# Patient Record
Sex: FEMALE | Race: WHITE | Employment: OTHER | URBAN - METROPOLITAN AREA
[De-identification: names, ages, dates, MRNs, and addresses within clinical notes are randomized per-mention and may not be internally consistent; named-entity substitution may affect disease eponyms.]

---

## 2017-08-08 ENCOUNTER — HOSPITAL ENCOUNTER (OUTPATIENT)
Dept: LAB | Age: 82
Discharge: HOME | End: 2017-08-08
Attending: INTERNAL MEDICINE
Payer: MEDICARE

## 2017-08-08 DIAGNOSIS — M81.8 OTHER OSTEOPOROSIS: ICD-10-CM

## 2017-08-08 DIAGNOSIS — E83.52 HYPERCALCEMIA: ICD-10-CM

## 2017-08-08 LAB
ALBUMIN SERPL BCP-MCNC: 4.1 G/DL (ref 3.2–4.6)
ALBUMIN/GLOB SERPL: 1.1 {RATIO}
ALP SERPL-CCNC: 77 U/L (ref 50–136)
ALT SERPL-CCNC: 21 U/L (ref 12–65)
ANION GAP BLD CALC-SCNC: 8 MMOL/L
AST SERPL W P-5'-P-CCNC: 17 U/L (ref 15–37)
BILIRUB SERPL-MCNC: 0.7 MG/DL (ref 0.2–1.1)
BUN SERPL-MCNC: 25 MG/DL (ref 8–23)
CA-I BLD-MCNC: 5.44 MG/DL (ref 4–5.2)
CALCIUM SERPL-MCNC: 10 MG/DL (ref 8.3–10.4)
CHLORIDE SERPL-SCNC: 107 MMOL/L (ref 98–107)
CO2 SERPL-SCNC: 25 MMOL/L (ref 21–32)
CREAT SERPL-MCNC: 1 MG/DL (ref 0.6–1)
CREAT UR-MCNC: 112 MG/DL
GLOBULIN SER CALC-MCNC: 3.6 G/DL
GLUCOSE SERPL-MCNC: 114 MG/DL (ref 65–100)
POTASSIUM SERPL-SCNC: 4.3 MMOL/L (ref 3.5–5.1)
PROT SERPL-MCNC: 7.7 G/DL (ref 6.3–8.2)
SODIUM SERPL-SCNC: 140 MMOL/L (ref 136–145)

## 2017-08-08 PROCEDURE — 82306 VITAMIN D 25 HYDROXY: CPT | Performed by: INTERNAL MEDICINE

## 2017-08-08 PROCEDURE — 82340 ASSAY OF CALCIUM IN URINE: CPT

## 2017-08-08 PROCEDURE — 36415 COLL VENOUS BLD VENIPUNCTURE: CPT | Performed by: INTERNAL MEDICINE

## 2017-08-08 PROCEDURE — 82330 ASSAY OF CALCIUM: CPT | Performed by: INTERNAL MEDICINE

## 2017-08-08 PROCEDURE — 80053 COMPREHEN METABOLIC PANEL: CPT | Performed by: INTERNAL MEDICINE

## 2017-08-08 PROCEDURE — 83970 ASSAY OF PARATHORMONE: CPT | Performed by: INTERNAL MEDICINE

## 2017-08-08 PROCEDURE — 82164 ANGIOTENSIN I ENZYME TEST: CPT | Performed by: INTERNAL MEDICINE

## 2017-08-08 PROCEDURE — 82570 ASSAY OF URINE CREATININE: CPT | Performed by: INTERNAL MEDICINE

## 2017-08-09 LAB
25(OH)D3+25(OH)D2 SERPL-MCNC: 48.8 NG/ML (ref 30–100)
ACE SERPL-CCNC: < 15 U/L (ref 14–82)

## 2017-08-11 LAB
CALCIUM SERPL-MCNC: 10 MG/DL (ref 8.3–10.4)
Lab: NORMAL
REFERENCE LAB,REFLB: NORMAL
TEST DESCRIPTION:,ATST: NORMAL

## 2017-08-17 ENCOUNTER — HOSPITAL ENCOUNTER (OUTPATIENT)
Dept: LAB | Age: 82
Discharge: HOME | End: 2017-08-17
Attending: INTERNAL MEDICINE
Payer: MEDICARE

## 2017-08-17 DIAGNOSIS — E83.52 HYPERCALCEMIA: ICD-10-CM

## 2017-08-17 LAB
ANION GAP SERPL CALC-SCNC: 13 MMOL/L
BUN SERPL-MCNC: 28 MG/DL (ref 8–23)
CALCIUM SERPL-MCNC: 9.7 MG/DL (ref 8.3–10.4)
CALCIUM SERPL-MCNC: 9.7 MG/DL (ref 8.3–10.4)
CHLORIDE SERPL-SCNC: 104 MMOL/L (ref 98–107)
CO2 SERPL-SCNC: 24 MMOL/L (ref 21–32)
CREAT SERPL-MCNC: 1.2 MG/DL (ref 0.6–1)
GLUCOSE SERPL-MCNC: 161 MG/DL (ref 65–100)
POTASSIUM SERPL-SCNC: 4.2 MMOL/L (ref 3.5–5.1)
PTH-INTACT SERPL-MCNC: 24.3 PG/ML (ref 14–72)
SODIUM SERPL-SCNC: 141 MMOL/L (ref 136–145)

## 2017-08-17 PROCEDURE — 36415 COLL VENOUS BLD VENIPUNCTURE: CPT | Performed by: INTERNAL MEDICINE

## 2017-08-17 PROCEDURE — 83970 ASSAY OF PARATHORMONE: CPT | Performed by: INTERNAL MEDICINE

## 2017-08-17 PROCEDURE — 80048 BASIC METABOLIC PNL TOTAL CA: CPT | Performed by: INTERNAL MEDICINE

## 2017-10-06 ENCOUNTER — ANESTHESIA (OUTPATIENT)
Dept: SURGERY | Age: 82
End: 2017-10-06
Payer: MEDICARE

## 2017-10-06 ENCOUNTER — ANESTHESIA EVENT (OUTPATIENT)
Dept: SURGERY | Age: 82
End: 2017-10-06
Payer: MEDICARE

## 2017-10-06 ENCOUNTER — HOSPITAL ENCOUNTER (OUTPATIENT)
Age: 82
Setting detail: OBSERVATION
Discharge: HOME | End: 2017-10-08
Attending: STUDENT IN AN ORGANIZED HEALTH CARE EDUCATION/TRAINING PROGRAM | Admitting: SURGERY
Payer: MEDICARE

## 2017-10-06 DIAGNOSIS — D72.829 LEUKOCYTOSIS, UNSPECIFIED TYPE: ICD-10-CM

## 2017-10-06 DIAGNOSIS — K81.0 ACUTE CHOLECYSTITIS: Primary | ICD-10-CM

## 2017-10-06 DIAGNOSIS — N17.9 ACUTE KIDNEY INJURY (HCC): ICD-10-CM

## 2017-10-06 PROBLEM — K80.10 CHOLECYSTITIS WITH CHOLELITHIASIS: Status: ACTIVE | Noted: 2017-10-06

## 2017-10-06 LAB
ALBUMIN SERPL-MCNC: 3.7 G/DL (ref 3.2–4.6)
ALBUMIN/GLOB SERPL: 0.9 {RATIO} (ref 1.2–3.5)
ALP SERPL-CCNC: 89 U/L (ref 50–136)
ALT SERPL-CCNC: 14 U/L (ref 12–65)
ANION GAP SERPL CALC-SCNC: 12 MMOL/L (ref 7–16)
AST SERPL-CCNC: 17 U/L (ref 15–37)
BACTERIA URNS QL MICRO: ABNORMAL /HPF
BASOPHILS # BLD: 0 K/UL (ref 0–0.2)
BASOPHILS NFR BLD: 0 % (ref 0–2)
BILIRUB SERPL-MCNC: 2.8 MG/DL (ref 0.2–1.1)
BUN SERPL-MCNC: 40 MG/DL (ref 8–23)
CALCIUM SERPL-MCNC: 9.6 MG/DL (ref 8.3–10.4)
CASTS URNS QL MICRO: ABNORMAL /LPF
CHLORIDE SERPL-SCNC: 97 MMOL/L (ref 98–107)
CO2 SERPL-SCNC: 24 MMOL/L (ref 21–32)
CREAT SERPL-MCNC: 2.28 MG/DL (ref 0.6–1)
CRYSTALS URNS QL MICRO: 0 /LPF
DIFFERENTIAL METHOD BLD: ABNORMAL
EOSINOPHIL # BLD: 0 K/UL (ref 0–0.8)
EOSINOPHIL NFR BLD: 0 % (ref 0.5–7.8)
EPI CELLS #/AREA URNS HPF: ABNORMAL /HPF
ERYTHROCYTE [DISTWIDTH] IN BLOOD BY AUTOMATED COUNT: 13.4 % (ref 11.9–14.6)
GLOBULIN SER CALC-MCNC: 4 G/DL (ref 2.3–3.5)
GLUCOSE BLD STRIP.AUTO-MCNC: 220 MG/DL (ref 65–100)
GLUCOSE BLD STRIP.AUTO-MCNC: 265 MG/DL (ref 65–100)
GLUCOSE SERPL-MCNC: 266 MG/DL (ref 65–100)
HCT VFR BLD AUTO: 36.5 % (ref 35.8–46.3)
HGB BLD-MCNC: 12.4 G/DL (ref 11.7–15.4)
IMM GRANULOCYTES # BLD: 0.3 K/UL (ref 0–0.5)
IMM GRANULOCYTES NFR BLD: 1.1 % (ref 0–5)
LIPASE SERPL-CCNC: 72 U/L (ref 73–393)
LYMPHOCYTES # BLD: 0.7 K/UL (ref 0.5–4.6)
LYMPHOCYTES NFR BLD: 3 % (ref 13–44)
MCH RBC QN AUTO: 29.8 PG (ref 26.1–32.9)
MCHC RBC AUTO-ENTMCNC: 34 G/DL (ref 31.4–35)
MCV RBC AUTO: 87.7 FL (ref 79.6–97.8)
MONOCYTES # BLD: 1 K/UL (ref 0.1–1.3)
MONOCYTES NFR BLD: 4 % (ref 4–12)
MUCOUS THREADS URNS QL MICRO: 0 /LPF
NEUTS SEG # BLD: 22.9 K/UL (ref 1.7–8.2)
NEUTS SEG NFR BLD: 92 % (ref 43–78)
PLATELET # BLD AUTO: 202 K/UL (ref 150–450)
PMV BLD AUTO: 11 FL (ref 10.8–14.1)
POTASSIUM SERPL-SCNC: 4.1 MMOL/L (ref 3.5–5.1)
PROT SERPL-MCNC: 7.7 G/DL (ref 6.3–8.2)
RBC # BLD AUTO: 4.16 M/UL (ref 4.05–5.25)
RBC #/AREA URNS HPF: ABNORMAL /HPF
SODIUM SERPL-SCNC: 133 MMOL/L (ref 136–145)
WBC # BLD AUTO: 24.9 K/UL (ref 4.3–11.1)
WBC URNS QL MICRO: ABNORMAL /HPF

## 2017-10-06 PROCEDURE — 77030000038 HC TIP SCIS LAPSCP SURI -B: Performed by: SURGERY

## 2017-10-06 PROCEDURE — 99218 HC RM OBSERVATION: CPT

## 2017-10-06 PROCEDURE — 77030008477 HC STYL SATN SLP COVD -A: Performed by: ANESTHESIOLOGY

## 2017-10-06 PROCEDURE — 76210000006 HC OR PH I REC 0.5 TO 1 HR: Performed by: SURGERY

## 2017-10-06 PROCEDURE — 74011000250 HC RX REV CODE- 250

## 2017-10-06 PROCEDURE — 76010000161 HC OR TIME 1 TO 1.5 HR INTENSV-TIER 1: Performed by: SURGERY

## 2017-10-06 PROCEDURE — 77030019908 HC STETH ESOPH SIMS -A: Performed by: ANESTHESIOLOGY

## 2017-10-06 PROCEDURE — 77030018836 HC SOL IRR NACL ICUM -A: Performed by: SURGERY

## 2017-10-06 PROCEDURE — 77030031139 HC SUT VCRL2 J&J -A: Performed by: SURGERY

## 2017-10-06 PROCEDURE — 77030011640 HC PAD GRND REM COVD -A: Performed by: SURGERY

## 2017-10-06 PROCEDURE — 74011250636 HC RX REV CODE- 250/636: Performed by: SURGERY

## 2017-10-06 PROCEDURE — 77030012022 HC APPL CLP ENDOSC COVD -C: Performed by: SURGERY

## 2017-10-06 PROCEDURE — 80053 COMPREHEN METABOLIC PANEL: CPT | Performed by: EMERGENCY MEDICINE

## 2017-10-06 PROCEDURE — 77030035048 HC TRCR ENDOSC OPTCL COVD -B: Performed by: SURGERY

## 2017-10-06 PROCEDURE — 77030008703 HC TU ET UNCUF COVD -A: Performed by: ANESTHESIOLOGY

## 2017-10-06 PROCEDURE — 81015 MICROSCOPIC EXAM OF URINE: CPT | Performed by: STUDENT IN AN ORGANIZED HEALTH CARE EDUCATION/TRAINING PROGRAM

## 2017-10-06 PROCEDURE — 88304 TISSUE EXAM BY PATHOLOGIST: CPT | Performed by: SURGERY

## 2017-10-06 PROCEDURE — 74011250636 HC RX REV CODE- 250/636: Performed by: STUDENT IN AN ORGANIZED HEALTH CARE EDUCATION/TRAINING PROGRAM

## 2017-10-06 PROCEDURE — 74011000258 HC RX REV CODE- 258: Performed by: STUDENT IN AN ORGANIZED HEALTH CARE EDUCATION/TRAINING PROGRAM

## 2017-10-06 PROCEDURE — 76060000033 HC ANESTHESIA 1 TO 1.5 HR: Performed by: SURGERY

## 2017-10-06 PROCEDURE — 85025 COMPLETE CBC W/AUTO DIFF WBC: CPT | Performed by: EMERGENCY MEDICINE

## 2017-10-06 PROCEDURE — 77030008467 HC STPLR SKN COVD -B: Performed by: SURGERY

## 2017-10-06 PROCEDURE — 96361 HYDRATE IV INFUSION ADD-ON: CPT | Performed by: STUDENT IN AN ORGANIZED HEALTH CARE EDUCATION/TRAINING PROGRAM

## 2017-10-06 PROCEDURE — 82962 GLUCOSE BLOOD TEST: CPT

## 2017-10-06 PROCEDURE — 77030020782 HC GWN BAIR PAWS FLX 3M -B: Performed by: ANESTHESIOLOGY

## 2017-10-06 PROCEDURE — 77030035051: Performed by: SURGERY

## 2017-10-06 PROCEDURE — 74011000250 HC RX REV CODE- 250: Performed by: SURGERY

## 2017-10-06 PROCEDURE — 77030008756 HC TU IRR SUC STRY -B: Performed by: SURGERY

## 2017-10-06 PROCEDURE — 77030036733 HC ENDOLP LIG VCRL SUT J&J -C: Performed by: SURGERY

## 2017-10-06 PROCEDURE — 96365 THER/PROPH/DIAG IV INF INIT: CPT | Performed by: STUDENT IN AN ORGANIZED HEALTH CARE EDUCATION/TRAINING PROGRAM

## 2017-10-06 PROCEDURE — 74011250637 HC RX REV CODE- 250/637: Performed by: ANESTHESIOLOGY

## 2017-10-06 PROCEDURE — 74011250636 HC RX REV CODE- 250/636: Performed by: ANESTHESIOLOGY

## 2017-10-06 PROCEDURE — 77030037892: Performed by: SURGERY

## 2017-10-06 PROCEDURE — 74011000258 HC RX REV CODE- 258: Performed by: SURGERY

## 2017-10-06 PROCEDURE — 77030032490 HC SLV COMPR SCD KNE COVD -B: Performed by: SURGERY

## 2017-10-06 PROCEDURE — 74011250636 HC RX REV CODE- 250/636

## 2017-10-06 PROCEDURE — 77030035220 HC TRCR ENDOSC BLNTPRT ANCHR COVD -B: Performed by: SURGERY

## 2017-10-06 PROCEDURE — 96375 TX/PRO/DX INJ NEW DRUG ADDON: CPT | Performed by: STUDENT IN AN ORGANIZED HEALTH CARE EDUCATION/TRAINING PROGRAM

## 2017-10-06 PROCEDURE — 77030008518 HC TBNG INSUF ENDO STRY -B: Performed by: SURGERY

## 2017-10-06 PROCEDURE — 83690 ASSAY OF LIPASE: CPT | Performed by: EMERGENCY MEDICINE

## 2017-10-06 PROCEDURE — 99284 EMERGENCY DEPT VISIT MOD MDM: CPT | Performed by: STUDENT IN AN ORGANIZED HEALTH CARE EDUCATION/TRAINING PROGRAM

## 2017-10-06 PROCEDURE — 81003 URINALYSIS AUTO W/O SCOPE: CPT | Performed by: STUDENT IN AN ORGANIZED HEALTH CARE EDUCATION/TRAINING PROGRAM

## 2017-10-06 RX ORDER — SODIUM CHLORIDE, SODIUM LACTATE, POTASSIUM CHLORIDE, CALCIUM CHLORIDE 600; 310; 30; 20 MG/100ML; MG/100ML; MG/100ML; MG/100ML
100 INJECTION, SOLUTION INTRAVENOUS CONTINUOUS
Status: DISCONTINUED | OUTPATIENT
Start: 2017-10-06 | End: 2017-10-06 | Stop reason: HOSPADM

## 2017-10-06 RX ORDER — ONDANSETRON 2 MG/ML
4 INJECTION INTRAMUSCULAR; INTRAVENOUS
Status: DISCONTINUED | OUTPATIENT
Start: 2017-10-06 | End: 2017-10-08 | Stop reason: HOSPADM

## 2017-10-06 RX ORDER — SODIUM CHLORIDE 0.9 % (FLUSH) 0.9 %
5-10 SYRINGE (ML) INJECTION AS NEEDED
Status: DISCONTINUED | OUTPATIENT
Start: 2017-10-06 | End: 2017-10-08 | Stop reason: HOSPADM

## 2017-10-06 RX ORDER — SODIUM CHLORIDE 0.9 % (FLUSH) 0.9 %
5-10 SYRINGE (ML) INJECTION EVERY 8 HOURS
Status: DISCONTINUED | OUTPATIENT
Start: 2017-10-06 | End: 2017-10-08 | Stop reason: HOSPADM

## 2017-10-06 RX ORDER — OXYCODONE AND ACETAMINOPHEN 7.5; 325 MG/1; MG/1
1 TABLET ORAL
Status: DISCONTINUED | OUTPATIENT
Start: 2017-10-06 | End: 2017-10-08 | Stop reason: HOSPADM

## 2017-10-06 RX ORDER — DEXTROSE MONOHYDRATE AND SODIUM CHLORIDE 5; .45 G/100ML; G/100ML
100 INJECTION, SOLUTION INTRAVENOUS CONTINUOUS
Status: DISCONTINUED | OUTPATIENT
Start: 2017-10-06 | End: 2017-10-07 | Stop reason: SDUPTHER

## 2017-10-06 RX ORDER — OXYCODONE HYDROCHLORIDE 5 MG/1
5 TABLET ORAL
Status: DISCONTINUED | OUTPATIENT
Start: 2017-10-06 | End: 2017-10-06 | Stop reason: HOSPADM

## 2017-10-06 RX ORDER — ONDANSETRON 2 MG/ML
4 INJECTION INTRAMUSCULAR; INTRAVENOUS
Status: COMPLETED | OUTPATIENT
Start: 2017-10-06 | End: 2017-10-06

## 2017-10-06 RX ORDER — SPIRONOLACTONE 25 MG/1
25 TABLET ORAL DAILY
Status: DISCONTINUED | OUTPATIENT
Start: 2017-10-07 | End: 2017-10-08 | Stop reason: HOSPADM

## 2017-10-06 RX ORDER — PROPOFOL 10 MG/ML
INJECTION, EMULSION INTRAVENOUS AS NEEDED
Status: DISCONTINUED | OUTPATIENT
Start: 2017-10-06 | End: 2017-10-06 | Stop reason: HOSPADM

## 2017-10-06 RX ORDER — PANTOPRAZOLE SODIUM 40 MG/1
40 TABLET, DELAYED RELEASE ORAL
Status: DISCONTINUED | OUTPATIENT
Start: 2017-10-07 | End: 2017-10-08 | Stop reason: HOSPADM

## 2017-10-06 RX ORDER — LIDOCAINE HYDROCHLORIDE 20 MG/ML
INJECTION, SOLUTION EPIDURAL; INFILTRATION; INTRACAUDAL; PERINEURAL AS NEEDED
Status: DISCONTINUED | OUTPATIENT
Start: 2017-10-06 | End: 2017-10-06 | Stop reason: HOSPADM

## 2017-10-06 RX ORDER — MORPHINE SULFATE 2 MG/ML
4 INJECTION, SOLUTION INTRAMUSCULAR; INTRAVENOUS
Status: COMPLETED | OUTPATIENT
Start: 2017-10-06 | End: 2017-10-06

## 2017-10-06 RX ORDER — GUAIFENESIN 100 MG/5ML
81 LIQUID (ML) ORAL DAILY
Status: DISCONTINUED | OUTPATIENT
Start: 2017-10-07 | End: 2017-10-06 | Stop reason: SDUPTHER

## 2017-10-06 RX ORDER — ONDANSETRON 2 MG/ML
INJECTION INTRAMUSCULAR; INTRAVENOUS AS NEEDED
Status: DISCONTINUED | OUTPATIENT
Start: 2017-10-06 | End: 2017-10-06 | Stop reason: HOSPADM

## 2017-10-06 RX ORDER — ROCURONIUM BROMIDE 10 MG/ML
INJECTION, SOLUTION INTRAVENOUS AS NEEDED
Status: DISCONTINUED | OUTPATIENT
Start: 2017-10-06 | End: 2017-10-06 | Stop reason: HOSPADM

## 2017-10-06 RX ORDER — BUPIVACAINE HYDROCHLORIDE 5 MG/ML
INJECTION, SOLUTION EPIDURAL; INTRACAUDAL AS NEEDED
Status: DISCONTINUED | OUTPATIENT
Start: 2017-10-06 | End: 2017-10-06 | Stop reason: HOSPADM

## 2017-10-06 RX ORDER — HYDROMORPHONE HYDROCHLORIDE 1 MG/ML
0.5 INJECTION, SOLUTION INTRAMUSCULAR; INTRAVENOUS; SUBCUTANEOUS
Status: DISCONTINUED | OUTPATIENT
Start: 2017-10-06 | End: 2017-10-08 | Stop reason: HOSPADM

## 2017-10-06 RX ORDER — HYDROMORPHONE HYDROCHLORIDE 1 MG/ML
0.5 INJECTION, SOLUTION INTRAMUSCULAR; INTRAVENOUS; SUBCUTANEOUS
Status: DISCONTINUED | OUTPATIENT
Start: 2017-10-06 | End: 2017-10-07 | Stop reason: SDUPTHER

## 2017-10-06 RX ORDER — FENTANYL CITRATE 50 UG/ML
100 INJECTION, SOLUTION INTRAMUSCULAR; INTRAVENOUS ONCE
Status: DISCONTINUED | OUTPATIENT
Start: 2017-10-06 | End: 2017-10-06 | Stop reason: HOSPADM

## 2017-10-06 RX ORDER — LISINOPRIL 20 MG/1
40 TABLET ORAL DAILY
Status: DISCONTINUED | OUTPATIENT
Start: 2017-10-07 | End: 2017-10-08 | Stop reason: HOSPADM

## 2017-10-06 RX ORDER — CARVEDILOL 12.5 MG/1
12.5 TABLET ORAL 2 TIMES DAILY WITH MEALS
Status: DISCONTINUED | OUTPATIENT
Start: 2017-10-07 | End: 2017-10-08 | Stop reason: HOSPADM

## 2017-10-06 RX ORDER — MIDAZOLAM HYDROCHLORIDE 1 MG/ML
2 INJECTION, SOLUTION INTRAMUSCULAR; INTRAVENOUS
Status: DISCONTINUED | OUTPATIENT
Start: 2017-10-06 | End: 2017-10-06 | Stop reason: HOSPADM

## 2017-10-06 RX ORDER — SUCCINYLCHOLINE CHLORIDE 20 MG/ML
INJECTION INTRAMUSCULAR; INTRAVENOUS AS NEEDED
Status: DISCONTINUED | OUTPATIENT
Start: 2017-10-06 | End: 2017-10-06 | Stop reason: HOSPADM

## 2017-10-06 RX ORDER — SODIUM CHLORIDE 9 MG/ML
75 INJECTION, SOLUTION INTRAVENOUS CONTINUOUS
Status: DISCONTINUED | OUTPATIENT
Start: 2017-10-06 | End: 2017-10-08 | Stop reason: HOSPADM

## 2017-10-06 RX ORDER — ZOLPIDEM TARTRATE 5 MG/1
5 TABLET ORAL
Status: DISCONTINUED | OUTPATIENT
Start: 2017-10-06 | End: 2017-10-08 | Stop reason: HOSPADM

## 2017-10-06 RX ORDER — ONDANSETRON 4 MG/1
4 TABLET, ORALLY DISINTEGRATING ORAL
Status: DISCONTINUED | OUTPATIENT
Start: 2017-10-06 | End: 2017-10-08 | Stop reason: HOSPADM

## 2017-10-06 RX ORDER — HYDROMORPHONE HYDROCHLORIDE 2 MG/ML
0.5 INJECTION, SOLUTION INTRAMUSCULAR; INTRAVENOUS; SUBCUTANEOUS
Status: DISCONTINUED | OUTPATIENT
Start: 2017-10-06 | End: 2017-10-06 | Stop reason: HOSPADM

## 2017-10-06 RX ORDER — GUAIFENESIN 100 MG/5ML
81 LIQUID (ML) ORAL
Status: COMPLETED | OUTPATIENT
Start: 2017-10-06 | End: 2017-10-06

## 2017-10-06 RX ORDER — MIDAZOLAM HYDROCHLORIDE 1 MG/ML
2 INJECTION, SOLUTION INTRAMUSCULAR; INTRAVENOUS ONCE
Status: DISCONTINUED | OUTPATIENT
Start: 2017-10-06 | End: 2017-10-06 | Stop reason: HOSPADM

## 2017-10-06 RX ORDER — LIDOCAINE HYDROCHLORIDE 10 MG/ML
0.1 INJECTION INFILTRATION; PERINEURAL AS NEEDED
Status: DISCONTINUED | OUTPATIENT
Start: 2017-10-06 | End: 2017-10-06 | Stop reason: HOSPADM

## 2017-10-06 RX ADMIN — SUCCINYLCHOLINE CHLORIDE 140 MG: 20 INJECTION INTRAMUSCULAR; INTRAVENOUS at 16:36

## 2017-10-06 RX ADMIN — LIDOCAINE HYDROCHLORIDE 60 MG: 20 INJECTION, SOLUTION EPIDURAL; INFILTRATION; INTRACAUDAL; PERINEURAL at 16:36

## 2017-10-06 RX ADMIN — SODIUM CHLORIDE 75 ML/HR: 900 INJECTION, SOLUTION INTRAVENOUS at 19:43

## 2017-10-06 RX ADMIN — SODIUM CHLORIDE, SODIUM LACTATE, POTASSIUM CHLORIDE, AND CALCIUM CHLORIDE: 600; 310; 30; 20 INJECTION, SOLUTION INTRAVENOUS at 16:29

## 2017-10-06 RX ADMIN — PIPERACILLIN SODIUM,TAZOBACTAM SODIUM 4.5 G: 4; .5 INJECTION, POWDER, FOR SOLUTION INTRAVENOUS at 14:55

## 2017-10-06 RX ADMIN — ROCURONIUM BROMIDE 10 MG: 10 INJECTION, SOLUTION INTRAVENOUS at 16:36

## 2017-10-06 RX ADMIN — PROPOFOL 150 MG: 10 INJECTION, EMULSION INTRAVENOUS at 16:36

## 2017-10-06 RX ADMIN — MORPHINE SULFATE 4 MG: 2 INJECTION, SOLUTION INTRAMUSCULAR; INTRAVENOUS at 13:44

## 2017-10-06 RX ADMIN — ROCURONIUM BROMIDE 15 MG: 10 INJECTION, SOLUTION INTRAVENOUS at 16:47

## 2017-10-06 RX ADMIN — ONDANSETRON 4 MG: 2 INJECTION INTRAMUSCULAR; INTRAVENOUS at 17:43

## 2017-10-06 RX ADMIN — ONDANSETRON 4 MG: 2 INJECTION INTRAMUSCULAR; INTRAVENOUS at 13:43

## 2017-10-06 RX ADMIN — SODIUM CHLORIDE 500 ML: 9 INJECTION, SOLUTION INTRAVENOUS at 13:43

## 2017-10-06 RX ADMIN — ASPIRIN 81 MG 81 MG: 81 TABLET ORAL at 19:49

## 2017-10-06 RX ADMIN — OXYCODONE HYDROCHLORIDE 5 MG: 5 TABLET ORAL at 18:05

## 2017-10-06 RX ADMIN — SODIUM CHLORIDE 3 G: 900 INJECTION, SOLUTION INTRAVENOUS at 19:44

## 2017-10-06 NOTE — ANESTHESIA POSTPROCEDURE EVALUATION
Post-Anesthesia Evaluation and Assessment    Patient: Irina Morel MRN: [id number]  SSN: [id number]    YOB: 1934  Age: 80 y.o. Sex: female       Cardiovascular Function/Vital Signs  Visit Vitals    BP 97/51    Pulse 80    Temp 36.7 °C (98 °F)    Resp 14    Ht 4' 9.5\" (1.461 m)    Wt 57.2 kg (126 lb)    SpO2 93%    BMI 26.79 kg/m2     Bedside sat 95%. Patient is status post general anesthesia for Procedure(s):  CHOLECYSTECTOMY LAPAROSCOPIC. Nausea/Vomiting: None    Postoperative hydration reviewed and adequate. Pain:  Pain Scale 1: Numeric (0 - 10) (10/06/17 1822)  Pain Intensity 1: 3 (10/06/17 1822)   Managed    Neurological Status:   Neuro (WDL): Within Defined Limits (10/06/17 1822)  Neuro  Neurologic State: Appropriate for age (10/06/17 4089)  Orientation Level: Oriented X4 (10/06/17 1822)  Cognition: Appropriate decision making; Appropriate safety awareness; Follows commands (10/06/17 1320)  LUE Motor Response: Purposeful (10/06/17 1822)  LLE Motor Response: Purposeful (10/06/17 1822)  RUE Motor Response: Purposeful (10/06/17 1822)  RLE Motor Response: Purposeful (10/06/17 1822)   At baseline    Mental Status and Level of Consciousness: Awake. Pulmonary Status:   O2 Device: Nasal cannula (10/06/17 1822)   Adequate oxygenation and airway patent    Complications related to anesthesia: None    Post-anesthesia assessment completed.  No concerns    Signed By: Duke Marinelli MD     October 6, 2017

## 2017-10-06 NOTE — ED PROVIDER NOTES
HPI Comments: 80-year-old female patient presents with reports of worsening abdominal pain ×3 days. Pain is localized over the right upper quadrant, nonradiating and worsening since onset. Patient was seen by her primary care provider ordered an ultrasound as outpatient. This test was performed yesterday and displayed findings concerning for acute cholecystitis. Patient reports 3 episodes of nonbilious, nonbloody vomiting, subjective chills with her symptoms. She denies any significant increase in pain with oral intake. Patient reports a history of a hysterectomy in the past but denies any other abdominal surgeries. Patient is a 80 y.o. female presenting with abdominal pain. The history is provided by the patient. No  was used. Abdominal Pain    This is a new problem. The current episode started more than 2 days ago. The problem occurs constantly. The problem has been gradually worsening. The pain is associated with vomiting and eating. The pain is located in the RUQ. The quality of the pain is aching, dull and sharp. The pain is at a severity of 7/10. The pain is moderate. Associated symptoms include diarrhea, nausea and vomiting. Pertinent negatives include no anorexia, no fever, no belching, no flatus, no hematochezia, no melena, no constipation, no dysuria, no frequency, no hematuria, no headaches, no arthralgias, no myalgias, no trauma, no chest pain, no testicular pain and no back pain. The pain is relieved by nothing. Past workup includes ultrasound. The patient's surgical history includes hysterectomy.        Past Medical History:   Diagnosis Date    Acute kidney failure 05/2016    Acute pericarditis 2015    Arthritis     CAP (community acquired pneumonia) 5/2016    Coronary artery disease     Crohn's disease     Hypercalcemia     Hypercholesterolemia     Hypertension     Lumbar compression fracture     Osteoporosis     Type 2 diabetes mellitus        Past Surgical History:   Procedure Laterality Date    HX ARTHROPLASTY  1/5/2016    left elbow    HX HEART CATHETERIZATION  5/12/2015    no intervention    HX HYSTERECTOMY  1978    HX KYPHOPLASTY  ~2002    lumbar         Family History:   Problem Relation Age of Onset    Alcohol abuse Neg Hx     Arthritis-rheumatoid Neg Hx     Asthma Neg Hx     Bleeding Prob Neg Hx     Elevated Lipids Neg Hx     Headache Neg Hx     Hypertension Neg Hx     Lung Disease Neg Hx     Migraines Neg Hx     Psychiatric Disorder Neg Hx     Stroke Neg Hx     Mental Retardation Neg Hx     Heart Failure Mother     Diabetes Mother     Cancer Father      throat    Thyroid Disease Neg Hx        Social History     Social History    Marital status:      Spouse name: N/A    Number of children: N/A    Years of education: N/A     Occupational History    Not on file. Social History Main Topics    Smoking status: Former Smoker     Types: Cigarettes     Start date: 6/14/1961     Quit date: 6/14/1964    Smokeless tobacco: Never Used      Comment: 2 packs a wk    Alcohol use 0.0 oz/week     0 Standard drinks or equivalent per week      Comment: rarely    Drug use: No    Sexual activity: No     Other Topics Concern    Not on file     Social History Narrative    Pt , lives with . Retired. She worked as an . She has 1 dog and 1 cat at home. ALLERGIES: Sulfa (sulfonamide antibiotics); Other medication; and Tape [adhesive]    Review of Systems   Constitutional: Negative for chills, diaphoresis and fever. HENT: Negative for congestion, sneezing and sore throat. Eyes: Negative for visual disturbance. Respiratory: Negative for cough, chest tightness, shortness of breath and wheezing. Cardiovascular: Negative for chest pain and leg swelling. Gastrointestinal: Positive for abdominal pain, diarrhea, nausea and vomiting.  Negative for anorexia, blood in stool, constipation, flatus, hematochezia and melena. Endocrine: Negative for polyuria. Genitourinary: Negative for difficulty urinating, dysuria, flank pain, frequency, hematuria, testicular pain and urgency. Musculoskeletal: Negative for arthralgias, back pain, myalgias, neck pain and neck stiffness. Skin: Negative for color change and rash. Neurological: Negative for dizziness, syncope, speech difficulty, weakness, light-headedness, numbness and headaches. Psychiatric/Behavioral: Negative for behavioral problems. All other systems reviewed and are negative. Vitals:    10/06/17 1208   BP: 115/66   Pulse: 72   Resp: 26   Temp: 98.1 °F (36.7 °C)   SpO2: 93%   Weight: 57.2 kg (126 lb)   Height: 4' 9.5\" (1.461 m)            Physical Exam   Constitutional: She is oriented to person, place, and time. She appears well-developed and well-nourished. No distress. Alert and oriented to person, place and time. No acute distress. Speaks in clear, fluent sentences. HENT:   Head: Normocephalic and atraumatic. Nose: Nose normal.   Eyes: Conjunctivae and EOM are normal. Pupils are equal, round, and reactive to light. Neck: Normal range of motion. Neck supple. No JVD present. No tracheal deviation present. Cardiovascular: Normal rate, regular rhythm, S1 normal, S2 normal, normal heart sounds and intact distal pulses. Exam reveals no gallop, no distant heart sounds and no friction rub. No murmur heard. Pulmonary/Chest: Effort normal and breath sounds normal. No accessory muscle usage or stridor. No tachypnea and no bradypnea. No respiratory distress. She has no decreased breath sounds. She has no wheezes. She has no rhonchi. She has no rales. She exhibits no tenderness. Abdominal: Soft. Normal appearance. She exhibits no distension and no mass. There is no hepatosplenomegaly, splenomegaly or hepatomegaly. There is tenderness in the right upper quadrant. There is positive Wolf's sign.  There is no rigidity, no rebound, no guarding, no CVA tenderness and no tenderness at McBurney's point. Right upper quadrant abdominal pain with positive Wolf sign on exam.  Also pain in the right mid to lower quadrant as well. Significant distention on exam.  There is no reproducible CVA tenderness. Musculoskeletal: Normal range of motion. She exhibits no edema, tenderness or deformity. Neurological: She is alert and oriented to person, place, and time. No cranial nerve deficit. Skin: Skin is warm and dry. No rash noted. She is not diaphoretic. Psychiatric: She has a normal mood and affect. Her behavior is normal.   Nursing note and vitals reviewed. MDM  Number of Diagnoses or Management Options  Acute cholecystitis: new and requires workup  Acute kidney injury [facility]): new and requires workup  Leukocytosis, unspecified type: new and requires workup  Diagnosis management comments: Patient underwent ultrasound imaging through the [facility] system yesterday. Findings concerning for acute cholecystitis secondary to gallbladder wall thickening and intraluminal stones present. Patient arrives today for surgical evaluation after the results were called to her primary care provider. She presents with a significant leukocytosis 24, mild elevation in her creatinine level as well. She reports decreased food and fluid intake but does have a history of kidney disease. DATE OF EXAM: 10/05/2017 16:30  REASON FOR EXAM: R10.11^Right upper quadrant pain^I10    ADMISSION DATE: 10/05/2017 17:06        EXAM:  Abdominal ultrasound    COMPARISON:  NONE    HISTORY: Right upper quadrant pain    TECHNIQUE:  Multi gray-scale and color Doppler images of the abdomen performed. FINDINGS:  Pancreas:  No focal mass. IVC:Unremarkable. Aorta:  Unremarkable. Liver:  Maximal craniocaudal dimension 18.1 cm.   No focal mass present. There is appropriate direction of flow in the portal vein.   Common bile duct:  Maximal diameter 6mm.    Gallbladder:  Wall thickening and heterogeneity of the echogenicity present.  Echogenic shadowing stones are noted.  The patient is diffusely tender in the right upper quadrant. Radha Hong kidney unremarkable.  Left kidney demonstrates a single cyst with thin septation    Impression:  1..  Wall thickening of the gallbladder with heterogeneity of the wall echogenicity with intraluminal stones present.  Findings suspicious for cholecystitis. Findings called to the referring clinician Dr. Flower Seo at 6:15 p.m. IV Zosyn initiated at this time. Spoke with on-call surgical service agrees to evaluate patient for admission. Nothing by mouth diet ordered as well.        Amount and/or Complexity of Data Reviewed  Clinical lab tests: ordered and reviewed  Tests in the radiology section of CPT®: ordered and reviewed  Tests in the medicine section of CPT®: ordered and reviewed  Review and summarize past medical records: yes  Discuss the patient with other providers: yes  Independent visualization of images, tracings, or specimens: yes    Risk of Complications, Morbidity, and/or Mortality  Presenting problems: moderate  Diagnostic procedures: low  Management options: moderate    Patient Progress  Patient progress: stable    ED Course       Procedures

## 2017-10-06 NOTE — PROGRESS NOTES
Appreciate Dr Graciela Cook input. Will proceed with cholecystectomy,procedure and all risks discussed. Pt agrees to proceed.

## 2017-10-06 NOTE — PERIOP NOTES
TRANSFER - OUT REPORT:    Verbal report given to Orville RN(name) on Ephriam Channel  being transferred to 220(unit) for routine post - op       Report consisted of patients Situation, Background, Assessment and   Recommendations(SBAR). Information from the following report(s) SBAR, OR Summary, Procedure Summary, Intake/Output and MAR was reviewed with the receiving nurse. Lines:   Peripheral IV 10/06/17 Right Arm (Active)   Site Assessment Clean, dry, & intact 10/6/2017  6:22 PM   Phlebitis Assessment 0 10/6/2017  6:22 PM   Infiltration Assessment 0 10/6/2017  6:22 PM   Dressing Status Clean, dry, & intact 10/6/2017  6:22 PM   Dressing Type Transparent 10/6/2017  6:22 PM   Hub Color/Line Status Pink 10/6/2017  6:22 PM   Alcohol Cap Used No 10/6/2017  6:22 PM        Opportunity for questions and clarification was provided. Patient transported with:   O2 @ 1.5 liters    VTE prophylaxis orders have been written for Ephriam Channel. Patient and family given floor number and nurses name. Family updated re: pt status after security code verified.

## 2017-10-06 NOTE — H&P
Gallbladder History and Physical    Subjective:     Patient is a 80 y.o.  female who presents with abdominal pain. Onset of symptoms was abrupt with unchanged course since that time. The pain is located in the RUQ without radiation. Patient describes the pain as pressure and sharp, continuous, and rated as severe. Additional biliary/liver symptoms include RUQ Pain. Pancreatic symptoms include none. Previous studies include ultrasound showing gallstones and a thick walled GB. This has been going on since Wednesday. WBC 24.9 and T.Bili is 2.8.     Patient Active Problem List    Diagnosis Date Noted    Osteoporosis 07/06/2016    History of implantation of joint prosthesis of elbow 05/18/2016    Electrolyte abnormality 05/11/2016    CAP (community acquired pneumonia) 05/09/2016    LIZ (acute kidney injury) ([address] 05/09/2016    Non-PTH-dependent hypercalcemia 05/09/2016    Coronary artery disease involving native coronary artery of native heart with angina pectoris ([address] 05/03/2016    Dyslipidemia 05/03/2016    History of artificial joint 02/03/2016    Supracondylar fracture of humerus 01/04/2016    Precordial pain 09/04/2015    Nonspecific abnormal electrocardiogram (ECG) (EKG) 09/04/2015    Pericarditis, acute 05/12/2015    DM type 2 (diabetes mellitus, type 2) ([address] 03/16/2013    Essential hypertension with goal blood pressure less than 140/90 03/16/2013    Crohn disease ([address] 03/16/2013    Other and unspecified hyperlipidemia 03/16/2013     Past Medical History:   Diagnosis Date    Acute kidney failure 05/2016    Acute pericarditis 2015    Arthritis     CAP (community acquired pneumonia) 5/2016    Coronary artery disease     Crohn's disease     Hypercalcemia     Hypercholesterolemia     Hypertension     Lumbar compression fracture     Osteoporosis     Type 2 diabetes mellitus       Past Surgical History:   Procedure Laterality Date    HX ARTHROPLASTY  1/5/2016    left elbow    HX HEART CATHETERIZATION  5/12/2015    no intervention    HX HYSTERECTOMY  1978    HX KYPHOPLASTY  ~2002    lumbar     Social History   Substance Use Topics    Smoking status: Former Smoker     Types: Cigarettes     Start date: 6/14/1961     Quit date: 6/14/1964    Smokeless tobacco: Never Used      Comment: 2 packs a wk    Alcohol use 0.0 oz/week     0 Standard drinks or equivalent per week      Comment: rarely      Family History   Problem Relation Age of Onset    Alcohol abuse Neg Hx     Arthritis-rheumatoid Neg Hx     Asthma Neg Hx     Bleeding Prob Neg Hx     Elevated Lipids Neg Hx     Headache Neg Hx     Hypertension Neg Hx     Lung Disease Neg Hx     Migraines Neg Hx     Psychiatric Disorder Neg Hx     Stroke Neg Hx     Mental Retardation Neg Hx     Heart Failure Mother     Diabetes Mother     Cancer Father      throat    Thyroid Disease Neg Hx        Prior to Admission medications    Medication Sig Start Date End Date Taking? Authorizing Provider   atorvastatin (LIPITOR) 40 mg tablet Take  by mouth daily. Historical Provider   ZINC PO Take  by mouth. Historical Provider   acetaminophen (TYLENOL EXTRA STRENGTH) 500 mg tablet Take  by mouth every six (6) hours as needed for Pain. Historical Provider   spironolactone (ALDACTONE) 25 mg tablet Take 1 Tab by mouth daily. 8/16/16   Armando Echavarria MD   cyanocobalamin 1,000 mcg tablet Take 2,500 mcg by mouth daily. Historical Provider   lisinopril (PRINIVIL, ZESTRIL) 40 mg tablet Take 40 mg by mouth daily. 9/30/15   Historical Provider   ranitidine (ZANTAC) 150 mg tablet Take 150 mg by mouth daily. 2/16/16   Historical Provider   cholecalciferol (VITAMIN D3) 1,000 unit cap Take  by mouth two (2) times a day. Historical Provider   omeprazole (PRILOSEC) 40 mg capsule Take 40 mg by mouth daily. 3/24/16   Historical Provider   B.infantis-B.ani-B.long-B.bifi (PROBIOTIC 4X) 10-15 mg Tab Take  by mouth daily.     Leon Barker MD carvedilol (COREG) 12.5 mg tablet Take 12.5 mg by mouth two (2) times daily (with meals). Historical Provider   METFORMIN 500 mg tablet Take 1,000 mg by mouth two (2) times daily (with meals). Leon Barker MD   aspirin 81 mg Tab take by mouth. Leon Barker MD     Allergies   Allergen Reactions    Sulfa (Sulfonamide Antibiotics) Rash    Other Medication Unknown (comments)     \"Dypentin\"    Tape [Adhesive] Other (comments)     Blisters and skin tears        Review of Systems   Gastrointestinal: Positive for abdominal pain, nausea and vomiting. All other systems reviewed and are negative. Objective:     Patient Vitals for the past 8 hrs:   BP Temp Pulse Resp SpO2 Height Weight   10/06/17 1427 107/68 - 70 - 92 % - -   10/06/17 1208 115/66 98.1 °F (36.7 °C) 72 26 93 % 4' 9.5\" (1.461 m) 126 lb (57.2 kg)       Physical Exam   Constitutional: She is oriented to person, place, and time. She appears well-developed and well-nourished. No distress. HENT:   Head: Normocephalic and atraumatic. Eyes: Conjunctivae and EOM are normal. Pupils are equal, round, and reactive to light. No scleral icterus. Neck: Normal range of motion. Neck supple. Cardiovascular: Normal rate, regular rhythm and normal heart sounds. Pulmonary/Chest: Effort normal and breath sounds normal. No respiratory distress. She has no wheezes. Abdominal: Soft. Bowel sounds are normal. She exhibits no distension and no mass. There is tenderness. There is guarding. There is no rebound. Musculoskeletal: Normal range of motion. Neurological: She is alert and oriented to person, place, and time. Skin: Skin is warm and dry. She is not diaphoretic. Psychiatric: She has a normal mood and affect.  Her behavior is normal. Judgment and thought content normal.       Imaging and Lab Review:     Recent Results (from the past 24 hour(s))   CBC WITH AUTOMATED DIFF    Collection Time: 10/06/17 12:13 PM   Result Value Ref Range    WBC 24.9 (H) 4.3 - 11.1 K/uL    RBC 4.16 4.05 - 5.25 M/uL    HGB 12.4 11.7 - 15.4 g/dL    HCT 36.5 35.8 - 46.3 %    MCV 87.7 79.6 - 97.8 FL    MCH 29.8 26.1 - 32.9 PG    MCHC 34.0 31.4 - 35.0 g/dL    RDW 13.4 11.9 - 14.6 %    PLATELET 920 331 - 981 K/uL    MPV 11.0 10.8 - 14.1 FL    DF AUTOMATED      NEUTROPHILS 92 (H) 43 - 78 %    LYMPHOCYTES 3 (L) 13 - 44 %    MONOCYTES 4 4.0 - 12.0 %    EOSINOPHILS 0 (L) 0.5 - 7.8 %    BASOPHILS 0 0.0 - 2.0 %    IMMATURE GRANULOCYTES 1.1 0.0 - 5.0 %    ABS. NEUTROPHILS 22.9 (H) 1.7 - 8.2 K/UL    ABS. LYMPHOCYTES 0.7 0.5 - 4.6 K/UL    ABS. MONOCYTES 1.0 0.1 - 1.3 K/UL    ABS. EOSINOPHILS 0.0 0.0 - 0.8 K/UL    ABS. BASOPHILS 0.0 0.0 - 0.2 K/UL    ABS. IMM. GRANS. 0.3 0.0 - 0.5 K/UL   METABOLIC PANEL, COMPREHENSIVE    Collection Time: 10/06/17 12:13 PM   Result Value Ref Range    Sodium 133 (L) 136 - 145 mmol/L    Potassium 4.1 3.5 - 5.1 mmol/L    Chloride 97 (L) 98 - 107 mmol/L    CO2 24 21 - 32 mmol/L    Anion gap 12 7 - 16 mmol/L    Glucose 266 (H) 65 - 100 mg/dL    BUN 40 (H) 8 - 23 MG/DL    Creatinine 2.28 (H) 0.6 - 1.0 MG/DL    GFR est AA 26 (L) >60 ml/min/1.73m2    GFR est non-AA 22 (L) >60 ml/min/1.73m2    Calcium 9.6 8.3 - 10.4 MG/DL    Bilirubin, total 2.8 (H) 0.2 - 1.1 MG/DL    ALT (SGPT) 14 12 - 65 U/L    AST (SGOT) 17 15 - 37 U/L    Alk. phosphatase 89 50 - 136 U/L    Protein, total 7.7 6.3 - 8.2 g/dL    Albumin 3.7 3.2 - 4.6 g/dL    Globulin 4.0 (H) 2.3 - 3.5 g/dL    A-G Ratio 0.9 (L) 1.2 - 3.5     LIPASE    Collection Time: 10/06/17 12:13 PM   Result Value Ref Range    Lipase 72 (L) 73 - 393 U/L   URINE MICROSCOPIC    Collection Time: 10/06/17  1:56 PM   Result Value Ref Range    WBC 20-50 0 /hpf    RBC 10-20 0 /hpf    Epithelial cells 10-20 0 /hpf    Bacteria 4+ (H) 0 /hpf    Casts HYALINE 0 /lpf    Crystals, urine 0 0 /LPF    Mucus 0 0 /lpf       images and reports reviewed    Assessment:     Acute cholecystitis. Choleliyhiasis.   Patient has failed conservative therapy and will need surgical intervention. Will ask GI to see  And determine whether they feel she should have an ERCP prior to cholecystectomy. Plan/Recommendations/Medical Decision Making:     Discussed the findings with her and a friend. Ask GI to see re:ERCP,then will proceed with cholecystectomy. Admit,hydrate,Abx!     Signed By: Azul Arreola MD     October 6, 2017

## 2017-10-06 NOTE — ED NOTES
TRANSFER - OUT REPORT:    Verbal report given to [facility], RN  on Francesca Omar  being transferred to Pre OP for routine progression of care  (surgical procedure)     Report consisted of patients Situation, Background, Assessment and   Recommendations(SBAR). Information from the following report(s) SBAR was reviewed with the receiving nurse. Lines:     20 gauge right [facility]   Opportunity for questions and clarification was provided.       Patient transported with:

## 2017-10-06 NOTE — BRIEF OP NOTE
BRIEF OPERATIVE NOTE    Date of Procedure: 10/6/2017   Preoperative Diagnosis: CHOLELITHIASIS  Postoperative Diagnosis: CHOLELITHIASIS    Procedure(s):  CHOLECYSTECTOMY LAPAROSCOPIC  Surgeon(s) and Role:      Maryellen Summers MD - Primary         Assistant Staff:       Surgical Staff:  Circ-1: Haylie Oliveros  Circ-Relief: Callum Marx RN  Scrub Tech-1: Cherise Farias  Scrub Tech-2: Myesha Esqueda  Event Time In   Incision Start 1649   Incision Close 1739     Anesthesia: General   Estimated Blood Loss: minimal  Specimens:   ID Type Source Tests Collected by Time Destination   1 : Gallbladder Preservative Gallbladder  Link Bailey MD 10/6/2017 1735 Pathology      Findings: see op note   Complications: none  Implants: * No implants in log *

## 2017-10-06 NOTE — ED TRIAGE NOTES
Pt states she is having abdominal pain related to her gallbladder since yesterday. Pt had an ultrasound yesterday at [facility] and was told to come to the er. Pt states nausea, vomiting and diarrhea.

## 2017-10-06 NOTE — CONSULTS
[facility] Consult Note    Monalisa Schaeefr,  1934       Primary GI Physician: Dr Kristina Matthew    Referring Physician:  [place] - Debra Laurent NP, Dr Katrin Martino    Consult Date:  10/6/2017    Admit Date:  10/6/2017    Chief Complaint:  Elevated bilirubin    Subjective:     History of Present Illness:  Patient is a 80 y.o. female seen in consultation at the request of Dr Katrin Martino for evaluation of elevated bilirubin. She presented to the ER for abdominal pain which began Wednesday. She reports RUQ pain with some heartburn and loose stools, reporting visit to her PCP, Dr Crystal Quintero, yesterday for evaluation. She was sent to the [facility] for ultrasound which revealed a CBD 6 mm with suspicion for cholecystitis. Labs in the ER today show WBC to be elevated at 24.7, and tbili 2.8, with remainder of liver panel normal.  Patient denies nausea, jaundice or icterus and has had no prior issues with her gallbladder. She does report hx of Crohn's disease for which she has followed with Dr Kristina Matthew. RUQ Ultrasound [facility] 10/5/17  Impression:  1..  Wall thickening of the gallbladder with heterogeneity of the wall echogenicity with intraluminal stones present.  Findings suspicious for cholecystitis. PMH:  Past Medical History:   Diagnosis Date    Acute kidney failure 2016    Acute pericarditis     Arthritis     CAP (community acquired pneumonia) 2016    Coronary artery disease     Crohn's disease     Hypercalcemia     Hypercholesterolemia     Hypertension     Lumbar compression fracture     Osteoporosis     Type 2 diabetes mellitus        PSH:  Past Surgical History:   Procedure Laterality Date    HX ARTHROPLASTY  2016    left elbow    HX HEART CATHETERIZATION  2015    no intervention    HX HYSTERECTOMY      HX KYPHOPLASTY  ~    lumbar       Allergies:   Allergies   Allergen Reactions    Sulfa (Sulfonamide Antibiotics) Rash    Other Medication Unknown (comments)     \"Dypentin\"  Tape [Adhesive] Other (comments)     Blisters and skin tears       Home Medications:  Prior to Admission medications    Medication Sig Start Date End Date Taking? Authorizing Provider   atorvastatin (LIPITOR) 40 mg tablet Take  by mouth daily. Historical Provider   ZINC PO Take  by mouth. Historical Provider   acetaminophen (TYLENOL EXTRA STRENGTH) 500 mg tablet Take  by mouth every six (6) hours as needed for Pain. Historical Provider   spironolactone (ALDACTONE) 25 mg tablet Take 1 Tab by mouth daily. 8/16/16   Jacqueline Kiran MD   cyanocobalamin 1,000 mcg tablet Take 2,500 mcg by mouth daily. Historical Provider   lisinopril (PRINIVIL, ZESTRIL) 40 mg tablet Take 40 mg by mouth daily. 9/30/15   Historical Provider   ranitidine (ZANTAC) 150 mg tablet Take 150 mg by mouth daily. 2/16/16   Historical Provider   cholecalciferol (VITAMIN D3) 1,000 unit cap Take  by mouth two (2) times a day. Historical Provider   omeprazole (PRILOSEC) 40 mg capsule Take 40 mg by mouth daily. 3/24/16   Historical Provider   B.infantis-B.ani-B.long-B.bifi (PROBIOTIC 4X) 10-15 mg Tab Take  by mouth daily. Leon Barker MD   carvedilol (COREG) 12.5 mg tablet Take 12.5 mg by mouth two (2) times daily (with meals). Historical Provider   METFORMIN 500 mg tablet Take 1,000 mg by mouth two (2) times daily (with meals). Leon Barker MD   aspirin 81 mg Tab take by mouth. Leon Barker MD       Hospital Medications:  Current Facility-Administered Medications   Medication Dose Route Frequency    sodium chloride 0.9 % bolus infusion 500 mL  500 mL IntraVENous ONCE    piperacillin-tazobactam (ZOSYN) 4.5 g in 0.9% sodium chloride (MBP/ADV) 100 mL  4.5 g IntraVENous NOW     Current Outpatient Prescriptions   Medication Sig    atorvastatin (LIPITOR) 40 mg tablet Take  by mouth daily.  ZINC PO Take  by mouth.  acetaminophen (TYLENOL EXTRA STRENGTH) 500 mg tablet Take  by mouth every six (6) hours as needed for Pain.  spironolactone (ALDACTONE) 25 mg tablet Take 1 Tab by mouth daily.  cyanocobalamin 1,000 mcg tablet Take 2,500 mcg by mouth daily.  lisinopril (PRINIVIL, ZESTRIL) 40 mg tablet Take 40 mg by mouth daily.  ranitidine (ZANTAC) 150 mg tablet Take 150 mg by mouth daily.  cholecalciferol (VITAMIN D3) 1,000 unit cap Take  by mouth two (2) times a day.  omeprazole (PRILOSEC) 40 mg capsule Take 40 mg by mouth daily.  B.infantis-B.ani-B.long-B.bifi (PROBIOTIC 4X) 10-15 mg Tab Take  by mouth daily.  carvedilol (COREG) 12.5 mg tablet Take 12.5 mg by mouth two (2) times daily (with meals).  METFORMIN 500 mg tablet Take 1,000 mg by mouth two (2) times daily (with meals).  aspirin 81 mg Tab take by mouth. Social History:  Social History   Substance Use Topics    Smoking status: Former Smoker     Types: Cigarettes     Start date: 6/14/1961     Quit date: 6/14/1964    Smokeless tobacco: Never Used      Comment: 2 packs a wk    Alcohol use 0.0 oz/week     0 Standard drinks or equivalent per week      Comment: rarely       Pt denies any history of drug use, blood transfusions, or tattoos. Family History:  Family History   Problem Relation Age of Onset    Alcohol abuse Neg Hx     Arthritis-rheumatoid Neg Hx     Asthma Neg Hx     Bleeding Prob Neg Hx     Elevated Lipids Neg Hx     Headache Neg Hx     Hypertension Neg Hx     Lung Disease Neg Hx     Migraines Neg Hx     Psychiatric Disorder Neg Hx     Stroke Neg Hx     Mental Retardation Neg Hx     Heart Failure Mother     Diabetes Mother     Cancer Father      throat    Thyroid Disease Neg Hx        Review of Systems:  A detailed 10 system ROS is obtained, with pertinent positives as listed above. All others are negative.     Diet:      Objective:     Physical Exam:  Vitals:  Visit Vitals    /68    Pulse 70    Temp 98.1 °F (36.7 °C)    Resp 26    Ht 4' 9.5\" (1.461 m)    Wt 57.2 kg (126 lb)    LMP 09/08/2008    SpO2 92%    BMI 26.79 kg/m2     Gen:  Pt is alert, cooperative, no acute distress  Skin:  Extremities and face reveal no rashes. HEENT: Sclerae anicteric. Extra-occular muscles are intact. No oral ulcers. No abnormal pigmentation of the lips. The neck is supple. Cardiovascular: Regular rate and rhythm. No murmurs, gallops, or rubs. Respiratory:  Comfortable breathing with no accessory muscle use. Clear breath sounds anteriorly with no wheezes, rales, or rhonchi. GI:  Abdomen nondistended, soft, with mild tenderness to RUQ but no rebound or guarding. Normal active bowel sounds. No enlargement of the liver or spleen. No masses palpable. Rectal:  Deferred  Musculoskeletal:  No pitting edema of the lower legs. Neurological:  Gross memory appears intact. Patient is alert and oriented. Psychiatric:  Mood appears appropriate with judgement intact. Lymphatic:  No cervical or supraclavicular adenopathy. Laboratory:    Recent Labs      10/06/17   1213   WBC  24.9*   HGB  12.4   HCT  36.5   PLT  202   MCV  87.7   NA  133*   K  4.1   CL  97*   CO2  24   BUN  40*   CREA  2.28*   CA  9.6   GLU  266*   AP  89   SGOT  17   ALT  14   TBILI  2.8*   ALB  3.7   TP  7.7   LPSE  72*          Assessment:     Active Problems:    *Elevated bilirubin *    Cholecystitis      Plan:     79 yo female is seen today in evaluation of elevated bilirubin, reporting RUQ abdominal pain which began Wednesday night. She was seen yesterday by primary care with ultrasound at [facility] which revealed a CBD 6 mm, with suspicion for cholecystitis. She presented to the ER today has been seen by surgery for evaluation for cholecystectomy. WBC is 24 with tbili 2.8; remainder of liver panel is normal.  Elevated bilirubin is likely secondary to acute cholecystitis or Gilbert's as opposed to biliary obstruction.   Recommend deferring of ERCP with consideration for intraoperative cholangiogram.     Patient is seen and examined in collaboration with  Don. Assessment and plan as per Dr. Andrew Coffey. Phil Meng NP    At this point don't suspect CBD stone or cholangitis  Discussed with Dr. Preethi Way and will be on standby pending surgical results and clinical course     [address]   Rachid Berumen MD

## 2017-10-06 NOTE — ED NOTES
Pt resting on stretcher with eyes closed. O2 monitoring in place, BP cycling. Pt in NAD at this time.

## 2017-10-06 NOTE — ANESTHESIA PREPROCEDURE EVALUATION
Anesthetic History   No history of anesthetic complications            Review of Systems / Medical History  Pertinent labs reviewed    Pulmonary  Within defined limits                 Neuro/Psych   Within defined limits           Cardiovascular    Hypertension          Past MI (2015) and CAD (mild nonobstructive dz on cath 2015)    Exercise tolerance: >4 METS: Exercises regularly     GI/Hepatic/Renal         Renal disease: ARF      Comments: Crohn's Endo/Other    Diabetes: type 2    Arthritis     Other Findings            Physical Exam    Airway  Mallampati: II  TM Distance: < 4 cm  Neck ROM: normal range of motion   Mouth opening: Diminished (comment)     Cardiovascular  Regular rate and rhythm,  S1 and S2 normal,  no murmur, click, rub, or gallop             Dental  No notable dental hx       Pulmonary  Breath sounds clear to auscultation               Abdominal  GI exam deferred       Other Findings            Anesthetic Plan    ASA: 3  Anesthesia type: general          Induction: Intravenous  Anesthetic plan and risks discussed with: Patient and Spouse

## 2017-10-06 NOTE — IP AVS SNAPSHOT
[facility] 
 
 
 [address] [facility] 
[phone] Patient: Cony Rouse MRN: [id number] :1934 You are allergic to the following Allergen Reactions Sulfa (Sulfonamide Antibiotics) Rash Other Medication Unknown (comments) \"Dypentin\" Tape (Adhesive) Other (comments) Blisters and skin tears Recent Documentation Height Weight BMI OB Status Smoking Status 1.461 m 57.2 kg 26.79 kg/m2 Postmenopausal Former Smoker Emergency Contacts Name Discharge Info Relation Home Work Mobile PikeJulieth DISCHARGE CAREGIVER [3] Daughter [21] [phone] Yan Maza DISCHARGE CAREGIVER [3] Spouse [3] [phone] About your hospitalization You were admitted on:  2017 You last received care in the:  [facility] 2 SURGICAL You were discharged on:  2017 Unit phone number:  [phone] Why you were hospitalized Your primary diagnosis was:  Cholecystitis With Cholelithiasis Providers Seen During Your Hospitalizations Provider Role Specialty Primary office phone Margareth Lawton DO Attending Provider Emergency Medicine [phone] Danni Monae MD Attending Provider General Surgery [phone] Your Primary Care Physician (PCP) Primary Care Physician Office Phone Office Fax Lucie Gallegoo [phone] Follow-up Information Follow up With Details Comments Contact Info D[facility] a [facility] follow up appointment [address] [facility] [address] Emanuel Shen [address] [facility] [address] [phone] Adilson Burkett MD   [address] [place] [address] [address] [place] [address] Andrey Curry [address] 
[phone] Current Discharge Medication List  
  
START taking these medications Dose & Instructions Dispensing Information Comments Morning Noon Evening Bedtime  
 oxyCODONE-acetaminophen 5-325 mg per tablet Commonly known as:  PERCOCET Your last dose was: Your next dose is:    
   
   
 1-2 tabs by mouth every four hours prn pain Quantity:  40 Tab Refills:  0 CONTINUE these medications which have NOT CHANGED Dose & Instructions Dispensing Information Comments Morning Noon Evening Bedtime  
 aspirin 81 mg tablet Your last dose was: Your next dose is:    
   
   
 take by mouth. Refills:  0  
     
   
   
   
  
 atorvastatin 40 mg tablet Commonly known as:  LIPITOR Your last dose was: Your next dose is: Take  by mouth daily. Refills:  0  
     
   
   
   
  
 carvedilol 12.5 mg tablet Commonly known as:  Media Glencoe Your last dose was: Your next dose is:    
   
   
 Dose:  12.5 mg Take 12.5 mg by mouth two (2) times daily (with meals). Refills:  0  
     
   
   
   
  
 cholecalciferol 1,000 unit Cap Commonly known as:  VITAMIN D3 Your last dose was: Your next dose is: Take  by mouth two (2) times a day. Refills:  0  
     
   
   
   
  
 cyanocobalamin 1,000 mcg tablet Your last dose was: Your next dose is:    
   
   
 Dose:  2500 mcg Take 2,500 mcg by mouth daily. Refills:  0  
     
   
   
   
  
 lisinopril 40 mg tablet Commonly known as:  Gualberto Bridges Your last dose was: Your next dose is:    
   
   
 Dose:  40 mg Take 40 mg by mouth daily. Refills:  0  
     
   
   
   
  
 metFORMIN 500 mg tablet Commonly known as:  GLUCOPHAGE Your last dose was: Your next dose is:    
   
   
 Dose:  1000 mg Take 1,000 mg by mouth two (2) times daily (with meals). Refills:  0  
     
   
   
   
  
 omeprazole 40 mg capsule Commonly known as:  PRILOSEC  
   
 Your last dose was: Your next dose is:    
   
   
 Dose:  40 mg Take 40 mg by mouth daily. Refills:  0 PROBIOTIC 4X 10-15 mg Tbec Generic drug:  B.infantis-B.ani-B.long-B.bifi Your last dose was: Your next dose is: Take  by mouth daily. Refills:  0  
     
   
   
   
  
 raNITIdine 150 mg tablet Commonly known as:  ZANTAC Your last dose was: Your next dose is:    
   
   
 Dose:  150 mg Take 150 mg by mouth daily. Refills:  0  
     
   
   
   
  
 spironolactone 25 mg tablet Commonly known as:  ALDACTONE Your last dose was: Your next dose is:    
   
   
 Dose:  25 mg Take 1 Tab by mouth daily. Quantity:  90 Tab Refills:  3  
     
   
   
   
  
 TYLENOL EXTRA STRENGTH 500 mg tablet Generic drug:  acetaminophen Your last dose was: Your next dose is: Take  by mouth every six (6) hours as needed for Pain. Refills:  0 ZINC PO Your last dose was: Your next dose is: Take  by mouth. Refills:  0 Where to Get Your Medications Information on where to get these meds will be given to you by the nurse or doctor. ! Ask your nurse or doctor about these medications  
  oxyCODONE-acetaminophen 5-325 mg per tablet Discharge Instructions DISCHARGE SUMMARY from Nurse The following personal items are in your possession at time of discharge: 
 
Dental Appliances: None Home Medications: None Jewelry: Ring Clothing: Footwear, Pants, Shirt, Undergarments Other Valuables: [address] [place] PATIENT INSTRUCTIONS: 
 
After general anesthesia or intravenous sedation, for 24 hours or while taking prescription Narcotics: · Limit your activities · Do not drive and operate hazardous machinery · Do not make important personal or business decisions · Do  not drink alcoholic beverages · If you have not urinated within 8 hours after discharge, please contact your surgeon on call. Report the following to your surgeon: 
· Excessive pain, swelling, redness or odor of or around the surgical area · Temperature over 100.5 · Nausea and vomiting lasting longer than 4 hours or if unable to take medications · Any signs of decreased circulation or nerve impairment to extremity: change in color, persistent  numbness, tingling, coldness or increase pain · Any questions What to do at Home: 
Recommended activity: activity as tolerated, no driving while on pain medicine and no heavy lifting (>10lbs) for 6 weeks, unless your doctor tells you otherwise. Wait until you have been completely off pain medication for 24 hours before you resume driving. You may shower, but no tub baths, swimming pools or hot tubs. If you experience any of the following symptoms pain that gets worse or is unrelieved by medication; temperature of 101 or higher; unusual smelly or large amounts of drainage from your incision; worsening redness at incision borders; unable to keep down food/liquds; any other questions or concerns, please follow up with Dr. Alexandrea Bolden [phone] *  Please give a list of your current medications to your Primary Care Provider. *  Please update this list whenever your medications are discontinued, doses are 
    changed, or new medications (including over-the-counter products) are added. *  Please carry medication information at all times in case of emergency situations. These are general instructions for a healthy lifestyle: No smoking/ No tobacco products/ Avoid exposure to second hand smoke Surgeon General's Warning:  Quitting smoking now greatly reduces serious risk to your health. Obesity, smoking, and sedentary lifestyle greatly increases your risk for illness A healthy diet, regular physical exercise & weight monitoring are important for maintaining a healthy lifestyle You may be retaining fluid if you have a history of heart failure or if you experience any of the following symptoms:  Weight gain of 3 pounds or more overnight or 5 pounds in a week, increased swelling in our hands or feet or shortness of breath while lying flat in bed. Please call your doctor as soon as you notice any of these symptoms; do not wait until your next office visit. Recognize signs and symptoms of STROKE: 
 
F-face looks uneven A-arms unable to move or move unevenly S-speech slurred or non-existent T-time-call 911 as soon as signs and symptoms begin-DO NOT go Back to bed or wait to see if you get better-TIME IS BRAIN. Warning Signs of HEART ATTACK Call 911 if you have these symptoms: 
? Chest discomfort. Most heart attacks involve discomfort in the center of the chest that lasts more than a few minutes, or that goes away and comes back. It can feel like uncomfortable pressure, squeezing, fullness, or pain. ? Discomfort in other areas of the upper body. Symptoms can include pain or discomfort in one or both arms, the back, neck, jaw, or stomach. ? Shortness of breath with or without chest discomfort. ? Other signs may include breaking out in a cold sweat, nausea, or lightheadedness. Don't wait more than five minutes to call [address]! Fast action can save your life. Calling 911 is almost always the fastest way to get lifesaving treatment. Emergency Medical Services staff can begin treatment when they arrive  up to an hour sooner than if someone gets to the hospital by car. The discharge information has been reviewed with the patient and spouse. The patient and spouse verbalized understanding. Discharge medications reviewed with the patient and spouse and appropriate educational materials and side effects teaching were provided. Cholecystectomy: What to Expect at [facility] Your Recovery After your surgery, it is normal to feel weak and tired for several days after you return home. Your belly may be swollen. Since you had laparoscopic surgery, you may also have pain in your shoulder for about 24 hours. You may have gas or need to burp a lot at first, and a few people get diarrhea. The diarrhea usually goes away in 2 to 4 weeks, but it may last longer. For a laparoscopic surgery, most people can go back to work or their normal routine in 1 to 2 weeks, but it may take longer, depending on the type of work you do. This care sheet gives you a general idea about how long it will take for you to recover. However, each person recovers at a different pace. Follow the steps below to get better as quickly as possible. How can you care for yourself at home? Activity · Rest when you feel tired. Getting enough sleep will help you recover. · Try to walk each day. Start out by walking a little more than you did the day before. Gradually increase the amount you walk. Walking boosts blood flow and helps prevent pneumonia and constipation. · For about 2 to 4 weeks, avoid lifting anything that would make you strain or anything over 10 pounds. · Avoid strenuous activities, such as biking, jogging, weightlifting, and aerobic exercise, until your doctor says it is okay. · You may shower 24 hours after surgery, if your doctor okays it. Pat the cut (incision) dry. Do not take a bath until your doctor tells you it is okay. · You may drive when you are no longer taking pain medicine and can quickly move your foot from the gas pedal to the brake. You must also be able to sit comfortably for a long period of time, even if you do not plan to go far. You might get caught in traffic. · Your doctor will tell you when you can have sex again. Diet · Eat smaller meals more often instead of fewer larger meals. You can eat a normal diet, but avoid eating fatty foods for about 1 month.  Fatty foods include hamburger, whole milk, cheese, and many snack foods. If your stomach is upset, try bland, low-fat foods like plain rice, broiled chicken, toast, and yogurt. · Drink plenty of fluids (unless your doctor tells you not to). · If you have diarrhea, try avoiding spicy foods, dairy products, fatty foods, and alcohol. You can also watch to see if specific foods cause it, and stop eating them. If the diarrhea continues for more than 2 weeks, talk to your doctor. · You may notice that your bowel movements are not regular right after your surgery. This is common. Try to avoid constipation and straining with bowel movements. You may want to take a fiber supplement every day. If you have not had a bowel movement after a couple of days, ask your doctor about taking a mild laxative. Medicines · Take pain medicines exactly as directed. ¨ If the doctor gave you a prescription medicine for pain, take it as prescribed. ¨ If you are not taking a prescription pain medicine, take an over-the-counter medicine such as acetaminophen (Tylenol), ibuprofen (Advil, Motrin), or naproxen (Aleve). Read and follow all instructions on the label. ¨ Do not take two or more pain medicines at the same time unless the doctor told you to. Many pain medicines contain acetaminophen, which is Tylenol. Too much Tylenol can be harmful. · If you think your pain medicine is making you sick to your stomach: 
¨ Take your medicine after meals (unless your doctor tells you not to). ¨ Ask your doctor for a different pain medicine. · If your doctor prescribed antibiotics, take them as directed. Do not stop taking them just because you feel better. You need to take the full course of antibiotics. Incision care · If you have strips of tape or glue on the incision, or cut, leave the tape/glue on for a week or until it falls off. · After 24 hours wash the area daily with warm, soapy water, and pat it dry. · You may have staples to hold the cut together. Keep them dry until your doctor takes them out. This is usually in 7 to 10 days. · Keep the area clean and dry. You may cover it with a gauze bandage if it weeps or rubs against clothing. Change the bandage every day. Ice · To reduce swelling and pain, put ice or a cold pack on your belly for 10 to 20 minutes at a time. Do this every 1 to 2 hours. Put a thin cloth between the ice and your skin. Follow-up care is a key part of your treatment and safety. Be sure to make and go to all appointments, and call your doctor if you are having problems. Its also a good idea to know your test results and keep a list of the medicines you take. When should you call for help? Call 911 anytime you think you may need emergency care. For example, call if: 
· You passed out (lost consciousness). · You have severe trouble breathing. · You have sudden chest pain and shortness of breath, or you cough up blood. Call your doctor now or seek immediate medical care if: 
· You are sick to your stomach and cannot drink fluids. · You have pain that does not get better when you take your pain medicine. · You have signs of infection, such as: 
¨ Increased pain, swelling, warmth, or redness. ¨ Red streaks leading from the incision. ¨ Pus draining from the incision. ¨ Swollen lymph nodes in your neck, armpits, or groin. ¨ A fever. · Your urine turns dark brown or your stool is light-colored or andrea-colored. · Your skin or the whites of your eyes turn yellow. · Bright red blood has soaked through a large bandage over your incision. · You have signs of a blood clot, such as: 
¨ Pain in your calf, back of knee, thigh, or groin. ¨ Redness and swelling in your leg or groin. · You have trouble passing urine or stool, especially if you have mild pain or swelling in your lower belly.  
· You had a laparoscopic surgery and your shoulder pain lasts more than 24 hours or if you do not have a bowel movement after taking a laxative. After general anesthesia or intravenous sedation, for 24 hours or while taking prescription Narcotics: · Limit your activities · Do not drive and operate hazardous machinery · Do not make important personal or business decisions · Do  not drink alcoholic beverages · If you have not urinated within 8 hours after discharge, please contact your surgeon on call. *  Please give a list of your current medications to your Primary Care Provider. *  Please update this list whenever your medications are discontinued, doses are 
    changed, or new medications (including over-the-counter products) are added. *  Please carry medication information at all times in case of emergency situations. These are general instructions for a healthy lifestyle: No smoking/ No tobacco products/ Avoid exposure to second hand smoke Surgeon General's Warning:  Quitting smoking now greatly reduces serious risk to your health. Obesity, smoking, and sedentary lifestyle greatly increases your risk for illness A healthy diet, regular physical exercise & weight monitoring are important for maintaining a healthy lifestyle You may be retaining fluid if you have a history of heart failure or if you experience any of the following symptoms:  Weight gain of 3 pounds or more overnight or 5 pounds in a week, increased swelling in our hands or feet or shortness of breath while lying flat in bed. Please call your doctor as soon as you notice any of these symptoms; do not wait until your next office visit. Recognize signs and symptoms of STROKE: 
F-face looks uneven A-arms unable to move or move unevenly S-speech slurred or non-existent T-time-call 911 as soon as signs and symptoms begin-DO NOT go Back to bed or wait to see if you get better-TIME IS BRAIN. Discharge Orders None [facility] Announcement We are excited to announce that we are making your provider's discharge notes available to you in Player X. You will see these notes when they are completed and signed by the physician that discharged you from your recent hospital stay. If you have any questions or concerns about any information you see in Player X, please call the Health Information Department where you were seen or reach out to your Primary Care Provider for more information about your plan of care. Introducing [facility]! Dear Corwin Marx: Thank you for requesting a Player X account. Our records indicate that you already have an active Player X account. You can access your account anytime at [email or web address] Did you know that you can access your hospital and ER discharge instructions at any time in Player X? You can also review all of your test results from your hospital stay or ER visit. Additional Information If you have questions, please visit the Frequently Asked Questions section of the Player X website at [email or web address] Remember, Player X is NOT to be used for urgent needs. For medical emergencies, dial 911. Now available from your iPhone and Android! General Information Please provide this summary of care documentation to your next provider. Patient Signature:  ____________________________________________________________ Date:  ____________________________________________________________  
  
[facility] Provider Signature:  ____________________________________________________________ Date:  ____________________________________________________________

## 2017-10-07 LAB
GLUCOSE BLD STRIP.AUTO-MCNC: 174 MG/DL (ref 65–100)
GLUCOSE BLD STRIP.AUTO-MCNC: 180 MG/DL (ref 65–100)
GLUCOSE BLD STRIP.AUTO-MCNC: 231 MG/DL (ref 65–100)
GLUCOSE BLD STRIP.AUTO-MCNC: 298 MG/DL (ref 65–100)

## 2017-10-07 PROCEDURE — 82962 GLUCOSE BLOOD TEST: CPT

## 2017-10-07 PROCEDURE — 99218 HC RM OBSERVATION: CPT

## 2017-10-07 PROCEDURE — 74011250637 HC RX REV CODE- 250/637: Performed by: SURGERY

## 2017-10-07 PROCEDURE — 74011250636 HC RX REV CODE- 250/636: Performed by: SURGERY

## 2017-10-07 PROCEDURE — 74011636637 HC RX REV CODE- 636/637: Performed by: SURGERY

## 2017-10-07 PROCEDURE — 74011000258 HC RX REV CODE- 258: Performed by: SURGERY

## 2017-10-07 RX ADMIN — LISINOPRIL 40 MG: 20 TABLET ORAL at 12:36

## 2017-10-07 RX ADMIN — INSULIN HUMAN 9 UNITS: 100 INJECTION, SOLUTION PARENTERAL at 21:15

## 2017-10-07 RX ADMIN — INSULIN HUMAN 9 UNITS: 100 INJECTION, SOLUTION PARENTERAL at 00:36

## 2017-10-07 RX ADMIN — OXYCODONE HYDROCHLORIDE AND ACETAMINOPHEN 1 TABLET: 7.5; 325 TABLET ORAL at 22:52

## 2017-10-07 RX ADMIN — Medication 10 ML: at 00:37

## 2017-10-07 RX ADMIN — INSULIN HUMAN 6 UNITS: 100 INJECTION, SOLUTION PARENTERAL at 08:02

## 2017-10-07 RX ADMIN — CARVEDILOL 12.5 MG: 12.5 TABLET, FILM COATED ORAL at 07:57

## 2017-10-07 RX ADMIN — Medication 10 ML: at 06:05

## 2017-10-07 RX ADMIN — SODIUM CHLORIDE 3 G: 900 INJECTION, SOLUTION INTRAVENOUS at 07:56

## 2017-10-07 RX ADMIN — INSULIN HUMAN 3 UNITS: 100 INJECTION, SOLUTION PARENTERAL at 12:31

## 2017-10-07 RX ADMIN — SPIRONOLACTONE 25 MG: 25 TABLET, FILM COATED ORAL at 12:35

## 2017-10-07 RX ADMIN — SODIUM CHLORIDE 3 G: 900 INJECTION, SOLUTION INTRAVENOUS at 21:10

## 2017-10-07 RX ADMIN — INSULIN HUMAN 3 UNITS: 100 INJECTION, SOLUTION PARENTERAL at 17:10

## 2017-10-07 RX ADMIN — CARVEDILOL 12.5 MG: 12.5 TABLET, FILM COATED ORAL at 17:13

## 2017-10-07 RX ADMIN — PANTOPRAZOLE SODIUM 40 MG: 40 TABLET, DELAYED RELEASE ORAL at 07:40

## 2017-10-07 NOTE — PROGRESS NOTES
END OF SHIFT NOTE:    INTAKE/OUTPUT  10/06 0701 - 10/07 0700  In: 843.9 [P.O.:120; I.V.:723.9]  Out: 535 [Urine:530]  Voiding: YES  Catheter: NO  Drain:              Flatus: Patient does not have flatus present. Stool:  0 occurrences. Characteristics:       Emesis: 0 occurrences. Characteristics:        VITAL SIGNS  Patient Vitals for the past 12 hrs:   Temp Pulse Resp BP SpO2   10/07/17 0300 99.1 °F (37.3 °C) 85 15 104/48 90 %   10/06/17 2315 98 °F (36.7 °C) 84 15 (!) 94/38 94 %   10/06/17 1943 97.8 °F (36.6 °C) 83 15 105/46 96 %       Pain Assessment  Pain Intensity 1: 0 (10/07/17 0000)  Pain Location 1: Abdomen  Pain Intervention(s) 1: Emotional support, Family Support  Patient Stated Pain Goal: 0    Ambulating  Yes    Shift report given to oncoming nurse at the bedside.     Ellis Marquez RN

## 2017-10-07 NOTE — PROGRESS NOTES
TRANSFER - IN REPORT:    Verbal report received from Herbert Muñoz RN(name) on Annelise Krueger  being received from PACU(unit) for routine progression of care      Report consisted of patients Situation, Background, Assessment and   Recommendations(SBAR). Information from the following report(s) Kardex was reviewed with the receiving nurse. Opportunity for questions and clarification was provided. Assessment completed upon patients arrival to unit and care assumed. Received to room and care assumed. Plan of care explained to patient and , both verbalized understanding. Instructed to call with needs.

## 2017-10-07 NOTE — OP NOTES
[facility]   OPERATIVE REPORT       Name:  Elliot Matos   MR#:  [id number]   :  1934   Account #:  [de-identified]   Date of Adm:  10/06/2017       DATE OF PROCEDURE: 10/06/2017. PREOPERATIVE DIAGNOSIS: Acute cholecystitis with cholelithiasis. PREOPERATIVE DIAGNOSIS: Acute cholecystitis with cholelithiasis. NAME OF PROCEDURE: Laparoscopic cholecystectomy. SURGEON: Keagan Rain. Juju Douglas MD.    ANESTHESIA: General.    COMPLICATIONS: None. COUNTS: Correct. ESTIMATED BLOOD LOSS: 20 mL. SPECIMEN: Gallbladder. DESCRIPTION OF PROCEDURE: After the patient was asleep, the   abdomen was prepped and draped in sterile fashion. Incision made   in the umbilicus, fascia opened and controlled with 0 Vicryls. Sulema trocar, followed by pneumoperitoneum. The scope was   introduced. Two right subcostal 5 mm trocars were placed under   direct visualization after infiltration of skin, subcutaneous   tissue and peritoneum with 0.5% Marcaine with epinephrine. A   superior 5 mm epigastric trocar was also placed. The gallbladder   was mottled, very thick-walled, and appeared to be gangrenous. This required decompressing with a needle. I was then able to   grasp it, but the gallbladder wall ripped and tore, but I was   able to elevate it. Extensive dissection was required to free   this up. Basically, I began freeing this up but could not get down   to the cystic duct. I, therefore, began a dome-down approach and   took the gallbladder down completely. This then entered into   some very fibrous tissue and firm tissue. I was afraid to   dissect this down any further as I might risk injury to the   common bile duct. I then divided the gallbladder at just above   where I felt the cystic duct was. Extensive irrigation was then   done. All fluid suctioned free. This base of the gallbladder   right at the cystic duct was then tied with an Endo tie of 0   Vicryl. This was then cut and removed. The gallbladder placed in   an EndoCatch bag and removed. I then reexamined the area. Extensive   irrigation was done. All fluid suctioned free until it was   clear. There was no oozing. Endo tie was then placed without   evidence of bile leakage. At this time, trocars removed   and pneumoperitoneum released. Vicryl 0 was used to close the   fascia at the umbilical port. Staples were used to close the skin. The patient tolerated the procedure well. MD NBA Baird / Kibmerly Ken   D:  10/06/2017   18:00   T:  10/06/2017   22:29   Job #:  [id number]

## 2017-10-07 NOTE — PROGRESS NOTES
PLAN:  Follow labs  I&O  IV - .9% Sodium Chloride @ 75cc/hr  IV Abx - Unasyn, Zosyn  Pain/ nausea control  Advance to Clear Liquid Diet  Possibly Home in am    ASSESSMENT:  Admit Date: 10/6/2017   1 Day Post-Op  Procedure(s):  CHOLECYSTECTOMY LAPAROSCOPIC    Active Problems:    Cholecystitis with cholelithiasis (10/6/2017)      SUBJECTIVE:  Pt awake in bed. Reports pain as controlled. Denies nausea or vomiting. Has been up to [facility]. Voiding without difficulty.  at bedside. AF, VSS. OBJECTIVE:  Constitutional: Alert, cooperative, no acute distress; appears stated age   Visit Vitals    /50    Pulse 78    Temp 97.8 °F (36.6 °C)    Resp 18    Ht 4' 9.5\" (1.461 m)    Wt 126 lb (57.2 kg)    LMP 09/08/2008    SpO2 95%    BMI 26.79 kg/m2     Eyes:Sclera are clear. ENMT: no external lesions gross hearing normal; no obvious neck masses, no ear or lip lesions  CV: RRR. Normal perfusion  Resp: No JVD. Breathing is  non-labored; no audible wheezing. GI: soft, appropriately tender, non-distended, BS active, dressing c/d/i     Musculoskeletal: unremarkable with normal function. No embolic signs or cyanosis.    Neuro:  Oriented; moves all 4; no focal deficits  Psychiatric: normal affect and mood, no memory impairment      Patient Vitals for the past 24 hrs:   BP Temp Pulse Resp SpO2   10/07/17 1512 125/50 97.8 °F (36.6 °C) 78 18 95 %   10/07/17 1206 111/51 99.6 °F (37.6 °C) 78 18 99 %   10/07/17 0747 118/56 98.1 °F (36.7 °C) 85 18 95 %   10/07/17 0300 104/48 99.1 °F (37.3 °C) 85 15 90 %   10/06/17 2315 (!) 94/38 98 °F (36.7 °C) 84 15 94 %   10/06/17 1943 105/46 97.8 °F (36.6 °C) 83 15 96 %   10/06/17 1832 97/51 - 80 14 93 %   10/06/17 1827 97/53 - 80 14 93 %   10/06/17 1822 99/52 98 °F (36.7 °C) 80 14 95 %   10/06/17 1817 99/51 - 80 14 91 %   10/06/17 1812 102/53 - 80 14 94 %   10/06/17 1807 95/49 - 80 14 94 %   10/06/17 1802 103/53 - 83 14 95 %   10/06/17 1758 103/52 - 82 14 95 %   10/06/17 1753 110/54 - 79 14 93 %   10/06/17 1752 110/54 97.5 °F (36.4 °C) 80 16 93 %     Labs:  Recent Labs      10/06/17   1213   WBC  24.9*   HGB  12.4   PLT  202   NA  133*   K  4.1   CL  97*   CO2  24   BUN  40*   CREA  2.28*   GLU  266*   TBILI  2.8*   SGOT  17   ALT  14   AP  89   LPSE  72*       Ba Flores, FNP-BC    The patient was seen in conjunction with Dr. Aj Samayoa who independently evaluated the patient, reviewed the chart and agreed with the assessment and plan.

## 2017-10-07 NOTE — PROGRESS NOTES
END OF SHIFT NOTE:    INTAKE/OUTPUT  10/06 0701 - 10/07 0700  In: 843.9 [P.O.:120; I.V.:723.9]  Out: 535 [Urine:530]  Voiding: YES  Catheter: NO  Drain:              Flatus: Patient does have flatus present. Stool:  0 occurrences. Characteristics:       Emesis: 0 occurrences. Characteristics:        VITAL SIGNS  Patient Vitals for the past 12 hrs:   Temp Pulse Resp BP SpO2   10/07/17 1512 97.8 °F (36.6 °C) 78 18 125/50 95 %   10/07/17 1206 99.6 °F (37.6 °C) 78 18 111/51 99 %   10/07/17 0747 98.1 °F (36.7 °C) 85 18 118/56 95 %       Pain Assessment  Pain Intensity 1: 0 (10/07/17 0747)  Pain Location 1: Abdomen  Pain Intervention(s) 1: Emotional support, Family Support  Patient Stated Pain Goal: 0    Ambulating  No    Shift report given to oncoming nurse at the bedside. Weaker this afternoon, no c/o pain.     Eduardo Mckeon RN

## 2017-10-08 VITALS
BODY MASS INDEX: 26.45 KG/M2 | DIASTOLIC BLOOD PRESSURE: 70 MMHG | WEIGHT: 126 LBS | HEIGHT: 58 IN | TEMPERATURE: 98.5 F | OXYGEN SATURATION: 95 % | SYSTOLIC BLOOD PRESSURE: 117 MMHG | HEART RATE: 68 BPM | RESPIRATION RATE: 17 BRPM

## 2017-10-08 PROBLEM — K80.10 CHOLECYSTITIS WITH CHOLELITHIASIS: Chronic | Status: ACTIVE | Noted: 2017-10-06

## 2017-10-08 LAB
BASOPHILS # BLD: 0 K/UL (ref 0–0.2)
BASOPHILS NFR BLD: 0 % (ref 0–2)
DIFFERENTIAL METHOD BLD: ABNORMAL
EOSINOPHIL # BLD: 0.2 K/UL (ref 0–0.8)
EOSINOPHIL NFR BLD: 1 % (ref 0.5–7.8)
ERYTHROCYTE [DISTWIDTH] IN BLOOD BY AUTOMATED COUNT: 13.5 % (ref 11.9–14.6)
GLUCOSE BLD STRIP.AUTO-MCNC: 139 MG/DL (ref 65–100)
GLUCOSE BLD STRIP.AUTO-MCNC: 223 MG/DL (ref 65–100)
HCT VFR BLD AUTO: 31.7 % (ref 35.8–46.3)
HGB BLD-MCNC: 10.7 G/DL (ref 11.7–15.4)
IMM GRANULOCYTES # BLD: 0.1 K/UL (ref 0–0.5)
IMM GRANULOCYTES NFR BLD: 0.9 % (ref 0–5)
LYMPHOCYTES # BLD: 0.5 K/UL (ref 0.5–4.6)
LYMPHOCYTES NFR BLD: 4 % (ref 13–44)
MCH RBC QN AUTO: 29.3 PG (ref 26.1–32.9)
MCHC RBC AUTO-ENTMCNC: 33.8 G/DL (ref 31.4–35)
MCV RBC AUTO: 86.8 FL (ref 79.6–97.8)
MONOCYTES # BLD: 0.7 K/UL (ref 0.1–1.3)
MONOCYTES NFR BLD: 5 % (ref 4–12)
NEUTS SEG # BLD: 12.3 K/UL (ref 1.7–8.2)
NEUTS SEG NFR BLD: 89 % (ref 43–78)
PLATELET # BLD AUTO: 146 K/UL (ref 150–450)
PMV BLD AUTO: 11.2 FL (ref 10.8–14.1)
RBC # BLD AUTO: 3.65 M/UL (ref 4.05–5.25)
WBC # BLD AUTO: 13.8 K/UL (ref 4.3–11.1)

## 2017-10-08 PROCEDURE — 82962 GLUCOSE BLOOD TEST: CPT

## 2017-10-08 PROCEDURE — 74011000258 HC RX REV CODE- 258: Performed by: SURGERY

## 2017-10-08 PROCEDURE — 36415 COLL VENOUS BLD VENIPUNCTURE: CPT | Performed by: NURSE PRACTITIONER

## 2017-10-08 PROCEDURE — 74011636637 HC RX REV CODE- 636/637: Performed by: SURGERY

## 2017-10-08 PROCEDURE — 85025 COMPLETE CBC W/AUTO DIFF WBC: CPT | Performed by: NURSE PRACTITIONER

## 2017-10-08 PROCEDURE — 74011250637 HC RX REV CODE- 250/637: Performed by: SURGERY

## 2017-10-08 PROCEDURE — 74011250636 HC RX REV CODE- 250/636: Performed by: SURGERY

## 2017-10-08 PROCEDURE — 99218 HC RM OBSERVATION: CPT

## 2017-10-08 RX ORDER — OXYCODONE AND ACETAMINOPHEN 5; 325 MG/1; MG/1
TABLET ORAL
Qty: 40 TAB | Refills: 0 | Status: SHIPPED
Start: 2017-10-08 | End: 2017-10-19

## 2017-10-08 RX ADMIN — PANTOPRAZOLE SODIUM 40 MG: 40 TABLET, DELAYED RELEASE ORAL at 08:03

## 2017-10-08 RX ADMIN — Medication 5 ML: at 11:15

## 2017-10-08 RX ADMIN — SODIUM CHLORIDE 3 G: 900 INJECTION, SOLUTION INTRAVENOUS at 08:03

## 2017-10-08 RX ADMIN — SODIUM CHLORIDE 75 ML/HR: 900 INJECTION, SOLUTION INTRAVENOUS at 08:05

## 2017-10-08 RX ADMIN — INSULIN HUMAN 6 UNITS: 100 INJECTION, SOLUTION PARENTERAL at 11:13

## 2017-10-08 RX ADMIN — CARVEDILOL 12.5 MG: 12.5 TABLET, FILM COATED ORAL at 08:03

## 2017-10-08 RX ADMIN — Medication 5 ML: at 11:14

## 2017-10-08 RX ADMIN — LISINOPRIL 40 MG: 20 TABLET ORAL at 08:03

## 2017-10-08 RX ADMIN — SPIRONOLACTONE 25 MG: 25 TABLET, FILM COATED ORAL at 08:03

## 2017-10-08 NOTE — DISCHARGE INSTRUCTIONS
DISCHARGE SUMMARY from Nurse    The following personal items are in your possession at time of discharge:    Dental Appliances: None        Home Medications: None  Jewelry: Ring  Clothing: Footwear, Pants, Shirt, Undergarments  Other Valuables: Cane             PATIENT INSTRUCTIONS:    After general anesthesia or intravenous sedation, for 24 hours or while taking prescription Narcotics:  · Limit your activities  · Do not drive and operate hazardous machinery  · Do not make important personal or business decisions  · Do  not drink alcoholic beverages  · If you have not urinated within 8 hours after discharge, please contact your surgeon on call. Report the following to your surgeon:  · Excessive pain, swelling, redness or odor of or around the surgical area  · Temperature over 100.5  · Nausea and vomiting lasting longer than 4 hours or if unable to take medications  · Any signs of decreased circulation or nerve impairment to extremity: change in color, persistent  numbness, tingling, coldness or increase pain  · Any questions      What to do at Home:  Recommended activity: activity as tolerated, no driving while on pain medicine and no heavy lifting (>10lbs) for 6 weeks, unless your doctor tells you otherwise. Wait until you have been completely off pain medication for 24 hours before you resume driving. You may shower, but no tub baths, swimming pools or hot tubs. If you experience any of the following symptoms pain that gets worse or is unrelieved by medication; temperature of 101 or higher; unusual smelly or large amounts of drainage from your incision; worsening redness at incision borders; unable to keep down food/liquds; any other questions or concerns, please follow up with Dr. Alba Boogie [phone]        *  Please give a list of your current medications to your Primary Care Provider.     *  Please update this list whenever your medications are discontinued, doses are      changed, or new medications (including over-the-counter products) are added. *  Please carry medication information at all times in case of emergency situations. These are general instructions for a healthy lifestyle:    No smoking/ No tobacco products/ Avoid exposure to second hand smoke    Surgeon General's Warning:  Quitting smoking now greatly reduces serious risk to your health. Obesity, smoking, and sedentary lifestyle greatly increases your risk for illness    A healthy diet, regular physical exercise & weight monitoring are important for maintaining a healthy lifestyle    You may be retaining fluid if you have a history of heart failure or if you experience any of the following symptoms:  Weight gain of 3 pounds or more overnight or 5 pounds in a week, increased swelling in our hands or feet or shortness of breath while lying flat in bed. Please call your doctor as soon as you notice any of these symptoms; do not wait until your next office visit. Recognize signs and symptoms of STROKE:    F-face looks uneven    A-arms unable to move or move unevenly    S-speech slurred or non-existent    T-time-call 911 as soon as signs and symptoms begin-DO NOT go       Back to bed or wait to see if you get better-TIME IS BRAIN. Warning Signs of HEART ATTACK     Call 911 if you have these symptoms:   Chest discomfort. Most heart attacks involve discomfort in the center of the chest that lasts more than a few minutes, or that goes away and comes back. It can feel like uncomfortable pressure, squeezing, fullness, or pain.  Discomfort in other areas of the upper body. Symptoms can include pain or discomfort in one or both arms, the back, neck, jaw, or stomach.  Shortness of breath with or without chest discomfort.  Other signs may include breaking out in a cold sweat, nausea, or lightheadedness. Don't wait more than five minutes to call 911 - MINUTES MATTER! Fast action can save your life.  Calling 911 is almost always the fastest way to get lifesaving treatment. Emergency Medical Services staff can begin treatment when they arrive -- up to an hour sooner than if someone gets to the hospital by car. The discharge information has been reviewed with the patient and spouse. The patient and spouse verbalized understanding. Discharge medications reviewed with the patient and spouse and appropriate educational materials and side effects teaching were provided. Cholecystectomy: What to Expect at [address] [place] [address]  After your surgery, it is normal to feel weak and tired for several days after you return home. Your belly may be swollen. Since you had laparoscopic surgery, you may also have pain in your shoulder for about 24 hours. You may have gas or need to burp a lot at first, and a few people get diarrhea. The diarrhea usually goes away in 2 to 4 weeks, but it may last longer. For a laparoscopic surgery, most people can go back to work or their normal routine in 1 to 2 weeks, but it may take longer, depending on the type of work you do. This care sheet gives you a general idea about how long it will take for you to recover. However, each person recovers at a different pace. Follow the steps below to get better as quickly as possible. How can you care for yourself at home? Activity  · Rest when you feel tired. Getting enough sleep will help you recover. · Try to walk each day. Start out by walking a little more than you did the day before. Gradually increase the amount you walk. Walking boosts blood flow and helps prevent pneumonia and constipation. · For about 2 to 4 weeks, avoid lifting anything that would make you strain or anything over 10 pounds. · Avoid strenuous activities, such as biking, jogging, weightlifting, and aerobic exercise, until your doctor says it is okay. · You may shower 24 hours after surgery, if your doctor okays it. Pat the cut (incision) dry.  Do not take a bath until your doctor tells you it is okay. · You may drive when you are no longer taking pain medicine and can quickly move your foot from the gas pedal to the brake. You must also be able to sit comfortably for a long period of time, even if you do not plan to go far. You might get caught in traffic. · Your doctor will tell you when you can have sex again. Diet  · Eat smaller meals more often instead of fewer larger meals. You can eat a normal diet, but avoid eating fatty foods for about 1 month. Fatty foods include hamburger, whole milk, cheese, and many snack foods. If your stomach is upset, try bland, low-fat foods like plain rice, broiled chicken, toast, and yogurt. · Drink plenty of fluids (unless your doctor tells you not to). · If you have diarrhea, try avoiding spicy foods, dairy products, fatty foods, and alcohol. You can also watch to see if specific foods cause it, and stop eating them. If the diarrhea continues for more than 2 weeks, talk to your doctor. · You may notice that your bowel movements are not regular right after your surgery. This is common. Try to avoid constipation and straining with bowel movements. You may want to take a fiber supplement every day. If you have not had a bowel movement after a couple of days, ask your doctor about taking a mild laxative. Medicines  · Take pain medicines exactly as directed. ¨ If the doctor gave you a prescription medicine for pain, take it as prescribed. ¨ If you are not taking a prescription pain medicine, take an over-the-counter medicine such as acetaminophen (Tylenol), ibuprofen (Advil, Motrin), or naproxen (Aleve). Read and follow all instructions on the label. ¨ Do not take two or more pain medicines at the same time unless the doctor told you to. Many pain medicines contain acetaminophen, which is Tylenol. Too much Tylenol can be harmful.   · If you think your pain medicine is making you sick to your stomach:  ¨ Take your medicine after meals (unless your doctor tells you not to). ¨ Ask your doctor for a different pain medicine. · If your doctor prescribed antibiotics, take them as directed. Do not stop taking them just because you feel better. You need to take the full course of antibiotics. Incision care  · If you have strips of tape or glue on the incision, or cut, leave the tape/glue on for a week or until it falls off. · After 24 hours wash the area daily with warm, soapy water, and pat it dry. · You may have staples to hold the cut together. Keep them dry until your doctor takes them out. This is usually in 7 to 10 days. · Keep the area clean and dry. You may cover it with a gauze bandage if it weeps or rubs against clothing. Change the bandage every day. Ice   · To reduce swelling and pain, put ice or a cold pack on your belly for 10 to 20 minutes at a time. Do this every 1 to 2 hours. Put a thin cloth between the ice and your skin. Follow-up care is a key part of your treatment and safety. Be sure to make and go to all appointments, and call your doctor if you are having problems. Its also a good idea to know your test results and keep a list of the medicines you take. When should you call for help? Call 911 anytime you think you may need emergency care. For example, call if:  · You passed out (lost consciousness). · You have severe trouble breathing. · You have sudden chest pain and shortness of breath, or you cough up blood. Call your doctor now or seek immediate medical care if:  · You are sick to your stomach and cannot drink fluids. · You have pain that does not get better when you take your pain medicine. · You have signs of infection, such as:  ¨ Increased pain, swelling, warmth, or redness. ¨ Red streaks leading from the incision. ¨ Pus draining from the incision. ¨ Swollen lymph nodes in your neck, armpits, or groin. ¨ A fever. · Your urine turns dark brown or your stool is light-colored or andrea-colored.   · Your skin or the whites of your eyes turn yellow. · Bright red blood has soaked through a large bandage over your incision. · You have signs of a blood clot, such as:  ¨ Pain in your calf, back of knee, thigh, or groin. ¨ Redness and swelling in your leg or groin. · You have trouble passing urine or stool, especially if you have mild pain or swelling in your lower belly. · You had a laparoscopic surgery and your shoulder pain lasts more than 24 hours or if you do not have a bowel movement after taking a laxative. After general anesthesia or intravenous sedation, for 24 hours or while taking prescription Narcotics:  · Limit your activities  · Do not drive and operate hazardous machinery  · Do not make important personal or business decisions  · Do  not drink alcoholic beverages  · If you have not urinated within 8 hours after discharge, please contact your surgeon on call. *  Please give a list of your current medications to your Primary Care Provider. *  Please update this list whenever your medications are discontinued, doses are      changed, or new medications (including over-the-counter products) are added. *  Please carry medication information at all times in case of emergency situations. These are general instructions for a healthy lifestyle:  No smoking/ No tobacco products/ Avoid exposure to second hand smoke  Surgeon General's Warning:  Quitting smoking now greatly reduces serious risk to your health. Obesity, smoking, and sedentary lifestyle greatly increases your risk for illness  A healthy diet, regular physical exercise & weight monitoring are important for maintaining a healthy lifestyle    You may be retaining fluid if you have a history of heart failure or if you experience any of the following symptoms:  Weight gain of 3 pounds or more overnight or 5 pounds in a week, increased swelling in our hands or feet or shortness of breath while lying flat in bed.   Please call your doctor as soon as you notice any of these symptoms; do not wait until your next office visit. Recognize signs and symptoms of STROKE:  F-face looks uneven  A-arms unable to move or move unevenly  S-speech slurred or non-existent  T-time-call 911 as soon as signs and symptoms begin-DO NOT go       Back to bed or wait to see if you get better-TIME IS BRAIN.

## 2017-10-08 NOTE — PROGRESS NOTES
PLAN:  Full Liquid Diet - Advance as tolerated  DC home after lunch  Call office on Monday to schedule an appointment with Dr. Christiano Roque in 7-10 days    ASSESSMENT:  Admit Date: 10/6/2017   2 Days Post-Op  Procedure(s):  CHOLECYSTECTOMY LAPAROSCOPIC    Principal Problem:    Cholecystitis with cholelithiasis (10/6/2017)       SUBJECTIVE:  Pt sitting up in chair. Pain controlled c/o generalized weakness. Tolerating Full Liquid Diet - no nausea or vomiting. Has been up walking in room. Voiding without difficulty.  at bedside. AF, VSS.        OBJECTIVE:  Constitutional: Alert, cooperative, no acute distress; appears stated age   Visit Vitals    /70    Pulse 68    Temp 98.5 °F (36.9 °C)    Resp 17    Ht 4' 9.5\" (1.461 m)    Wt 126 lb (57.2 kg)    LMP 09/08/2008    SpO2 95%    BMI 26.79 kg/m2     Eyes:Sclera are clear. ENMT: no external lesions gross hearing normal; no obvious neck masses, no ear or lip lesions  CV: RRR. Normal perfusion  Resp: No JVD. Breathing is  non-labored; no audible wheezing. GI: soft, appropriately tender, non-distended, BS active, dressings removed - staples c/d/i  Musculoskeletal: unremarkable with normal function. No embolic signs or cyanosis.    Neuro:  Oriented; moves all 4; no focal deficits  Psychiatric: normal affect and mood, no memory impairment    Patient Vitals for the past 24 hrs:   BP Temp Pulse Resp SpO2   10/08/17 1110 117/70 98.5 °F (36.9 °C) 68 17 95 %   10/08/17 0715 104/59 98.4 °F (36.9 °C) 72 17 94 %   10/08/17 0320 105/58 97.7 °F (36.5 °C) 71 18 94 %   10/07/17 2310 105/45 99.8 °F (37.7 °C) 82 18 90 %   10/07/17 1920 132/68 99 °F (37.2 °C) 90 18 91 %   10/07/17 1512 125/50 97.8 °F (36.6 °C) 78 18 95 %     Labs:  Recent Labs      10/08/17   0918  10/06/17   1213   WBC  13.8*  24.9*   HGB  10.7*  12.4   PLT  146*  202   NA   --   133*   K   --   4.1   CL   --   97*   CO2   --   24   BUN   --   40*   CREA   --   2.28*   GLU   --   266*   TBILI   --   2.8* SGOT   --   17   ALT   --   14   AP   --   89   LPSE   --   72*     Cindy Bob, [facility]-BC    The patient was seen in conjunction with Dr Krystle Naylor who independently evaluated the patient, reviewed the chart and agreed with the assessment and plan.

## 2017-10-08 NOTE — DISCHARGE SUMMARY
Physician Discharge Summary     Patient ID:  Sidney Gauthier  [id number]  4 y.o.  1934    Admit Date: 10/6/2017     HPI: Patient is a 80 y.o.  female who presents to the ED 10/6/17 with abdominal pain. Onset of symptoms was abrupt with unchanged course since that time. The pain is located in the RUQ without radiation. Patient describes the pain as pressure and sharp, continuous, and rated as severe. Additional biliary/liver symptoms include RUQ Pain. Pancreatic symptoms include none. Previous studies include ultrasound showing gallstones and a thick walled GB. This has been going on since Wednesday. WBC 24.9 and T.Bili is 2.8. Pt underwent Laparoscopic cholecystectomy 10/6/17 by Dr. Erasmo Mobley. Discharge Date: 10/8/2017    * Admission Diagnoses: CHOLELITHIASIS  Cholecystitis with cholelithiasis    * Discharge Diagnoses:    Hospital Problems as of 10/8/2017  Date Reviewed: 6/14/2016          Codes Class Noted - Resolved POA    * (Principal)Cholecystitis with cholelithiasis (Chronic) ICD-10-CM: K80.10  ICD-9-CM: 574.10  10/6/2017 - Present Unknown               Admission Condition: Fair    * Discharge Condition: good    * Procedures: Procedure(s):  [address] Course:   Normal hospital course for this procedure. Consults: GI    Significant Diagnostic Studies: labs and radiology    * Disposition: Home    Discharge Medications:   Current Discharge Medication List      START taking these medications    Details   oxyCODONE-acetaminophen (PERCOCET) 5-325 mg per tablet 1-2 tabs by mouth every four hours prn pain  Qty: 40 Tab, Refills: 0         CONTINUE these medications which have NOT CHANGED    Details   atorvastatin (LIPITOR) 40 mg tablet Take  by mouth daily. ZINC PO Take  by mouth. spironolactone (ALDACTONE) 25 mg tablet Take 1 Tab by mouth daily.   Qty: 90 Tab, Refills: 3    Associated Diagnoses: Essential hypertension with goal blood pressure less than 140/90 cyanocobalamin 1,000 mcg tablet Take 2,500 mcg by mouth daily. lisinopril (PRINIVIL, ZESTRIL) 40 mg tablet Take 40 mg by mouth daily. ranitidine (ZANTAC) 150 mg tablet Take 150 mg by mouth daily. cholecalciferol (VITAMIN D3) 1,000 unit cap Take  by mouth two (2) times a day. omeprazole (PRILOSEC) 40 mg capsule Take 40 mg by mouth daily. B.infantis-B.ani-B.long-B.bifi (PROBIOTIC 4X) 10-15 mg Tab Take  by mouth daily. carvedilol (COREG) 12.5 mg tablet Take 12.5 mg by mouth two (2) times daily (with meals). METFORMIN 500 mg tablet Take 1,000 mg by mouth two (2) times daily (with meals). aspirin 81 mg Tab take by mouth. acetaminophen (TYLENOL EXTRA STRENGTH) 500 mg tablet Take  by mouth every six (6) hours as needed for Pain. * Follow-up Care/Patient Instructions: Activity: Activity as tolerated  Diet: Full Liquid - advance as tolerated to soft diet over next several days  Wound Care: Keep wound clean and dry    Follow-up Information     Follow up With Details Comments Contact Info    Rolene Lundborg., MD Call call ofice on monday to make a post op follow up appointment [address] [place] [phone] [facility], [address] [place] [address]  [phone]          Discharge Instructions/Follow-up Plans: Follow-up - Call office on Monday to schedule a follow up appointment with Dr. Dali Cazares in 7-10 days. [phone]. Keep incisions clean and dry, may remain uncovered. Do not apply lotions, creams or ointments to incisions.     Diet - Full Liquid diet - advance as tolerated to Soft foods diet over next few days  Activity - ambulate - as tolerated   May shower - no tub baths or soaking/submerging.     No driving while taking narcotics. Do not drink alcohol while taking narcotics.   Resume other home medications.      If problems or questions arise, please call our office at [phone].     Greater than 30 minutes were spent discharging the patient     Signed:  JULIAN Reyes  10/8/2017  12:19 PM

## 2017-10-08 NOTE — PROGRESS NOTES
END OF SHIFT NOTE:    INTAKE/OUTPUT  10/07 0701 - 10/08 0700  In: 2020 [I.V.:2020]  Out: 300 [Urine:300]  Voiding: YES  Catheter: NO  Drain:              Flatus: Patient does have flatus present. Stool:  0 occurrences. Characteristics:       Emesis: 0 occurrences. Characteristics:        VITAL SIGNS  Patient Vitals for the past 12 hrs:   Temp Pulse Resp BP SpO2   10/08/17 0320 97.7 °F (36.5 °C) 71 18 105/58 94 %   10/07/17 2310 99.8 °F (37.7 °C) 82 18 105/45 90 %   10/07/17 1920 99 °F (37.2 °C) 90 18 132/68 91 %       Pain Assessment  Pain Intensity 1: 0 (10/08/17 0231)  Pain Location 1: Abdomen  Pain Intervention(s) 1: Emotional support, Family Support  Patient Stated Pain Goal: 0    Ambulating  Yes    Shift report given to oncoming nurse at the bedside.     Jennie Marcelino RN

## 2017-10-08 NOTE — PROGRESS NOTES
Discharge instructions reviewed with patient and spouse. Opportunity for questions allowed. Patient signed and given copies of discharge instructions. Pt verbalizes understanding of all instructions and follow-up appointment. Prescription given to patient. Patient discharged to home with belongings, discharge papers and prescription. Pt denies needs at this time. No distress or changes noted. Pt transported to pt dc area via wheelchair accompanied by hospital personel.

## 2017-10-23 ENCOUNTER — HOSPITAL ENCOUNTER (INPATIENT)
Age: 82
LOS: 4 days | Discharge: HOME HEALTH | DRG: 064 | End: 2017-10-27
Attending: EMERGENCY MEDICINE | Admitting: HOSPITALIST
Payer: MEDICARE

## 2017-10-23 ENCOUNTER — APPOINTMENT (OUTPATIENT)
Dept: CT IMAGING | Age: 82
DRG: 064 | End: 2017-10-23
Attending: EMERGENCY MEDICINE
Payer: MEDICARE

## 2017-10-23 DIAGNOSIS — G93.89 RIGHT PARIETAL LOBE MASS: Primary | ICD-10-CM

## 2017-10-23 PROBLEM — R41.0 CONFUSION: Status: ACTIVE | Noted: 2017-10-23

## 2017-10-23 PROBLEM — I63.9 CVA (CEREBRAL VASCULAR ACCIDENT) (HCC): Status: ACTIVE | Noted: 2017-10-23

## 2017-10-23 LAB
ALBUMIN SERPL-MCNC: 3.5 G/DL (ref 3.2–4.6)
ALBUMIN/GLOB SERPL: 0.7 {RATIO} (ref 1.2–3.5)
ALP SERPL-CCNC: 123 U/L (ref 50–136)
ALT SERPL-CCNC: 28 U/L (ref 12–65)
ANION GAP SERPL CALC-SCNC: 8 MMOL/L (ref 7–16)
AST SERPL-CCNC: 56 U/L (ref 15–37)
BACTERIA URNS QL MICRO: NORMAL /HPF
BASOPHILS # BLD: 0.1 K/UL (ref 0–0.2)
BASOPHILS NFR BLD: 1 % (ref 0–2)
BILIRUB SERPL-MCNC: 0.8 MG/DL (ref 0.2–1.1)
BUN SERPL-MCNC: 15 MG/DL (ref 8–23)
CALCIUM SERPL-MCNC: 10.2 MG/DL (ref 8.3–10.4)
CASTS URNS QL MICRO: 0 /LPF
CHLORIDE SERPL-SCNC: 99 MMOL/L (ref 98–107)
CO2 SERPL-SCNC: 33 MMOL/L (ref 21–32)
CREAT SERPL-MCNC: 0.9 MG/DL (ref 0.6–1)
CRYSTALS URNS QL MICRO: 0 /LPF
DIFFERENTIAL METHOD BLD: ABNORMAL
EOSINOPHIL # BLD: 0.3 K/UL (ref 0–0.8)
EOSINOPHIL NFR BLD: 3 % (ref 0.5–7.8)
EPI CELLS #/AREA URNS HPF: NORMAL /HPF
ERYTHROCYTE [DISTWIDTH] IN BLOOD BY AUTOMATED COUNT: 13.5 % (ref 11.9–14.6)
GLOBULIN SER CALC-MCNC: 5 G/DL (ref 2.3–3.5)
GLUCOSE SERPL-MCNC: 199 MG/DL (ref 65–100)
HCT VFR BLD AUTO: 39.7 % (ref 35.8–46.3)
HGB BLD-MCNC: 12.9 G/DL (ref 11.7–15.4)
IMM GRANULOCYTES # BLD: 0.1 K/UL (ref 0–0.5)
IMM GRANULOCYTES NFR BLD: 1 % (ref 0–5)
LACTATE BLD-SCNC: 1.8 MMOL/L (ref 0.5–1.9)
LIPASE SERPL-CCNC: 293 U/L (ref 73–393)
LYMPHOCYTES # BLD: 2.1 K/UL (ref 0.5–4.6)
LYMPHOCYTES NFR BLD: 20 % (ref 13–44)
MCH RBC QN AUTO: 28.9 PG (ref 26.1–32.9)
MCHC RBC AUTO-ENTMCNC: 32.5 G/DL (ref 31.4–35)
MCV RBC AUTO: 89 FL (ref 79.6–97.8)
MONOCYTES # BLD: 0.8 K/UL (ref 0.1–1.3)
MONOCYTES NFR BLD: 8 % (ref 4–12)
MUCOUS THREADS URNS QL MICRO: 0 /LPF
NEUTS SEG # BLD: 7 K/UL (ref 1.7–8.2)
NEUTS SEG NFR BLD: 67 % (ref 43–78)
OTHER OBSERVATIONS,UCOM: NORMAL
PLATELET # BLD AUTO: 457 K/UL (ref 150–450)
PMV BLD AUTO: 10.4 FL (ref 10.8–14.1)
POTASSIUM SERPL-SCNC: 4.9 MMOL/L (ref 3.5–5.1)
PROT SERPL-MCNC: 8.5 G/DL (ref 6.3–8.2)
RBC # BLD AUTO: 4.46 M/UL (ref 4.05–5.25)
RBC #/AREA URNS HPF: NORMAL /HPF
SODIUM SERPL-SCNC: 140 MMOL/L (ref 136–145)
WBC # BLD AUTO: 10.3 K/UL (ref 4.3–11.1)
WBC URNS QL MICRO: NORMAL /HPF

## 2017-10-23 PROCEDURE — 74011250636 HC RX REV CODE- 250/636: Performed by: HOSPITALIST

## 2017-10-23 PROCEDURE — 83036 HEMOGLOBIN GLYCOSYLATED A1C: CPT | Performed by: HOSPITALIST

## 2017-10-23 PROCEDURE — 83605 ASSAY OF LACTIC ACID: CPT

## 2017-10-23 PROCEDURE — 93005 ELECTROCARDIOGRAM TRACING: CPT | Performed by: EMERGENCY MEDICINE

## 2017-10-23 PROCEDURE — 96360 HYDRATION IV INFUSION INIT: CPT | Performed by: EMERGENCY MEDICINE

## 2017-10-23 PROCEDURE — 74011250636 HC RX REV CODE- 250/636: Performed by: EMERGENCY MEDICINE

## 2017-10-23 PROCEDURE — 85025 COMPLETE CBC W/AUTO DIFF WBC: CPT | Performed by: EMERGENCY MEDICINE

## 2017-10-23 PROCEDURE — 81015 MICROSCOPIC EXAM OF URINE: CPT | Performed by: EMERGENCY MEDICINE

## 2017-10-23 PROCEDURE — 99285 EMERGENCY DEPT VISIT HI MDM: CPT | Performed by: EMERGENCY MEDICINE

## 2017-10-23 PROCEDURE — 74011000258 HC RX REV CODE- 258: Performed by: HOSPITALIST

## 2017-10-23 PROCEDURE — 74011250637 HC RX REV CODE- 250/637: Performed by: HOSPITALIST

## 2017-10-23 PROCEDURE — 70450 CT HEAD/BRAIN W/O DYE: CPT

## 2017-10-23 PROCEDURE — 83690 ASSAY OF LIPASE: CPT | Performed by: EMERGENCY MEDICINE

## 2017-10-23 PROCEDURE — 80053 COMPREHEN METABOLIC PANEL: CPT | Performed by: EMERGENCY MEDICINE

## 2017-10-23 PROCEDURE — 65660000000 HC RM CCU STEPDOWN

## 2017-10-23 PROCEDURE — 81003 URINALYSIS AUTO W/O SCOPE: CPT | Performed by: EMERGENCY MEDICINE

## 2017-10-23 RX ORDER — ACETAMINOPHEN 325 MG/1
650 TABLET ORAL
Status: DISCONTINUED | OUTPATIENT
Start: 2017-10-23 | End: 2017-10-27 | Stop reason: HOSPADM

## 2017-10-23 RX ORDER — FAMOTIDINE 20 MG/1
20 TABLET, FILM COATED ORAL 2 TIMES DAILY
Status: DISCONTINUED | OUTPATIENT
Start: 2017-10-24 | End: 2017-10-24 | Stop reason: SDUPTHER

## 2017-10-23 RX ORDER — NITROFURANTOIN MACROCRYSTALS 50 MG/1
50 CAPSULE ORAL EVERY 6 HOURS
Status: DISCONTINUED | OUTPATIENT
Start: 2017-10-24 | End: 2017-10-24

## 2017-10-23 RX ORDER — INSULIN LISPRO 100 [IU]/ML
INJECTION, SOLUTION INTRAVENOUS; SUBCUTANEOUS
Status: DISCONTINUED | OUTPATIENT
Start: 2017-10-24 | End: 2017-10-25

## 2017-10-23 RX ORDER — PANTOPRAZOLE SODIUM 40 MG/1
40 TABLET, DELAYED RELEASE ORAL
Status: DISCONTINUED | OUTPATIENT
Start: 2017-10-24 | End: 2017-10-27 | Stop reason: HOSPADM

## 2017-10-23 RX ORDER — ACETAMINOPHEN 650 MG/1
650 SUPPOSITORY RECTAL
Status: DISCONTINUED | OUTPATIENT
Start: 2017-10-23 | End: 2017-10-27 | Stop reason: HOSPADM

## 2017-10-23 RX ORDER — BISACODYL 5 MG
5 TABLET, DELAYED RELEASE (ENTERIC COATED) ORAL DAILY PRN
Status: DISCONTINUED | OUTPATIENT
Start: 2017-10-23 | End: 2017-10-27 | Stop reason: HOSPADM

## 2017-10-23 RX ORDER — ONDANSETRON 2 MG/ML
4 INJECTION INTRAMUSCULAR; INTRAVENOUS
Status: DISCONTINUED | OUTPATIENT
Start: 2017-10-23 | End: 2017-10-27 | Stop reason: HOSPADM

## 2017-10-23 RX ORDER — DEXTROSE 50 % IN WATER (D50W) INTRAVENOUS SYRINGE
25-50 AS NEEDED
Status: DISCONTINUED | OUTPATIENT
Start: 2017-10-23 | End: 2017-10-27 | Stop reason: HOSPADM

## 2017-10-23 RX ORDER — CARVEDILOL 6.25 MG/1
12.5 TABLET ORAL 2 TIMES DAILY WITH MEALS
Status: DISCONTINUED | OUTPATIENT
Start: 2017-10-24 | End: 2017-10-27 | Stop reason: HOSPADM

## 2017-10-23 RX ORDER — LANOLIN ALCOHOL/MO/W.PET/CERES
2500 CREAM (GRAM) TOPICAL DAILY
Status: DISCONTINUED | OUTPATIENT
Start: 2017-10-24 | End: 2017-10-27 | Stop reason: HOSPADM

## 2017-10-23 RX ORDER — LABETALOL HYDROCHLORIDE 5 MG/ML
5 INJECTION, SOLUTION INTRAVENOUS
Status: DISCONTINUED | OUTPATIENT
Start: 2017-10-23 | End: 2017-10-25 | Stop reason: ALTCHOICE

## 2017-10-23 RX ORDER — DEXTROSE 40 %
15 GEL (GRAM) ORAL AS NEEDED
Status: DISCONTINUED | OUTPATIENT
Start: 2017-10-23 | End: 2017-10-27 | Stop reason: HOSPADM

## 2017-10-23 RX ORDER — ATORVASTATIN CALCIUM 40 MG/1
40 TABLET, FILM COATED ORAL
Status: DISCONTINUED | OUTPATIENT
Start: 2017-10-23 | End: 2017-10-27 | Stop reason: HOSPADM

## 2017-10-23 RX ORDER — SODIUM CHLORIDE 0.9 % (FLUSH) 0.9 %
5-10 SYRINGE (ML) INJECTION AS NEEDED
Status: DISCONTINUED | OUTPATIENT
Start: 2017-10-23 | End: 2017-10-27 | Stop reason: HOSPADM

## 2017-10-23 RX ORDER — SODIUM CHLORIDE 9 MG/ML
100 INJECTION, SOLUTION INTRAVENOUS CONTINUOUS
Status: DISPENSED | OUTPATIENT
Start: 2017-10-23 | End: 2017-10-24

## 2017-10-23 RX ORDER — LISINOPRIL 20 MG/1
40 TABLET ORAL DAILY
Status: DISCONTINUED | OUTPATIENT
Start: 2017-10-24 | End: 2017-10-27 | Stop reason: HOSPADM

## 2017-10-23 RX ORDER — ATORVASTATIN CALCIUM 10 MG/1
40 TABLET, FILM COATED ORAL DAILY
Status: DISCONTINUED | OUTPATIENT
Start: 2017-10-24 | End: 2017-10-24 | Stop reason: SDUPTHER

## 2017-10-23 RX ORDER — MELATONIN
1000 DAILY
Status: DISCONTINUED | OUTPATIENT
Start: 2017-10-24 | End: 2017-10-27 | Stop reason: HOSPADM

## 2017-10-23 RX ORDER — SODIUM CHLORIDE 0.9 % (FLUSH) 0.9 %
5-10 SYRINGE (ML) INJECTION EVERY 8 HOURS
Status: DISCONTINUED | OUTPATIENT
Start: 2017-10-23 | End: 2017-10-27 | Stop reason: HOSPADM

## 2017-10-23 RX ORDER — DEXAMETHASONE SODIUM PHOSPHATE 100 MG/10ML
6 INJECTION INTRAMUSCULAR; INTRAVENOUS EVERY 8 HOURS
Status: DISCONTINUED | OUTPATIENT
Start: 2017-10-23 | End: 2017-10-27 | Stop reason: HOSPADM

## 2017-10-23 RX ADMIN — ATORVASTATIN CALCIUM 40 MG: 40 TABLET, FILM COATED ORAL at 22:33

## 2017-10-23 RX ADMIN — DEXAMETHASONE SODIUM PHOSPHATE 6 MG: 10 INJECTION INTRAMUSCULAR; INTRAVENOUS at 20:46

## 2017-10-23 RX ADMIN — SODIUM CHLORIDE 1000 ML: 900 INJECTION, SOLUTION INTRAVENOUS at 18:00

## 2017-10-23 RX ADMIN — LEVETIRACETAM 500 MG: 100 INJECTION, SOLUTION INTRAVENOUS at 20:45

## 2017-10-23 NOTE — ED PROVIDER NOTES
HPI Comments: Pt presents to the ED for onset of confusion and fatigue. Pt states symptoms began last evening. Reports today she has \"not felt like herself\". Reports some generalized malaise and fatigue. State she was recently diagnosed with a UTI. Reports difficulty \"focusing here eyes\". Was seen by her PCP today and sent to the ED for further Work-up    Patient is a 80 y.o. female presenting with altered mental status. The history is provided by the patient. Altered mental status    This is a recurrent problem. The current episode started more than 2 days ago. Associated symptoms include confusion. Pertinent negatives include no somnolence, no seizures, no unresponsiveness, no weakness, no delusions, no self-injury and no tingling. Risk factors include a recent illness. Her past medical history does not include CVA or TIA.         Past Medical History:   Diagnosis Date    Acute kidney failure 05/2016    Acute pericarditis 2015    Arthritis     CAP (community acquired pneumonia) 5/2016    Coronary artery disease     Crohn's disease     Hypercalcemia     Hypercholesterolemia     Hypertension     Lumbar compression fracture     Osteoporosis     Type 2 diabetes mellitus        Past Surgical History:   Procedure Laterality Date    HX ARTHROPLASTY  1/5/2016    left elbow    HX HEART CATHETERIZATION  5/12/2015    no intervention    HX HYSTERECTOMY  1978    HX KYPHOPLASTY  ~2002    lumbar         Family History:   Problem Relation Age of Onset    Heart Failure Mother     Diabetes Mother     Cancer Father      throat    Alcohol abuse Neg Hx     Arthritis-rheumatoid Neg Hx     Asthma Neg Hx     Bleeding Prob Neg Hx     Elevated Lipids Neg Hx     Headache Neg Hx     Hypertension Neg Hx     Lung Disease Neg Hx     Migraines Neg Hx     Psychiatric Disorder Neg Hx     Stroke Neg Hx     Mental Retardation Neg Hx     Thyroid Disease Neg Hx        Social History     Social History    Marital status:      Spouse name: N/A    Number of children: N/A    Years of education: N/A     Occupational History    Not on file. Social History Main Topics    Smoking status: Former Smoker     Types: Cigarettes     Start date: 6/14/1961     Quit date: 6/14/1964    Smokeless tobacco: Never Used      Comment: 2 packs a wk    Alcohol use 0.0 oz/week     0 Standard drinks or equivalent per week      Comment: rarely    Drug use: No    Sexual activity: No     Other Topics Concern    Not on file     Social History Narrative    Pt , lives with . Retired. She worked as an . She has 1 dog and 1 cat at home. ALLERGIES: Sulfa (sulfonamide antibiotics); Other medication; and Tape [adhesive]    Review of Systems   Constitutional: Positive for fatigue. Negative for fever and unexpected weight change. HENT: Negative for congestion and dental problem. Eyes: Negative for photophobia, redness and visual disturbance. Respiratory: Negative for chest tightness and shortness of breath. Cardiovascular: Negative for palpitations and leg swelling. Gastrointestinal: Negative for abdominal pain, anal bleeding and vomiting. Endocrine: Negative for polydipsia and polyphagia. Genitourinary: Negative for flank pain, frequency and urgency. Musculoskeletal: Negative for back pain and gait problem. Skin: Negative for pallor and rash. Allergic/Immunologic: Negative for food allergies and immunocompromised state. Neurological: Positive for light-headedness. Negative for tingling, tremors, seizures, syncope and weakness. Psychiatric/Behavioral: Positive for confusion. Negative for self-injury. All other systems reviewed and are negative.       Vitals:    10/23/17 1640   BP: 182/85   Pulse: 74   Resp: 16   Temp: 98.5 °F (36.9 °C)   SpO2: 95%   Weight: 57.2 kg (126 lb)   Height: 4' 9\" (1.448 m)            Physical Exam   Constitutional: She is oriented to person, place, and time. She appears well-developed and well-nourished. HENT:   Head: Normocephalic and atraumatic. Mouth/Throat: Oropharynx is clear and moist.   Eyes: Conjunctivae and EOM are normal. Pupils are equal, round, and reactive to light. No scleral icterus. Neck: Normal range of motion. Neck supple. No tracheal deviation present. No thyromegaly present. Cardiovascular: Normal rate, regular rhythm and normal heart sounds. Exam reveals no friction rub. No murmur heard. Pulmonary/Chest: Effort normal and breath sounds normal. No respiratory distress. Abdominal: Soft. Bowel sounds are normal. She exhibits no distension. There is no tenderness. Musculoskeletal: Normal range of motion. She exhibits no edema, tenderness or deformity. Neurological: She is alert and oriented to person, place, and time. She has normal reflexes. No cranial nerve deficit. Coordination normal.   Nursing note and vitals reviewed. MDM  Number of Diagnoses or Management Options  Diagnosis management comments: Pt is AAOx3 here  No gross neurological deficits  Will check basic labs, urine as well as CT head    6:27 PM  CT head shows a right parietal lobe vasogenic edema concerning for mass versus infarct. Case d/w Hospitalist for admission       Amount and/or Complexity of Data Reviewed  Clinical lab tests: ordered and reviewed  Tests in the radiology section of CPT®: ordered and reviewed    Risk of Complications, Morbidity, and/or Mortality  Presenting problems: moderate  Diagnostic procedures: moderate  Management options: moderate    Patient Progress  Patient progress: stable    ED Course       Procedures             CT HEAD WO CONT (Final result) Result time: 10/23/17 18:09:15     Final result by Berenice Koroma MD (10/23/17 18:09:15)     Impression:     Impression: Vasogenic edema present within the right parietal lobe with mass  effect upon the right lateral ventricle as well as upon the sulci.  Cannot  exclude either infarction or intracranial neoplasm. Correlation with MRI with  and without contrast as well as diffusion-weighted imaging for further  evaluation.         Narrative:     History: altered mental status. Blurred vision, nausea, one day duration     Exam: CT head without contrast    Technique: Thin section axial CT images were obtained from the skullbase through  the vertex. Radiation dose reduction techniques were used for this study.  Our  CT scanners use one or all of the following: Automated exposure control,  adjustment of the mA and/or kV according to patient size, use of iterative  reconstruction. Findings: The ventricles are normal in size, shape, and position. There is a  focal area of low density seen within the subcortical white matter of the right  parietal lobe with some localized mass effect and obliteration of the sulci as  well as localized mass effect upon the atrium of the right lateral ventricle. There are also chronic appearing white matter changes noted in the corona  radiata and centrum semiovale. No extra-axial fluid collection is present. There  is no mass or mass-effect. The basilar cisterns are patent. The paranasal  sinuses and mastoid air cells are clear.

## 2017-10-23 NOTE — ED TRIAGE NOTES
PMD-Dr Kevin Anderson. Pt c/o confusion x 2-3 days. States that she started taking an antibiotics for a urinary tract infection and is using a cream for a yeast infection. Pt is alert and oriented to person, place, and time. She says that she is having trouble focusing her eyes. Was seen by both PMD and eye doctor today and they both suggested she come to the ED.

## 2017-10-23 NOTE — IP AVS SNAPSHOT
[place] 
 
 
 [address] 
[phone] Patient: Kwame Allen MRN: [id number] :1934 About your hospitalization You were admitted on:  2017 You last received care in the:  [facility] 3M You were discharged on:  2017 Why you were hospitalized Your primary diagnosis was:  Cva (Cerebral Vascular Accident) (Hcc) Your diagnoses also included:  Confusion, Coronary Artery Disease Involving Native Coronary Artery Of Native Heart With Angina Pectoris (Hcc), Dyslipidemia, Dysuria, Vaginal Candida Things You Need To Do (next 8 weeks) Follow up with Lewis Matthew MD in 1 week(s) APPT.  @ 11:00 Phone:  [phone] Where:  [facility] [address] [place] [address] Follow up with GIOVANNA HANKINS in 1 week(s) APPT.  @ 2:45 Phone:  [phone] Where:  [address] [address]  New Patient with Javi Garcia MD at  3:00 PM  
Where: [facility] ([facility]) Discharge Orders None A check ernesto indicates which time of day the medication should be taken. My Medications TAKE these medications as instructed Instructions Each Dose to Equal  
 Morning Noon Evening Bedtime  
 amLODIPine 5 mg tablet Commonly known as:  Cristobal Jameel Your last dose was: Your next dose is: Take 0.5 Tabs by mouth daily for 30 days. 2.5 mg  
    
   
   
   
  
 aspirin 81 mg tablet Your last dose was: Your next dose is:    
   
   
 take by mouth. atorvastatin 40 mg tablet Commonly known as:  LIPITOR Your last dose was: Your next dose is: Take  by mouth daily. carvedilol 12.5 mg tablet Commonly known as:  Marley Locket Your last dose was: Your next dose is: Take 12.5 mg by mouth two (2) times daily (with meals). 12.5 mg  
    
   
   
   
  
 cholecalciferol 1,000 unit Cap Commonly known as:  VITAMIN D3 Your last dose was: Your next dose is: Take  by mouth two (2) times a day. cyanocobalamin 1,000 mcg tablet Your last dose was: Your next dose is: Take 2,500 mcg by mouth daily. 2500 mcg  
    
   
   
   
  
 levETIRAcetam 500 mg tablet Commonly known as:  KEPPRA Your last dose was: Your next dose is: Take 1 Tab by mouth two (2) times a day for 30 days. 500 mg  
    
   
   
   
  
 lisinopril 40 mg tablet Commonly known as:  Hailey Manolo Your last dose was: Your next dose is: Take 40 mg by mouth daily. 40 mg  
    
   
   
   
  
 magnesium oxide 400 mg tablet Commonly known as:  MAG-OX Your last dose was: Your next dose is: Take 1 Tab by mouth two (2) times a day for 14 days. 400 mg  
    
   
   
   
  
 metFORMIN 500 mg tablet Commonly known as:  GLUCOPHAGE Your last dose was: Your next dose is: Take 1,000 mg by mouth two (2) times daily (with meals). 1000 mg  
    
   
   
   
  
 nitrofurantoin (macrocrystal-monohydrate) 100 mg capsule Commonly known as:  MACROBID Your last dose was: Your next dose is:    
   
   
      
   
   
   
  
 nystatin powder Commonly known as:  MYCOSTATIN Your last dose was: Your next dose is:    
   
   
 Apply 1 g to affected area two (2) times a day for 14 days. 1 g  
    
   
   
   
  
 omeprazole 40 mg capsule Commonly known as:  PRILOSEC Your last dose was: Your next dose is: Take 40 mg by mouth daily. 40 mg PROBIOTIC 4X 10-15 mg Tbec Generic drug:  B.infantis-B.ani-B.long-B.bifi Your last dose was: Your next dose is: Take  by mouth daily. raNITIdine 150 mg tablet Commonly known as:  ZANTAC Your last dose was: Your next dose is: Take 150 mg by mouth daily. 150 mg  
    
   
   
   
  
 spironolactone 25 mg tablet Commonly known as:  ALDACTONE Your last dose was: Your next dose is: Take 1 Tab by mouth daily. 25 mg  
    
   
   
   
  
 TYLENOL EXTRA STRENGTH 500 mg tablet Generic drug:  acetaminophen Your last dose was: Your next dose is: Take  by mouth every six (6) hours as needed for Pain. URO--10-40.8-36 mg Cap capsule Generic drug:  Mth-Me Blue-Sod Phos-PhSal-Hyo Your last dose was: Your next dose is: ZINC PO Your last dose was: Your next dose is: Take  by mouth. Where to Get Your Medications These medications were sent to [address] [place] [address], [address] [place] [address] Phone:  [phone]  
  amLODIPine 5 mg tablet  
 levETIRAcetam 500 mg tablet  
 magnesium oxide 400 mg tablet  
 nystatin powder Discharge Instructions DISCHARGE SUMMARY from Nurse PATIENT INSTRUCTIONS: 
 
After general anesthesia or intravenous sedation, for 24 hours or while taking prescription Narcotics: · Limit your activities · Do not drive and operate hazardous machinery · Do not make important personal or business decisions · Do  not drink alcoholic beverages · If you have not urinated within 8 hours after discharge, please contact your surgeon on call. Report the following to your surgeon: 
· Excessive pain, swelling, redness or odor of or around the surgical area · Temperature over 100.5 · Nausea and vomiting lasting longer than 4 hours or if unable to take medications · Any signs of decreased circulation or nerve impairment to extremity: change in color, persistent  numbness, tingling, coldness or increase pain · Any questions What to do at Home: 
Recommended activity: Activity as tolerated. If you experience any of the following symptoms nausea, vomiting, chest pain, shortness of breath;  please follow up with MD . 
 
*  Please give a list of your current medications to your Primary Care Provider. *  Please update this list whenever your medications are discontinued, doses are 
    changed, or new medications (including over-the-counter products) are added. *  Please carry medication information at all times in case of emergency situations. These are general instructions for a healthy lifestyle: No smoking/ No tobacco products/ Avoid exposure to second hand smoke Surgeon General's Warning:  Quitting smoking now greatly reduces serious risk to your health. Obesity, smoking, and sedentary lifestyle greatly increases your risk for illness A healthy diet, regular physical exercise & weight monitoring are important for maintaining a healthy lifestyle You may be retaining fluid if you have a history of heart failure or if you experience any of the following symptoms:  Weight gain of 3 pounds or more overnight or 5 pounds in a week, increased swelling in our hands or feet or shortness of breath while lying flat in bed. Please call your doctor as soon as you notice any of these symptoms; do not wait until your next office visit. Recognize signs and symptoms of STROKE: 
 
F-face looks uneven A-arms unable to move or move unevenly S-speech slurred or non-existent T-time-call 911 as soon as signs and symptoms begin-DO NOT go Back to bed or wait to see if you get better-TIME IS BRAIN.  
 
Warning Signs of HEART ATTACK  
 
 Call 911 if you have these symptoms: 
? Chest discomfort. Most heart attacks involve discomfort in the center of the chest that lasts more than a few minutes, or that goes away and comes back. It can feel like uncomfortable pressure, squeezing, fullness, or pain. ? Discomfort in other areas of the upper body. Symptoms can include pain or discomfort in one or both arms, the back, neck, jaw, or stomach. ? Shortness of breath with or without chest discomfort. ? Other signs may include breaking out in a cold sweat, nausea, or lightheadedness. Don't wait more than five minutes to call [address]! Fast action can save your life. Calling 911 is almost always the fastest way to get lifesaving treatment. Emergency Medical Services staff can begin treatment when they arrive  up to an hour sooner than if someone gets to the hospital by car. The discharge information has been reviewed with the patient. The patient verbalized understanding. Discharge medications reviewed with the patient and appropriate educational materials and side effects teaching were provided. ___________________________________________________________________________________________________________________________________ Stroke: After Your Visit Your Care Instructions You have had a stroke. Risk factors for stroke include being overweight, smoking, and sedentary lifestyle. This means that the blood flow to a part of your brain was blocked for some time, which damages the nerve cells in that part of the brain. The part of your body controlled by that part of your brain may not function properly now. The brain is an amazing organ that can heal itself to some degree. The stroke you had damaged part of your brain, but other parts of your brain may take over in some way for the damaged areas. You have already started this process. Going home may be hard for you and your family.  The more you can try to do for yourself, the better. Remember to take each day one at a time. Follow-up care is a key part of your treatment and safety. Be sure to make and go to all appointments, and call your doctor if you are having problems. Its also a good idea to know your test results and keep a list of the medicines you take. How can you care for yourself at home? Enter a stroke rehabilitation (rehab) program, if your doctor recommends it. Physical, speech, and occupational therapies can help you manage bathing, dressing, eating, and other basics of daily living. Eat a heart-healthy diet that is low in cholesterol, saturated fat, and salt. Eat lots of fresh fruits and vegetables and foods high in fiber. Increase your activities slowly. Take short rest breaks when you get tired. Gradually increase the amount you walk. Start out by walking a little more than you did the day before. Do not drive until your doctor says it is okay. It is normal to feel sad or depressed after a stroke. If the blues last, talk to your doctor. If you are having problems with urine leakage, go to the bathroom at regular times, including when you first wake up and at bedtime. Also, limit fluids after dinner. If you are constipated, drink plenty of fluids, enough so that your urine is light yellow or clear like water. If you have kidney, heart, or liver disease and have to limit fluids, talk with your doctor before you increase the amount of fluids you drink. Set up a regular time for using the toilet. If you continue to have constipation, your doctor may suggest using a bulking agent, such as Metamucil, or a stool softener, laxative, or enema. Medicines Take your medicines exactly as prescribed. Call your doctor if you think you are having a problem with your medicine. You may be taking several medicines.  ACE (angiotensin-converting enzyme) inhibitors, angiotensin II receptor blockers (ARBs), beta-blockers, diuretics (water pills), and calcium channel blockers control your blood pressure. Statins help lower cholesterol. Your doctor may also prescribe medicines for depression, pain, sleep problems, anxiety, or agitation. If your doctor has given you medicine that prevents blood clots, such as warfarin (Coumadin), aspirin combined with extended-release dipyridamole (Aggrenox), clopidogrel (Plavix), or aspirin to prevent another stroke, you should: 
Tell your dentist, pharmacist, and other health professionals that you take these medicines. Watch for unusual bruising or bleeding, such as blood in your urine, red or black stools, or bleeding from your nose or gums. Get regular blood tests to check your clotting time if you are taking Coumadin. Wear medical alert jewelry that says you take blood thinners. You can buy this at most drugsclypdes. Do not take any over-the-counter medicines or herbal products without talking to your doctor first. 
If you take birth control pills or hormone replacement therapy, talk to your doctor about whether they are right for you. For family members and caregivers Make the home safe. Set up a room so that your loved one does not have to climb stairs. Be sure the bathroom is on the same floor. Move throw rugs and furniture that could cause falls, and make sure that the lighting is good. Put grab bars and seats in tubs and showers. Find out what your loved one can do and what he or she needs help with. Try not to do things for your loved one that your loved one can do on his or her own. Help him or her learn and practice new skills. Visit and talk with your loved one often. Try doing activities together that you both enjoy, such as playing cards or board games. Keep in touch with your loved one's friends as much as you can, and encourage them to visit. Take care of yourself. Do not try to do everything yourself. Ask other family members to help.  Eat well, get enough rest, and take time to do things that you enjoy. Keep up with your own doctor visits, and make sure to take your medicines regularly. Get out of the house as much as you can. Join a local support group. Find out if you qualify for home health care visits to help with rehab or for adult day care. When should you call for help? Call 911 anytime you think you may need emergency care. For example, call if: 
You have signs of another stroke. These may include: 
Sudden numbness, paralysis, or weakness in your face, arm, or leg, especially on only one side of your body. New problems with walking or balance. Sudden vision changes. Drooling or slurred speech. New problems speaking or understanding simple statements, or you feel confused. A sudden, severe headache that is different from past headaches. Call 911 even if these symptoms go away in a few minutes. You cough up blood. You vomit blood or what looks like coffee grounds. You pass maroon or very bloody stools. Call your doctor now or seek immediate medical care if: 
You have new bruises or blood spots under your skin. You have a nosebleed. Your gums bleed when you brush your teeth. You have blood in your urine. Your stools are black and tarlike or have streaks of blood. You have vaginal bleeding when you are not having your period, or heavy period bleeding. You have new symptoms that may be related to your stroke, such as falls or trouble swallowing. Watch closely for changes in your health, and be sure to contact your doctor if you have any problems. Where can you learn more? Go to CopsForHire.be Enter D174  in the search box to learn more about \"Stroke: After Your Visit\". © 3339-9078 Healthwise, Incorporated. Care instructions adapted under license by [facility] (which disclaims liability or warranty for this information).  This care instruction is for use with your licensed healthcare professional. If you have questions about a medical condition or this instruction, always ask your healthcare professional. Demetrius Juniortammy any warranty or liability for your use of this information. Panelfly Announcement We are excited to announce that we are making your provider's discharge notes available to you in Panelfly. You will see these notes when they are completed and signed by the physician that discharged you from your recent hospital stay. If you have any questions or concerns about any information you see in Panelfly, please call the Health Information Department where you were seen or reach out to your Primary Care Provider for more information about your plan of care. Introducing [facility]! Dear Joaquina Gomez: Thank you for requesting a Panelfly account. Our records indicate that you already have an active Panelfly account. You can access your account anytime at [email or web address] Did you know that you can access your hospital and ER discharge instructions at any time in Panelfly? You can also review all of your test results from your hospital stay or ER visit. Additional Information If you have questions, please visit the Frequently Asked Questions section of the Panelfly website at [email or web address] Remember, Panelfly is NOT to be used for urgent needs. For medical emergencies, dial 911. Now available from your iPhone and Android! Providers Seen During Your Hospitalization Provider Specialty Primary office phone Saul Chavira MD Emergency Medicine [phone] Adriana Kim MD Internal Medicine [phone] Gauri Manzano DO Internal Medicine [phone] Your Primary Care Physician (PCP) Primary Care Physician Office Phone Office Fax Steven Smith [phone] You are allergic to the following Allergen Reactions Sulfa (Sulfonamide Antibiotics) Rash Other Medication Unknown (comments) \"Dypentin\" Tape (Adhesive) Other (comments) Blisters and skin tears Recent Documentation Height Weight BMI OB Status Smoking Status 1.448 m 55 kg 26.25 kg/m2 Postmenopausal Former Smoker Emergency Contacts Name Discharge Info Relation Home Work Mobile Julieth Pike DISCHARGE CAREGIVER [3] Daughter [21] [phone] Yan Maza DISCHARGE CAREGIVER [3] Spouse [3] [phone] Patient Belongings The following personal items are in your possession at time of discharge: 
  Dental Appliances: None  Visual Aid: Glasses      Home Medications: None      Clothing: None    Other Valuables: None Discharge Instructions Attachments/References TIA (TRANSIENT ISCHEMIC ATTACK) (ENGLISH) SECONDHAND SMOKE (ENGLISH) HAND-WASHING (ENGLISH) Patient Handouts Transient Ischemic Attack: Care Instructions Your Care Instructions A transient ischemic attack (TIA) is when blood flow to a part of your brain is blocked for a short time. A TIA is like a stroke but usually lasts only a few minutes. A TIA does not cause lasting brain damage. Any vision problems, slurred speech, or other symptoms usually go away in 10 to 20 minutes. But they may last for up to 24 hours. TIAs are often warning signs of a stroke. Some people who have a TIA may have a stroke in the future. A stroke can cause symptoms like those of a TIA. But a stroke causes lasting damage to your brain. You can take steps to help prevent a stroke. One thing you can do is get early treatment. If you have other new symptoms, or if your symptoms do not get better, go back to the emergency room or call your doctor right away. Getting treatment right away may prevent long-term brain damage caused by a stroke. The doctor has checked you carefully, but problems can develop later.  If you notice any problems or new symptoms, get medical treatment right away. Follow-up care is a key part of your treatment and safety. Be sure to make and go to all appointments, and call your doctor if you are having problems. It's also a good idea to know your test results and keep a list of the medicines you take. How can you care for yourself at home? Medicines ? · Be safe with medicines. Take your medicines exactly as prescribed. Call your doctor if you think you are having a problem with your medicine. ? · If you take a blood thinner, such as aspirin, be sure you get instructions about how to take your medicine safely. Blood thinners can cause serious bleeding problems. ? · Call your doctor if you are not able to take your medicines for any reason. ? · Do not take any over-the-counter medicines or herbal products without talking to your doctor first.  
? · If you take birth control pills or hormone therapy, talk to your doctor. Ask if these treatments are right for you. ? Lifestyle changes ? · Do not smoke. If you need help quitting, talk to your doctor about stop-smoking programs and medicines. ? · Be active. If your doctor recommends it, get more exercise. Walking is a good choice. Bit by bit, increase the amount you walk every day. Try for at least 30 minutes on most days of the week. You also may want to swim, bike, or do other activities. ? · Eat heart-healthy foods. These include fruits, vegetables, high-fiber foods, fish, and foods that are low in sodium, saturated fat, and trans fat. ? · Stay at a healthy weight. Lose weight if you need to.  
? · Limit alcohol to 2 drinks a day for men and 1 drink a day for women. ?Staying healthy ? · Manage other health problems such as diabetes, high blood pressure, and high cholesterol. ? · Get the flu vaccine every year. When should you call for help? Call 911 anytime you think you may need emergency care. For example, call if: ? · You have new or worse symptoms of a stroke. These may include: 
¨ Sudden numbness, tingling, weakness, or loss of movement in your face, arm, or leg, especially on only one side of your body. ¨ Sudden vision changes. ¨ Sudden trouble speaking. ¨ Sudden confusion or trouble understanding simple statements. ¨ Sudden problems with walking or balance. ¨ A sudden, severe headache that is different from past headaches. Call 911 even if these symptoms go away in a few minutes. ? · You feel like you are having another TIA. ? Watch closely for changes in your health, and be sure to contact your doctor if you have any problems. Where can you learn more? Go to http://faviolaKadoinkconi.info/. Enter ([phone] [id number] in the search box to learn more about \"Transient Ischemic Attack: Care Instructions. \" Current as of: March 20, 2017 Content Version: 11.4 © 5197-9437 [employer]. Care instructions adapted under license by [employer] (which disclaims liability or warranty for this information). If you have questions about a medical condition or this instruction, always ask your healthcare professional. [place] [address] any warranty or liability for your use of this information. Secondhand Smoke: Care Instructions Your Care Instructions Secondhand smoke comes from the burning end of a cigarette, cigar, or pipe and the smoke that a smoker exhales. The smoke contains nicotine and many other harmful chemicals. Breathing secondhand smoke can cause or worsen health problems including cancer, asthma, coronary artery disease, and respiratory infections. It can make your eyes and nose burn and cause a sore throat. Secondhand smoke is especially bad for babies and young children whose lungs are still developing.  Babies whose parents smoke are more likely to have ear infections, pneumonia, and bronchitis in the first few years of their lives. Secondhand smoke can make asthma symptoms worse in children. If you are pregnant, it is important that you not smoke and that you avoid secondhand smoke. You are more likely to give birth to a baby who weighs less than expected (low birth weight) if you smoke. And your baby may have a greater risk for sudden infant death syndrome (SIDS). Babies whose mothers are exposed to secondhand smoke during pregnancy have a higher risk for health problems. Follow-up care is a key part of your treatment and safety. Be sure to make and go to all appointments, and call your doctor if you are having problems. It's also a good idea to know your test results and keep a list of the medicines you take. How can you care for yourself at home? · Do not smoke or let anyone else smoke in your home. If people must smoke, ask them to go outside. · If people do smoke in your home, choose a room where you can open a window or use a fan to get the smoke outside. · Do not let anyone smoke in your car. If someone must smoke, pull over in a safe place and let him or her smoke away from the car. · Ask your employer to make sure that you have a smoke-free work area. · Make sure that your children are not exposed to secondhand smoke at day care, school, and after-school programs. · Try to choose nonsmoking bars, restaurants, and other public places when you go out. · Help your family and friends who smoke to quit by encouraging them to try. Tell them about treatment resources. Having support from others often helps. · If you smoke, quit. Quitting is hard, but there are ways to boost your chance of quitting tobacco for good. ¨ Use nicotine gum, patches, or lozenges. Call a quitline. Ask your doctor about stop-smoking programs and medicines. ¨ Keep trying. When should you call for help? Watch closely for changes in your health, and be sure to contact your doctor if you have any problems. Where can you learn more? Go to http://faviola-coni.info/. Enter L004 in the search box to learn more about \"Secondhand Smoke: Care Instructions. \" Current as of: March 20, 2017 Content Version: 11.4 © 9515-3763 [employer]. Care instructions adapted under license by [employer] (which disclaims liability or warranty for this information). If you have questions about a medical condition or this instruction, always ask your healthcare professional. [address] any warranty or liability for your use of this information. Hand-Washing: Care Instructions Your Care Instructions It is important for caregivers to wash their hands properly. This is the single best way to prevent the spread of infections. Hand-washing can help keep you from getting sick. It is easy, doesn't cost much, and it works. Make sure that you and your caregivers follow safe hand-washing routines. Caregivers may include health care workers or family members at home or in a care facility. You can talk to them about this information on hand-washing. Follow-up care is a key part of your treatment and safety. Be sure to make and go to all appointments, and call your doctor if you are having problems. It's also a good idea to know your test results and keep a list of the medicines you take. How can you care for yourself at home? · Caregivers should wash their hands with soap and water: ¨ When their hands are dirty, especially after being exposed to body fluids. This includes blood. ¨ When their hands may have been exposed to germs that could spread infection. ¨ After they touch broken skin, sores, or wound bandages. ¨ After they use the bathroom. · At other times, caregivers can use an alcohol-based gel  or soap and water to clean hands. This should be done: ¨ Before and after any contact with you. ¨ After they take off gloves. ¨ Before they handle a device that touches your body (even if gloves are used). ¨ After they touch any objects near you, such as medical equipment, lights, or doorknobs. ¨ Before they handle medicine or prepare food. Proper hand-washing for caregivers · When using an alcohol-based gel , fill your palm with the gel. Then spread it all over your hands. Rub your hands together until they are dry. · When washing hands with soap and water: ¨ Wet your hands with running water, and apply soap. ¨ Rub your hands together to make a lather. Scrub well for at least 20 seconds. ¨ Pay special attention to your wrists, the backs of your hands, between your fingers, and under your fingernails. ¨ Rinse your hands well under running water. ¨ Use a clean towel to dry your hands, or air-dry your hands. You may want to use a clean towel as a barrier between the faucet and your clean hands when you turn off the water. · If you use bar soap, use small bars. Set the soap on a rack that lets water drain. Where can you learn more? Go to http://faviola-coni.info/. Enter P903 in the search box to learn more about \"Hand-Washing: Care Instructions. \" Current as of: March 3, 2017 Content Version: 11.4 © 3897-1162 [employer]. Care instructions adapted under license by [employer] (which disclaims liability or warranty for this information). If you have questions about a medical condition or this instruction, always ask your healthcare professional. [place] [address] any warranty or liability for your use of this information. Please provide this summary of care documentation to your next provider. Signatures-by signing, you are acknowledging that this After Visit Summary has been reviewed with you and you have received a copy. Patient Signature:  ____________________________________________________________ Date:  ____________________________________________________________  
  
Isaiah Sport Provider Signature:  ____________________________________________________________ Date:  ____________________________________________________________

## 2017-10-24 ENCOUNTER — APPOINTMENT (OUTPATIENT)
Dept: CT IMAGING | Age: 82
DRG: 064 | End: 2017-10-24
Attending: HOSPITALIST
Payer: MEDICARE

## 2017-10-24 ENCOUNTER — APPOINTMENT (OUTPATIENT)
Dept: MRI IMAGING | Age: 82
DRG: 064 | End: 2017-10-24
Attending: HOSPITALIST
Payer: MEDICARE

## 2017-10-24 LAB
ATRIAL RATE: 65 BPM
CALCULATED P AXIS, ECG09: 114 DEGREES
CALCULATED R AXIS, ECG10: 7 DEGREES
CALCULATED T AXIS, ECG11: -53 DEGREES
CHOLEST SERPL-MCNC: 95 MG/DL
CK SERPL-CCNC: 25 U/L (ref 21–215)
DIAGNOSIS, 93000: NORMAL
ERYTHROCYTE [SEDIMENTATION RATE] IN BLOOD: 42 MM/HR (ref 0–30)
EST. AVERAGE GLUCOSE BLD GHB EST-MCNC: 163 MG/DL
EST. AVERAGE GLUCOSE BLD GHB EST-MCNC: 166 MG/DL
GLUCOSE BLD STRIP.AUTO-MCNC: 263 MG/DL (ref 65–100)
GLUCOSE BLD STRIP.AUTO-MCNC: 303 MG/DL (ref 65–100)
GLUCOSE BLD STRIP.AUTO-MCNC: 316 MG/DL (ref 65–100)
GLUCOSE BLD STRIP.AUTO-MCNC: 333 MG/DL (ref 65–100)
HBA1C MFR BLD: 7.3 % (ref 4.8–6)
HBA1C MFR BLD: 7.4 % (ref 4.8–6)
HDLC SERPL-MCNC: 32 MG/DL (ref 40–60)
HDLC SERPL: 3 {RATIO}
LDLC SERPL CALC-MCNC: 43.4 MG/DL
LIPID PROFILE,FLP: ABNORMAL
MAGNESIUM SERPL-MCNC: 1.3 MG/DL (ref 1.8–2.4)
MAGNESIUM SERPL-MCNC: 2.4 MG/DL (ref 1.8–2.4)
P-R INTERVAL, ECG05: 160 MS
PHOSPHATE SERPL-MCNC: 3 MG/DL (ref 2.3–3.7)
Q-T INTERVAL, ECG07: 386 MS
QRS DURATION, ECG06: 86 MS
QTC CALCULATION (BEZET), ECG08: 401 MS
TRIGL SERPL-MCNC: 98 MG/DL (ref 35–150)
TSH SERPL DL<=0.005 MIU/L-ACNC: 0.4 UIU/ML (ref 0.36–3.74)
VENTRICULAR RATE, ECG03: 65 BPM
VLDLC SERPL CALC-MCNC: 19.6 MG/DL (ref 6–23)

## 2017-10-24 PROCEDURE — 84443 ASSAY THYROID STIM HORMONE: CPT | Performed by: HOSPITALIST

## 2017-10-24 PROCEDURE — 65660000000 HC RM CCU STEPDOWN

## 2017-10-24 PROCEDURE — 83036 HEMOGLOBIN GLYCOSYLATED A1C: CPT | Performed by: HOSPITALIST

## 2017-10-24 PROCEDURE — 74011250637 HC RX REV CODE- 250/637: Performed by: HOSPITALIST

## 2017-10-24 PROCEDURE — 82550 ASSAY OF CK (CPK): CPT | Performed by: HOSPITALIST

## 2017-10-24 PROCEDURE — A9577 INJ MULTIHANCE: HCPCS | Performed by: INTERNAL MEDICINE

## 2017-10-24 PROCEDURE — 80061 LIPID PANEL: CPT | Performed by: HOSPITALIST

## 2017-10-24 PROCEDURE — 70553 MRI BRAIN STEM W/O & W/DYE: CPT

## 2017-10-24 PROCEDURE — 36415 COLL VENOUS BLD VENIPUNCTURE: CPT | Performed by: INTERNAL MEDICINE

## 2017-10-24 PROCEDURE — 74011250637 HC RX REV CODE- 250/637: Performed by: INTERNAL MEDICINE

## 2017-10-24 PROCEDURE — 74011250636 HC RX REV CODE- 250/636: Performed by: HOSPITALIST

## 2017-10-24 PROCEDURE — 83735 ASSAY OF MAGNESIUM: CPT | Performed by: HOSPITALIST

## 2017-10-24 PROCEDURE — 82962 GLUCOSE BLOOD TEST: CPT

## 2017-10-24 PROCEDURE — 74011000258 HC RX REV CODE- 258: Performed by: HOSPITALIST

## 2017-10-24 PROCEDURE — 74011250636 HC RX REV CODE- 250/636: Performed by: INTERNAL MEDICINE

## 2017-10-24 PROCEDURE — 74011636320 HC RX REV CODE- 636/320: Performed by: HOSPITALIST

## 2017-10-24 PROCEDURE — 84100 ASSAY OF PHOSPHORUS: CPT | Performed by: HOSPITALIST

## 2017-10-24 PROCEDURE — 93306 TTE W/DOPPLER COMPLETE: CPT

## 2017-10-24 PROCEDURE — 83735 ASSAY OF MAGNESIUM: CPT | Performed by: INTERNAL MEDICINE

## 2017-10-24 PROCEDURE — 70496 CT ANGIOGRAPHY HEAD: CPT

## 2017-10-24 PROCEDURE — 85652 RBC SED RATE AUTOMATED: CPT | Performed by: HOSPITALIST

## 2017-10-24 PROCEDURE — 74011000250 HC RX REV CODE- 250: Performed by: INTERNAL MEDICINE

## 2017-10-24 PROCEDURE — 74011636637 HC RX REV CODE- 636/637: Performed by: HOSPITALIST

## 2017-10-24 RX ORDER — MAGNESIUM SULFATE HEPTAHYDRATE 40 MG/ML
2 INJECTION, SOLUTION INTRAVENOUS ONCE
Status: COMPLETED | OUTPATIENT
Start: 2017-10-24 | End: 2017-10-24

## 2017-10-24 RX ORDER — SODIUM CHLORIDE 0.9 % (FLUSH) 0.9 %
10 SYRINGE (ML) INJECTION
Status: COMPLETED | OUTPATIENT
Start: 2017-10-24 | End: 2017-10-24

## 2017-10-24 RX ORDER — POLYVINYL ALCOHOL 14 MG/ML
1 SOLUTION/ DROPS OPHTHALMIC AS NEEDED
Status: DISCONTINUED | OUTPATIENT
Start: 2017-10-24 | End: 2017-10-27 | Stop reason: HOSPADM

## 2017-10-24 RX ORDER — MAGNESIUM SULFATE HEPTAHYDRATE 40 MG/ML
2 INJECTION, SOLUTION INTRAVENOUS
Status: COMPLETED | OUTPATIENT
Start: 2017-10-24 | End: 2017-10-24

## 2017-10-24 RX ORDER — GUAIFENESIN 100 MG/5ML
81 LIQUID (ML) ORAL DAILY
Status: DISCONTINUED | OUTPATIENT
Start: 2017-10-24 | End: 2017-10-27 | Stop reason: HOSPADM

## 2017-10-24 RX ORDER — FAMOTIDINE 20 MG/1
20 TABLET, FILM COATED ORAL DAILY
Status: DISCONTINUED | OUTPATIENT
Start: 2017-10-24 | End: 2017-10-27 | Stop reason: HOSPADM

## 2017-10-24 RX ADMIN — INSULIN LISPRO 6 UNITS: 100 INJECTION, SOLUTION INTRAVENOUS; SUBCUTANEOUS at 07:30

## 2017-10-24 RX ADMIN — PANTOPRAZOLE SODIUM 40 MG: 40 TABLET, DELAYED RELEASE ORAL at 07:30

## 2017-10-24 RX ADMIN — MAGNESIUM SULFATE IN WATER 2 G: 40 INJECTION, SOLUTION INTRAVENOUS at 17:05

## 2017-10-24 RX ADMIN — INSULIN LISPRO 8 UNITS: 100 INJECTION, SOLUTION INTRAVENOUS; SUBCUTANEOUS at 21:20

## 2017-10-24 RX ADMIN — NITROFURANTOIN MACROCRYSTALS 50 MG: 50 CAPSULE ORAL at 06:00

## 2017-10-24 RX ADMIN — Medication 10 ML: at 13:22

## 2017-10-24 RX ADMIN — Medication 10 ML: at 09:15

## 2017-10-24 RX ADMIN — INSULIN LISPRO 8 UNITS: 100 INJECTION, SOLUTION INTRAVENOUS; SUBCUTANEOUS at 11:30

## 2017-10-24 RX ADMIN — DEXAMETHASONE SODIUM PHOSPHATE 6 MG: 10 INJECTION INTRAMUSCULAR; INTRAVENOUS at 12:37

## 2017-10-24 RX ADMIN — FAMOTIDINE 20 MG: 20 TABLET ORAL at 09:00

## 2017-10-24 RX ADMIN — CYANOCOBALAMIN TAB 1000 MCG 2500 MCG: 1000 TAB at 09:00

## 2017-10-24 RX ADMIN — LISINOPRIL 40 MG: 20 TABLET ORAL at 09:00

## 2017-10-24 RX ADMIN — CARVEDILOL 12.5 MG: 6.25 TABLET, FILM COATED ORAL at 08:00

## 2017-10-24 RX ADMIN — IOPAMIDOL 80 ML: 755 INJECTION, SOLUTION INTRAVENOUS at 09:15

## 2017-10-24 RX ADMIN — GADOBENATE DIMEGLUMINE 10 ML: 529 INJECTION, SOLUTION INTRAVENOUS at 09:38

## 2017-10-24 RX ADMIN — POLYVINYL ALCOHOL 1 DROP: 14 SOLUTION/ DROPS OPHTHALMIC at 18:35

## 2017-10-24 RX ADMIN — ATORVASTATIN CALCIUM 40 MG: 40 TABLET, FILM COATED ORAL at 21:18

## 2017-10-24 RX ADMIN — Medication 10 ML: at 09:39

## 2017-10-24 RX ADMIN — NITROFURANTOIN MACROCRYSTALS 50 MG: 50 CAPSULE ORAL at 07:00

## 2017-10-24 RX ADMIN — DEXAMETHASONE SODIUM PHOSPHATE 6 MG: 10 INJECTION INTRAMUSCULAR; INTRAVENOUS at 19:26

## 2017-10-24 RX ADMIN — Medication 10 ML: at 21:18

## 2017-10-24 RX ADMIN — ASPIRIN 81 MG 81 MG: 81 TABLET ORAL at 09:20

## 2017-10-24 RX ADMIN — SODIUM CHLORIDE 100 ML/HR: 900 INJECTION, SOLUTION INTRAVENOUS at 12:45

## 2017-10-24 RX ADMIN — DEXAMETHASONE SODIUM PHOSPHATE 6 MG: 10 INJECTION INTRAMUSCULAR; INTRAVENOUS at 04:26

## 2017-10-24 RX ADMIN — SODIUM CHLORIDE 100 ML: 900 INJECTION, SOLUTION INTRAVENOUS at 09:15

## 2017-10-24 RX ADMIN — INSULIN LISPRO 8 UNITS: 100 INJECTION, SOLUTION INTRAVENOUS; SUBCUTANEOUS at 16:22

## 2017-10-24 RX ADMIN — ATORVASTATIN CALCIUM 40 MG: 10 TABLET, FILM COATED ORAL at 09:00

## 2017-10-24 RX ADMIN — CARVEDILOL 12.5 MG: 6.25 TABLET, FILM COATED ORAL at 16:16

## 2017-10-24 RX ADMIN — LEVETIRACETAM 500 MG: 100 INJECTION, SOLUTION INTRAVENOUS at 08:00

## 2017-10-24 RX ADMIN — MAGNESIUM SULFATE IN WATER 2 G: 40 INJECTION, SOLUTION INTRAVENOUS at 04:26

## 2017-10-24 RX ADMIN — LEVETIRACETAM 500 MG: 100 INJECTION, SOLUTION INTRAVENOUS at 19:26

## 2017-10-24 RX ADMIN — CHOLECALCIFEROL TAB 25 MCG (1000 UNIT) 1000 UNITS: 25 TAB at 12:37

## 2017-10-24 NOTE — PROGRESS NOTES
Speech Pathology:     Patient is asleep and RN stated that patient had been up all night. ST will re-assess in the am.       IESHA Dallas

## 2017-10-24 NOTE — ED NOTES
Patient c/o blurry vision. Patient states she went to her ophthalmologist today and they dilated her eyes. Patient states her eyes still feel dilated and fuzzy. Patient with some difficulty reading words on exam. Patient able to read some words but not the complete sentence. Patient does recognize and identify items. Patient normally wears glasses to read but she does not have them with her today.

## 2017-10-24 NOTE — ED NOTES
Pt on and off bedpan. Voided 200 mls of clear yellow urine. Readjusted in bed.    left to get breakfast.

## 2017-10-24 NOTE — PROGRESS NOTES
TRANSFER - IN REPORT:    Verbal report received from Wadie Severe, RN on [address]  being received from ED(unit) for routine progression of care      Report consisted of patients Situation, Background, Assessment and   Recommendations(SBAR). Information from the following report(s) SBAR, Kardex, ED Summary, MAR, Recent Results, Med Rec Status and Cardiac Rhythm SR was reviewed with the receiving nurse. Opportunity for questions and clarification was provided. Assessment completed upon patients arrival to unit and care assumed. Dual skin assessment completed with Shahnaz Sue RN findings were Skin color, texture, turgor normal. No rashes or lesions. Patient turns self and ambulatory, no Allevyn placed.      Stroke education binder given to patient

## 2017-10-24 NOTE — ED NOTES
BGL is 263. Pt to MRI and will be given insulin and breakfast once she returns. Pt assisted to restroom before MRI.

## 2017-10-24 NOTE — PROGRESS NOTES
Hospitalist Progress Note    10/24/2017  Admit Date: 10/23/2017  5:12 PM   NAME: Christian Curtis   :  1934   MRN:  [id number]   Attending: No att. providers found  PCP:  Sascha Lowery MD    SUBJECTIVE:   Patient presented with concerns for cva including confusion and va changes. cva workup pending and pt awaiting tele bed currently. Denies any pain, nausea, sob. Review of Systems negative with exception of pertinent positives noted above  PHYSICAL EXAM     Visit Vitals    /68    Pulse 61    Temp 98.5 °F (36.9 °C)    Resp 18    Ht 4' 9\" (1.448 m)    Wt 57.2 kg (126 lb)    LMP 2008    SpO2 92%    BMI 27.27 kg/m2      Temp (24hrs), Av.5 °F (36.9 °C), Min:98.5 °F (36.9 °C), Max:98.5 °F (36.9 °C)    Oxygen Therapy  O2 Sat (%): 92 % (10/24/17 0631)  Pulse via Oximetry: 61 beats per minute (10/24/17 0631)  O2 Device: Room air (10/23/17 1640)  No intake or output data in the 24 hours ending 10/24/17 08   General: No acute distress    Lungs:  CTA Bilaterally.    Heart:  Regular rate and rhythm,  No murmur, rub, or gallop  Abdomen: Soft, Non distended, Non tender, Positive bowel sounds  Extremities: No cyanosis, clubbing or edema  Neurologic:  No focal deficits, cn intact    ASSESSMENT      Active Hospital Problems    Diagnosis Date Noted    CVA [facility]cerebral vascular accident) ([facility] 10/23/2017    Confusion 10/23/2017    Coronary artery disease involving native coronary artery of native heart with angina pectoris ([facility] 2016    Dyslipidemia 2016    DM type 2 (diabetes mellitus, type 2) ([facility] [address] 2013    Crohn disease ([facility] [address] 2013    Essential hypertension with goal blood pressure less than 140/90 2013     Plan:  · Follow up mri brain, echo, carotid duplex  · Continue statin, asa   · Pt/ot/speech  · Further rcs pending workup    DVT Prophylaxis:     Signed By: Aster Stephenson MD     2017

## 2017-10-24 NOTE — PROGRESS NOTES
Physical Therapy note and Occupational therapy note: on first attempt pt was eating lunch. Came back a little later and pt had just gotten to sleep. She had been in the ER all night and most of the morning waiting on a telemetry bed. Talked with RN and RN and therapy agreed that we would let pt sleep. We will assess in the morning. Thanks.  Kelsy Carroll, PT

## 2017-10-24 NOTE — H&P
Hospitalist H&P/Consult Note     Admit Date:  10/23/2017  5:12 PM   Name:  Cony Rouse   Age:  80 y.o.  :  1934   MRN:  [id number]   PCP:  Adilson Burkett MD  Treatment Team: Primary Nurse: Felicia Vergara RN    HPI:   Fany 81 y/o female with hx CAD, HTN, DM presents with 2-3 days of confusion and difficulty focusing her eyes. Recently diagnosed with UTI and started on abx. She has not felt like her usual self. Denies any neck stiffness, fever or chills. Saw her PCP and eye Dr who told her to go to the ED for further evaluation. No significant neurologic deficits identified but she did have abnormal head CT:    Impression: Vasogenic edema present within the right parietal lobe with mass  effect upon the right lateral ventricle as well as upon the sulci. Cannot  exclude either infarction or intracranial neoplasm. Correlation with MRI with  and without contrast as well as diffusion-weighted imaging for further evaluation    Family reports she has actually had some difficulties for a week. No headaches. No seizures. Has no prior hx CVA. Will admit for further workup. 10 systems reviewed and negative except as noted in HPI. Past Medical History:   Diagnosis Date    Acute kidney failure 2016    Acute pericarditis     Arthritis     CAP (community acquired pneumonia) 2016    Coronary artery disease     Crohn's disease     Hypercalcemia     Hypercholesterolemia     Hypertension     Lumbar compression fracture     Osteoporosis     Type 2 diabetes mellitus       Past Surgical History:   Procedure Laterality Date    HX ARTHROPLASTY  2016    left elbow    HX HEART CATHETERIZATION  2015    no intervention    HX HYSTERECTOMY      HX KYPHOPLASTY  ~    lumbar      Prior to Admission Medications   Prescriptions Last Dose Informant Patient Reported? Taking? B.infantis-B.ani-B.long-B.bifi (PROBIOTIC 4X) 10-15 mg Tab   Yes No   Sig: Take  by mouth daily. METFORMIN 500 mg tablet   Yes No   Sig: Take 1,000 mg by mouth two (2) times daily (with meals). URO--10-40.8-36 mg cap capsule   Yes No   ZINC PO   Yes No   Sig: Take  by mouth. acetaminophen (TYLENOL EXTRA STRENGTH) 500 mg tablet   Yes No   Sig: Take  by mouth every six (6) hours as needed for Pain. aspirin 81 mg Tab   Yes No   Sig: take by mouth. atorvastatin (LIPITOR) 40 mg tablet   Yes No   Sig: Take  by mouth daily. carvedilol (COREG) 12.5 mg tablet   Yes No   Sig: Take 12.5 mg by mouth two (2) times daily (with meals). cholecalciferol (VITAMIN D3) 1,000 unit cap   Yes No   Sig: Take  by mouth two (2) times a day. cyanocobalamin 1,000 mcg tablet   Yes No   Sig: Take 2,500 mcg by mouth daily. lisinopril (PRINIVIL, ZESTRIL) 40 mg tablet   Yes No   Sig: Take 40 mg by mouth daily. nitrofurantoin, macrocrystal-monohydrate, (MACROBID) 100 mg capsule   Yes No   omeprazole (PRILOSEC) 40 mg capsule   Yes No   Sig: Take 40 mg by mouth daily. ranitidine (ZANTAC) 150 mg tablet   Yes No   Sig: Take 150 mg by mouth daily. spironolactone (ALDACTONE) 25 mg tablet   No No   Sig: Take 1 Tab by mouth daily.       Facility-Administered Medications: None     Allergies   Allergen Reactions    Sulfa (Sulfonamide Antibiotics) Rash    Other Medication Unknown (comments)     \"Dypentin\"    Tape [Adhesive] Other (comments)     Blisters and skin tears      Social History   Substance Use Topics    Smoking status: Former Smoker     Types: Cigarettes     Start date: 6/14/1961     Quit date: 6/14/1964    Smokeless tobacco: Never Used      Comment: 2 packs a wk    Alcohol use 0.0 oz/week     0 Standard drinks or equivalent per week      Comment: rarely      Family History   Problem Relation Age of Onset    Heart Failure Mother     Diabetes Mother     Cancer Father      throat    Alcohol abuse Neg Hx     Arthritis-rheumatoid Neg Hx     Asthma Neg Hx     Bleeding Prob Neg Hx     Elevated Lipids Neg Hx  Headache Neg Hx     Hypertension Neg Hx     Lung Disease Neg Hx     Migraines Neg Hx     Psychiatric Disorder Neg Hx     Stroke Neg Hx     Mental Retardation Neg Hx     Thyroid Disease Neg Hx       Immunization History   Administered Date(s) Administered    Influenza Vaccine 10/01/2015, 10/17/2016    TB Skin Test (PPD) Intradermal 03/17/2013       Objective:   Patient Vitals for the past 24 hrs:   Temp Pulse Resp BP SpO2   10/24/17 0030 - 67 25 176/74 -   10/24/17 0000 - 69 8 165/73 -   10/23/17 2330 - 69 13 178/76 (!) 82 %   10/23/17 2300 - 67 23 166/73 (!) 81 %   10/23/17 2230 - 60 26 155/80 95 %   10/23/17 2130 - 72 18 (!) 211/87 92 %   10/23/17 2100 - 76 13 (!) 211/87 92 %   10/23/17 2046 - 66 18 (!) 195/95 95 %   10/23/17 2006 - 70 21 186/82 96 %   10/23/17 1921 - 67 16 179/90 -   10/23/17 1640 98.5 °F (36.9 °C) 74 16 182/85 95 %     Oxygen Therapy  O2 Sat (%): (!) 82 % (10/23/17 2330)  Pulse via Oximetry: 62 beats per minute (10/23/17 2230)  O2 Device: Room air (10/23/17 1640)  No intake or output data in the 24 hours ending 10/24/17 0139    Physical Exam:  General:    Well nourished. Alert and oriented. Eyes:   Normal sclera. Extraocular movements intact. Right pupil larger than left. No dysarthria or aphasia. No nystagmus  ENT:  Normocephalic, atraumatic. Moist mucous membranes  CV:   Regular rate and rhythm. No murmur, rub, or gallop. Lungs:  Clear to auscultation bilaterally. No wheezing, rhonchi, or rales. Abdomen: Soft, nontender, nondistended. Bowel sounds normal.   Extremities: Warm and dry. No cyanosis or edema. Neurologic: CN II-XII intact. Sensation intact. Str 5/5 all extremities. Skin:     No rashes or jaundice. No wounds. Psych:  Normal mood and affect. I reviewed the labs, imaging, EKGs, telemetry, and other studies done this admission.   Data Review:   Recent Results (from the past 24 hour(s))   CBC WITH AUTOMATED DIFF    Collection Time: 10/23/17  4:52 PM Result Value Ref Range    WBC 10.3 4.3 - 11.1 K/uL    RBC 4.46 4.05 - 5.25 M/uL    HGB 12.9 11.7 - 15.4 g/dL    HCT 39.7 35.8 - 46.3 %    MCV 89.0 79.6 - 97.8 FL    MCH 28.9 26.1 - 32.9 PG    MCHC 32.5 31.4 - 35.0 g/dL    RDW 13.5 11.9 - 14.6 %    PLATELET 021 (H) 398 - 450 K/uL    MPV 10.4 (L) 10.8 - 14.1 FL    DF AUTOMATED      NEUTROPHILS 67 43 - 78 %    LYMPHOCYTES 20 13 - 44 %    MONOCYTES 8 4.0 - 12.0 %    EOSINOPHILS 3 0.5 - 7.8 %    BASOPHILS 1 0.0 - 2.0 %    IMMATURE GRANULOCYTES 1 0.0 - 5.0 %    ABS. NEUTROPHILS 7.0 1.7 - 8.2 K/UL    ABS. LYMPHOCYTES 2.1 0.5 - 4.6 K/UL    ABS. MONOCYTES 0.8 0.1 - 1.3 K/UL    ABS. EOSINOPHILS 0.3 0.0 - 0.8 K/UL    ABS. BASOPHILS 0.1 0.0 - 0.2 K/UL    ABS. IMM. GRANS. 0.1 0.0 - 0.5 K/UL   METABOLIC PANEL, COMPREHENSIVE    Collection Time: 10/23/17  4:52 PM   Result Value Ref Range    Sodium 140 136 - 145 mmol/L    Potassium 4.9 3.5 - 5.1 mmol/L    Chloride 99 98 - 107 mmol/L    CO2 33 (H) 21 - 32 mmol/L    Anion gap 8 7 - 16 mmol/L    Glucose 199 (H) 65 - 100 mg/dL    BUN 15 8 - 23 MG/DL    Creatinine 0.90 0.6 - 1.0 MG/DL    GFR est AA >60 >60 ml/min/1.73m2    GFR est non-AA >60 >60 ml/min/1.73m2    Calcium 10.2 8.3 - 10.4 MG/DL    Bilirubin, total 0.8 0.2 - 1.1 MG/DL    ALT (SGPT) 28 12 - 65 U/L    AST (SGOT) 56 (H) 15 - 37 U/L    Alk.  phosphatase 123 50 - 136 U/L    Protein, total 8.5 (H) 6.3 - 8.2 g/dL    Albumin 3.5 3.2 - 4.6 g/dL    Globulin 5.0 (H) 2.3 - 3.5 g/dL    A-G Ratio 0.7 (L) 1.2 - 3.5     LIPASE    Collection Time: 10/23/17  4:52 PM   Result Value Ref Range    Lipase 293 73 - 393 U/L   POC LACTIC ACID    Collection Time: 10/23/17  4:58 PM   Result Value Ref Range    Lactic Acid (POC) 1.8 0.5 - 1.9 mmol/L   EKG, 12 LEAD, INITIAL    Collection Time: 10/23/17  7:21 PM   Result Value Ref Range    Ventricular Rate 65 BPM    Atrial Rate 65 BPM    P-R Interval 160 ms    QRS Duration 86 ms    Q-T Interval 386 ms    QTC Calculation (Bezet) 401 ms    Calculated P Axis 114 degrees    Calculated R Axis 7 degrees    Calculated T Axis -53 degrees    Diagnosis       !! AGE AND GENDER SPECIFIC ECG ANALYSIS !! Normal sinus rhythm  ST & T wave abnormality, consider inferior ischemia  Abnormal ECG  When compared with ECG of 09-MAY-2016 19:42,  Non-specific change in ST segment in Lateral leads     URINE MICROSCOPIC    Collection Time: 10/23/17  7:30 PM   Result Value Ref Range    WBC 5-10 0 /hpf    RBC 0-3 0 /hpf    Epithelial cells 3-5 0 /hpf    Bacteria TRACE 0 /hpf    Casts 0 0 /lpf    Crystals, urine 0 0 /LPF    Mucus 0 0 /lpf    Other observations RESULTS VERIFIED MANUALLY     HEMOGLOBIN A1C WITH EAG    Collection Time: 10/23/17  8:17 PM   Result Value Ref Range    Hemoglobin A1c 7.4 (H) 4.8 - 6.0 %    Est. average glucose 166 mg/dL       Imaging Studies:  CXR Results  (Last 48 hours)    None        CT Results  (Last 48 hours)               10/23/17 1758  CT HEAD WO CONT Final result    Impression:  Impression: Vasogenic edema present within the right parietal lobe with mass   effect upon the right lateral ventricle as well as upon the sulci. Cannot   exclude either infarction or intracranial neoplasm. Correlation with MRI with   and without contrast as well as diffusion-weighted imaging for further   evaluation. Narrative:  History: altered mental status. Blurred vision, nausea, one day duration        Exam: CT head without contrast       Technique: Thin section axial CT images were obtained from the skullbase through   the vertex. Radiation dose reduction techniques were used for this study. Our   CT scanners use one or all of the following: Automated exposure control,   adjustment of the mA and/or kV according to patient size, use of iterative   reconstruction. Findings: The ventricles are normal in size, shape, and position.  There is a   focal area of low density seen within the subcortical white matter of the right   parietal lobe with some localized mass effect and obliteration of the sulci as   well as localized mass effect upon the atrium of the right lateral ventricle. There are also chronic appearing white matter changes noted in the corona   radiata and centrum semiovale. No extra-axial fluid collection is present. There   is no mass or mass-effect. The basilar cisterns are patent. The paranasal   sinuses and mastoid air cells are clear. Assessment and Plan:     Hospital Problems as of 10/24/2017  Date Reviewed: 10/19/2017          Codes Class Noted - Resolved POA    * (Principal)CVA (cerebral vascular accident) [facility]) ICD-10-CM: I63.9  ICD-9-CM: 434.91  10/23/2017 - Present Yes        Confusion ICD-10-CM: R41.0  ICD-9-CM: 298.9  10/23/2017 - Present Yes        Coronary artery disease involving native coronary artery of native heart with angina pectoris ([facility] [address] ICD-10-CM: I25.119  ICD-9-CM: 414.01, 413.9  5/3/2016 - Present Yes        Dyslipidemia ICD-10-CM: E78.5  ICD-9-CM: 272.4  5/3/2016 - Present Yes        DM type 2 (diabetes mellitus, type 2) (HCC) (Chronic) ICD-10-CM: E11.9  ICD-9-CM: 250.00  3/16/2013 - Present Yes        Essential hypertension with goal blood pressure less than 140/90 ICD-10-CM: I10  ICD-9-CM: 401.9  3/16/2013 - Present Yes        Cro[facility] (Chronic) ICD-10-CM: K50.90  ICD-9-CM: 555.9  3/16/2013 - Present Yes                PLAN  · Admit to inpatient on telemetry  · Check MRI brain, 2d echo, carotid US, lipids and a1c.  antithrombotic ordered. PT/OT/SLP evals. Permissive HTN for ~24 hours after symptom onset  · CT head shows vasogenic edema within the parietal lobe with mass effect upon the right lateral ventricle as well as upon the sulci and cannot exclude either infarction or neoplasm  · Will keep on bedrest. Start IV decadron and keppra. No asa or AC. Needs MRI with and without contrast to further delineate. If CVA needs further neurologic workup for stroke.  If neoplasm needs neurosurgical evaluation  · Recently started on abx for UTI.  Will repeat UA to see if cleared  · Place SCDs  · I discussed full plan with family at bedside      Estimated LOS:  2-3 days  Risk: high    Signed:  Arnie Alexandre MD

## 2017-10-24 NOTE — ED NOTES
TRANSFER - OUT REPORT:    Verbal report given to Pa Faulkner RN(name) on Lawanda Ríos  being transferred to [facility](unit) for routine progression of care       Report consisted of patients Situation, Background, Assessment and   Recommendations(SBAR). Information from the following report(s) SBAR, ED Summary, Procedure Summary and MAR was reviewed with the receiving nurse. Lines:   Peripheral IV 10/23/17 Right Antecubital (Active)   Site Assessment Clean, dry, & intact 10/23/2017  4:51 PM   Phlebitis Assessment 0 10/23/2017  4:51 PM   Infiltration Assessment 0 10/23/2017  4:51 PM        Opportunity for questions and clarification was provided.       Patient transported with:   Registered Nurse

## 2017-10-25 PROBLEM — R30.0 DYSURIA: Status: ACTIVE | Noted: 2017-10-25

## 2017-10-25 LAB
ANION GAP SERPL CALC-SCNC: 12 MMOL/L (ref 7–16)
APPEARANCE UR: CLEAR
BILIRUB UR QL: NEGATIVE
BUN SERPL-MCNC: 21 MG/DL (ref 8–23)
CALCIUM SERPL-MCNC: 9.5 MG/DL (ref 8.3–10.4)
CHLORIDE SERPL-SCNC: 102 MMOL/L (ref 98–107)
CO2 SERPL-SCNC: 23 MMOL/L (ref 21–32)
COLOR UR: YELLOW
CREAT SERPL-MCNC: 1.13 MG/DL (ref 0.6–1)
ERYTHROCYTE [DISTWIDTH] IN BLOOD BY AUTOMATED COUNT: 13.2 % (ref 11.9–14.6)
GLUCOSE BLD STRIP.AUTO-MCNC: 234 MG/DL (ref 65–100)
GLUCOSE BLD STRIP.AUTO-MCNC: 288 MG/DL (ref 65–100)
GLUCOSE BLD STRIP.AUTO-MCNC: 314 MG/DL (ref 65–100)
GLUCOSE BLD STRIP.AUTO-MCNC: 348 MG/DL (ref 65–100)
GLUCOSE SERPL-MCNC: 363 MG/DL (ref 65–100)
GLUCOSE UR STRIP.AUTO-MCNC: >1000 MG/DL
HCT VFR BLD AUTO: 37.1 % (ref 35.8–46.3)
HGB BLD-MCNC: 11.7 G/DL (ref 11.7–15.4)
HGB UR QL STRIP: NEGATIVE
KETONES UR QL STRIP.AUTO: NEGATIVE MG/DL
LEUKOCYTE ESTERASE UR QL STRIP.AUTO: NEGATIVE
MAGNESIUM SERPL-MCNC: 2 MG/DL (ref 1.8–2.4)
MCH RBC QN AUTO: 28.3 PG (ref 26.1–32.9)
MCHC RBC AUTO-ENTMCNC: 31.5 G/DL (ref 31.4–35)
MCV RBC AUTO: 89.8 FL (ref 79.6–97.8)
NITRITE UR QL STRIP.AUTO: NEGATIVE
PH UR STRIP: 5 [PH] (ref 5–9)
PLATELET # BLD AUTO: 448 K/UL (ref 150–450)
PMV BLD AUTO: 10.4 FL (ref 10.8–14.1)
POTASSIUM SERPL-SCNC: 4.1 MMOL/L (ref 3.5–5.1)
PROT UR STRIP-MCNC: NEGATIVE MG/DL
RBC # BLD AUTO: 4.13 M/UL (ref 4.05–5.25)
SODIUM SERPL-SCNC: 137 MMOL/L (ref 136–145)
SP GR UR REFRACTOMETRY: 1.03 (ref 1–1.02)
UROBILINOGEN UR QL STRIP.AUTO: 0.2 EU/DL (ref 0.2–1)
WBC # BLD AUTO: 18.6 K/UL (ref 4.3–11.1)

## 2017-10-25 PROCEDURE — 97161 PT EVAL LOW COMPLEX 20 MIN: CPT

## 2017-10-25 PROCEDURE — 74011636637 HC RX REV CODE- 636/637: Performed by: INTERNAL MEDICINE

## 2017-10-25 PROCEDURE — 74011000258 HC RX REV CODE- 258: Performed by: INTERNAL MEDICINE

## 2017-10-25 PROCEDURE — 82962 GLUCOSE BLOOD TEST: CPT

## 2017-10-25 PROCEDURE — 74011250636 HC RX REV CODE- 250/636: Performed by: HOSPITALIST

## 2017-10-25 PROCEDURE — 92610 EVALUATE SWALLOWING FUNCTION: CPT

## 2017-10-25 PROCEDURE — 83735 ASSAY OF MAGNESIUM: CPT | Performed by: INTERNAL MEDICINE

## 2017-10-25 PROCEDURE — 74011636637 HC RX REV CODE- 636/637: Performed by: HOSPITALIST

## 2017-10-25 PROCEDURE — 36415 COLL VENOUS BLD VENIPUNCTURE: CPT | Performed by: INTERNAL MEDICINE

## 2017-10-25 PROCEDURE — 81003 URINALYSIS AUTO W/O SCOPE: CPT | Performed by: INTERNAL MEDICINE

## 2017-10-25 PROCEDURE — 74011250637 HC RX REV CODE- 250/637: Performed by: INTERNAL MEDICINE

## 2017-10-25 PROCEDURE — 74011250636 HC RX REV CODE- 250/636: Performed by: INTERNAL MEDICINE

## 2017-10-25 PROCEDURE — 97165 OT EVAL LOW COMPLEX 30 MIN: CPT

## 2017-10-25 PROCEDURE — 74011250637 HC RX REV CODE- 250/637: Performed by: HOSPITALIST

## 2017-10-25 PROCEDURE — 74011000258 HC RX REV CODE- 258: Performed by: HOSPITALIST

## 2017-10-25 PROCEDURE — 65270000029 HC RM PRIVATE

## 2017-10-25 PROCEDURE — 94760 N-INVAS EAR/PLS OXIMETRY 1: CPT

## 2017-10-25 PROCEDURE — 80048 BASIC METABOLIC PNL TOTAL CA: CPT | Performed by: INTERNAL MEDICINE

## 2017-10-25 PROCEDURE — 85027 COMPLETE CBC AUTOMATED: CPT | Performed by: INTERNAL MEDICINE

## 2017-10-25 RX ORDER — INSULIN GLARGINE 100 [IU]/ML
0.2 INJECTION, SOLUTION SUBCUTANEOUS
Status: DISCONTINUED | OUTPATIENT
Start: 2017-10-25 | End: 2017-10-26

## 2017-10-25 RX ORDER — INSULIN LISPRO 100 [IU]/ML
0.05 INJECTION, SOLUTION INTRAVENOUS; SUBCUTANEOUS
Status: DISCONTINUED | OUTPATIENT
Start: 2017-10-25 | End: 2017-10-27 | Stop reason: HOSPADM

## 2017-10-25 RX ORDER — INSULIN LISPRO 100 [IU]/ML
INJECTION, SOLUTION INTRAVENOUS; SUBCUTANEOUS
Status: DISCONTINUED | OUTPATIENT
Start: 2017-10-25 | End: 2017-10-27 | Stop reason: HOSPADM

## 2017-10-25 RX ORDER — LEVETIRACETAM 500 MG/1
500 TABLET ORAL 2 TIMES DAILY
Status: DISCONTINUED | OUTPATIENT
Start: 2017-10-25 | End: 2017-10-25

## 2017-10-25 RX ORDER — LEVETIRACETAM 500 MG/1
500 TABLET ORAL 2 TIMES DAILY
Status: DISCONTINUED | OUTPATIENT
Start: 2017-10-25 | End: 2017-10-27 | Stop reason: HOSPADM

## 2017-10-25 RX ADMIN — CARVEDILOL 12.5 MG: 6.25 TABLET, FILM COATED ORAL at 08:35

## 2017-10-25 RX ADMIN — DEXAMETHASONE SODIUM PHOSPHATE 6 MG: 10 INJECTION INTRAMUSCULAR; INTRAVENOUS at 12:11

## 2017-10-25 RX ADMIN — LEVETIRACETAM 500 MG: 100 INJECTION, SOLUTION INTRAVENOUS at 08:39

## 2017-10-25 RX ADMIN — INSULIN LISPRO 6 UNITS: 100 INJECTION, SOLUTION INTRAVENOUS; SUBCUTANEOUS at 08:34

## 2017-10-25 RX ADMIN — ASPIRIN 81 MG 81 MG: 81 TABLET ORAL at 08:35

## 2017-10-25 RX ADMIN — FAMOTIDINE 20 MG: 20 TABLET ORAL at 08:35

## 2017-10-25 RX ADMIN — CEFTRIAXONE 1 G: 1 INJECTION, POWDER, FOR SOLUTION INTRAMUSCULAR; INTRAVENOUS at 14:07

## 2017-10-25 RX ADMIN — LISINOPRIL 40 MG: 20 TABLET ORAL at 08:34

## 2017-10-25 RX ADMIN — CYANOCOBALAMIN TAB 1000 MCG 2500 MCG: 1000 TAB at 08:36

## 2017-10-25 RX ADMIN — Medication 10 ML: at 22:32

## 2017-10-25 RX ADMIN — DEXAMETHASONE SODIUM PHOSPHATE 6 MG: 10 INJECTION INTRAMUSCULAR; INTRAVENOUS at 04:08

## 2017-10-25 RX ADMIN — INSULIN LISPRO 8 UNITS: 100 INJECTION, SOLUTION INTRAVENOUS; SUBCUTANEOUS at 17:26

## 2017-10-25 RX ADMIN — CARVEDILOL 12.5 MG: 6.25 TABLET, FILM COATED ORAL at 17:27

## 2017-10-25 RX ADMIN — CHOLECALCIFEROL TAB 25 MCG (1000 UNIT) 1000 UNITS: 25 TAB at 08:34

## 2017-10-25 RX ADMIN — DEXAMETHASONE SODIUM PHOSPHATE 6 MG: 10 INJECTION INTRAMUSCULAR; INTRAVENOUS at 22:29

## 2017-10-25 RX ADMIN — INSULIN GLARGINE 11 UNITS: 100 INJECTION, SOLUTION SUBCUTANEOUS at 22:35

## 2017-10-25 RX ADMIN — PANTOPRAZOLE SODIUM 40 MG: 40 TABLET, DELAYED RELEASE ORAL at 08:34

## 2017-10-25 RX ADMIN — INSULIN LISPRO 8 UNITS: 100 INJECTION, SOLUTION INTRAVENOUS; SUBCUTANEOUS at 12:11

## 2017-10-25 RX ADMIN — INSULIN LISPRO 3 UNITS: 100 INJECTION, SOLUTION INTRAVENOUS; SUBCUTANEOUS at 17:26

## 2017-10-25 RX ADMIN — ATORVASTATIN CALCIUM 40 MG: 40 TABLET, FILM COATED ORAL at 22:32

## 2017-10-25 RX ADMIN — LEVETIRACETAM 500 MG: 500 TABLET ORAL at 22:32

## 2017-10-25 RX ADMIN — Medication 10 ML: at 14:07

## 2017-10-25 RX ADMIN — Medication 10 ML: at 06:11

## 2017-10-25 NOTE — PROGRESS NOTES
Shift generally unremarkable. Neuro status unchanged; pt exhibits only mild deficits on R side. SB throughout night with moderate HTN, never requiring prn labetolol. Currently sleeping on RA, NAD & VSS. Will continue to monitor.

## 2017-10-25 NOTE — PROGRESS NOTES
Bedside shift report received from Tianna moon, [facility]. Pt resting quietly in bed. Oriented x4, follows commands appropriately. Slight R facial droop noted, PERRL, mild R side deficits. Afebrile, NSR/SB, moderate HTN SBP 160s, O2 sat 100% on RA. Pt denies pain or other needs at this time.

## 2017-10-25 NOTE — PROGRESS NOTES
Problem: Mobility Impaired (Adult and Pediatric)  Goal: *Acute Goals and Plan of Care (Insert Text)  STG:  (1.)Ms. Curtis will move from supine to sit and sit to supine  with SUPERVISION within 3 day(s). (2.)Ms. Curtis will transfer from bed to chair and chair to bed with SUPERVISION using the least restrictive device within 3 day(s). (3.)Ms. Curtis will ambulate with SUPERVISION for 250 feet with the least restrictive device within 3 day(s). (4.)Pt. Will increase B LE strength 1/2 grade within 3 days      ________________________________________________________________________________________________    PHYSICAL THERAPY: Initial Assessment, AM 10/25/2017  INPATIENT: Hospital Day: 3  Payor: Jalil Gil / Plan: [facility] HUMANA MEDICARE CHOICE PPO/PFFS / Product Type: [employer] Medicare /      NAME/AGE/GENDER: John Campos is a 80 y.o. female   PRIMARY DIAGNOSIS: CVA (cerebral vascular accident) ([address] CVA (cerebral vascular accident) ([facility] CVA (cerebral vascular accident) ([facility]        ICD-10: Treatment Diagnosis:   · Generalized Muscle Weakness (M62.81)  · Other lack of cordination (R27.8)  · Other abnormalities of gait and mobility (R26.89)   Precaution/Allergies:  Sulfa (sulfonamide antibiotics); Other medication; and Tape [adhesive]      ASSESSMENT:     Ms. Da Christensen presents with a R parieto-occipital lobe CVA and demonstrates mild decrease in general strength and her ability to perform functional mobility. She is a a little impulsive and seems to possibly have some problems following instruction. She did become tearful this morning, \"I am so used to being able to get up and go without any problems\". Her spouse is present and is supportive. If she stays in the hospital, I will continue to follow her to address these deficits. If she is DCd home, I would recommend [place] and I have left a note for the .     This section established at most recent assessment   PROBLEM LIST (Impairments causing functional limitations):  1. Decreased Strength  2. Decreased Transfer Abilities  3. Decreased Ambulation Ability/Technique  4. Decreased Balance  5. Decreased [place] with Home Exercise Program  6. Decreased Cognition   INTERVENTIONS PLANNED: (Benefits and precautions of physical therapy have been discussed with the patient.)  1. Balance Exercise  2. Bed Mobility  3. Family Education  4. Gait Training  5. Neuromuscular Re-education/Strengthening  6. Therapeutic Activites  7. Therapeutic Exercise/Strengthening  8. Transfer Training     TREATMENT PLAN: Frequency/Duration: daily for duration of hospital stay  Rehabilitation Potential For Stated Goals: Good     RECOMMENDED REHABILITATION/EQUIPMENT: (at time of discharge pending progress): Due to the probability of continued deficits (see above) this patient will likely need continued skilled physical therapy after discharge. Equipment:    has a straight cane, rollator and shower chair at home, but does not use them currently              HISTORY:   History of Present Injury/Illness (Reason for Referral): Admitted with a R parieto-occipital CVA  Past Medical History/Comorbidities:   Ms. Anand Mendoza  has a past medical history of Acute kidney failure (05/2016); Acute pericarditis (2015); Arthritis; CAP (community acquired pneumonia) (5/2016); Coronary artery disease; Crohn's disease; Hypercalcemia; Hypercholesterolemia; Hypertension; Lumbar compression fracture; Osteoporosis; and Type 2 diabetes mellitus. Ms. Anand Mendoza  has a past surgical history that includes hysterectomy (1978); heart catheterization (5/12/2015); arthroplasty (1/5/2016); and kyphoplasty (~2002).   Social History/Living Environment:   Home Environment: Apartment  # Steps to Enter: 0  One/Two Story Residence: One story  Living Alone: No  Support Systems: Child(eulalio), Family member(s), Spouse/Significant Other/Partner  Patient Expects to be Discharged to[de-identified] Apartment  Current DME Used/Available at Home: None  Tub or Shower Type: Shower  Prior Level of Function/Work/Activity:  Functionally independent. Drives, dances. Dominant Side:         RIGHT   Number of Personal Factors/Comorbidities that affect the Plan of Care: 1-2: MODERATE COMPLEXITY   EXAMINATION:   Most Recent Physical Functioning:   Gross Assessment:  AROM: Within functional limits (B LEs)  Strength: Generally decreased, functional (B LEs 3+-4/5(no difference btwn L/R))  Coordination: Generally decreased, functional (B LEs)  Sensation: Impaired (impaired toes on B feet)               Posture:  Posture Assessment: Forward head, Rounded shoulders  Balance:  Sitting: Intact  Standing: Pull to stand (high guard) Bed Mobility:  Supine to Sit: Minimum assistance  Wheelchair Mobility:     Transfers:  Sit to Stand: Minimum assistance  Stand to Sit: Contact guard assistance  Gait:     Speed/Sammie: Pace decreased (<100 feet/min)  Gait Abnormalities: Decreased step clearance  Distance (ft): 20 Feet (ft) (x2)  Assistive Device:  (HHA on L. Can try assistive next session if needed)  Ambulation - Level of Assistance: Minimal assistance  Interventions: Safety awareness training;Verbal cues; Visual/Demos      Body Structures Involved:  1. Eyes and Ears  2. Muscles Body Functions Affected:  1. Neuromusculoskeletal  2. Movement Related Activities and Participation Affected:  1. Mobility  2. Self Care   Number of elements that affect the Plan of Care: 4+: HIGH COMPLEXITY   CLINICAL PRESENTATION:   Presentation: Stable and uncomplicated: LOW COMPLEXITY   CLINICAL DECISION MAKIN [facility] [id number] AM-PAC 6 Clicks   Basic Mobility Inpatient Short Form  How much difficulty does the patient currently have. .. Unable A Lot A Little None   1. Turning over in bed (including adjusting bedclothes, sheets and blankets)? [] 1   [] 2   [] 3   [x] 4   2.   Sitting down on and standing up from a chair with arms ( e.g., wheelchair, bedside commode, etc.)   [] 1   [] 2   [x] 3 [] 4   3. Moving from lying on back to sitting on the side of the bed? [] 1   [] 2   [x] 3   [] 4   How much help from another person does the patient currently need. .. Total A Lot A Little None   4. Moving to and from a bed to a chair (including a wheelchair)? [] 1   [] 2   [x] 3   [] 4   5. Need to walk in hospital room? [] 1   [] 2   [x] 3   [] 4   6. Climbing 3-5 steps with a railing? [] 1   [] 2   [x] 3   [] 4   © 2007, Trustees of [address] [place] [address] [address] [id number], under license to [employer]. All rights reserved      Score:  Initial: 19 Most Recent: X (Date: -- )    Interpretation of Tool:  Represents activities that are increasingly more difficult (i.e. Bed mobility, Transfers, Gait). Score 24 23 22-20 19-15 14-10 9-7 6     Modifier CH CI CJ CK CL CM CN      ? Mobility - Walking and Moving Around:     - CURRENT STATUS: CK - 40%-59% impaired, limited or restricted    - GOAL STATUS: CJ - 20%-39% impaired, limited or restricted    - D/C STATUS:  ---------------To be determined---------------  Payor: HUMANA MEDICARE / Plan: [facility] HUMANA MEDICARE CHOICE PPO/PFFS / Product Type: Managed Care Medicare /      Medical Necessity:     · Patient demonstrates good rehab potential due to higher previous functional level. Reason for Services/Other Comments:  · Patient continues to require present interventions due to patient's inability to perform functional mobility independently.    Use of outcome tool(s) and clinical judgement create a POC that gives a: Clear prediction of patient's progress: LOW COMPLEXITY            TREATMENT:   (In addition to Assessment/Re-Assessment sessions the following treatments were rendered)   Pre-treatment Symptoms/Complaints:  Needs to shaila the restroom  Pain: Initial:   Pain Intensity 1: 0  Post Session:  0     Assessment/Reassessment only, no treatment provided today    Braces/Orthotics/Lines/Etc:   · telemetry lines  · O2 Device: Room air  Treatment/Session Assessment: · Response to Treatment:  Tolerated well. Left up in recliner with spouse in room and RN aware  · Interdisciplinary Collaboration:   o Physical Therapist  o Occupational Therapist  o Registered Nurse  · After treatment position/precautions:   o Up in chair  o Bed/Chair-wheels locked  o Call light within reach  o Family at bedside  o Nurse at bedside   · Compliance with Program/Exercises: Will assess as treatment progresses. · Recommendations/Intent for next treatment session: \"Next visit will focus on advancements to more challenging activities and reduction in assistance provided\".   Total Treatment Duration:  PT Patient Time In/Time Out  Time In: 0805  Time Out: [address], PT

## 2017-10-25 NOTE — PROGRESS NOTES
Progress Note    Patient: Annelise Krueger MRN: [id number]  SSN: [id number]    YOB: 1934  Age: 80 y.o. Sex: female      Admit Date: 10/23/2017    LOS: 2 days     Subjective: This 80 you female was admitted on 10/23/17 with complaint of confusion and difficulty focusing her eyes.  stated that she was not acting like herself. She was recently diagnosed with an UTI, started on antibiotic therapy. Continued complaints of worsening dysuria and discharge on admission. She denied stiff neck, fever or chills. She saw her PCP and eye doctor that told her to come to ER for evaluation. She had an abnormal CT on admission that showed vasogenic edema present within the right parietal lobe with mass  effect upon the right lateral ventricle as well as upon the sulci. Cannot exclude either infarction or intracranial neoplasm. Correlation with MRI with and without contrast as well as diffusion-weighted imaging for further evaluation. MRI was negative for an acute stroke, only Probable chronic right parietal occipital CVA containing some petechial hemosiderin, pontine and supratentorial microischemia. Patient has clinically improved, back to baseline by this afternoon. No issues per ICU nursing staff overnight.     Current Facility-Administered Medications   Medication Dose Route Frequency    levETIRAcetam (KEPPRA) tablet 500 mg  500 mg Oral BID    cefTRIAXone (ROCEPHIN) 1 g in 0.9% sodium chloride (MBP/ADV) 50 mL  1 g IntraVENous Q24H    insulin lispro (HUMALOG) injection 3 Units  0.05 Units/kg SubCUTAneous TID WITH MEALS    insulin lispro (HUMALOG) injection   SubCUTAneous TIDAC    insulin glargine (LANTUS) injection 11 Units  0.2 Units/kg SubCUTAneous QHS    aspirin chewable tablet 81 mg  81 mg Oral DAILY    famotidine (PEPCID) tablet 20 mg  20 mg Oral DAILY    NUTRITIONAL SUPPORT ELECTROLYTE PRN ORDERS   Does Not Apply PRN    polyvinyl alcohol (LIQUIFILM TEARS) 1.4 % ophthalmic solution 1 Drop  1 Drop Both Eyes PRN    sodium chloride (NS) flush 5-10 mL  5-10 mL IntraVENous PRN    dexamethasone (DECADRON) injection 6 mg  6 mg IntraVENous Q8H    sodium chloride (NS) flush 5-10 mL  5-10 mL IntraVENous Q8H    sodium chloride (NS) flush 5-10 mL  5-10 mL IntraVENous PRN    ondansetron (ZOFRAN) injection 4 mg  4 mg IntraVENous Q6H PRN    acetaminophen (TYLENOL) suppository 650 mg  650 mg Rectal Q4H PRN    dextrose 40% (GLUTOSE) oral gel 1 Tube  15 g Oral PRN    glucagon (GLUCAGEN) injection 1 mg  1 mg IntraMUSCular PRN    dextrose (D50W) injection syrg 12.5-25 g  25-50 mL IntraVENous PRN    atorvastatin (LIPITOR) tablet 40 mg  40 mg Oral QHS    bisacodyl (DULCOLAX) tablet 5 mg  5 mg Oral DAILY PRN    acetaminophen (TYLENOL) tablet 650 mg  650 mg Oral Q6H PRN    carvedilol (COREG) tablet 12.5 mg  12.5 mg Oral BID WITH MEALS    cholecalciferol (VITAMIN D3) tablet 1,000 Units  1,000 Units Oral DAILY    pantoprazole (PROTONIX) tablet 40 mg  40 mg Oral ACB    lisinopril (PRINIVIL, ZESTRIL) tablet 40 mg  40 mg Oral DAILY    cyanocobalamin tablet 2,500 mcg  2,500 mcg Oral DAILY       Objective:     Vitals:    10/25/17 1416 10/25/17 1516 10/25/17 1616 10/25/17 1725   BP: 152/58 125/55 140/63 171/69   Pulse: 62 (!) 55 81 (!) 59   Resp: 21 22 20 21   Temp:    97.8 °F (36.6 °C)   SpO2: 97% 94% 96% 98%   Weight:       Height:            Intake and Output:  Current Shift:    Last three shifts: 10/24 0701 - 10/25 1900  In: 2045 [P.O.:720; I.V.:1325]  Out: -     Physical Exam:   GEN:  This 79 yo white female , in no apparent distress,  family present at time of assessment. ENT: Pupils equal, round , reactive to light. Conjunctiva pink/moist, sclera non-icteric, mildly injected. PUL:  Clear to ascultation bilaterally, no appreciated rales, rhonchi or wheezes. Decreased breath lung sounds at the lung bases with mild crackles. CARD: Regular rhythm, regular rate, no appreciated HEATHER.  No carotid bruits. ABD: Non-tender, non-distended, positive bowel sounds in all four quadrants. EXT: No upper extremity or lower extremity edema. Pulses equal at all levels bilaterally. SKIN: No skin breakdown, open wounds, ulcers or concerning findings. NEURO: Grossly intact CN II-XII, no overt focal defects on exam.  Sluggish on some movements but equal bilaterally.      PSYC: Mood appropriate, no anxiety appreciated    Lab/Data Review:    Recent Results (from the past 24 hour(s))   GLUCOSE, POC    Collection Time: 10/24/17  9:20 PM   Result Value Ref Range    Glucose (POC) 333 (H) 65 - 100 mg/dL   MAGNESIUM    Collection Time: 10/24/17  9:49 PM   Result Value Ref Range    Magnesium 2.4 1.8 - 2.4 mg/dL   GLUCOSE, POC    Collection Time: 10/25/17  8:29 AM   Result Value Ref Range    Glucose (POC) 288 (H) 65 - 100 mg/dL   GLUCOSE, POC    Collection Time: 10/25/17 12:08 PM   Result Value Ref Range    Glucose (POC) 314 (H) 65 - 100 mg/dL   CBC W/O DIFF    Collection Time: 10/25/17 12:43 PM   Result Value Ref Range    WBC 18.6 (H) 4.3 - 11.1 K/uL    RBC 4.13 4.05 - 5.25 M/uL    HGB 11.7 11.7 - 15.4 g/dL    HCT 37.1 35.8 - 46.3 %    MCV 89.8 79.6 - 97.8 FL    MCH 28.3 26.1 - 32.9 PG    MCHC 31.5 31.4 - 35.0 g/dL    RDW 13.2 11.9 - 14.6 %    PLATELET 936 869 - 303 K/uL    MPV 10.4 (L) 10.8 - 08.0 FL   METABOLIC PANEL, BASIC    Collection Time: 10/25/17 12:43 PM   Result Value Ref Range    Sodium 137 136 - 145 mmol/L    Potassium 4.1 3.5 - 5.1 mmol/L    Chloride 102 98 - 107 mmol/L    CO2 23 21 - 32 mmol/L    Anion gap 12 7 - 16 mmol/L    Glucose 363 (H) 65 - 100 mg/dL    BUN 21 8 - 23 MG/DL    Creatinine 1.13 (H) 0.6 - 1.0 MG/DL    GFR est AA 59 (L) >60 ml/min/1.73m2    GFR est non-AA 49 (L) >60 ml/min/1.73m2    Calcium 9.5 8.3 - 10.4 MG/DL   MAGNESIUM    Collection Time: 10/25/17 12:43 PM   Result Value Ref Range    Magnesium 2.0 1.8 - 2.4 mg/dL   URINALYSIS W/ RFLX MICROSCOPIC    Collection Time: 10/25/17 2:31 PM   Result Value Ref Range    Color YELLOW      Appearance CLEAR      Specific gravity 1.030 (H) 1.001 - 1.023      pH (UA) 5.0 5.0 - 9.0      Protein NEGATIVE  NEG mg/dL    Glucose >1000 mg/dL    Ketone NEGATIVE  NEG mg/dL    Bilirubin NEGATIVE  NEG      Blood NEGATIVE  NEG      Urobilinogen 0.2 0.2 - 1.0 EU/dL    Nitrites NEGATIVE  NEG      Leukocyte Esterase NEGATIVE  NEG     GLUCOSE, POC    Collection Time: 10/25/17  5:19 PM   Result Value Ref Range    Glucose (POC) 348 (H) 65 - 100 mg/dL       Assessment/ Plan:     Principal Problem:    CVA (cerebral vascular accident) ([facility] (10/23/2017)    Active Problems:    DM type 2 (diabetes mellitus, type 2) ([facility] (3/16/2013)      Essential hypertension with goal blood pressure less than 140/90 (3/16/2013)      Crohn disease ([facility] (3/16/2013)      Coronary artery disease involving native coronary artery of native heart with angina pectoris ([facility] (5/3/2016)      Dyslipidemia (5/3/2016)      Confusion (10/23/2017)      Dysuria (10/25/2017)      Patient was moved to the medical floor for further care and PT/OT assessment. She has returned to her mental baseline and has no over focal defects. Will await their assessment and continue discharge planning. The plan of care will be continued and adjusted as patient condition changes, laboratory and radiologic data is obtained.       Signed By: Gauri Manzano DO     October 25, 2017

## 2017-10-25 NOTE — PROGRESS NOTES
TRANSFER - OUT REPORT:    Verbal report given to Aristides Munoz RN on Eufemia Mcclure  being transferred to [facility] for routine progression of care       Report consisted of patients Situation, Background, Assessment and   Recommendations(SBAR). Information from the following report(s) SBAR, Kardex, [facility], Recent Results and Cardiac Rhythm NSR/SB was reviewed with the receiving nurse. Lines:   Peripheral IV 10/23/17 Right Antecubital (Active)   Site Assessment Clean, dry, & intact 10/25/2017 11:16 AM   Phlebitis Assessment 0 10/25/2017 11:16 AM   Infiltration Assessment 0 10/25/2017 11:16 AM   Dressing Status Clean, dry, & intact 10/25/2017 11:16 AM   Dressing Type Tape;Transparent 10/25/2017 11:16 AM   Hub Color/Line Status Pink;Patent; Flushed 10/25/2017 11:16 AM   Alcohol Cap Used No 10/25/2017 11:16 AM        Opportunity for questions and clarification was provided. Patient transported with:   Tech   Pt belongings from home - given to family    Daughter and  at bedside and aware of transfer.

## 2017-10-25 NOTE — PROGRESS NOTES
Problem: Self Care Deficits Care Plan (Adult)  Goal: *Acute Goals and Plan of Care (Insert Text)  1. Patient will perform grooming with supervision seated on EOB. 2. Patient will perform Upper body dressing with supervision  3. Patient will perform lower body dressing with stand by assist.  4. Patient will perform upper and lower body bathing with supervision seated and Stand by assist standing  5. Patient will perform toilet transfers with stand by assist  6. Patient will perform shower transfer with stand by assist  7. Patient will participate in 30 + minutes of ADL/ therapeutic exercise/therapeutic activity with min rest breaks to increase activity tolerance for self care. 8. Patient will perform ADL functional mobility in room with stand by assist.    Goals to be achieved in 7 days. OCCUPATIONAL THERAPY: Initial Assessment and AM 10/25/2017  INPATIENT: Hospital Day: 3  Payor: Ora Rodriguez / Plan: [facility] HUMANA MEDICARE CHOICE PPO/PFFS / Product Type: Managed Care Medicare /      NAME/AGE/GENDER: Fany Merino is a 80 y.o. female   PRIMARY DIAGNOSIS:  CVA (cerebral vascular accident) ([address] CVA (cerebral vascular accident) ([address] CVA (cerebral vascular accident) ([address]        ICD-10: Treatment Diagnosis:    · Generalized Muscle Weakness (M62.81)  · Other lack of cordination (R27.8)   Precautions/Allergies:     Sulfa (sulfonamide antibiotics); Other medication; and Tape [adhesive]      ASSESSMENT:     Ms. Erica Rojas presents supine in bed, reported that she still has blurry vision. She is unable to read the clock on the wall or see the color of therapist eyes. She was able to navigate the room effectively to use the toilet and wash hands at the sink. She was able to feed herself with spoon, finger foods and grasp and  Drink from coffee cup without difficulty. Her right eye was very droopy during the eval and she was unable to open it on command.  After eval she opened R eye more effectively when talking to nursing. SHe is grossly min assist for ADLs and transfers. She lives with her  and has walkers and shower chair but she does not use them. She plans to return home with her  to assist. She would benefit form skilled OT services. Will follow. This section established at most recent assessment   PROBLEM LIST (Impairments causing functional limitations):  1. Decreased Strength  2. Decreased ADL/Functional Activities  3. Decreased Transfer Abilities  4. Decreased Ambulation Ability/Technique  5. Decreased Balance  6. Decreased Activity Tolerance   INTERVENTIONS PLANNED: (Benefits and precautions of occupational therapy have been discussed with the patient.)  1. Activities of daily living training  2. Adaptive equipment training  3. Balance training  4. Clothing management  5. Cognitive training  6. Donning&doffing training  7. Neuromuscular re-eduation  8. Therapeutic activity  9. Therapeutic exercise  10. Home safety and DME recommendations     TREATMENT PLAN: Frequency/Duration: Follow patient 3 times to address above goals. Rehabilitation Potential For Stated Goals: Good     RECOMMENDED REHABILITATION/EQUIPMENT: (at time of discharge pending progress): Due to the probability of continued deficits (see above) this patient will likely need continued skilled occupational therapy after discharge. Equipment:    None at this time              OCCUPATIONAL PROFILE AND HISTORY:   History of Present Injury/Illness (Reason for Referral):  Decreased self care and functional mobility  Past Medical History/Comorbidities:   Ms. Tremayne Villanueva  has a past medical history of Acute kidney failure (05/2016); Acute pericarditis (2015); Arthritis; CAP (community acquired pneumonia) (5/2016); Coronary artery disease; Crohn's disease; Hypercalcemia; Hypercholesterolemia; Hypertension; Lumbar compression fracture; Osteoporosis; and Type 2 diabetes mellitus.   Ms. Tremayne Villanueva  has a past surgical history that includes hysterectomy (1978); heart catheterization (5/12/2015); arthroplasty (1/5/2016); and kyphoplasty (~2002). Social History/Living Environment:   Home Environment: Apartment  # Steps to Enter: 0  One/Two Story Residence: One story  Living Alone: No  Support Systems: Child(eulalio), Family member(s), Spouse/Significant Other/Partner  Patient Expects to be Discharged to[de-identified] Apartment  Current DME Used/Available at Home: None  Tub or Shower Type: Shower  Prior Level of Function/Work/Activity:  Mod I with all ADLs prior to admit     Number of Personal Factors/Comorbidities that affect the Plan of Care: Brief history (0):  LOW COMPLEXITY   ASSESSMENT OF OCCUPATIONAL PERFORMANCE[de-identified]   Activities of Daily Living:           Basic ADLs (From Assessment) Complex ADLs (From Assessment)   Basic ADL  Feeding: Setup (drank from cup, used spoon, finger foods WFL)  Oral Facial Hygiene/Grooming: Contact guard assistance (standing at sink washing hands)  Bathing: Minimum assistance  Upper Body Dressing: Minimum assistance (ICU monitors and lines)  Lower Body Dressing: Minimum assistance  Toileting: Minimum assistance (CGa hygiene in standing)     Grooming/Bathing/Dressing Activities of Daily Living     Cognitive Retraining  Safety/Judgement: Awareness of environment                 Functional Transfers  Toilet Transfer : Minimum assistance     Bed/Mat Mobility  Supine to Sit: Minimum assistance  Sit to Stand: Minimum assistance       Most Recent Physical Functioning:   Gross Assessment:                  Posture:  Posture Assessment: Forward head, Rounded shoulders  Balance:  Sitting: Intact  Standing: Pull to stand (high guard) Bed Mobility:  Supine to Sit: Minimum assistance  Wheelchair Mobility:     Transfers:  Sit to Stand: Minimum assistance  Stand to Sit: Contact guard assistance     AROM in B UE WFL  Decreased visual skills,  Blurry close up and far away. But able to navigate in the room around the bed and wash hands at the sink.  Also   Able to manipulate utensils, finger foods and cup without difficulty. Patient Vitals for the past 6 hrs:   BP BP Patient Position SpO2 Pulse   10/25/17 0710 127/59 At rest;Head of bed elevated (Comment degrees) 98 % 65   10/25/17 0740 - - 96 % -   10/25/17 0836 139/63 - 95 % 67   10/25/17 0916 165/68 - 96 % 73   10/25/17 1016 149/63 - 95 % 69   10/25/17 1116 147/65 At rest;Head of bed elevated (Comment degrees) 98 % 67       Mental Status  Neurologic State: Alert  Orientation Level: Appropriate for age, Oriented X4  Cognition: Follows commands, Impulsive  Perception: Cues to attend right visual field  Perseveration: No perseveration noted  Safety/Judgement: Awareness of environment                          Physical Skills Involved:  1. Balance  2. Strength  3. Activity Tolerance Cognitive Skills Affected (resulting in the inability to perform in a timely and safe manner):  1. Perception Psychosocial Skills Affected:  1. Habits/Routines   Number of elements that affect the Plan of Care: 3-5:  MODERATE COMPLEXITY   CLINICAL DECISION MAKIN[address] [place] [address] AM-PAC 6 Clicks   Daily Activity Inpatient Short Form  How much help from another person does the patient currently need. .. Total A Lot A Little None   1. Putting on and taking off regular lower body clothing? [] 1   [] 2   [x] 3   [] 4   2. Bathing (including washing, rinsing, drying)? [] 1   [] 2   [x] 3   [] 4   3. Toileting, which includes using toilet, bedpan or urinal?   [] 1   [] 2   [x] 3   [] 4   4. Putting on and taking off regular upper body clothing? [] 1   [] 2   [x] 3   [] 4   5. Taking care of personal grooming such as brushing teeth? [] 1   [] 2   [x] 3   [] 4   6. Eating meals? [] 1   [] 2   [] 3   [x] 4   © , Trustees of [address] [place] [id number], under license to [employer].  All rights reserved      Score:  Initial: 19 Most Recent: X (Date: -- )    Interpretation of Tool:  Represents activities that are increasingly more difficult (i.e. Bed mobility, Transfers, Gait). Score 24 23 22-20 19-15 14-10 9-7 6     Modifier CH CI CJ CK CL CM CN      ? Self Care:     - CURRENT STATUS: CK - 40%-59% impaired, limited or restricted    - GOAL STATUS: CJ - 20%-39% impaired, limited or restricted    - D/C STATUS:  ---------------To be determined---------------  Payor: HUMANA MEDICARE / Plan: [facility] HUMANA MEDICARE CHOICE PPO/PFFS / Product Type: Managed Care Medicare /      Medical Necessity:     · Patient is expected to demonstrate progress in balance, coordination and functional technique to decrease assistance required with self care and functional mobiltiy and improve safety during self care and functional mobiltiy. Reason for Services/Other Comments:  · Patient continues to require skilled intervention due to decreased self care and functional mobiltiy. Use of outcome tool(s) and clinical judgement create a POC that gives a: LOW COMPLEXITY         TREATMENT:   (In addition to Assessment/Re-Assessment sessions the following treatments were rendered)     Pre-treatment Symptoms/Complaints:    Pain: Initial:   Pain Intensity 1: 0  Post Session:  0/10     Assessment/Reassessment only, no treatment provided today    Braces/Orthotics/Lines/Etc:   · ICU monitors  · O2 Device: Room air  Treatment/Session Assessment:    · Response to Treatment:  Tolerated well, plans to return home with  to assist  · Interdisciplinary Collaboration:   o Physical Therapist  o Occupational Therapist  o Registered Nurse  · After treatment position/precautions:   o Up in chair  o Bed/Chair-wheels locked  o Bed in low position  o Call light within reach  o RN notified  o Family at bedside   · Compliance with Program/Exercises: compliant most of the time. · Recommendations/Intent for next treatment session: \"Next visit will focus on advancements to more challenging activities and reduction in assistance provided\".   Total Treatment Duration:  OT Patient Time In/Time Out  Time In: 0815  Time Out: [address], OT

## 2017-10-25 NOTE — PROGRESS NOTES
Problem: Nutrition Deficit  Goal: *Optimize nutritional status  Nutrition:  Reason for assessment: Nutrition Consult for Electrolyte Management per Nutritional Support Protocols (Dr. Sheryl Brooks)   Assessment:  Diet order(s): Consistent CHO-1500-1600 kcal, Cardiac  Food/Nutrition History:  Pt presented to ED with 2-3 day confusion and difficulty focusing eyes and admitted with CVA. No nutrition risk factors identified on nursing admission malnutrition screening tool. Pt lives at home with spouse; pt prepares breakfast at home and she and spouse frequently eat lunch and supper out-[employer] and other restaurants. Anthropometrics:Height: 4' 9\" (144.8 cm), Weight Source: Bed, Weight: 55 kg (121 lb 4.8 oz), Body mass index is 26.25 kg/(m^2). Armando Valentine BMI class of normal range for Older American Woman. Macronutrient needs:  · EER:  5089-3340 kcal /day (20-25 kcal/kg W)  · EPR:  46-55 grams protein/day (1-1.2 grams/kg IBW-45.5 kg)  Pertinent labs:  Lab Results   Component Value Date/Time    Potassium 4.1 10/25/2017 12:43 PM    Sodium 137 10/25/2017 12:43 PM    Phosphorus 3.0 10/24/2017 03:29 AM    Magnesium 2.0 10/25/2017 12:43 PM    Glucose 363 10/25/2017 12:43 PM    Creatinine 1.13 10/25/2017 12:43 PM    Hemoglobin A1c 7.3 10/24/2017 03:29 AM     Intake/Comparative Standards: RD meal rounds; pt ate 90% lunch meal.  2 recorded intake with average consumption of 83%. Pt potentially meets 100% estimated kcal and protein needs. Nutrition Diagnosis:   No nutrition diagnosis at this time. Intervention:   Meals and snacks:  Continue current diet  Nutrition Supplement Therapy:  Nutrition Support Orders for Electrolyte Replacement are active on patient's MAR. Armando Valentine Coordination of Nutrition care: Interdisciplinary Rounds  Nutrition discharge plan: Continue current diet.     Kenna Pryor, [address] [place] [address], [address] [place] [address], MPH  [phone]

## 2017-10-25 NOTE — PROGRESS NOTES
Pt assessment completed. Alert and oriented. Lungs CTA even and unlabored. Heart sounds regular. Abdomen soft and non tender with active bowel sounds. Iv present with no s/sx of complications at this time. PT denies pain needs at this time. Right sided weakness noted on upper and lower extremities. Pt aware to call for assistance. All needs met will continue to monitor.

## 2017-10-25 NOTE — PROGRESS NOTES
STG: Patient will participate in cognitive/linguistic assessment x1. Speech language pathology: bedside swallow note: Initial Assessment    NAME/AGE/GENDER: Francesca Nelson is a 80 y.o. female  DATE: 10/25/2017  PRIMARY DIAGNOSIS: CVA (cerebral vascular accident) ([facility] [address]       ICD-10: Treatment Diagnosis: Oropharyngeal dysphagia (R13.12)  INTERDISCIPLINARY COLLABORATION: Registered Nurse  PRECAUTIONS/ALLERGIES: Sulfa (sulfonamide antibiotics); Other medication; and Tape [adhesive] ASSESSMENT:Based on the objective data described below, Ms. Curtis presents with functional oral and pharyngeal swallow with no overt signs or symptoms of aspiration with any consistencies assessed. All swallows timely, clear to cervical auscultation with adequate laryngeal excursion and clear voicing after swallow. Patient with possible difficulty following commands. She reports she is having difficulty understanding and focusing. Recommend continue regular textures and thin liquids. Patient will benefit from further assessment of cognitive/linguistic function. ?????? ? ? This section established at most recent assessment??????????  PROBLEM LIST (Impairments causing functional limitations):  1. Possible cognitive deficits  REHABILITATION POTENTIAL FOR STATED GOALS: Good  PLAN OF CARE:   Patient will benefit from skilled intervention to address the following impairments. RECOMMENDATIONS AND PLANNED INTERVENTIONS (Benefits and precautions of therapy have been discussed with the patient.):  · continue prescribed diet  MEDICATIONS:  · With liquid  COMPENSATORY STRATEGIES/MODIFICATIONS INCLUDING:  · Upright for all PO  OTHER RECOMMENDATIONS (including follow up treatment recommendations):   · Cognitive/linguistic assessment  RECOMMENDED DIET MODIFICATIONS DISCUSSED WITH:  · Nursing  · Patient  FREQUENCY/DURATION: To be determined after cognitive/linguistic assessment. RECOMMENDED REHABILITATION/EQUIPMENT: (at time of discharge pending progress): Due to the probability of continued deficits (see above) this patient will likely need continued skilled speech therapy after discharge. SUBJECTIVE:   Patient pleasant and cooperative. History of Present Injury/Illness: Ms. Eyad Arteaga  has a past medical history of Acute kidney failure (05/2016); Acute pericarditis (2015); Arthritis; CAP (community acquired pneumonia) (5/2016); Coronary artery disease; Crohn's disease; Hypercalcemia; Hypercholesterolemia; Hypertension; Lumbar compression fracture; Osteoporosis; and Type 2 diabetes mellitus. She also  has a past surgical history that includes hysterectomy (1978); heart catheterization (5/12/2015); arthroplasty (1/5/2016); and kyphoplasty (~2002). Present Symptoms: CVA   Pain Intensity 1: 0  Current Medications:   No current facility-administered medications on file prior to encounter. Current Outpatient Prescriptions on File Prior to Encounter   Medication Sig Dispense Refill    nitrofurantoin, macrocrystal-monohydrate, (MACROBID) 100 mg capsule       URO--10-40.8-36 mg cap capsule       atorvastatin (LIPITOR) 40 mg tablet Take  by mouth daily.  ZINC PO Take  by mouth.  acetaminophen (TYLENOL EXTRA STRENGTH) 500 mg tablet Take  by mouth every six (6) hours as needed for Pain.  spironolactone (ALDACTONE) 25 mg tablet Take 1 Tab by mouth daily. 90 Tab 3    cyanocobalamin 1,000 mcg tablet Take 2,500 mcg by mouth daily.  lisinopril (PRINIVIL, ZESTRIL) 40 mg tablet Take 40 mg by mouth daily.  ranitidine (ZANTAC) 150 mg tablet Take 150 mg by mouth daily.  cholecalciferol (VITAMIN D3) 1,000 unit cap Take  by mouth two (2) times a day.  omeprazole (PRILOSEC) 40 mg capsule Take 40 mg by mouth daily.  B.infantis-B.ani-B.long-B.bifi (PROBIOTIC 4X) 10-15 mg Tab Take  by mouth daily.  carvedilol (COREG) 12.5 mg tablet Take 12.5 mg by mouth two (2) times daily (with meals).       METFORMIN 500 mg tablet Take 1,000 mg by mouth two (2) times daily (with meals).  aspirin 81 mg Tab take by mouth. 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     Cognitive and Communication Status:  Neurologic State: Alert  Orientation Level: Appropriate for age;Oriented X4  Cognition: Follows commands; Impulsive  Perception: Cues to attend right visual field  Perseveration: No perseveration noted  Safety/Judgement: Awareness of environment    BEDSIDE SWALLOW EVALUATION  Oral Assessment:  Oral Assessment  Labial: No impairment  Dentition: Intact  Oral Hygiene: adequate  Lingual: Decreased rate  Velum: No impairment  P.O. Trials:  Patient Position: upright with bed in chair position    The patient was given the following:   Consistency Presented: Thin liquid; Solid;Puree;Mixed consistency  How Presented: Self-fed/presented;Cup/sip;Cup/gulp; Spoon;Straw;Successive swallows    ORAL PHASE:  Bolus Acceptance: No impairment  Bolus Formation/Control: No impairment  Propulsion: No impairment     Oral Residue: None    PHARYNGEAL PHASE:  Initiation of Swallow: No impairment     Aspiration Signs/Symptoms: None  Vocal Quality: No impairment           Pharyngeal Phase Characteristics: No impairment, issues, or problems     OTHER OBSERVATIONS:  Rate/bite size: WNL   Endurance:   WNL      Tool Used: Dysphagia Outcome and Severity Scale (SUZY)    Score Comments   Normal Diet [x] 7 With no strategies or extra time needed   Functional Swallow  [] 6 May have mild oral or pharyngeal delay       Mild Dysphagia    [] 5 Which may require one diet consistency restricted (those who demonstrate penetration which is entirely cleared on MBS would be included)   Mild-Moderate Dysphagia  [] 4 With 1-2 diet consistencies restricted       Moderate Dysphagia  [] 3 With 2 or more diet consistencies restricted       Moderately Severe Dysphagia  [] 2 With partial PO strategies (trials with ST only)       Severe Dysphagia  [] 1 With inability to tolerate any PO safely          Score:  Initial: 7 Most Recent: X (Date: -- )   Interpretation of Tool: The Dysphagia Outcome and Severity Scale (SUZY) is a simple, easy-to-use, 7-point scale developed to systematically rate the functional severity of dysphagia based on objective assessment and make recommendations for diet level, independence level, and type of nutrition. Score 7 6 5 4 3 2 1   Modifier CH CI CJ CK CL CM CN   ? Swallowing:     - CURRENT STATUS: CH - 0% impaired, limited or restricted    - GOAL STATUS:  CH - 0% impaired, limited or restricted    - D/C STATUS:  CH - 0% impaired, limited or restricted  Payor: Gilles Khalil / Plan: Malou Cintron PPO/PFFS / Product Type: [facility] Medicare /     TREATMENT:    (In addition to Assessment/Re-Assessment sessions the following treatments were rendered)  Assessment/Reassessment only, no treatment provided today  ______________________________________________________________________________________  Safety:   After treatment position/precautions:  · RN notified  · Upright in Bed  Treatment Assessment:  No further skilled dysphagia intervention indicated. Recommendations/Intent for next treatment session: \"Treatment next visit will focus on cognitive/linguistic assessment\".     Total Treatment Duration:  Time In: 8189  Time Out: [address] [place], [place], CCC-SLP

## 2017-10-26 ENCOUNTER — HOME HEALTH ADMISSION (OUTPATIENT)
Dept: HOME HEALTH SERVICES | Facility: HOME HEALTH | Age: 82
End: 2017-10-26
Payer: MEDICARE

## 2017-10-26 PROBLEM — B37.31 VAGINA, CANDIDIASIS: Status: ACTIVE | Noted: 2017-10-26

## 2017-10-26 LAB
ANION GAP SERPL CALC-SCNC: 10 MMOL/L (ref 7–16)
ANION GAP SERPL CALC-SCNC: 9 MMOL/L (ref 7–16)
BUN SERPL-MCNC: 24 MG/DL (ref 8–23)
BUN SERPL-MCNC: 28 MG/DL (ref 8–23)
CALCIUM SERPL-MCNC: 9.3 MG/DL (ref 8.3–10.4)
CALCIUM SERPL-MCNC: 9.6 MG/DL (ref 8.3–10.4)
CHLORIDE SERPL-SCNC: 103 MMOL/L (ref 98–107)
CHLORIDE SERPL-SCNC: 103 MMOL/L (ref 98–107)
CO2 SERPL-SCNC: 26 MMOL/L (ref 21–32)
CO2 SERPL-SCNC: 26 MMOL/L (ref 21–32)
CREAT SERPL-MCNC: 0.96 MG/DL (ref 0.6–1)
CREAT SERPL-MCNC: 1.04 MG/DL (ref 0.6–1)
ERYTHROCYTE [DISTWIDTH] IN BLOOD BY AUTOMATED COUNT: 13.2 % (ref 11.9–14.6)
ERYTHROCYTE [DISTWIDTH] IN BLOOD BY AUTOMATED COUNT: 13.3 % (ref 11.9–14.6)
GLUCOSE BLD STRIP.AUTO-MCNC: 278 MG/DL (ref 65–100)
GLUCOSE BLD STRIP.AUTO-MCNC: 285 MG/DL (ref 65–100)
GLUCOSE BLD STRIP.AUTO-MCNC: 287 MG/DL (ref 65–100)
GLUCOSE BLD STRIP.AUTO-MCNC: 301 MG/DL (ref 65–100)
GLUCOSE SERPL-MCNC: 292 MG/DL (ref 65–100)
GLUCOSE SERPL-MCNC: 321 MG/DL (ref 65–100)
HCT VFR BLD AUTO: 35.2 % (ref 35.8–46.3)
HCT VFR BLD AUTO: 35.5 % (ref 35.8–46.3)
HGB BLD-MCNC: 11.4 G/DL (ref 11.7–15.4)
HGB BLD-MCNC: 11.5 G/DL (ref 11.7–15.4)
MAGNESIUM SERPL-MCNC: 1.6 MG/DL (ref 1.8–2.4)
MAGNESIUM SERPL-MCNC: 1.7 MG/DL (ref 1.8–2.4)
MCH RBC QN AUTO: 28.5 PG (ref 26.1–32.9)
MCH RBC QN AUTO: 29 PG (ref 26.1–32.9)
MCHC RBC AUTO-ENTMCNC: 32.1 G/DL (ref 31.4–35)
MCHC RBC AUTO-ENTMCNC: 32.7 G/DL (ref 31.4–35)
MCV RBC AUTO: 88.8 FL (ref 79.6–97.8)
MCV RBC AUTO: 88.9 FL (ref 79.6–97.8)
PLATELET # BLD AUTO: 392 K/UL (ref 150–450)
PLATELET # BLD AUTO: 412 K/UL (ref 150–450)
PMV BLD AUTO: 10.7 FL (ref 10.8–14.1)
PMV BLD AUTO: 10.8 FL (ref 10.8–14.1)
POTASSIUM SERPL-SCNC: 4.1 MMOL/L (ref 3.5–5.1)
POTASSIUM SERPL-SCNC: 4.1 MMOL/L (ref 3.5–5.1)
RBC # BLD AUTO: 3.96 M/UL (ref 4.05–5.25)
RBC # BLD AUTO: 4 M/UL (ref 4.05–5.25)
SODIUM SERPL-SCNC: 138 MMOL/L (ref 136–145)
SODIUM SERPL-SCNC: 139 MMOL/L (ref 136–145)
WBC # BLD AUTO: 12.7 K/UL (ref 4.3–11.1)
WBC # BLD AUTO: 16 K/UL (ref 4.3–11.1)

## 2017-10-26 PROCEDURE — 97110 THERAPEUTIC EXERCISES: CPT

## 2017-10-26 PROCEDURE — 74011250637 HC RX REV CODE- 250/637: Performed by: INTERNAL MEDICINE

## 2017-10-26 PROCEDURE — 74011250637 HC RX REV CODE- 250/637: Performed by: HOSPITALIST

## 2017-10-26 PROCEDURE — 74011250636 HC RX REV CODE- 250/636: Performed by: HOSPITALIST

## 2017-10-26 PROCEDURE — 80048 BASIC METABOLIC PNL TOTAL CA: CPT | Performed by: INTERNAL MEDICINE

## 2017-10-26 PROCEDURE — 82962 GLUCOSE BLOOD TEST: CPT

## 2017-10-26 PROCEDURE — 36415 COLL VENOUS BLD VENIPUNCTURE: CPT | Performed by: INTERNAL MEDICINE

## 2017-10-26 PROCEDURE — 83735 ASSAY OF MAGNESIUM: CPT | Performed by: INTERNAL MEDICINE

## 2017-10-26 PROCEDURE — 65270000029 HC RM PRIVATE

## 2017-10-26 PROCEDURE — 92523 SPEECH SOUND LANG COMPREHEN: CPT

## 2017-10-26 PROCEDURE — 97116 GAIT TRAINING THERAPY: CPT

## 2017-10-26 PROCEDURE — 74011636637 HC RX REV CODE- 636/637: Performed by: INTERNAL MEDICINE

## 2017-10-26 PROCEDURE — 85027 COMPLETE CBC AUTOMATED: CPT | Performed by: INTERNAL MEDICINE

## 2017-10-26 RX ORDER — NYSTATIN 100000 [USP'U]/G
POWDER TOPICAL 2 TIMES DAILY
Status: DISCONTINUED | OUTPATIENT
Start: 2017-10-26 | End: 2017-10-27 | Stop reason: HOSPADM

## 2017-10-26 RX ORDER — FLUCONAZOLE 100 MG/1
100 TABLET ORAL ONCE
Status: COMPLETED | OUTPATIENT
Start: 2017-10-26 | End: 2017-10-26

## 2017-10-26 RX ORDER — LANOLIN ALCOHOL/MO/W.PET/CERES
400 CREAM (GRAM) TOPICAL 2 TIMES DAILY
Status: DISCONTINUED | OUTPATIENT
Start: 2017-10-26 | End: 2017-10-27 | Stop reason: HOSPADM

## 2017-10-26 RX ORDER — INSULIN GLARGINE 100 [IU]/ML
0.4 INJECTION, SOLUTION SUBCUTANEOUS
Status: DISCONTINUED | OUTPATIENT
Start: 2017-10-26 | End: 2017-10-27

## 2017-10-26 RX ADMIN — FLUCONAZOLE 100 MG: 100 TABLET ORAL at 12:20

## 2017-10-26 RX ADMIN — INSULIN LISPRO 8 UNITS: 100 INJECTION, SOLUTION INTRAVENOUS; SUBCUTANEOUS at 08:04

## 2017-10-26 RX ADMIN — Medication 10 ML: at 05:41

## 2017-10-26 RX ADMIN — LISINOPRIL 40 MG: 20 TABLET ORAL at 09:33

## 2017-10-26 RX ADMIN — NYSTATIN: 100000 POWDER TOPICAL at 18:00

## 2017-10-26 RX ADMIN — INSULIN LISPRO 3 UNITS: 100 INJECTION, SOLUTION INTRAVENOUS; SUBCUTANEOUS at 08:04

## 2017-10-26 RX ADMIN — ASPIRIN 81 MG 81 MG: 81 TABLET ORAL at 09:32

## 2017-10-26 RX ADMIN — CYANOCOBALAMIN TAB 1000 MCG 2500 MCG: 1000 TAB at 09:32

## 2017-10-26 RX ADMIN — INSULIN LISPRO 3 UNITS: 100 INJECTION, SOLUTION INTRAVENOUS; SUBCUTANEOUS at 17:49

## 2017-10-26 RX ADMIN — DEXAMETHASONE SODIUM PHOSPHATE 6 MG: 10 INJECTION INTRAMUSCULAR; INTRAVENOUS at 21:43

## 2017-10-26 RX ADMIN — INSULIN LISPRO 6 UNITS: 100 INJECTION, SOLUTION INTRAVENOUS; SUBCUTANEOUS at 11:44

## 2017-10-26 RX ADMIN — LEVETIRACETAM 500 MG: 500 TABLET ORAL at 09:32

## 2017-10-26 RX ADMIN — FAMOTIDINE 20 MG: 20 TABLET ORAL at 09:33

## 2017-10-26 RX ADMIN — LEVETIRACETAM 500 MG: 500 TABLET ORAL at 21:43

## 2017-10-26 RX ADMIN — CARVEDILOL 12.5 MG: 6.25 TABLET, FILM COATED ORAL at 09:33

## 2017-10-26 RX ADMIN — PANTOPRAZOLE SODIUM 40 MG: 40 TABLET, DELAYED RELEASE ORAL at 09:33

## 2017-10-26 RX ADMIN — INSULIN GLARGINE 22 UNITS: 100 INJECTION, SOLUTION SUBCUTANEOUS at 22:26

## 2017-10-26 RX ADMIN — CHOLECALCIFEROL TAB 25 MCG (1000 UNIT) 1000 UNITS: 25 TAB at 09:33

## 2017-10-26 RX ADMIN — Medication 400 MG: at 18:03

## 2017-10-26 RX ADMIN — CARVEDILOL 12.5 MG: 6.25 TABLET, FILM COATED ORAL at 17:49

## 2017-10-26 RX ADMIN — DEXAMETHASONE SODIUM PHOSPHATE 6 MG: 10 INJECTION INTRAMUSCULAR; INTRAVENOUS at 14:29

## 2017-10-26 RX ADMIN — ATORVASTATIN CALCIUM 40 MG: 40 TABLET, FILM COATED ORAL at 21:43

## 2017-10-26 RX ADMIN — Medication 10 ML: at 21:49

## 2017-10-26 RX ADMIN — INSULIN LISPRO 6 UNITS: 100 INJECTION, SOLUTION INTRAVENOUS; SUBCUTANEOUS at 17:49

## 2017-10-26 RX ADMIN — INSULIN LISPRO 3 UNITS: 100 INJECTION, SOLUTION INTRAVENOUS; SUBCUTANEOUS at 11:44

## 2017-10-26 RX ADMIN — NYSTATIN: 100000 POWDER TOPICAL at 12:20

## 2017-10-26 RX ADMIN — DEXAMETHASONE SODIUM PHOSPHATE 6 MG: 10 INJECTION INTRAMUSCULAR; INTRAVENOUS at 05:40

## 2017-10-26 NOTE — PROGRESS NOTES
Pt assessment completed. Alert and oriented. Lungs CTA even and unlabored. Heart sounds regular. Abdomen soft and non tender with active bowel sounds. Minor right sided weakness noted bilaterally. Iv present with no s/sx of complications at this time. Pt denies pain. All needs met will continue to monitor.

## 2017-10-26 NOTE — PROGRESS NOTES
Problem: Mobility Impaired (Adult and Pediatric)  Goal: *Acute Goals and Plan of Care (Insert Text)  STG:  (1.)Ms. Curtis will move from supine to sit and sit to supine  with SUPERVISION within 3 day(s). (2.)Ms. Curtis will transfer from bed to chair and chair to bed with SUPERVISION using the least restrictive device within 3 day(s). (3.)Ms. Curtis will ambulate with SUPERVISION for 250 feet with the least restrictive device within 3 day(s). (4.)Pt. Will increase B LE strength 1/2 grade within 3 days      ________________________________________________________________________________________________    PHYSICAL THERAPY: Daily Note, Treatment Day: 1st, AM 10/26/2017  INPATIENT: Hospital Day: 4  Payor: Reva Marlow / Plan: [facility] HUMANA MEDICARE CHOICE PPO/PFFS / Product Type: [place] Care Medicare /      NAME/AGE/GENDER: Wilfrid Garcia is a 80 y.o. female   PRIMARY DIAGNOSIS: CVA (cerebral vascular accident) ([address] CVA (cerebral vascular accident) ([address] CVA (cerebral vascular accident) ([facility]        ICD-10: Treatment Diagnosis:   · Generalized Muscle Weakness (M62.81)  · Other lack of cordination (R27.8)  · Other abnormalities of gait and mobility (R26.89)   Precaution/Allergies:  Sulfa (sulfonamide antibiotics); Other medication; and Tape [adhesive]      ASSESSMENT:     Ms. Haim Arreola presents with a R parieto-occipital lobe CVA and demonstrates mild decrease in general strength and her ability to perform functional mobility. She is a a little impulsive and seems to possibly have some problems following instruction. She did become tearful this morning, \"I am so used to being able to get up and go without any problems\". Her spouse is present and is supportive. If she stays in the hospital, I will continue to follow her to address these deficits. If she is DCd home, I would recommend [facility] PT and I have left a note for the .   She is supine upon arrival.  She performs exercises in the bed with no problems. She increase her distance using RW and CGA and no LOB. She return to the chair with chair alarm on. This section established at most recent assessment   PROBLEM LIST (Impairments causing functional limitations):  1. Decreased Strength  2. Decreased Transfer Abilities  3. Decreased Ambulation Ability/Technique  4. Decreased Balance  5. Decreased [place] with Home Exercise Program  6. Decreased Cognition   INTERVENTIONS PLANNED: (Benefits and precautions of physical therapy have been discussed with the patient.)  1. Balance Exercise  2. Bed Mobility  3. Family Education  4. Gait Training  5. Neuromuscular Re-education/Strengthening  6. Therapeutic Activites  7. Therapeutic Exercise/Strengthening  8. Transfer Training     TREATMENT PLAN: Frequency/Duration: daily for duration of hospital stay  Rehabilitation Potential For Stated Goals: Good     RECOMMENDED REHABILITATION/EQUIPMENT: (at time of discharge pending progress): Due to the probability of continued deficits (see above) this patient will likely need continued skilled physical therapy after discharge. Equipment:    has a straight cane, rollator and shower chair at home, but does not use them currently              HISTORY:   History of Present Injury/Illness (Reason for Referral): Admitted with a R parieto-occipital CVA  Past Medical History/Comorbidities:   Ms. Haim Arreola  has a past medical history of Acute kidney failure (05/2016); Acute pericarditis (2015); Arthritis; CAP (community acquired pneumonia) (5/2016); Coronary artery disease; Crohn's disease; Hypercalcemia; Hypercholesterolemia; Hypertension; Lumbar compression fracture; Osteoporosis; and Type 2 diabetes mellitus. Ms. Haim Arreola  has a past surgical history that includes hysterectomy (1978); heart catheterization (5/12/2015); arthroplasty (1/5/2016); and kyphoplasty (~2002).   Social History/Living Environment:   Home Environment: Apartment  # Steps to Enter: 0  One/Two Story Residence: One story  Living Alone: No  Support Systems: Child(eulalio), Family member(s), Spouse/Significant Other/Partner  Patient Expects to be Discharged to[de-identified] Apartment  Current DME Used/Available at Home: None  Tub or Shower Type: Shower  Prior Level of Function/Work/Activity:  Functionally independent. Drives, dances. Dominant Side:         RIGHT   Number of Personal Factors/Comorbidities that affect the Plan of Care: 1-2: MODERATE COMPLEXITY   EXAMINATION:   Most Recent Physical Functioning:   Gross Assessment:                  Posture:     Balance:    Bed Mobility:  Supine to Sit: Minimum assistance  Sit to Supine:  (left up in chair)  Wheelchair Mobility:     Transfers:  Sit to Stand: Minimum assistance  Stand to Sit: Contact guard assistance  Gait:     Speed/Sammie: Pace decreased (<100 feet/min)  Gait Abnormalities: Decreased step clearance  Distance (ft): 100 Feet (ft)  Assistive Device: Walker, rolling  Ambulation - Level of Assistance: Contact guard assistance  Interventions: Safety awareness training  Duration: 15 Minutes      Body Structures Involved:  1. Eyes and Ears  2. Muscles Body Functions Affected:  1. Neuromusculoskeletal  2. Movement Related Activities and Participation Affected:  1. Mobility  2. Self Care   Number of elements that affect the Plan of Care: 4+: HIGH COMPLEXITY   CLINICAL PRESENTATION:   Presentation: Stable and uncomplicated: LOW COMPLEXITY   CLINICAL DECISION MAKIN [facility] [address] [id number] AM-PAC 6 Clicks   Basic Mobility Inpatient Short Form  How much difficulty does the patient currently have. .. Unable A Lot A Little None   1. Turning over in bed (including adjusting bedclothes, sheets and blankets)? [] 1   [] 2   [] 3   [x] 4   2. Sitting down on and standing up from a chair with arms ( e.g., wheelchair, bedside commode, etc.)   [] 1   [] 2   [x] 3   [] 4   3. Moving from lying on back to sitting on the side of the bed?    [] 1   [] 2   [x] 3   [] 4   How much help from another person does the patient currently need. .. Total A Lot A Little None   4. Moving to and from a bed to a chair (including a wheelchair)? [] 1   [] 2   [x] 3   [] 4   5. Need to walk in hospital room? [] 1   [] 2   [x] 3   [] 4   6. Climbing 3-5 steps with a railing? [] 1   [] 2   [x] 3   [] 4   © 2007, Trustees of [address] [place] [address] [address] [address], under license to [employer]. All rights reserved      Score:  Initial: 19 Most Recent: X (Date: -- )    Interpretation of Tool:  Represents activities that are increasingly more difficult (i.e. Bed mobility, Transfers, Gait). Score 24 23 22-20 19-15 14-10 9-7 6     Modifier CH CI CJ CK CL CM CN      ? Mobility - Walking and Moving Around:     - CURRENT STATUS: CK - 40%-59% impaired, limited or restricted    - GOAL STATUS: CJ - 20%-39% impaired, limited or restricted    - D/C STATUS:  ---------------To be determined---------------  Payor: HUMANA MEDICARE / Plan: [facility] HUMANA MEDICARE CHOICE PPO/PFFS / Product Type: Managed Care Medicare /      Medical Necessity:     · Patient demonstrates good rehab potential due to higher previous functional level. Reason for Services/Other Comments:  · Patient continues to require present interventions due to patient's inability to perform functional mobility independently. Use of outcome tool(s) and clinical judgement create a POC that gives a: Clear prediction of patient's progress: LOW COMPLEXITY            TREATMENT:   (In addition to Assessment/Re-Assessment sessions the following treatments were rendered)   Pre-treatment Symptoms/Complaints:  Needs to shaila the restroom  Pain: Initial:   Pain Intensity 1: 0 (0/10 after therapy)  Post Session:      Gait Training (15 Minutes):  Gait training to improve and/or restore physical functioning as related to mobility.   Ambulated 100 Feet (ft) with Contact guard assistance using a Walker, rolling and minimal Safety awareness training   Therapeutic Exercise: (15 Minutes): Exercises per grid below to improve strength. Required minimal verbal cues to promote proper body alignment. Progressed range as indicated. Date:  10/26 Date:   Date:     Activity/Exercise Parameters Parameters Parameters   Ankle pumps 12     heelslides 12     Hip abd/add 12     SLR 12                                   Braces/Orthotics/Lines/Etc:   ·   Treatment/Session Assessment:    · Response to Treatment:  She tolerated session well. · Interdisciplinary Collaboration:   o Registered Nurse  · After treatment position/precautions:   o Up in chair  o Bed/Chair-wheels locked  o Call light within reach  o Family at bedside  o Nurse at bedside   · Compliance with Program/Exercises: Will assess as treatment progresses. · Recommendations/Intent for next treatment session: \"Next visit will focus on advancements to more challenging activities and reduction in assistance provided\".   Total Treatment Duration:PT Patient Time In/Time Out  Time In: 3721  Time Out: 6356    Dipti Villanueva, PTA

## 2017-10-26 NOTE — PROGRESS NOTES
Problem: Interdisciplinary Rounds  Goal: Interdisciplinary Rounds  Interdisciplinary team rounds were held 10/26/2017 with the following team members:Care Management, Nursing, Nutrition, Pastoral Care, Pharmacy and Physician. Plan of care discussed. See clinical pathway and/or care plan for interventions and desired outcomes.

## 2017-10-26 NOTE — PROGRESS NOTES
Gini reviewed chart and met pt, daughter and pt's spouse. Pt is an 80 yr old female adm on 10/23 due to suspected CVA. Pt was in ICU. Pt out on the floor today. Sw talked at length with her daughter as she was very concerned about her mom's overall decline. Daughter informed pt has had her gallbladder out only two weeks ago. She thinks her mom had an infection after that. One week ago pt received a call that her son who was 72 (who had been incarcerated for 30 years) had passed away. Pt became very distraught and doesn't even know where he is buried. She has spoken with a warden times several, but is very upset, distracted, and concerned that she can't get answers. Pt is an avid square dancer and dances three times per week. Pt also goes to the  and walks with her [facility] daily.  was present in the room, but only allowed norberto responses to Sw talking with pt. Informed pt she would hopefully be medically stable and ready for discharge tomorrow. She said she needs to get out of here since she has plans for Friday. Sw made ref to [facility].  Pat Eckert

## 2017-10-26 NOTE — PROGRESS NOTES
Progress Note    Patient: Wilfrid Garcia MRN: [id number]  SSN: [id number]    YOB: 1934  Age: 80 y.o. Sex: female      Admit Date: 10/23/2017    LOS: 3 days     Subjective:     10/25/17: This 80 you female was admitted on 10/23/17 with complaint of confusion and difficulty focusing her eyes.  stated that she was not acting like herself. She was recently diagnosed with an UTI, started on antibiotic therapy. Continued complaints of worsening dysuria and discharge on admission. She denied stiff neck, fever or chills. She saw her PCP and eye doctor that told her to come to ER for evaluation. She had an abnormal CT on admission that showed vasogenic edema present within the right parietal lobe with mass effect upon the right lateral ventricle as well as upon the sulci. Cannot exclude either infarction or intracranial neoplasm. Correlation with MRI with and without contrast as well as diffusion-weighted imaging for further evaluation. MRI was negative for an acute stroke, only Probable chronic right parietal occipital CVA containing some petechial hemosiderin, pontine and supratentorial microischemia. Patient has clinically improved, back to baseline by this afternoon. No issues per ICU nursing staff overnight. 10/26/17: Patient had no overnight events, her mental status remains the same as yesterday, continues to have a slowed rate of speech per her daughter that was present at bedside today. Daughter feels that a portion of her acute issues is stress related. She told me that the her mother's son had passes away in [facility] two weeks ago and that there has been issues getting the body that caused a good deal of stress. The day of the acute event, she was trying to deal with phone calls to the [facility] where he . Patient is openly depressed and has been for some time, her PCP is addressing this per the daughter. Nursing reports no new events.      Current Facility-Administered Medications   Medication Dose Route Frequency    insulin glargine (LANTUS) injection 22 Units  0.4 Units/kg SubCUTAneous QHS    nystatin (MYCOSTATIN) 100,000 unit/gram powder   Topical BID    levETIRAcetam (KEPPRA) tablet 500 mg  500 mg Oral BID    insulin lispro (HUMALOG) injection 3 Units  0.05 Units/kg SubCUTAneous TID WITH MEALS    insulin lispro (HUMALOG) injection   SubCUTAneous TIDAC    aspirin chewable tablet 81 mg  81 mg Oral DAILY    famotidine (PEPCID) tablet 20 mg  20 mg Oral DAILY    NUTRITIONAL SUPPORT ELECTROLYTE PRN ORDERS   Does Not Apply PRN    polyvinyl alcohol (LIQUIFILM TEARS) 1.4 % ophthalmic solution 1 Drop  1 Drop Both Eyes PRN    sodium chloride (NS) flush 5-10 mL  5-10 mL IntraVENous PRN    dexamethasone (DECADRON) injection 6 mg  6 mg IntraVENous Q8H    sodium chloride (NS) flush 5-10 mL  5-10 mL IntraVENous Q8H    sodium chloride (NS) flush 5-10 mL  5-10 mL IntraVENous PRN    ondansetron (ZOFRAN) injection 4 mg  4 mg IntraVENous Q6H PRN    acetaminophen (TYLENOL) suppository 650 mg  650 mg Rectal Q4H PRN    dextrose 40% (GLUTOSE) oral gel 1 Tube  15 g Oral PRN    glucagon (GLUCAGEN) injection 1 mg  1 mg IntraMUSCular PRN    dextrose (D50W) injection syrg 12.5-25 g  25-50 mL IntraVENous PRN    atorvastatin (LIPITOR) tablet 40 mg  40 mg Oral QHS    bisacodyl (DULCOLAX) tablet 5 mg  5 mg Oral DAILY PRN    acetaminophen (TYLENOL) tablet 650 mg  650 mg Oral Q6H PRN    carvedilol (COREG) tablet 12.5 mg  12.5 mg Oral BID WITH MEALS    cholecalciferol (VITAMIN D3) tablet 1,000 Units  1,000 Units Oral DAILY    pantoprazole (PROTONIX) tablet 40 mg  40 mg Oral ACB    lisinopril (PRINIVIL, ZESTRIL) tablet 40 mg  40 mg Oral DAILY    cyanocobalamin tablet 2,500 mcg  2,500 mcg Oral DAILY       Objective:     Vitals:    10/26/17 0253 10/26/17 0722 10/26/17 1025 10/26/17 1455   BP: 165/63 152/66 147/60 174/65   Pulse: 77 60 (!) 50 (!) 58   Resp: 14 16 16 16   Temp: 97.8 °F (36.6 °C) 98.6 °F (37 °C) 97.7 °F (36.5 °C) 98 °F (36.7 °C)   SpO2: 95% 93% 95% 96%   Weight:       Height:            Intake and Output:  Current Shift: 10/26 0701 - 10/26 1900  In: -   Out: 400 [Urine:400]  Last three shifts: 10/24 1901 - 10/26 0700  In: 1520 [P.O.:720; I.V.:800]  Out: 650 [Urine:650]    Physical Exam:   GEN:  This 79 yo white female , in no apparent distress,  family present at time of assessment. Sitting in chair watching TV.     ENT: Pupils equal, round , reactive to light. Conjunctiva pink/moist, sclera non-icteric, mildly injected.      PUL:  Clear to ascultation bilaterally, no appreciated rales, rhonchi or wheezes. Decreased breath lung sounds at the lung bases with mild crackles.     CARD: Regular rhythm, regular rate, no appreciated HEATHER. No carotid bruits.     ABD: Non-tender, non-distended, positive bowel sounds in all four quadrants. : Red and irritated outer and inner labia with evidence of vaginal candidiasis, cutaneous, no discharge from the introitus. Redness and skin irritation in the inguinal region as well. EXT: No upper extremity or lower extremity edema.   Pulses equal at all levels bilaterally.     SKIN: No additional skin breakdown, open wounds, ulcers or concerning findings outside of those noted in the  exam.     NEURO: Grossly intact CN II-XII, no overt focal defects on exam.  Sluggish on some movements but equal bilaterally.      PSYC: Mood appropriate, no anxiety appreciated    Lab/Data Review:    Recent Results (from the past 24 hour(s))   GLUCOSE, POC    Collection Time: 10/25/17  9:53 PM   Result Value Ref Range    Glucose (POC) 234 (H) 65 - 100 mg/dL   CBC W/O DIFF    Collection Time: 10/26/17  5:19 AM   Result Value Ref Range    WBC 16.0 (H) 4.3 - 11.1 K/uL    RBC 3.96 (L) 4.05 - 5.25 M/uL    HGB 11.5 (L) 11.7 - 15.4 g/dL    HCT 35.2 (L) 35.8 - 46.3 %    MCV 88.9 79.6 - 97.8 FL    MCH 29.0 26.1 - 32.9 PG    MCHC 32.7 31.4 - 35.0 g/dL    RDW 13.2 11.9 - 14.6 % PLATELET 486 338 - 119 K/uL    MPV 10.7 (L) 10.8 - 71.5 FL   METABOLIC PANEL, BASIC    Collection Time: 10/26/17  5:19 AM   Result Value Ref Range    Sodium 138 136 - 145 mmol/L    Potassium 4.1 3.5 - 5.1 mmol/L    Chloride 103 98 - 107 mmol/L    CO2 26 21 - 32 mmol/L    Anion gap 9 7 - 16 mmol/L    Glucose 321 (H) 65 - 100 mg/dL    BUN 24 (H) 8 - 23 MG/DL    Creatinine 0.96 0.6 - 1.0 MG/DL    GFR est AA >60 >60 ml/min/1.73m2    GFR est non-AA 59 (L) >60 ml/min/1.73m2    Calcium 9.3 8.3 - 10.4 MG/DL   MAGNESIUM    Collection Time: 10/26/17  5:19 AM   Result Value Ref Range    Magnesium 1.7 (L) 1.8 - 2.4 mg/dL   GLUCOSE, POC    Collection Time: 10/26/17  7:30 AM   Result Value Ref Range    Glucose (POC) 301 (H) 65 - 100 mg/dL   GLUCOSE, POC    Collection Time: 10/26/17 11:05 AM   Result Value Ref Range    Glucose (POC) 278 (H) 65 - 100 mg/dL   GLUCOSE, POC    Collection Time: 10/26/17  5:19 PM   Result Value Ref Range    Glucose (POC) 287 (H) 65 - 100 mg/dL       Assessment/ Plan:     Principal Problem:    CVA (cerebral vascular accident) ([facility] (10/23/2017)    Active Problems:    DM type 2 (diabetes mellitus, type 2) [facility] (3/16/2013)      Essential hypertension with goal blood pressure less than 140/90 (3/16/2013)      Crohn disease ([facility] (3/16/2013)      Coronary artery disease involving native coronary artery of native heart with angina pectoris ([facility] (5/3/2016)      Dyslipidemia (5/3/2016)      Confusion (10/23/2017)      Dysuria (10/25/2017)      Vaginal candida (10/26/2017)      Diflucan single dose along with Nystatin powder prescribed for the vaginal cutaneous candidiasis that was the crux of her dysuria complaints. UA and UCx negative, ceftriaxone discontinued. Will continue to monitor overnight and likely discharge tomorrow with neurology and PCP follow up. The plan of care will be continued and adjusted as patient condition changes, laboratory and radiologic data is obtained.       Signed By: Chelsea Arellano Reed Lewis, DO     October 26, 2017

## 2017-10-26 NOTE — PROGRESS NOTES
STG: Patient will follow multi-step commands with 80% accuracy with minimal assistance. STG: Patient will participate in generative naming tasks with 80% accuracy. STG: Patient will participate in functional attention tasks with 80% accuracy. LTG: Patient will improve language and cognitive function to improve safety within her home environment. Speech language pathology: Speech-language and cognitive note: Initial Assessment    NAME/AGE/GENDER: Silvio Henderson is a 80 y.o. female  DATE: 10/26/2017  PRIMARY DIAGNOSIS: CVA (cerebral vascular accident) [facility])       ICD-10: Treatment Diagnosis: r41.841 Cognitive communication deficit  INTERDISCIPLINARY COLLABORATION: Social WorkerASSESSMENT:Based on the objective data described below, Ms. Curtis presents with moderate cognitive communicative impairment. She completed the [facility] Cognitive Assessment with a raw score of 15/30 (normal >26)  Results of assessment as follows:   - Visuospatial: 0/5  - Naming: 3/3  - Memory: 3/5  - Attention 0/6  - Language: 0/4  - Abstraction 2/2  - Orientation 5/6  Perseveration noted throughout assessment, requiring moderate prompting for re-direction. Patient also verbalizing confusion and confabulation regarding recent events. .   Patient will benefit from skilled intervention to address the below impairments. She will also benefit from continued speech therapy services upon discharge from this facility. ?????? ? ? This section established at most recent assessment??????????  PROBLEM LIST (Impairments causing functional limitations):  1. Attention  2. Problem solving  3. Expression  4. recepitive language  REHABILITATION POTENTIAL FOR STATED GOALS: Fair  PLAN OF CARE:   Patient will benefit from skilled intervention to address the following impairments. INTERVENTIONS PLANNED: (Benefits and precautions of therapy have been discussed with the patient.)  1.  Cognitive communication deficits  FREQUENCY/DURATION: Continue to follow patient 3 times a week for duration of hospital stay to address above goals. RECOMMENDED REHABILITATION/EQUIPMENT: (at time of discharge pending progress): Due to the probability of continued deficits (see above) this patient will likely need continued skilled speech therapy after discharge. SUBJECTIVE:   \"I don't know where I am. I keep moving\"  History of Present Injury/Illness: Ms. Luis Radford  has a past medical history of Acute kidney failure (05/2016); Acute pericarditis (2015); Arthritis; CAP (community acquired pneumonia) (5/2016); Coronary artery disease; Crohn's disease; Hypercalcemia; Hypercholesterolemia; Hypertension; Lumbar compression fracture; Osteoporosis; and Type 2 diabetes mellitus. .  She also  has a past surgical history that includes hysterectomy (1978); heart catheterization (5/12/2015); arthroplasty (1/5/2016); and kyphoplasty (~2002).    Present Symptoms: Confabulations   Pain Intensity 1: 0  Current Medications:         Social History/Home Situation:    Home Environment: Apartment  # Steps to Enter: 0  One/Two Story Residence: One story  Living Alone: No  Support Systems: Child(eulalio), Family member(s), Spouse/Significant Other/Partner  Patient Expects to be Discharged to[de-identified] Apartment  Current DME Used/Available at Home: None  Tub or Shower Type: Shower  Work/Activity History:   OBJECTIVE:   Oral Motor Structure/Speech:  Oral-Motor Structure/Motor Speech  Labial: No impairment  Dentition: Intact  Oral Hygiene: adequate  Lingual: Decreased rate  Velum: No impairment    SPEECH-LANGUAGE COGNITIVE EVALUATION  Tests Given:MOCA    Mental Status:  Neurologic State: Alert  Orientation Level: Oriented X4              Auditory Comprehension:   Auditory Comprehension  Auditory Impairment: Yes  Verbal Expression:   Verbal Expression  Repetition: Impaired  Naming: Impaired    Neuro-Linguistics:  Verbal Reasoning Tasks: Impaired    Attention impaired  Verbal Organization: Impaired Assessment/Reassessment only, no treatment provided today    Tool Used: Functional Lytle Measure (FIMTM)   Score Comments   Eating       Comprehension       Expression       Social Interaction       Problem Solving       Memory  3        Score:  Initial: 3 Most Recent: X (Date: -- )   Interpretation of Tool: Provides a uniform system of measurement for disability based on the International Classification of Impairment, Disabilities and Handicaps; measures the level of a patient's disability and indicates how much assistance is required for the individual to carry out activities of daily living. Score 7 6 5 4 3 2 1   Modifier CH CI CJ CK CL CM CN   ? Memory:     - CURRENT STATUS: CK - 40%-59% impaired, limited or restricted    - GOAL STATUS:  CJ - 20%-39% impaired, limited or restricted    - D/C STATUS:  ---------------To be determined---------------  Payor: [employer] MEDICARE / Plan: [employer] MEDICARE CHOICE PPO/PFFS / Product Type: Managed Care Medicare /   __________________________________________________________________________________________________  Safety:   After treatment position/precautions:  · Upright in Bed. Progression/Medical Necessity:   · Patient is expected to demonstrate progress in expressive communication, receptive ability and cognitive ability to increase independence with activities of daily living and increase communication with family/caregivers. Compliance with Program/Exercises: Will assess as treatment progresses. Reason for Continuation of Services/Other Comments:  · Patient has tolerated an increase in communication tasks, cognitive tasks and receptive tasks as demonstrated by: increased complexity of cognitive skills  Recommendations/Intent for next treatment session: \"Treatment next visit will focus on advancements to more challenging activities\".     Total Treatment Duration:  Time In: 1540  Time Out: 3609    Pretty Gonzalez MSP, CCC-SLP, CBIS

## 2017-10-26 NOTE — PROGRESS NOTES
[facility] - INPATIENT  Face to Face Encounter    Patients Name: Anny Garcia    YOB: 1934    Ordering Physician: Dr Rolf Dumont    Primary Diagnosis: CVA (cerebral vascular accident) [facility])    Date of Face to Face:   10/26/2017                                  Face to Face Encounter findings are related to primary reason for home care:   yes. 1. I certify that the patient needs intermittent care as follows: physical therapy: strengthening  occupational therapy:  ADL safety (ie. cooking, bathing, dressing)  speech therapy:  cognition training    2. I certify that this patient is homebound, that is: 1) patient requires the use of a walker device, special transportation, or assistance of another to leave the home; or 2) patient's condition makes leaving the home medically contraindicated; and 3) patient has a normal inability to leave the home and leaving the home requires considerable and taxing effort. Patient may leave the home for infrequent and short duration for medical reasons, and occasional absences for non-medical reasons. Homebound status is due to the following functional limitations: Patient with poor safety awareness and is at risk for falls without assistance of another person and the use of an assistive device. Patient with poor ambulation endurance limiting their safe ability to ascend/descend the required number of steps to leave the home. 3. I certify that this patient is under my care and that I, or a nurse practitioner or  [id number], or clinical nurse specialist, or certified nurse midwife, working with me, had a Face-to-Face Encounter that meets the physician Face-to-Face Encounter requirements.   The following are the clinical findings from the [address] [place] [address] encounter that support the need for skilled services and is a summary of the encounter: See medical records  See attached progess note      Kendrick Hernandez LMSW  10/26/2017      THE FOLLOWING TO BE COMPLETED BY THE COMMUNITY PHYSICIAN:    I concur with the findings described above from the F2F encounter that this patient is homebound and in need of a skilled service.     Certifying Physician: _____________________________________      Printed Certifying Physician Name: _____________________________________    Date: _________________

## 2017-10-26 NOTE — PROGRESS NOTES
Pt resting in bed and is alert and oriented x 4. She denies pain and is on room air. RR even and unlabored. Call light in reach and pt instructed to call for assistance if needed. Will monitor.

## 2017-10-27 VITALS
HEART RATE: 60 BPM | WEIGHT: 121.3 LBS | TEMPERATURE: 98.3 F | HEIGHT: 57 IN | DIASTOLIC BLOOD PRESSURE: 75 MMHG | BODY MASS INDEX: 26.17 KG/M2 | RESPIRATION RATE: 16 BRPM | OXYGEN SATURATION: 95 % | SYSTOLIC BLOOD PRESSURE: 177 MMHG

## 2017-10-27 PROBLEM — R41.0 CONFUSION: Status: RESOLVED | Noted: 2017-10-23 | Resolved: 2017-10-27

## 2017-10-27 LAB
GLUCOSE BLD STRIP.AUTO-MCNC: 268 MG/DL (ref 65–100)
GLUCOSE BLD STRIP.AUTO-MCNC: 272 MG/DL (ref 65–100)

## 2017-10-27 PROCEDURE — 74011636637 HC RX REV CODE- 636/637: Performed by: INTERNAL MEDICINE

## 2017-10-27 PROCEDURE — 74011250637 HC RX REV CODE- 250/637: Performed by: INTERNAL MEDICINE

## 2017-10-27 PROCEDURE — 97530 THERAPEUTIC ACTIVITIES: CPT

## 2017-10-27 PROCEDURE — 97110 THERAPEUTIC EXERCISES: CPT

## 2017-10-27 PROCEDURE — 74011250636 HC RX REV CODE- 250/636: Performed by: HOSPITALIST

## 2017-10-27 PROCEDURE — 74011250637 HC RX REV CODE- 250/637: Performed by: HOSPITALIST

## 2017-10-27 PROCEDURE — 82962 GLUCOSE BLOOD TEST: CPT

## 2017-10-27 RX ORDER — LANOLIN ALCOHOL/MO/W.PET/CERES
400 CREAM (GRAM) TOPICAL 2 TIMES DAILY
Qty: 28 TAB | Refills: 0 | Status: SHIPPED | OUTPATIENT
Start: 2017-10-27 | End: 2017-11-10

## 2017-10-27 RX ORDER — SPIRONOLACTONE 25 MG/1
25 TABLET ORAL DAILY
Status: DISCONTINUED | OUTPATIENT
Start: 2017-10-27 | End: 2017-10-27 | Stop reason: HOSPADM

## 2017-10-27 RX ORDER — AMLODIPINE BESYLATE 5 MG/1
2.5 TABLET ORAL DAILY
Qty: 15 TAB | Refills: 0 | Status: SHIPPED | OUTPATIENT
Start: 2017-10-27 | End: 2017-11-26

## 2017-10-27 RX ORDER — LEVETIRACETAM 500 MG/1
500 TABLET ORAL 2 TIMES DAILY
Qty: 60 TAB | Refills: 0 | Status: SHIPPED | OUTPATIENT
Start: 2017-10-27 | End: 2017-11-26

## 2017-10-27 RX ORDER — AMLODIPINE BESYLATE 5 MG/1
5 TABLET ORAL DAILY
Status: DISCONTINUED | OUTPATIENT
Start: 2017-10-27 | End: 2017-10-27 | Stop reason: HOSPADM

## 2017-10-27 RX ORDER — NYSTATIN 100000 [USP'U]/G
1 POWDER TOPICAL 2 TIMES DAILY
Qty: 60 G | Refills: 0 | Status: SHIPPED | OUTPATIENT
Start: 2017-10-27 | End: 2017-11-10

## 2017-10-27 RX ORDER — INSULIN GLARGINE 100 [IU]/ML
25 INJECTION, SOLUTION SUBCUTANEOUS
Status: DISCONTINUED | OUTPATIENT
Start: 2017-10-27 | End: 2017-10-27 | Stop reason: HOSPADM

## 2017-10-27 RX ADMIN — Medication 400 MG: at 08:27

## 2017-10-27 RX ADMIN — Medication 10 ML: at 05:34

## 2017-10-27 RX ADMIN — DEXAMETHASONE SODIUM PHOSPHATE 6 MG: 10 INJECTION INTRAMUSCULAR; INTRAVENOUS at 05:34

## 2017-10-27 RX ADMIN — NYSTATIN: 100000 POWDER TOPICAL at 09:00

## 2017-10-27 RX ADMIN — LEVETIRACETAM 500 MG: 500 TABLET ORAL at 08:27

## 2017-10-27 RX ADMIN — SPIRONOLACTONE 25 MG: 25 TABLET, FILM COATED ORAL at 12:42

## 2017-10-27 RX ADMIN — AMLODIPINE BESYLATE 5 MG: 5 TABLET ORAL at 12:42

## 2017-10-27 RX ADMIN — ASPIRIN 81 MG 81 MG: 81 TABLET ORAL at 08:27

## 2017-10-27 RX ADMIN — INSULIN LISPRO 3 UNITS: 100 INJECTION, SOLUTION INTRAVENOUS; SUBCUTANEOUS at 12:00

## 2017-10-27 RX ADMIN — CHOLECALCIFEROL TAB 25 MCG (1000 UNIT) 1000 UNITS: 25 TAB at 08:27

## 2017-10-27 RX ADMIN — CYANOCOBALAMIN TAB 1000 MCG 2500 MCG: 1000 TAB at 08:26

## 2017-10-27 RX ADMIN — PANTOPRAZOLE SODIUM 40 MG: 40 TABLET, DELAYED RELEASE ORAL at 08:26

## 2017-10-27 RX ADMIN — LISINOPRIL 40 MG: 20 TABLET ORAL at 08:27

## 2017-10-27 RX ADMIN — INSULIN LISPRO 6 UNITS: 100 INJECTION, SOLUTION INTRAVENOUS; SUBCUTANEOUS at 12:41

## 2017-10-27 RX ADMIN — FAMOTIDINE 20 MG: 20 TABLET ORAL at 08:27

## 2017-10-27 RX ADMIN — INSULIN LISPRO 3 UNITS: 100 INJECTION, SOLUTION INTRAVENOUS; SUBCUTANEOUS at 08:29

## 2017-10-27 RX ADMIN — INSULIN LISPRO 6 UNITS: 100 INJECTION, SOLUTION INTRAVENOUS; SUBCUTANEOUS at 08:30

## 2017-10-27 RX ADMIN — CARVEDILOL 12.5 MG: 6.25 TABLET, FILM COATED ORAL at 08:27

## 2017-10-27 NOTE — DISCHARGE INSTRUCTIONS
DISCHARGE SUMMARY from Nurse    PATIENT INSTRUCTIONS:    After general anesthesia or intravenous sedation, for 24 hours or while taking prescription Narcotics:  · Limit your activities  · Do not drive and operate hazardous machinery  · Do not make important personal or business decisions  · Do  not drink alcoholic beverages  · If you have not urinated within 8 hours after discharge, please contact your surgeon on call. Report the following to your surgeon:  · Excessive pain, swelling, redness or odor of or around the surgical area  · Temperature over 100.5  · Nausea and vomiting lasting longer than 4 hours or if unable to take medications  · Any signs of decreased circulation or nerve impairment to extremity: change in color, persistent  numbness, tingling, coldness or increase pain  · Any questions    What to do at Home:  Recommended activity: Activity as tolerated. If you experience any of the following symptoms nausea, vomiting, chest pain, shortness of breath;  please follow up with MD .    *  Please give a list of your current medications to your Primary Care Provider. *  Please update this list whenever your medications are discontinued, doses are      changed, or new medications (including over-the-counter products) are added. *  Please carry medication information at all times in case of emergency situations. These are general instructions for a healthy lifestyle:    No smoking/ No tobacco products/ Avoid exposure to second hand smoke  Surgeon General's Warning:  Quitting smoking now greatly reduces serious risk to your health.     Obesity, smoking, and sedentary lifestyle greatly increases your risk for illness    A healthy diet, regular physical exercise & weight monitoring are important for maintaining a healthy lifestyle    You may be retaining fluid if you have a history of heart failure or if you experience any of the following symptoms:  Weight gain of 3 pounds or more overnight or 5 pounds in a week, increased swelling in our hands or feet or shortness of breath while lying flat in bed. Please call your doctor as soon as you notice any of these symptoms; do not wait until your next office visit. Recognize signs and symptoms of STROKE:    F-face looks uneven    A-arms unable to move or move unevenly    S-speech slurred or non-existent    T-time-call 911 as soon as signs and symptoms begin-DO NOT go       Back to bed or wait to see if you get better-TIME IS BRAIN. Warning Signs of HEART ATTACK     Call 911 if you have these symptoms:   Chest discomfort. Most heart attacks involve discomfort in the center of the chest that lasts more than a few minutes, or that goes away and comes back. It can feel like uncomfortable pressure, squeezing, fullness, or pain.  Discomfort in other areas of the upper body. Symptoms can include pain or discomfort in one or both arms, the back, neck, jaw, or stomach.  Shortness of breath with or without chest discomfort.  Other signs may include breaking out in a cold sweat, nausea, or lightheadedness. Don't wait more than five minutes to call 911 - MINUTES MATTER! Fast action can save your life. Calling 911 is almost always the fastest way to get lifesaving treatment. Emergency Medical Services staff can begin treatment when they arrive -- up to an hour sooner than if someone gets to the hospital by car. The discharge information has been reviewed with the patient. The patient verbalized understanding. Discharge medications reviewed with the patient and appropriate educational materials and side effects teaching were provided. ___________________________________________________________________________________________________________________________________  Stroke: After Your Visit     Your Care Instructions     You have had a stroke. Risk factors for stroke include being overweight, smoking, and sedentary lifestyle.  This means that the blood flow to a part of your brain was blocked for some time, which damages the nerve cells in that part of the brain. The part of your body controlled by that part of your brain may not function properly now. The brain is an amazing organ that can heal itself to some degree. The stroke you had damaged part of your brain, but other parts of your brain may take over in some way for the damaged areas. You have already started this process. Going home may be hard for you and your family. The more you can try to do for yourself, the better. Remember to take each day one at a time. Follow-up care is a key part of your treatment and safety. Be sure to make and go to all appointments, and call your doctor if you are having problems. Its also a good idea to know your test results and keep a list of the medicines you take. How can you care for yourself at home? Enter a stroke rehabilitation (rehab) program, if your doctor recommends it. Physical, speech, and occupational therapies can help you manage bathing, dressing, eating, and other basics of daily living. Eat a heart-healthy diet that is low in cholesterol, saturated fat, and salt. Eat lots of fresh fruits and vegetables and foods high in fiber. Increase your activities slowly. Take short rest breaks when you get tired. Gradually increase the amount you walk. Start out by walking a little more than you did the day before. Do not drive until your doctor says it is okay. It is normal to feel sad or depressed after a stroke. If the blues last, talk to your doctor. If you are having problems with urine leakage, go to the bathroom at regular times, including when you first wake up and at bedtime. Also, limit fluids after dinner. If you are constipated, drink plenty of fluids, enough so that your urine is light yellow or clear like water.  If you have kidney, heart, or liver disease and have to limit fluids, talk with your doctor before you increase the amount of fluids you drink. Set up a regular time for using the toilet. If you continue to have constipation, your doctor may suggest using a bulking agent, such as Metamucil, or a stool softener, laxative, or enema. Medicines  Take your medicines exactly as prescribed. Call your doctor if you think you are having a problem with your medicine. You may be taking several medicines. ACE (angiotensin-converting enzyme) inhibitors, angiotensin II receptor blockers (ARBs), beta-blockers, diuretics (water pills), and calcium channel blockers control your blood pressure. Statins help lower cholesterol. Your doctor may also prescribe medicines for depression, pain, sleep problems, anxiety, or agitation. If your doctor has given you medicine that prevents blood clots, such as warfarin (Coumadin), aspirin combined with extended-release dipyridamole (Aggrenox), clopidogrel (Plavix), or aspirin to prevent another stroke, you should:  Tell your dentist, pharmacist, and other health professionals that you take these medicines. Watch for unusual bruising or bleeding, such as blood in your urine, red or black stools, or bleeding from your nose or gums. Get regular blood tests to check your clotting time if you are taking Coumadin. Wear medical alert jewelry that says you take blood thinners. You can buy this at most drugstores. Do not take any over-the-counter medicines or herbal products without talking to your doctor first.  If you take birth control pills or hormone replacement therapy, talk to your doctor about whether they are right for you. For family members and caregivers  Make the home safe. Set up a room so that your loved one does not have to climb stairs. Be sure the bathroom is on the same floor. Move throw rugs and furniture that could cause falls, and make sure that the lighting is good. Put grab bars and seats in tubs and showers. Find out what your loved one can do and what he or she needs help with.  Try not to do things for your loved one that your loved one can do on his or her own. Help him or her learn and practice new skills. Visit and talk with your loved one often. Try doing activities together that you both enjoy, such as playing cards or board games. Keep in touch with your loved one's friends as much as you can, and encourage them to visit. Take care of yourself. Do not try to do everything yourself. Ask other family members to help. Eat well, get enough rest, and take time to do things that you enjoy. Keep up with your own doctor visits, and make sure to take your medicines regularly. Get out of the house as much as you can. Join a local support group. Find out if you qualify for home health care visits to help with rehab or for adult day care. When should you call for help? Call 911 anytime you think you may need emergency care. For example, call if:  You have signs of another stroke. These may include:  Sudden numbness, paralysis, or weakness in your face, arm, or leg, especially on only one side of your body. New problems with walking or balance. Sudden vision changes. Drooling or slurred speech. New problems speaking or understanding simple statements, or you feel confused. A sudden, severe headache that is different from past headaches. Call 911 even if these symptoms go away in a few minutes. You cough up blood. You vomit blood or what looks like coffee grounds. You pass maroon or very bloody stools. Call your doctor now or seek immediate medical care if:  You have new bruises or blood spots under your skin. You have a nosebleed. Your gums bleed when you brush your teeth. You have blood in your urine. Your stools are black and tarlike or have streaks of blood. You have vaginal bleeding when you are not having your period, or heavy period bleeding. You have new symptoms that may be related to your stroke, such as falls or trouble swallowing.     Watch closely for changes in your X2TV, and be sure to contact your doctor if you have any problems. Where can you learn more? Go to Workable.be    Enter C294  in the search box to learn more about \"Stroke: After Your Visit\". © 2578-8167 [employer]. Care instructions adapted under license by [employer]sl[employer] (which disclaims liability or warranty for this information). This care instruction is for use with your licensed healthcare professional. If you have questions about a medical condition or this instruction, always ask your healthcare professional. Korene Burkitt any warranty or liability for your use of this information.

## 2017-10-27 NOTE — PROGRESS NOTES
Shift assessment complete. Pt alert and oriented x4, seems to be withdrawn and very flat affect  respirations present., S1&S2 auscultated, HR regular, abd soft, non- tender, Active BS in all 4 quadrants. Pt ambulates to bathroom with a walker .  No c/o pain and no distress noted

## 2017-10-27 NOTE — PROGRESS NOTES
Shift assessment complete. Pt alert and oriented x4, respirations present, even and unlabored, HOB elevated, pt denies any SOB at this time, S1&S2 auscultated, HR regular, abd soft, non- tender, Active BS in all 4 quadrants, No pressure ulcers or edema noted, pt is up with assistance to the bathroom, voiding, SCDs in place and functioning, pt denies any pain at this time, pt instructed to call for assistance, pt verbalizes understanding, bed low and locked, side rails x3, call light within reach.

## 2017-10-27 NOTE — DISCHARGE SUMMARY
Discharge Summary     Patient: Ben Birmingham MRN: [id number]  SSN: [id number]    YOB: 1934  Age: 80 y.o. Sex: female       Admit Date: 10/23/2017    Discharge Date: 10/27/2017      Admission Diagnoses: CVA (cerebral vascular accident) [facility])    Discharge Diagnoses:   Problem List as of 10/27/2017  Date Reviewed: 10/19/2017          Codes Class Noted - Resolved    * (Principal)CVA (cerebral vascular accident) [facility]) ICD-10-CM: I63.9  ICD-9-CM: 434.91  10/23/2017 - Present    Overview Signed 10/27/2017 12:11 PM by Tasha Pereyra DO     Probable chronic right parietal occipital CVA containing some petechial hemosiderin, pontine and supratentorial microischemia.              Vaginal candida ICD-10-CM: B37.3  ICD-9-CM: 112.1  10/26/2017 - Present        Dysuria ICD-10-CM: R30.0  ICD-9-CM: 788.1  10/25/2017 - Present        Cholecystitis with cholelithiasis (Chronic) ICD-10-CM: K80.10  ICD-9-CM: 574.10  10/6/2017 - Present        Osteoporosis ICD-10-CM: M81.0  ICD-9-CM: 733.00  7/6/2016 - Present        History of implantation of joint prosthesis of elbow ICD-10-CM: F54.253  ICD-9-CM: V43.62  5/18/2016 - Present        Electrolyte abnormality ICD-10-CM: E87.8  ICD-9-CM: 276.9  5/11/2016 - Present        CAP (community acquired pneumonia) ICD-10-CM: J18.9  ICD-9-CM: 486  5/9/2016 - Present        LIZ (acute kidney injury) ([facility] ICD-10-CM: N17.9  ICD-9-CM: 584.9  5/9/2016 - Present        Non-PTH-dependent hypercalcemia ICD-10-CM: N49.75  ICD-9-CM: 275.42  5/9/2016 - Present        Coronary artery disease involving native coronary artery of native heart with angina pectoris ([facility] ICD-10-CM: I25.119  ICD-9-CM: 414.01, 413.9  5/3/2016 - Present        Dyslipidemia ICD-10-CM: E78.5  ICD-9-CM: 272.4  5/3/2016 - Present        History of artificial joint ICD-10-CM: Z96.60  ICD-9-CM: V43.60  2/3/2016 - Present        Supracondylar fracture of humerus ICD-10-CM: T04.140Q  ICD-9-CM: 812.41  1/4/2016 - Present Precordial pain ICD-10-CM: R07.2  ICD-9-CM: 786.51  9/4/2015 - Present        Nonspecific abnormal electrocardiogram (ECG) (EKG) ICD-10-CM: R94.31  ICD-9-CM: 794.31  9/4/2015 - Present        Pericarditis, acute ICD-10-CM: I30.9  ICD-9-CM: 420.90  5/12/2015 - Present        DM type 2 (diabetes mellitus, type 2) (HCC) (Chronic) ICD-10-CM: E11.9  ICD-9-CM: 250.00  3/16/2013 - Present        Essential hypertension with goal blood pressure less than 140/90 ICD-10-CM: I10  ICD-9-CM: 401.9  3/16/2013 - Present        Crohn disease ([facility] (Chronic) ICD-10-CM: K50.90  ICD-9-CM: 555.9  3/16/2013 - Present        Other and unspecified hyperlipidemia ICD-10-CM: E78.5  ICD-9-CM: 272.4  3/16/2013 - Present        RESOLVED: Confusion ICD-10-CM: R41.0  ICD-9-CM: 298.9  10/23/2017 - 10/27/2017        RESOLVED: Osteopenia of multiple sites ICD-10-CM: M85.89  ICD-9-CM: 733.90  Unknown - 8/8/2017        RESOLVED: Cholelithiasis ICD-10-CM: K80.20  ICD-9-CM: 574.20  3/17/2013 - 5/12/2015        RESOLVED: Intestinal infection due to Clostridium difficile ICD-10-CM: A04.72  ICD-9-CM: 008.45  3/16/2013 - 5/12/2015        RESOLVED: ARF (acute renal failure) ([facility] ICD-10-CM: N17.9  ICD-9-CM: 584.9  3/16/2013 - 5/12/2015        RESOLVED: Hyponatremia ICD-10-CM: E87.1  ICD-9-CM: 276.1  3/16/2013 - 5/12/2015               Discharge Condition: Good    Hospital Course:  This 80 you female was admitted on 10/23/17 with complaint of confusion and difficulty focusing her eyes. Timo Reid stated that she was not acting like herself. Adrianna Cedeño was recently diagnosed with an UTI, started on antibiotic therapy.  Continued complaints of worsening dysuria and discharge on admission.  She denied stiff neck, fever or chills.  She saw her PCP and eye doctor that told her to come to ER for evaluation.  She had an abnormal CT on admission that showed vasogenic edema present within the right parietal lobe with mass effect upon the right lateral ventricle as well as upon the sulci. Cannot exclude either infarction or intracranial neoplasm. Correlation with MRI with and without contrast as well as diffusion-weighted imaging for further evaluation.  MRI was negative for an acute stroke, only Probable chronic right parietal occipital CVA containing some petechial hemosiderin, pontine and supratentorial microischemia. Patient showed clinical improvement and was moved out to the medical floor. Patient's mental status continued to return to baseline, speech however remained slowed, not broken or dysarthric, just slowed from what family reported as her \"normal baseline\". Daughter feels that a portion of her acute issues is stress related. She told me that the her mother's son had passes away in skilled nursing two weeks ago and that there has been issues getting the body that caused a good deal of stress. The day of the acute event, she was trying to deal with phone calls to the skilled nursing where he . Patient is openly depressed and has been for some time, her PCP is addressing this per the daughter.  was consulted to discuss further evaluation and support. Patient was found to have a vaginal cutaneous candidiasis infection, Diflucan single dose along with Nystatin powder prescribed for the vaginal cutaneous candidiasis that was the crux of her dysuria complaints. UA and UCx negative, ceftriaxone discontinued. This was the source of patient's dysuria. Improvement seen within 24 hours of the dosing and initiation of treatment. With Home health, PT/OT/Speech and social work setup, patient will be discharged home with follow up with PCP and her neurologist.    Consults: None    Significant Diagnostic Studies: labs: standard admission labs and radiology: MRI: Probable chronic right parietal occipital CVA containing some petechial hemosiderin, pontine and supratentorial microischemia.  and CT scan: Vasogenic edema present within the right parietal lobe with mass effect upon the right lateral ventricle as well as upon the sulci. Cannot exclude either infarction or intracranial neoplasm. Correlation with MRI with and without contrast as well as diffusion-weighted imaging for further evaluation. Disposition: home    Discharge Medications:   Current Discharge Medication List      START taking these medications    Details   amLODIPine (NORVASC) 5 mg tablet Take 0.5 Tabs by mouth daily for 30 days. Qty: 15 Tab, Refills: 0      nystatin (MYCOSTATIN) powder Apply 1 g to affected area two (2) times a day for 14 days. Qty: 60 g, Refills: 0      magnesium oxide (MAG-OX) 400 mg tablet Take 1 Tab by mouth two (2) times a day for 14 days. Qty: 28 Tab, Refills: 0      levETIRAcetam (KEPPRA) 500 mg tablet Take 1 Tab by mouth two (2) times a day for 30 days. Qty: 60 Tab, Refills: 0         CONTINUE these medications which have NOT CHANGED    Details   nitrofurantoin, macrocrystal-monohydrate, (MACROBID) 100 mg capsule       URO--10-40.8-36 mg cap capsule       atorvastatin (LIPITOR) 40 mg tablet Take  by mouth daily. ZINC PO Take  by mouth. acetaminophen (TYLENOL EXTRA STRENGTH) 500 mg tablet Take  by mouth every six (6) hours as needed for Pain. spironolactone (ALDACTONE) 25 mg tablet Take 1 Tab by mouth daily. Qty: 90 Tab, Refills: 3    Associated Diagnoses: Essential hypertension with goal blood pressure less than 140/90      cyanocobalamin 1,000 mcg tablet Take 2,500 mcg by mouth daily. lisinopril (PRINIVIL, ZESTRIL) 40 mg tablet Take 40 mg by mouth daily. ranitidine (ZANTAC) 150 mg tablet Take 150 mg by mouth daily. cholecalciferol (VITAMIN D3) 1,000 unit cap Take  by mouth two (2) times a day. omeprazole (PRILOSEC) 40 mg capsule Take 40 mg by mouth daily. B.infantis-B.ani-B.long-B.bifi (PROBIOTIC 4X) 10-15 mg Tab Take  by mouth daily. carvedilol (COREG) 12.5 mg tablet Take 12.5 mg by mouth two (2) times daily (with meals).       METFORMIN 500 mg tablet Take 1,000 mg by mouth two (2) times daily (with meals). aspirin 81 mg Tab take by mouth. Activity: Activity as tolerated, Ambulate in house and PT/OT per Home Health  Diet: Diabetic Diet  Wound Care: None needed    Follow-up Appointments   Procedures    FOLLOW UP VISIT Appointment in: One Week Follow up with PCP, Dr. Romana Doyne and Neurologist.     Follow up with PCP, Dr. Romana Doyne and Neurologist.     Standing Status:   Standing     Number of Occurrences:   1     Order Specific Question:   Appointment in     Answer:    One Week       Signed By: Ynes Barnett DO     October 27, 2017

## 2017-10-27 NOTE — PROGRESS NOTES
Problem: Mobility Impaired (Adult and Pediatric)  Goal: *Acute Goals and Plan of Care (Insert Text)  STG:  (1.)Ms. Curtis will move from supine to sit and sit to supine  with SUPERVISION within 3 day(s). MET 10/27/14  (2.)Ms. Curtis will transfer from bed to chair and chair to bed with SUPERVISION using the least restrictive device within 3 day(s). (3.)Ms. Curtis will ambulate with SUPERVISION for 250 feet with the least restrictive device within 3 day(s). (4.)Pt. Will increase B LE strength 1/2 grade within 3 days      ________________________________________________________________________________________________    PHYSICAL THERAPY: Daily Note, Treatment Day: 1st, AM 10/27/2017  INPATIENT: Hospital Day: 5  Payor: Jalil Gil / Plan: [facility] HUMANA MEDICARE CHOICE PPO/PFFS / Product Type: [employer] Care Medicare /      NAME/AGE/GENDER: John Campos is a 80 y.o. female   PRIMARY DIAGNOSIS: CVA (cerebral vascular accident) ([facility] CVA (cerebral vascular accident) ([facility] CVA (cerebral vascular accident) ([facility]        ICD-10: Treatment Diagnosis:   · Generalized Muscle Weakness (M62.81)  · Other lack of cordination (R27.8)  · Other abnormalities of gait and mobility (R26.89)   Precaution/Allergies:  Sulfa (sulfonamide antibiotics); Other medication; and Tape [adhesive]      ASSESSMENT:     Ms. Da Christensen is supine in bed on arrival.  She is agreeable to PT and plans to go home today. She states she has to get home today to go dancing tonight. She is a little unsteady but does better with RW. Pt ambulated in the hallway with SBA with RW. Pt performed BLE exercises sitting up in chair. Pt left sitting up in chair with  in room. This section established at most recent assessment   PROBLEM LIST (Impairments causing functional limitations):  1. Decreased Strength  2. Decreased Transfer Abilities  3. Decreased Ambulation Ability/Technique  4. Decreased Balance  5.  Decreased [place] with Home Exercise Program  6. Decreased Cognition   INTERVENTIONS PLANNED: (Benefits and precautions of physical therapy have been discussed with the patient.)  1. Balance Exercise  2. Bed Mobility  3. Family Education  4. Gait Training  5. Neuromuscular Re-education/Strengthening  6. Therapeutic Activites  7. Therapeutic Exercise/Strengthening  8. Transfer Training     TREATMENT PLAN: Frequency/Duration: daily for duration of hospital stay  Rehabilitation Potential For Stated Goals: Good     RECOMMENDED REHABILITATION/EQUIPMENT: (at time of discharge pending progress): Due to the probability of continued deficits (see above) this patient will likely need continued skilled physical therapy after discharge. Equipment:    has a straight cane, rollator and shower chair at home, but does not use them currently              HISTORY:   History of Present Injury/Illness (Reason for Referral): Admitted with a R parieto-occipital CVA  Past Medical History/Comorbidities:   Ms. Klaudia Crowder  has a past medical history of Acute kidney failure (05/2016); Acute pericarditis (2015); Arthritis; CAP (community acquired pneumonia) (5/2016); Coronary artery disease; Crohn's disease; Hypercalcemia; Hypercholesterolemia; Hypertension; Lumbar compression fracture; Osteoporosis; and Type 2 diabetes mellitus. Ms. Klaudia Crowder  has a past surgical history that includes hysterectomy (1978); heart catheterization (5/12/2015); arthroplasty (1/5/2016); and kyphoplasty (~2002). Social History/Living Environment:   Home Environment: Apartment  # Steps to Enter: 0  One/Two Story Residence: One story  Living Alone: No  Support Systems: Child(eulalio), Family member(s), Spouse/Significant Other/Partner  Patient Expects to be Discharged to[de-identified] Apartment  Current DME Used/Available at Home: None  Tub or Shower Type: Shower  Prior Level of Function/Work/Activity:  Functionally independent. Drives, dances.   Dominant Side:         RIGHT   Number of Personal Factors/Comorbidities that affect the Plan of Care: 1-2: MODERATE COMPLEXITY   EXAMINATION:   Most Recent Physical Functioning:   Gross Assessment:                  Posture:     Balance:    Bed Mobility:  Supine to Sit: Supervision  Wheelchair Mobility:     Transfers:  Sit to Stand: Stand-by asssistance  Stand to Sit: Stand-by asssistance  Gait:     Speed/Sammie: Delayed;Pace decreased (<100 feet/min)  Gait Abnormalities: Decreased step clearance  Distance (ft): 120 Feet (ft)  Assistive Device: Walker, rolling  Ambulation - Level of Assistance: Stand-by asssistance  Interventions: Safety awareness training;Verbal cues  Duration: 10 Minutes      Body Structures Involved:  1. Eyes and Ears  2. Muscles Body Functions Affected:  1. Neuromusculoskeletal  2. Movement Related Activities and Participation Affected:  1. Mobility  2. Self Care   Number of elements that affect the Plan of Care: 4+: HIGH COMPLEXITY   CLINICAL PRESENTATION:   Presentation: Stable and uncomplicated: LOW COMPLEXITY   CLINICAL DECISION MAKING:   [facility] AM-PAC 6 Clicks   Basic Mobility Inpatient Short Form  How much difficulty does the patient currently have. .. Unable A Lot A Little None   1. Turning over in bed (including adjusting bedclothes, sheets and blankets)? [] 1   [] 2   [] 3   [x] 4   2. Sitting down on and standing up from a chair with arms ( e.g., wheelchair, bedside commode, etc.)   [] 1   [] 2   [x] 3   [] 4   3. Moving from lying on back to sitting on the side of the bed? [] 1   [] 2   [x] 3   [] 4   How much help from another person does the patient currently need. .. Total A Lot A Little None   4. Moving to and from a bed to a chair (including a wheelchair)? [] 1   [] 2   [x] 3   [] 4   5. Need to walk in hospital room? [] 1   [] 2   [x] 3   [] 4   6. Climbing 3-5 steps with a railing? [] 1   [] 2   [x] 3   [] 4   © 2007, Trustees of [facility], under license to [employer].  All rights reserved      Score:  Initial: 19 Most Recent: X (Date: -- )    Interpretation of Tool:  Represents activities that are increasingly more difficult (i.e. Bed mobility, Transfers, Gait). Score 24 23 22-20 19-15 14-10 9-7 6     Modifier CH CI CJ CK CL CM CN      ? Mobility - Walking and Moving Around:     - CURRENT STATUS: CK - 40%-59% impaired, limited or restricted    - GOAL STATUS: CJ - 20%-39% impaired, limited or restricted    - D/C STATUS:  ---------------To be determined---------------  Payor: HUMANA MEDICARE / Plan: [facility]MANA MEDICARE CHOICE PPO/PFFS / Product Type: [employer] Medicare /      Medical Necessity:     · Patient demonstrates good rehab potential due to higher previous functional level. Reason for Services/Other Comments:  · Patient continues to require present interventions due to patient's inability to perform functional mobility independently. Use of outcome tool(s) and clinical judgement create a POC that gives a: Clear prediction of patient's progress: LOW COMPLEXITY            TREATMENT:   (In addition to Assessment/Re-Assessment sessions the following treatments were rendered)   Pre-treatment Symptoms/Complaints:  No complaints  Pain: Initial:      Post Session:      Gait Training (10 Minutes):  Gait training to improve and/or restore physical functioning as related to mobility. Ambulated 120 Feet (ft) with Stand-by asssistance using a Walker, rolling and minimal Safety awareness training;Verbal cues     Therapeutic Exercise: (15 Minutes):  Exercises per grid below to improve strength. Required minimal verbal cues to promote proper body alignment. Progressed range as indicated.      Date:  10/26 Date:  10/27/17 Date:     Activity/Exercise Parameters Parameters Parameters   Ankle pumps 12 15    heelslides 12     Hip abd/add 12 15    SLR 12     marching  15                  Braces/Orthotics/Lines/Etc:   ·   Treatment/Session Assessment:    · Response to Treatment:  She tolerated session well.  · Interdisciplinary Collaboration:   o Registered Nurse  · After treatment position/precautions:   o Up in chair  o Bed/Chair-wheels locked  o Call light within reach  o Family at bedside  o Nurse at bedside   · Compliance with Program/Exercises: Will assess as treatment progresses. · Recommendations/Intent for next treatment session: \"Next visit will focus on advancements to more challenging activities and reduction in assistance provided\".   Total Treatment Duration:PT Patient Time In/Time Out  Time In: 9100  Time Out: [address], DANIEL

## 2017-10-27 NOTE — PROGRESS NOTES
Discharge instructions & prescription information given to pt & her . Verbalized understanding. IV removed. Tip intact. Opportunity for questions provided.

## 2017-10-29 ENCOUNTER — HOME CARE VISIT (OUTPATIENT)
Dept: SCHEDULING | Facility: HOME HEALTH | Age: 82
End: 2017-10-29
Payer: MEDICARE

## 2017-10-29 VITALS
SYSTOLIC BLOOD PRESSURE: 146 MMHG | TEMPERATURE: 97.7 F | OXYGEN SATURATION: 97 % | RESPIRATION RATE: 16 BRPM | DIASTOLIC BLOOD PRESSURE: 64 MMHG | HEART RATE: 61 BPM

## 2017-10-29 PROCEDURE — 3331090002 HH PPS REVENUE DEBIT

## 2017-10-29 PROCEDURE — G0151 HHCP-SERV OF PT,EA 15 MIN: HCPCS

## 2017-10-29 PROCEDURE — 400013 HH SOC

## 2017-10-29 PROCEDURE — 3331090001 HH PPS REVENUE CREDIT

## 2017-10-30 ENCOUNTER — HOME CARE VISIT (OUTPATIENT)
Dept: SCHEDULING | Facility: HOME HEALTH | Age: 82
End: 2017-10-30
Payer: MEDICARE

## 2017-10-30 VITALS
RESPIRATION RATE: 16 BRPM | TEMPERATURE: 97.7 F | SYSTOLIC BLOOD PRESSURE: 134 MMHG | HEART RATE: 72 BPM | DIASTOLIC BLOOD PRESSURE: 72 MMHG

## 2017-10-30 PROCEDURE — G0155 HHCP-SVS OF CSW,EA 15 MIN: HCPCS

## 2017-10-30 PROCEDURE — 3331090001 HH PPS REVENUE CREDIT

## 2017-10-30 PROCEDURE — G0153 HHCP-SVS OF S/L PATH,EA 15MN: HCPCS

## 2017-10-30 PROCEDURE — 3331090002 HH PPS REVENUE DEBIT

## 2017-10-31 ENCOUNTER — HOME CARE VISIT (OUTPATIENT)
Dept: SCHEDULING | Facility: HOME HEALTH | Age: 82
End: 2017-10-31
Payer: MEDICARE

## 2017-10-31 VITALS
TEMPERATURE: 98.6 F | OXYGEN SATURATION: 97 % | SYSTOLIC BLOOD PRESSURE: 120 MMHG | DIASTOLIC BLOOD PRESSURE: 55 MMHG | HEART RATE: 63 BPM

## 2017-10-31 VITALS
RESPIRATION RATE: 16 BRPM | SYSTOLIC BLOOD PRESSURE: 118 MMHG | TEMPERATURE: 96 F | HEART RATE: 72 BPM | DIASTOLIC BLOOD PRESSURE: 78 MMHG

## 2017-10-31 PROCEDURE — 3331090001 HH PPS REVENUE CREDIT

## 2017-10-31 PROCEDURE — G0152 HHCP-SERV OF OT,EA 15 MIN: HCPCS

## 2017-10-31 PROCEDURE — G0157 HHC PT ASSISTANT EA 15: HCPCS

## 2017-10-31 PROCEDURE — 3331090002 HH PPS REVENUE DEBIT

## 2017-11-01 PROCEDURE — 3331090001 HH PPS REVENUE CREDIT

## 2017-11-01 PROCEDURE — 3331090002 HH PPS REVENUE DEBIT

## 2017-11-02 PROCEDURE — 3331090002 HH PPS REVENUE DEBIT

## 2017-11-02 PROCEDURE — 3331090001 HH PPS REVENUE CREDIT

## 2017-11-03 PROCEDURE — 3331090001 HH PPS REVENUE CREDIT

## 2017-11-03 PROCEDURE — 3331090002 HH PPS REVENUE DEBIT

## 2017-11-04 PROCEDURE — 3331090001 HH PPS REVENUE CREDIT

## 2017-11-04 PROCEDURE — 3331090002 HH PPS REVENUE DEBIT

## 2017-11-05 PROCEDURE — 3331090001 HH PPS REVENUE CREDIT

## 2017-11-05 PROCEDURE — 3331090002 HH PPS REVENUE DEBIT

## 2017-11-06 PROCEDURE — 3331090001 HH PPS REVENUE CREDIT

## 2017-11-06 PROCEDURE — 3331090002 HH PPS REVENUE DEBIT

## 2017-11-07 ENCOUNTER — HOME CARE VISIT (OUTPATIENT)
Dept: SCHEDULING | Facility: HOME HEALTH | Age: 82
End: 2017-11-07
Payer: MEDICARE

## 2017-11-07 VITALS — DIASTOLIC BLOOD PRESSURE: 60 MMHG | HEART RATE: 80 BPM | RESPIRATION RATE: 17 BRPM | SYSTOLIC BLOOD PRESSURE: 112 MMHG

## 2017-11-07 VITALS
SYSTOLIC BLOOD PRESSURE: 114 MMHG | DIASTOLIC BLOOD PRESSURE: 62 MMHG | RESPIRATION RATE: 16 BRPM | TEMPERATURE: 96.2 F | HEART RATE: 63 BPM

## 2017-11-07 PROCEDURE — 3331090002 HH PPS REVENUE DEBIT

## 2017-11-07 PROCEDURE — G0158 HHC OT ASSISTANT EA 15: HCPCS

## 2017-11-07 PROCEDURE — 3331090001 HH PPS REVENUE CREDIT

## 2017-11-07 PROCEDURE — G0157 HHC PT ASSISTANT EA 15: HCPCS

## 2017-11-08 PROCEDURE — 3331090001 HH PPS REVENUE CREDIT

## 2017-11-08 PROCEDURE — 3331090002 HH PPS REVENUE DEBIT

## 2017-11-09 ENCOUNTER — HOME CARE VISIT (OUTPATIENT)
Dept: SCHEDULING | Facility: HOME HEALTH | Age: 82
End: 2017-11-09
Payer: MEDICARE

## 2017-11-09 VITALS
TEMPERATURE: 96.9 F | SYSTOLIC BLOOD PRESSURE: 118 MMHG | DIASTOLIC BLOOD PRESSURE: 68 MMHG | HEART RATE: 72 BPM | RESPIRATION RATE: 17 BRPM

## 2017-11-09 PROCEDURE — G0157 HHC PT ASSISTANT EA 15: HCPCS

## 2017-11-09 PROCEDURE — 3331090001 HH PPS REVENUE CREDIT

## 2017-11-09 PROCEDURE — 3331090002 HH PPS REVENUE DEBIT

## 2017-11-10 ENCOUNTER — HOME CARE VISIT (OUTPATIENT)
Dept: SCHEDULING | Facility: HOME HEALTH | Age: 82
End: 2017-11-10
Payer: MEDICARE

## 2017-11-10 VITALS
SYSTOLIC BLOOD PRESSURE: 104 MMHG | TEMPERATURE: 97.5 F | RESPIRATION RATE: 18 BRPM | HEART RATE: 67 BPM | DIASTOLIC BLOOD PRESSURE: 62 MMHG

## 2017-11-10 PROCEDURE — 3331090002 HH PPS REVENUE DEBIT

## 2017-11-10 PROCEDURE — G0158 HHC OT ASSISTANT EA 15: HCPCS

## 2017-11-10 PROCEDURE — 3331090001 HH PPS REVENUE CREDIT

## 2017-11-11 PROCEDURE — 3331090001 HH PPS REVENUE CREDIT

## 2017-11-11 PROCEDURE — 3331090002 HH PPS REVENUE DEBIT

## 2017-11-12 PROCEDURE — 3331090001 HH PPS REVENUE CREDIT

## 2017-11-12 PROCEDURE — 3331090002 HH PPS REVENUE DEBIT

## 2017-11-13 ENCOUNTER — HOME CARE VISIT (OUTPATIENT)
Dept: SCHEDULING | Facility: HOME HEALTH | Age: 82
End: 2017-11-13
Payer: MEDICARE

## 2017-11-13 ENCOUNTER — HOME CARE VISIT (OUTPATIENT)
Dept: HOME HEALTH SERVICES | Facility: HOME HEALTH | Age: 82
End: 2017-11-13
Payer: MEDICARE

## 2017-11-13 PROCEDURE — G0155 HHCP-SVS OF CSW,EA 15 MIN: HCPCS

## 2017-11-13 PROCEDURE — 3331090001 HH PPS REVENUE CREDIT

## 2017-11-13 PROCEDURE — 3331090002 HH PPS REVENUE DEBIT

## 2017-11-14 ENCOUNTER — HOME CARE VISIT (OUTPATIENT)
Dept: SCHEDULING | Facility: HOME HEALTH | Age: 82
End: 2017-11-14
Payer: MEDICARE

## 2017-11-14 VITALS
SYSTOLIC BLOOD PRESSURE: 122 MMHG | RESPIRATION RATE: 16 BRPM | HEART RATE: 62 BPM | DIASTOLIC BLOOD PRESSURE: 68 MMHG | TEMPERATURE: 97.4 F

## 2017-11-14 VITALS
TEMPERATURE: 97.4 F | SYSTOLIC BLOOD PRESSURE: 122 MMHG | DIASTOLIC BLOOD PRESSURE: 68 MMHG | RESPIRATION RATE: 16 BRPM | HEART RATE: 62 BPM

## 2017-11-14 PROCEDURE — 3331090001 HH PPS REVENUE CREDIT

## 2017-11-14 PROCEDURE — G0158 HHC OT ASSISTANT EA 15: HCPCS

## 2017-11-14 PROCEDURE — G0157 HHC PT ASSISTANT EA 15: HCPCS

## 2017-11-14 PROCEDURE — 3331090002 HH PPS REVENUE DEBIT

## 2017-11-15 ENCOUNTER — HOME CARE VISIT (OUTPATIENT)
Dept: SCHEDULING | Facility: HOME HEALTH | Age: 82
End: 2017-11-15
Payer: MEDICARE

## 2017-11-15 VITALS
HEART RATE: 67 BPM | OXYGEN SATURATION: 98 % | SYSTOLIC BLOOD PRESSURE: 110 MMHG | TEMPERATURE: 96.5 F | DIASTOLIC BLOOD PRESSURE: 55 MMHG

## 2017-11-15 VITALS
TEMPERATURE: 97.1 F | DIASTOLIC BLOOD PRESSURE: 68 MMHG | SYSTOLIC BLOOD PRESSURE: 118 MMHG | HEART RATE: 72 BPM | RESPIRATION RATE: 17 BRPM

## 2017-11-15 PROCEDURE — G0157 HHC PT ASSISTANT EA 15: HCPCS

## 2017-11-15 PROCEDURE — G0152 HHCP-SERV OF OT,EA 15 MIN: HCPCS

## 2017-11-15 PROCEDURE — 3331090001 HH PPS REVENUE CREDIT

## 2017-11-15 PROCEDURE — 3331090002 HH PPS REVENUE DEBIT

## 2017-11-16 PROCEDURE — 3331090002 HH PPS REVENUE DEBIT

## 2017-11-16 PROCEDURE — 3331090001 HH PPS REVENUE CREDIT

## 2017-11-17 PROCEDURE — 3331090001 HH PPS REVENUE CREDIT

## 2017-11-17 PROCEDURE — 3331090002 HH PPS REVENUE DEBIT

## 2017-11-18 PROCEDURE — 3331090002 HH PPS REVENUE DEBIT

## 2017-11-18 PROCEDURE — 3331090001 HH PPS REVENUE CREDIT

## 2017-11-19 PROCEDURE — 3331090001 HH PPS REVENUE CREDIT

## 2017-11-19 PROCEDURE — 3331090002 HH PPS REVENUE DEBIT

## 2017-11-20 PROBLEM — R29.3 POSTURAL IMBALANCE: Status: ACTIVE | Noted: 2017-11-20

## 2017-11-20 PROBLEM — G62.9 POLYNEUROPATHY: Status: ACTIVE | Noted: 2017-11-20

## 2017-11-20 PROCEDURE — 3331090002 HH PPS REVENUE DEBIT

## 2017-11-20 PROCEDURE — 3331090001 HH PPS REVENUE CREDIT

## 2017-11-21 ENCOUNTER — HOME CARE VISIT (OUTPATIENT)
Dept: SCHEDULING | Facility: HOME HEALTH | Age: 82
End: 2017-11-21
Payer: MEDICARE

## 2017-11-21 VITALS
TEMPERATURE: 97.2 F | HEART RATE: 68 BPM | SYSTOLIC BLOOD PRESSURE: 118 MMHG | DIASTOLIC BLOOD PRESSURE: 70 MMHG | RESPIRATION RATE: 18 BRPM

## 2017-11-21 PROCEDURE — G0157 HHC PT ASSISTANT EA 15: HCPCS

## 2017-11-21 PROCEDURE — 3331090001 HH PPS REVENUE CREDIT

## 2017-11-21 PROCEDURE — 3331090002 HH PPS REVENUE DEBIT

## 2017-11-22 PROCEDURE — 3331090002 HH PPS REVENUE DEBIT

## 2017-11-22 PROCEDURE — 3331090001 HH PPS REVENUE CREDIT

## 2017-11-23 PROCEDURE — 3331090001 HH PPS REVENUE CREDIT

## 2017-11-23 PROCEDURE — 3331090002 HH PPS REVENUE DEBIT

## 2017-11-24 PROCEDURE — 3331090001 HH PPS REVENUE CREDIT

## 2017-11-24 PROCEDURE — 3331090002 HH PPS REVENUE DEBIT

## 2017-11-25 PROCEDURE — 3331090002 HH PPS REVENUE DEBIT

## 2017-11-25 PROCEDURE — 3331090001 HH PPS REVENUE CREDIT

## 2017-11-26 PROCEDURE — 3331090002 HH PPS REVENUE DEBIT

## 2017-11-26 PROCEDURE — 3331090001 HH PPS REVENUE CREDIT

## 2017-11-27 PROCEDURE — 3331090002 HH PPS REVENUE DEBIT

## 2017-11-27 PROCEDURE — 3331090001 HH PPS REVENUE CREDIT

## 2017-11-28 ENCOUNTER — HOME CARE VISIT (OUTPATIENT)
Dept: SCHEDULING | Facility: HOME HEALTH | Age: 82
End: 2017-11-28
Payer: MEDICARE

## 2017-11-28 VITALS
DIASTOLIC BLOOD PRESSURE: 62 MMHG | SYSTOLIC BLOOD PRESSURE: 128 MMHG | RESPIRATION RATE: 19 BRPM | HEART RATE: 62 BPM | TEMPERATURE: 97.1 F

## 2017-11-28 PROCEDURE — 3331090003 HH PPS REVENUE ADJ

## 2017-11-28 PROCEDURE — 3331090002 HH PPS REVENUE DEBIT

## 2017-11-28 PROCEDURE — 3331090001 HH PPS REVENUE CREDIT

## 2017-11-28 PROCEDURE — G0151 HHCP-SERV OF PT,EA 15 MIN: HCPCS

## 2017-11-29 PROCEDURE — 3331090001 HH PPS REVENUE CREDIT

## 2017-11-29 PROCEDURE — 3331090002 HH PPS REVENUE DEBIT

## 2017-11-30 PROCEDURE — 3331090002 HH PPS REVENUE DEBIT

## 2017-11-30 PROCEDURE — 3331090001 HH PPS REVENUE CREDIT

## 2017-12-01 PROCEDURE — 3331090001 HH PPS REVENUE CREDIT

## 2017-12-01 PROCEDURE — 3331090002 HH PPS REVENUE DEBIT

## 2017-12-02 PROCEDURE — 3331090001 HH PPS REVENUE CREDIT

## 2017-12-02 PROCEDURE — 3331090002 HH PPS REVENUE DEBIT

## 2017-12-03 PROCEDURE — 3331090001 HH PPS REVENUE CREDIT

## 2017-12-03 PROCEDURE — 3331090002 HH PPS REVENUE DEBIT

## 2017-12-04 ENCOUNTER — HOSPITAL ENCOUNTER (OUTPATIENT)
Dept: PHYSICAL THERAPY | Age: 82
Discharge: HOME | End: 2017-12-04
Payer: MEDICARE

## 2017-12-04 PROCEDURE — 3331090002 HH PPS REVENUE DEBIT

## 2017-12-04 PROCEDURE — 97163 PT EVAL HIGH COMPLEX 45 MIN: CPT

## 2017-12-04 PROCEDURE — 3331090001 HH PPS REVENUE CREDIT

## 2017-12-04 PROCEDURE — G8978 MOBILITY CURRENT STATUS: HCPCS

## 2017-12-04 PROCEDURE — G8979 MOBILITY GOAL STATUS: HCPCS

## 2017-12-04 NOTE — THERAPY EVALUATION
Ryan Bhandarims  : 1934  Primary: Gissel Sabillon Medicare Choice *  Secondary:  [address]  at [place] [id number] [place] [address], [facility] 91.  [phone]   [phone]        OUTPATIENT PHYSICAL THERAPY:Initial Assessment 2017    ICD-10: Treatment Diagnosis: Difficulty in walking, not elsewhere classified (R26.2)  Precautions/Allergies:   Sulfa (sulfonamide antibiotics); Other medication; and Tape [adhesive]   Fall Risk Score: 3 (? 5 = High Risk)  MD Orders: Evaluate and treat  MEDICAL/REFERRING DIAGNOSIS:  stroke   DATE OF ONSET: 2017  REFERRING PHYSICIAN: Usha Sam MD  RETURN PHYSICIAN APPOINTMENT: Unknown      INITIAL ASSESSMENT:  Ms. Gokul Jolly presents with decreased lower extremity strength, imbalance, and difficulty walking due to recent CVA. Patient is at higher fall risk based on scores received on Hsu Balance Score and Dynamic Gait Index. Patient would benefit from skilled physical therapy to address problems and goals. Thank you for this referral.     PROBLEM LIST (Impacting functional limitations):  1. Decreased Strength  2. Decreased Ambulation Ability/Technique  3. Decreased Balance  4. Decreased Activity Tolerance  5. Decreased [place] with Home Exercise Program INTERVENTIONS PLANNED:  1. Balance Exercise  2. Gait Training  3. Home Exercise Program (HEP)  4. Neuromuscular Re-education/Strengthening  5. Therapeutic Exercise/Strengthening   TREATMENT PLAN:  Effective Dates: 2017 TO 2018. Frequency/Duration: 2 times a week for 12 weeks  GOALS: (Goals have been discussed and agreed upon with patient.)  Short-Term Functional Goals: Time Frame: 4 weeks   1. Patient will be independent with home exercise program to improve strength and improve balance. 2. Patient will increase score on Hsu Balance Scale to greater than or equal to 43/56 demonstrating improved balance and decreased fall risk with daily activities. Discharge Goals: Time Frame: 12 weeks   1. Patient will increase score on Hsu Balance Scale to greater than or equal to 48/56 demonstrating improved balance and decreased fall risk with daily activities. 2. Patient will increase score on Dynamic Gait Index to greater than or equal to 19/24 demonstrating improved balance and decreased fall risk with daily activities. 3. Patient will ambulate with least restrictive assistive device greater than 150 feet without loss of balance or fear of falling. 4. Patient will be able to resume normal daily activities without issues or complaints. Rehabilitation Potential For Stated Goals: Excellent  Regarding Christian Curtis's therapy, I certify that the treatment plan above will be carried out by a therapist or under their direction. Thank you for this referral,  Sharron Damico PT     Referring Physician Signature: Sanjay Chiang MD              Date                    The information in this section was collected on 12/4/2017 (except where otherwise noted). HISTORY:   History of Present Injury/Illness (Reason for Referral):  Patient reports having recent CVA in October 2017. Patient recently released from [address] [place] [address] physical [facility]. Patient complains of imbalance and weakness. Patient has had no falls. Patient currently using rollator walker for ambulation. Patient used no assistive device prior to CVA. Patient rates dizziness as 1/10 and pain level as 0/10. Patient went to [employer] five days a week for exercise. Patient also enjoyed square dancing with her . Patient would like to get back to exercising, square dancing, and working in her yard. Past Medical History/Comorbidities:   Ms. Katherine Reyes  has a past medical history of Acute kidney failure (05/2016); Acute pericarditis (2015); Arthritis; CAP (community acquired pneumonia) (5/2016); Coronary artery disease; Crohn's disease; Hypercalcemia; Hypercholesterolemia;  Hypertension; Lumbar compression fracture; Osteoporosis; and Type 2 diabetes mellitus. Ms. Sharonda Aleman  has a past surgical history that includes hysterectomy (1978); heart catheterization (5/12/2015); arthroplasty (1/5/2016); and kyphoplasty (~2002). Social History/Living Environment:     Lives in one story home with no steps to enter. Lives with spouse. Prior Level of Function/Work/Activity:  Independent. Retired. Patient exercises at [employer] and square dances with her . Dominant Side:         RIGHT  Personal Factors:          Sex:  female        Age:  80 y.o. Current Medications:       Current Outpatient Prescriptions:     rOPINIRole (REQUIP) 1 mg tablet, , Disp: , Rfl:     nitrofurantoin, macrocrystal-monohydrate, (MACROBID) 100 mg capsule, , Disp: , Rfl:     URO--10-40.8-36 mg cap capsule, , Disp: , Rfl:     atorvastatin (LIPITOR) 40 mg tablet, Take  by mouth daily. , Disp: , Rfl:     ZINC PO, Take  by mouth., Disp: , Rfl:     acetaminophen (TYLENOL EXTRA STRENGTH) 500 mg tablet, Take  by mouth every six (6) hours as needed for Pain., Disp: , Rfl:     spironolactone (ALDACTONE) 25 mg tablet, Take 1 Tab by mouth daily. , Disp: 90 Tab, Rfl: 3    cyanocobalamin 1,000 mcg tablet, Take 2,500 mcg by mouth daily. , Disp: , Rfl:     lisinopril (PRINIVIL, ZESTRIL) 40 mg tablet, Take 40 mg by mouth daily. , Disp: , Rfl:     ranitidine (ZANTAC) 150 mg tablet, Take 150 mg by mouth daily. , Disp: , Rfl:     cholecalciferol (VITAMIN D3) 1,000 unit cap, Take  by mouth two (2) times a day., Disp: , Rfl:     omeprazole (PRILOSEC) 40 mg capsule, Take 40 mg by mouth daily. , Disp: , Rfl:     B.infantis-B.ani-B.long-B.bifi (PROBIOTIC 4X) 10-15 mg Tab, Take  by mouth daily. , Disp: , Rfl:     carvedilol (COREG) 12.5 mg tablet, Take 12.5 mg by mouth two (2) times daily (with meals). , Disp: , Rfl:     METFORMIN 500 mg tablet, Take 1,000 mg by mouth two (2) times daily (with meals). , Disp: , Rfl:     aspirin 81 mg Tab, take by mouth. , Disp: , Rfl:    Date Last Reviewed:  12/4/2017   Number of Personal Factors/Comorbidities that affect the Plan of Care: 3+: HIGH COMPLEXITY   EXAMINATION:   Observation/Orthostatic Postural Assessment: Forward head/rounded shoulders posture. Functional Mobility:         Gait/Ambulation:  Patient ambulates without assistive device demonstrating decreased gait speed with decreased heel strike on initial contact. Strength:          Hip flexion 4/5, hip abduction 4/5, hip adduction 4+/5, quadriceps 4+/5, hamstring 4+/5, ankle dorsiflexion 5/5, and ankle plantarflexion 5/5. Sensation:         Decreased bilateral hands and bilateral feet. Postural Control & Balance:  · Hsu Balance Scale:  40/56.   (A score less than 45/56 indicates high risk of falls)     · Dynamic Gait Index:  12/24. (A score less than or equal to19/24 is abnormal and predictive of falls)     · [employer]ensory [employer] Test:  Not tested. Body Structures Involved:  1. Nerves  2. Eyes and Ears  3. Muscles Body Functions Affected:  1. Neuromusculoskeletal Activities and Participation Affected:  1. General Tasks and Demands  2. Mobility  3. Community, Social and [place]   Number of elements (examined above) that affect the Plan of Care: 4+: HIGH COMPLEXITY   CLINICAL PRESENTATION:   Presentation: Evolving clinical presentation with unstable and unpredictable characteristics: HIGH COMPLEXITY   CLINICAL DECISION MAKING:   Outcome Measure: Tool Used: Hsu Balance Scale  Score:  Initial: 40/56 Most Recent: X/56 (Date: -- )   Interpretation of Score: Each section is scored on a 0-4 scale, 0 representing the patients inability to perform the task and 4 representing independence. The scores of each section are added together for a total score of 56. The higher the patients score, the more independent the patient is. Any score below 45 indicates increased risk for falls.     Score 56 55-45 44-34 33-23 22-12 11-1 0   Modifier CH CI CJ CK CL CM CN     ? Mobility - Walking and Moving Around:     - CURRENT STATUS: CJ - 20%-39% impaired, limited or restricted    - GOAL STATUS: CI - 1%-19% impaired, limited or restricted    - D/C STATUS:  ---------------To be determined---------------      Medical Necessity:   · Patient is expected to demonstrate progress in strength and balance to improve safety during activities of daily living. Reason for Services/Other Comments:  · Patient continues to require skilled intervention due to recent CVA affecting functional mobility. Use of outcome tool(s) and clinical judgement create a POC that gives a: Difficult prediction of patient's progress: HIGH COMPLEXITY            TREATMENT:   (In addition to Assessment/Re-Assessment sessions the following treatments were rendered)  Pre-treatment Symptoms/Complaints:  Imbalance, weakness, and difficulty walking  Pain: Initial: Pain Intensity 1: 0  Post Session:  0     Initial Evaluation: 55 minutes  Patient given home exercise program consisting of balance exercises. [employer]  Treatment/Session Assessment:    · Response to Treatment:  Tolerated without complaints. · Compliance with Program/Exercises: Will assess as treatment progresses. · Recommendations/Intent for next treatment session: \"Next visit will focus on advancements to more challenging activities\".   Total Treatment Duration:  PT Patient Time In/Time Out  Time In: 0915  Time Out: 287 Jose Mcnally, PT

## 2017-12-04 NOTE — PROGRESS NOTES
Ambulatory/Rehab Services H2 Model Falls Risk Assessment    Risk Factor Pts. ·   Confusion/Disorientation/Impulsivity  []    4 ·   Symptomatic Depression  []   2 ·   Altered Elimination  []   1 ·   Dizziness/Vertigo  []   1 ·   Gender (Male)  []   1 ·   Any administered antiepileptics (anticonvulsants):  []   2 ·   Any administered benzodiazepines:  []   1 ·   Visual Impairment (specify):  []   1 ·   Portable Oxygen Use  []   1 ·   Orthostatic ? BP  []   1 ·   History of Recent Falls (within 3 mos.)  []   5     Ability to Rise from Chair (choose one) Pts. ·   Ability to rise in a single movement  []   0 ·   Pushes up, successful in one attempt  []   1 ·   Multiple attempts, but successful  [x]   3 ·   Unable to rise without assistance  []   4   Total: (5 or greater = High Risk) 3     Falls Prevention Plan:   []                Physical Limitations to Exercise (specify):   []                Mobility Assistance Device (type):   []                Exercise/Equipment Adaptation (specify):    ©2010 [facility] of [address] Patent #[id number].  Federal Law prohibits the replication, distribution or use without written permission from [facility] [employer]

## 2017-12-05 PROCEDURE — 3331090001 HH PPS REVENUE CREDIT

## 2017-12-05 PROCEDURE — 3331090002 HH PPS REVENUE DEBIT

## 2017-12-06 PROCEDURE — 3331090002 HH PPS REVENUE DEBIT

## 2017-12-06 PROCEDURE — 3331090001 HH PPS REVENUE CREDIT

## 2017-12-07 ENCOUNTER — HOSPITAL ENCOUNTER (OUTPATIENT)
Dept: PHYSICAL THERAPY | Age: 82
Discharge: HOME | End: 2017-12-07
Payer: MEDICARE

## 2017-12-07 PROCEDURE — 3331090001 HH PPS REVENUE CREDIT

## 2017-12-07 PROCEDURE — 3331090002 HH PPS REVENUE DEBIT

## 2017-12-07 NOTE — PROGRESS NOTES
Patient cancelled today's OT evaluation secondary to having a foot procedure. She re-scheduled for 12/15/17.     Leyda Quiles, OT

## 2017-12-08 PROCEDURE — 3331090001 HH PPS REVENUE CREDIT

## 2017-12-08 PROCEDURE — 3331090002 HH PPS REVENUE DEBIT

## 2017-12-09 PROCEDURE — 3331090002 HH PPS REVENUE DEBIT

## 2017-12-09 PROCEDURE — 3331090001 HH PPS REVENUE CREDIT

## 2017-12-10 PROCEDURE — 3331090001 HH PPS REVENUE CREDIT

## 2017-12-10 PROCEDURE — 3331090002 HH PPS REVENUE DEBIT

## 2017-12-11 PROCEDURE — 3331090002 HH PPS REVENUE DEBIT

## 2017-12-11 PROCEDURE — 3331090001 HH PPS REVENUE CREDIT

## 2017-12-12 PROCEDURE — 3331090002 HH PPS REVENUE DEBIT

## 2017-12-12 PROCEDURE — 3331090001 HH PPS REVENUE CREDIT

## 2017-12-13 PROCEDURE — 3331090001 HH PPS REVENUE CREDIT

## 2017-12-13 PROCEDURE — 3331090002 HH PPS REVENUE DEBIT

## 2017-12-15 ENCOUNTER — HOSPITAL ENCOUNTER (OUTPATIENT)
Dept: PHYSICAL THERAPY | Age: 82
Discharge: HOME | End: 2017-12-15
Payer: MEDICARE

## 2017-12-15 PROCEDURE — G8987 SELF CARE CURRENT STATUS: HCPCS

## 2017-12-15 PROCEDURE — G8989 SELF CARE D/C STATUS: HCPCS

## 2017-12-15 PROCEDURE — 97165 OT EVAL LOW COMPLEX 30 MIN: CPT

## 2017-12-15 PROCEDURE — G8988 SELF CARE GOAL STATUS: HCPCS

## 2017-12-15 NOTE — THERAPY EVALUATION
Preston Curtis  : 1934  Primary: Moody Sabillon Medicare Choice *  Secondary:  [address] at [facility]  [address], [address], [place].  [phone]   [phone]        OUTPATIENT OCCUPATIONAL THERAPY: Initial Assessment and Discharge 12/15/2017    ICD-10: Treatment Diagnosis: Other lack of coordination (R27.8)  Precautions/Allergies:   Sulfa (sulfonamide antibiotics); Other medication; and Tape [adhesive]   Fall Risk Score: 3 (? 5 = High Risk)  MD Orders: referral to occupational therapy MEDICAL/REFERRING DIAGNOSIS:   cva   DATE OF ONSET: 2017   REFERRING PHYSICIAN: Raleigh Puentes MD  RETURN PHYSICIAN APPOINTMENT: unknown     INITIAL ASSESSMENT:  Ms. Nae Sung presents with mild impairment in memory and left upper extremity fine and gross motor coordination that is not significantly affecting her ability to complete activities of daily living independently. She reports that her physician has cleared her to drive and resume exercising at the [facility] and square dancing. Patient reports she would prefer to work on her coordination activities at home; home exercise program issued today. Plan of treatment will be discharged at this time. Please re-consult if future needs arise. PLAN OF CARE:   PROBLEM LIST:  1. Decreased Cognition  2. Decreased coordination INTERVENTIONS PLANNED:  1. Neuromuscular re-eduation  2. Therapeutic activity  3. Therapeutic exercise   TREATMENT PLAN:  Effective Dates: 12/15/17 TO 12/15/17. Frequency/Duration: Discharge today. GOALS: (Goals have been discussed and agreed upon with patient.)  Discharge Goals: Time Frame: 1 day  1. Patient will verbalize and demonstrate independence with home exercise program for non-dominant left upper extremity gross and fine motor coordination. Met today during initial evaluation.   Rehabilitation Potential For Stated Goals: Excellent  Regarding Preston Curtis's therapy, I certify that the treatment plan above will be carried out by a therapist or under their direction. Thank you for this referral,  Wenceslao Lnyn OT     Referring Physician Signature: Adilia Cowan MD _________________________  Date _________            The information in this section was collected on 12/15/17 (except where otherwise noted). OCCUPATIONAL PROFILE & HISTORY:   History of Present Injury/Illness (Reason for Referral):  Patient presents s/p CVA with mildly decreased coordination of the left upper extremity and mild memory impairment. She reports that she is feeling better and has resumed all of her normal daily activities at this time. Past Medical History/Comorbidities:   Ms. Latasha Woodruff  has a past medical history of Acute kidney failure (05/2016); Acute pericarditis (2015); Arthritis; CAP (community acquired pneumonia) (5/2016); Coronary artery disease; Crohn's disease; Hypercalcemia; Hypercholesterolemia; Hypertension; Lumbar compression fracture; Osteoporosis; and Type 2 diabetes mellitus. Ms. Latasha Woodruff  has a past surgical history that includes hysterectomy (1978); heart catheterization (5/12/2015); arthroplasty (1/5/2016); and kyphoplasty (~2002). Social History/Living Environment:   Home Environment: Private residence  # Steps to Enter: 0  One/Two Story Residence: One story  Living Alone: No  Support Systems: Spouse/Significant Other/Partner  Current DME Used/Available at Home: [address] [facility], Ne, straight, Walker, rollator  Tub or Shower Type: Shower  Prior Level of Function/Work/Activity:  Retired; works out at [employer] and [employer] several times per week. Dominant Side:         RIGHT    Current Medications:    Current Outpatient Prescriptions:     rOPINIRole (REQUIP) 1 mg tablet, , Disp: , Rfl:     nitrofurantoin, macrocrystal-monohydrate, (MACROBID) 100 mg capsule, , Disp: , Rfl:     URO--10-40.8-36 mg cap capsule, , Disp: , Rfl:     atorvastatin (LIPITOR) 40 mg tablet, Take  by mouth daily. , Disp: , Rfl:   ZINC PO, Take  by mouth., Disp: , Rfl:     acetaminophen (TYLENOL EXTRA STRENGTH) 500 mg tablet, Take  by mouth every six (6) hours as needed for Pain., Disp: , Rfl:     spironolactone (ALDACTONE) 25 mg tablet, Take 1 Tab by mouth daily. , Disp: 90 Tab, Rfl: 3    cyanocobalamin 1,000 mcg tablet, Take 2,500 mcg by mouth daily. , Disp: , Rfl:     lisinopril (PRINIVIL, ZESTRIL) 40 mg tablet, Take 40 mg by mouth daily. , Disp: , Rfl:     ranitidine (ZANTAC) 150 mg tablet, Take 150 mg by mouth daily. , Disp: , Rfl:     cholecalciferol (VITAMIN D3) 1,000 unit cap, Take  by mouth two (2) times a day., Disp: , Rfl:     omeprazole (PRILOSEC) 40 mg capsule, Take 40 mg by mouth daily. , Disp: , Rfl:     B.infantis-B.ani-B.long-B.bifi (PROBIOTIC 4X) 10-15 mg Tab, Take  by mouth daily. , Disp: , Rfl:     carvedilol (COREG) 12.5 mg tablet, Take 12.5 mg by mouth two (2) times daily (with meals). , Disp: , Rfl:     METFORMIN 500 mg tablet, Take 1,000 mg by mouth two (2) times daily (with meals). , Disp: , Rfl:     aspirin 81 mg Tab, take by mouth. , Disp: , Rfl:             Date Last Reviewed:  12/15/2017   Complexity Level : Brief history (0):  LOW COMPLEXITY   ASSESSMENT OF OCCUPATIONAL PERFORMANCE:   ROM:          WDLs  Strength:          WDLs  Right  = 36 pounds;  Left  = 34 pounds  Coordination:             · Fine Motor Skills-Upper: Left Impaired, Right Intact (9-hole peg test: R=24 seconds; L=44 seconds)  · Gross Motor Skills-Upper: Left Impaired, Right Intact     Mental Status:  MOCA = 21/30; norm is at least 26/30             · Neurologic State: Alert  · Orientation Level: Oriented X4  · Cognition: Follows commands  · Perception: Appears intact  · Perseveration: No perseveration noted  · Safety/Judgement: Insight into deficits     Vision:             · Tracking: Able to track stimulus in all quadrants w/o difficulty  · Visual Fields: Difficulty detecting stimulus  in left lateral quadrant  · Acuity: Able to read employee name badlara without difficulty  · Corrective Lenses: Reading glasses (Awaiting cataract surgery)     Activities of Daily Living:           Basic ADLs (From Assessment) Complex ADLs (From Assessment)   Basic ADL  Feeding: Independent  Oral Facial Hygiene/Grooming: Independent  Bathing: Independent  Upper Body Dressing: Independent  Lower Body Dressing: Independent  Toileting: Independent Instrumental ADL  Meal Preparation: Independent  Homemaking: Moderate assistance  Medication Management: Moderate assistance  Financial Management: Moderate assistance   Grooming/Bathing/Dressing Activities of Daily Living     Cognitive Retraining  Safety/Judgement: Insight into deficits                 Functional Transfers  Toilet Transfer : Independent  Shower Transfer: Modified independent (Shower chair)     Bed/Mat Mobility  Rolling: Independent  Supine to Sit: Independent  Sit to Supine: Independent  Sit to Stand: Independent  Bed to Chair: Independent  Scooting: Independent     Sensation:         Intact     Physical Skills Involved:  1. Fine Motor Control  2. Gross Motor Control Cognitive Skills Affected (resulting in the inability to perform in a timely and safe manner):  1. Short Term Recall  2. Divided Attention Psychosocial Skills Affected:  1. None   Number of elements that affect the Plan of Care: 3-5:  MODERATE COMPLEXITY   CLINICAL DECISION MAKING:   Outcome Measure: Tool Used: [employer] AM-PAC Daily Activity Outpatient Short Form  Score:  Initial: 47 Most Recent: X (Date: -- )   Interpretation of Tool:  Represents clinically-significant functional categories (i.e., basic and instrumental activities of daily living, fine motor activities). Score 60 59-55 54-47 46-34 32-21 20-16 15   Modifier CH CI CJ CK CL CM CN     ?  Self Care:     - CURRENT STATUS: CJ - 20%-39% impaired, limited or restricted    - GOAL STATUS: CJ - 20%-39% impaired, limited or restricted    - D/C STATUS:  CJ - 20%-39% impaired, limited or restricted      Medical Necessity:   · Patient demonstrates excellent rehab potential due to higher previous functional level. Assessment process, impact of co-morbidities, assessment modification\need for assistance, and selection of interventions: Analytical Complexity:LOW COMPLEXITY   TREATMENT:   (In addition to Assessment/Re-Assessment sessions the following treatments were rendered)    Pre-treatment Symptoms/Complaints:  Patient states, \"I am feeling so much better now; I love to work on puzzles and play games at home; I will make sure I use my left hand. \"  Pain: Initial: Pain Intensity 1: 0  Post Session:  0/10       Issued home exercise program for fine and gross motor coordination; patient and her daughter verbalized understanding.       Treatment/Session Assessment:    · Response to Treatment:  Patient agreeable to compete home exercise program.    Total Treatment Duration:  OT Patient Time In/Time Out  Time In: 1100  Time Out: [place], OT

## 2018-02-09 PROBLEM — E11.21 TYPE 2 DIABETES WITH NEPHROPATHY (HCC): Status: ACTIVE | Noted: 2018-02-09

## 2018-04-23 NOTE — THERAPY DISCHARGE
Elinor Curtis  : 1934  Primary: Juaquin Sabillon Medicare Choice *  Secondary:  [address]  at [place] [id number] [place] [address], [place] [address].  [phone]   [phone]        OUTPATIENT PHYSICAL THERAPY:Discharge    ICD-10: Treatment Diagnosis: Difficulty in walking, not elsewhere classified (R26.2)  Precautions/Allergies:   Sulfa (sulfonamide antibiotics); Other medication; and Tape [adhesive]   Fall Risk Score: 3 (? 5 = High Risk)  MD Orders: Evaluate and treat  MEDICAL/REFERRING DIAGNOSIS:  stroke   DATE OF ONSET: 2017  REFERRING PHYSICIAN: Lillian Brooks MD  RETURN PHYSICIAN APPOINTMENT: Unknown      INITIAL ASSESSMENT:  Ms. Elizabeth Smalls presents with decreased lower extremity strength, imbalance, and difficulty walking due to recent CVA. Patient is at higher fall risk based on scores received on Hsu Balance Score and Dynamic Gait Index. Patient would benefit from skilled physical therapy to address problems and goals. Thank you for this referral.    Discharge note:  Patient only attended initial evaluation on 2018. Patient did not call to schedule any additional appointments. Problems and goals unable to be reassessed. Patient discharged from physical therapy. Thank you for this referral.     PROBLEM LIST (Impacting functional limitations):  1. Decreased Strength  2. Decreased Ambulation Ability/Technique  3. Decreased Balance  4. Decreased Activity Tolerance  5. Decreased [place] with Home Exercise Program INTERVENTIONS PLANNED:  1. Balance Exercise  2. Gait Training  3. Home Exercise Program (HEP)  4. Neuromuscular Re-education/Strengthening  5. Therapeutic Exercise/Strengthening   TREATMENT PLAN:   Frequency/Duration: Patient discharged from physical therapy. GOALS: (Goals have been discussed and agreed upon with patient.)  Short-Term Functional Goals: Time Frame: 4 weeks   1.  Patient will be independent with home exercise program to improve strength and improve balance. Goal unable to be reassessed. 2. Patient will increase score on Hsu Balance Scale to greater than or equal to 43/56 demonstrating improved balance and decreased fall risk with daily activities. Goal unable to be reassessed. Discharge Goals: Time Frame: 12 weeks   1. Patient will increase score on Hsu Balance Scale to greater than or equal to 48/56 demonstrating improved balance and decreased fall risk with daily activities. Goal unable to be reassessed. 2. Patient will increase score on Dynamic Gait Index to greater than or equal to 19/24 demonstrating improved balance and decreased fall risk with daily activities. Goal unable to be reassessed. 3. Patient will ambulate with least restrictive assistive device greater than 150 feet without loss of balance or fear of falling. Goal unable to be reassessed. 4. Patient will be able to resume normal daily activities without issues or complaints. Goal unable to be reassessed. The information in this section was collected on 12/4/2017 (except where otherwise noted). HISTORY:   History of Present Injury/Illness (Reason for Referral):  Patient reports having recent CVA in October 2017. Patient recently released from [facility] physical therapy. Patient complains of imbalance and weakness. Patient has had no falls. Patient currently using rollator walker for ambulation. Patient used no assistive device prior to CVA. Patient rates dizziness as 1/10 and pain level as 0/10. Patient went to [employer] five days a week for exercise. Patient also enjoyed square dancing with her . Patient would like to get back to exercising, square dancing, and working in her yard. Past Medical History/Comorbidities:   Ms. Gianni Heath  has a past medical history of Acute kidney failure (05/2016); Acute pericarditis (2015); Arthritis; CAP (community acquired pneumonia) (5/2016);  Coronary artery disease; Crohn's disease; Hypercalcemia; Hypercholesterolemia; Hypertension; Lumbar compression fracture; Osteoporosis; and Type 2 diabetes mellitus. Ms. Dwayne Berkowitz  has a past surgical history that includes hx hysterectomy (1978); hx heart catheterization (5/12/2015); hx arthroplasty (1/5/2016); and hx kyphoplasty (~2002). Social History/Living Environment:     Lives in one story home with no steps to enter. Lives with spouse. Prior Level of Function/Work/Activity:  Independent. Retired. Patient exercises at [employer] and square dances with her . Dominant Side:         RIGHT  Personal Factors:          Sex:  female        Age:  80 y.o. Current Medications:       Current Outpatient Prescriptions:     amLODIPine (NORVASC) 5 mg tablet, Take 2.5 mg by mouth daily. , Disp: , Rfl:     rOPINIRole (REQUIP) 1 mg tablet, 1 mg daily. , Disp: , Rfl:     atorvastatin (LIPITOR) 40 mg tablet, Take  by mouth daily. , Disp: , Rfl:     ZINC PO, Take  by mouth., Disp: , Rfl:     acetaminophen (TYLENOL EXTRA STRENGTH) 500 mg tablet, Take  by mouth every six (6) hours as needed for Pain., Disp: , Rfl:     spironolactone (ALDACTONE) 25 mg tablet, Take 1 Tab by mouth daily. , Disp: 90 Tab, Rfl: 3    cyanocobalamin 1,000 mcg tablet, Take 2,500 mcg by mouth daily. , Disp: , Rfl:     lisinopril (PRINIVIL, ZESTRIL) 40 mg tablet, Take 40 mg by mouth daily. , Disp: , Rfl:     ranitidine (ZANTAC) 150 mg tablet, Take 150 mg by mouth two (2) times a day., Disp: , Rfl:     cholecalciferol (VITAMIN D3) 1,000 unit cap, Take  by mouth two (2) times a day., Disp: , Rfl:     omeprazole (PRILOSEC) 40 mg capsule, Take 40 mg by mouth daily. , Disp: , Rfl:     B.infantis-B.ani-B.long-B.bifi (PROBIOTIC 4X) 10-15 mg Tab, Take  by mouth daily. , Disp: , Rfl:     carvedilol (COREG) 12.5 mg tablet, Take 12.5 mg by mouth two (2) times daily (with meals). , Disp: , Rfl:     METFORMIN 500 mg tablet, Take  by mouth daily. , Disp: , Rfl:     aspirin 81 mg Tab, take by mouth. , Disp: , Rfl:       Number of Personal Factors/Comorbidities that affect the Plan of Care: 3+: HIGH COMPLEXITY   EXAMINATION:   Observation/Orthostatic Postural Assessment: Forward head/rounded shoulders posture. Functional Mobility:         Gait/Ambulation:  Patient ambulates without assistive device demonstrating decreased gait speed with decreased heel strike on initial contact. Strength:          Hip flexion 4/5, hip abduction 4/5, hip adduction 4+/5, quadriceps 4+/5, hamstring 4+/5, ankle dorsiflexion 5/5, and ankle plantarflexion 5/5. Sensation:         Decreased bilateral hands and bilateral feet. Postural Control & Balance:  · Hsu Balance Scale:  40/56.   (A score less than 45/56 indicates high risk of falls)     · Dynamic Gait Index:  12/24. (A score less than or equal to19/24 is abnormal and predictive of falls)     · [employer] Sensory Organization Test:  Not tested. Body Structures Involved:  1. Nerves  2. Eyes and Ears  3. Muscles Body Functions Affected:  1. Neuromusculoskeletal Activities and Participation Affected:  1. General Tasks and Demands  2. Mobility  3. Community, Social and [place]   Number of elements (examined above) that affect the Plan of Care: 4+: HIGH COMPLEXITY   CLINICAL PRESENTATION:   Presentation: Evolving clinical presentation with unstable and unpredictable characteristics: HIGH COMPLEXITY   CLINICAL DECISION MAKING:   Outcome Measure: Tool Used: Hsu Balance Scale  Score:  Initial: 40/56 Most Recent: X/56 (Date: -- )   Interpretation of Score: Each section is scored on a 0-4 scale, 0 representing the patients inability to perform the task and 4 representing independence. The scores of each section are added together for a total score of 56. The higher the patients score, the more independent the patient is. Any score below 45 indicates increased risk for falls.     Score 56 55-45 44-34 33-23 22-12 11-1 0   Modifier CH CI CJ CK CL CM CN     ? Mobility - Walking and Moving Around:     - CURRENT STATUS: CJ - 20%-39% impaired, limited or restricted    - GOAL STATUS: CI - 1%-19% impaired, limited or restricted    - D/C STATUS:  ---------------To be determined---------------      ·    Use of outcome tool(s) and clinical judgement create a POC that gives a: Difficult prediction of patient's progress: HIGH COMPLEXITY            TREATMENT:   · Recommendations/Intent for next treatment session: Patient discharged from physical therapy.     Keron Boyce, PT
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
## 2018-06-11 PROBLEM — E11.42 DIABETIC POLYNEUROPATHY ASSOCIATED WITH TYPE 2 DIABETES MELLITUS (HCC): Status: ACTIVE | Noted: 2018-06-11

## 2018-06-11 PROBLEM — Z79.899 ENCOUNTER FOR MEDICATION MANAGEMENT: Status: ACTIVE | Noted: 2018-06-11

## 2018-06-11 PROBLEM — R29.3 POSTURAL IMBALANCE: Status: RESOLVED | Noted: 2017-11-20 | Resolved: 2018-06-11

## 2018-09-10 ENCOUNTER — HOSPITAL ENCOUNTER (EMERGENCY)
Age: 83
Discharge: HOME | End: 2018-09-10
Attending: EMERGENCY MEDICINE
Payer: MEDICARE

## 2018-09-10 VITALS
SYSTOLIC BLOOD PRESSURE: 128 MMHG | OXYGEN SATURATION: 100 % | RESPIRATION RATE: 16 BRPM | HEART RATE: 58 BPM | BODY MASS INDEX: 25.67 KG/M2 | HEIGHT: 57 IN | WEIGHT: 119 LBS | DIASTOLIC BLOOD PRESSURE: 60 MMHG | TEMPERATURE: 98 F

## 2018-09-10 DIAGNOSIS — M54.6 ACUTE RIGHT-SIDED THORACIC BACK PAIN: Primary | ICD-10-CM

## 2018-09-10 PROCEDURE — 99283 EMERGENCY DEPT VISIT LOW MDM: CPT | Performed by: EMERGENCY MEDICINE

## 2018-09-10 RX ORDER — CYCLOBENZAPRINE HCL 5 MG
5 TABLET ORAL
Qty: 10 TAB | Refills: 0 | Status: SHIPPED | OUTPATIENT
Start: 2018-09-10 | End: 2019-03-15

## 2018-09-10 NOTE — DISCHARGE INSTRUCTIONS
Back Pain: Care Instructions  Your Care Instructions    Back pain has many possible causes. It is often related to problems with muscles and ligaments of the back. It may also be related to problems with the nerves, discs, or bones of the back. Moving, lifting, standing, sitting, or sleeping in an awkward way can strain the back. Sometimes you don't notice the injury until later. Arthritis is another common cause of back pain. Although it may hurt a lot, back pain usually improves on its own within several weeks. Most people recover in 12 weeks or less. Using good home treatment and being careful not to stress your back can help you feel better sooner. Follow-up care is a key part of your treatment and safety. Be sure to make and go to all appointments, and call your doctor if you are having problems. It's also a good idea to know your test results and keep a list of the medicines you take. How can you care for yourself at home? · Sit or lie in positions that are most comfortable and reduce your pain. Try one of these positions when you lie down:  ¨ Lie on your back with your knees bent and supported by large pillows. ¨ Lie on the floor with your legs on the seat of a sofa or chair. [place] on your side with your knees and hips bent and a pillow between your legs. ¨ Lie on your stomach if it does not make pain worse. · Do not sit up in bed, and avoid soft couches and twisted positions. Bed rest can help relieve pain at first, but it delays healing. Avoid bed rest after the first day of back pain. · Change positions every 30 minutes. If you must sit for long periods of time, take breaks from sitting. Get up and walk around, or lie in a comfortable position. · Try using a heating pad on a low or medium setting for 15 to 20 minutes every 2 or 3 hours. Try a warm shower in place of one session with the heating pad. · You can also try an ice pack for 10 to 15 minutes every 2 to 3 hours.  Put a thin cloth between the ice pack and your skin. · Take pain medicines exactly as directed. ¨ If the doctor gave you a prescription medicine for pain, take it as prescribed. ¨ If you are not taking a prescription pain medicine, ask your doctor if you can take an over-the-counter medicine. · Take short walks several times a day. You can start with 5 to 10 minutes, 3 or 4 times a day, and work up to longer walks. Walk on level surfaces and avoid hills and stairs until your back is better. · Return to work and other activities as soon as you can. Continued rest without activity is usually not good for your back. · To prevent future back pain, do exercises to stretch and strengthen your back and stomach. Learn how to use good posture, safe lifting techniques, and proper body mechanics. When should you call for help? Call your doctor now or seek immediate medical care if:    · You have new or worsening numbness in your legs.     · You have new or worsening weakness in your legs. (This could make it hard to stand up.)     · You lose control of your bladder or bowels.    Watch closely for changes in your health, and be sure to contact your doctor if:    · You have a fever, lose weight, or don't feel well.     · You do not get better as expected. Where can you learn more? Go to http://faviola-coni.info/. Enter T768 in the search box to learn more about \"Back Pain: Care Instructions. \"  Current as of: November 29, 2017  Content Version: 11.7  © 3500-8612 [employer]. Care instructions adapted under license by [employer] (which disclaims liability or warranty for this information). If you have questions about a medical condition or this instruction, always ask your healthcare professional. [place] [address] any warranty or liability for your use of this information. Learning About Relief for Back Pain  What is back tension and strain?     Back strain happens when you overstretch, or pull, a muscle in your back. You may hurt your back in an accident or when you exercise or lift something. Most back pain will get better with rest and time. You can take care of yourself at home to help your back heal.  What can you do first to relieve back pain? When you first feel back pain, try these steps:  · Walk. Take a short walk (10 to 20 minutes) on a level surface (no slopes, hills, or stairs) every 2 to 3 hours. Walk only distances you can manage without pain, especially leg pain. · Relax. Find a comfortable position for rest. Some people are comfortable on the floor or a medium-firm bed with a small pillow under their head and another under their knees. Some people prefer to lie on their side with a pillow between their knees. Don't stay in one position for too long. · Try heat or ice. Try using a heating pad on a low or medium setting, or take a warm shower, for 15 to 20 minutes every 2 to 3 hours. Or you can buy single-use heat wraps that last up to 8 hours. You can also try an ice pack for 10 to 15 minutes every 2 to 3 hours. You can use an ice pack or a bag of frozen vegetables wrapped in a thin towel. There is not strong evidence that either heat or ice will help, but you can try them to see if they help. You may also want to try switching between heat and cold. · Take pain medicine exactly as directed. ¨ If the doctor gave you a prescription medicine for pain, take it as prescribed. ¨ If you are not taking a prescription pain medicine, ask your doctor if you can take an over-the-counter medicine. What else can you do? · Stretch and exercise. Exercises that increase flexibility may relieve your pain and make it easier for your muscles to keep your spine in a good, neutral position. And don't forget to keep walking. · Do self-massage. You can use self-massage to unwind after work or school or to energize yourself in the morning.  You can easily massage your feet, hands, or neck. Self-massage works best if you are in comfortable clothes and are sitting or lying in a comfortable position. Use oil or lotion to massage bare skin. · Reduce stress. Back pain can lead to a vicious [place]: Distress about the pain tenses the muscles in your back, which in turn causes more pain. Learn how to relax your mind and your muscles to lower your stress. Where can you learn more? Go to http://faviola-coni.info/. Enter Y631 in the search box to learn more about \"Learning About Relief for Back Pain. \"  Current as of: November 29, 2017  Content Version: 11.7  © 8158-1025 [employer]. Care instructions adapted under license by [employer] (which disclaims liability or warranty for this information). If you have questions about a medical condition or this instruction, always ask your healthcare professional. [address] any warranty or liability for your use of this information.

## 2018-09-10 NOTE — ED PROVIDER NOTES
HPI Comments: Pt with right upper mid back pain going into right arm for pst 10 days. Possibly triggered by opening a bottle. Had a CT or MRI with no acute found per pt. Still hurting so came here. Patient is a 80 y.o. female presenting with back pain. The history is provided by the patient. No  was used. Back Pain This is a new problem. The current episode started more than 1 week ago. The problem has not changed since onset. The problem occurs constantly. Patient reports not work related injury. The pain is associated with no known injury. The pain is present in the upper back and right side. The quality of the pain is described as sharp. Radiates to: right arm. The pain is mild. The symptoms are aggravated by certain positions. Pertinent negatives include no chest pain, no fever, no numbness, no headaches, no abdominal pain, no abdominal swelling, no bowel incontinence, no perianal numbness, no bladder incontinence, no dysuria, no leg pain, no paresthesias and no weakness. She has tried nothing for the symptoms. Past Medical History:  
Diagnosis Date  Acute kidney failure 05/2016  Acute pericarditis 2015  Arthritis  CAP (community acquired pneumonia) 5/2016  Coronary artery disease  Crohn's disease  Hypercalcemia  Hypercholesterolemia  Hypertension  Lumbar compression fracture  Osteoporosis  Type 2 diabetes mellitus Past Surgical History:  
Procedure Laterality Date  HX ARTHROPLASTY  1/5/2016  
 left elbow  HX HEART CATHETERIZATION  5/12/2015  
 no intervention [id number]  HX KYPHOPLASTY  [id number]  
 lumbar Family History:  
Problem Relation Age of Onset  Heart Failure Mother  Diabetes Mother  Cancer Father   
  throat  Alcohol abuse Neg Hx  Arthritis-rheumatoid Neg Hx  Asthma Neg Hx  Bleeding Prob Neg Hx  Elevated Lipids Neg Hx   
 Headache Neg Hx  Hypertension Neg Hx  Lung Disease Neg Hx  Migraines Neg Hx  Psychiatric Disorder Neg Hx  Stroke Neg Hx  Mental Retardation Neg Hx  Thyroid Disease Neg Hx Social History Social History  Marital status:  Spouse name: N/A  
 Number of children: N/A  
 Years of education: N/A Occupational History  Not on file. Social History Main Topics  Smoking status: Former Smoker Types: Cigarettes Start date: 6/14/1961 Quit date: 6/14/1964  Smokeless tobacco: Never Used Comment: 2 packs a wk  Alcohol use 0.0 oz/week  
  0 Standard drinks or equivalent per week Comment: rarely  Drug use: No  
 Sexual activity: No  
 
Other Topics Concern  Not on file Social History Narrative Pt , lives with . Retired. She worked as an . She has 1 dog and 1 cat at home. ALLERGIES: Sulfa (sulfonamide antibiotics); Other medication; and Tape [adhesive] Review of Systems Constitutional: Negative for chills and fever. HENT: Negative for rhinorrhea and sore throat. Eyes: Negative for pain and redness. Respiratory: Negative for chest tightness, shortness of breath and wheezing. Cardiovascular: Negative for chest pain and leg swelling. Gastrointestinal: Negative for abdominal pain, bowel incontinence, diarrhea, nausea and vomiting. Genitourinary: Negative for bladder incontinence, dysuria and hematuria. Musculoskeletal: Positive for back pain. Negative for gait problem, neck pain and neck stiffness. Skin: Negative for color change and rash. Neurological: Negative for weakness, numbness, headaches and paresthesias. Vitals:  
 09/10/18 1445 BP: 139/69 Pulse: 62 Resp: 16 Temp: 97.6 °F (36.4 °C) SpO2: 97% Weight: 54 kg (119 lb) Height: 4' 9\" (1.448 m) Physical Exam  
Constitutional: She is oriented to person, place, and time.  She appears well-developed and well-nourished. HENT:  
Head: Normocephalic and atraumatic. Neck: Normal range of motion. Neck supple. Cardiovascular: Normal rate and regular rhythm. No murmur heard. Pulmonary/Chest: Effort normal and breath sounds normal. She has no wheezes. Abdominal: Soft. Bowel sounds are normal. There is no tenderness. Musculoskeletal: Normal range of motion. She exhibits tenderness (mild around shoulder blade on right. ). She exhibits no edema. Neurological: She is alert and oriented to person, place, and time. She exhibits normal muscle tone. Skin: Skin is warm and dry. Nursing note and vitals reviewed. MDM Number of Diagnoses or Management Options Diagnosis management comments: Right back pain. Will try meds at home. Patient Progress Patient progress: stable ED Course Procedures

## 2018-11-17 ENCOUNTER — HOSPITAL ENCOUNTER (EMERGENCY)
Age: 83
Discharge: HOME | End: 2018-11-18
Attending: EMERGENCY MEDICINE
Payer: MEDICARE

## 2018-11-17 DIAGNOSIS — R11.2 NON-INTRACTABLE VOMITING WITH NAUSEA, UNSPECIFIED VOMITING TYPE: Primary | ICD-10-CM

## 2018-11-17 DIAGNOSIS — R19.7 DIARRHEA, UNSPECIFIED TYPE: ICD-10-CM

## 2018-11-17 LAB
BASOPHILS # BLD: 0.1 K/UL (ref 0–0.2)
BASOPHILS NFR BLD: 0 % (ref 0–2)
DIFFERENTIAL METHOD BLD: ABNORMAL
EOSINOPHIL # BLD: 0.2 K/UL (ref 0–0.8)
EOSINOPHIL NFR BLD: 1 % (ref 0.5–7.8)
ERYTHROCYTE [DISTWIDTH] IN BLOOD BY AUTOMATED COUNT: 15.6 %
HCT VFR BLD AUTO: 38 % (ref 35.8–46.3)
HGB BLD-MCNC: 12.2 G/DL (ref 11.7–15.4)
IMM GRANULOCYTES # BLD: 0.4 K/UL (ref 0–0.5)
IMM GRANULOCYTES NFR BLD AUTO: 3 % (ref 0–5)
LYMPHOCYTES # BLD: 0.5 K/UL (ref 0.5–4.6)
LYMPHOCYTES NFR BLD: 3 % (ref 13–44)
MCH RBC QN AUTO: 29.8 PG (ref 26.1–32.9)
MCHC RBC AUTO-ENTMCNC: 32.1 G/DL (ref 31.4–35)
MCV RBC AUTO: 92.9 FL (ref 79.6–97.8)
MONOCYTES # BLD: 0.7 K/UL (ref 0.1–1.3)
MONOCYTES NFR BLD: 4 % (ref 4–12)
NEUTS SEG # BLD: 13.8 K/UL (ref 1.7–8.2)
NEUTS SEG NFR BLD: 88 % (ref 43–78)
NRBC # BLD: 0 K/UL (ref 0–0.2)
PLATELET # BLD AUTO: 201 K/UL (ref 150–450)
PMV BLD AUTO: 10.9 FL (ref 9.4–12.3)
RBC # BLD AUTO: 4.09 M/UL (ref 4.05–5.2)
WBC # BLD AUTO: 15.6 K/UL (ref 4.3–11.1)

## 2018-11-17 PROCEDURE — 99283 EMERGENCY DEPT VISIT LOW MDM: CPT | Performed by: EMERGENCY MEDICINE

## 2018-11-17 PROCEDURE — 96374 THER/PROPH/DIAG INJ IV PUSH: CPT | Performed by: EMERGENCY MEDICINE

## 2018-11-17 PROCEDURE — 80053 COMPREHEN METABOLIC PANEL: CPT

## 2018-11-17 PROCEDURE — 85025 COMPLETE CBC W/AUTO DIFF WBC: CPT

## 2018-11-17 PROCEDURE — 83690 ASSAY OF LIPASE: CPT

## 2018-11-17 PROCEDURE — 74011250636 HC RX REV CODE- 250/636: Performed by: EMERGENCY MEDICINE

## 2018-11-17 PROCEDURE — 84484 ASSAY OF TROPONIN QUANT: CPT

## 2018-11-17 RX ORDER — DIPHENOXYLATE HYDROCHLORIDE AND ATROPINE SULFATE 2.5; .025 MG/1; MG/1
2 TABLET ORAL
Status: COMPLETED | OUTPATIENT
Start: 2018-11-17 | End: 2018-11-18

## 2018-11-17 RX ORDER — ONDANSETRON 2 MG/ML
4 INJECTION INTRAMUSCULAR; INTRAVENOUS
Status: COMPLETED | OUTPATIENT
Start: 2018-11-17 | End: 2018-11-17

## 2018-11-17 RX ORDER — HYOSCYAMINE SULFATE 0.12 MG/1
0.25 TABLET SUBLINGUAL
Status: COMPLETED | OUTPATIENT
Start: 2018-11-17 | End: 2018-11-18

## 2018-11-17 RX ADMIN — ONDANSETRON 4 MG: 2 INJECTION INTRAMUSCULAR; INTRAVENOUS at 23:39

## 2018-11-18 VITALS
TEMPERATURE: 98.9 F | WEIGHT: 140 LBS | SYSTOLIC BLOOD PRESSURE: 122 MMHG | DIASTOLIC BLOOD PRESSURE: 86 MMHG | HEIGHT: 63 IN | OXYGEN SATURATION: 99 % | HEART RATE: 87 BPM | BODY MASS INDEX: 24.8 KG/M2 | RESPIRATION RATE: 16 BRPM

## 2018-11-18 LAB
ALBUMIN SERPL-MCNC: 3.7 G/DL (ref 3.2–4.6)
ALBUMIN/GLOB SERPL: 1.1 {RATIO}
ALP SERPL-CCNC: 72 U/L (ref 50–136)
ALT SERPL-CCNC: 21 U/L (ref 12–65)
ANION GAP SERPL CALC-SCNC: 10 MMOL/L
AST SERPL-CCNC: 11 U/L (ref 15–37)
BILIRUB SERPL-MCNC: 1.1 MG/DL (ref 0.2–1.1)
BUN SERPL-MCNC: 23 MG/DL (ref 8–23)
CALCIUM SERPL-MCNC: 10 MG/DL (ref 8.3–10.4)
CHLORIDE SERPL-SCNC: 100 MMOL/L (ref 98–107)
CO2 SERPL-SCNC: 23 MMOL/L (ref 21–32)
CREAT SERPL-MCNC: 1.25 MG/DL (ref 0.6–1)
GLOBULIN SER CALC-MCNC: 3.4 G/DL (ref 2.3–3.5)
GLUCOSE SERPL-MCNC: 273 MG/DL (ref 65–100)
LIPASE SERPL-CCNC: 114 U/L (ref 73–393)
POTASSIUM SERPL-SCNC: 4.3 MMOL/L (ref 3.5–5.1)
PROT SERPL-MCNC: 7.1 G/DL
SODIUM SERPL-SCNC: 133 MMOL/L (ref 136–145)
TROPONIN I SERPL-MCNC: <0.02 NG/ML (ref 0.02–0.05)

## 2018-11-18 PROCEDURE — 74011250636 HC RX REV CODE- 250/636: Performed by: EMERGENCY MEDICINE

## 2018-11-18 PROCEDURE — 74011250637 HC RX REV CODE- 250/637: Performed by: EMERGENCY MEDICINE

## 2018-11-18 RX ORDER — ONDANSETRON 8 MG/1
8 TABLET, ORALLY DISINTEGRATING ORAL
Qty: 12 TAB | Refills: 1 | Status: SHIPPED | OUTPATIENT
Start: 2018-11-18 | End: 2019-03-15

## 2018-11-18 RX ORDER — HYOSCYAMINE SULFATE 0.12 MG/1
0.25 TABLET SUBLINGUAL
Qty: 20 TAB | Refills: 0 | Status: SHIPPED | OUTPATIENT
Start: 2018-11-18 | End: 2019-03-15

## 2018-11-18 RX ADMIN — SODIUM CHLORIDE 1000 ML: 900 INJECTION, SOLUTION INTRAVENOUS at 00:01

## 2018-11-18 RX ADMIN — DIPHENOXYLATE HYDROCHLORIDE AND ATROPINE SULFATE 2 TABLET: 2.5; .025 TABLET ORAL at 00:01

## 2018-11-18 RX ADMIN — HYOSCYAMINE SULFATE 0.25 MG: 0.12 TABLET ORAL; SUBLINGUAL at 00:02

## 2018-11-18 NOTE — ED NOTES
I have reviewed discharge instructions with the patient. The patient verbalized understanding. Patient left ED via Discharge Method: ambulatory to Home with ( family, self). Opportunity for questions and clarification provided. Patient given 2 scripts. To continue your aftercare when you leave the hospital, you may receive an automated call from our care team to check in on how you are doing. This is a free service and part of our promise to provide the best care and service to meet your aftercare needs.  If you have questions, or wish to unsubscribe from this service please call [phone]. Thank you for Choosing our [facility] Emergency Department.

## 2018-11-18 NOTE — ED PROVIDER NOTES
Chief complaint : Vomiting and diarrhea HISTORY OF PRESENT ILLNESS : 
Location : per routine Quality : uncontrollable Quantity : 5-6 times each Timing : around 9 PM 
 
Severity : moderate to severe, patient was having fecal incontinence accidents while trying to get to the bathroom. Alleviating / exacerbating factors : none No sick contacts Associated Symptoms :  
No pain no fever no UTI symptoms Past Medical History:  
Diagnosis Date  Acute kidney failure 05/2016  Acute pericarditis 2015  Arthritis  CAP (community acquired pneumonia) 5/2016  Coronary artery disease  Crohn's disease  Hypercalcemia  Hypercholesterolemia  Hypertension  Lumbar compression fracture  Osteoporosis  Type 2 diabetes mellitus Past Surgical History:  
Procedure Laterality Date  HX ARTHROPLASTY  1/5/2016  
 left elbow  HX HEART CATHETERIZATION  5/12/2015  
 no intervention [address]  HX KYPHOPLASTY  [id number]  
 lumbar Family History:  
Problem Relation Age of Onset  Heart Failure Mother  Diabetes Mother  Cancer Father   
     throat  Alcohol abuse Neg Hx  Arthritis-rheumatoid Neg Hx  Asthma Neg Hx  Bleeding Prob Neg Hx  Elevated Lipids Neg Hx   
 Headache Neg Hx  Hypertension Neg Hx  Lung Disease Neg Hx  Migraines Neg Hx  Psychiatric Disorder Neg Hx  Stroke Neg Hx  Mental Retardation Neg Hx  Thyroid Disease Neg Hx Social History Socioeconomic History  Marital status:  Spouse name: Not on file  Number of children: Not on file  Years of education: Not on file  Highest education level: Not on file Social Needs  Financial resource strain: Not on file  Food insecurity - worry: Not on file  Food insecurity - inability: Not on file  Transportation needs - medical: Not on file  Transportation needs - non-medical: Not on file Occupational History  Not on file Tobacco Use  Smoking status: Former Smoker Types: Cigarettes Start date: 1961 Last attempt to quit: 1964 Years since quittin.4  Smokeless tobacco: Never Used  Tobacco comment: 2 packs a wk Substance and Sexual Activity  Alcohol use: Yes Alcohol/week: 0.0 oz  
  Comment: rarely  Drug use: No  
 Sexual activity: No  
Other Topics Concern  Not on file Social History Narrative Pt , lives with . Retired. She worked as an . She has 1 dog and 1 cat at home. ALLERGIES: Sulfa (sulfonamide antibiotics); Other medication; and Tape [adhesive] Review of Systems Constitutional: Negative for chills and fever. HENT: Negative for rhinorrhea and sore throat. Eyes: Negative for discharge and redness. Respiratory: Negative for cough and shortness of breath. Cardiovascular: Negative for chest pain and palpitations. Gastrointestinal: Positive for diarrhea, nausea and vomiting. Negative for abdominal pain. Genitourinary: Negative for dysuria and enuresis. Musculoskeletal: Negative for arthralgias and back pain. Skin: Negative for rash. Neurological: Negative for dizziness and headaches. All other systems reviewed and are negative. Vitals:  
 18 2136 BP: 118/89 Pulse: 89 Resp: 20 Temp: 98.9 °F (37.2 °C) SpO2: 99% Weight: 63.5 kg (140 lb) Height: 5' 3\" (1.6 m) Physical Exam  
Constitutional: She is oriented to person, place, and time. She appears well-developed and well-nourished. HENT:  
Head: Normocephalic and atraumatic. Eyes: Conjunctivae are normal. Pupils are equal, round, and reactive to light. Right eye exhibits no discharge. Left eye exhibits no discharge. No scleral icterus. Neck: Normal range of motion. Neck supple. Cardiovascular: Normal rate, regular rhythm and normal heart sounds. Exam reveals no gallop. No murmur heard. Pulmonary/Chest: Effort normal and breath sounds normal. No respiratory distress. She has no wheezes. She has no rales. Abdominal: Soft. Bowel sounds are normal. There is no tenderness. There is no guarding. Musculoskeletal: Normal range of motion. She exhibits no edema. Neurological: She is alert and oriented to person, place, and time. She exhibits normal muscle tone. cni 2-12 grossly Skin: Skin is warm and dry. She is not diaphoretic. Psychiatric: She has a normal mood and affect. Her behavior is normal.  
Nursing note and vitals reviewed. MDM Number of Diagnoses or Management Options Diagnosis management comments: Medical decision making note: 
Nausea vomiting diarrhea. Benign examination Suspect viral process. Ansley Lord check labs, administer antiemetics and antidiarrheals and then attempt a by mouth challenge afterwards. This concludes the \"medical decision making note\" part of this emergency department visit note. Procedures

## 2018-11-18 NOTE — ED NOTES
Pt presents to the ED for abd pain with nausea, vomiting and diarrhea. Came in tonight with projectile vomiting and explosive diarrhea

## 2018-11-18 NOTE — DISCHARGE INSTRUCTIONS
levsin as needed for cramps  zofran as needed for nausea/vomiting  Immodium, at home as needed for diarrhea    Return to the e.r. If worse in any way           Diarrhea: Care Instructions  Your Care Instructions    Diarrhea is loose, watery stools (bowel movements). The exact cause is often hard to find. Sometimes diarrhea is your body's way of getting rid of what caused an upset stomach. Viruses, food poisoning, and many medicines can cause diarrhea. Some people get diarrhea in response to emotional stress, anxiety, or certain foods. Almost everyone has diarrhea now and then. It usually isn't serious, and your stools will return to normal soon. The important thing to do is replace the fluids you have lost, so you can prevent dehydration. The doctor has checked you carefully, but problems can develop later. If you notice any problems or new symptoms, get medical treatment right away. Follow-up care is a key part of your treatment and safety. Be sure to make and go to all appointments, and call your doctor if you are having problems. It's also a good idea to know your test results and keep a list of the medicines you take. How can you care for yourself at home? · Watch for signs of dehydration, which means your body has lost too much water. Dehydration is a serious condition and should be treated right away. Signs of dehydration are:  ? Increasing thirst and dry eyes and mouth. ? Feeling faint or lightheaded. ? Darker urine, and a smaller amount of urine than normal.  · To prevent dehydration, drink plenty of fluids, enough so that your urine is light yellow or clear like water. Choose water and other caffeine-free clear liquids until you feel better. If you have kidney, heart, or liver disease and have to limit fluids, talk with your doctor before you increase the amount of fluids you drink. · Begin eating small amounts of mild foods the next day, if you feel like it.   ? Try yogurt that has live cultures of Lactobacillus. (Check the label.)  ? Avoid spicy foods, fruits, alcohol, and caffeine until 48 hours after all symptoms are gone. ? Avoid chewing gum that contains sorbitol. ? Avoid dairy products (except for yogurt with Lactobacillus) while you have diarrhea and for 3 days after symptoms are gone. · The doctor may recommend that you take over-the-counter medicine, such as loperamide (Imodium), if you still have diarrhea after 6 hours. Read and follow all instructions on the label. Do not use this medicine if you have bloody diarrhea, a high fever, or other signs of serious illness. Call your doctor if you think you are having a problem with your medicine. When should you call for help? Call 911 anytime you think you may need emergency care. For example, call if:    · You passed out (lost consciousness).     · Your stools are maroon or very bloody.    Call your doctor now or seek immediate medical care if:    · You are dizzy or lightheaded, or you feel like you may faint.     · Your stools are black and look like tar, or they have streaks of blood.     · You have new or worse belly pain.     · You have symptoms of dehydration, such as:  ? Dry eyes and a dry mouth. ? Passing only a little dark urine. ? Feeling thirstier than usual.     · You have a new or higher fever.    Watch closely for changes in your health, and be sure to contact your doctor if:    · Your diarrhea is getting worse.     · You see pus in the diarrhea.     · You are not getting better after 2 days (48 hours). Where can you learn more? Go to http://faviola-coni.info/. Enter R665 in the search box to learn more about \"Diarrhea: Care Instructions. \"  Current as of: November 20, 2017  Content Version: 11.8  © 6723-7303 [employer]. Care instructions adapted under license by [employer] (which disclaims liability or warranty for this information).  If you have questions about a medical condition or this instruction, always ask your healthcare professional. [place] [address] any warranty or liability for your use of this information. Nausea and Vomiting: Care Instructions  Your Care Instructions    When you are nauseated, you may feel weak and sweaty and notice a lot of saliva in your mouth. Nausea often leads to vomiting. Most of the time you do not need to worry about nausea and vomiting, but they can be signs of other illnesses. Two common causes of nausea and vomiting are stomach flu and food poisoning. Nausea and vomiting from viral stomach flu will usually start to improve within 24 hours. Nausea and vomiting from food poisoning may last from 12 to 48 hours. The doctor has checked you carefully, but problems can develop later. If you notice any problems or new symptoms, get medical treatment right away. Follow-up care is a key part of your treatment and safety. Be sure to make and go to all appointments, and call your doctor if you are having problems. It's also a good idea to know your test results and keep a list of the medicines you take. How can you care for yourself at home? · To prevent dehydration, drink plenty of fluids, enough so that your urine is light yellow or clear like water. Choose water and other caffeine-free clear liquids until you feel better. If you have kidney, heart, or liver disease and have to limit fluids, talk with your doctor before you increase the amount of fluids you drink. · Rest in bed until you feel better. · When you are able to eat, try clear soups, mild foods, and liquids until all symptoms are gone for 12 to 48 hours. Other good choices include dry toast, crackers, cooked cereal, and gelatin dessert, such as Jell-O. When should you call for help? Call 911 anytime you think you may need emergency care.  For example, call if:    · You passed out (lost consciousness).    Call your doctor now or seek immediate medical care if:    · You have symptoms of dehydration, such as:  ? Dry eyes and a dry mouth. ? Passing only a little dark urine. ? Feeling thirstier than usual.     · You have new or worsening belly pain.     · You have a new or higher fever.     · You vomit blood or what looks like coffee grounds.    Watch closely for changes in your health, and be sure to contact your doctor if:    · You have ongoing nausea and vomiting.     · Your vomiting is getting worse.     · Your vomiting lasts longer than 2 days.     · You are not getting better as expected. Where can you learn more? Go to http://faviola-coni.info/. Enter 25 [id number] in the search box to learn more about \"Nausea and Vomiting: Care Instructions. \"  Current as of: November 20, 2017  Content Version: 11.8  © 2122-2823 [employer]. Care instructions adapted under license by [employer] (which disclaims liability or warranty for this information). If you have questions about a medical condition or this instruction, always ask your healthcare professional. [place] [address] any warranty or liability for your use of this information.

## 2018-11-18 NOTE — ED NOTES
No diarrhea or vomiting since admission to the ED. Pt was able to keep po fluids down without problems

## 2019-04-16 ENCOUNTER — APPOINTMENT (OUTPATIENT)
Dept: CT IMAGING | Age: 84
End: 2019-04-16
Attending: PHYSICIAN ASSISTANT
Payer: MEDICARE

## 2019-04-16 ENCOUNTER — APPOINTMENT (OUTPATIENT)
Dept: GENERAL RADIOLOGY | Age: 84
End: 2019-04-16
Attending: PHYSICIAN ASSISTANT
Payer: MEDICARE

## 2019-04-16 ENCOUNTER — HOSPITAL ENCOUNTER (EMERGENCY)
Age: 84
Discharge: HOME | End: 2019-04-16
Attending: EMERGENCY MEDICINE
Payer: MEDICARE

## 2019-04-16 VITALS
DIASTOLIC BLOOD PRESSURE: 75 MMHG | SYSTOLIC BLOOD PRESSURE: 157 MMHG | HEART RATE: 72 BPM | HEIGHT: 63 IN | OXYGEN SATURATION: 97 % | WEIGHT: 122 LBS | TEMPERATURE: 97.5 F | RESPIRATION RATE: 16 BRPM | BODY MASS INDEX: 21.62 KG/M2
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
## 2019-04-16 DIAGNOSIS — S00.81XA FACIAL ABRASION, INITIAL ENCOUNTER: ICD-10-CM

## 2019-04-16 DIAGNOSIS — T14.8XXA MULTIPLE SKIN TEARS: ICD-10-CM

## 2019-04-16 DIAGNOSIS — H60.332 ACUTE SWIMMER'S EAR OF LEFT SIDE: ICD-10-CM

## 2019-04-16 DIAGNOSIS — S00.03XA HEMATOMA OF FRONTAL SCALP, INITIAL ENCOUNTER: Primary | ICD-10-CM

## 2019-04-16 PROCEDURE — 99285 EMERGENCY DEPT VISIT HI MDM: CPT | Performed by: PHYSICIAN ASSISTANT

## 2019-04-16 PROCEDURE — 74011250637 HC RX REV CODE- 250/637: Performed by: PHYSICIAN ASSISTANT

## 2019-04-16 PROCEDURE — 72125 CT NECK SPINE W/O DYE: CPT

## 2019-04-16 PROCEDURE — 70450 CT HEAD/BRAIN W/O DYE: CPT

## 2019-04-16 PROCEDURE — 70150 X-RAY EXAM OF FACIAL BONES: CPT

## 2019-04-16 RX ORDER — OFLOXACIN 3 MG/ML
10 SOLUTION AURICULAR (OTIC) DAILY
Qty: 5 ML | Refills: 0 | Status: SHIPPED | OUTPATIENT
Start: 2019-04-16 | End: 2019-04-26

## 2019-04-16 RX ORDER — ACETAMINOPHEN 500 MG
1000 TABLET ORAL
Status: COMPLETED | OUTPATIENT
Start: 2019-04-16 | End: 2019-04-16

## 2019-04-16 RX ORDER — IBUPROFEN 400 MG/1
400 TABLET ORAL
Status: COMPLETED | OUTPATIENT
Start: 2019-04-16 | End: 2019-04-16

## 2019-04-16 RX ADMIN — ACETAMINOPHEN 1000 MG: 500 TABLET, FILM COATED ORAL at 13:23

## 2019-04-16 RX ADMIN — IBUPROFEN 400 MG: 400 TABLET ORAL at 14:56

## 2019-04-16 NOTE — DISCHARGE INSTRUCTIONS
Apply ice to nose and forehead for 10-15 minutes, repeating every hour to minimize swelling. Watch skin tears and clean with soapy water after the Steri-Strips fall off. The Steri-Strips if they bother your skin. Apply erythromycin ointment to Wounds to prevent infection. Follow-up with family doctor in 3 days for wound recheck. Take Norco as directed for severe pain. This medication can cause drowsiness so no driving or drinking alcohol. Max dose of acetaminophen is 1000 mg every 6 hours. You can take one regular strength acetaminophen with one Norco every 6 hours for maximal pain relief. You can add up to 400 ibuprofen every 4 hours for additional pain relief. Return to the ER if worse or new symptoms.

## 2019-04-16 NOTE — ED NOTES
Undressed and cleaned pts wounds on left lower leg, two wounds on left forearm, and one wound on right forearm. Avulsion wounds on both forearms. MANOHAR Bledsoe notified.

## 2019-04-16 NOTE — ED TRIAGE NOTES
[facility] brought pt in from home after she fell and hit her head. Pt has skin tears on both arms and right leg.

## 2019-04-16 NOTE — ED NOTES
I have reviewed discharge instructions with the patient and spouse. The patient and spouse verbalized understanding. Patient left ED via Discharge Method: ambulatory to Home with [facility], her . Opportunity for questions and clarification provided. Patient given 2 scripts. To continue your aftercare when you leave the hospital, you may receive an automated call from our care team to check in on how you are doing. This is a free service and part of our promise to provide the best care and service to meet your aftercare needs.  If you have questions, or wish to unsubscribe from this service please call [phone]. Thank you for Choosing our [employer] [facility] Emergency Department.

## 2019-04-16 NOTE — ED PROVIDER NOTES
Patient brought to the ER by EMS. She was walking her dog when the dog slipped out of his collar; patient chased the dog and [place] St[place] head and nose on rocks, denies LOC, visual disturbances, confusion, facial pain other than her nose and NO neck pain BL arms and LLE abrasions. Endorses headache at this time. She was able to walk immediately following the incident. Takes baby aspirin daily. Pt reports Taking herself off her hypertensive medicines, recently saw her cardiologist who is aware. PCP is not aware. States her usual blood pressure at home is 170 over 90s Past Medical History:  
Diagnosis Date  Acute kidney failure 05/2016  Acute pericarditis 2015  Arthritis  CAP (community acquired pneumonia) 5/2016  Coronary artery disease  Crohn's disease  Hypercalcemia  Hypercholesterolemia  Hypertension  Lumbar compression fracture  Osteoporosis  Type 2 diabetes mellitus Past Surgical History:  
Procedure Laterality Date  HX ARTHROPLASTY  1/5/2016  
 left elbow  HX HEART CATHETERIZATION  5/12/2015  
 no intervention [id number]  HX KYPHOPLASTY  [id number]  
 lumbar Family History:  
Problem Relation Age of Onset  Heart Failure Mother  Diabetes Mother  Cancer Father   
     throat  Alcohol abuse Neg Hx  Arthritis-rheumatoid Neg Hx  Asthma Neg Hx  Bleeding Prob Neg Hx  Elevated Lipids Neg Hx   
 Headache Neg Hx  Hypertension Neg Hx  Lung Disease Neg Hx  Migraines Neg Hx  Psychiatric Disorder Neg Hx  Stroke Neg Hx  Mental Retardation Neg Hx  Thyroid Disease Neg Hx Social History Socioeconomic History  Marital status:  Spouse name: Not on file  Number of children: Not on file  Years of education: Not on file  Highest education level: Not on file Occupational History  Not on file Social Needs  Financial resource strain: Not on file  Food insecurity:  
  Worry: Not on file Inability: Not on file  Transportation needs:  
  Medical: Not on file Non-medical: Not on file Tobacco Use  Smoking status: Former Smoker Types: Cigarettes Start date: 1961 Last attempt to quit: 1964 Years since quittin.8  Smokeless tobacco: Never Used  Tobacco comment: 2 packs a wk Substance and Sexual Activity  Alcohol use: Yes Alcohol/week: 0.0 oz  
  Comment: rarely  Drug use: No  
 Sexual activity: Never Lifestyle  Physical activity:  
  Days per week: Not on file Minutes per session: Not on file  Stress: Not on file Relationships  Social connections:  
  Talks on phone: Not on file Gets together: Not on file Attends Restoration service: Not on file Active member of club or organization: Not on file Attends meetings of clubs or organizations: Not on file Relationship status: Not on file  Intimate partner violence:  
  Fear of current or ex partner: Not on file Emotionally abused: Not on file Physically abused: Not on file Forced sexual activity: Not on file Other Topics Concern  Not on file Social History Narrative Pt , lives with . Retired. She worked as an . She has 1 dog and 1 cat at home. ALLERGIES: Sulfa (sulfonamide antibiotics); Other medication; and Tape [adhesive] Review of Systems Constitutional: Negative for chills, diaphoresis and fatigue. Eyes: Negative for photophobia and itching. Respiratory: Negative for cough, shortness of breath and stridor. Musculoskeletal: Positive for arthralgias. Negative for neck pain. Skin: Positive for wound. Negative for color change. Vitals:  
 19 1202 BP: (!) 198/93 Pulse: 80 Resp: 16 Temp: 98 °F (36.7 °C) SpO2: 95% Weight: 55.3 kg (122 lb) Height: 5' 3\" (1.6 m) Physical Exam  
Constitutional: She appears well-developed and well-nourished. Elderly appearing white female. Alert and oriented ×3, friend at bedside. Answering appropriately. Moving neck in all limbs independently without displays a pain. HENT:  
Head: Normocephalic and atraumatic. Right Ear: External ear normal.  
Nose: Nose normal.  
Mouth/Throat: Oropharynx is clear and moist.  
No hemotympanum right ear canal, left is being irrigated by nursing. Will recheck. Oropharynx clear, no septal hematoma. Bridge of nose with superficial abrasion and localized swelling. No epistaxis No other facial tenderness to palpation. Eyes: Pupils are equal, round, and reactive to light. Conjunctivae and EOM are normal.  
Neck: Normal range of motion. No midline tenderness. No pain with range of motion. Pulmonary/Chest: Effort normal and breath sounds normal. She exhibits no tenderness. Abdominal: Soft. Bowel sounds are normal. She exhibits no distension. There is no tenderness. Musculoskeletal: Normal range of motion. She exhibits tenderness. Skin: Laceration noted. Nursing note and vitals reviewed. MDM Number of Diagnoses or Management Options Acute swimmer's ear of left side: new and requires workup Facial abrasion, initial encounter: new and requires workup Fall, initial encounter: new and requires workup Hematoma of frontal scalp, initial encounter: new and requires workup Multiple skin tears: new and requires workup Amount and/or Complexity of Data Reviewed Tests in the radiology section of CPT®: ordered and reviewed Risk of Complications, Morbidity, and/or Mortality Presenting problems: moderate Diagnostic procedures: moderate Management options: moderate General comments: Will order images to rule out intracranial or neck injury & facial trauma. No bony tenderness on upper or lower extremities, no x-rays ordered. Patient's blood pressure elevated, but she took herself off her bp meds several weeks ago. Patient states she feels better than she has while being on the drugs. Her cardiologist is reportedly aware. CT head and neck and facial bone x-ray negative for acute fracture. Patient complaining of headache after 1 g of Tylenol. We'll give 400 ibuprofen. 5 mg Norco written as a prescription along with ofloxacin for otitis externa left ear. Return precautions discussed, has been at bedside. Patient encouraged to remove Steri-Strips if they irritate her skin. Follow-up with PCP in 2-3 days. Patient Progress Patient progress: stable Procedures

## 2019-04-17 RX ORDER — HYDROCODONE BITARTRATE AND ACETAMINOPHEN 5; 325 MG/1; MG/1
1 TABLET ORAL
Qty: 11 TAB | Refills: 0 | Status: SHIPPED | OUTPATIENT
Start: 2019-04-17 | End: 2019-04-22

## 2019-05-02 ENCOUNTER — HOSPITAL ENCOUNTER (OUTPATIENT)
Dept: GENERAL RADIOLOGY | Age: 84
Discharge: HOME | End: 2019-05-02
Payer: MEDICARE

## 2019-05-02 DIAGNOSIS — J06.9 ACUTE UPPER RESPIRATORY INFECTION: ICD-10-CM

## 2019-05-02 PROCEDURE — 71046 X-RAY EXAM CHEST 2 VIEWS: CPT

## 2019-07-05 ENCOUNTER — APPOINTMENT (OUTPATIENT)
Dept: CT IMAGING | Age: 84
End: 2019-07-05
Attending: EMERGENCY MEDICINE
Payer: MEDICARE

## 2019-07-05 ENCOUNTER — APPOINTMENT (OUTPATIENT)
Dept: GENERAL RADIOLOGY | Age: 84
End: 2019-07-05
Attending: EMERGENCY MEDICINE
Payer: MEDICARE

## 2019-07-05 ENCOUNTER — HOSPITAL ENCOUNTER (EMERGENCY)
Age: 84
Discharge: HOME | End: 2019-07-05
Attending: EMERGENCY MEDICINE | Admitting: EMERGENCY MEDICINE
Payer: MEDICARE

## 2019-07-05 VITALS
BODY MASS INDEX: 25.89 KG/M2 | WEIGHT: 120 LBS | DIASTOLIC BLOOD PRESSURE: 82 MMHG | HEART RATE: 78 BPM | HEIGHT: 57 IN | OXYGEN SATURATION: 97 % | TEMPERATURE: 98.3 F | SYSTOLIC BLOOD PRESSURE: 164 MMHG | RESPIRATION RATE: 16 BRPM
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
## 2019-07-05 DIAGNOSIS — S20.219A CONTUSION OF CHEST WALL, INITIAL ENCOUNTER: ICD-10-CM

## 2019-07-05 DIAGNOSIS — S16.1XXA ACUTE CERVICAL MYOFASCIAL STRAIN, INITIAL ENCOUNTER: ICD-10-CM

## 2019-07-05 DIAGNOSIS — S56.522A LACERATION OF EXTENSOR TENDON OF LEFT FOREARM, INITIAL ENCOUNTER: ICD-10-CM

## 2019-07-05 PROCEDURE — 72125 CT NECK SPINE W/O DYE: CPT

## 2019-07-05 PROCEDURE — 74011250637 HC RX REV CODE- 250/637: Performed by: EMERGENCY MEDICINE

## 2019-07-05 PROCEDURE — 99284 EMERGENCY DEPT VISIT MOD MDM: CPT | Performed by: EMERGENCY MEDICINE

## 2019-07-05 PROCEDURE — 73090 X-RAY EXAM OF FOREARM: CPT

## 2019-07-05 PROCEDURE — 93005 ELECTROCARDIOGRAM TRACING: CPT | Performed by: EMERGENCY MEDICINE

## 2019-07-05 PROCEDURE — 71046 X-RAY EXAM CHEST 2 VIEWS: CPT

## 2019-07-05 PROCEDURE — 75810000293 HC SIMP/SUPERF WND  RPR: Performed by: EMERGENCY MEDICINE

## 2019-07-05 RX ORDER — HYDROCODONE BITARTRATE AND ACETAMINOPHEN 5; 325 MG/1; MG/1
1 TABLET ORAL
Qty: 10 TAB | Refills: 0 | Status: SHIPPED | OUTPATIENT
Start: 2019-07-05 | End: 2019-07-08

## 2019-07-05 RX ORDER — HYDROCODONE BITARTRATE AND ACETAMINOPHEN 7.5; 325 MG/1; MG/1
1 TABLET ORAL
Status: COMPLETED | OUTPATIENT
Start: 2019-07-05 | End: 2019-07-05

## 2019-07-05 RX ADMIN — HYDROCODONE BITARTRATE AND ACETAMINOPHEN 1 TABLET: 7.5; 325 TABLET ORAL at 17:58

## 2019-07-05 NOTE — DISCHARGE INSTRUCTIONS
Tylenol or prescription pain medication but not both at the same time. Rest.  May try some heat to your neck. May try cool compresses to your breast and chest wall. Call your primary care doctor to recheck next week. Recheck sooner for worse pain or shortness of breath or fever. Also have your doctor recheck the laceration on the left forearm. Patient Education        Neck Strain: Care Instructions  Your Care Instructions    You have strained the muscles and ligaments in your neck. A sudden, awkward movement can strain the neck. This often occurs with falls or car accidents or during certain sports. Everyday activities like working on a computer or sleeping can also cause neck strain if they force you to hold your neck in an awkward position for a long time. It is common for neck pain to get worse for a day or two after an injury, but it should start to feel better after that. You may have more pain and stiffness for several days before it gets better. This is expected. It may take a few weeks or longer for it to heal completely. Good home treatment can help you get better faster and avoid future neck problems. Follow-up care is a key part of your treatment and safety. Be sure to make and go to all appointments, and call your doctor if you are having problems. It's also a good idea to know your test results and keep a list of the medicines you take. How can you care for yourself at home? · If you were given a neck brace (cervical collar) to limit neck motion, wear it as instructed for as many days as your doctor tells you to. Do not wear it longer than you were told to. Wearing a brace for too long can make neck stiffness worse and weaken the neck muscles. · You can try using heat or ice to see if it helps. ? Try using a heating pad on a low or medium setting for 15 to 20 minutes every 2 to 3 hours. Try a warm shower in place of one session with the heating pad.  You can also buy single-use heat wraps that last up to 8 hours. ? You can also try an ice pack for 10 to 15 minutes every 2 to 3 hours. · Take pain medicines exactly as directed. ? If the doctor gave you a prescription medicine for pain, take it as prescribed. ? If you are not taking a prescription pain medicine, ask your doctor if you can take an over-the-counter medicine. · Gently rub the area to relieve pain and help with blood flow. Do not massage the area if it hurts to do so. · Do not do anything that makes the pain worse. Take it easy for a couple of days. You can do your usual activities if they do not hurt your neck or put it at risk for more stress or injury. · Try sleeping on a special neck pillow. Place it under your neck, not under your head. Placing a tightly rolled-up towel under your neck while you sleep will also work. If you use a neck pillow or rolled towel, do not use your regular pillow at the same time. · To prevent future neck pain, do exercises to stretch and strengthen your neck and back. Learn how to use good posture, safe lifting techniques, and proper body mechanics. When should you call for help? Call 911 anytime you think you may need emergency care. For example, call if:    · You are unable to move an arm or a leg at all.   [facility] your doctor now or seek immediate medical care if:    · You have new or worse symptoms in your arms, legs, chest, belly, or buttocks. Symptoms may include:  ? Numbness or tingling. ? Weakness. ? Pain.     · You lose bladder or bowel control.    Watch closely for changes in your health, and be sure to contact your doctor if:    · You are not getting better as expected. Where can you learn more? Go to http://faviola-coni.info/. Enter M253 in the search box to learn more about \"Neck Strain: Care Instructions. \"  Current as of: September 20, 2018  Content Version: 11.9  © 0615-9334 [employer].  Care instructions adapted under license by [employer] (which disclaims liability or warranty for this information). If you have questions about a medical condition or this instruction, always ask your healthcare professional. [place] [address] any warranty or liability for your use of this information. Patient Education        Chest Contusion: Care Instructions  Your Care Instructions  A chest contusion, or bruise, is caused by a fall or direct blow to the chest. Car crashes, falls, getting punched, and injury from bicycle handlebars are common causes of chest contusions. A very forceful blow to the chest can injure the heart or blood vessels in the chest, the lungs, the airway, the liver, or the spleen. Pain may be caused by an injury to muscles, cartilage, or ribs. Deep breathing, coughing, or sneezing can increase your pain. Lying on the injured area also can cause pain. Follow-up care is a key part of your treatment and safety. Be sure to make and go to all appointments, and call your doctor if you are having problems. It's also a good idea to know your test results and keep a list of the medicines you take. How can you care for yourself at home? · Rest and protect the injured or sore area. Stop, change, or take a break from any activity that may be causing your pain. · Put ice or a cold pack on the area for 10 to 20 minutes at a time. Put a thin cloth between the ice and your skin. · After 2 or 3 days, if your swelling is gone, apply a heating pad set on low or a warm cloth to your chest. Some doctors suggest that you go back and forth between hot and cold. Put a thin cloth between the heating pad and your skin. · Do not wrap or tape your ribs for support. This may cause you to take smaller breaths, which could increase your risk of pneumonia and lung collapse. · Ask your doctor if you can take an over-the-counter pain medicine, such as acetaminophen (Tylenol), ibuprofen (Advil, Motrin), or naproxen (Aleve).  Be safe with medicines. Read and follow all instructions on the label. · Take your medicines exactly as prescribed. Call your doctor if you think you are having a problem with your medicine. · Gentle stretching and massage may help you feel better after a few days of rest. Stretch slowly to the point just before discomfort begins, then hold the stretch for at least 15 to 30 seconds. Do this 3 or 4 times per day. · As your pain gets better, slowly return to your normal activities. Be patient, because chest bruises can take weeks or months to heal. Any increased pain may be a sign that you need to rest a while longer. When should you call for help? Call 911 anytime you think you may need emergency care. For example, call if:    · You have severe trouble breathing.     · You cough up blood.    Call your doctor now or seek immediate medical care if:    · You have belly pain.     · You are dizzy or lightheaded, or you feel like you may faint.     · You develop new symptoms with the chest pain.     · Your chest pain gets worse.     · You have a fever.     · You have some shortness of breath.     · You have a cough that brings up mucus from the lungs.    Watch closely for changes in your health, and be sure to contact your doctor if:    · Your chest pain is not improving after 1 week. Where can you learn more? Go to http://faviola-coni.info/. Enter I174 in the search box to learn more about \"Chest Contusion: Care Instructions. \"  Current as of: September 23, 2018  Content Version: 11.9  © 6655-0111 [employer]. Care instructions adapted under license by [employer] (which disclaims liability or warranty for this information). If you have questions about a medical condition or this instruction, always ask your healthcare professional. [place] [address] any warranty or liability for your use of this information.        Patient Education        Skin Tears: Care Instructions  Your Care Instructions  As we get older, our skin gets drier and more fragile. Sometimes this can cause the outer layers of skin to split and tear open. Skin tears are treated in different ways. In some cases, doctors use pieces of tape called Steri-Strips to pull the skin together and help it heal. Other times, it's best to leave the tear open and cover it with a special wound-care bandage. Skin tears are usually not serious. They usually heal in a few weeks. But how long you take to heal depends on your body and the type of tear you have. Sometimes the torn piece of skin is used to protect the wound while it heals. But that piece of skin does not heal. It may fall off on its own. Or the doctor may remove it. As your tear heals, it's important to keep it clean to help prevent infection. The doctor has checked you carefully, but problems can develop later. If you notice any problems or new symptoms, get medical treatment right away. Follow-up care is a key part of your treatment and safety. Be sure to make and go to all appointments, and call your doctor if you are having problems. It's also a good idea to know your test results and keep a list of the medicines you take. How can you care for yourself at home? · If you have pain, ask your doctor if you can take an over-the-counter pain medicine, such as acetaminophen (Tylenol), ibuprofen (Advil, Motrin), or naproxen (Aleve). Be safe with medicines. Read and follow all instructions on the label. · If you have a bandage, follow your doctor's instructions for changing it. · If you have Steri-Strips, leave them on until they fall off. · Follow your doctor's instructions about bathing. · Gently wash the skin tear with plain water 2 times a day. Do not rub the area. · Let the area air dry. Or you can pat it carefully with a soft towel. When should you call for help?   Call your doctor now or seek immediate medical care if:    · You have signs of infection, such as:  ? Increased pain, swelling, warmth, or redness around the tear. ? Red streaks leading from the tear. ? Pus draining from the tear. ? A fever.     · The tear starts to bleed a lot. Small amounts of blood are normal.    Watch closely for changes in your health, and be sure to contact your doctor if:    · You do not get better as expected. Where can you learn more? Go to http://faviola-coni.info/. Enter K087 in the search box to learn more about \"Skin Tears: Care Instructions. \"  Current as of: September 23, 2018  Content Version: 11.9  © 5139-6451 [employer]. Care instructions adapted under license by [employer] (which disclaims liability or warranty for this information). If you have questions about a medical condition or this instruction, always ask your healthcare professional. [address] any warranty or liability for your use of this information.
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
## 2019-07-05 NOTE — ED NOTES
I have reviewed discharge instructions with the patient and spouse. The patient and spouse verbalized understanding. Patient left ED via Discharge Method: ambulatory to Home with her  Sarah Mckeon.    Opportunity for questions and clarification provided. Patient given 1 scripts. To continue your aftercare when you leave the hospital, you may receive an automated call from our care team to check in on how you are doing. This is a free service and part of our promise to provide the best care and service to meet your aftercare needs.  If you have questions, or wish to unsubscribe from this service please call [phone]. Thank you for Choosing our [facility] Emergency Department.

## 2019-07-06 LAB
ATRIAL RATE: 83 BPM
CALCULATED P AXIS, ECG09: 76 DEGREES
CALCULATED R AXIS, ECG10: -14 DEGREES
CALCULATED T AXIS, ECG11: -25 DEGREES
DIAGNOSIS, 93000: NORMAL
P-R INTERVAL, ECG05: 154 MS
Q-T INTERVAL, ECG07: 372 MS
QRS DURATION, ECG06: 86 MS
QTC CALCULATION (BEZET), ECG08: 437 MS
VENTRICULAR RATE, ECG03: 83 BPM

## 2020-05-13 ENCOUNTER — HOSPITAL ENCOUNTER (OUTPATIENT)
Dept: LAB | Age: 85
Discharge: HOME | End: 2020-05-13

## 2020-05-13 PROCEDURE — 88305 TISSUE EXAM BY PATHOLOGIST: CPT

## 2020-12-14 ENCOUNTER — APPOINTMENT (OUTPATIENT)
Dept: CT IMAGING | Age: 85
End: 2020-12-14
Attending: STUDENT IN AN ORGANIZED HEALTH CARE EDUCATION/TRAINING PROGRAM
Payer: MEDICARE

## 2020-12-14 ENCOUNTER — APPOINTMENT (OUTPATIENT)
Dept: GENERAL RADIOLOGY | Age: 85
End: 2020-12-14
Attending: STUDENT IN AN ORGANIZED HEALTH CARE EDUCATION/TRAINING PROGRAM
Payer: MEDICARE

## 2020-12-14 ENCOUNTER — HOSPITAL ENCOUNTER (EMERGENCY)
Age: 85
Discharge: HOME | End: 2020-12-14
Attending: STUDENT IN AN ORGANIZED HEALTH CARE EDUCATION/TRAINING PROGRAM
Payer: MEDICARE

## 2020-12-14 VITALS
TEMPERATURE: 98.3 F | DIASTOLIC BLOOD PRESSURE: 64 MMHG | SYSTOLIC BLOOD PRESSURE: 136 MMHG | OXYGEN SATURATION: 94 % | HEIGHT: 57 IN | HEART RATE: 75 BPM | WEIGHT: 116 LBS | BODY MASS INDEX: 25.03 KG/M2 | RESPIRATION RATE: 18 BRPM

## 2020-12-14 DIAGNOSIS — K29.90 GASTRITIS AND DUODENITIS: Primary | ICD-10-CM

## 2020-12-14 LAB
ALBUMIN SERPL-MCNC: 3.2 G/DL (ref 3.2–4.6)
ALBUMIN/GLOB SERPL: 1 {RATIO} (ref 1.2–3.5)
ALP SERPL-CCNC: 75 U/L (ref 50–136)
ALT SERPL-CCNC: 17 U/L (ref 12–65)
ANION GAP SERPL CALC-SCNC: 8 MMOL/L (ref 7–16)
AST SERPL-CCNC: 16 U/L (ref 15–37)
ATRIAL RATE: 77 BPM
BASOPHILS # BLD: 0.1 K/UL (ref 0–0.2)
BASOPHILS NFR BLD: 1 % (ref 0–2)
BILIRUB SERPL-MCNC: 1.1 MG/DL (ref 0.2–1.1)
BUN SERPL-MCNC: 28 MG/DL (ref 8–23)
CALCIUM SERPL-MCNC: 8.9 MG/DL (ref 8.3–10.4)
CALCULATED P AXIS, ECG09: 37 DEGREES
CALCULATED R AXIS, ECG10: -12 DEGREES
CALCULATED T AXIS, ECG11: -22 DEGREES
CHLORIDE SERPL-SCNC: 101 MMOL/L (ref 98–107)
CO2 SERPL-SCNC: 20 MMOL/L (ref 21–32)
CREAT SERPL-MCNC: 1.09 MG/DL (ref 0.6–1)
DIAGNOSIS, 93000: NORMAL
DIFFERENTIAL METHOD BLD: ABNORMAL
EOSINOPHIL # BLD: 0 K/UL (ref 0–0.8)
EOSINOPHIL NFR BLD: 0 % (ref 0.5–7.8)
ERYTHROCYTE [DISTWIDTH] IN BLOOD BY AUTOMATED COUNT: 12.9 % (ref 11.9–14.6)
GLOBULIN SER CALC-MCNC: 3.1 G/DL (ref 2.3–3.5)
GLUCOSE SERPL-MCNC: 412 MG/DL (ref 65–100)
HCT VFR BLD AUTO: 44.6 % (ref 35.8–46.3)
HGB BLD-MCNC: 15.3 G/DL (ref 11.7–15.4)
IMM GRANULOCYTES # BLD AUTO: 0.5 K/UL (ref 0–0.5)
IMM GRANULOCYTES NFR BLD AUTO: 3 % (ref 0–5)
LIPASE SERPL-CCNC: 97 U/L (ref 73–393)
LYMPHOCYTES # BLD: 1.4 K/UL (ref 0.5–4.6)
LYMPHOCYTES NFR BLD: 8 % (ref 13–44)
MCH RBC QN AUTO: 30.8 PG (ref 26.1–32.9)
MCHC RBC AUTO-ENTMCNC: 34.3 G/DL (ref 31.4–35)
MCV RBC AUTO: 89.9 FL (ref 79.6–97.8)
MONOCYTES # BLD: 0.8 K/UL (ref 0.1–1.3)
MONOCYTES NFR BLD: 5 % (ref 4–12)
NEUTS SEG # BLD: 14.2 K/UL (ref 1.7–8.2)
NEUTS SEG NFR BLD: 83 % (ref 43–78)
NRBC # BLD: 0 K/UL (ref 0–0.2)
P-R INTERVAL, ECG05: 96 MS
PLATELET # BLD AUTO: 262 K/UL (ref 150–450)
PMV BLD AUTO: 10.4 FL (ref 9.4–12.3)
POTASSIUM SERPL-SCNC: 4.5 MMOL/L (ref 3.5–5.1)
PROT SERPL-MCNC: 6.3 G/DL (ref 6.3–8.2)
Q-T INTERVAL, ECG07: 370 MS
QRS DURATION, ECG06: 88 MS
QTC CALCULATION (BEZET), ECG08: 410 MS
RBC # BLD AUTO: 4.96 M/UL (ref 4.05–5.2)
SODIUM SERPL-SCNC: 129 MMOL/L (ref 136–145)
TROPONIN-HIGH SENSITIVITY: 18.3 PG/ML (ref 0–14)
TROPONIN-HIGH SENSITIVITY: 22 PG/ML (ref 0–14)
VENTRICULAR RATE, ECG03: 74 BPM
WBC # BLD AUTO: 17.1 K/UL (ref 4.3–11.1)

## 2020-12-14 PROCEDURE — 74011250636 HC RX REV CODE- 250/636: Performed by: STUDENT IN AN ORGANIZED HEALTH CARE EDUCATION/TRAINING PROGRAM

## 2020-12-14 PROCEDURE — 85025 COMPLETE CBC W/AUTO DIFF WBC: CPT

## 2020-12-14 PROCEDURE — 93005 ELECTROCARDIOGRAM TRACING: CPT | Performed by: STUDENT IN AN ORGANIZED HEALTH CARE EDUCATION/TRAINING PROGRAM

## 2020-12-14 PROCEDURE — 74011000258 HC RX REV CODE- 258: Performed by: STUDENT IN AN ORGANIZED HEALTH CARE EDUCATION/TRAINING PROGRAM

## 2020-12-14 PROCEDURE — 71045 X-RAY EXAM CHEST 1 VIEW: CPT

## 2020-12-14 PROCEDURE — 80053 COMPREHEN METABOLIC PANEL: CPT

## 2020-12-14 PROCEDURE — 74177 CT ABD & PELVIS W/CONTRAST: CPT

## 2020-12-14 PROCEDURE — 84484 ASSAY OF TROPONIN QUANT: CPT

## 2020-12-14 PROCEDURE — 74011000636 HC RX REV CODE- 636: Performed by: STUDENT IN AN ORGANIZED HEALTH CARE EDUCATION/TRAINING PROGRAM

## 2020-12-14 PROCEDURE — 83690 ASSAY OF LIPASE: CPT

## 2020-12-14 PROCEDURE — 99284 EMERGENCY DEPT VISIT MOD MDM: CPT

## 2020-12-14 PROCEDURE — 74011000250 HC RX REV CODE- 250: Performed by: STUDENT IN AN ORGANIZED HEALTH CARE EDUCATION/TRAINING PROGRAM

## 2020-12-14 PROCEDURE — 74011250637 HC RX REV CODE- 250/637: Performed by: STUDENT IN AN ORGANIZED HEALTH CARE EDUCATION/TRAINING PROGRAM

## 2020-12-14 RX ORDER — MAG HYDROX/ALUMINUM HYD/SIMETH 200-200-20
30 SUSPENSION, ORAL (FINAL DOSE FORM) ORAL
Status: COMPLETED | OUTPATIENT
Start: 2020-12-14 | End: 2020-12-14

## 2020-12-14 RX ORDER — PANTOPRAZOLE SODIUM 40 MG/1
40 TABLET, DELAYED RELEASE ORAL ONCE
Status: COMPLETED | OUTPATIENT
Start: 2020-12-14 | End: 2020-12-14

## 2020-12-14 RX ORDER — AMOXICILLIN AND CLAVULANATE POTASSIUM 875; 125 MG/1; MG/1
1 TABLET, FILM COATED ORAL 2 TIMES DAILY
Qty: 14 TAB | Refills: 0 | Status: ON HOLD | OUTPATIENT
Start: 2020-12-14 | End: 2021-03-05

## 2020-12-14 RX ORDER — LIDOCAINE HYDROCHLORIDE 20 MG/ML
15 SOLUTION OROPHARYNGEAL
Status: COMPLETED | OUTPATIENT
Start: 2020-12-14 | End: 2020-12-14

## 2020-12-14 RX ORDER — SODIUM CHLORIDE 0.9 % (FLUSH) 0.9 %
10 SYRINGE (ML) INJECTION
Status: COMPLETED | OUTPATIENT
Start: 2020-12-14 | End: 2020-12-14

## 2020-12-14 RX ORDER — PANTOPRAZOLE SODIUM 40 MG/1
40 TABLET, DELAYED RELEASE ORAL DAILY
Qty: 20 TAB | Refills: 0 | Status: SHIPPED | OUTPATIENT
Start: 2020-12-14 | End: 2021-01-03

## 2020-12-14 RX ADMIN — LIDOCAINE HYDROCHLORIDE 15 ML: 20 SOLUTION ORAL; TOPICAL at 15:41

## 2020-12-14 RX ADMIN — IOPAMIDOL 100 ML: 755 INJECTION, SOLUTION INTRAVENOUS at 14:29

## 2020-12-14 RX ADMIN — ALUMINUM HYDROXIDE, MAGNESIUM HYDROXIDE, AND SIMETHICONE 30 ML: 200; 200; 20 SUSPENSION ORAL at 15:41

## 2020-12-14 RX ADMIN — Medication 10 ML: at 14:29

## 2020-12-14 RX ADMIN — SODIUM CHLORIDE 100 ML: 900 INJECTION, SOLUTION INTRAVENOUS at 14:29

## 2020-12-14 RX ADMIN — SODIUM CHLORIDE 250 ML: 900 INJECTION, SOLUTION INTRAVENOUS at 13:43

## 2020-12-14 RX ADMIN — PANTOPRAZOLE SODIUM 40 MG: 40 TABLET, DELAYED RELEASE ORAL at 15:41

## 2020-12-14 NOTE — ED NOTES
I have reviewed discharge instructions with the patient. The patient verbalized understanding. Patient left ED via Discharge Method: ambulatory to Home with self. Opportunity for questions and clarification provided. Patient given 2 scripts. To continue your aftercare when you leave the hospital, you may receive an automated call from our care team to check in on how you are doing. This is a free service and part of our promise to provide the best care and service to meet your aftercare needs.  If you have questions, or wish to unsubscribe from this service please call [phone]. Thank you for Choosing our [facility] Emergency Department.

## 2020-12-14 NOTE — ED PROVIDER NOTES
Patient is 79-year-old female presents to [facility] emergency department complaining of intermittent sharp epigastric and retrosternal chest/abdominal pain. States the pain has been intermittent over the last 2 days. States the pain last no longer than 10 minutes when it occurs. She had nausea, no vomiting. Denies significant shortness of breath or diaphoresis. States she is most of the time resting when it occurs, denies worsening factors, denies any exertional element of the pain. Denies any radiation. Denies any improving factors. She denies cough, congestion, diarrhea, constipation or melena. Patient does have history of CAD, Dr. Amna Freeman performed a cardiac catheterization in 2015.            Past Medical History:   Diagnosis Date    Acute kidney failure 05/2016    Acute pericarditis 2015    Arthritis     CAP (community acquired pneumonia) 5/2016    Coronary artery disease     Crohn's disease     Hypercalcemia     Hypercholesterolemia     Hypertension     Lumbar compression fracture     Osteoporosis     Type 2 diabetes mellitus        Past Surgical History:   Procedure Laterality Date    HX ARTHROPLASTY  1/5/2016    left elbow    HX HEART CATHETERIZATION  5/12/2015    no intervention    HX HYSTERECTOMY  1978    HX KYPHOPLASTY  ~2002    lumbar         Family History:   Problem Relation Age of Onset    Heart Failure Mother     Diabetes Mother     Cancer Father         throat    Alcohol abuse Neg Hx     Arthritis-rheumatoid Neg Hx     Asthma Neg Hx     Bleeding Prob Neg Hx     Elevated Lipids Neg Hx     Headache Neg Hx     Hypertension Neg Hx     Lung Disease Neg Hx     Migraines Neg Hx     Psychiatric Disorder Neg Hx     Stroke Neg Hx     Mental Retardation Neg Hx     Thyroid Disease Neg Hx        Social History     Socioeconomic History    Marital status:      Spouse name: Not on file    Number of children: Not on file    Years of education: Not on file    Highest education level: Not on file   Occupational History    Not on file   Social Needs    Financial resource strain: Not on file    Food insecurity     Worry: Not on file     Inability: Not on file    Transportation needs     Medical: Not on file     Non-medical: Not on file   Tobacco Use    Smoking status: Former Smoker     Types: Cigarettes     Start date: 1961     Last attempt to quit: 1964     Years since quittin.5    Smokeless tobacco: Never Used    Tobacco comment: 2 packs a wk   Substance and Sexual Activity    Alcohol use: Yes     Alcohol/week: 0.0 standard drinks     Comment: rarely    Drug use: No    Sexual activity: Never   Lifestyle    Physical activity     Days per week: Not on file     Minutes per session: Not on file    Stress: Not on file   Relationships    Social connections     Talks on phone: Not on file     Gets together: Not on file     Attends Mormonism service: Not on file     Active member of club or organization: Not on file     Attends meetings of clubs or organizations: Not on file     Relationship status: Not on file    Intimate partner violence     Fear of current or ex partner: Not on file     Emotionally abused: Not on file     Physically abused: Not on file     Forced sexual activity: Not on file   Other Topics Concern    Not on file   Social History Narrative    Pt , lives with . Retired. She worked as an . She has 1 dog and 1 cat at home. ALLERGIES: Sulfa (sulfonamide antibiotics); Other medication; and Tape [adhesive]    Review of Systems   Constitutional: Negative for chills, fatigue and fever. HENT: Negative for facial swelling and sore throat. Eyes: Negative for visual disturbance. Respiratory: Negative for cough, chest tightness and shortness of breath. Cardiovascular: Positive for chest pain. Negative for palpitations. Gastrointestinal: Positive for abdominal pain.  Negative for diarrhea, nausea and vomiting. Genitourinary: Negative for difficulty urinating, dysuria and flank pain. Musculoskeletal: Negative for myalgias, neck pain and neck stiffness. Skin: Negative for color change. Neurological: Negative for dizziness, speech difficulty, weakness, numbness and headaches. Psychiatric/Behavioral: Negative for confusion. Vitals:    12/14/20 1227   BP: (!) 155/72   Pulse: 75   Resp: 18   Temp: 98.3 °F (36.8 °C)   SpO2: 94%   Weight: 52.6 kg (116 lb)   Height: 4' 9\" (1.448 m)            Physical Exam  Vitals signs and nursing note reviewed. Constitutional:       Appearance: Normal appearance. She is not ill-appearing or toxic-appearing. HENT:      Head: Normocephalic and atraumatic. Nose: Nose normal.      Mouth/Throat:      Mouth: Mucous membranes are moist.   Eyes:      Extraocular Movements: Extraocular movements intact. Neck:      Musculoskeletal: Normal range of motion. No neck rigidity. Cardiovascular:      Rate and Rhythm: Normal rate and regular rhythm. Pulses: Normal pulses. Heart sounds: Normal heart sounds. Pulmonary:      Effort: Pulmonary effort is normal. No respiratory distress. Breath sounds: Normal breath sounds. Abdominal:      General: Abdomen is flat. There is no distension. Palpations: Abdomen is soft. Tenderness: There is abdominal tenderness. Comments: Epigastric malnii pain to palpation. No rebound or guarding. Musculoskeletal: Normal range of motion. Skin:     General: Skin is warm and dry. Neurological:      General: No focal deficit present. Mental Status: She is alert and oriented to person, place, and time. Psychiatric:         Mood and Affect: Mood normal.          MDM  Number of Diagnoses or Management Options  Gastritis and duodenitis:   Diagnosis management comments: Patient was seen and examined wearing appropriate PPE. Patient with grossly normal vital signs appears in no distress.   EKG was obtained, shows sinus rhythm, rate 74, VA 96, QRS 88, QTc 410, normal axis, less than 1 mm of ST depression in aVF, T wave inversion lead III, aVF as well as V4 V5. Again mild ST depression, less than 1 mm in V5, abnormal appearance of V6 without significant ST elevation. EKG is similar in appearance to July 2019. T wave inversions were present at that time. Labs show white count of 17.1, stable H&H, Sodium 129, potassium 4.5, creatinine 1.09, this appears to be about patient's baseline, lipase 97, troponin 18.3. Patient will be given 1 L bolus IV fluid, Protonix as well as a GI cocktail for her epigastric abdominal irritation. CT scan with IV contrast shows evidence for duodenitis. I spoke with gastroenterology who states that normally duodenitis does not cause significant leukocytosis. I went back and spoke with patient about her abdominal findings, patient and her  state they want to be discharged home and understand the risk involved in this. Patient does have a primary care physician appointment tomorrow. I advised her to have repeat labs at that time. Patient states her epigastric pain is completely resolved. Patient was placed on a prescription for Augmentin, 1 tab twice daily x7 days as well as Protonix daily. Advised to continue orally hydrate with clear liquids. Return to the ER for worsening worrisome symptoms. Patient and her  voiced understanding and agreement with this plan.        Amount and/or Complexity of Data Reviewed  Clinical lab tests: ordered and reviewed  Tests in the radiology section of CPT®: ordered and reviewed  Independent visualization of images, tracings, or specimens: yes    Risk of Complications, Morbidity, and/or Mortality  Presenting problems: moderate  Diagnostic procedures: moderate  Management options: low    Patient Progress  Patient progress: stable         Procedures

## 2020-12-14 NOTE — ED TRIAGE NOTES
Pt arrived via EMS from home with c/o intermittent chest pain that started yesterday. Pt reports pain is worsened with exertion. Pt reports headache and nausea.   EMS gave 500cc NS /90 Temp 97.4

## 2020-12-14 NOTE — DISCHARGE INSTRUCTIONS
Take Protonix daily as prescribed. Follow-up with your family physician in 1 to 2 days for repeat blood work. White blood cell count was elevated here in the emergency department. CT scan showed duodenitis. You are not septic today. Return to the ER for any worsening or worrisome symptoms.

## 2021-01-09 ENCOUNTER — APPOINTMENT (OUTPATIENT)
Dept: GENERAL RADIOLOGY | Age: 86
End: 2021-01-09
Attending: EMERGENCY MEDICINE
Payer: MEDICARE

## 2021-01-09 ENCOUNTER — HOSPITAL ENCOUNTER (EMERGENCY)
Age: 86
Discharge: HOME | End: 2021-01-09
Attending: EMERGENCY MEDICINE
Payer: MEDICARE

## 2021-01-09 ENCOUNTER — APPOINTMENT (OUTPATIENT)
Dept: CT IMAGING | Age: 86
End: 2021-01-09
Attending: EMERGENCY MEDICINE
Payer: MEDICARE

## 2021-01-09 VITALS
HEART RATE: 63 BPM | DIASTOLIC BLOOD PRESSURE: 81 MMHG | SYSTOLIC BLOOD PRESSURE: 127 MMHG | TEMPERATURE: 99.8 F | RESPIRATION RATE: 16 BRPM | OXYGEN SATURATION: 94 %

## 2021-01-09 DIAGNOSIS — U07.1 COVID-19: Primary | ICD-10-CM

## 2021-01-09 DIAGNOSIS — N39.0 URINARY TRACT INFECTION WITHOUT HEMATURIA, SITE UNSPECIFIED: ICD-10-CM

## 2021-01-09 LAB
ALBUMIN SERPL-MCNC: 2.8 G/DL (ref 3.2–4.6)
ALBUMIN/GLOB SERPL: 0.7 {RATIO} (ref 1.2–3.5)
ALP SERPL-CCNC: 239 U/L (ref 50–136)
ALT SERPL-CCNC: 44 U/L (ref 12–65)
ANION GAP SERPL CALC-SCNC: 8 MMOL/L (ref 7–16)
APPEARANCE UR: ABNORMAL
AST SERPL-CCNC: 28 U/L (ref 15–37)
BACTERIA URNS QL MICRO: ABNORMAL /HPF
BILIRUB SERPL-MCNC: 1 MG/DL (ref 0.2–1.1)
BILIRUB UR QL: NEGATIVE
BUN SERPL-MCNC: 10 MG/DL (ref 8–23)
CALCIUM SERPL-MCNC: 8.9 MG/DL (ref 8.3–10.4)
CASTS URNS QL MICRO: 0 /LPF
CHLORIDE SERPL-SCNC: 95 MMOL/L (ref 98–107)
CO2 SERPL-SCNC: 27 MMOL/L (ref 21–32)
COLOR UR: YELLOW
CREAT SERPL-MCNC: 0.74 MG/DL (ref 0.6–1)
CRYSTALS URNS QL MICRO: 0 /LPF
DIFFERENTIAL METHOD BLD: ABNORMAL
EPI CELLS #/AREA URNS HPF: 0 /HPF
ERYTHROCYTE [DISTWIDTH] IN BLOOD BY AUTOMATED COUNT: 13.6 % (ref 11.9–14.6)
GLOBULIN SER CALC-MCNC: 3.8 G/DL (ref 2.3–3.5)
GLUCOSE SERPL-MCNC: 223 MG/DL (ref 65–100)
GLUCOSE UR STRIP.AUTO-MCNC: >1000 MG/DL
HCT VFR BLD AUTO: 39.2 % (ref 35.8–46.3)
HGB BLD-MCNC: 13.2 G/DL (ref 11.7–15.4)
HGB UR QL STRIP: NEGATIVE
KETONES UR QL STRIP.AUTO: ABNORMAL MG/DL
LEUKOCYTE ESTERASE UR QL STRIP.AUTO: ABNORMAL
LYMPHOCYTES # BLD: 2.4 K/UL (ref 0.5–4.6)
LYMPHOCYTES NFR BLD MANUAL: 15 % (ref 16–44)
MCH RBC QN AUTO: 29.3 PG (ref 26.1–32.9)
MCHC RBC AUTO-ENTMCNC: 33.7 G/DL (ref 31.4–35)
MCV RBC AUTO: 86.9 FL (ref 79.6–97.8)
METAMYELOCYTES NFR BLD MANUAL: 5 %
MONOCYTES # BLD: 0.9 K/UL (ref 0.1–1.3)
MONOCYTES NFR BLD MANUAL: 6 % (ref 3–9)
MUCOUS THREADS URNS QL MICRO: 0 /LPF
MYELOCYTES NFR BLD MANUAL: 4 %
NEUTS BAND NFR BLD MANUAL: 1 % (ref 0–10)
NEUTS SEG # BLD: 12.4 K/UL (ref 1.7–8.2)
NEUTS SEG NFR BLD MANUAL: 69 % (ref 47–75)
NITRITE UR QL STRIP.AUTO: NEGATIVE
NRBC # BLD: 0 K/UL (ref 0–0.2)
PH UR STRIP: 5.5 [PH] (ref 5–9)
PLATELET # BLD AUTO: 226 K/UL (ref 150–450)
PLATELET COMMENTS,PCOM: ADEQUATE
PMV BLD AUTO: 10 FL (ref 9.4–12.3)
POTASSIUM SERPL-SCNC: 3.7 MMOL/L (ref 3.5–5.1)
PROT SERPL-MCNC: 6.6 G/DL (ref 6.3–8.2)
PROT UR STRIP-MCNC: 30 MG/DL
RBC # BLD AUTO: 4.51 M/UL (ref 4.05–5.2)
RBC #/AREA URNS HPF: 0 /HPF
RBC MORPH BLD: ABNORMAL
SODIUM SERPL-SCNC: 130 MMOL/L (ref 136–145)
SP GR UR REFRACTOMETRY: 1.02 (ref 1–1.02)
UROBILINOGEN UR QL STRIP.AUTO: 1 EU/DL (ref 0.2–1)
WBC # BLD AUTO: 15.7 K/UL (ref 4.3–11.1)
WBC MORPH BLD: ABNORMAL
WBC URNS QL MICRO: ABNORMAL /HPF

## 2021-01-09 PROCEDURE — 85025 COMPLETE CBC W/AUTO DIFF WBC: CPT

## 2021-01-09 PROCEDURE — 71045 X-RAY EXAM CHEST 1 VIEW: CPT

## 2021-01-09 PROCEDURE — 81001 URINALYSIS AUTO W/SCOPE: CPT

## 2021-01-09 PROCEDURE — 81003 URINALYSIS AUTO W/O SCOPE: CPT

## 2021-01-09 PROCEDURE — 70450 CT HEAD/BRAIN W/O DYE: CPT

## 2021-01-09 PROCEDURE — 74011250637 HC RX REV CODE- 250/637: Performed by: EMERGENCY MEDICINE

## 2021-01-09 PROCEDURE — 81015 MICROSCOPIC EXAM OF URINE: CPT

## 2021-01-09 PROCEDURE — 74011250636 HC RX REV CODE- 250/636: Performed by: EMERGENCY MEDICINE

## 2021-01-09 PROCEDURE — 80053 COMPREHEN METABOLIC PANEL: CPT

## 2021-01-09 PROCEDURE — 99284 EMERGENCY DEPT VISIT MOD MDM: CPT

## 2021-01-09 PROCEDURE — 96374 THER/PROPH/DIAG INJ IV PUSH: CPT

## 2021-01-09 PROCEDURE — 72100 X-RAY EXAM L-S SPINE 2/3 VWS: CPT

## 2021-01-09 RX ORDER — ONDANSETRON 2 MG/ML
4 INJECTION INTRAMUSCULAR; INTRAVENOUS
Status: COMPLETED | OUTPATIENT
Start: 2021-01-09 | End: 2021-01-09

## 2021-01-09 RX ORDER — CEPHALEXIN 500 MG/1
500 CAPSULE ORAL 2 TIMES DAILY
Qty: 14 CAP | Refills: 0 | Status: SHIPPED | OUTPATIENT
Start: 2021-01-09 | End: 2021-01-16

## 2021-01-09 RX ORDER — ONDANSETRON 4 MG/1
4 TABLET, ORALLY DISINTEGRATING ORAL
Qty: 11 TAB | Refills: 1 | Status: ON HOLD | OUTPATIENT
Start: 2021-01-09 | End: 2021-10-18 | Stop reason: SDUPTHER

## 2021-01-09 RX ORDER — ACETAMINOPHEN 500 MG
1000 TABLET ORAL
Status: COMPLETED | OUTPATIENT
Start: 2021-01-09 | End: 2021-01-09

## 2021-01-09 RX ADMIN — ONDANSETRON 4 MG: 2 INJECTION INTRAMUSCULAR; INTRAVENOUS at 15:50

## 2021-01-09 RX ADMIN — ACETAMINOPHEN 1000 MG: 500 TABLET ORAL at 15:50

## 2021-01-09 NOTE — DISCHARGE INSTRUCTIONS
Use the nausea medication as needed. Complete the course of antibiotics as prescribed. Take Tylenol for aches, pains, fever. Follow-up with your doctor or return to the ER for any other acute concerns.

## 2021-01-09 NOTE — ED TRIAGE NOTES
Pt arrives via from home. Pt COVID + and reports generalized weakness x 6 days. 98% on RA. - Hx of DM. HR 60- NSR with occ PAC, /70. Temp 100.2 in route. 20g LAC. Pt states she fell last week and hit her head. Pt states she uses walker because she loses her balance easily. Pt states she hit her left ribs when she fell as well.

## 2021-01-09 NOTE — ED PROVIDER NOTES
Patient is an 24-year-old female who presents to the emergency department today from home via EMS. She complains of generalized weakness and fatigue for the past 1 week. She has had several falls. She states she just does not want to get out of bed. She did test positive for the Covid virus at [facility] on January 5. She admits to chills, vomiting, diarrhea. She also thinks she might of broken a rib. She complains of pain in the left posterior lower ribs. The history is provided by the patient. Fatigue  This is a new problem. The current episode started more than 1 week ago. The problem has been gradually worsening. There was no focality noted. Patient reports a subjective fever - was not measured. Associated symptoms include shortness of breath, vomiting and nausea. Pertinent negatives include no chest pain. There were no medications administered prior to arrival. Associated medical issues do not include seizures or CVA.         Past Medical History:   Diagnosis Date    Acute kidney failure 05/2016    Acute pericarditis 2015    Arthritis     CAP (community acquired pneumonia) 5/2016    Coronary artery disease     Crohn's disease     Hypercalcemia     Hypercholesterolemia     Hypertension     Lumbar compression fracture     Osteoporosis     Type 2 diabetes mellitus        Past Surgical History:   Procedure Laterality Date    HX ARTHROPLASTY  1/5/2016    left elbow    HX HEART CATHETERIZATION  5/12/2015    no intervention    HX HYSTERECTOMY  1978    HX KYPHOPLASTY  ~2002    lumbar         Family History:   Problem Relation Age of Onset    Heart Failure Mother     Diabetes Mother     Cancer Father         throat    Alcohol abuse Neg Hx     Arthritis-rheumatoid Neg Hx     Asthma Neg Hx     Bleeding Prob Neg Hx     Elevated Lipids Neg Hx     Headache Neg Hx     Hypertension Neg Hx     Lung Disease Neg Hx     Migraines Neg Hx     Psychiatric Disorder Neg Hx     Stroke Neg Hx  Mental Retardation Neg Hx     Thyroid Disease Neg Hx        Social History     Socioeconomic History    Marital status:      Spouse name: Not on file    Number of children: Not on file    Years of education: Not on file    Highest education level: Not on file   Occupational History    Not on file   Social Needs    Financial resource strain: Not on file    Food insecurity     Worry: Not on file     Inability: Not on file    Transportation needs     Medical: Not on file     Non-medical: Not on file   Tobacco Use    Smoking status: Former Smoker     Types: Cigarettes     Start date: 1961     Quit date: 1964     Years since quittin.7    Smokeless tobacco: Never Used    Tobacco comment: 2 packs a wk   Substance and Sexual Activity    Alcohol use: Yes     Alcohol/week: 0.0 standard drinks     Comment: rarely    Drug use: No    Sexual activity: Never   Lifestyle    Physical activity     Days per week: Not on file     Minutes per session: Not on file    Stress: Not on file   Relationships    Social connections     Talks on phone: Not on file     Gets together: Not on file     Attends Gnosticist service: Not on file     Active member of club or organization: Not on file     Attends meetings of clubs or organizations: Not on file     Relationship status: Not on file    Intimate partner violence     Fear of current or ex partner: Not on file     Emotionally abused: Not on file     Physically abused: Not on file     Forced sexual activity: Not on file   Other Topics Concern    Not on file   Social History Narrative    Pt , lives with . Retired. She worked as an . She has 1 dog and 1 cat at home. ALLERGIES: Sulfa (sulfonamide antibiotics), Other medication, and Tape [adhesive]    Review of Systems   Constitutional: Positive for chills and fatigue. Negative for fever. HENT: Negative for congestion, rhinorrhea and sore throat.     Eyes: Negative for pain, discharge and visual disturbance. Respiratory: Positive for shortness of breath. Negative for cough. Cardiovascular: Negative for chest pain and palpitations. Gastrointestinal: Positive for diarrhea, nausea and vomiting. Negative for abdominal pain. Endocrine: Negative for polydipsia and polyuria. Genitourinary: Negative for dysuria, frequency and urgency. Musculoskeletal: Negative for back pain and neck pain. Skin: Negative for rash. Neurological: Negative for seizures, syncope and weakness. Hematological: Negative. Vitals:    01/09/21 1112   BP: (!) 121/57   Pulse: 72   Resp: 16   Temp: 99.8 °F (37.7 °C)   SpO2: 94%            Physical Exam  Constitutional:       Appearance: She is well-developed. She is ill-appearing. HENT:      Head: Normocephalic. Comments: Ecchymosis On the right forehead     Mouth/Throat:      Mouth: Mucous membranes are dry. Eyes:      Conjunctiva/sclera: Conjunctivae normal.      Pupils: Pupils are equal, round, and reactive to light. Neck:      Musculoskeletal: Normal range of motion and neck supple. Cardiovascular:      Rate and Rhythm: Normal rate and regular rhythm. Pulses: Normal pulses. Pulmonary:      Effort: Pulmonary effort is normal.      Breath sounds: Normal breath sounds. Chest:      Comments: Tender left Posterior lower ribs. Abdominal:      Palpations: Abdomen is soft. Tenderness: There is no abdominal tenderness. There is no guarding or rebound. Musculoskeletal: Normal range of motion. General: No deformity. Lymphadenopathy:      Cervical: No cervical adenopathy. Skin:     General: Skin is warm and dry. Capillary Refill: Capillary refill takes less than 2 seconds. Coloration: Skin is pale. Findings: No rash. Neurological:      General: No focal deficit present. Mental Status: She is alert and oriented to person, place, and time. GCS: GCS eye subscore is 4.  GCS verbal subscore is 5. GCS motor subscore is 6. Cranial Nerves: No cranial nerve deficit. Sensory: No sensory deficit. MDM  Number of Diagnoses or Management Options  Diagnosis management comments: I wore appropriate PPE throughout this patient's ED visit. Nathan Thompson MD, 1:47 PM    3:47 PM  Records were reviewed she did test positive for Covid virus at [facility] on January 5    Chest x-ray shows patchy bilateral infiltrates consistent with the Covid pneumonia  X-ray of the lumbar spine shows some arthritic changes and old compression fractures  CAT scan of the head shows chronic small vessel ischemic disease but no intracranial hemorrhage. No acute injury  Urinalysis shows a mild UTI  White blood cell count is elevated at 15,000 but this is similar to all of her recent labs    Patient is not hypoxic, she does not need admission to the hospital today    I discussed the case and results with her daughter via the telephone. Daughter also reported that patient vomited this morning we will give some Zofran. Advised Tylenol for aches and pain and fever. Will prescribe some oral antibiotics for the UTI. Zofran for the nausea. Voice dictation software was used during the making of this note. This software is not perfect and grammatical and other typographical errors may be present. This note has been proofread, but may still contain errors.   Nathan Thompson MD; 1/9/2021 @3:49 PM   ===================================================================             Amount and/or Complexity of Data Reviewed  Clinical lab tests: ordered and reviewed  Tests in the radiology section of CPT®: ordered and reviewed  Tests in the medicine section of CPT®: ordered and reviewed  Review and summarize past medical records: yes  Independent visualization of images, tracings, or specimens: yes    Risk of Complications, Morbidity, and/or Mortality  Presenting problems: moderate  Diagnostic procedures: moderate  Management options: low    Patient Progress  Patient progress: stable         Procedures

## 2021-01-09 NOTE — ED NOTES
I have reviewed discharge instructions with the patient. The patient verbalized understanding. Patient left ED via Discharge Method: wheelchair to Home with family. Opportunity for questions and clarification provided. Patient given 2 scripts. To continue your aftercare when you leave the hospital, you may receive an automated call from our care team to check in on how you are doing. This is a free service and part of our promise to provide the best care and service to meet your aftercare needs.  If you have questions, or wish to unsubscribe from this service please call [phone]. Thank you for Choosing our [facility] Emergency Department.

## 2021-03-03 ENCOUNTER — HOSPITAL ENCOUNTER (INPATIENT)
Age: 86
LOS: 6 days | Discharge: NURSING, REHAB OR LONG-TERM CARE | DRG: 481 | End: 2021-03-09
Attending: INTERNAL MEDICINE | Admitting: INTERNAL MEDICINE
Payer: MEDICARE

## 2021-03-03 ENCOUNTER — APPOINTMENT (OUTPATIENT)
Dept: GENERAL RADIOLOGY | Age: 86
DRG: 481 | End: 2021-03-03
Attending: EMERGENCY MEDICINE
Payer: MEDICARE

## 2021-03-03 ENCOUNTER — APPOINTMENT (OUTPATIENT)
Dept: CT IMAGING | Age: 86
DRG: 481 | End: 2021-03-03
Attending: INTERNAL MEDICINE
Payer: MEDICARE

## 2021-03-03 ENCOUNTER — HOSPITAL ENCOUNTER (EMERGENCY)
Age: 86
Discharge: OTHER INSTITUTION | DRG: 481 | End: 2021-03-03
Attending: EMERGENCY MEDICINE
Payer: MEDICARE

## 2021-03-03 VITALS
OXYGEN SATURATION: 94 % | RESPIRATION RATE: 18 BRPM | SYSTOLIC BLOOD PRESSURE: 177 MMHG | DIASTOLIC BLOOD PRESSURE: 82 MMHG | HEART RATE: 75 BPM | TEMPERATURE: 98.5 F

## 2021-03-03 DIAGNOSIS — S72.002D CLOSED FRACTURE OF LEFT HIP REQUIRING OPERATIVE REPAIR WITH ROUTINE HEALING, SUBSEQUENT ENCOUNTER: Primary | ICD-10-CM

## 2021-03-03 DIAGNOSIS — S72.002A CLOSED LEFT HIP FRACTURE, INITIAL ENCOUNTER (HCC): Primary | ICD-10-CM

## 2021-03-03 DIAGNOSIS — S72.002D CLOSED FRACTURE OF LEFT HIP WITH ROUTINE HEALING, SUBSEQUENT ENCOUNTER: ICD-10-CM

## 2021-03-03 PROBLEM — S72.009A HIP FRACTURE (HCC): Status: ACTIVE | Noted: 2021-03-03

## 2021-03-03 PROBLEM — S72.009A HIP FRACTURE REQUIRING OPERATIVE REPAIR (HCC): Status: ACTIVE | Noted: 2021-03-03

## 2021-03-03 LAB
ABO + RH BLD: NORMAL
ALBUMIN SERPL-MCNC: 4 G/DL (ref 3.2–4.6)
ALBUMIN/GLOB SERPL: 1.1 {RATIO} (ref 1.2–3.5)
ALP SERPL-CCNC: 120 U/L (ref 50–136)
ALT SERPL-CCNC: 27 U/L (ref 12–65)
ANION GAP SERPL CALC-SCNC: 9 MMOL/L (ref 7–16)
AST SERPL-CCNC: 21 U/L (ref 15–37)
BASOPHILS # BLD: 0.1 K/UL (ref 0–0.2)
BASOPHILS NFR BLD: 1 % (ref 0–2)
BILIRUB SERPL-MCNC: 0.7 MG/DL (ref 0.2–1.1)
BLOOD GROUP ANTIBODIES SERPL: NORMAL
BUN SERPL-MCNC: 19 MG/DL (ref 8–23)
CALCIUM SERPL-MCNC: 10 MG/DL (ref 8.3–10.4)
CHLORIDE SERPL-SCNC: 97 MMOL/L (ref 98–107)
CO2 SERPL-SCNC: 27 MMOL/L (ref 21–32)
COVID-19 RAPID TEST, COVR: DETECTED
CREAT SERPL-MCNC: 1.03 MG/DL (ref 0.6–1)
DIFFERENTIAL METHOD BLD: ABNORMAL
EOSINOPHIL # BLD: 0.2 K/UL (ref 0–0.8)
EOSINOPHIL NFR BLD: 2 % (ref 0.5–7.8)
ERYTHROCYTE [DISTWIDTH] IN BLOOD BY AUTOMATED COUNT: 13.6 % (ref 11.9–14.6)
GLOBULIN SER CALC-MCNC: 3.5 G/DL (ref 2.3–3.5)
GLUCOSE SERPL-MCNC: 273 MG/DL (ref 65–100)
HCT VFR BLD AUTO: 38.9 % (ref 35.8–46.3)
HGB BLD-MCNC: 13.3 G/DL (ref 11.7–15.4)
IMM GRANULOCYTES # BLD AUTO: 0.5 K/UL (ref 0–0.5)
IMM GRANULOCYTES NFR BLD AUTO: 4 % (ref 0–5)
LYMPHOCYTES # BLD: 1.7 K/UL (ref 0.5–4.6)
LYMPHOCYTES NFR BLD: 13 % (ref 13–44)
MCH RBC QN AUTO: 30.2 PG (ref 26.1–32.9)
MCHC RBC AUTO-ENTMCNC: 34.2 G/DL (ref 31.4–35)
MCV RBC AUTO: 88.4 FL (ref 79.6–97.8)
MONOCYTES # BLD: 0.7 K/UL (ref 0.1–1.3)
MONOCYTES NFR BLD: 5 % (ref 4–12)
NEUTS SEG # BLD: 10.3 K/UL (ref 1.7–8.2)
NEUTS SEG NFR BLD: 76 % (ref 43–78)
NRBC # BLD: 0 K/UL (ref 0–0.2)
PLATELET # BLD AUTO: 321 K/UL (ref 150–450)
PMV BLD AUTO: 10.2 FL (ref 9.4–12.3)
POTASSIUM SERPL-SCNC: 4.5 MMOL/L (ref 3.5–5.1)
PROT SERPL-MCNC: 7.5 G/DL (ref 6.3–8.2)
RBC # BLD AUTO: 4.4 M/UL (ref 4.05–5.2)
SARS-COV-2, COV2: NORMAL
SODIUM SERPL-SCNC: 133 MMOL/L (ref 136–145)
SOURCE, COVRS: ABNORMAL
SPECIMEN EXP DATE BLD: NORMAL
WBC # BLD AUTO: 13.5 K/UL (ref 4.3–11.1)

## 2021-03-03 PROCEDURE — 65270000029 HC RM PRIVATE

## 2021-03-03 PROCEDURE — 86901 BLOOD TYPING SEROLOGIC RH(D): CPT

## 2021-03-03 PROCEDURE — 74011250636 HC RX REV CODE- 250/636: Performed by: EMERGENCY MEDICINE

## 2021-03-03 PROCEDURE — 71250 CT THORAX DX C-: CPT

## 2021-03-03 PROCEDURE — 80053 COMPREHEN METABOLIC PANEL: CPT

## 2021-03-03 PROCEDURE — 96375 TX/PRO/DX INJ NEW DRUG ADDON: CPT

## 2021-03-03 PROCEDURE — 87641 MR-STAPH DNA AMP PROBE: CPT

## 2021-03-03 PROCEDURE — 87635 SARS-COV-2 COVID-19 AMP PRB: CPT

## 2021-03-03 PROCEDURE — 74011250637 HC RX REV CODE- 250/637: Performed by: INTERNAL MEDICINE

## 2021-03-03 PROCEDURE — 96374 THER/PROPH/DIAG INJ IV PUSH: CPT

## 2021-03-03 PROCEDURE — 71045 X-RAY EXAM CHEST 1 VIEW: CPT

## 2021-03-03 PROCEDURE — 73502 X-RAY EXAM HIP UNI 2-3 VIEWS: CPT

## 2021-03-03 PROCEDURE — 73552 X-RAY EXAM OF FEMUR 2/>: CPT

## 2021-03-03 PROCEDURE — 96376 TX/PRO/DX INJ SAME DRUG ADON: CPT

## 2021-03-03 PROCEDURE — 85025 COMPLETE CBC W/AUTO DIFF WBC: CPT

## 2021-03-03 PROCEDURE — 2709999900 HC NON-CHARGEABLE SUPPLY

## 2021-03-03 PROCEDURE — 99284 EMERGENCY DEPT VISIT MOD MDM: CPT

## 2021-03-03 RX ORDER — ACETAMINOPHEN 325 MG/1
650 TABLET ORAL EVERY 6 HOURS
Status: DISPENSED | OUTPATIENT
Start: 2021-03-04 | End: 2021-03-06

## 2021-03-03 RX ORDER — SODIUM CHLORIDE 0.9 % (FLUSH) 0.9 %
5-40 SYRINGE (ML) INJECTION EVERY 8 HOURS
Status: DISCONTINUED | OUTPATIENT
Start: 2021-03-03 | End: 2021-03-03 | Stop reason: HOSPADM

## 2021-03-03 RX ORDER — SODIUM CHLORIDE 0.9 % (FLUSH) 0.9 %
5-40 SYRINGE (ML) INJECTION AS NEEDED
Status: DISCONTINUED | OUTPATIENT
Start: 2021-03-03 | End: 2021-03-05 | Stop reason: SDUPTHER

## 2021-03-03 RX ORDER — HYDROMORPHONE HYDROCHLORIDE 1 MG/ML
0.5 INJECTION, SOLUTION INTRAMUSCULAR; INTRAVENOUS; SUBCUTANEOUS ONCE
Status: COMPLETED | OUTPATIENT
Start: 2021-03-03 | End: 2021-03-03

## 2021-03-03 RX ORDER — SODIUM CHLORIDE 0.9 % (FLUSH) 0.9 %
5-40 SYRINGE (ML) INJECTION EVERY 8 HOURS
Status: DISCONTINUED | OUTPATIENT
Start: 2021-03-03 | End: 2021-03-05 | Stop reason: SDUPTHER

## 2021-03-03 RX ORDER — ONDANSETRON 2 MG/ML
4 INJECTION INTRAMUSCULAR; INTRAVENOUS
Status: COMPLETED | OUTPATIENT
Start: 2021-03-03 | End: 2021-03-03

## 2021-03-03 RX ORDER — SODIUM CHLORIDE 0.9 % (FLUSH) 0.9 %
5-40 SYRINGE (ML) INJECTION AS NEEDED
Status: DISCONTINUED | OUTPATIENT
Start: 2021-03-03 | End: 2021-03-03 | Stop reason: HOSPADM

## 2021-03-03 RX ORDER — ONDANSETRON 2 MG/ML
4 INJECTION INTRAMUSCULAR; INTRAVENOUS
Status: DISCONTINUED | OUTPATIENT
Start: 2021-03-03 | End: 2021-03-09 | Stop reason: HOSPADM

## 2021-03-03 RX ADMIN — HYDROMORPHONE HYDROCHLORIDE 0.5 MG: 1 INJECTION, SOLUTION INTRAMUSCULAR; INTRAVENOUS; SUBCUTANEOUS at 19:44

## 2021-03-03 RX ADMIN — HYDROMORPHONE HYDROCHLORIDE 0.5 MG: 1 INJECTION, SOLUTION INTRAMUSCULAR; INTRAVENOUS; SUBCUTANEOUS at 20:20

## 2021-03-03 RX ADMIN — ONDANSETRON 4 MG: 2 INJECTION INTRAMUSCULAR; INTRAVENOUS at 19:45

## 2021-03-03 RX ADMIN — HYDROMORPHONE HYDROCHLORIDE 0.5 MG: 1 INJECTION, SOLUTION INTRAMUSCULAR; INTRAVENOUS; SUBCUTANEOUS at 22:01

## 2021-03-03 RX ADMIN — ACETAMINOPHEN 650 MG: 325 TABLET, FILM COATED ORAL at 23:52

## 2021-03-03 NOTE — ED PROVIDER NOTES
The history is provided by the patient. Fall  The accident occurred less than 1 hour ago. The fall occurred while standing. She landed on carpet. There was no blood loss. The point of impact was the left hip. The pain is present in the left hip. The pain is at a severity of 9/10. She was not ambulatory at the scene. There was no entrapment after the fall. There was no drug use involved in the accident. There was no alcohol use involved in the accident. Pertinent negatives include no visual change, no fever, no numbness, no abdominal pain, no bowel incontinence, no nausea, no vomiting, no hematuria, no headaches, no extremity weakness, no hearing loss, no loss of consciousness, no tingling and no laceration. The risk factors include being elderly. The symptoms are aggravated by pressure on injury and use of injured limb. The treatment provided no relief. It is unknown when the patient last had a tetanus shot.         Past Medical History:   Diagnosis Date    Acute kidney failure 05/2016    Acute pericarditis 2015    Arthritis     CAP (community acquired pneumonia) 5/2016    Coronary artery disease     Crohn's disease     Hypercalcemia     Hypercholesterolemia     Hypertension     Lumbar compression fracture     Osteoporosis     Type 2 diabetes mellitus        Past Surgical History:   Procedure Laterality Date    HX ARTHROPLASTY  1/5/2016    left elbow    HX HEART CATHETERIZATION  5/12/2015    no intervention    HX HYSTERECTOMY  1978    HX KYPHOPLASTY  ~2002    lumbar         Family History:   Problem Relation Age of Onset    Heart Failure Mother     Diabetes Mother     Cancer Father         throat    Alcohol abuse Neg Hx     Arthritis-rheumatoid Neg Hx     Asthma Neg Hx     Bleeding Prob Neg Hx     Elevated Lipids Neg Hx     Headache Neg Hx     Hypertension Neg Hx     Lung Disease Neg Hx     Migraines Neg Hx     Psychiatric Disorder Neg Hx     Stroke Neg Hx     Mental Retardation Neg Hx     Thyroid Disease Neg Hx        Social History     Socioeconomic History    Marital status:      Spouse name: Not on file    Number of children: Not on file    Years of education: Not on file    Highest education level: Not on file   Occupational History    Not on file   Social Needs    Financial resource strain: Not on file    Food insecurity     Worry: Not on file     Inability: Not on file    Transportation needs     Medical: Not on file     Non-medical: Not on file   Tobacco Use    Smoking status: Former Smoker     Types: Cigarettes     Start date: 1961     Quit date: 1964     Years since quittin.7    Smokeless tobacco: Never Used    Tobacco comment: 2 packs a wk   Substance and Sexual Activity    Alcohol use: Yes     Alcohol/week: 0.0 standard drinks     Comment: rarely    Drug use: No    Sexual activity: Never   Lifestyle    Physical activity     Days per week: Not on file     Minutes per session: Not on file    Stress: Not on file   Relationships    Social connections     Talks on phone: Not on file     Gets together: Not on file     Attends [employer] service: Not on file     Active member of club or organization: Not on file     Attends meetings of clubs or organizations: Not on file     Relationship status: Not on file    Intimate partner violence     Fear of current or ex partner: Not on file     Emotionally abused: Not on file     Physically abused: Not on file     Forced sexual activity: Not on file   Other Topics Concern    Not on file   Social History Narrative    Pt , lives with . Retired. She worked as an . She has 1 dog and 1 cat at home. ALLERGIES: Sulfa (sulfonamide antibiotics), Other medication, and Tape [adhesive]    Review of Systems   Constitutional: Negative for chills and fever. Gastrointestinal: Negative for abdominal pain, bowel incontinence, constipation, diarrhea, nausea and vomiting. Genitourinary: Negative for dysuria and hematuria. Musculoskeletal: Negative for back pain and extremity weakness. Neurological: Negative for tingling, loss of consciousness, numbness and headaches. All other systems reviewed and are negative. There were no vitals filed for this visit. Physical Exam  Vitals signs and nursing note reviewed. Constitutional:       General: She is in acute distress. Appearance: She is well-developed. HENT:      Head: Normocephalic and atraumatic. Right Ear: External ear normal.      Left Ear: External ear normal.   Eyes:      Extraocular Movements: Extraocular movements intact. Conjunctiva/sclera: Conjunctivae normal.      Pupils: Pupils are equal, round, and reactive to light. Neck:      Musculoskeletal: Normal range of motion and neck supple. Cardiovascular:      Rate and Rhythm: Normal rate and regular rhythm. Heart sounds: Normal heart sounds. Pulmonary:      Effort: Pulmonary effort is normal.      Breath sounds: Normal breath sounds. Abdominal:      General: Bowel sounds are normal.      Palpations: Abdomen is soft. Tenderness: There is no abdominal tenderness. Musculoskeletal:      Left hip: She exhibits decreased range of motion and tenderness. She exhibits no deformity. Skin:     General: Skin is warm and dry. Capillary Refill: Capillary refill takes less than 2 seconds. Findings: No laceration. Neurological:      General: No focal deficit present. Mental Status: She is alert and oriented to person, place, and time. Cranial Nerves: Cranial nerves are intact. Sensory: Sensation is intact. Motor: Motor function is intact.    Psychiatric:         Mood and Affect: Mood and affect normal.         Speech: Speech normal.         Behavior: Behavior normal.         Cognition and Memory: Cognition and memory normal.          MDM  Number of Diagnoses or Management Options  Closed left hip fracture, initial encounter [facility]): new and requires workup     Amount and/or Complexity of Data Reviewed  Clinical lab tests: ordered and reviewed  Tests in the radiology section of CPT®: ordered and reviewed  Obtain history from someone other than the patient: yes  Review and summarize past medical records: yes  Discuss the patient with other providers: yes  Independent visualization of images, tracings, or specimens: yes    Risk of Complications, Morbidity, and/or Mortality  Presenting problems: moderate  Diagnostic procedures: moderate  Management options: moderate    Patient Progress  Patient progress: stable         Procedures  The patient was observed in the ED. Results Reviewed:      Recent Results (from the past 24 hour(s))   CBC WITH AUTOMATED DIFF    Collection Time: 03/03/21  7:43 PM   Result Value Ref Range    WBC 13.5 (H) 4.3 - 11.1 K/uL    RBC 4.40 4.05 - 5.2 M/uL    HGB 13.3 11.7 - 15.4 g/dL    HCT 38.9 35.8 - 46.3 %    MCV 88.4 79.6 - 97.8 FL    MCH 30.2 26.1 - 32.9 PG    MCHC 34.2 31.4 - 35.0 g/dL    RDW 13.6 11.9 - 14.6 %    PLATELET 090 719 - 639 K/uL    MPV 10.2 9.4 - 12.3 FL    ABSOLUTE NRBC 0.00 0.0 - 0.2 K/uL    DF AUTOMATED      NEUTROPHILS 76 43 - 78 %    LYMPHOCYTES 13 13 - 44 %    MONOCYTES 5 4.0 - 12.0 %    EOSINOPHILS 2 0.5 - 7.8 %    BASOPHILS 1 0.0 - 2.0 %    IMMATURE GRANULOCYTES 4 0.0 - 5.0 %    ABS. NEUTROPHILS 10.3 (H) 1.7 - 8.2 K/UL    ABS. LYMPHOCYTES 1.7 0.5 - 4.6 K/UL    ABS. MONOCYTES 0.7 0.1 - 1.3 K/UL    ABS. EOSINOPHILS 0.2 0.0 - 0.8 K/UL    ABS. BASOPHILS 0.1 0.0 - 0.2 K/UL    ABS. IMM. GRANS.  0.5 0.0 - 0.5 K/UL   METABOLIC PANEL, COMPREHENSIVE    Collection Time: 03/03/21  7:43 PM   Result Value Ref Range    Sodium 133 (L) 136 - 145 mmol/L    Potassium 4.5 3.5 - 5.1 mmol/L    Chloride 97 (L) 98 - 107 mmol/L    CO2 27 21 - 32 mmol/L    Anion gap 9 7 - 16 mmol/L    Glucose 273 (H) 65 - 100 mg/dL    BUN 19 8 - 23 MG/DL    Creatinine 1.03 (H) 0.6 - 1.0 MG/DL    GFR est AA >60 >60 ml/min/1.73m2    GFR est non-AA 54 (L) >60 ml/min/1.73m2    Calcium 10.0 8.3 - 10.4 MG/DL    Bilirubin, total 0.7 0.2 - 1.1 MG/DL    ALT (SGPT) 27 12 - 65 U/L    AST (SGOT) 21 15 - 37 U/L    Alk. phosphatase 120 50 - 136 U/L    Protein, total 7.5 6.3 - 8.2 g/dL    Albumin 4.0 3.2 - 4.6 g/dL    Globulin 3.5 2.3 - 3.5 g/dL    A-G Ratio 1.1 (L) 1.2 - 3.5         XR HIP LT W OR WO PELV 2-3 VWS   Final Result   1. Minimally displaced and angulated intertrochanteric fracture of the proximal   femur.         XR FEMUR LT 2 V   Final Result   1. Minimally displaced and angulated intertrochanteric fracture of the proximal   femur.         XR CHEST PORT   Final Result   1. Abnormal lucency at the right lateral lung base. A pneumothorax is difficult   to exclude given the appearance although this could represent a skinfold. This   can be further assessed with a PA and lateral chest x-ray or chest CT.   Alternatively, a repeat portable chest x-ray can be attempted ensuring no   skinfolds occur at this level.         This report was made using voice transcription. Despite my best efforts to avoid   any, transcription errors may persist. If there is any question about the   accuracy of the report or need for clarification, then please call [phone], or text me through perfectserv for clarification or correction.

## 2021-03-04 ENCOUNTER — ANESTHESIA EVENT (OUTPATIENT)
Dept: SURGERY | Age: 86
DRG: 481 | End: 2021-03-04
Payer: MEDICARE

## 2021-03-04 ENCOUNTER — HOSPITAL ENCOUNTER (INPATIENT)
Dept: GENERAL RADIOLOGY | Age: 86
Discharge: HOME | DRG: 481 | End: 2021-03-04
Attending: ORTHOPAEDIC SURGERY
Payer: MEDICARE

## 2021-03-04 ENCOUNTER — ANESTHESIA (OUTPATIENT)
Dept: SURGERY | Age: 86
DRG: 481 | End: 2021-03-04
Payer: MEDICARE

## 2021-03-04 ENCOUNTER — APPOINTMENT (OUTPATIENT)
Dept: GENERAL RADIOLOGY | Age: 86
DRG: 481 | End: 2021-03-04
Attending: ORTHOPAEDIC SURGERY
Payer: MEDICARE

## 2021-03-04 PROBLEM — N30.00 ACUTE CYSTITIS WITHOUT HEMATURIA: Status: ACTIVE | Noted: 2021-03-04

## 2021-03-04 PROBLEM — S72.002A CLOSED FRACTURE OF LEFT HIP (HCC): Status: ACTIVE | Noted: 2021-03-03

## 2021-03-04 LAB
ANION GAP SERPL CALC-SCNC: 8 MMOL/L (ref 7–16)
APPEARANCE UR: ABNORMAL
BACTERIA SPEC CULT: NORMAL
BACTERIA URNS QL MICRO: ABNORMAL /HPF
BILIRUB UR QL: NEGATIVE
BUN SERPL-MCNC: 18 MG/DL (ref 8–23)
CALCIUM SERPL-MCNC: 9.8 MG/DL (ref 8.3–10.4)
CASTS URNS QL MICRO: ABNORMAL /LPF
CHLORIDE SERPL-SCNC: 100 MMOL/L (ref 98–107)
CO2 SERPL-SCNC: 27 MMOL/L (ref 21–32)
COLOR UR: YELLOW
CREAT SERPL-MCNC: 0.9 MG/DL (ref 0.6–1)
EPI CELLS #/AREA URNS HPF: 0 /HPF
ERYTHROCYTE [DISTWIDTH] IN BLOOD BY AUTOMATED COUNT: 13.8 % (ref 11.9–14.6)
GLUCOSE BLD STRIP.AUTO-MCNC: 108 MG/DL (ref 65–100)
GLUCOSE BLD STRIP.AUTO-MCNC: 143 MG/DL (ref 65–100)
GLUCOSE BLD STRIP.AUTO-MCNC: 145 MG/DL (ref 65–100)
GLUCOSE BLD STRIP.AUTO-MCNC: 162 MG/DL (ref 65–100)
GLUCOSE BLD STRIP.AUTO-MCNC: 261 MG/DL (ref 65–100)
GLUCOSE BLD STRIP.AUTO-MCNC: 401 MG/DL (ref 65–100)
GLUCOSE SERPL-MCNC: 150 MG/DL (ref 65–100)
GLUCOSE UR STRIP.AUTO-MCNC: NEGATIVE MG/DL
HCT VFR BLD AUTO: 36.8 % (ref 35.8–46.3)
HGB BLD-MCNC: 12.4 G/DL (ref 11.7–15.4)
HGB UR QL STRIP: NEGATIVE
KETONES UR QL STRIP.AUTO: NEGATIVE MG/DL
LEUKOCYTE ESTERASE UR QL STRIP.AUTO: ABNORMAL
MCH RBC QN AUTO: 30.5 PG (ref 26.1–32.9)
MCHC RBC AUTO-ENTMCNC: 33.7 G/DL (ref 31.4–35)
MCV RBC AUTO: 90.4 FL (ref 79.6–97.8)
NITRITE UR QL STRIP.AUTO: POSITIVE
NRBC # BLD: 0 K/UL (ref 0–0.2)
PH UR STRIP: 5.5 [PH] (ref 5–9)
PLATELET # BLD AUTO: 268 K/UL (ref 150–450)
PMV BLD AUTO: 10.3 FL (ref 9.4–12.3)
POTASSIUM SERPL-SCNC: 4 MMOL/L (ref 3.5–5.1)
PROT UR STRIP-MCNC: NEGATIVE MG/DL
RBC # BLD AUTO: 4.07 M/UL (ref 4.05–5.2)
RBC #/AREA URNS HPF: ABNORMAL /HPF
SERVICE CMNT-IMP: NORMAL
SODIUM SERPL-SCNC: 135 MMOL/L (ref 136–145)
SP GR UR REFRACTOMETRY: 1.02 (ref 1–1.02)
UROBILINOGEN UR QL STRIP.AUTO: 0.2 EU/DL (ref 0.2–1)
WBC # BLD AUTO: 13 K/UL (ref 4.3–11.1)
WBC URNS QL MICRO: ABNORMAL /HPF

## 2021-03-04 PROCEDURE — C1769 GUIDE WIRE: HCPCS | Performed by: ORTHOPAEDIC SURGERY

## 2021-03-04 PROCEDURE — 80048 BASIC METABOLIC PNL TOTAL CA: CPT

## 2021-03-04 PROCEDURE — 77030016449 HC BIT DRL AO1 STRY -C: Performed by: ORTHOPAEDIC SURGERY

## 2021-03-04 PROCEDURE — C1713 ANCHOR/SCREW BN/BN,TIS/BN: HCPCS | Performed by: ORTHOPAEDIC SURGERY

## 2021-03-04 PROCEDURE — 74011250637 HC RX REV CODE- 250/637: Performed by: HOSPITALIST

## 2021-03-04 PROCEDURE — 87186 SC STD MICRODIL/AGAR DIL: CPT

## 2021-03-04 PROCEDURE — 81001 URINALYSIS AUTO W/SCOPE: CPT

## 2021-03-04 PROCEDURE — 77030039425 HC BLD LARYNG TRULITE DISP TELE -A: Performed by: ANESTHESIOLOGY

## 2021-03-04 PROCEDURE — 77030018673: Performed by: ORTHOPAEDIC SURGERY

## 2021-03-04 PROCEDURE — 2709999900 HC NON-CHARGEABLE SUPPLY

## 2021-03-04 PROCEDURE — 74011000250 HC RX REV CODE- 250: Performed by: NURSE ANESTHETIST, CERTIFIED REGISTERED

## 2021-03-04 PROCEDURE — 76010000160 HC OR TIME 0.5 TO 1 HR INTENSV-TIER 1: Performed by: ORTHOPAEDIC SURGERY

## 2021-03-04 PROCEDURE — 77030017016 HC DSG ANTIMIC BARR2 S&N -B: Performed by: ORTHOPAEDIC SURGERY

## 2021-03-04 PROCEDURE — 85027 COMPLETE CBC AUTOMATED: CPT

## 2021-03-04 PROCEDURE — 82962 GLUCOSE BLOOD TEST: CPT

## 2021-03-04 PROCEDURE — 74011250636 HC RX REV CODE- 250/636: Performed by: ORTHOPAEDIC SURGERY

## 2021-03-04 PROCEDURE — 77030008846 HC WRE K STRY -C: Performed by: ORTHOPAEDIC SURGERY

## 2021-03-04 PROCEDURE — 77030021107 HC SHFT RMR MOD DISP STRY -D: Performed by: ORTHOPAEDIC SURGERY

## 2021-03-04 PROCEDURE — 74011000258 HC RX REV CODE- 258: Performed by: HOSPITALIST

## 2021-03-04 PROCEDURE — 74011250637 HC RX REV CODE- 250/637: Performed by: ORTHOPAEDIC SURGERY

## 2021-03-04 PROCEDURE — 74011000302 HC RX REV CODE- 302: Performed by: INTERNAL MEDICINE

## 2021-03-04 PROCEDURE — 65270000029 HC RM PRIVATE

## 2021-03-04 PROCEDURE — 74011250636 HC RX REV CODE- 250/636: Performed by: NURSE ANESTHETIST, CERTIFIED REGISTERED

## 2021-03-04 PROCEDURE — 0QS706Z REPOSITION LEFT UPPER FEMUR WITH INTRAMEDULLARY INTERNAL FIXATION DEVICE, OPEN APPROACH: ICD-10-PCS | Performed by: ORTHOPAEDIC SURGERY

## 2021-03-04 PROCEDURE — 86580 TB INTRADERMAL TEST: CPT | Performed by: INTERNAL MEDICINE

## 2021-03-04 PROCEDURE — 74011636637 HC RX REV CODE- 636/637: Performed by: ORTHOPAEDIC SURGERY

## 2021-03-04 PROCEDURE — 74011250637 HC RX REV CODE- 250/637: Performed by: INTERNAL MEDICINE

## 2021-03-04 PROCEDURE — 87086 URINE CULTURE/COLONY COUNT: CPT

## 2021-03-04 PROCEDURE — 74011000258 HC RX REV CODE- 258: Performed by: ORTHOPAEDIC SURGERY

## 2021-03-04 PROCEDURE — 73552 X-RAY EXAM OF FEMUR 2/>: CPT

## 2021-03-04 PROCEDURE — 77030037088 HC TUBE ENDOTRACH ORAL NSL COVD-A: Performed by: ANESTHESIOLOGY

## 2021-03-04 PROCEDURE — 2709999900 HC NON-CHARGEABLE SUPPLY: Performed by: ORTHOPAEDIC SURGERY

## 2021-03-04 PROCEDURE — 77030002933 HC SUT MCRYL J&J -A: Performed by: ORTHOPAEDIC SURGERY

## 2021-03-04 PROCEDURE — 73502 X-RAY EXAM HIP UNI 2-3 VIEWS: CPT

## 2021-03-04 PROCEDURE — 87088 URINE BACTERIA CULTURE: CPT

## 2021-03-04 PROCEDURE — 76060000033 HC ANESTHESIA 1 TO 1.5 HR: Performed by: ORTHOPAEDIC SURGERY

## 2021-03-04 PROCEDURE — 74011250636 HC RX REV CODE- 250/636: Performed by: HOSPITALIST

## 2021-03-04 PROCEDURE — 76210000006 HC OR PH I REC 0.5 TO 1 HR: Performed by: ORTHOPAEDIC SURGERY

## 2021-03-04 PROCEDURE — 36415 COLL VENOUS BLD VENIPUNCTURE: CPT

## 2021-03-04 DEVICE — LOCKING SCREW, FULLY THREADED: Type: IMPLANTABLE DEVICE | Site: HIP | Status: FUNCTIONAL

## 2021-03-04 DEVICE — LAG SCREW, TI
Type: IMPLANTABLE DEVICE | Site: HIP | Status: FUNCTIONAL
Brand: GAMMA

## 2021-03-04 DEVICE — LONG NAIL KIT R1.5, TI, LEFT
Type: IMPLANTABLE DEVICE | Site: HIP | Status: FUNCTIONAL
Brand: GAMMA

## 2021-03-04 RX ORDER — GLYCOPYRROLATE 0.2 MG/ML
INJECTION INTRAMUSCULAR; INTRAVENOUS AS NEEDED
Status: DISCONTINUED | OUTPATIENT
Start: 2021-03-04 | End: 2021-03-04 | Stop reason: HOSPADM

## 2021-03-04 RX ORDER — ONDANSETRON 2 MG/ML
INJECTION INTRAMUSCULAR; INTRAVENOUS AS NEEDED
Status: DISCONTINUED | OUTPATIENT
Start: 2021-03-04 | End: 2021-03-04 | Stop reason: HOSPADM

## 2021-03-04 RX ORDER — CYCLOBENZAPRINE HCL 10 MG
5 TABLET ORAL
Status: DISCONTINUED | OUTPATIENT
Start: 2021-03-04 | End: 2021-03-09 | Stop reason: HOSPADM

## 2021-03-04 RX ORDER — ROCURONIUM BROMIDE 10 MG/ML
INJECTION, SOLUTION INTRAVENOUS AS NEEDED
Status: DISCONTINUED | OUTPATIENT
Start: 2021-03-04 | End: 2021-03-04 | Stop reason: HOSPADM

## 2021-03-04 RX ORDER — SODIUM CHLORIDE 0.9 % (FLUSH) 0.9 %
5-40 SYRINGE (ML) INJECTION AS NEEDED
Status: DISCONTINUED | OUTPATIENT
Start: 2021-03-04 | End: 2021-03-09 | Stop reason: HOSPADM

## 2021-03-04 RX ORDER — MORPHINE SULFATE 2 MG/ML
4 INJECTION, SOLUTION INTRAMUSCULAR; INTRAVENOUS
Status: DISCONTINUED | OUTPATIENT
Start: 2021-03-04 | End: 2021-03-05

## 2021-03-04 RX ORDER — ENOXAPARIN SODIUM 100 MG/ML
30 INJECTION SUBCUTANEOUS EVERY 24 HOURS
Status: DISCONTINUED | OUTPATIENT
Start: 2021-03-05 | End: 2021-03-04

## 2021-03-04 RX ORDER — OXYCODONE HYDROCHLORIDE 5 MG/1
10 TABLET ORAL
Status: DISCONTINUED | OUTPATIENT
Start: 2021-03-04 | End: 2021-03-04 | Stop reason: HOSPADM

## 2021-03-04 RX ORDER — MAG HYDROX/ALUMINUM HYD/SIMETH 200-200-20
30 SUSPENSION, ORAL (FINAL DOSE FORM) ORAL
Status: DISCONTINUED | OUTPATIENT
Start: 2021-03-04 | End: 2021-03-09 | Stop reason: HOSPADM

## 2021-03-04 RX ORDER — SUCCINYLCHOLINE CHLORIDE 20 MG/ML
INJECTION INTRAMUSCULAR; INTRAVENOUS AS NEEDED
Status: DISCONTINUED | OUTPATIENT
Start: 2021-03-04 | End: 2021-03-04 | Stop reason: HOSPADM

## 2021-03-04 RX ORDER — SODIUM CHLORIDE, SODIUM LACTATE, POTASSIUM CHLORIDE, CALCIUM CHLORIDE 600; 310; 30; 20 MG/100ML; MG/100ML; MG/100ML; MG/100ML
INJECTION, SOLUTION INTRAVENOUS
Status: DISCONTINUED | OUTPATIENT
Start: 2021-03-04 | End: 2021-03-04 | Stop reason: HOSPADM

## 2021-03-04 RX ORDER — CYCLOBENZAPRINE HCL 10 MG
10 TABLET ORAL ONCE
Status: COMPLETED | OUTPATIENT
Start: 2021-03-04 | End: 2021-03-04

## 2021-03-04 RX ORDER — SODIUM CHLORIDE, SODIUM LACTATE, POTASSIUM CHLORIDE, CALCIUM CHLORIDE 600; 310; 30; 20 MG/100ML; MG/100ML; MG/100ML; MG/100ML
75 INJECTION, SOLUTION INTRAVENOUS CONTINUOUS
Status: DISCONTINUED | OUTPATIENT
Start: 2021-03-04 | End: 2021-03-04 | Stop reason: HOSPADM

## 2021-03-04 RX ORDER — FERROUS SULFATE, DRIED 160(50) MG
1 TABLET, EXTENDED RELEASE ORAL
Status: DISCONTINUED | OUTPATIENT
Start: 2021-03-04 | End: 2021-03-09 | Stop reason: HOSPADM

## 2021-03-04 RX ORDER — INSULIN LISPRO 100 [IU]/ML
INJECTION, SOLUTION INTRAVENOUS; SUBCUTANEOUS EVERY 6 HOURS
Status: DISCONTINUED | OUTPATIENT
Start: 2021-03-04 | End: 2021-03-05

## 2021-03-04 RX ORDER — PROPOFOL 10 MG/ML
INJECTION, EMULSION INTRAVENOUS AS NEEDED
Status: DISCONTINUED | OUTPATIENT
Start: 2021-03-04 | End: 2021-03-04 | Stop reason: HOSPADM

## 2021-03-04 RX ORDER — OXYCODONE HYDROCHLORIDE 5 MG/1
10 TABLET ORAL
Status: DISCONTINUED | OUTPATIENT
Start: 2021-03-04 | End: 2021-03-05

## 2021-03-04 RX ORDER — SODIUM CHLORIDE 0.9 % (FLUSH) 0.9 %
5-40 SYRINGE (ML) INJECTION EVERY 8 HOURS
Status: DISCONTINUED | OUTPATIENT
Start: 2021-03-04 | End: 2021-03-09 | Stop reason: HOSPADM

## 2021-03-04 RX ORDER — ENOXAPARIN SODIUM 100 MG/ML
30 INJECTION SUBCUTANEOUS EVERY 24 HOURS
Status: DISCONTINUED | OUTPATIENT
Start: 2021-03-04 | End: 2021-03-04

## 2021-03-04 RX ORDER — ASPIRIN 325 MG
325 TABLET ORAL DAILY
Status: DISCONTINUED | OUTPATIENT
Start: 2021-03-05 | End: 2021-03-09 | Stop reason: HOSPADM

## 2021-03-04 RX ORDER — LIDOCAINE HYDROCHLORIDE 20 MG/ML
INJECTION, SOLUTION EPIDURAL; INFILTRATION; INTRACAUDAL; PERINEURAL AS NEEDED
Status: DISCONTINUED | OUTPATIENT
Start: 2021-03-04 | End: 2021-03-04 | Stop reason: HOSPADM

## 2021-03-04 RX ORDER — FENTANYL CITRATE 50 UG/ML
INJECTION, SOLUTION INTRAMUSCULAR; INTRAVENOUS AS NEEDED
Status: DISCONTINUED | OUTPATIENT
Start: 2021-03-04 | End: 2021-03-04 | Stop reason: HOSPADM

## 2021-03-04 RX ORDER — OXYCODONE HYDROCHLORIDE 5 MG/1
5 TABLET ORAL
Status: DISCONTINUED | OUTPATIENT
Start: 2021-03-04 | End: 2021-03-04 | Stop reason: HOSPADM

## 2021-03-04 RX ORDER — HYDROMORPHONE HYDROCHLORIDE 1 MG/ML
0.5 INJECTION, SOLUTION INTRAMUSCULAR; INTRAVENOUS; SUBCUTANEOUS
Status: DISCONTINUED | OUTPATIENT
Start: 2021-03-04 | End: 2021-03-04 | Stop reason: HOSPADM

## 2021-03-04 RX ORDER — OXYCODONE HYDROCHLORIDE 5 MG/1
5 TABLET ORAL
Status: DISCONTINUED | OUTPATIENT
Start: 2021-03-04 | End: 2021-03-04

## 2021-03-04 RX ORDER — DEXAMETHASONE SODIUM PHOSPHATE 4 MG/ML
INJECTION, SOLUTION INTRA-ARTICULAR; INTRALESIONAL; INTRAMUSCULAR; INTRAVENOUS; SOFT TISSUE AS NEEDED
Status: DISCONTINUED | OUTPATIENT
Start: 2021-03-04 | End: 2021-03-04 | Stop reason: HOSPADM

## 2021-03-04 RX ORDER — MORPHINE SULFATE 2 MG/ML
1 INJECTION, SOLUTION INTRAMUSCULAR; INTRAVENOUS
Status: DISCONTINUED | OUTPATIENT
Start: 2021-03-04 | End: 2021-03-04

## 2021-03-04 RX ADMIN — GLYCOPYRROLATE 0.1 MG: 0.2 INJECTION, SOLUTION INTRAMUSCULAR; INTRAVENOUS at 16:46

## 2021-03-04 RX ADMIN — Medication 10 ML: at 05:07

## 2021-03-04 RX ADMIN — Medication 10 ML: at 17:42

## 2021-03-04 RX ADMIN — SODIUM CHLORIDE, SODIUM LACTATE, POTASSIUM CHLORIDE, AND CALCIUM CHLORIDE: 600; 310; 30; 20 INJECTION, SOLUTION INTRAVENOUS at 16:13

## 2021-03-04 RX ADMIN — LIDOCAINE HYDROCHLORIDE 60 MG: 20 INJECTION, SOLUTION EPIDURAL; INFILTRATION; INTRACAUDAL; PERINEURAL at 16:20

## 2021-03-04 RX ADMIN — DEXAMETHASONE SODIUM PHOSPHATE 4 MG: 4 INJECTION, SOLUTION INTRAMUSCULAR; INTRAVENOUS at 16:46

## 2021-03-04 RX ADMIN — PROPOFOL 100 MG: 10 INJECTION, EMULSION INTRAVENOUS at 16:20

## 2021-03-04 RX ADMIN — ACETAMINOPHEN 650 MG: 325 TABLET, FILM COATED ORAL at 05:06

## 2021-03-04 RX ADMIN — ACETAMINOPHEN 650 MG: 325 TABLET, FILM COATED ORAL at 23:42

## 2021-03-04 RX ADMIN — PHENYLEPHRINE HYDROCHLORIDE 120 MCG: 10 INJECTION INTRAVENOUS at 16:29

## 2021-03-04 RX ADMIN — Medication 10 ML: at 01:13

## 2021-03-04 RX ADMIN — OXYCODONE 5 MG: 5 TABLET ORAL at 07:58

## 2021-03-04 RX ADMIN — CEFAZOLIN 1 G: 1 INJECTION, POWDER, FOR SOLUTION INTRAMUSCULAR; INTRAVENOUS at 18:48

## 2021-03-04 RX ADMIN — SUCCINYLCHOLINE CHLORIDE 160 MG: 20 INJECTION, SOLUTION INTRAMUSCULAR; INTRAVENOUS at 16:20

## 2021-03-04 RX ADMIN — FENTANYL CITRATE 50 MCG: 50 INJECTION INTRAMUSCULAR; INTRAVENOUS at 16:40

## 2021-03-04 RX ADMIN — OXYCODONE 10 MG: 5 TABLET ORAL at 20:01

## 2021-03-04 RX ADMIN — ONDANSETRON 4 MG: 2 INJECTION INTRAMUSCULAR; INTRAVENOUS at 16:46

## 2021-03-04 RX ADMIN — ROCURONIUM BROMIDE 5 MG: 10 INJECTION, SOLUTION INTRAVENOUS at 16:20

## 2021-03-04 RX ADMIN — ACETAMINOPHEN 650 MG: 325 TABLET, FILM COATED ORAL at 11:48

## 2021-03-04 RX ADMIN — Medication 10 ML: at 23:48

## 2021-03-04 RX ADMIN — TUBERCULIN PURIFIED PROTEIN DERIVATIVE 5 UNITS: 5 INJECTION, SOLUTION INTRADERMAL at 01:11

## 2021-03-04 RX ADMIN — INSULIN LISPRO 10 UNITS: 100 INJECTION, SOLUTION INTRAVENOUS; SUBCUTANEOUS at 23:49

## 2021-03-04 RX ADMIN — CEFTRIAXONE SODIUM 1 G: 1 INJECTION, POWDER, FOR SOLUTION INTRAMUSCULAR; INTRAVENOUS at 11:52

## 2021-03-04 RX ADMIN — CYCLOBENZAPRINE 10 MG: 10 TABLET, FILM COATED ORAL at 11:00

## 2021-03-04 NOTE — PROGRESS NOTES
Attempt made to call pt daughter, Abbi Matthews, in order to update on pt surgical plans, but without answer.

## 2021-03-04 NOTE — PROGRESS NOTES
Ortho On Call Note:     80-year-old female who suffered a fall at home onto her left hip. She developed left severe hip pain. X-rays reviewed with Dr. Tiago Pisano and revealed minimally displaced and angulated intertrochanteric fracture of the proximal femur.   Patient was transferred to [address] [place] [address] for admission via hospitalist.  Dr. Tavo Villareal will be notified and consulted for TFN of the left hip    Note that patient is Covid positive on rapid test with previous positive Covid infection in January 2021

## 2021-03-04 NOTE — PROGRESS NOTES
TRANSFER - IN REPORT:    Verbal report received from Margie RN(name) on Erin Castellani  being received from [facility](unit) for routine progression of care      Report consisted of patients Situation, Background, Assessment and   Recommendations(SBAR). Information from the following report(s) SBAR, Kardex, ED Summary, Intake/Output, MAR and Recent Results was reviewed with the receiving nurse. Opportunity for questions and clarification was provided. Assessment to be completed upon patients arrival to unit and care assumed.

## 2021-03-04 NOTE — ROUTINE PROCESS
TRANSFER - OUT REPORT: 
 
Verbal report given to [facility] RN on  Evin  being transferred to 523(unit) for routine progression of care Report consisted of patients Situation, Background, Assessment and  
Recommendations(SBAR). Information from the following report(s) OR Summary was reviewed with the receiving nurse. Lines:  
Peripheral IV 03/03/21 Left Antecubital (Active) Site Assessment Clean, dry, & intact 03/04/21 0030 Phlebitis Assessment 0 03/04/21 0030 Infiltration Assessment 0 03/04/21 0030 Dressing Status Clean, dry, & intact 03/04/21 0030 Dressing Type Tape;Transparent 03/04/21 0030 Hub Color/Line Status Pink;Flushed 03/04/21 0030 Action Taken Blood drawn 03/03/21 1939 Alcohol Cap Used Yes 03/03/21 1939 Peripheral IV 03/03/21 Right; Outer Antecubital (Active) Site Assessment Clean, dry, & intact 03/04/21 0030 Phlebitis Assessment 0 03/04/21 0030 Infiltration Assessment 0 03/04/21 0030 Dressing Status Clean, dry, & intact 03/04/21 0030 Dressing Type Tape;Transparent 03/04/21 0030 Hub Color/Line Status Pink;Flushed 03/04/21 0030 Action Taken Blood drawn 03/03/21 2042 Opportunity for questions and clarification was provided. Patient transported with: 
 O2 @ 6 liters, CRNA AND RN.

## 2021-03-04 NOTE — PROGRESS NOTES
Problem: Risk for Spread of Infection  Goal: Prevent transmission of infectious organism to others  Description: Prevent the transmission of infectious organisms to other patients, staff members, and visitors. Outcome: Progressing Towards Goal     Problem: Patient Education:  Go to Education Activity  Goal: Patient/Family Education  Outcome: Progressing Towards Goal     Problem: Falls - Risk of  Goal: *Absence of Falls  Description: Document Bev Blood Fall Risk and appropriate interventions in the flowsheet. Outcome: Progressing Towards Goal  Note: Fall Risk Interventions:            Medication Interventions: Evaluate medications/consider consulting pharmacy    Elimination Interventions: Call light in reach, Patient to call for help with toileting needs    History of Falls Interventions: Investigate reason for fall         Problem: Patient Education: Go to Patient Education Activity  Goal: Patient/Family Education  Outcome: Progressing Towards Goal     Problem: Pressure Injury - Risk of  Goal: *Prevention of pressure injury  Description: Document Joshua Scale and appropriate interventions in the flowsheet. Outcome: Progressing Towards Goal  Note: Pressure Injury Interventions:  Sensory Interventions: Assess changes in LOC         Activity Interventions: Assess need for specialty bed    Mobility Interventions: Turn and reposition approx.  every two hours(pillow and wedges)    Nutrition Interventions: Document food/fluid/supplement intake    Friction and Shear Interventions: Minimize layers                Problem: Patient Education: Go to Patient Education Activity  Goal: Patient/Family Education  Outcome: Progressing Towards Goal

## 2021-03-04 NOTE — PROGRESS NOTES
03/03/21 2303   Dual Skin Pressure Injury Assessment   Dual Skin Pressure Injury Assessment WDL   Second Care Provider (Based on [address] [place] [address]) BAM RN   Skin Integumentary   Skin Integumentary (WDL) X    Pressure  Injury Documentation No Pressure Injury Noted-Pressure Ulcer Prevention Initiated   Skin Integrity Tear  (LUE)   Skin Color Appropriate for ethnicity   Skin Condition/Temp Warm;Dry   Turgor Epidermis thin w/ loss of subcut tissue   Hair Growth Sparce   Nails WDL   Varicosities Present

## 2021-03-04 NOTE — ED NOTES
TRANSFER - OUT REPORT:    Verbal report given to Douglas Harris on Live Dan  being transferred to  for routine progression of care       Report consisted of patients Situation, Background, Assessment and   Recommendations(SBAR). Information from the following report(s) SBAR was reviewed with the receiving nurse. Lines:   Peripheral IV 03/03/21 Left Antecubital (Active)   Site Assessment Clean, dry, & intact 03/03/21 1939   Phlebitis Assessment 0 03/03/21 1939   Infiltration Assessment 0 03/03/21 1939   Dressing Status Clean, dry, & intact 03/03/21 1939   Hub Color/Line Status Pink 03/03/21 1939   Action Taken Blood drawn 03/03/21 1939   Alcohol Cap Used Yes 03/03/21 1939       Peripheral IV 03/03/21 Right; Outer Antecubital (Active)   Site Assessment Clean, dry, & intact 03/03/21 2042   Phlebitis Assessment 0 03/03/21 2042   Infiltration Assessment 0 03/03/21 2042   Dressing Status Clean, dry, & intact 03/03/21 2042   Dressing Type Tape;Transparent 03/03/21 2042   Hub Color/Line Status Pink;Flushed;Patent 03/03/21 2042   Action Taken Blood drawn 03/03/21 2042        Opportunity for questions and clarification was provided.       Patient transported with:   [facility] ambulance service

## 2021-03-04 NOTE — PERIOP NOTES
PATIENT RECOVERED IN OPERATING ROOM  11 FOR 30 MINUTES AFTER EXTUBATION AND THEN TRANSFERRED TO THE FLOOR BY MYSELF AND CRNA.

## 2021-03-04 NOTE — INTERVAL H&P NOTE
Update History & Physical 
The Patient's History and Physical of 3/3/2021 was reviewed with the patient and I examined the patient. There was no change. The surgical site was confirmed by the patient and me. Plan:  The risk, benefits, expected outcome, and alternative to the recommended procedure have been discussed with the patient. Patient understands and wants to proceed with open treatment of left proximal femur fracture with intramedullary nail fixation.  
 
Electronically signed by Paola Snow MD on 3/4/2021 at 3:00 PM

## 2021-03-04 NOTE — ANESTHESIA PREPROCEDURE EVALUATION
Relevant Problems   No relevant active problems       Anesthetic History               Review of Systems / Medical History  Patient summary reviewed and pertinent labs reviewed    Pulmonary                   Neuro/Psych       CVA: no residual symptoms       Cardiovascular    Hypertension          CAD    Exercise tolerance: <4 METS     GI/Hepatic/Renal                Endo/Other    Diabetes: type 2    Arthritis     Other Findings   Comments: Barb Dan yesterday at home  Covid January 8           Physical Exam    Airway  Mallampati: II  TM Distance: > 6 cm  Neck ROM: normal range of motion   Mouth opening: Normal     Cardiovascular    Rhythm: regular  Rate: normal         Dental  No notable dental hx       Pulmonary  Breath sounds clear to auscultation               Abdominal         Other Findings            Anesthetic Plan    ASA: 3  Anesthesia type: general          Induction: RSI  Anesthetic plan and risks discussed with: Patient

## 2021-03-04 NOTE — PROGRESS NOTES
Hospitalist Progress Note    3/4/2021  Admit Date: 3/3/2021 10:39 PM   NAME: Rosendo Curtis   :  1934   MRN:  [id number]   Attending: Brent Fuentes MD  PCP:  Eli Mir MD    SUBJECTIVE:     Ruth Ann Lopez is a 55-year-old  female with history of arthritis, HTN, lumbar compression fracture, DM 2 admitted on 3/3 following mechanical fall resulting in left closed hip fracture. Patient is currently medically cleared for surgery, is at low risk for moderate risk surgery. Patient was also tested positive for COVID-19 on 3/3.  3/4: Patient is resting in bed complains of left hip pain to be 9/10 in severity along with intermittent spasms. Denies any chest pain, cough, shortness of breath. Patient is n.p.o. for surgical intervention to be done by Dr. Nancy Medellin today. Review of Systems negative with exception of pertinent positives noted above      PHYSICAL EXAM       Visit Vitals  /60 (BP 1 Location: Left arm, BP Patient Position: At rest)   Pulse 79   Temp 97.8 °F (36.6 °C)   Resp 18   Wt 53.3 kg (117 lb 8 oz)   LMP 2008   SpO2 97%   BMI 25.43 kg/m²      Temp (24hrs), Av °F (36.7 °C), Min:97.6 °F (36.4 °C), Max:98.5 °F (36.9 °C)    Oxygen Therapy  O2 Sat (%): 97 % (21 0430)  O2 Device: Room air (21 0030)  No intake or output data in the 24 hours ending 21 0708       General: Elderly, hard of hearing, frail, NAD, on room air  Head:  Atraumatic Normocephalic. Eyes:  PERRLA, EOMI, Anicteric. ENT:  No discharges/lesions. Lungs:  CTA Bilaterally. CVS:  Regular rate and rhythm,  No murmur, rub, or gallop, No JVD, No lower   extremity edema. Abdomen: Soft, Non distended, Non tender, Positive bowel sounds. MSK:  Left lower extremity in a brace with limited range of motion due to pain in left hip. Neurologic:  GCS 15, no motor or sensory deficits, cranial through grossly intact  Psychiatry:      AO x3, mood and affect appropriate.   Skin:   No rash/lesions. Good skin turgor  Heme/Lymph/Immune:  No petechiae, ecchymoses, overt signs of bleeding or    lymphadenopathy noted. Recent Results (from the past 24 hour(s))   TYPE & SCREEN    Collection Time: 03/03/21  7:42 PM   Result Value Ref Range    Crossmatch Expiration 03/06/2021,2359     ABO/Rh(D) Lisbeth Perez POSITIVE     Antibody screen NEG    CBC WITH AUTOMATED DIFF    Collection Time: 03/03/21  7:43 PM   Result Value Ref Range    WBC 13.5 (H) 4.3 - 11.1 K/uL    RBC 4.40 4.05 - 5.2 M/uL    HGB 13.3 11.7 - 15.4 g/dL    HCT 38.9 35.8 - 46.3 %    MCV 88.4 79.6 - 97.8 FL    MCH 30.2 26.1 - 32.9 PG    MCHC 34.2 31.4 - 35.0 g/dL    RDW 13.6 11.9 - 14.6 %    PLATELET 538 420 - 369 K/uL    MPV 10.2 9.4 - 12.3 FL    ABSOLUTE NRBC 0.00 0.0 - 0.2 K/uL    DF AUTOMATED      NEUTROPHILS 76 43 - 78 %    LYMPHOCYTES 13 13 - 44 %    MONOCYTES 5 4.0 - 12.0 %    EOSINOPHILS 2 0.5 - 7.8 %    BASOPHILS 1 0.0 - 2.0 %    IMMATURE GRANULOCYTES 4 0.0 - 5.0 %    ABS. NEUTROPHILS 10.3 (H) 1.7 - 8.2 K/UL    ABS. LYMPHOCYTES 1.7 0.5 - 4.6 K/UL    ABS. MONOCYTES 0.7 0.1 - 1.3 K/UL    ABS. EOSINOPHILS 0.2 0.0 - 0.8 K/UL    ABS. BASOPHILS 0.1 0.0 - 0.2 K/UL    ABS. IMM. GRANS. 0.5 0.0 - 0.5 K/UL   METABOLIC PANEL, COMPREHENSIVE    Collection Time: 03/03/21  7:43 PM   Result Value Ref Range    Sodium 133 (L) 136 - 145 mmol/L    Potassium 4.5 3.5 - 5.1 mmol/L    Chloride 97 (L) 98 - 107 mmol/L    CO2 27 21 - 32 mmol/L    Anion gap 9 7 - 16 mmol/L    Glucose 273 (H) 65 - 100 mg/dL    BUN 19 8 - 23 MG/DL    Creatinine 1.03 (H) 0.6 - 1.0 MG/DL    GFR est AA >60 >60 ml/min/1.73m2    GFR est non-AA 54 (L) >60 ml/min/1.73m2    Calcium 10.0 8.3 - 10.4 MG/DL    Bilirubin, total 0.7 0.2 - 1.1 MG/DL    ALT (SGPT) 27 12 - 65 U/L    AST (SGOT) 21 15 - 37 U/L    Alk.  phosphatase 120 50 - 136 U/L    Protein, total 7.5 6.3 - 8.2 g/dL    Albumin 4.0 3.2 - 4.6 g/dL    Globulin 3.5 2.3 - 3.5 g/dL    A-G Ratio 1.1 (L) 1.2 - 3.5     SARS-COV-2    Collection Time: 03/03/21  9:58 PM   Result Value Ref Range    SARS-CoV-2 Please find results under separate order     COVID-19 RAPID TEST    Collection Time: 03/03/21  9:58 PM   Result Value Ref Range    Specimen source Nasopharyngeal      COVID-19 rapid test Detected (AA) NOTD     MSSA/MRSA SC BY PCR, NASAL SWAB    Collection Time: 03/03/21 11:58 PM    Specimen: Nasal swab   Result Value Ref Range    Special Requests: NASAL SWAB      Culture result:        SA target not detected. A MRSA NEGATIVE, SA NEGATIVE test result does not preclude MRSA or SA nasal colonization. Imaging /Procedures /Studies   All diagnostic imaging personally reviewed by me. EXAM: Noncontrast CT chest.     INDICATION: Dyspnea. Covid 19.     COMPARISON: Prior CT chest on May 26, 2016, and prior lumbar spine x-rays on  January 9, 2021.     TECHNIQUE: Axial noncontrast CT images of the chest were obtained. Radiation  dose reduction techniques were used for this study. Our CT scanners use one or  all of the following:  Automated exposure control, adjustment of the mA and/or  kV according to patient size, iterative reconstruction.     FINDINGS:  - Pleura/pericardium: Within normal limits. - Lungs: Within normal limits. - Leslie/Mediastinum: Within normal limits. - Tracheobronchial tree: Within normal limits. - Aorta/pulmonary arteries: Within normal limits. - Heart: Within normal limits. - Coronary arteries: There are coronary artery calcifications. - Chest wall: Within normal limits. - Spine/bones: No acute process. There is an old T11 compression fracture. - Additional comments: A small hiatal hernia is present.     IMPRESSION  1. No acute process. 2. Coronary artery disease. 3. Small hiatal hernia. Left hip and femur radiographs 3/3/2021     CLINICAL HISTORY: Fall with left hip pain.  Unable to straighten leg.     FINDINGS:   Left hip:  A frontal view of the pelvis and AP and lateral view of the left hip are  submitted for evaluation. The sacroiliac joints and pubic symphysis are intact. The pelvic ring is grossly intact.       AP and lateral views of the left hip show an intertrochanteric fracture of the  proximal left femur with only minimal displacement and varus deformity. No  evidence for dislocation is seen.      Left femur:  AP and lateral views of the left femur once again demonstrates a left occipital  femur intertrochanteric fracture. No additional fracture seen of the left femur.     IMPRESSION  1. Minimally displaced and angulated intertrochanteric fracture of the proximal  femur.     ASSESSMENT      Hospital Problems as of 3/4/2021 Date Reviewed: 3/15/2019          Codes Class Noted - Resolved POA    * (Principal) Closed fracture of left hip ([facility] ICD-10-CM: Z67.521N  ICD-9-CM: 820.8  3/3/2021 - Present         Hip fracture requiring operative repair [facility]) ICD-10-CM: E44.533I  ICD-9-CM: 820.8  3/3/2021 - Present Unknown        Diabetic polyneuropathy associated with type 2 diabetes mellitus ([facility].) ICD-10-CM: E11.42  ICD-9-CM: 250.60, 357.2  6/11/2018 - Present Yes        Coronary artery disease involving native coronary artery of native heart with angina pectoris ([facility] ICD-10-CM: I25.119  ICD-9-CM: 414.01, 413.9  5/3/2016 - Present Yes        Dyslipidemia ICD-10-CM: E78.5  ICD-9-CM: 272.4  5/3/2016 - Present Yes        Essential hypertension with goal blood pressure less than 140/90 ICD-10-CM: I10  ICD-9-CM: 401.9  3/16/2013 - Present Yes                  Plan:  #Acute closed left hip fracture:  Patient is currently n.p.o.  Plan for ORIF to be done by Dr. Sagrario Sheffield today itself. Pain control with morphine 1 mg every 4 hours along with Roxicodone 5 mg every 4 hours as needed. Order for Flexeril 5 mg p.o. 3 times daily for spasm. Order for 1 dose of stat Flexeril 10 mg at bedside. Complete bedrest until surgery    #Acute cystitis:  Urinalysis suggestive of UTI. Follow-up with urine culture.   Ordered for IV Rocephin 1 g daily for 5 days. #DM2:  Continue to monitor POC glucose every 6 hours for now as patient is n.p.o. Continue sliding scale insulin every 6 hours for now. #Coronary artery disease:  Continue aspirin after surgery. #Hypertension:  BP is currently optimally controlled. We will order for as needed hydralazine 25 mg every 8 hours for systolic greater than 063    DVT Prophylaxis: Lovenox on tomorrow  PT and OT eval  Disposition: Patient will likely need short-term rehab after surgery.   Continue as needed antiemetics as needed  High risk with opioids on board  Pete Sanchez MD

## 2021-03-04 NOTE — OP NOTES
Operative Report    Patient: Beronica Glasgow MRN: [id number]  SSN: [id number]    YOB: 1934  Age: 80 y.o. Sex: female       Date of Surgery: 3/4/2021     History:  Beronica Glasgow is a 80 y.o. female who fell from a standing height landing on her left hip. She was seen emergency room and found to have a left intertrochanteric proximal femur fracture. I did have a chance to talk to her regarding the nature of her injury and exactly what the operative procedure was for this. I talked her about the need for intramedullary nail fixation and what this would involve. After talking to her about the procedure she seemed to feel comfortable consenting. I talked to the patient and/or their representative and explained the exact nature the procedure. I also went through a detailed list of the material risks associated with  the procedure which included risk of bleeding, infection, injury to nearby structures, worsening the situation, as well as the risks associate with anesthesia and finally death. Also talked with him regarding the benefits and alternatives to the procedure. Preoperative Diagnosis: LEFT HIP FX     Postoperative Diagnosis:  Closed displaced left proximal femur intertrochanteric fracture    Surgeon(s) and Role:     * Mariana Perez MD - Primary    Anesthesia: General    Procedure: Procedure(s):  Open treatment of left proximal intertrochanteric femur fracture with intramedullary nail fixation    Procedure in Detail: After successful  induction of general anesthetic the left lower extremity was placed in gentle boot traction on a fracture table and we made sure we could adequately visualize the proximal femur and that an adequate closed reduction could be obtained.   I then prepped and draped the left hip and thigh area and made a small incision just proximal to the area the greater trochanter and placed the cannulated awl into the tip the greater trochanter on both the AP and lateral projection and once it was in appropriate position placed a guidewire down the shaft of the femur and then reamed sequentially up to 13 mm for the shaft of the femur and to 15.5 mm proximally. I then measured for a 360 mm nail and placed the actual nail. Once the nail was in appropriate position I placed a guidewire in the center of the femoral head on both the AP and lateral projection. Once this was in appropriate position I drilled for and placed a 95 mm lag screw proximally. Once this was in position I then placed a set screw in the proximal portion of the nail and tightened this all the way down and backed off a half of a turn to allow for dynamic compression. I then placed a single interlock bolt distally using freehand technique. I then removed the insertion handle and checked the final reduction as well as the placement of the hardware. I was very pleased with this I then closed incisions with 2.0 Monocryl for the subcutaneous tissue and staples for the skin. Dressings were applied. The patient was awakened and taken recovery in stable condition there were no apparent complications      Estimated Blood Loss: 150 cc    Tourniquet Time: * No tourniquets in log *      Implants:   Implant Name Type Inv.  Item Serial No.  Lot No. LRB No. Used Action   NAIL KT LNG [id number] 125D LT -- IM KIT STRL GAMMA 3 - [id number]  NAIL KT LNG [id number] 125D LT -- IM KIT STRL GAMMA 3  [employer] [facility] [id number] Left 1 Implanted   SCR BNE LAG GAMMA 3 10.5X95MM -- TI STRL - [id number]  SCR BNE LAG GAMMA 3 10.5X95MM -- TI STRL  [employer] [facility] [id number] Left 1 Implanted   SCREW BNE L45MM DIA5MM KIT TI AUGUSTUS FULL THRD SHFT FOR T2 IM - [id number]  SCREW BNE L45MM DIA5MM KIT TI AUGUSTUS FULL THRD SHFT FOR T2 IM  [employer] [facility] [id number] Left 1 Implanted               Specimens: * No specimens in log *        Drains: None                Complications: None    Counts: Sponge and needle counts were correct times two.     Signed By:  Gerson Rodriguez MD     March 4, 2021

## 2021-03-04 NOTE — H&P
Hospitalist Note     Admit Date:  3/3/2021 10:39 PM   Name:  Stevenson Smith   Age:  80 y.o.  :  1934   MRN:  [id number]   PCP:  Eron Perez MD  Treatment Team: Attending Provider: Kayla Ladd MD    HPI/Subjective:     59-year-old female history of arthritis, hypertension, lumbar compression fractures, type 2 diabetes. Patient presents after a fall at the [facility] [address] Fall occurred while standing. No visual change, no fever, no numbness, no abdominal pain, no bowel incontinence, no nausea, no vomiting, no hematuria, no headaches, no extremity weakness, no hearing loss, no loss of consciousness, no tingling and no laceration. The risk factors include being elderly. The symptoms are aggravated by pressure on injury and use of injured limb. The treatment provided no relief. It is unknown when the patient last had a tetanus shot. In ED was found to have L femur fracture and transferred to [facility]. Patient's rapid Covid was positive. Was done as a preoperative screen. She had Covid infection in January. No SOB or cough or muscle aches    10 systems reviewed and negative except as noted in HPI.   Past Medical History:   Diagnosis Date    Acute kidney failure 2016    Acute pericarditis     Arthritis     CAP (community acquired pneumonia) 2016    Coronary artery disease     Crohn's disease     Hypercalcemia     Hypercholesterolemia     Hypertension     Lumbar compression fracture     Osteoporosis     Type 2 diabetes mellitus       Past Surgical History:   Procedure Laterality Date    HX ARTHROPLASTY  2016    left elbow    HX HEART CATHETERIZATION  2015    no intervention    HX HYSTERECTOMY      HX KYPHOPLASTY  ~    lumbar      Allergies   Allergen Reactions    Sulfa (Sulfonamide Antibiotics) Rash    Other Medication Unknown (comments)     \"Dypentin\"    Tape [Adhesive] Other (comments)     Blisters and skin tears      Social History     Tobacco Use  Smoking status: Former Smoker     Types: Cigarettes     Start date: 1961     Quit date: 1964     Years since quittin.5    Smokeless tobacco: Never Used    Tobacco comment: 2 packs a wk   Substance Use Topics    Alcohol use: Yes     Alcohol/week: 0.0 standard drinks     Comment: rarely      Family History   Problem Relation Age of Onset    Heart Failure Mother     Diabetes Mother     Cancer Father         throat    Alcohol abuse Neg Hx     Arthritis-rheumatoid Neg Hx     Asthma Neg Hx     Bleeding Prob Neg Hx     Elevated Lipids Neg Hx     Headache Neg Hx     Hypertension Neg Hx     Lung Disease Neg Hx     Migraines Neg Hx     Psychiatric Disorder Neg Hx     Stroke Neg Hx     Mental Retardation Neg Hx     Thyroid Disease Neg Hx       Family history reviewed and noncontributory. Immunization History   Administered Date(s) Administered    Influenza Vaccine 10/01/2015, 10/17/2016    TB Skin Test (PPD) Intradermal 2013     PTA Medications:  Prior to Admission Medications   Prescriptions Last Dose Informant Patient Reported? Taking? B.infantis-B.ani-B.long-B.bifi (PROBIOTIC 4X) 10-15 mg Tab   Yes No   Sig: Take  by mouth daily. acetaminophen (TYLENOL EXTRA STRENGTH) 500 mg tablet   Yes No   Sig: Take  by mouth every six (6) hours as needed for Pain.   amoxicillin-clavulanate (Augmentin) 875-125 mg per tablet   No No   Sig: Take 1 Tab by mouth two (2) times a day. aspirin 81 mg Tab   Yes No   Sig: take by mouth.    cyanocobalamin 1,000 mcg tablet   Yes No   Sig: Take 2,500 mcg by mouth daily. insulin glargine (LANTUS,BASAGLAR) 100 unit/mL (3 mL) inpn   Yes No   Si Units by SubCUTAneous route. ondansetron (Zofran ODT) 4 mg disintegrating tablet   No No   Sig: Take 1 Tab by mouth every eight (8) hours as needed for Nausea. Facility-Administered Medications: None       Objective:   No data found.        Estimated body mass index is 25.1 kg/m² as calculated from the following:    Height as of 12/14/20: 4' 9\" (1.448 m). Weight as of 12/14/20: 52.6 kg (116 lb). No intake or output data in the 24 hours ending 03/03/21 8942    *Note that automatically entered I/Os may not be accurate; dependent on patient compliance with collection and accurate  by assistants.       Physical Exam  Vitals signs and nursing note reviewed. Constitutional:       General: She is in acute distress. Appearance: She is well-developed. HENT:      Head: Normocephalic and atraumatic. Right Ear: External ear normal.      Left Ear: External ear normal.   Eyes:      Extraocular Movements: Extraocular movements intact. Conjunctiva/sclera: Conjunctivae normal.      Pupils: Pupils are equal, round, and reactive to light. Neck:      Musculoskeletal: Normal range of motion and neck supple. Cardiovascular:      Rate and Rhythm: Normal rate and regular rhythm. Heart sounds: Normal heart sounds. Pulmonary:      Effort: Pulmonary effort is normal.      Breath sounds: Normal breath sounds. Abdominal:      General: Bowel sounds are normal.      Palpations: Abdomen is soft. Tenderness: There is no abdominal tenderness. Musculoskeletal:      Left hip: She exhibits decreased range of motion and tenderness. She exhibits no deformity. Skin:     General: Skin is warm and dry. Capillary Refill: Capillary refill takes less than 2 seconds. Findings: No laceration. Neurological:      General: No focal deficit present. Mental Status: She is alert and oriented to person, place, and time. Cranial Nerves: Cranial nerves are intact. Sensory: Sensation is intact. Motor: Motor function is intact.    Psychiatric:         Mood and Affect: Mood and affect normal.         Speech: Speech normal.         Behavior: Behavior normal.         Cognition and Memory: Cognition and memory normal.   I reviewed the labs, imaging, EKGs, telemetry, and other studies done this admission. Data Review:   Recent Results (from the past 24 hour(s))   TYPE & SCREEN    Collection Time: 03/03/21  7:42 PM   Result Value Ref Range    Crossmatch Expiration 03/06/2021,2359     ABO/Rh(D) Monica Sammy POSITIVE     Antibody screen NEG    CBC WITH AUTOMATED DIFF    Collection Time: 03/03/21  7:43 PM   Result Value Ref Range    WBC 13.5 (H) 4.3 - 11.1 K/uL    RBC 4.40 4.05 - 5.2 M/uL    HGB 13.3 11.7 - 15.4 g/dL    HCT 38.9 35.8 - 46.3 %    MCV 88.4 79.6 - 97.8 FL    MCH 30.2 26.1 - 32.9 PG    MCHC 34.2 31.4 - 35.0 g/dL    RDW 13.6 11.9 - 14.6 %    PLATELET 341 794 - 968 K/uL    MPV 10.2 9.4 - 12.3 FL    ABSOLUTE NRBC 0.00 0.0 - 0.2 K/uL    DF AUTOMATED      NEUTROPHILS 76 43 - 78 %    LYMPHOCYTES 13 13 - 44 %    MONOCYTES 5 4.0 - 12.0 %    EOSINOPHILS 2 0.5 - 7.8 %    BASOPHILS 1 0.0 - 2.0 %    IMMATURE GRANULOCYTES 4 0.0 - 5.0 %    ABS. NEUTROPHILS 10.3 (H) 1.7 - 8.2 K/UL    ABS. LYMPHOCYTES 1.7 0.5 - 4.6 K/UL    ABS. MONOCYTES 0.7 0.1 - 1.3 K/UL    ABS. EOSINOPHILS 0.2 0.0 - 0.8 K/UL    ABS. BASOPHILS 0.1 0.0 - 0.2 K/UL    ABS. IMM. GRANS. 0.5 0.0 - 0.5 K/UL   METABOLIC PANEL, COMPREHENSIVE    Collection Time: 03/03/21  7:43 PM   Result Value Ref Range    Sodium 133 (L) 136 - 145 mmol/L    Potassium 4.5 3.5 - 5.1 mmol/L    Chloride 97 (L) 98 - 107 mmol/L    CO2 27 21 - 32 mmol/L    Anion gap 9 7 - 16 mmol/L    Glucose 273 (H) 65 - 100 mg/dL    BUN 19 8 - 23 MG/DL    Creatinine 1.03 (H) 0.6 - 1.0 MG/DL    GFR est AA >60 >60 ml/min/1.73m2    GFR est non-AA 54 (L) >60 ml/min/1.73m2    Calcium 10.0 8.3 - 10.4 MG/DL    Bilirubin, total 0.7 0.2 - 1.1 MG/DL    ALT (SGPT) 27 12 - 65 U/L    AST (SGOT) 21 15 - 37 U/L    Alk.  phosphatase 120 50 - 136 U/L    Protein, total 7.5 6.3 - 8.2 g/dL    Albumin 4.0 3.2 - 4.6 g/dL    Globulin 3.5 2.3 - 3.5 g/dL    A-G Ratio 1.1 (L) 1.2 - 3.5     SARS-COV-2    Collection Time: 03/03/21  9:58 PM   Result Value Ref Range    SARS-CoV-2 Please find results under separate order COVID-19 RAPID TEST    Collection Time: 03/03/21  9:58 PM   Result Value Ref Range    Specimen source Nasopharyngeal      COVID-19 rapid test Detected (AA) NOTD         All Micro Results     Procedure Component Value Units Date/Time    MSSA/MRSA SC BY PCR, NASAL SWAB [[id number]]     Order Status: Sent Specimen: Nasal swab           Current Facility-Administered Medications   Medication Dose Route Frequency    sodium chloride (NS) flush 5-40 mL  5-40 mL IntraVENous Q8H    sodium chloride (NS) flush 5-40 mL  5-40 mL IntraVENous PRN    [START ON 3/4/2021] acetaminophen (TYLENOL) tablet 650 mg  650 mg Oral Q6H    ondansetron (ZOFRAN) injection 4 mg  4 mg IntraVENous Q4H PRN    tuberculin injection 5 Units  5 Units IntraDERMal ONCE       Other Studies:  No results found for this visit on 03/03/21. Xr Hip Lt W Or Wo Pelv 2-3 Vws    Result Date: 3/3/2021  Left hip and femur radiographs 3/3/2021 CLINICAL HISTORY: Fall with left hip pain. Unable to straighten leg. FINDINGS: Left hip: A frontal view of the pelvis and AP and lateral view of the left hip are submitted for evaluation. The sacroiliac joints and pubic symphysis are intact. The pelvic ring is grossly intact. AP and lateral views of the left hip show an intertrochanteric fracture of the proximal left femur with only minimal displacement and varus deformity. No evidence for dislocation is seen. Left femur: AP and lateral views of the left femur once again demonstrates a left occipital femur intertrochanteric fracture. No additional fracture seen of the left femur. 1. Minimally displaced and angulated intertrochanteric fracture of the proximal femur. Xr Femur Lt 2 V    Result Date: 3/3/2021  Left hip and femur radiographs 3/3/2021 CLINICAL HISTORY: Fall with left hip pain. Unable to straighten leg. FINDINGS: Left hip: A frontal view of the pelvis and AP and lateral view of the left hip are submitted for evaluation.  The sacroiliac joints and pubic symphysis are intact. The pelvic ring is grossly intact. AP and lateral views of the left hip show an intertrochanteric fracture of the proximal left femur with only minimal displacement and varus deformity. No evidence for dislocation is seen. Left femur: AP and lateral views of the left femur once again demonstrates a left occipital femur intertrochanteric fracture. No additional fracture seen of the left femur. 1. Minimally displaced and angulated intertrochanteric fracture of the proximal femur. Xr Chest Port    Result Date: 3/3/2021  EXAM: Chest x-ray. INDICATION: Questionable pneumothorax on earlier chest x-ray. COMPARISON: Earlier chest x-ray on the same day. TECHNIQUE: Frontal view chest x-ray. FINDINGS: On a repeat view, the previously seen right lung base lucency does not persist suggesting it was not artifactual skin fold. No acute intrathoracic process, as above. Xr Chest Port    Result Date: 3/3/2021  CHEST X-RAY, single portable view  3/3/2021 History: Left hip pain after fall. Unable to straighten leg Technique: Single frontal view of the chest. Comparison: Chest x-ray 1/9/2021 Findings: The cardiac silhouette is mildly enlarged although stable. Lucency is seen at the right lateral lung base. A pneumothorax is difficult to exclude although this could result from a large skinfold. This should be further characterized. No evolving consolidative airspace process or pleural effusion is seen. 1. Abnormal lucency at the right lateral lung base. A pneumothorax is difficult to exclude given the appearance although this could represent a skinfold. This can be further assessed with a PA and lateral chest x-ray or chest CT. Alternatively, a repeat portable chest x-ray can be attempted ensuring no skinfolds occur at this level. This report was made using voice transcription.  Despite my best efforts to avoid any, transcription errors may persist. If there is any question about the accuracy of the report or need for clarification, then please call [phone], or text me through perfectserv for clarification or correction. Assessment and Plan:     Hospital Problems as of 3/3/2021 Date Reviewed: 3/15/2019          Codes Class Noted - Resolved POA    Hip fracture [facility]) ICD-10-CM: S72.009A  ICD-9-CM: 820.8  3/3/2021 - Present Unknown        Hip fracture requiring operative repair [facility]) ICD-10-CM: J51.527A  ICD-9-CM: 820.8  3/3/2021 - Present Unknown            80-year-old female presents after a fall with a left hip pain, found to have L femur fracture    Plan:    Acute minimally displaced left femur fracture  -X-ray femur showed minimally displaced and angulated intertrochanteric fracture of the proximal femur on the left side  -Ortho consult  -N.p.o. after midnight    COVID-recovered infection  -don't think isolation required given infection in january    X-ray chest on admission was unable to exclude pneumothorax given an abnormal lucency of the right lung base.    -Check CT chest    Chronic/prior  arthritis, hypertension, lumbar compression fractures, type 2 diabetes, crohns disease      Discharge planning:    DVT ppx ordered SCD  Code status:  Full  Estimated LOS:  Greater than 2 midnights  Risk:  high    Signed:  Akosua Ramirez MD

## 2021-03-04 NOTE — CONSULTS
[facility]  Consultation Note    Patient ID:  Name: Rosendo Jones  MRN: [id number]  AGE: 80 y.o.  : 1934    Date of Consultation:  2021  Referring Physician:  Hospitalist     Subjective: Pt complains of left hip pain after sustaining a fall at home yesterday. She was unable to bear weight and presented to the [facility] Emergency Dept. She was found to have a closed intertrochanteric hip fracture. She localizes her pain to the left groin and hip. She has pain with movement. Her pain is fairly controlled at this time. During admission patient tested COVID+. She denies any other orthopedic injuries or concerns.       Past Medical History Includes:   Past Medical History:   Diagnosis Date    Acute kidney failure 2016    Acute pericarditis     Arthritis     CAP (community acquired pneumonia) 2016    Coronary artery disease     Crohn's disease     Hypercalcemia     Hypercholesterolemia     Hypertension     Lumbar compression fracture     Osteoporosis     Type 2 diabetes mellitus    ,   Past Surgical History:   Procedure Laterality Date    HX ARTHROPLASTY  2016    left elbow    HX HEART CATHETERIZATION  2015    no intervention    HX HYSTERECTOMY      HX KYPHOPLASTY  ~    lumbar     Family History:   Family History   Problem Relation Age of Onset    Heart Failure Mother     Diabetes Mother     Cancer Father         throat    Alcohol abuse Neg Hx     Arthritis-rheumatoid Neg Hx     Asthma Neg Hx     Bleeding Prob Neg Hx     Elevated Lipids Neg Hx     Headache Neg Hx     Hypertension Neg Hx     Lung Disease Neg Hx     Migraines Neg Hx     Psychiatric Disorder Neg Hx     Stroke Neg Hx     Mental Retardation Neg Hx     Thyroid Disease Neg Hx       Social History:   Social History     Tobacco Use    Smoking status: Former Smoker     Types: Cigarettes     Start date: 1961     Quit date: 1964     Years since quittin.5    Smokeless tobacco: Never Used    Tobacco comment: 2 packs a wk   Substance Use Topics    Alcohol use: Yes     Alcohol/week: 0.0 standard drinks     Comment: rarely       ALLERGIES:   Allergies   Allergen Reactions    Sulfa (Sulfonamide Antibiotics) Rash    Other Medication Unknown (comments)     \"Dypentin\"    Tape [Adhesive] Other (comments)     Blisters and skin tears        Patient Medications    Current Facility-Administered Medications   Medication Dose Route Frequency    morphine injection 1 mg  1 mg IntraVENous Q4H PRN    insulin lispro (HUMALOG) injection   SubCUTAneous Q6H    oxyCODONE IR (ROXICODONE) tablet 5 mg  5 mg Oral Q4H PRN    sodium chloride (NS) flush 5-40 mL  5-40 mL IntraVENous Q8H    sodium chloride (NS) flush 5-40 mL  5-40 mL IntraVENous PRN    acetaminophen (TYLENOL) tablet 650 mg  650 mg Oral Q6H    ondansetron (ZOFRAN) injection 4 mg  4 mg IntraVENous Q4H PRN    tuberculin injection 5 Units  5 Units IntraDERMal ONCE         Review of Systems:  A comprehensive review of systems was negative except for that written in the HPI. Physical Exam:      General: NAD, Alert, Oriented x 3   Mental Status: Appropriate   Psych: Normal Affect, Normal Mood    HEENT: Normal Cephalic/Atraumatic, PERRL   Lungs: Respirations even and unlabored, Breath Sounds were clear, no respiratory distress   Heart: Regular Rate and Rhythm   Vascular: Distal pulses intact, good capillary refill   Skin: No redness, No Rashes, Skin is dry   Musculoskeletal: exam of both lower extremities reveal padded left leg. ROM not assessed due to fracture. NV intact.    Lymphatic: No lympahdenopathy, No distal edema   Neuro: No gross deficits   Abdomen: Soft, Non tender, No distension      VITALS:   Patient Vitals for the past 8 hrs:   BP Temp Pulse Resp SpO2   21 0713 (!) 133/58 98.1 °F (36.7 °C) 63 16 94 %   21 0430 130/60 97.8 °F (36.6 °C) 79 18 97 %    , Temp (24hrs), Av °F (36.7 °C), Min:97.6 °F (36.4 °C), Max:98.5 °F (36.9 °C)         X-ray: reviewed on EMR    Diagnosis   Patient Active Problem List   Diagnosis Code    DM type 2 (diabetes mellitus, type 2) ([facility] E11.9    Essential hypertension with goal blood pressure less than 140/90 I10    Crohn disease ([facility] K50.90    Other and unspecified hyperlipidemia E78.5    Pericarditis, acute I30.9    Precordial pain R07.2    Nonspecific abnormal electrocardiogram (ECG) (EKG) R94.31    Coronary artery disease involving native coronary artery of native heart with angina pectoris (HCC) I25.119    Dyslipidemia E78.5    CAP (community acquired pneumonia) J18.9    LIZ (acute kidney injury) ([facility] N17.9    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 E11.42    Fall W19. Rafa Xiong Encounter for medication management Z23.362    Closed fracture of left hip ([facility] S72.002A    Hip fracture requiring operative repair ([facility] S72.009A          Assessment and Plan:     Closed left hip intertrochanteric hip fracture. Patient also COVID + on admission. I have discussed this patient with Dr. Angelita Ross. We will plan of ORIF with cephalomedullary nail fixation today. He will discuss the surgery with the patient. We will keep her NPO. Hold blood thinners.        MANOHAR Mckeon  3/4/2021,  9:55 AM

## 2021-03-04 NOTE — PROGRESS NOTES
TRANSFER - OUT REPORT:    Verbal report given to 5th floor RN(name) on Mary Cohen  being transferred to 5th floor(unit) for change in patient condition(+ covid test)       Report consisted of patients Situation, Background, Assessment and   Recommendations(SBAR). Information from the following report(s) SBAR, Kardex, Intake/Output, MAR and Recent Results was reviewed with the receiving nurse. Lines:   Peripheral IV 03/03/21 Left Antecubital (Active)   Site Assessment Clean, dry, & intact 03/03/21 1939   Phlebitis Assessment 0 03/03/21 1939   Infiltration Assessment 0 03/03/21 1939   Dressing Status Clean, dry, & intact 03/03/21 1939   Hub Color/Line Status Pink 03/03/21 1939   Action Taken Blood drawn 03/03/21 1939   Alcohol Cap Used Yes 03/03/21 1939       Peripheral IV 03/03/21 Right; Outer Antecubital (Active)   Site Assessment Clean, dry, & intact 03/03/21 2042   Phlebitis Assessment 0 03/03/21 2042   Infiltration Assessment 0 03/03/21 2042   Dressing Status Clean, dry, & intact 03/03/21 2042   Dressing Type Tape;Transparent 03/03/21 2042   Hub Color/Line Status Pink;Flushed;Patent 03/03/21 2042   Action Taken Blood drawn 03/03/21 2042        Opportunity for questions and clarification was provided.       Patient transported with:   Registered Nurse

## 2021-03-05 ENCOUNTER — APPOINTMENT (OUTPATIENT)
Dept: GENERAL RADIOLOGY | Age: 86
DRG: 481 | End: 2021-03-05
Attending: PHYSICIAN ASSISTANT
Payer: MEDICARE

## 2021-03-05 LAB
BASOPHILS # BLD: 0.1 K/UL (ref 0–0.2)
BASOPHILS NFR BLD: 0 % (ref 0–2)
DIFFERENTIAL METHOD BLD: ABNORMAL
EOSINOPHIL # BLD: 0 K/UL (ref 0–0.8)
EOSINOPHIL NFR BLD: 0 % (ref 0.5–7.8)
ERYTHROCYTE [DISTWIDTH] IN BLOOD BY AUTOMATED COUNT: 14 % (ref 11.9–14.6)
GLUCOSE BLD STRIP.AUTO-MCNC: 291 MG/DL (ref 65–100)
GLUCOSE BLD STRIP.AUTO-MCNC: 311 MG/DL (ref 65–100)
GLUCOSE BLD STRIP.AUTO-MCNC: 333 MG/DL (ref 65–100)
GLUCOSE BLD STRIP.AUTO-MCNC: 401 MG/DL (ref 65–100)
HCT VFR BLD AUTO: 34.6 % (ref 35.8–46.3)
HGB BLD-MCNC: 11.4 G/DL (ref 11.7–15.4)
IMM GRANULOCYTES # BLD AUTO: 0.3 K/UL (ref 0–0.5)
IMM GRANULOCYTES NFR BLD AUTO: 2 % (ref 0–5)
LYMPHOCYTES # BLD: 0.7 K/UL (ref 0.5–4.6)
LYMPHOCYTES NFR BLD: 4 % (ref 13–44)
MCH RBC QN AUTO: 30.4 PG (ref 26.1–32.9)
MCHC RBC AUTO-ENTMCNC: 32.9 G/DL (ref 31.4–35)
MCV RBC AUTO: 92.3 FL (ref 79.6–97.8)
MM INDURATION POC: 0 MM (ref 0–5)
MONOCYTES # BLD: 1 K/UL (ref 0.1–1.3)
MONOCYTES NFR BLD: 5 % (ref 4–12)
NEUTS SEG # BLD: 17.5 K/UL (ref 1.7–8.2)
NEUTS SEG NFR BLD: 90 % (ref 43–78)
NRBC # BLD: 0 K/UL (ref 0–0.2)
PLATELET # BLD AUTO: 287 K/UL (ref 150–450)
PMV BLD AUTO: 10.5 FL (ref 9.4–12.3)
PPD POC: NEGATIVE NEGATIVE
RBC # BLD AUTO: 3.75 M/UL (ref 4.05–5.2)
WBC # BLD AUTO: 19.6 K/UL (ref 4.3–11.1)

## 2021-03-05 PROCEDURE — 74011000258 HC RX REV CODE- 258: Performed by: ORTHOPAEDIC SURGERY

## 2021-03-05 PROCEDURE — 2709999900 HC NON-CHARGEABLE SUPPLY

## 2021-03-05 PROCEDURE — 74011250637 HC RX REV CODE- 250/637: Performed by: ORTHOPAEDIC SURGERY

## 2021-03-05 PROCEDURE — 97165 OT EVAL LOW COMPLEX 30 MIN: CPT

## 2021-03-05 PROCEDURE — 85025 COMPLETE CBC W/AUTO DIFF WBC: CPT

## 2021-03-05 PROCEDURE — 74011250636 HC RX REV CODE- 250/636: Performed by: ORTHOPAEDIC SURGERY

## 2021-03-05 PROCEDURE — 97530 THERAPEUTIC ACTIVITIES: CPT

## 2021-03-05 PROCEDURE — 74011636637 HC RX REV CODE- 636/637: Performed by: HOSPITALIST

## 2021-03-05 PROCEDURE — 82962 GLUCOSE BLOOD TEST: CPT

## 2021-03-05 PROCEDURE — 97535 SELF CARE MNGMENT TRAINING: CPT

## 2021-03-05 PROCEDURE — 65270000029 HC RM PRIVATE

## 2021-03-05 PROCEDURE — 74011250637 HC RX REV CODE- 250/637: Performed by: INTERNAL MEDICINE

## 2021-03-05 PROCEDURE — 74011636637 HC RX REV CODE- 636/637: Performed by: INTERNAL MEDICINE

## 2021-03-05 PROCEDURE — 97161 PT EVAL LOW COMPLEX 20 MIN: CPT

## 2021-03-05 PROCEDURE — 36415 COLL VENOUS BLD VENIPUNCTURE: CPT

## 2021-03-05 PROCEDURE — 74011636637 HC RX REV CODE- 636/637: Performed by: ORTHOPAEDIC SURGERY

## 2021-03-05 PROCEDURE — 73080 X-RAY EXAM OF ELBOW: CPT

## 2021-03-05 RX ORDER — GLUCOSAMINE SULFATE 1500 MG
POWDER IN PACKET (EA) ORAL EVERY OTHER DAY
COMMUNITY

## 2021-03-05 RX ORDER — CARVEDILOL 6.25 MG/1
12.5 TABLET ORAL 2 TIMES DAILY WITH MEALS
Status: DISCONTINUED | OUTPATIENT
Start: 2021-03-05 | End: 2021-03-09 | Stop reason: HOSPADM

## 2021-03-05 RX ORDER — ESCITALOPRAM OXALATE 10 MG/1
10 TABLET ORAL DAILY
Status: ON HOLD | COMMUNITY
End: 2021-10-18 | Stop reason: SDUPTHER

## 2021-03-05 RX ORDER — OMEPRAZOLE 40 MG/1
40 CAPSULE, DELAYED RELEASE ORAL DAILY
Status: ON HOLD | COMMUNITY
End: 2021-10-18 | Stop reason: SDUPTHER

## 2021-03-05 RX ORDER — INSULIN LISPRO 100 [IU]/ML
0-10 INJECTION, SOLUTION INTRAVENOUS; SUBCUTANEOUS
Status: DISCONTINUED | OUTPATIENT
Start: 2021-03-05 | End: 2021-03-09 | Stop reason: HOSPADM

## 2021-03-05 RX ORDER — MULTIVITAMIN WITH IRON
1 TABLET ORAL DAILY
Status: ON HOLD | COMMUNITY
End: 2021-10-18 | Stop reason: SDUPTHER

## 2021-03-05 RX ORDER — INSULIN GLARGINE 100 [IU]/ML
10 INJECTION, SOLUTION SUBCUTANEOUS
Status: DISCONTINUED | OUTPATIENT
Start: 2021-03-05 | End: 2021-03-06

## 2021-03-05 RX ORDER — PANTOPRAZOLE SODIUM 40 MG/1
40 TABLET, DELAYED RELEASE ORAL
Status: DISCONTINUED | OUTPATIENT
Start: 2021-03-06 | End: 2021-03-09 | Stop reason: HOSPADM

## 2021-03-05 RX ORDER — ATORVASTATIN CALCIUM 40 MG/1
40 TABLET, FILM COATED ORAL DAILY
Status: DISCONTINUED | OUTPATIENT
Start: 2021-03-06 | End: 2021-03-09 | Stop reason: HOSPADM

## 2021-03-05 RX ORDER — INSULIN GLARGINE 100 [IU]/ML
16 INJECTION, SOLUTION SUBCUTANEOUS DAILY
Status: DISCONTINUED | OUTPATIENT
Start: 2021-03-06 | End: 2021-03-05

## 2021-03-05 RX ORDER — SPIRONOLACTONE 25 MG/1
TABLET ORAL DAILY
Status: ON HOLD | COMMUNITY
End: 2021-10-18 | Stop reason: SDUPTHER

## 2021-03-05 RX ORDER — INSULIN LISPRO 100 [IU]/ML
8 INJECTION, SOLUTION INTRAVENOUS; SUBCUTANEOUS
Status: DISCONTINUED | OUTPATIENT
Start: 2021-03-05 | End: 2021-03-09 | Stop reason: HOSPADM

## 2021-03-05 RX ORDER — MORPHINE SULFATE 4 MG/ML
3 INJECTION INTRAVENOUS
Status: DISCONTINUED | OUTPATIENT
Start: 2021-03-05 | End: 2021-03-09 | Stop reason: HOSPADM

## 2021-03-05 RX ORDER — FAMOTIDINE 20 MG/1
20 TABLET, FILM COATED ORAL DAILY
Status: ON HOLD | COMMUNITY
End: 2021-10-18 | Stop reason: SDUPTHER

## 2021-03-05 RX ORDER — ESCITALOPRAM OXALATE 10 MG/1
10 TABLET ORAL DAILY
Status: DISCONTINUED | OUTPATIENT
Start: 2021-03-06 | End: 2021-03-09 | Stop reason: HOSPADM

## 2021-03-05 RX ORDER — ACETAMINOPHEN 500 MG
500 TABLET ORAL
Status: DISCONTINUED | OUTPATIENT
Start: 2021-03-05 | End: 2021-03-09 | Stop reason: HOSPADM

## 2021-03-05 RX ORDER — AMLODIPINE BESYLATE 5 MG/1
5 TABLET ORAL DAILY
Status: ON HOLD | COMMUNITY
End: 2021-10-18 | Stop reason: SDUPTHER

## 2021-03-05 RX ORDER — CARVEDILOL 12.5 MG/1
12.5 TABLET ORAL 2 TIMES DAILY WITH MEALS
Status: ON HOLD | COMMUNITY
End: 2021-10-18 | Stop reason: SDUPTHER

## 2021-03-05 RX ORDER — ASPIRIN 81 MG/1
81 TABLET ORAL DAILY
Status: DISCONTINUED | OUTPATIENT
Start: 2021-03-06 | End: 2021-03-08

## 2021-03-05 RX ORDER — ROPINIROLE 1 MG/1
1 TABLET, FILM COATED ORAL 2 TIMES DAILY
Status: ON HOLD | COMMUNITY
End: 2021-10-18 | Stop reason: SDUPTHER

## 2021-03-05 RX ORDER — ATORVASTATIN CALCIUM 40 MG/1
40 TABLET, FILM COATED ORAL DAILY
Status: ON HOLD | COMMUNITY
End: 2021-10-18 | Stop reason: SDUPTHER

## 2021-03-05 RX ORDER — INSULIN GLARGINE 100 [IU]/ML
12 INJECTION, SOLUTION SUBCUTANEOUS DAILY
Status: DISCONTINUED | OUTPATIENT
Start: 2021-03-05 | End: 2021-03-05

## 2021-03-05 RX ORDER — MELATONIN
1000
Status: DISCONTINUED | OUTPATIENT
Start: 2021-03-05 | End: 2021-03-09 | Stop reason: HOSPADM

## 2021-03-05 RX ORDER — FAMOTIDINE 20 MG/1
20 TABLET, FILM COATED ORAL DAILY
Status: DISCONTINUED | OUTPATIENT
Start: 2021-03-06 | End: 2021-03-09 | Stop reason: HOSPADM

## 2021-03-05 RX ORDER — FOLIC ACID 1 MG/1
1 TABLET ORAL DAILY
Status: DISCONTINUED | OUTPATIENT
Start: 2021-03-06 | End: 2021-03-09 | Stop reason: HOSPADM

## 2021-03-05 RX ORDER — LANOLIN ALCOHOL/MO/W.PET/CERES
2500 CREAM (GRAM) TOPICAL DAILY
Status: DISCONTINUED | OUTPATIENT
Start: 2021-03-06 | End: 2021-03-09 | Stop reason: HOSPADM

## 2021-03-05 RX ORDER — ROPINIROLE 1 MG/1
1 TABLET, FILM COATED ORAL 2 TIMES DAILY
Status: DISCONTINUED | OUTPATIENT
Start: 2021-03-05 | End: 2021-03-09 | Stop reason: HOSPADM

## 2021-03-05 RX ORDER — UREA 10 %
800 LOTION (ML) TOPICAL DAILY
Status: ON HOLD | COMMUNITY
End: 2021-10-18 | Stop reason: SDUPTHER

## 2021-03-05 RX ORDER — SPIRONOLACTONE 25 MG/1
25 TABLET ORAL DAILY
Status: DISCONTINUED | OUTPATIENT
Start: 2021-03-06 | End: 2021-03-09 | Stop reason: HOSPADM

## 2021-03-05 RX ORDER — LOPERAMIDE HCL 2 MG
2 TABLET ORAL AS NEEDED
Status: ON HOLD | COMMUNITY
End: 2021-10-18 | Stop reason: ALTCHOICE

## 2021-03-05 RX ORDER — AMLODIPINE BESYLATE 5 MG/1
5 TABLET ORAL DAILY
Status: DISCONTINUED | OUTPATIENT
Start: 2021-03-06 | End: 2021-03-09 | Stop reason: HOSPADM

## 2021-03-05 RX ORDER — OXYCODONE HYDROCHLORIDE 5 MG/1
10 TABLET ORAL
Status: DISCONTINUED | OUTPATIENT
Start: 2021-03-05 | End: 2021-03-09 | Stop reason: HOSPADM

## 2021-03-05 RX ORDER — INSULIN LISPRO 100 [IU]/ML
4 INJECTION, SOLUTION INTRAVENOUS; SUBCUTANEOUS
Status: DISCONTINUED | OUTPATIENT
Start: 2021-03-05 | End: 2021-03-05

## 2021-03-05 RX ADMIN — INSULIN LISPRO 8 UNITS: 100 INJECTION, SOLUTION INTRAVENOUS; SUBCUTANEOUS at 21:46

## 2021-03-05 RX ADMIN — CARVEDILOL 12.5 MG: 6.25 TABLET, FILM COATED ORAL at 18:36

## 2021-03-05 RX ADMIN — Medication 10 ML: at 05:01

## 2021-03-05 RX ADMIN — ASPIRIN 325 MG ORAL TABLET 325 MG: 325 PILL ORAL at 09:40

## 2021-03-05 RX ADMIN — INSULIN GLARGINE 10 UNITS: 100 INJECTION, SOLUTION SUBCUTANEOUS at 21:45

## 2021-03-05 RX ADMIN — INSULIN LISPRO 4 UNITS: 100 INJECTION, SOLUTION INTRAVENOUS; SUBCUTANEOUS at 11:44

## 2021-03-05 RX ADMIN — ACETAMINOPHEN 650 MG: 325 TABLET, FILM COATED ORAL at 18:37

## 2021-03-05 RX ADMIN — VITAMIN D, TAB 1000IU (100/BT) 1000 UNITS: 25 TAB at 18:36

## 2021-03-05 RX ADMIN — Medication 1 TABLET: at 09:40

## 2021-03-05 RX ADMIN — ACETAMINOPHEN 650 MG: 325 TABLET, FILM COATED ORAL at 23:38

## 2021-03-05 RX ADMIN — Medication 10 ML: at 15:17

## 2021-03-05 RX ADMIN — INSULIN LISPRO 8 UNITS: 100 INJECTION, SOLUTION INTRAVENOUS; SUBCUTANEOUS at 06:00

## 2021-03-05 RX ADMIN — INSULIN GLARGINE 12 UNITS: 100 INJECTION, SOLUTION SUBCUTANEOUS at 09:41

## 2021-03-05 RX ADMIN — INSULIN LISPRO 10 UNITS: 100 INJECTION, SOLUTION INTRAVENOUS; SUBCUTANEOUS at 18:44

## 2021-03-05 RX ADMIN — OXYCODONE 10 MG: 5 TABLET ORAL at 10:31

## 2021-03-05 RX ADMIN — INSULIN LISPRO 4 UNITS: 100 INJECTION, SOLUTION INTRAVENOUS; SUBCUTANEOUS at 09:40

## 2021-03-05 RX ADMIN — ACETAMINOPHEN 650 MG: 325 TABLET, FILM COATED ORAL at 11:44

## 2021-03-05 RX ADMIN — INSULIN LISPRO 6 UNITS: 100 INJECTION, SOLUTION INTRAVENOUS; SUBCUTANEOUS at 11:44

## 2021-03-05 RX ADMIN — Medication 1 TABLET: at 18:36

## 2021-03-05 RX ADMIN — CEFAZOLIN 1 G: 1 INJECTION, POWDER, FOR SOLUTION INTRAMUSCULAR; INTRAVENOUS at 03:20

## 2021-03-05 RX ADMIN — INSULIN LISPRO 8 UNITS: 100 INJECTION, SOLUTION INTRAVENOUS; SUBCUTANEOUS at 18:44

## 2021-03-05 RX ADMIN — Medication 1 TABLET: at 11:44

## 2021-03-05 RX ADMIN — ACETAMINOPHEN 650 MG: 325 TABLET, FILM COATED ORAL at 05:01

## 2021-03-05 RX ADMIN — CEFTRIAXONE SODIUM 1 G: 1 INJECTION, POWDER, FOR SOLUTION INTRAMUSCULAR; INTRAVENOUS at 11:44

## 2021-03-05 RX ADMIN — Medication 10 ML: at 21:45

## 2021-03-05 RX ADMIN — ROPINIROLE HYDROCHLORIDE 1 MG: 1 TABLET, FILM COATED ORAL at 20:03

## 2021-03-05 NOTE — PROGRESS NOTES
March 5, 2021         Post Op day: 1 Day Post-Op Procedure(s) (LRB):  LEFT HIP GAMMA NAIL INSERTION (Left)      Admit Date: 3/3/2021  Admit Diagnosis: Hip fracture ([facility] [S72.009A]  Hip fracture requiring operative repair ([facility] [S72.009A]       Principle Problem: Closed fracture of left hip ([facility] Subjective: Complains of left elbow pain. She says she had a left elbow replacement in the past and injured it during her fall. In the ER they dressed her wound but no one has x-rayed her elbow. No SOB, No Chest Pain, No Nausea or Vomiting     Objective:   Vital Signs are Stable, No Acute Distress, Alert,  Left hip dressing is dry Neurovascular exam is normal.    Left Elbow: I took down the dressing and there is  Silver dollar size complex superfisical skin tear. I redressed. Patient has IV on that side as well. Tenderness on palpation.       Assessment / Plan :  Patient Active Problem List   Diagnosis Code    DM type 2 (diabetes mellitus, type 2) ([facility] E11.9    Essential hypertension with goal blood pressure less than 140/90 I10    Crohn disease ([facility] K50.90    Other and unspecified hyperlipidemia E78.5    Pericarditis, acute I30.9    Precordial pain R07.2    Nonspecific abnormal electrocardiogram (ECG) (EKG) R94.31    Coronary artery disease involving native coronary artery of native heart with angina pectoris (HCC) I25.119    Dyslipidemia E78.5    CAP (community acquired pneumonia) J18.9    LIZ (acute kidney injury) ([facility] N17.9    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 Helio Dears Encounter for medication management S37.169    Closed fracture of left hip ([facility] S72.002A    Hip fracture requiring operative repair [facility] S72.009A    Acute cystitis without hematuria N30.00      Patient Vitals for the past 8 hrs:   BP Temp Pulse Resp SpO2   21 0738 (!) 142/76 98.7 °F (37.1 °C) 87 18 92 %   21 0315 (!) 120/54 98.5 °F (36.9 °C) 77 14 98 %    Temp (24hrs), Av.4 °F (36.9 °C), Min:98 °F (36.7 °C), Max:98.7 °F (37.1 °C)    Body mass index is 25.43 kg/m². Lab Results   Component Value Date/Time    HGB 12.4 2021 06:36 AM        S/P Left hip Gamma 3/4: PT, WBAT with walker and assistance   Left elbow pain: will obatin x-rays. I redressed the skin tear over the elbow.  Dressing changes every other day  Medical Mgmt per hospitalist  Anticoagulation plan: ASA 325mg  Continue PT  Fall Precautions  DC disp: per SW  F/U in 2 weeks for staple removal        Signed By: MANOHAR Jim  3/5/2021,  9:24 AM

## 2021-03-05 NOTE — PROGRESS NOTES
Reviewed notes for spiritual concerns  Prayer for patient      Staff continues to provide very compassionate care!

## 2021-03-05 NOTE — PROGRESS NOTES
Blood sugar 401. Patient received 18 units of Humalog, (10 units per sliding scale and 8 units per order).  Perfect Serve message sent to Dr Onelia Grant.

## 2021-03-05 NOTE — PROGRESS NOTES
ACUTE PHYSICAL THERAPY GOALS:  (Developed with and agreed upon by patient and/or caregiver. )    LTG:  (1.)Ms. Curtis will move from supine to sit and sit to supine , scoot up and down and roll side to side in bed with CONTACT GUARD ASSIST within 7 treatment day(s). (2.)Ms. Curtis will transfer from bed to chair and chair to bed with CONTACT GUARD ASSIST using the least restrictive device within 7 treatment day(s). (3.)Ms. Curtis will ambulate with CONTACT GUARD ASSIST for 50 feet with the least restrictive device within 7 treatment day(s). ________________________________________________________________________________________________      PHYSICAL THERAPY ASSESSMENT: Initial Assessment, Daily Note and AM PT Treatment Day # 1      Tamika Lopez is a 80 y.o. female   PRIMARY DIAGNOSIS: Closed fracture of left hip (HCC)  Hip fracture ([facility] [S72.009A]  Hip fracture requiring operative repair ([facility] [S72.009A]  Procedure(s) (LRB):  LEFT HIP GAMMA NAIL INSERTION (Left)  1 Day Post-Op  Reason for Referral:     ICD-10: Treatment Diagnosis: Difficulty in walking, Not elsewhere classified (R26.2)  History of falling (Z91.81)  INPATIENT: Payor: Fredi Strong / Plan: [address] [place] [address] HMO / Product Type: Managed Care Medicare /     ASSESSMENT:     REHAB RECOMMENDATIONS:   Recommendation to date pending progress:  Setting:   Short-term Rehab vs. HHPT pending progress  Equipment:    To Be Determined     PRIOR LEVEL OF FUNCTION:  (Prior to Hospitalization) INITIAL/CURRENT LEVEL OF FUNCTION:  (Most Recently Demonstrated)   Bed Mobility:   Independent  Sit to Stand:   Independent  Transfers:   Modified Independent  Gait/Mobility:   Modified Independent Bed Mobility:   Moderate Assistance  Sit to Stand:   Minimal Assistance  Transfers:   Minimal Assistance  Gait/Mobility:   Minimal Assistance     ASSESSMENT:  Ms. Romel Smart lives with her spouse and ambulates with a rollator independently in home and community. She drives and attends [employer]. Patient underwent Gamma Nail insertion on 3/4/21. She has declined in functional mobility. Ms. Zabrina Lloyd would benefit from skilled physical therapy (medically necessary) to address her deficits and maximize her function. .     SUBJECTIVE:   Ms. Zabrina Lloyd states, \"I go to [employer]. \"    SOCIAL HISTORY/LIVING ENVIRONMENT: Ms. Zabrina Lloyd lives with her spouse and ambulates with a rollator independently in home and community. She drives and attends [employer].   Home Environment: Private residence  One/Two Story Residence: One story  Living Alone: No  Support Systems: Family member(s), Spouse/Significant Other/Partner  OBJECTIVE:     PAIN: VITAL SIGNS: LINES/DRAINS:   Pre Treatment: Pain Screen  Pain Scale 1: Numeric (0 - 10)  Pain Intensity 1: 8  Pain Onset 1: postop  Pain Location 1: Hip  Pain Orientation 1: Left  Pain Intervention(s) 1: Nurse notified  Post Treatment: 8   none  O2 Device: Nasal cannula     GROSS EVALUATION:  B LE's Within Functional Limits Abnormal/ Functional Abnormal/ Non-Functional (see comments) Not Tested Comments:   AROM [] [x] [] []    PROM [] [] [] []    Strength [] [x] [] []    Balance [] [x] [] [] With RW   Posture [] [x] [] []    Sensation [] [] [] []    Coordination [] [] [] []    Tone [] [] [] []    Edema [] [] [] []    Activity Tolerance [] [x] [] []     [] [] [] []      COGNITION/  PERCEPTION: Intact Impaired   (see comments) Comments:   Orientation [] []    Vision [] []    Hearing [] [x] Very [place]   Command Following [x] []    Safety Awareness [x] []     [] []      MOBILITY: I Mod I S SBA CGA Min Mod Max Total  NT x2 Comments:   Bed Mobility    Rolling [] [] [] [] [] [] [] [] [] [x] []    Supine to Sit [] [] [] [] [] [] [x] [] [] [] []    Scooting [] [] [] [] [] [x] [] [] [] [] []    Sit to Supine [] [] [] [] [] [] [] [] [] [x] []    Transfers    Sit to Stand [] [] [] [] [] [x] [] [] [] [] []    Bed to Chair [] [] [] [] [] [x] [] [] [] [] [] Stand to Sit [] [] [] [] [] [x] [] [] [] [] []    I=Independent, Mod I=Modified Independent, S=Supervision, SBA=Standby Assistance, CGA=Contact Guard Assistance,   Min=Minimal Assistance, Mod=Moderate Assistance, Max=Maximal Assistance, Total=Total Assistance, NT=Not Tested  GAIT: I Mod I S SBA CGA Min Mod Max Total  NT x2 Comments:   Level of Assistance [] [] [] [] [] [x] [] [] [] [] []    Distance 3    DME Rolling Walker    Gait Quality Step to, self limiting WBing to basically TT    Weightbearing Status WBAT     I=Independent, Mod I=Modified Independent, S=Supervision, SBA=Standby Assistance, CGA=Contact Guard Assistance,   Min=Minimal Assistance, Mod=Moderate Assistance, Max=Maximal Assistance, Total=Total Assistance, NT=Not Tested    [address] [place] [address] AM-[facility] Form       How much difficulty does the patient currently have. .. Unable A Lot A Little None   1. Turning over in bed (including adjusting bedclothes, sheets and blankets)? [] 1   [x] 2   [] 3   [] 4   2. Sitting down on and standing up from a chair with arms ( e.g., wheelchair, bedside commode, etc.)   [] 1   [] 2   [x] 3   [] 4   3. Moving from lying on back to sitting on the side of the bed? [] 1   [x] 2   [] 3   [] 4   How much help from another person does the patient currently need. .. Total A Lot A Little None   4. Moving to and from a bed to a chair (including a wheelchair)? [] 1   [] 2   [x] 3   [] 4   5. Need to walk in hospital room? [] 1   [] 2   [x] 3   [] 4   6. Climbing 3-5 steps with a railing? [] 1   [x] 2   [] 3   [] 4   © 2007, Trustees of [address] [place] [id number], under license to [employer]. All rights reserved     Score:  Initial: 15 Most Recent: X (Date: -- )    Interpretation of Tool:  Represents activities that are increasingly more difficult (i.e. Bed mobility, Transfers, Gait).     PLAN:   FREQUENCY/DURATION: PT Plan of Care: BID for duration of hospital stay or until stated goals are met, whichever comes first.    PROBLEM LIST:   (Skilled intervention is medically necessary to address:)  1. Decreased Activity Tolerance  2. Decreased Balance  3. Decreased Gait Ability  4. Decreased Strength  5. Decreased Transfer Abilities  6. Increased Pain   INTERVENTIONS PLANNED:   (Benefits and precautions of physical therapy have been discussed with the patient.)  1. Therapeutic Activity  2. Therapeutic Exercise/HEP  3. Neuromuscular Re-education  4. Gait Training  5. Manual Therapy  6.  Education     TREATMENT:     EVALUATION: Low Complexity : (Untimed Charge)    TREATMENT:   (     )  Co-Treatment PT/OT necessary due to patient's decreased overall endurance/tolerance levels, as well as need for high level skilled assistance to complete functional transfers/mobility and functional tasks    TREATMENT GRID:  N/A    AFTER TREATMENT POSITION/PRECAUTIONS:  Chair, Needs within reach and RN notified    INTERDISCIPLINARY COLLABORATION:  RN/PCT, PT/PTA and OT/YOUNG    TOTAL TREATMENT DURATION:  PT Patient Time In/Time Out  Time In: 1013  Time Out: [address], PT, DPT

## 2021-03-05 NOTE — PROGRESS NOTES
CM made contact with patient via phone to discuss d/c plan. Patient verified demographic and changes were made. Verified PCP and insurance. Patient states she uses [facility] in [facility] for medication. States she has no issue with purchasing medication in the community. Patient states she lives with spouse in a one level condo with no steps to enter into the home. States she's independent with ADL's and do has several DME's in the home (rolloator,cane,wheelchair,BSC,shower chair,grab bars) in the home. States she's participate in an exercise class day called ([employer]). States she do use a rollator for ambulation. Patient states her daughter lives nearby and come everyday to check on them. Patient states she do need rehab because her spouse has dementia and can't assist her any. Patient requested a referral be made to [address] [place] [address]. Referral completed. CM will continue to monitor and remain available for any needs or concerns that may occur. Care Management Interventions  PCP Verified by CM: Yes(Angeli Miranda MD)  Mode of Transport at Discharge:  Other (see comment)  Transition of Care Consult (CM Consult): Discharge Planning, SNF(referral made to SNF)  Discharge Durable Medical Equipment: No  Physical Therapy Consult: Yes  Occupational Therapy Consult: Yes  Speech Therapy Consult: No  Current Support Network: Own Home, Lives with Spouse  Confirm Follow Up Transport: Other (see comment)([facility] )  The Plan for Transition of Care is Related to the Following Treatment Goals : patient to ontain therapy to return back to baseline   The Patient and/or Patient Representative was Provided with a Choice of Provider and Agrees with the Discharge Plan?: Yes  Name of the Patient Representative Who was Provided with a Choice of Provider and Agrees with the Discharge Plan: patient  Freedom of Choice List was Provided with Basic Dialogue that Supports the Patient's Individualized Plan of Care/Goals, Treatment Preferences and Shares the Quality Data Associated with the Providers?: Yes  [employer] Information Provided?: No  Discharge Location  Discharge Placement: Skilled nursing facility

## 2021-03-05 NOTE — PROGRESS NOTES
Assumed care of the patient. Off going report given by Mirza Russell. The patient is AO x 4 at this time and is resting in bed. She is [place] without hearing aides. IV access: PIV x2 are saline locked. L elbow with a skin tear and IV is on a cushion board. L hip with dry dressing intact. Oxygen needs: 4 Liters via nc  Pain assessment: No pain until she moves. She is resting now. Safety: Bed is low and call light is within reach. Patient understands to call for assistance. Disaster charting initiated.

## 2021-03-05 NOTE — PROGRESS NOTES
Patient's daughter Denver Chen brought medication list in. PTA med list has been updated in [facility]. Sent patients jewelery and daily pill box with unidentified pills back with the daughter.

## 2021-03-05 NOTE — PROGRESS NOTES
ACUTE OT GOALS:  (Developed with and agreed upon by patient and/or caregiver.)  1. Patient will complete grooming with setup. 2. Patient will complete functional transfers with supervision while maintaining WBAT status in LLE and with adaptive equipment as needed. 3. Patient will complete lower body bathing and dressing with minimal assistance and adaptive equipment as needed. 4. Patient will complete toileting and toilet transfer with supervision. 5. Patient will tolerate 30 minutes of OT treatment with as needed rest breaks to increase activity tolerance for ADLs. 6. Patient will participate in at least 30 minutes of BUE therapeutic exercises to strengthen BUE for functional transfers.      Timeframe: 7 visits       OCCUPATIONAL THERAPY ASSESSMENT: Initial Assessment and Daily Note OT Treatment Day # 1    Veronika Beckett is a 80 y.o. female   PRIMARY DIAGNOSIS: Closed fracture of left hip (HCC)  Hip fracture ([facility] [S72.009A]  Hip fracture requiring operative repair ([facility] [S72.009A]  Procedure(s) (LRB):  LEFT HIP GAMMA NAIL INSERTION (Left)  1 Day Post-Op  Reason for Referral:  History of falling, hip fracture   ICD-10: Treatment Diagnosis: Pain in left hip (M25.552)  Stiffness of Left Hip, Not elsewhere classified (M25.652)  History of falling (Z91.81)     WBAT LLE       INPATIENT: Payor: [employer] MEDICARE / Plan: [facility] [employer] MEDICARE HMO / Product Type: Managed Care Medicare /   ASSESSMENT:     REHAB RECOMMENDATIONS:   Recommendation to date pending progress:  Setting:   Short-term Rehab  Equipment:    To Be Determined     PRIOR LEVEL OF FUNCTION:  (Prior to Hospitalization)  INITIAL/CURRENT LEVEL OF FUNCTION:  (Based on today's evaluation)   Bathing:   Independent  Dressing:   Independent  Feeding/Grooming:   Independent  Toileting:   Independent  Functional Mobility:   Independent Bathing:   Maximal Assistance  Dressing:   Moderate Assistance  Feeding/Grooming:   Minimal Assistance  Toileting:   Maximal Assistance  Functional Mobility:   Minimal Assistance     ASSESSMENT:  Ms. Clarisse Diaz is an 80year old female admitted after fall, now s/p above procedure and WBAT in LLE. COVID-19+ in Jan 2021. Lives with spouse and is typically independent with all ADLs. Today required minimal assistance x2 + RW for functional transfers and moderate assistance for LB dressing. Reports pain 8/10 in hip (RN notified). Would benefit from OT to increase independence and safety. SUBJECTIVE:   Ms. Clarisse Diaz states, \"Call me Mae Rojas. \"    SOCIAL HISTORY/LIVING ENVIRONMENT: Lives with spouse, independent ADLs. Enjoys square dancing and doing   [employer] at [employer].    Home Environment: Private residence  One/Two Story Residence: One story  Living Alone: No  Support Systems: Family member(s), Spouse/Significant Other/Partner    OBJECTIVE:     PAIN: VITAL SIGNS: LINES/DRAINS:   Pre Treatment: Pain Screen  Pain Scale 1: Numeric (0 - 10)  Pain Intensity 1: 8  Pain Intervention(s) 1: Nurse notified  Post Treatment: 0   IV  O2 Device: Room air     GROSS EVALUATION:  BUE Within Functional Limits Abnormal/ Functional Abnormal/ Non-Functional (see comments) Not Tested Comments:   AROM [] [x] [] []    PROM [] [] [] [x]    Strength [] [x] [] []    Balance [] [x] [] []    Posture [] [x] [] []    Sensation [] [] [] [x]    Coordination [] [] [] [x]    Tone [] [] [] [x]    Edema [] [] [] [x]    Activity Tolerance [] [x] [] []     [] [] [] []      COGNITION/  PERCEPTION: Intact Impaired   (see comments) Comments:   Orientation [x] []    Vision [] []    Hearing [] [x] [place]; hearing aids @ home    Judgment/ Insight [] []    Attention [] []    Memory [] []    Command Following [] []    Emotional Regulation [x] []     [] []      ACTIVITIES OF DAILY LIVING: I Mod I S SBA CGA Min Mod Max Total NT Comments   BASIC ADLs:              Bathing/ Showering [] [] [] [] [] [] [] [] [] [x]    Toileting [] [] [] [] [] [] [] [] [] [x]    Dressing [] [] [] [] [] [] [x] [] [] [] SOcks    Feeding [] [] [x] [] [] [] [] [] [] []    Grooming [] [] [x] [] [] [] [] [] [] [] Washing face, combing hair from seated position    Personal Device Care [] [] [] [] [] [] [] [] [] [x]    Functional Mobility [] [] [] [] [] [x] [x] [] [] []    I=Independent, Mod I=Modified Independent, S=Supervision, SBA=Standby Assistance, CGA=Contact Guard Assistance,   Min=Minimal Assistance, Mod=Moderate Assistance, Max=Maximal Assistance, Total=Total Assistance, NT=Not Tested    MOBILITY: I Mod I S SBA CGA Min Mod Max Total  NT x2 Comments:   Supine to sit [] [] [] [] [] [] [x] [] [] [] []    Sit to supine [] [] [] [] [] [] [] [] [] [x] []    Sit to stand [] [] [] [] [] [x] [] [] [] [] []    Bed to chair [] [] [] [] [] [x] [] [] [] [] [x] RW   I=Independent, Mod I=Modified Independent, S=Supervision, SBA=Standby Assistance, CGA=Contact Guard Assistance,   Min=Minimal Assistance, Mod=Moderate Assistance, Max=Maximal Assistance, Total=Total Assistance, NT=Not [facility] 6 Clicks   Daily Activity Inpatient Short Form        How much help from another person does the patient currently need. .. Total A Lot A Little None   1. Putting on and taking off regular lower body clothing? [] 1   [x] 2   [] 3   [] 4   2. Bathing (including washing, rinsing, drying)? [] 1   [x] 2   [] 3   [] 4   3. Toileting, which includes using toilet, bedpan or urinal?   [] 1   [x] 2   [] 3   [] 4   4. Putting on and taking off regular upper body clothing? [] 1   [] 2   [x] 3   [] 4   5. Taking care of personal grooming such as brushing teeth? [] 1   [] 2   [x] 3   [] 4   6. Eating meals? [] 1   [] 2   [x] 3   [] 4   © 2007, Trustees of [address] [place] [address] [address] [address], under license to [facility].  All rights reserved     Score:  Initial: 15 Most Recent: X (Date: -- )   Interpretation of Tool:  Represents activities that are increasingly more difficult (i.e. Bed mobility, Transfers, Gait).    PLAN:   FREQUENCY/DURATION: OT Plan of Care: 5 times/week for duration of hospital stay or until stated goals are met, whichever comes first.    PROBLEM LIST:   (Skilled intervention is medically necessary to address:)  1. Decreased ADL/Functional Activities  2. Decreased Activity Tolerance  3. Decreased Balance  4. Decreased Gait Ability  5. Decreased Strength  6. Decreased Transfer Abilities  7. Increased Pain   INTERVENTIONS PLANNED:   (Benefits and precautions of occupational therapy have been discussed with the patient.)  1. Self Care Training  2. Therapeutic Activity  3. Therapeutic Exercise/HEP  4. Neuromuscular Re-education  5. Education     TREATMENT:     EVALUATION: Low Complexity : (Untimed Charge)    TREATMENT:   ($$ Self Care/Home Management: 8-22 mins    )  Co-Treatment PT/OT necessary due to patient's decreased overall endurance/tolerance levels, as well as need for high level skilled assistance to complete functional transfers/mobility and functional tasks  Self Care (10 Minutes): Self care including Lower Body Dressing, Self Feeding and Grooming to increase independence and activity tolerance.    AFTER TREATMENT POSITION/PRECAUTIONS:  Alarm Activated, Chair, Needs within reach and RN notified    INTERDISCIPLINARY COLLABORATION:  RN/PCT, PT/PTA and OT/YOUNG    TOTAL TREATMENT DURATION:  OT Patient Time In/Time Out  Time In: 1014  Time Out: 1034    JOHN Sepulveda/DYLAN

## 2021-03-05 NOTE — PROGRESS NOTES
Patient has accepted the bed offer at [address] Patient pre-cert for insurance was sent. Patient pre-cert should be back by Monday. CM will continue to monitor.

## 2021-03-05 NOTE — ANESTHESIA POSTPROCEDURE EVALUATION
Procedure(s):  LEFT HIP GAMMA NAIL INSERTION.     general    Anesthesia Post Evaluation      Multimodal analgesia: multimodal analgesia not used between 6 hours prior to anesthesia start to PACU discharge  Patient location during evaluation: PACU  Patient participation: complete - patient participated  Level of consciousness: awake and alert  Pain management: adequate  Airway patency: patent  Anesthetic complications: no  Cardiovascular status: hemodynamically stable  Respiratory status: acceptable  Hydration status: acceptable        INITIAL Post-op Vital signs:   Vitals Value Taken Time   /61 03/04/21 1749   Temp 37.1 °C (98.7 °F) 03/04/21 1739   Pulse 83 03/04/21 1749   Resp 15 03/04/21 1749   SpO2 91 % 03/04/21 1749

## 2021-03-05 NOTE — PROGRESS NOTES
Hospitalist Progress Note    3/5/2021  Admit Date: 3/3/2021 10:39 PM   NAME: Kraolina Curtis   :  1934   MRN:  [id number]   Attending: Jessi Arnold MD  PCP:  Shireen Alfred MD    SUBJECTIVE:     Farzaneh Hicks is a 59-year-old  female with history of arthritis, HTN, lumbar compression fracture, DM 2 admitted on 3/3 following mechanical fall resulting in left closed hip fracture. Patient is currently medically cleared for surgery, is at low risk for moderate risk surgery. Patient was also tested positive for COVID-19 on 3/3.  3/5:   Patient seen at bedside, currently resting in bed on room air. Reports having pain in left elbow and left hip, currently reports pain to be 9/10 in intensity. She is status post ORIF done on 3/4. No fever, chills, nausea vomiting, chest pain or palpitations. Review of Systems negative with exception of pertinent positives noted above      PHYSICAL EXAM       Visit Vitals  BP (!) 120/54 (BP 1 Location: Right arm, BP Patient Position: At rest)   Pulse 77   Temp 98.5 °F (36.9 °C)   Resp 14   Wt 53.3 kg (117 lb 8 oz)   LMP 2008   SpO2 98%   BMI 25.43 kg/m²      Temp (24hrs), Av.3 °F (36.8 °C), Min:98 °F (36.7 °C), Max:98.7 °F (37.1 °C)    Oxygen Therapy  O2 Sat (%): 98 % (21)  O2 Device: Nasal cannula (21)  O2 Flow Rate (L/min): 4 l/min (21)    Intake/Output Summary (Last 24 hours) at 3/5/2021 0800  Last data filed at 3/5/2021 0445  Gross per 24 hour   Intake 500 ml   Output 2350 ml   Net -1850 ml          General: Elderly, hard of hearing, frail, NAD, on room air  Head:  Atraumatic Normocephalic. Eyes:  PERRLA, EOMI, Anicteric. ENT:  No discharges/lesions. Lungs:  CTA Bilaterally. CVS:  Regular rate and rhythm,  No murmur, rub, or gallop, No JVD, No lower   extremity edema. Abdomen: Soft, Non distended, Non tender, Positive bowel sounds.   MSK:  Left lower extremity with surgical dressing in around left knee and left hip, Kerlix bandage in left elbow  Neurologic:  GCS 15, no motor or sensory deficits, cranial through grossly intact  Psychiatry:      AO x3, mood and affect appropriate. Skin:   No rash/lesions. Good skin turgor  Heme/Lymph/Immune:  No petechiae, ecchymoses, overt signs of bleeding or    lymphadenopathy noted.     Recent Results (from the past 24 hour(s))   CULTURE, URINE    Collection Time: 03/04/21  8:03 AM    Specimen: Cath Urine   Result Value Ref Range    Special Requests: NO SPECIAL REQUESTS      Culture result: (A)       >100,000 COLONIES/mL GRAM NEGATIVE RODS IDENTIFICATION AND SUSCEPTIBILITY TO FOLLOW   URINALYSIS W/ RFLX MICROSCOPIC    Collection Time: 03/04/21  8:04 AM   Result Value Ref Range    Color YELLOW      Appearance CLOUDY      Specific gravity 1.017 1.001 - 1.023      pH (UA) 5.5 5.0 - 9.0      Protein Negative NEG mg/dL    Glucose Negative mg/dL    Ketone Negative NEG mg/dL    Bilirubin Negative NEG      Blood Negative NEG      Urobilinogen 0.2 0.2 - 1.0 EU/dL    Nitrites Positive (A) NEG      Leukocyte Esterase LARGE (A) NEG      WBC  0 /hpf    RBC 0-3 0 /hpf    Epithelial cells 0 0 /hpf    Bacteria 1+ (H) 0 /hpf    Casts 3-5 0 /lpf   GLUCOSE, POC    Collection Time: 03/04/21 10:59 AM   Result Value Ref Range    Glucose (POC) 145 (H) 65 - 100 mg/dL   GLUCOSE, POC    Collection Time: 03/04/21  4:01 PM   Result Value Ref Range    Glucose (POC) 108 (H) 65 - 100 mg/dL   GLUCOSE, POC    Collection Time: 03/04/21  5:45 PM   Result Value Ref Range    Glucose (POC) 143 (H) 65 - 100 mg/dL   GLUCOSE, POC    Collection Time: 03/04/21  8:46 PM   Result Value Ref Range    Glucose (POC) 261 (H) 65 - 100 mg/dL   GLUCOSE, POC    Collection Time: 03/04/21 11:25 PM   Result Value Ref Range    Glucose (POC) 401 (H) 65 - 100 mg/dL   PLEASE READ & DOCUMENT PPD TEST IN 24 HRS    Collection Time: 03/05/21  3:00 AM   Result Value Ref Range    PPD Negative Negative    mm Induration 0 0 - 5 mm GLUCOSE, POC    Collection Time: 03/05/21  5:10 AM   Result Value Ref Range    Glucose (POC) 311 (H) 65 - 100 mg/dL         Imaging /Procedures /Studies   All diagnostic imaging personally reviewed by me. EXAM: Noncontrast CT chest.     INDICATION: Dyspnea. Covid 19.     COMPARISON: Prior CT chest on May 26, 2016, and prior lumbar spine x-rays on  January 9, 2021.     TECHNIQUE: Axial noncontrast CT images of the chest were obtained. Radiation  dose reduction techniques were used for this study. Our CT scanners use one or  all of the following:  Automated exposure control, adjustment of the mA and/or  kV according to patient size, iterative reconstruction.     FINDINGS:  - Pleura/pericardium: Within normal limits. - Lungs: Within normal limits. - Leslie/Mediastinum: Within normal limits. - Tracheobronchial tree: Within normal limits. - Aorta/pulmonary arteries: Within normal limits. - Heart: Within normal limits. - Coronary arteries: There are coronary artery calcifications. - Chest wall: Within normal limits. - Spine/bones: No acute process. There is an old T11 compression fracture. - Additional comments: A small hiatal hernia is present.     IMPRESSION  1. No acute process. 2. Coronary artery disease. 3. Small hiatal hernia. Left hip and femur radiographs 3/3/2021     CLINICAL HISTORY: Fall with left hip pain. Unable to straighten leg.     FINDINGS:   Left hip:  A frontal view of the pelvis and AP and lateral view of the left hip are  submitted for evaluation. The sacroiliac joints and pubic symphysis are intact. The pelvic ring is grossly intact.       AP and lateral views of the left hip show an intertrochanteric fracture of the  proximal left femur with only minimal displacement and varus deformity. No  evidence for dislocation is seen.      Left femur:  AP and lateral views of the left femur once again demonstrates a left occipital  femur intertrochanteric fracture.  No additional fracture seen of the left femur.     IMPRESSION  1. Minimally displaced and angulated intertrochanteric fracture of the proximal  femur.     ASSESSMENT      Hospital Problems as of 3/5/2021 Date Reviewed: 3/15/2019          Codes Class Noted - Resolved POA    * (Principal) Closed fracture of left hip ([facility] ICD-10-CM: D89.040F  ICD-9-CM: 820.8  3/3/2021 - Present         Acute cystitis without hematuria ICD-10-CM: N30.00  ICD-9-CM: 595.0  3/4/2021 - Present Unknown        Hip fracture requiring operative repair [facility]) ICD-10-CM: Q38.839D  ICD-9-CM: 820.8  3/3/2021 - Present Unknown        Diabetic polyneuropathy associated with type 2 diabetes mellitus ([facility] ICD-10-CM: E11.42  ICD-9-CM: 250.60, 357.2  6/11/2018 - Present Yes        Coronary artery disease involving native coronary artery of native heart with angina pectoris ([facility] ICD-10-CM: I25.119  ICD-9-CM: 414.01, 413.9  5/3/2016 - Present Yes        Dyslipidemia ICD-10-CM: E78.5  ICD-9-CM: 272.4  5/3/2016 - Present Yes        Essential hypertension with goal blood pressure less than 140/90 ICD-10-CM: I10  ICD-9-CM: 401.9  3/16/2013 - Present Yes                  Plan:  #Acute closed left hip fracture:  3/5:  Status post ORIF for left hip fracture, POD #1   Pain control with morphine 3 mg every 3 hours along with Roxicodone 10 mg every 4 hours as needed. Continue Flexeril 5 mg p.o. 3 times daily for muscle spasm. PT and OT eval once cleared by orthopedics. #Acute cystitis:  3/5:  Urine culture positive for gram-negative rods   Follow-up with urine culture. Ordered for IV Rocephin 1 g daily for 5 days. D2    #DM2:  3/5:  Blood sugars not optimally controlled, hyperglycemia noted with blood sugar ranging from 300-400. Increasing Lantus from 12 to 16 units daily and Humalog from 4 to 8 units 3 times daily AC. Continue sliding scale. Ordered for A1c. #Coronary artery disease:  Continue aspirin after surgery.     #Hypertension:  3/5:  BP is currently optimally controlled. Continue as needed hydralazine 25 mg every 8 hours for systolic greater than 189    DVT Prophylaxis: Aspirin 325 mg p.o. daily for 10 days. PT and OT eval  Disposition: Patient will likely need short-term rehab after surgery.   Continue as needed antiemetics as needed  High risk with opioids on board  Levi Mathis MD

## 2021-03-05 NOTE — PROGRESS NOTES
ACUTE PHYSICAL THERAPY GOALS:  (Developed with and agreed upon by patient and/or caregiver. )  LTG:  (1.)Ms. Curtis will move from supine to sit and sit to supine , scoot up and down and roll side to side in bed with stand by ASSIST within 7 treatment day(s). (2.)Ms. Curtis will transfer from bed to chair and chair to bed with stand by ASSIST using the least restrictive device within 7 treatment day(s). (3.)Ms. Curtis will ambulate with stand by ASSIST for  feet with the least restrictive device within 7 treatment day(s). PHYSICAL THERAPY: Daily Note and PM Treatment Day # 1    Farzaneh Hicks is a 80 y.o. female   PRIMARY DIAGNOSIS: Closed fracture of left hip (HCC)  Hip fracture ([facility] [S72.009A]  Hip fracture requiring operative repair ([facility] [S72.009A]  Procedure(s) (LRB):  LEFT HIP GAMMA NAIL INSERTION (Left)  1 Day Post-Op    ASSESSMENT:     REHAB RECOMMENDATIONS: CURRENT LEVEL OF FUNCTION:  (Most Recently Demonstrated)   Recommendation to date pending progress:  Setting:   Short-term Rehab   vs HHPT pending progress  Equipment:    Rolling Walker   To Be Determined Bed Mobility:   Not tested  Sit to Stand:   Minimal Assistance  Transfers:  [employer] Guard Assistance  Gait/Mobility:   Minimal Assistance     ASSESSMENT:  Ms. Greg Lopez was sunbathing in the recliner. She required less assist with transfers and gait today. Great progress with transfers/gait and less pain this afternoon! ! Upgraded goals slightly. Patient requested to stay up in recliner. SUBJECTIVE:   Ms. Greg Lopez states, \"I want to come back as an owl so I can sleep all day and hoot all night!!.\"    SOCIAL HISTORY/ LIVING ENVIRONMENT: Ms. Greg Lopez lives with her spouse and ambulates with a rollator independently in home and community. She drives and attends [employer]. Was a square dancer prior to covid.   Home Environment: Private residence  One/Two Story Residence: One story  Living Alone: No  Support Systems: Family member(s), Spouse/Significant Other/Partner  OBJECTIVE:     PAIN: VITAL SIGNS: LINES/DRAINS:   Pre Treatment: Pain Screen  Pain Scale 1: Numeric (0 - 10)  Pain Intensity 1: 2  Pain Onset 1: postop  Pain Location 1: Hip  Pain Orientation 1: Left  Pain Intervention(s) 1: Ambulation/Increased Activity  Post Treatment: 2      O2 Device: Room air     MOBILITY: I Mod I S SBA CGA Min Mod Max Total  NT x2 Comments:   Bed Mobility    Rolling [] [] [] [] [] [] [] [] [] [x] []    Supine to Sit [] [] [] [] [] [] [] [] [] [x] []    Scooting [] [] [] [] [] [] [] [] [] [x] []    Sit to Supine [] [] [] [] [] [] [] [] [] [x] []    Transfers    Sit to Stand [] [] [] [] [x] [x] [] [] [] [] [] Cueing for hand and foot placement. Bed to Chair [] [] [] [] [] [] [] [] [] [] []    Stand to Sit [] [] [] [] [x] [x] [] [] [] [] []    I=Independent, Mod I=Modified Independent, S=Supervision, SBA=Standby Assistance, CGA=Contact Guard Assistance,   Min=Minimal Assistance, Mod=Moderate Assistance, Max=Maximal Assistance, Total=Total Assistance, NT=Not Tested    BALANCE: Good Fair+ Fair Fair- Poor NT Comments   Sitting Static [x] [] [] [] [] []    Sitting Dynamic [x] [] [] [] [] []              Standing Static [] [] [x] [] [] [] With RW   Standing Dynamic [] [] [] [x] [] []      GAIT: I Mod I S SBA CGA Min Mod Max Total  NT x2 Comments:   Level of Assistance [] [] [] [] [x] [x] [] [] [] [] [] Patient required cueing for foot flat L during L stance phase.    Distance 15'x2    DME Rolling Walker and Gait Belt    Gait Quality Step to gait pattern, short, slow steps  Patient self limiting WBing    Weightbearing  Status WBAT     I=Independent, Mod I=Modified Independent, S=Supervision, SBA=Standby Assistance, CGA=Contact Guard Assistance,   Min=Minimal Assistance, Mod=Moderate Assistance, Max=Maximal Assistance, Total=Total Assistance, NT=Not Tested    PLAN:   FREQUENCY/DURATION: PT Plan of Care: BID for duration of hospital stay or until stated goals are met, whichever comes first.  TREATMENT:     TREATMENT:   ($$ Therapeutic Activity: 23-37 mins    )  Therapeutic Activity (30 Minutes): Therapeutic activity included Transfer Training, Ambulation on level ground, Sitting balance , Standing balance and LE ex's to improve functional Mobility, Strength and Activity tolerance.     TREATMENT GRID:      DATE: 3/5/21 pm       Ambulation        Hip Flexion x10 AB       Long Arc Quads x10 AB       Hip ab/ad        Heel Raises x20 AB       Toe Raises x20 AB                                Key:  A=active, AA=active assisted, P=passive, B=bilaterally, R=right, L=left   DF=dorsiflexion, PF=plantarflexion    AFTER TREATMENT POSITION/PRECAUTIONS:  Chair, Needs within reach and RN notified    INTERDISCIPLINARY COLLABORATION:  RN/PCT and PT/PTA    TOTAL TREATMENT DURATION:  PT Patient Time In/Time Out  Time In: 1300  Time Out: [address], PT, DPT

## 2021-03-06 LAB
BACTERIA SPEC CULT: ABNORMAL
BASOPHILS # BLD: 0.1 K/UL (ref 0–0.2)
BASOPHILS NFR BLD: 0 % (ref 0–2)
DIFFERENTIAL METHOD BLD: ABNORMAL
EOSINOPHIL # BLD: 0.2 K/UL (ref 0–0.8)
EOSINOPHIL NFR BLD: 1 % (ref 0.5–7.8)
ERYTHROCYTE [DISTWIDTH] IN BLOOD BY AUTOMATED COUNT: 14 % (ref 11.9–14.6)
GLUCOSE BLD STRIP.AUTO-MCNC: 173 MG/DL (ref 65–100)
GLUCOSE BLD STRIP.AUTO-MCNC: 207 MG/DL (ref 65–100)
GLUCOSE BLD STRIP.AUTO-MCNC: 228 MG/DL (ref 65–100)
GLUCOSE BLD STRIP.AUTO-MCNC: 230 MG/DL (ref 65–100)
HCT VFR BLD AUTO: 29 % (ref 35.8–46.3)
HGB BLD-MCNC: 9.5 G/DL (ref 11.7–15.4)
IMM GRANULOCYTES # BLD AUTO: 0.4 K/UL (ref 0–0.5)
IMM GRANULOCYTES NFR BLD AUTO: 3 % (ref 0–5)
LYMPHOCYTES # BLD: 1.6 K/UL (ref 0.5–4.6)
LYMPHOCYTES NFR BLD: 11 % (ref 13–44)
MCH RBC QN AUTO: 30 PG (ref 26.1–32.9)
MCHC RBC AUTO-ENTMCNC: 32.8 G/DL (ref 31.4–35)
MCV RBC AUTO: 91.5 FL (ref 79.6–97.8)
MM INDURATION POC: 0 MM (ref 0–5)
MONOCYTES # BLD: 0.9 K/UL (ref 0.1–1.3)
MONOCYTES NFR BLD: 6 % (ref 4–12)
NEUTS SEG # BLD: 12 K/UL (ref 1.7–8.2)
NEUTS SEG NFR BLD: 79 % (ref 43–78)
NRBC # BLD: 0 K/UL (ref 0–0.2)
PLATELET # BLD AUTO: 213 K/UL (ref 150–450)
PMV BLD AUTO: 10.3 FL (ref 9.4–12.3)
PPD POC: NEGATIVE NEGATIVE
RBC # BLD AUTO: 3.17 M/UL (ref 4.05–5.2)
SERVICE CMNT-IMP: ABNORMAL
WBC # BLD AUTO: 15.3 K/UL (ref 4.3–11.1)

## 2021-03-06 PROCEDURE — 74011250636 HC RX REV CODE- 250/636: Performed by: HOSPITALIST

## 2021-03-06 PROCEDURE — 85025 COMPLETE CBC W/AUTO DIFF WBC: CPT

## 2021-03-06 PROCEDURE — 65270000029 HC RM PRIVATE

## 2021-03-06 PROCEDURE — 97530 THERAPEUTIC ACTIVITIES: CPT

## 2021-03-06 PROCEDURE — 74011250637 HC RX REV CODE- 250/637: Performed by: ORTHOPAEDIC SURGERY

## 2021-03-06 PROCEDURE — 74011000258 HC RX REV CODE- 258: Performed by: ORTHOPAEDIC SURGERY

## 2021-03-06 PROCEDURE — 74011636637 HC RX REV CODE- 636/637: Performed by: INTERNAL MEDICINE

## 2021-03-06 PROCEDURE — 74011250636 HC RX REV CODE- 250/636: Performed by: ORTHOPAEDIC SURGERY

## 2021-03-06 PROCEDURE — 74011250637 HC RX REV CODE- 250/637: Performed by: HOSPITALIST

## 2021-03-06 PROCEDURE — 74011250637 HC RX REV CODE- 250/637: Performed by: INTERNAL MEDICINE

## 2021-03-06 PROCEDURE — 94762 N-INVAS EAR/PLS OXIMTRY CONT: CPT

## 2021-03-06 PROCEDURE — 74011636637 HC RX REV CODE- 636/637: Performed by: HOSPITALIST

## 2021-03-06 PROCEDURE — 82962 GLUCOSE BLOOD TEST: CPT

## 2021-03-06 RX ORDER — INSULIN GLARGINE 100 [IU]/ML
14 INJECTION, SOLUTION SUBCUTANEOUS
Status: DISCONTINUED | OUTPATIENT
Start: 2021-03-06 | End: 2021-03-09 | Stop reason: HOSPADM

## 2021-03-06 RX ADMIN — ROPINIROLE HYDROCHLORIDE 1 MG: 1 TABLET, FILM COATED ORAL at 12:09

## 2021-03-06 RX ADMIN — CARVEDILOL 12.5 MG: 6.25 TABLET, FILM COATED ORAL at 16:44

## 2021-03-06 RX ADMIN — ROPINIROLE HYDROCHLORIDE 1 MG: 1 TABLET, FILM COATED ORAL at 17:32

## 2021-03-06 RX ADMIN — SPIRONOLACTONE 25 MG: 25 TABLET ORAL at 08:36

## 2021-03-06 RX ADMIN — Medication 1 TABLET: at 08:36

## 2021-03-06 RX ADMIN — INSULIN LISPRO 4 UNITS: 100 INJECTION, SOLUTION INTRAVENOUS; SUBCUTANEOUS at 21:09

## 2021-03-06 RX ADMIN — INSULIN LISPRO 4 UNITS: 100 INJECTION, SOLUTION INTRAVENOUS; SUBCUTANEOUS at 08:33

## 2021-03-06 RX ADMIN — ONDANSETRON 4 MG: 2 INJECTION INTRAMUSCULAR; INTRAVENOUS at 04:00

## 2021-03-06 RX ADMIN — INSULIN LISPRO 4 UNITS: 100 INJECTION, SOLUTION INTRAVENOUS; SUBCUTANEOUS at 11:48

## 2021-03-06 RX ADMIN — ASPIRIN 325 MG ORAL TABLET 325 MG: 325 PILL ORAL at 08:33

## 2021-03-06 RX ADMIN — ACETAMINOPHEN 650 MG: 325 TABLET, FILM COATED ORAL at 17:32

## 2021-03-06 RX ADMIN — PANTOPRAZOLE SODIUM 40 MG: 40 TABLET, DELAYED RELEASE ORAL at 08:34

## 2021-03-06 RX ADMIN — Medication 1 TABLET: at 11:49

## 2021-03-06 RX ADMIN — Medication 10 ML: at 04:20

## 2021-03-06 RX ADMIN — ASPIRIN 81 MG: 81 TABLET ORAL at 08:33

## 2021-03-06 RX ADMIN — INSULIN GLARGINE 14 UNITS: 100 INJECTION, SOLUTION SUBCUTANEOUS at 21:08

## 2021-03-06 RX ADMIN — INSULIN LISPRO 8 UNITS: 100 INJECTION, SOLUTION INTRAVENOUS; SUBCUTANEOUS at 11:48

## 2021-03-06 RX ADMIN — AMLODIPINE BESYLATE 5 MG: 5 TABLET ORAL at 08:33

## 2021-03-06 RX ADMIN — Medication 10 ML: at 15:08

## 2021-03-06 RX ADMIN — ACETAMINOPHEN 650 MG: 325 TABLET, FILM COATED ORAL at 07:08

## 2021-03-06 RX ADMIN — FAMOTIDINE 20 MG: 20 TABLET ORAL at 08:36

## 2021-03-06 RX ADMIN — MORPHINE SULFATE 3 MG: 4 INJECTION INTRAVENOUS at 02:26

## 2021-03-06 RX ADMIN — CYANOCOBALAMIN TAB 1000 MCG 2500 MCG: 1000 TAB at 08:34

## 2021-03-06 RX ADMIN — FOLIC ACID 1 MG: 1 TABLET ORAL at 08:33

## 2021-03-06 RX ADMIN — Medication 1 TABLET: at 16:44

## 2021-03-06 RX ADMIN — Medication 10 ML: at 07:08

## 2021-03-06 RX ADMIN — ATORVASTATIN CALCIUM 40 MG: 40 TABLET, FILM COATED ORAL at 08:33

## 2021-03-06 RX ADMIN — Medication 10 ML: at 21:09

## 2021-03-06 RX ADMIN — ESCITALOPRAM OXALATE 10 MG: 10 TABLET ORAL at 08:36

## 2021-03-06 RX ADMIN — OXYCODONE 10 MG: 5 TABLET ORAL at 04:18

## 2021-03-06 RX ADMIN — INSULIN LISPRO 8 UNITS: 100 INJECTION, SOLUTION INTRAVENOUS; SUBCUTANEOUS at 16:43

## 2021-03-06 RX ADMIN — INSULIN LISPRO 8 UNITS: 100 INJECTION, SOLUTION INTRAVENOUS; SUBCUTANEOUS at 08:32

## 2021-03-06 RX ADMIN — ACETAMINOPHEN 650 MG: 325 TABLET, FILM COATED ORAL at 11:49

## 2021-03-06 RX ADMIN — INSULIN LISPRO 2 UNITS: 100 INJECTION, SOLUTION INTRAVENOUS; SUBCUTANEOUS at 16:43

## 2021-03-06 RX ADMIN — CARVEDILOL 12.5 MG: 6.25 TABLET, FILM COATED ORAL at 08:36

## 2021-03-06 RX ADMIN — CEFTRIAXONE SODIUM 1 G: 1 INJECTION, POWDER, FOR SOLUTION INTRAMUSCULAR; INTRAVENOUS at 11:49

## 2021-03-06 NOTE — PROGRESS NOTES
Resting quietly, resp even, unlab with O2 intact  Eyes closed with relaxed facial expression. No distress noted. Report given to Misael Quintero RN.

## 2021-03-06 NOTE — PROGRESS NOTES
Assumed care of the patient. Off going report given by Francisco J Montiel. The patient is AO x 4 at this time and is resting in bed. IV access: PIV is saline locked  Oxygen needs: 1L via nc  Pain assessment: NAD at this time, resting in bed  Safety: Bed is low and call light is within reach with bed alarm on for safety. Patient understands to call for assistance. Disaster charting initiated.

## 2021-03-06 NOTE — PROGRESS NOTES
Hospitalist Progress Note     Admit Date:  3/3/2021 10:39 PM   Name:  Suma Rhoades   Age:  80 y.o.  :  1934   MRN:  [id number]   PCP:  Danilo Santacruz MD  Treatment Team: Attending Provider: Annalisa Nava DO; Consulting Provider: Kemar Amor NP; Surgeon: Alanna Philip MD; Utilization Review: Debo Chapman RN; Primary Nurse: Fany Mir RN; Physical Therapist: Ziyad Mccall PT    Assessment and plan:  #Acute closed left hip fracture:  3/5:  Status post ORIF for left hip fracture, POD #1   Pain control with morphine 3 mg every 3 hours along with Roxicodone 10 mg every 4 hours as needed. Continue Flexeril 5 mg p.o. 3 times daily for muscle spasm. PT and OT eval once cleared by orthopedics. postop day #2patient has tentative bed at [facility] on [address] [facility] need rapid Covid testing Monday for placementI spoke with case management on call     #Acute cystitis:  3/5:  Urine culture positive for gram-negative rods   Follow-up with urine culture. Ordered for IV Rocephin 1 g daily for 5 days. D2  ontinue ceftriaxone while hospitalized but may administer early  and discharged with cephalexin to complete course.     #DM2:  3/5:  Blood sugars not optimally controlled, hyperglycemia noted with blood sugar ranging from 300-400. Increasing Lantus from 12 to 16 units daily and Humalog from 4 to 8 units 3 times daily AC. Continue sliding scale. Ordered for A1c.  fair control continue same and continue to monitor     #Coronary artery disease:  Continue aspirin after surgery.     #Hypertension:  3/5:  BP is currently optimally controlled. Continue as needed hydralazine 25 mg every 8 hours for systolic greater than 751  ontinue same sugars controlled.     DVT Prophylaxis: Aspirin 325 mg p.o. daily for 10 days.     Disposition--[place] [facility] short-term rehab  if remains stable        Subjective:   HPI and or CC:          Objective:     Patient Vitals for the past 24 hrs:   Temp Pulse Resp BP SpO2   03/06/21 1100 97.5 °F (36.4 °C) 66 18 (!) 119/58 95 %   03/06/21 0739 98.1 °F (36.7 °C) 65 16 (!) 127/57 95 %   03/06/21 0350 97.9 °F (36.6 °C) 67 16 (!) 133/55 99 %   03/05/21 2352 98.4 °F (36.9 °C) 70 18 125/74 (!) 88 %   03/05/21 1906 98.1 °F (36.7 °C) 80 18 133/62 95 %     Oxygen Therapy  O2 Sat (%): 95 % (03/06/21 1100)  O2 Device: Room air (03/06/21 1102)  Skin Assessment: Clean, dry, & intact (03/05/21 0710)  Skin Protection for O2 Device: No (03/05/21 0710)  O2 Flow Rate (L/min): 4 l/min (03/05/21 0710)    Intake/Output Summary (Last 24 hours) at 3/6/2021 1708  Last data filed at 3/6/2021 1102  Gross per 24 hour   Intake    Output 1000 ml   Net -1000 ml         REVIEW OF SYSTEMS: Comprehensive ROS performed and negative except as stated in HPI. Physical Examination:  General:    Well nourished. No gross distress. Head:  Normocephalic, atraumatic, nares patent  Eyes:  Extraocular movements intact, normal sclera  CV:   RRR. No  Murmurs, clicks, or gallops, distal pulses intact  Lungs:   Unlabored, no cyanosis, no wheeze  Abdomen:   Soft, nondistended, nontender. Extremities: Warm and dry. No cyanosis or edema. Skin:     No rashes or jaundice. Neuro:  No gross focal deficits, no tremor  Psych:  Mood and affect appropriate    Data Review:  I have reviewed all labs, meds, telemetry events, and studies from the last 24 hours.     Recent Results (from the past 24 hour(s))   GLUCOSE, POC    Collection Time: 03/05/21  6:43 PM   Result Value Ref Range    Glucose (POC) 401 (H) 65 - 100 mg/dL   GLUCOSE, POC    Collection Time: 03/05/21  9:27 PM   Result Value Ref Range    Glucose (POC) 333 (H) 65 - 100 mg/dL   PLEASE READ & DOCUMENT PPD TEST IN 48 HRS    Collection Time: 03/06/21  1:36 AM   Result Value Ref Range    PPD Negative Negative    mm Induration 0 0 - 5 mm CBC WITH AUTOMATED DIFF    Collection Time: 03/06/21  4:22 AM   Result Value Ref Range    WBC 15.3 (H) 4.3 - 11.1 K/uL    RBC 3.17 (L) 4.05 - 5.2 M/uL    HGB 9.5 (L) 11.7 - 15.4 g/dL    HCT 29.0 (L) 35.8 - 46.3 %    MCV 91.5 79.6 - 97.8 FL    MCH 30.0 26.1 - 32.9 PG    MCHC 32.8 31.4 - 35.0 g/dL    RDW 14.0 11.9 - 14.6 %    PLATELET 251 142 - 048 K/uL    MPV 10.3 9.4 - 12.3 FL    ABSOLUTE NRBC 0.00 0.0 - 0.2 K/uL    DF AUTOMATED      NEUTROPHILS 79 (H) 43 - 78 %    LYMPHOCYTES 11 (L) 13 - 44 %    MONOCYTES 6 4.0 - 12.0 %    EOSINOPHILS 1 0.5 - 7.8 %    BASOPHILS 0 0.0 - 2.0 %    IMMATURE GRANULOCYTES 3 0.0 - 5.0 %    ABS. NEUTROPHILS 12.0 (H) 1.7 - 8.2 K/UL    ABS. LYMPHOCYTES 1.6 0.5 - 4.6 K/UL    ABS. MONOCYTES 0.9 0.1 - 1.3 K/UL    ABS. EOSINOPHILS 0.2 0.0 - 0.8 K/UL    ABS. BASOPHILS 0.1 0.0 - 0.2 K/UL    ABS. IMM. GRANS. 0.4 0.0 - 0.5 K/UL   GLUCOSE, POC    Collection Time: 03/06/21  7:39 AM   Result Value Ref Range    Glucose (POC) 207 (H) 65 - 100 mg/dL   GLUCOSE, POC    Collection Time: 03/06/21 11:14 AM   Result Value Ref Range    Glucose (POC) 228 (H) 65 - 100 mg/dL   GLUCOSE, POC    Collection Time: 03/06/21  4:30 PM   Result Value Ref Range    Glucose (POC) 173 (H) 65 - 100 mg/dL        All Micro Results     Procedure Component Value Units Date/Time    CULTURE, URINE [[id number]]  (Abnormal)  (Susceptibility) Collected: 03/04/21 0803    Order Status: Completed Specimen: Cath Urine Updated: 03/06/21 1244     Special Requests: NO SPECIAL REQUESTS        Culture result:       >100,000 COLONIES/mL ESCHERICHIA COLI          MSSA/MRSA SC BY PCR, NASAL SWAB [[id number]] Collected: 03/03/21 7081    Order Status: Completed Specimen: Nasal swab Updated: 03/04/21 0219     Special Requests: NASAL SWAB        Culture result:       SA target not detected. A MRSA NEGATIVE, SA NEGATIVE test result does not preclude MRSA or SA nasal colonization.                 Current Meds:  Current Facility-Administered Medications   Medication Dose Route Frequency    morphine injection 3 mg  3 mg IntraVENous Q3H PRN    oxyCODONE IR (ROXICODONE) tablet 10 mg  10 mg Oral Q4H PRN    insulin lispro (HUMALOG) injection 0-10 Units  0-10 Units SubCUTAneous AC&HS    insulin lispro (HUMALOG) injection 8 Units  8 Units SubCUTAneous TIDAC    acetaminophen (TYLENOL) tablet 500 mg  500 mg Oral Q6H PRN    amLODIPine (NORVASC) tablet 5 mg  5 mg Oral DAILY    aspirin delayed-release tablet 81 mg  81 mg Oral DAILY    atorvastatin (LIPITOR) tablet 40 mg  40 mg Oral DAILY    carvediloL (COREG) tablet 12.5 mg  12.5 mg Oral BID WITH MEALS    cholecalciferol (VITAMIN D3) (1000 Units /25 mcg) tablet 1,000 Units  1,000 Units Oral Q48H    cyanocobalamin tablet 2,500 mcg  2,500 mcg Oral DAILY    escitalopram oxalate (LEXAPRO) tablet 10 mg  10 mg Oral DAILY    famotidine (PEPCID) tablet 20 mg  20 mg Oral DAILY    folic acid (FOLVITE) tablet 1 mg  1 mg Oral DAILY    insulin glargine (LANTUS) injection 10 Units  10 Units SubCUTAneous QHS    pantoprazole (PROTONIX) tablet 40 mg  40 mg Oral ACB    rOPINIRole (REQUIP) tablet 1 mg  1 mg Oral BID    spironolactone (ALDACTONE) tablet 25 mg  25 mg Oral DAILY    cyclobenzaprine (FLEXERIL) tablet 5 mg  5 mg Oral TID PRN    cefTRIAXone (ROCEPHIN) 1 g in 0.9% sodium chloride (MBP/ADV) 50 mL MBP  1 g IntraVENous Q24H    sodium chloride (NS) flush 5-40 mL  5-40 mL IntraVENous Q8H    sodium chloride (NS) flush 5-40 mL  5-40 mL IntraVENous PRN    alum-mag hydroxide-simeth (MYLANTA) oral suspension 30 mL  30 mL Oral Q4H PRN    calcium-vitamin D (OS-ALYCIA +D3) 500 mg-200 unit per tablet 1 Tab  1 Tab Oral TID WITH MEALS    aspirin tablet 325 mg  325 mg Oral DAILY    acetaminophen (TYLENOL) tablet 650 mg  650 mg Oral Q6H    ondansetron (ZOFRAN) injection 4 mg  4 mg IntraVENous Q4H PRN       Diet:  DIET REGULAR    Other Studies (last 24 hours):  No results found.     Assessment and Plan:     Hospital Problems as of 3/6/2021 Date Reviewed: 3/15/2019          Codes Class Noted - Resolved POA    Acute cystitis without hematuria ICD-10-CM: N30.00  ICD-9-CM: 595.0  3/4/2021 - Present Unknown        * (Principal) Closed fracture of left hip ([facility] ICD-10-CM: M81.661A  ICD-9-CM: 820.8  3/3/2021 - Present         Hip fracture requiring operative repair [facility]) ICD-10-CM: V85.815X  ICD-9-CM: 820.8  3/3/2021 - Present Unknown        Diabetic polyneuropathy associated with type 2 diabetes mellitus ([facility] ICD-10-CM: E11.42  ICD-9-CM: 250.60, 357.2  6/11/2018 - Present Yes        Coronary artery 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    DC planning/Dispo:    DVT ppx:      Code status:  Medical decision maker:      Signed:  Parag MENDOZA

## 2021-03-06 NOTE — PROGRESS NOTES
Resting quietly, awake, resp even, unlab, skin warm, dry. AP reg, lungs sounds clear. Left hip and left elbow dsgs clean, dry, intact. No N/V deficits noted. Assessment completed. All light in reach, bed alarm set. No c/o. No distress.

## 2021-03-06 NOTE — PROGRESS NOTES
March 6, 2021         Post Op day: 2 Days Post-Op Procedure(s) (LRB):  LEFT HIP GAMMA NAIL INSERTION (Left)      Admit Date: 3/3/2021  Admit Diagnosis: [facility] fracture ([facility] [S72.009A]  Hip fracture requiring operative repair ([facility] [S72.009A]       Principle Problem: Closed fracture of left hip ([facility] Subjective: Doing well, No complaints, No SOB, No Chest Pain, No Nausea or Vomiting     Objective:   Vital Signs are Stable, No Acute Distress, Alert,  Dressing is Dry,  Neurovascular exam is normal.     Assessment / Plan :  Patient Active Problem List   Diagnosis Code    DM type 2 (diabetes mellitus, type 2) ([facility] E11.9    Essential hypertension with goal blood pressure less than 140/90 I10    Crohn disease ([facility] K50.90    Other and unspecified hyperlipidemia E78.5    Pericarditis, acute I30.9    Precordial pain R07.2    Nonspecific abnormal electrocardiogram (ECG) (EKG) R94.31    Coronary artery disease involving native coronary artery of native heart with angina pectoris (HCC) I25.119    Dyslipidemia E78.5    CAP (community acquired pneumonia) J18.9    LIZ (acute kidney injury) ([facility] N17.9    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    Electrolyte abnormality E87.8    History of artificial joint Z96.60    History of implantation of joint prosthesis of elbow Z96.629    Supracondylar fracture of humerus S42.413A    Osteoporosis M81.0    Cholecystitis with cholelithiasis K80.10    CVA (cerebral vascular accident) ([facility] I63.9    Dysuria R30.0    Vaginal candida B37.3    Polyneuropathy G62.9    Type 2 diabetes with nephropathy ([facility] E11.21    Diabetic polyneuropathy associated with type 2 diabetes mellitus ([facility] E11.42    Fall W19. Cosme Douglass Encounter for medication management A18.083    Closed fracture of left hip ([facility] S72.002A    Hip fracture requiring operative repair ([facility] S72.009A    Acute cystitis without hematuria N30.00      Patient Vitals for the past 8 hrs:   BP Temp Pulse Resp SpO2   21 0350 (!) 133/55 97.9 °F (36.6 °C) 67 16 99 %    Temp (24hrs), Av.2 °F (36.8 °C), Min:97.9 °F (36.6 °C), Max:98.4 °F (36.9 °C)    Body mass index is 24.89 kg/m².     Lab Results   Component Value Date/Time    HGB 9.5 (L) 2021 04:22 AM        S/P Left hip Gamma 3/4: PT, WBAT with walker and assistance   Left elbow improving, x-rays were negative: Dressing changes every other day  Medical Mgmt per hospitalist  Anticoagulation plan: ASA 325mg  Continue PT  Fall Precautions  DC disp: per SW  F/U in 2 weeks for staple removal        Signed By: MANOHAR Clark  3/6/2021,  9:05 AM

## 2021-03-06 NOTE — PROGRESS NOTES
ACUTE PHYSICAL THERAPY GOALS:  (Developed with and agreed upon by patient and/or caregiver.)    All goals ongoing 3/6/2021     LTG:  (1.)Ms. Curtis will move from supine to sit and sit to supine , scoot up and down and roll side to side in bed with stand by ASSIST within 7 treatment day(s). (2.)Ms. Curtis will transfer from bed to chair and chair to bed with stand by ASSIST using the least restrictive device within 7 treatment day(s). (3.)Ms. Curtis will ambulate with stand by ASSIST for  feet with the least restrictive device within 7 treatment day(s). PHYSICAL THERAPY: Daily Note and PM Treatment Day # 2    Stevenson Smith is a 80 y.o. female   PRIMARY DIAGNOSIS: Closed fracture of left hip (HCC)  Hip fracture ([facility] [S72.009A]  Hip fracture requiring operative repair ([facility] [S72.009A]  Procedure(s) (LRB):  LEFT HIP GAMMA NAIL INSERTION (Left)  2 Days Post-Op    ASSESSMENT:     REHAB RECOMMENDATIONS: CURRENT LEVEL OF FUNCTION:  (Most Recently Demonstrated)   Recommendation to date pending progress:  Setting:   Short-term Rehab   vs HHPT pending progress  Equipment:    Rolling Walker   To Be Determined Bed Mobility:   Not tested  Sit to Stand:   Minimal Assistance  Transfers:  [employer] Assistance  Gait/Mobility:   Minimal Assistance     ASSESSMENT:  Ms. Staci Frank was sitting up in recliner motivated for PT this earlier PM (was not able to see AM as occupied prior). She required CGA x 1 and 20 feet ambulation WBAT with a 2 wheel rolling walker. [facility] progress with transfers/gait and less pain this afternoon. Goals remain ongoing. Patient requested to stay up in recliner until later. .     SUBJECTIVE:   Ms. Staci Frank states, \"I am ready to get up with you! .\"    SOCIAL HISTORY/ LIVING ENVIRONMENT: Ms. Staci Frank lives with her spouse and ambulates with a rollator independently in home and community. She drives and attends [employer]. Was a square dancer prior to covid.   Home Environment: Private residence  One/Two Story Residence: One story  Living Alone: No  Support Systems: Family member(s), Spouse/Significant Other/Partner  OBJECTIVE:     PAIN: VITAL SIGNS: LINES/DRAINS:   Pre Treatment: Pain Screen  Pain Scale 1: Numeric (0 - 10)  Pain Intensity 1: 0  Post Treatment: 2      O2 Device: Room air     MOBILITY: I Mod I S SBA CGA Min Mod Max Total  NT x2 Comments:   Bed Mobility    Rolling [] [] [] [] [] [] [] [] [] [x] [] In chair. Supine to Sit [] [] [] [] [] [] [] [] [] [x] []    Scooting [] [] [] [] [] [] [] [] [] [x] []    Sit to Supine [] [] [] [] [] [] [] [] [] [x] []    Transfers    Sit to Stand [] [] [] [] [x] [] [] [] [] [] [] Cueing for hand and foot placement. Bed to Chair [] [] [] [] [] [] [] [] [] [] []    Stand to Sit [] [] [] [] [x] [] [] [] [] [] []    I=Independent, Mod I=Modified Independent, S=Supervision, SBA=Standby Assistance, CGA=Contact Guard Assistance,   Min=Minimal Assistance, Mod=Moderate Assistance, Max=Maximal Assistance, Total=Total Assistance, NT=Not Tested    BALANCE: Good Fair+ Fair Fair- Poor NT Comments   Sitting Static [x] [] [] [] [] []    Sitting Dynamic [x] [] [] [] [] []              Standing Static [] [] [x] [] [] [] With RW   Standing Dynamic [] [] [x] [] [] []      GAIT: I Mod I S SBA CGA Min Mod Max Total  NT x2 Comments:   Level of Assistance [] [] [] [] [x] [] [] [] [] [] [] Patient required cueing for foot flat L during L stance phase.    Distance 20 feet     DME Rolling Walker and Gait Belt    Gait Quality Step to gait pattern, short, slow steps  Patient self limiting WBing    Weightbearing  Status WBAT     I=Independent, Mod I=Modified Independent, S=Supervision, SBA=Standby Assistance, CGA=Contact Guard Assistance,   Min=Minimal Assistance, Mod=Moderate Assistance, Max=Maximal Assistance, Total=Total Assistance, NT=Not Tested    PLAN:   FREQUENCY/DURATION: PT Plan of Care: BID for duration of hospital stay or until stated goals are met, whichever comes first.  TREATMENT:     TREATMENT:   ($$ Therapeutic Activity: 23-37 mins    )  Therapeutic Activity (24 Minutes): Therapeutic activity included Transfer Training, Ambulation on level ground, Sitting balance , Standing balance and LE ex's to improve functional Mobility, Strength and Activity tolerance.     TREATMENT GRID:      DATE: 3/6/21 early pm       Ambulation        Hip Flexion x10 AB       Long Arc Quads x10 AB       Hip ab/ad        Heel Raises x20 AB       Toe Raises x20 AB                                Key:  A=active, AA=active assisted, P=passive, B=bilaterally, R=right, L=left   DF=dorsiflexion, PF=plantarflexion    AFTER TREATMENT POSITION/PRECAUTIONS:  Chair, Needs within reach and RN notified    INTERDISCIPLINARY COLLABORATION:  RN/PCT and PT/PTA    TOTAL TREATMENT DURATION:  PT Patient Time In/Time Out  Time In: 1241  Time Out: Mike [id number], PT

## 2021-03-07 PROBLEM — N39.0 UTI (URINARY TRACT INFECTION): Status: ACTIVE | Noted: 2021-03-07

## 2021-03-07 PROBLEM — D62 ACUTE BLOOD LOSS ANEMIA: Status: ACTIVE | Noted: 2021-03-07

## 2021-03-07 LAB
BASOPHILS # BLD: 0.1 K/UL (ref 0–0.2)
BASOPHILS NFR BLD: 1 % (ref 0–2)
DIFFERENTIAL METHOD BLD: ABNORMAL
EOSINOPHIL # BLD: 0.5 K/UL (ref 0–0.8)
EOSINOPHIL NFR BLD: 4 % (ref 0.5–7.8)
ERYTHROCYTE [DISTWIDTH] IN BLOOD BY AUTOMATED COUNT: 14.2 % (ref 11.9–14.6)
GLUCOSE BLD STRIP.AUTO-MCNC: 132 MG/DL (ref 65–100)
GLUCOSE BLD STRIP.AUTO-MCNC: 139 MG/DL (ref 65–100)
GLUCOSE BLD STRIP.AUTO-MCNC: 156 MG/DL (ref 65–100)
GLUCOSE BLD STRIP.AUTO-MCNC: 183 MG/DL (ref 65–100)
HCT VFR BLD AUTO: 29.2 % (ref 35.8–46.3)
HGB BLD-MCNC: 9.4 G/DL (ref 11.7–15.4)
IMM GRANULOCYTES # BLD AUTO: 0.6 K/UL (ref 0–0.5)
IMM GRANULOCYTES NFR BLD AUTO: 4 % (ref 0–5)
LYMPHOCYTES # BLD: 2.3 K/UL (ref 0.5–4.6)
LYMPHOCYTES NFR BLD: 17 % (ref 13–44)
MCH RBC QN AUTO: 29.8 PG (ref 26.1–32.9)
MCHC RBC AUTO-ENTMCNC: 32.2 G/DL (ref 31.4–35)
MCV RBC AUTO: 92.7 FL (ref 79.6–97.8)
MM INDURATION POC: 0 MM (ref 0–5)
MONOCYTES # BLD: 0.9 K/UL (ref 0.1–1.3)
MONOCYTES NFR BLD: 7 % (ref 4–12)
NEUTS SEG # BLD: 9.1 K/UL (ref 1.7–8.2)
NEUTS SEG NFR BLD: 68 % (ref 43–78)
NRBC # BLD: 0 K/UL (ref 0–0.2)
PLATELET # BLD AUTO: 204 K/UL (ref 150–450)
PMV BLD AUTO: 10.4 FL (ref 9.4–12.3)
PPD POC: NEGATIVE NEGATIVE
RBC # BLD AUTO: 3.15 M/UL (ref 4.05–5.2)
WBC # BLD AUTO: 13.5 K/UL (ref 4.3–11.1)

## 2021-03-07 PROCEDURE — 74011000258 HC RX REV CODE- 258: Performed by: ORTHOPAEDIC SURGERY

## 2021-03-07 PROCEDURE — 74011636637 HC RX REV CODE- 636/637: Performed by: HOSPITALIST

## 2021-03-07 PROCEDURE — 74011250636 HC RX REV CODE- 250/636: Performed by: ORTHOPAEDIC SURGERY

## 2021-03-07 PROCEDURE — 85025 COMPLETE CBC W/AUTO DIFF WBC: CPT

## 2021-03-07 PROCEDURE — 74011250637 HC RX REV CODE- 250/637: Performed by: INTERNAL MEDICINE

## 2021-03-07 PROCEDURE — 65270000029 HC RM PRIVATE

## 2021-03-07 PROCEDURE — 74011636637 HC RX REV CODE- 636/637: Performed by: INTERNAL MEDICINE

## 2021-03-07 PROCEDURE — 82962 GLUCOSE BLOOD TEST: CPT

## 2021-03-07 PROCEDURE — 74011250637 HC RX REV CODE- 250/637: Performed by: HOSPITALIST

## 2021-03-07 PROCEDURE — 97530 THERAPEUTIC ACTIVITIES: CPT

## 2021-03-07 PROCEDURE — 74011250637 HC RX REV CODE- 250/637: Performed by: ORTHOPAEDIC SURGERY

## 2021-03-07 PROCEDURE — 97110 THERAPEUTIC EXERCISES: CPT

## 2021-03-07 PROCEDURE — 94762 N-INVAS EAR/PLS OXIMTRY CONT: CPT

## 2021-03-07 PROCEDURE — 2709999900 HC NON-CHARGEABLE SUPPLY

## 2021-03-07 RX ADMIN — PANTOPRAZOLE SODIUM 40 MG: 40 TABLET, DELAYED RELEASE ORAL at 08:51

## 2021-03-07 RX ADMIN — ATORVASTATIN CALCIUM 40 MG: 40 TABLET, FILM COATED ORAL at 08:52

## 2021-03-07 RX ADMIN — INSULIN LISPRO 8 UNITS: 100 INJECTION, SOLUTION INTRAVENOUS; SUBCUTANEOUS at 17:04

## 2021-03-07 RX ADMIN — CEFTRIAXONE SODIUM 1 G: 1 INJECTION, POWDER, FOR SOLUTION INTRAMUSCULAR; INTRAVENOUS at 11:56

## 2021-03-07 RX ADMIN — CARVEDILOL 12.5 MG: 6.25 TABLET, FILM COATED ORAL at 17:03

## 2021-03-07 RX ADMIN — Medication 1 TABLET: at 08:51

## 2021-03-07 RX ADMIN — ROPINIROLE HYDROCHLORIDE 1 MG: 1 TABLET, FILM COATED ORAL at 18:32

## 2021-03-07 RX ADMIN — CYCLOBENZAPRINE 5 MG: 10 TABLET, FILM COATED ORAL at 11:56

## 2021-03-07 RX ADMIN — CARVEDILOL 12.5 MG: 6.25 TABLET, FILM COATED ORAL at 08:51

## 2021-03-07 RX ADMIN — INSULIN LISPRO 8 UNITS: 100 INJECTION, SOLUTION INTRAVENOUS; SUBCUTANEOUS at 12:10

## 2021-03-07 RX ADMIN — Medication 1 TABLET: at 11:57

## 2021-03-07 RX ADMIN — VITAMIN D, TAB 1000IU (100/BT) 1000 UNITS: 25 TAB at 18:32

## 2021-03-07 RX ADMIN — ESCITALOPRAM OXALATE 10 MG: 10 TABLET ORAL at 08:51

## 2021-03-07 RX ADMIN — CYCLOBENZAPRINE 5 MG: 10 TABLET, FILM COATED ORAL at 20:53

## 2021-03-07 RX ADMIN — OXYCODONE 10 MG: 5 TABLET ORAL at 21:55

## 2021-03-07 RX ADMIN — FAMOTIDINE 20 MG: 20 TABLET ORAL at 08:52

## 2021-03-07 RX ADMIN — OXYCODONE 10 MG: 5 TABLET ORAL at 02:15

## 2021-03-07 RX ADMIN — AMLODIPINE BESYLATE 5 MG: 5 TABLET ORAL at 08:52

## 2021-03-07 RX ADMIN — FOLIC ACID 1 MG: 1 TABLET ORAL at 08:51

## 2021-03-07 RX ADMIN — Medication 10 ML: at 22:15

## 2021-03-07 RX ADMIN — INSULIN LISPRO 2 UNITS: 100 INJECTION, SOLUTION INTRAVENOUS; SUBCUTANEOUS at 07:50

## 2021-03-07 RX ADMIN — Medication 1 TABLET: at 17:03

## 2021-03-07 RX ADMIN — Medication 10 ML: at 13:13

## 2021-03-07 RX ADMIN — ASPIRIN 325 MG ORAL TABLET 325 MG: 325 PILL ORAL at 08:51

## 2021-03-07 RX ADMIN — ROPINIROLE HYDROCHLORIDE 1 MG: 1 TABLET, FILM COATED ORAL at 08:51

## 2021-03-07 RX ADMIN — INSULIN LISPRO 2 UNITS: 100 INJECTION, SOLUTION INTRAVENOUS; SUBCUTANEOUS at 17:04

## 2021-03-07 RX ADMIN — CYANOCOBALAMIN TAB 1000 MCG 2500 MCG: 1000 TAB at 08:50

## 2021-03-07 RX ADMIN — INSULIN LISPRO 8 UNITS: 100 INJECTION, SOLUTION INTRAVENOUS; SUBCUTANEOUS at 07:50

## 2021-03-07 RX ADMIN — ASPIRIN 81 MG: 81 TABLET ORAL at 08:51

## 2021-03-07 RX ADMIN — SPIRONOLACTONE 25 MG: 25 TABLET ORAL at 08:51

## 2021-03-07 RX ADMIN — INSULIN GLARGINE 14 UNITS: 100 INJECTION, SOLUTION SUBCUTANEOUS at 22:15

## 2021-03-07 NOTE — PROGRESS NOTES
Hospitalist Progress Note     Admit Date:  3/3/2021 10:39 PM   Name:  [place]   Age:  80 y.o.  :  1934   MRN:  [id number]   PCP:  Minesh Duong MD  Treatment Team: Attending Provider: Amy Moraes DO; Consulting Provider: Jina Noonan NP; Surgeon: Annelise Priest MD; Utilization Review: Saritha Murphy RN; Primary Nurse: Bijan Lloyd RN    Assessment and plan:  #Acute closed left hip fracture:  3/5:  Status post ORIF for left hip fracture, POD #1   Pain control with morphine 3 mg every 3 hours along with Roxicodone 10 mg every 4 hours as needed. Continue Flexeril 5 mg p.o. 3 times daily for muscle spasm. PT and OT eval once cleared by orthopedics. March 6postop day #2patient has tentative bed at [facility] on [facility] need rapid Covid testing Monday for placementI spoke with case management on call  March 7postop day #3clinically stable. Hopeful discharge  - skilled nursing facility for short-term rehab.     #Acute cystitis/UTI:  3/5:  Urine culture positive for gram-negative rods   Follow-up with urine culture. Ordered for IV Rocephin 1 g daily for 5 days. D2  March 6continue ceftriaxone while hospitalized but may administer early Monday,  and discharged with cephalexin to complete course. reviewed urine culture broadly sensitive E. coli UTI Rocephin course completes tomorrow at noon     #DM2:  3/5:  Blood sugars not optimally controlled, hyperglycemia noted with blood sugar ranging from 300-400. Increasing Lantus from 12 to 16 units daily and Humalog from 4 to 8 units 3 times daily AC. Continue sliding scale. Ordered for A1c. sugars fair control continue same and continue to monitor  7unchangedsugars controlled    #Acute blood loss anemiahemoglobin stable around 9will need outpatient follow-up.     #Coronary artery disease:  Continue aspirin after surgery.   reviewed meds in preparation for tentative discharge tomorrow and continue same.     #Hypertension:  3/5:  BP is currently optimally controlled. Continue as needed hydralazine 25 mg every 8 hours for systolic greater than 459  March 6continue same sugars controlled. March 7unchanged.     DVT Prophylaxis: Aspirin 325 mg p.o. daily for 10 days per orthopedic recommendations. Disposition--[place] [facility] nursing short-term rehab Monday, March 8 if remains stable        Subjective:   Summary copied from previous progress note    Damon Urban is a 80-year-old  female with history of arthritis, HTN, lumbar compression fracture, DM 2 admitted on 3/3 following mechanical fall resulting in left closed hip fracture. Patient is currently medically cleared for surgery, is at low risk for moderate risk surgery. Patient was also tested positive for COVID-19 on 3/3. March 7 interval history:  No new complaintscomplains of pain on weightbearing but tolerating physical therapy. Denies chest pain. No significant dyspnea. Left elbow pain x-ray with no acute fractureprosthesis notedskin tears present and x-ray report of probable chronic fracture. No significant hypoxemia or dyspnea.   Hemoglobin stable postop around 9     Objective:     Patient Vitals for the past 24 hrs:   Temp Pulse Resp BP SpO2   03/07/21 1451     95 %   03/07/21 1055     97 %   03/07/21 1054 97.9 °F (36.6 °C) 66 20 (!) 108/54 99 %   03/07/21 0815     98 %   03/07/21 0734 97.7 °F (36.5 °C) 66 20 (!) 117/55 96 %   03/07/21 0523 97.7 °F (36.5 °C) 66 20 (!) 117/55 96 %   03/07/21 0328 98.3 °F (36.8 °C) 68 20 (!) 130/42 92 %   03/07/21 0215  67   93 %   03/06/21 2355 98.5 °F (36.9 °C) 66 20 (!) 105/46 92 %   03/06/21 1915 98.4 °F (36.9 °C) 67 16 (!) 128/49 95 %   03/06/21 1719     93 %     Oxygen Therapy  O2 Sat (%): 95 % (03/07/21 1451)  O2 Device: Room air (03/07/21 8743)  Skin Assessment: Clean, dry, & intact (03/05/21 6713)  Skin Protection for O2 Device: No (03/05/21 0710)  O2 Flow Rate (L/min): 0 l/min (03/07/21 0815)    Intake/Output Summary (Last 24 hours) at 3/7/2021 1606  Last data filed at 3/7/2021 0700  Gross per 24 hour   Intake    Output 850 ml   Net -850 ml         REVIEW OF SYSTEMS: Comprehensive ROS performed and negative except as stated in HPI. Physical Examination:  General:    No acute distress. Pleasant and cooperativerecall is poor. Head:  Nares patent oral mucosa moist  Eyes:  No corneal scarring, no roving eye movements  CV:   No gross gallop, no gross JVD  Lungs:   No gross consolidation, no wheezing  Abdomen:   Gross ascites, no rebound tenderness. Extremities: No deformity, moves all extremities symmetrically, skin tear left elbow  Skin:     No petechiae, no purpura  Neuro:  No dysdiadochokinesia and no past-pointing. Psych:  Mood and affect appropriate    Data Review:  I have reviewed all labs, meds, telemetry events, and studies from the last 24 hours.     Recent Results (from the past 24 hour(s))   GLUCOSE, POC    Collection Time: 03/06/21  4:30 PM   Result Value Ref Range    Glucose (POC) 173 (H) 65 - 100 mg/dL   GLUCOSE, POC    Collection Time: 03/06/21  8:44 PM   Result Value Ref Range    Glucose (POC) 230 (H) 65 - 100 mg/dL   PLEASE READ & DOCUMENT PPD TEST IN 72 HRS    Collection Time: 03/07/21  2:15 AM   Result Value Ref Range    PPD Negative Negative    mm Induration 0 0 - 5 mm   CBC WITH AUTOMATED DIFF    Collection Time: 03/07/21  5:23 AM   Result Value Ref Range    WBC 13.5 (H) 4.3 - 11.1 K/uL    RBC 3.15 (L) 4.05 - 5.2 M/uL    HGB 9.4 (L) 11.7 - 15.4 g/dL    HCT 29.2 (L) 35.8 - 46.3 %    MCV 92.7 79.6 - 97.8 FL    MCH 29.8 26.1 - 32.9 PG    MCHC 32.2 31.4 - 35.0 g/dL    RDW 14.2 11.9 - 14.6 %    PLATELET 628 850 - 814 K/uL    MPV 10.4 9.4 - 12.3 FL    ABSOLUTE NRBC 0.00 0.0 - 0.2 K/uL    DF AUTOMATED      NEUTROPHILS 68 43 - 78 %    LYMPHOCYTES 17 13 - 44 %    MONOCYTES 7 4.0 - 12.0 % EOSINOPHILS 4 0.5 - 7.8 %    BASOPHILS 1 0.0 - 2.0 %    IMMATURE GRANULOCYTES 4 0.0 - 5.0 %    ABS. NEUTROPHILS 9.1 (H) 1.7 - 8.2 K/UL    ABS. LYMPHOCYTES 2.3 0.5 - 4.6 K/UL    ABS. MONOCYTES 0.9 0.1 - 1.3 K/UL    ABS. EOSINOPHILS 0.5 0.0 - 0.8 K/UL    ABS. BASOPHILS 0.1 0.0 - 0.2 K/UL    ABS. IMM. GRANS. 0.6 (H) 0.0 - 0.5 K/UL   GLUCOSE, POC    Collection Time: 03/07/21  7:33 AM   Result Value Ref Range    Glucose (POC) 183 (H) 65 - 100 mg/dL   GLUCOSE, POC    Collection Time: 03/07/21 10:54 AM   Result Value Ref Range    Glucose (POC) 139 (H) 65 - 100 mg/dL        All Micro Results     Procedure Component Value Units Date/Time    CULTURE, URINE [[id number]]  (Abnormal)  (Susceptibility) Collected: 03/04/21 0803    Order Status: Completed Specimen: Cath Urine Updated: 03/06/21 1003     Special Requests: NO SPECIAL REQUESTS        Culture result:       >100,000 COLONIES/mL ESCHERICHIA COLI          MSSA/MRSA SC BY PCR, NASAL SWAB [[id number]] Collected: 03/03/21 7268    Order Status: Completed Specimen: Nasal swab Updated: 03/04/21 0219     Special Requests: NASAL SWAB        Culture result:       SA target not detected. A MRSA NEGATIVE, SA NEGATIVE test result does not preclude MRSA or SA nasal colonization.                 Current Meds:  Current Facility-Administered Medications   Medication Dose Route Frequency    insulin glargine (LANTUS) injection 14 Units  14 Units SubCUTAneous QHS    morphine injection 3 mg  3 mg IntraVENous Q3H PRN    oxyCODONE IR (ROXICODONE) tablet 10 mg  10 mg Oral Q4H PRN    insulin lispro (HUMALOG) injection 0-10 Units  0-10 Units SubCUTAneous AC&HS    insulin lispro (HUMALOG) injection 8 Units  8 Units SubCUTAneous TIDAC    acetaminophen (TYLENOL) tablet 500 mg  500 mg Oral Q6H PRN    amLODIPine (NORVASC) tablet 5 mg  5 mg Oral DAILY    aspirin delayed-release tablet 81 mg  81 mg Oral DAILY    atorvastatin (LIPITOR) tablet 40 mg  40 mg Oral DAILY  carvediloL (COREG) tablet 12.5 mg  12.5 mg Oral BID WITH MEALS    cholecalciferol (VITAMIN D3) (1000 Units /25 mcg) tablet 1,000 Units  1,000 Units Oral Q48H    cyanocobalamin tablet 2,500 mcg  2,500 mcg Oral DAILY    escitalopram oxalate (LEXAPRO) tablet 10 mg  10 mg Oral DAILY    famotidine (PEPCID) tablet 20 mg  20 mg Oral DAILY    folic acid (FOLVITE) tablet 1 mg  1 mg Oral DAILY    pantoprazole (PROTONIX) tablet 40 mg  40 mg Oral ACB    rOPINIRole (REQUIP) tablet 1 mg  1 mg Oral BID    spironolactone (ALDACTONE) tablet 25 mg  25 mg Oral DAILY    cyclobenzaprine (FLEXERIL) tablet 5 mg  5 mg Oral TID PRN    cefTRIAXone (ROCEPHIN) 1 g in 0.9% sodium chloride (MBP/ADV) 50 mL MBP  1 g IntraVENous Q24H    sodium chloride (NS) flush 5-40 mL  5-40 mL IntraVENous Q8H    sodium chloride (NS) flush 5-40 mL  5-40 mL IntraVENous PRN    alum-mag hydroxide-simeth (MYLANTA) oral suspension 30 mL  30 mL Oral Q4H PRN    calcium-vitamin D (OS-ALYCIA +D3) 500 mg-200 unit per tablet 1 Tab  1 Tab Oral TID WITH MEALS    aspirin tablet 325 mg  325 mg Oral DAILY    ondansetron (ZOFRAN) injection 4 mg  4 mg IntraVENous Q4H PRN       Diet:  DIET REGULAR    Other Studies (last 24 hours):  No results found.     Assessment and Plan:     Hospital Problems as of 3/7/2021 Date Reviewed: 3/15/2019          Codes Class Noted - Resolved POA    UTI (urinary tract infection) ICD-10-CM: N39.0  ICD-9-CM: 599.0  3/7/2021 - Present Unknown        Acute blood loss anemia ICD-10-CM: D62  ICD-9-CM: 285.1  3/7/2021 - Present Unknown        Acute cystitis without hematuria ICD-10-CM: N30.00  ICD-9-CM: 595.0  3/4/2021 - Present Unknown        * (Principal) Closed fracture of left hip ([facility] ICD-10-CM: V92.782O  ICD-9-CM: 820.8  3/3/2021 - Present         Hip fracture requiring operative repair [facility]) ICD-10-CM: J68.689A  ICD-9-CM: 820.8  3/3/2021 - Present Unknown        Diabetic polyneuropathy associated with type 2 diabetes mellitus ([facility] ICD-10-CM: E11.42  ICD-9-CM: 250.60, 357.2  6/11/2018 - Present Yes        Coronary artery disease involving native coronary artery of native heart with angina pectoris ([facility] ICD-10-CM: I25.119  ICD-9-CM: 414.01, 413.9  5/3/2016 - Present Yes        Dyslipidemia ICD-10-CM: E78.5  ICD-9-CM: 272.4  5/3/2016 - Present Yes        Essential hypertension with goal blood pressure less than 140/90 ICD-10-CM: I10  ICD-9-CM: 401.9  3/16/2013 - Present Yes

## 2021-03-07 NOTE — PROGRESS NOTES
ACUTE PHYSICAL THERAPY GOALS:  (Developed with and agreed upon by patient and/or caregiver.)    All goals ongoing 3/6/2021     LTG:  (1.)Ms. Curtis will move from supine to sit and sit to supine , scoot up and down and roll side to side in bed with stand by ASSIST within 7 treatment day(s). (2.)Ms. Curtis will transfer from bed to chair and chair to bed with stand by ASSIST using the least restrictive device within 7 treatment day(s). (3.)Ms. Curtis will ambulate with stand by ASSIST for  feet with the least restrictive device within 7 treatment day(s). PHYSICAL THERAPY: Daily Note and PM Treatment Day # 2    Jaylon Garcia is a 80 y.o. female   PRIMARY DIAGNOSIS: Closed fracture of left hip (HCC)  Hip fracture ([facility] [S72.009A]  Hip fracture requiring operative repair ([facility] [S72.009A]  Procedure(s) (LRB):  LEFT HIP GAMMA NAIL INSERTION (Left)  2 Days Post-Op    ASSESSMENT:     REHAB RECOMMENDATIONS: CURRENT LEVEL OF FUNCTION:  (Most Recently Demonstrated)   Recommendation to date pending progress:  Setting:   Short-term Rehab   vs HHPT pending progress  Equipment:    Rolling Walker   To Be Determined Bed Mobility:   Not tested  Sit to Stand:  [employer] Assistance  Transfers:   Contact Guard Assistance  Gait/Mobility:   Contact Guard Assistance     ASSESSMENT:  Ms. Cuauhtemoc Darling was sitting up in recliner motivated for PT this earlier PM (was not able to see AM as occupied prior). She required CGA x 1 - MIN A x 1 and 20 feet ambulation WBAT with a 2 wheel rolling walker. Great progress with transfers/gait and less pain this afternoon. Goals remain ongoing. Patient requested to stay up in recliner until later. TREATMENT #2  . After being back to bed Jaylon Garcia elects to get up again CGA x 1 and ambulate with CGA x 1 for 10 feet in the room with a 2 wheel rolling walker to the bedside chair and is seated looking out of the window. Still improving. WBAT.    Skilled PT indicated for functional mobility deficits. SUBJECTIVE:   Ms. Venkat Hoff states, \"I will get up with you again. Rachele Stephens \"    SOCIAL HISTORY/ LIVING ENVIRONMENT: Ms. Venkat Hoff lives with her spouse and ambulates with a rollator independently in home and community. She drives and attends [employer]. Was a square dancer prior to covid. Home Environment: Private residence  One/Two Story Residence: One story  Living Alone: No  Support Systems: Family member(s), Spouse/Significant Other/Partner  OBJECTIVE:     PAIN: VITAL SIGNS: LINES/DRAINS:   Pre Treatment: Pain Screen  Pain Scale 1: Numeric (0 - 10)  Pain Intensity 1: 0  Post Treatment: 2      O2 Device: Room air     MOBILITY: I Mod I S SBA CGA Min Mod Max Total  NT x2 Comments:   Bed Mobility    Rolling [] [] [] [] [] [] [] [] [] [x] [] In chair. Supine to Sit [] [] [] [] [] [] [] [] [] [x] []    Scooting [] [] [] [] [] [] [] [] [] [x] []    Sit to Supine [] [] [] [] [] [] [] [] [] [x] []    Transfers    Sit to Stand [] [] [] [] [x] [] [] [] [] [] [] Cueing for hand and foot placement. Bed to Chair [] [] [] [] [] [] [] [] [] [] []    Stand to Sit [] [] [] [] [x] [] [] [] [] [] []    I=Independent, Mod I=Modified Independent, S=Supervision, SBA=Standby Assistance, CGA=Contact Guard Assistance,   Min=Minimal Assistance, Mod=Moderate Assistance, Max=Maximal Assistance, Total=Total Assistance, NT=Not Tested    BALANCE: Good Fair+ Fair Fair- Poor NT Comments   Sitting Static [x] [] [] [] [] []    Sitting Dynamic [x] [] [] [] [] []              Standing Static [] [] [x] [] [] [] With RW   Standing Dynamic [] [] [x] [] [] []      GAIT: I Mod I S SBA CGA Min Mod Max Total  NT x2 Comments:   Level of Assistance [] [] [] [] [x] [] [] [] [] [] [] Patient required cueing for foot flat L during L stance phase.    Distance 20 feet     DME Rolling Walker and Gait Belt    Gait Quality Step to gait pattern, short, slow steps  Patient self limiting WBing    Weightbearing  Status WBAT I=Independent, Mod I=Modified Independent, S=Supervision, SBA=Standby Assistance, CGA=Contact Guard Assistance,   Min=Minimal Assistance, Mod=Moderate Assistance, Max=Maximal Assistance, Total=Total Assistance, NT=Not Tested    PLAN:   FREQUENCY/DURATION: PT Plan of Care: BID for duration of hospital stay or until stated goals are met, whichever comes first.  TREATMENT:     TREATMENT:   ($$ Therapeutic Activity: 23-37 mins    )  Therapeutic Activity (24 Minutes): Therapeutic activity included Transfer Training, Ambulation on level ground, Sitting balance , Standing balance and LE ex's to improve functional Mobility, Strength and Activity tolerance.     TREATMENT GRID:      DATE: 3/6/21 early pm       Ambulation        Hip Flexion x10 AB       Long Arc Quads x10 AB       Hip ab/ad        Heel Raises x20 AB       Toe Raises x20 AB                                Key:  A=active, AA=active assisted, P=passive, B=bilaterally, R=right, L=left   DF=dorsiflexion, PF=plantarflexion    AFTER TREATMENT POSITION/PRECAUTIONS:  Chair, Needs within reach and RN notified    INTERDISCIPLINARY COLLABORATION:  RN/PCT and PT/PTA    TOTAL TREATMENT DURATION:  PT Patient Time In/Time Out  Time In: 1602  Time Out: [address] [place]

## 2021-03-07 NOTE — PROGRESS NOTES
ACUTE PHYSICAL THERAPY GOALS:  (Developed with and agreed upon by patient and/or caregiver.)    All goals ongoing 3/7/2021     LTG:  (1.)Ms. Curtis will move from supine to sit and sit to supine , scoot up and down and roll side to side in bed with stand by ASSIST within 7 treatment day(s).    (2.)Ms. Curtis will transfer from bed to chair and chair to bed with stand by ASSIST using the least restrictive device within 7 treatment day(s).    (3.)Ms. Curtis will ambulate with stand by ASSIST for  feet with the least restrictive device within 7 treatment day(s).    PHYSICAL THERAPY: Daily Note and PM Treatment Day # 3    Alexandria Curtis is a 86 y.o. female   PRIMARY DIAGNOSIS: Closed fracture of left hip (HCC)  Hip fracture (HCC) [S72.009A]  Hip fracture requiring operative repair (HCC) [S72.009A]  Procedure(s) (LRB):  LEFT HIP GAMMA NAIL INSERTION (Left)  3 Days Post-Op    ASSESSMENT:     REHAB RECOMMENDATIONS: CURRENT LEVEL OF FUNCTION:  (Most Recently Demonstrated)   Recommendation to date pending progress:  Setting:  • Short-term Rehab  Equipment:   • Rolling Walker  • To Be Determined Bed Mobility:  • Minimal Assistance  Sit to Stand:  • Not tested  Transfers:  • Not tested  Gait/Mobility:  • Not tested     ASSESSMENT:  Ms. Curtis on RA, stats >90% with there ex. Pt just returned to supine with floor staff, performed there ex in supine L LE AROM to AAROM, noted in chart below. Pt overall doing well with general activity, continues to demonstrate decreased AROM and strength L LE.      SUBJECTIVE:   Ms. Curtis states, \"I just got in the bed\"    SOCIAL HISTORY/ LIVING ENVIRONMENT: Ms. Curtis lives with her spouse and ambulates with a rollator independently in home and community.  She drives and attends [employer].  Was a square dancer prior to covid.  Home Environment: Private residence  One/Two Story Residence: One story  Living Alone: No  Support Systems: Family member(s), Spouse/Significant  Other/Partner  OBJECTIVE:     PAIN: VITAL SIGNS: LINES/DRAINS:   Pre Treatment: Pain Screen  Pain Scale 1: Numeric (0 - 10)  Pain Intensity 1: 8(RN aware)  Pain Location 1: Leg  Pain Orientation 1: Left  Pain Description 1: Aching  Pain Intervention(s) 1: Repositioned  Post Treatment: 9, RN aware Vital Signs  O2 Sat (%): 95 %  O2 Device: Room air  IV  O2 Device: Room air     MOBILITY: I Mod I S SBA CGA Min Mod Max Total  NT x2 Comments:   Bed Mobility    Rolling [] [] [] [] [x] [x] [] [] [] [] []    Supine to Sit [] [] [] [] [] [] [] [] [] [x] []    Scooting [] [] [] [] [] [] [] [] [] [x] []    Sit to Supine [] [] [] [] [] [] [] [] [] [x] []    Transfers    Sit to Stand [] [] [] [] [] [] [] [] [] [x] []    Bed to Chair [] [] [] [] [] [] [] [] [] [x] []    Stand to Sit [] [] [] [] [] [] [] [] [] [x] []    I=Independent, Mod I=Modified Independent, S=Supervision, SBA=Standby Assistance, CGA=Contact Guard Assistance,   Min=Minimal Assistance, Mod=Moderate Assistance, Max=Maximal Assistance, Total=Total Assistance, NT=Not Tested    BALANCE: Good Fair+ Fair Fair- Poor NT Comments   Sitting Static [] [] [] [] [] [x]    Sitting Dynamic [] [] [] [] [] [x]              Standing Static [] [] [] [] [] [x]    Standing Dynamic [] [] [] [] [] [x]      GAIT: I Mod I S SBA CGA Min Mod Max Total  NT x2 Comments:   Level of Assistance [] [] [] [] [] [] [] [] [] [x] []    Distance     DME N/A    Gait Quality     Weightbearing  Status WBAT     I=Independent, Mod I=Modified Independent, S=Supervision, SBA=Standby Assistance, CGA=Contact Guard Assistance,   Min=Minimal Assistance, Mod=Moderate Assistance, Max=Maximal Assistance, Total=Total Assistance, NT=Not Tested    PLAN:   FREQUENCY/DURATION: PT Plan of Care: BID for duration of hospital stay or until stated goals are met, whichever comes first.  TREATMENT:     TREATMENT:   ($$ Therapeutic Activity: 23-37 mins  $$ Therapeutic Exercises: 8-22 mins    )  Therapeutic Exercise (10 Minutes): Therapeutic exercises noted below to improve functional AROM, strength and mobility.      TREATMENT GRID:      DATE: 3/6/21 early pm 3/7/21      Ambulation        Hip Flexion x10 AB X 10 L AA      Long Arc Quads x10 AB       Hip ab/ad  X 10 L AA      Heel Raises x20 AB X 10 A B      Toe Raises x20 AB X 10 A B      SAQ  X 10 A L      Glut sets  X 10 A               Key:  A=active, AA=active assisted, P=passive, B=bilaterally, R=right, L=left   DF=dorsiflexion, PF=plantarflexion    AFTER TREATMENT POSITION/PRECAUTIONS:  Bed, Needs within reach and RN notified    INTERDISCIPLINARY COLLABORATION:  RN/PCT and PT/PTA    TOTAL TREATMENT DURATION:  PT Patient Time In/Time Out  Time In: 1451  Time Out: [address]  [facility],

## 2021-03-07 NOTE — PROGRESS NOTES
Awake. Continues to void without difficulty with asst on bedpan. Oxycodone 10 mg given PO for c/o pain.

## 2021-03-07 NOTE — PROGRESS NOTES
Resting quietly, awaking easily. Resp even, unlab, skin warm, dry. AP 68, reg, lungs sounds clear, diminished in bases. Left hip, left elbow, LLE dsgs clean, dry, intact. Oriented to person, place, forgetful with time and situation. Assessment completed. No c/o. Call light in reach, bed alarm set. No distress noted. Voided on bedpan with asst by NOÉ Aguila.

## 2021-03-07 NOTE — PROGRESS NOTES
Assumed care of the patient. Off going report given by Yajaira Londono. The patient is AO x 3 at this time and is resting in bed. IV access: PIV is saline locked. Oxygen needs: 1L via nc  Pain assessment: Resting quietly in bed. Safety: Bed is low and call light is within reach. Patient understands to call for assistance. We have the bed alarm on for patient safety. Disaster charting initiated.

## 2021-03-07 NOTE — PROGRESS NOTES
Resting quietly, resp even, unlab with O2 intact. Facial expression relaxed with no distress noted. Report given to Margret Jiang RN.

## 2021-03-08 LAB
GLUCOSE BLD STRIP.AUTO-MCNC: 156 MG/DL (ref 65–100)
GLUCOSE BLD STRIP.AUTO-MCNC: 174 MG/DL (ref 65–100)
GLUCOSE BLD STRIP.AUTO-MCNC: 201 MG/DL (ref 65–100)
GLUCOSE BLD STRIP.AUTO-MCNC: 202 MG/DL (ref 65–100)

## 2021-03-08 PROCEDURE — 74011250637 HC RX REV CODE- 250/637: Performed by: INTERNAL MEDICINE

## 2021-03-08 PROCEDURE — 74011000258 HC RX REV CODE- 258: Performed by: INTERNAL MEDICINE

## 2021-03-08 PROCEDURE — 65270000029 HC RM PRIVATE

## 2021-03-08 PROCEDURE — 74011636637 HC RX REV CODE- 636/637: Performed by: INTERNAL MEDICINE

## 2021-03-08 PROCEDURE — 97110 THERAPEUTIC EXERCISES: CPT

## 2021-03-08 PROCEDURE — 74011250636 HC RX REV CODE- 250/636: Performed by: INTERNAL MEDICINE

## 2021-03-08 PROCEDURE — 74011250637 HC RX REV CODE- 250/637: Performed by: ORTHOPAEDIC SURGERY

## 2021-03-08 PROCEDURE — 97530 THERAPEUTIC ACTIVITIES: CPT

## 2021-03-08 PROCEDURE — 82962 GLUCOSE BLOOD TEST: CPT

## 2021-03-08 PROCEDURE — 97535 SELF CARE MNGMENT TRAINING: CPT

## 2021-03-08 PROCEDURE — 94762 N-INVAS EAR/PLS OXIMTRY CONT: CPT

## 2021-03-08 PROCEDURE — 74011636637 HC RX REV CODE- 636/637: Performed by: HOSPITALIST

## 2021-03-08 RX ORDER — TRAMADOL HYDROCHLORIDE 50 MG/1
50 TABLET ORAL
Status: DISCONTINUED | OUTPATIENT
Start: 2021-03-08 | End: 2021-03-09 | Stop reason: HOSPADM

## 2021-03-08 RX ORDER — INSULIN LISPRO 100 [IU]/ML
INJECTION, SOLUTION INTRAVENOUS; SUBCUTANEOUS
Qty: 1 VIAL | Refills: 0 | Status: ON HOLD | OUTPATIENT
Start: 2021-03-08 | End: 2021-10-18 | Stop reason: ALTCHOICE

## 2021-03-08 RX ORDER — BISACODYL 5 MG
5 TABLET, DELAYED RELEASE (ENTERIC COATED) ORAL DAILY PRN
Status: DISCONTINUED | OUTPATIENT
Start: 2021-03-08 | End: 2021-03-09 | Stop reason: HOSPADM

## 2021-03-08 RX ORDER — FERROUS SULFATE, DRIED 160(50) MG
1 TABLET, EXTENDED RELEASE ORAL
Qty: 90 TAB | Refills: 0 | Status: ON HOLD
Start: 2021-03-08 | End: 2021-10-18 | Stop reason: SDUPTHER

## 2021-03-08 RX ORDER — OXYCODONE HYDROCHLORIDE 10 MG/1
10 TABLET ORAL
Qty: 18 TAB | Refills: 0 | Status: SHIPPED | OUTPATIENT
Start: 2021-03-08 | End: 2021-03-11

## 2021-03-08 RX ORDER — POLYETHYLENE GLYCOL 3350 17 G/17G
17 POWDER, FOR SOLUTION ORAL DAILY
Status: DISCONTINUED | OUTPATIENT
Start: 2021-03-09 | End: 2021-03-09 | Stop reason: HOSPADM

## 2021-03-08 RX ORDER — ASPIRIN 81 MG/1
81 TABLET ORAL DAILY
Status: DISCONTINUED | OUTPATIENT
Start: 2021-03-16 | End: 2021-03-09 | Stop reason: HOSPADM

## 2021-03-08 RX ADMIN — ESCITALOPRAM OXALATE 10 MG: 10 TABLET ORAL at 08:12

## 2021-03-08 RX ADMIN — Medication 10 ML: at 21:38

## 2021-03-08 RX ADMIN — ASPIRIN 325 MG ORAL TABLET 325 MG: 325 PILL ORAL at 08:13

## 2021-03-08 RX ADMIN — CEFTRIAXONE SODIUM 1 G: 1 INJECTION, POWDER, FOR SOLUTION INTRAMUSCULAR; INTRAVENOUS at 12:39

## 2021-03-08 RX ADMIN — TRAMADOL HYDROCHLORIDE 50 MG: 50 TABLET, FILM COATED ORAL at 16:20

## 2021-03-08 RX ADMIN — INSULIN LISPRO 2 UNITS: 100 INJECTION, SOLUTION INTRAVENOUS; SUBCUTANEOUS at 21:38

## 2021-03-08 RX ADMIN — CARVEDILOL 12.5 MG: 6.25 TABLET, FILM COATED ORAL at 08:12

## 2021-03-08 RX ADMIN — ASPIRIN 81 MG: 81 TABLET ORAL at 08:12

## 2021-03-08 RX ADMIN — INSULIN GLARGINE 14 UNITS: 100 INJECTION, SOLUTION SUBCUTANEOUS at 21:38

## 2021-03-08 RX ADMIN — Medication 1 TABLET: at 12:40

## 2021-03-08 RX ADMIN — Medication 10 ML: at 15:02

## 2021-03-08 RX ADMIN — PANTOPRAZOLE SODIUM 40 MG: 40 TABLET, DELAYED RELEASE ORAL at 08:12

## 2021-03-08 RX ADMIN — ROPINIROLE HYDROCHLORIDE 1 MG: 1 TABLET, FILM COATED ORAL at 08:12

## 2021-03-08 RX ADMIN — ROPINIROLE HYDROCHLORIDE 1 MG: 1 TABLET, FILM COATED ORAL at 17:10

## 2021-03-08 RX ADMIN — FOLIC ACID 1 MG: 1 TABLET ORAL at 08:13

## 2021-03-08 RX ADMIN — BISACODYL 5 MG: 5 TABLET, COATED ORAL at 23:47

## 2021-03-08 RX ADMIN — Medication 1 TABLET: at 16:19

## 2021-03-08 RX ADMIN — INSULIN LISPRO 8 UNITS: 100 INJECTION, SOLUTION INTRAVENOUS; SUBCUTANEOUS at 17:09

## 2021-03-08 RX ADMIN — SPIRONOLACTONE 25 MG: 25 TABLET ORAL at 08:12

## 2021-03-08 RX ADMIN — Medication 10 ML: at 06:02

## 2021-03-08 RX ADMIN — CYANOCOBALAMIN TAB 1000 MCG 2500 MCG: 1000 TAB at 08:11

## 2021-03-08 RX ADMIN — INSULIN LISPRO 8 UNITS: 100 INJECTION, SOLUTION INTRAVENOUS; SUBCUTANEOUS at 12:40

## 2021-03-08 RX ADMIN — INSULIN LISPRO 4 UNITS: 100 INJECTION, SOLUTION INTRAVENOUS; SUBCUTANEOUS at 12:40

## 2021-03-08 RX ADMIN — CYCLOBENZAPRINE 5 MG: 10 TABLET, FILM COATED ORAL at 21:40

## 2021-03-08 RX ADMIN — INSULIN LISPRO 2 UNITS: 100 INJECTION, SOLUTION INTRAVENOUS; SUBCUTANEOUS at 08:10

## 2021-03-08 RX ADMIN — CARVEDILOL 12.5 MG: 6.25 TABLET, FILM COATED ORAL at 16:19

## 2021-03-08 RX ADMIN — FAMOTIDINE 20 MG: 20 TABLET ORAL at 08:12

## 2021-03-08 RX ADMIN — ACETAMINOPHEN 500 MG: 500 TABLET ORAL at 12:43

## 2021-03-08 RX ADMIN — INSULIN LISPRO 8 UNITS: 100 INJECTION, SOLUTION INTRAVENOUS; SUBCUTANEOUS at 08:10

## 2021-03-08 RX ADMIN — AMLODIPINE BESYLATE 5 MG: 5 TABLET ORAL at 08:13

## 2021-03-08 RX ADMIN — ATORVASTATIN CALCIUM 40 MG: 40 TABLET, FILM COATED ORAL at 08:12

## 2021-03-08 RX ADMIN — Medication 1 TABLET: at 08:12

## 2021-03-08 RX ADMIN — INSULIN LISPRO 4 UNITS: 100 INJECTION, SOLUTION INTRAVENOUS; SUBCUTANEOUS at 17:09

## 2021-03-08 NOTE — PROGRESS NOTES
ACUTE PHYSICAL THERAPY GOALS:  (Developed with and agreed upon by patient and/or caregiver.)    All goals ongoing 3/8/2021     LTG:  (1.)Ms. Curtis will move from supine to sit and sit to supine , scoot up and down and roll side to side in bed with stand by ASSIST within 7 treatment day(s). (2.)Ms. Curtis will transfer from bed to chair and chair to bed with stand by ASSIST using the least restrictive device within 7 treatment day(s). (3.)Ms. Curtis will ambulate with stand by ASSIST for  feet with the least restrictive device within 7 treatment day(s). PHYSICAL THERAPY: Daily Note and AM Treatment Day # 4    Orville Dutta is a 80 y.o. female   PRIMARY DIAGNOSIS: Closed fracture of left hip (HCC)  Hip fracture ([facility] [address] [S72.009A]  Hip fracture requiring operative repair ([facility] [address] [S72.009A]  Procedure(s) (LRB):  LEFT HIP GAMMA NAIL INSERTION (Left)  4 Days Post-Op    ASSESSMENT:     REHAB RECOMMENDATIONS: CURRENT LEVEL OF FUNCTION:  (Most Recently Demonstrated)   Recommendation to date pending progress:  Setting:   Short-term Rehab  Equipment:    Rolling Walker   To Be Determined Bed Mobility:   Not tested  Sit to Stand:   Not tested  Transfers:   Not tested  Gait/Mobility:   Not tested     ASSESSMENT:  Ms. Hill High sitting in recliner and very groggy from pain meds. Attempted exercises, but patient requiring max cueing to participate and stay on task. No progress this treatment. SUBJECTIVE:   Ms. Hill High states, \"OK\"    SOCIAL HISTORY/ LIVING ENVIRONMENT: Ms. Hill High lives with her spouse and ambulates with a rollator independently in home and community. She drives and attends [employer]. Was a square dancer prior to covid.   Home Environment: Private residence  One/Two Story Residence: One story  Living Alone: No  Support Systems: Family member(s), Spouse/Significant Other/Partner  OBJECTIVE:     PAIN: VITAL SIGNS: LINES/DRAINS:   Pre Treatment: Pain Screen  Pain Scale 1: Numeric (0 - 10)  Pain Intensity 1: 8  Post Treatment: 8      O2 Device: Nasal cannula     MOBILITY: I Mod I S SBA CGA Min Mod Max Total  NT x2 Comments:   Bed Mobility    Rolling [] [] [] [] [] [] [] [] [] [x] []    Supine to Sit [] [] [] [] [] [] [] [] [] [x] []    Scooting [] [] [] [] [] [] [] [] [] [x] []    Sit to Supine [] [] [] [] [] [] [] [] [] [x] []    Transfers    Sit to Stand [] [] [] [] [] [] [] [] [] [x] []    Bed to Chair [] [] [] [] [] [] [] [] [] [x] []    Stand to Sit [] [] [] [] [] [] [] [] [] [x] []    I=Independent, Mod I=Modified Independent, S=Supervision, SBA=Standby Assistance, CGA=Contact Guard Assistance,   Min=Minimal Assistance, Mod=Moderate Assistance, Max=Maximal Assistance, Total=Total Assistance, NT=Not Tested    BALANCE: Good Fair+ Fair Fair- Poor NT Comments   Sitting Static [] [] [] [] [] [x]    Sitting Dynamic [] [] [] [] [] [x]              Standing Static [] [] [] [] [] [x]    Standing Dynamic [] [] [] [] [] [x]      GAIT: I Mod I S SBA CGA Min Mod Max Total  NT x2 Comments:   Level of Assistance [] [] [] [] [] [] [] [] [] [x] []    Distance     DME N/A    Gait Quality     Weightbearing  Status WBAT     I=Independent, Mod I=Modified Independent, S=Supervision, SBA=Standby Assistance, CGA=Contact Guard Assistance,   Min=Minimal Assistance, Mod=Moderate Assistance, Max=Maximal Assistance, Total=Total Assistance, NT=Not Tested    PLAN:   FREQUENCY/DURATION: PT Plan of Care: BID for duration of hospital stay or until stated goals are met, whichever comes first.  TREATMENT:     TREATMENT:   ($$ Therapeutic Exercises: 8-22 mins    )  Therapeutic Exercise (14 Minutes): Therapeutic exercises noted below to improve functional activity tolerance, AROM, strength and mobility.      TREATMENT GRID:      DATE: 3/6/21 early pm 3/7/21 3/8/21 am     Ambulation        Hip Flexion x10 AB X 10 L AA      Long Arc Quads x10 AB  2x10 AB     Hip ab/ad  X 10 L AA 2x10 AR, AAL     Heel Raises x20 AB X 10 A B x20 AB Toe Raises x20 AB X 10 A B x20 AB     SAQ  X 10 A L      Glut sets  X 10 A               Key:  A=active, AA=active assisted, P=passive, B=bilaterally, R=right, L=left   DF=dorsiflexion, PF=plantarflexion    AFTER TREATMENT POSITION/PRECAUTIONS:  Chair, Needs within reach and RN notified    INTERDISCIPLINARY COLLABORATION:  RN/PCT and PT/PTA    TOTAL TREATMENT DURATION:  PT Patient Time In/Time Out  Time In: 1030  Time Out: HECTOR [address], PT, DPT

## 2021-03-08 NOTE — PROGRESS NOTES
ACUTE OT GOALS:  (Developed with and agreed upon by patient and/or caregiver.)  1. Patient will complete grooming with setup. 2. Patient will complete functional transfers with supervision while maintaining WBAT status in LLE and with adaptive equipment as needed. 3. Patient will complete lower body bathing and dressing with minimal assistance and adaptive equipment as needed. 4. Patient will complete toileting and toilet transfer with supervision. 5. Patient will tolerate 30 minutes of OT treatment with as needed rest breaks to increase activity tolerance for ADLs. 6. Patient will participate in at least 30 minutes of BUE therapeutic exercises to strengthen BUE for functional transfers.      Timeframe: 7 visits     OCCUPATIONAL THERAPY: Daily Note OT Treatment Day # 2    Val Cleary is a 80 y.o. female   PRIMARY DIAGNOSIS: Closed fracture of left hip (HCC)  Hip fracture ([facility] [address] [S72.009A]  Hip fracture requiring operative repair ([facility] [address] [S72.009A]  Procedure(s) (LRB):  LEFT HIP GAMMA NAIL INSERTION (Left) WBAT LLE   4 Days Post-Op  Payor: Skye Del Toro / Plan: [address] [place] [address] HMO / Product Type: [employer] /   ASSESSMENT:     REHAB RECOMMENDATIONS: CURRENT LEVEL OF FUNCTION:  (Most Recently Demonstrated)   Recommendation to date pending progress:  Setting:   Short-term Rehab  Equipment:    To Be Determined Bathing:   Not tested  Dressing:   Not tested  Feeding/Grooming:   Not tested  Toileting:   Maximal Assistance  Functional Mobility:   Maximal Assistance     ASSESSMENT:  Ms. Chaz Tompkins is now 4 days post-op L hip surgery (WBAT). This morning, very drowsy and dizzy. /58 in sitting, O2 100% on 2L. Also reports hip pain 8/10. RN aware of pain, drowsiness, and dizziness. Required moderate assistance for bedside commode transfer and maximal assistance for toileting. Slow progress due to the above. Will continue efforts. SUBJECTIVE:   Ms. Chaz Tompkins states, \"I'm dizzy. \"    SOCIAL HISTORY/LIVING ENVIRONMENT: Lives with spouse, independent ADLs. Enjoys square dancing and doing   [employer] at [employer].    Home Environment: Private residence  One/Two Story Residence: One story  Living Alone: No  Support Systems: Family member(s), Spouse/Significant Other/Partner    OBJECTIVE:     PAIN: VITAL SIGNS: LINES/DRAINS:   Pre Treatment: Pain Screen  Pain Scale 1: Numeric (0 - 10)  Pain Intensity 1: 8  Pain Intervention(s) 1: Repositioned;Nurse notified  Post Treatment: 8 Vital Signs  BP: (!) 131/58  MAP (Calculated): 82  O2 Sat (%): 98 %  O2 Flow Rate (L/min): 2 l/min IV  O2 Device: Nasal cannula     ACTIVITIES OF DAILY LIVING: I Mod I S SBA CGA Min Mod Max Total NT Comments   BASIC ADLs:              Bathing/ Showering [] [] [] [] [] [] [] [] [] [x]    Toileting [] [] [] [] [] [] [] [x] [] []    Dressing [] [] [] [] [] [] [] [] [] [x]    Feeding [] [] [] [] [] [] [] [] [] [x]    Grooming [] [] [] [] [] [] [] [] [] [x]    Personal Device Care [] [] [] [] [] [] [] [x] [] [] Cell phone    Functional Mobility [] [] [] [] [] [] [x] [] [] []    I=Independent, Mod I=Modified Independent, S=Supervision, SBA=Standby Assistance, CGA=Contact Guard Assistance,   Min=Minimal Assistance, Mod=Moderate Assistance, Max=Maximal Assistance, Total=Total Assistance, NT=Not Tested    MOBILITY: I Mod I S SBA CGA Min Mod Max Total  NT x2 Comments:   Supine to sit [] [] [] [] [] [] [] [x] [] [] []    Sit to supine [] [] [] [] [] [] [] [] [] [x] []    Sit to stand [] [] [] [] [] [] [x] [] [] [] []    Bed to chair [] [] [] [] [] [] [x] [] [] [] [x] Due to drowsiness & dizziness   I=Independent, Mod I=Modified Independent, S=Supervision, SBA=Standby Assistance, CGA=Contact Guard Assistance,   Min=Minimal Assistance, Mod=Moderate Assistance, Max=Maximal Assistance, Total=Total Assistance, NT=Not Tested    PLAN:   FREQUENCY/DURATION: OT Plan of Care: 5 times/week for duration of hospital stay or until stated goals are met, whichever comes first.    TREATMENT:   TREATMENT:   ($$ Self Care/Home Management: 23-37 mins    )  Self Care (28 Minutes): Self care including Toileting and bedside commode transfer, bed mobility, chair transfer to increase independence and activity tolerance.     AFTER TREATMENT POSITION/PRECAUTIONS:  Alarm Activated, Chair, Needs within reach and RN notified    INTERDISCIPLINARY COLLABORATION:  RN/PCT and OT/YOUNG    TOTAL TREATMENT DURATION:  OT Patient Time In/Time Out  Time In: 0940  Time Out: [id number] [facility] OTR/DYLAN

## 2021-03-08 NOTE — PROGRESS NOTES
Resting quietly, resp even, unlab with O2 intact. Facial expression relaxed with no distress noted. Call light in reach, bed alarm set. Report given to Jey Hays RN.

## 2021-03-08 NOTE — PROGRESS NOTES
ACUTE PHYSICAL THERAPY GOALS:  (Developed with and agreed upon by patient and/or caregiver.)    All goals ongoing 3/8/2021     LTG:  (1.)Ms. Curtis will move from supine to sit and sit to supine , scoot up and down and roll side to side in bed with stand by ASSIST within 7 treatment day(s). (2.)Ms. Curtis will transfer from bed to chair and chair to bed with stand by ASSIST using the least restrictive device within 7 treatment day(s). (3.)Ms. Curtis will ambulate with stand by ASSIST for  feet with the least restrictive device within 7 treatment day(s). PHYSICAL THERAPY: Daily Note and PM Treatment Day # 4    Adriana Escamilla is a 80 y.o. female   PRIMARY DIAGNOSIS: Closed fracture of left hip (HCC)  Hip fracture ([facility] [address] [S72.009A]  Hip fracture requiring operative repair ([facility] [address] [S72.009A]  Procedure(s) (LRB):  LEFT HIP GAMMA NAIL INSERTION (Left)  4 Days Post-Op    ASSESSMENT:     REHAB RECOMMENDATIONS: CURRENT LEVEL OF FUNCTION:  (Most Recently Demonstrated)   Recommendation to date pending progress:  Setting:   Short-term Rehab  Equipment:    Rolling Walker   To Be Determined Bed Mobility:   Moderate Assistance  Sit to Stand:   Minimal Assistance  Transfers:   Minimal Assistance  Gait/Mobility:   Minimal Assistance     ASSESSMENT:  Ms. Teresa Villalpando sitting in recliner and very groggy from pain meds. Attempted exercises, but patient requiring max cueing to participate and stay on task. No progress this treatment. PM note:  Patient needs to use the restroom. She is experiencing significant pain and it shows in her face. Patient required increased assistance today with transfers/gait-likely due to discomfort. Will continue working towards goals. SUBJECTIVE:   Ms. Teresa Villalpando states, \"I need to go\"    SOCIAL HISTORY/ LIVING ENVIRONMENT: Ms. Teresa Villalpando lives with her spouse and ambulates with a rollator independently in home and community. She drives and attends [employer].   Was a square dancer prior to covid.   Home Environment: Private residence  One/Two Story Residence: One story  Living Alone: No  Support Systems: Family member(s), Spouse/Significant Other/Partner  OBJECTIVE:     PAIN: VITAL SIGNS: LINES/DRAINS:   Pre Treatment: Pain Screen  Pain Scale 1: Numeric (0 - 10)  Pain Intensity 1: 9  Pain Location 1: Hip  Pain Intervention(s) 1: Nurse notified  Post Treatment: 8      O2 Device: Nasal cannula     MOBILITY: I Mod I S SBA CGA Min Mod Max Total  NT x2 Comments:   Bed Mobility    Rolling [] [] [] [] [] [x] [] [] [] [] []    Supine to Sit [] [] [] [] [] [] [x] [] [] [] []    Scooting [] [] [] [] [] [x] [] [] [] [] []    Sit to Supine [] [] [] [] [] [] [x] [] [] [] []    Transfers    Sit to Stand [] [] [] [] [] [x] [] [] [] [] []    Bed to Chair [] [] [] [] [] [] [] [] [] [] []    Stand to Sit [] [] [] [] [] [x] [] [] [] [] []    I=Independent, Mod I=Modified Independent, S=Supervision, SBA=Standby Assistance, CGA=Contact Guard Assistance,   Min=Minimal Assistance, Mod=Moderate Assistance, Max=Maximal Assistance, Total=Total Assistance, NT=Not Tested    BALANCE: Good Fair+ Fair Fair- Poor NT Comments   Sitting Static [x] [] [] [] [] []    Sitting Dynamic [x] [] [] [] [] []              Standing Static [] [] [] [] [] [x]    Standing Dynamic [] [] [] [] [] [x]      GAIT: I Mod I S SBA CGA Min Mod Max Total  NT x2 Comments:   Level of Assistance [] [] [] [] [] [x] [] [] [] [x] [] Patient requires max cueing during gait to increase step length and stay closer to RW   Distance 15'x2    DME Rolling Walker and Gait Belt    Gait Quality Flexed trunk, very slow, step to gait    Weightbearing  Status WBAT     I=Independent, Mod I=Modified Independent, S=Supervision, SBA=Standby Assistance, CGA=Contact Guard Assistance,   Min=Minimal Assistance, Mod=Moderate Assistance, Max=Maximal Assistance, Total=Total Assistance, NT=Not Tested    PLAN:   FREQUENCY/DURATION: PT Plan of Care: BID for duration of hospital stay or until stated goals are met, whichever comes first.  TREATMENT:     TREATMENT:   ($$ Therapeutic Activity: 23-37 mins  $$ Therapeutic Exercises: 8-22 mins    )  Therapeutic Exercise (14 Minutes): Therapeutic exercises noted below to improve functional activity tolerance, AROM, strength and mobility.      TREATMENT GRID:      DATE: 3/6/21 early pm 3/7/21 3/8/21 am     Ambulation        Hip Flexion x10 AB X 10 L AA      Long Arc Quads x10 AB  2x10 AB     Hip ab/ad  X 10 L AA 2x10 AR, AAL     Heel Raises x20 AB X 10 A B x20 AB     Toe Raises x20 AB X 10 A B x20 AB     SAQ  X 10 A L      Glut sets  X 10 A               Key:  A=active, AA=active assisted, P=passive, B=bilaterally, R=right, L=left   DF=dorsiflexion, PF=plantarflexion    AFTER TREATMENT POSITION/PRECAUTIONS:  Bed, Needs within reach and RN notified    INTERDISCIPLINARY COLLABORATION:  RN/PCT and PT/PTA    TOTAL TREATMENT DURATION:  PT Patient Time In/Time Out  Time In: 1445  Time Out: Joan Vanessa PT, DPT

## 2021-03-08 NOTE — PROGRESS NOTES
Assumed care of the patient. Off going report given by Erci Hernandez. The patient is AO x 3 at this time and is resting in bed. IV access: PIV is saline locked. Oxygen needs: 2 L via nc which she needs mostly at night. Pain assessment: NAD at this time. L hip dressing intact. L elbow dressing intact. Safety: Bed is low and call light is within reach. Patient understands to call for assistance. Bed alarm on for patient safety.

## 2021-03-08 NOTE — DISCHARGE SUMMARY
[facility] Hospitalist Discharge Summary     Name:  Tamika Lopez    Age:86 y.o. Sex:female  :  1934       MRN:  [id number]       Admitting Physician: Suzanne Berrios MD Admit Date: 3/3/2021 10:39 PM   Attending Physician: Alfred Baird DO  Primary Care Physician: Mor Coffey MD       Discharge Physician: Emeli Licea DO  Discharge date: 21   Discharged Condition: Stable    Indication for Admission: hip fracture     Reasons for hospitalization:  Hospital Problems as of 3/8/2021 Date Reviewed: 3/15/2019          Codes Class Noted - Resolved POA    UTI (urinary tract infection) ICD-10-CM: N39.0  ICD-9-CM: 599.0  3/7/2021 - Present Unknown        Acute blood loss anemia ICD-10-CM: D62  ICD-9-CM: 285.1  3/7/2021 - Present Unknown        Acute cystitis without hematuria ICD-10-CM: N30.00  ICD-9-CM: 595.0  3/4/2021 - Present Unknown        * (Principal) Closed fracture of left hip ([facility] ICD-10-CM: W98.310J  ICD-9-CM: 820.8  3/3/2021 - Present         Hip fracture requiring operative repair [facility]) ICD-10-CM: H63.143C  ICD-9-CM: 820.8  3/3/2021 - Present Unknown        Diabetic polyneuropathy associated with type 2 diabetes mellitus ([facility] ICD-10-CM: E11.42  ICD-9-CM: 250.60, 357.2  2018 - Present Yes        Coronary artery disease involving native coronary artery of native heart with angina pectoris ([facility] ICD-10-CM: I25.119  ICD-9-CM: 414.01, 413.9  5/3/2016 - Present Yes        Dyslipidemia ICD-10-CM: E78.5  ICD-9-CM: 272.4  5/3/2016 - Present Yes        Essential hypertension with goal blood pressure less than 140/90 ICD-10-CM: I10  ICD-9-CM: 401.9  3/16/2013 - Present Yes                 Discharge Diagnosis: hip fracture s/p repair  Did Patient have Sepsis (YES OR NO): no      Hospital Course :  80year-old female history of previous lumbar compression fracture DJD hypertension diabetes mellitus requiring insulin admitted March 3,Following fall with closed left hip fracture. Underwent surgical repair. She has left elbow pain with previous left elbow prosthesis and possible chronic fracturesee x-ray report. Seen and followed by orthopedics. patient did test positive for Covid and was isolated during hospital stay. She was found to have UTI and treated with course of Rocephin and clinically improvedbroadly sensitive E. coli was isolated. Sugars were controlled with sliding scale insulin and Lantus. She is not requiring oxygen at time of discharge. She is currently postop day #4 and will require short-term rehab for rehabilitation strengthening and nutritional support. She is stable for discharge to short-term rehab facility. Consults: IP CONSULT TO ORTHOPEDIC SURGERY     Disposition: Skilled Nursing Facility  Diet: DIET REGULAR  Code Status: Full Code      Discharge Info:     Current Discharge Medication List      START taking these medications    Details   calcium-vitamin D (OS-ALYCIA +D3) 500 mg-200 unit per tablet Take 1 Tab by mouth three (3) times daily (with meals). Qty: 90 Tab, Refills: 0      insulin lispro (HUMALOG) 100 unit/mL injection INITIATE INSULIN CORRECTIVE PROTOCOL:  Normal Insulin Sensitivity   For Blood Sugar (mg/dL) of:     Less than 150 =   0 units           150 -199 =   2 units  200 -249 =   4 units  250 -299 =   6 units  300 -349 =   8 units  350 and above = 10 units and Call Physician     Initiate Hypoglycemia protocol if blood glucose is <70 mg/dL Fast Acting - Administer Immediately - or within 15 minutes of start of meal, if mealtime coverage. Qty: 1 Vial, Refills: 0      oxyCODONE IR (ROXICODONE) 10 mg tab immediate release tablet Take 1 Tab by mouth every four (4) hours as needed for Pain for up to 3 days.  Max Daily Amount: 60 mg.  Qty: 18 Tab, Refills: 0    Associated Diagnoses: Closed fracture of left hip with routine healing, subsequent encounter         CONTINUE these medications which have NOT CHANGED    Details   amLODIPine (NORVASC) 5 mg tablet Take 5 mg by mouth daily. Indications: high blood pressure      atorvastatin (LIPITOR) 40 mg tablet Take 40 mg by mouth daily. Indications: excessive fat in the blood, treatment to slow progression of coronary artery disease      carvediloL (Coreg) 12.5 mg tablet Take 12.5 mg by mouth two (2) times daily (with meals). escitalopram oxalate (LEXAPRO) 10 mg tablet Take 10 mg by mouth daily. famotidine (PEPCID) 20 mg tablet Take 20 mg by mouth daily. folic acid (FOLVITE) 703 mcg tablet Take 800 mcg by mouth daily. loperamide (IMMODIUM) 2 mg tablet Take 2 mg by mouth as needed for Diarrhea. omeprazole (PRILOSEC) 40 mg capsule Take 40 mg by mouth daily. rOPINIRole (REQUIP) 1 mg tablet Take 1 mg by mouth two (2) times a day. spironolactone (ALDACTONE) 25 mg tablet Take  by mouth daily. cholecalciferol (Vitamin D3) 25 mcg (1,000 unit) cap Take  by mouth every other day. multivitamin with iron tablet Take 1 Tab by mouth daily. insulin glargine (LANTUS,BASAGLAR) 100 unit/mL (3 mL) inpn 20 Units by SubCUTAneous route. cyanocobalamin 1,000 mcg tablet Take 2,500 mcg by mouth daily. B.infantis-B.ani-B.long-B.bifi (PROBIOTIC 4X) 10-15 mg Tab Take  by mouth daily. aspirin 81 mg Tab take by mouth. ondansetron (Zofran ODT) 4 mg disintegrating tablet Take 1 Tab by mouth every eight (8) hours as needed for Nausea. Qty: 11 Tab, Refills: 1      acetaminophen (TYLENOL EXTRA STRENGTH) 500 mg tablet Take  by mouth every six (6) hours as needed for Pain.          STOP taking these medications       amoxicillin-clavulanate (Augmentin) 875-125 mg per tablet Comments:   Reason for Stopping:               Medications Discontinued During This Encounter   Medication Reason    enoxaparin (LOVENOX) injection 30 mg     enoxaparin (LOVENOX) injection 30 mg     morphine injection 1 mg     oxyCODONE IR (ROXICODONE) tablet 5 mg     lactated Ringers infusion Patient Transfer    oxyCODONE IR (ROXICODONE) tablet 5 mg Patient Transfer    oxyCODONE IR (ROXICODONE) tablet 10 mg Patient Transfer    HYDROmorphone (DILAUDID) injection 0.5 mg Patient Transfer    promethazine (PHENERGAN) with saline injection 3.25 mg Patient Transfer    sodium chloride (NS) flush 6-36 mL DUPLICATE ORDER    sodium chloride (NS) flush 5-61 mL DUPLICATE ORDER    morphine injection 4 mg     oxyCODONE IR (ROXICODONE) tablet 10 mg     insulin lispro (HUMALOG) injection     insulin glargine (LANTUS) injection 12 Units     insulin lispro (HUMALOG) injection 4 Units     amoxicillin-clavulanate (Augmentin) 875-125 mg per tablet LIST CLEANUP    insulin glargine (LANTUS) injection 16 Units     insulin glargine (LANTUS) injection 10 Units     cefTRIAXone (ROCEPHIN) 1 g in 0.9% sodium chloride (MBP/ADV) 50 mL MBP          Follow Up Orders:   Follow-up Appointments   Procedures    FOLLOW UP VISIT Appointment in: 3 - 5 Days Follow up as soon as possible with house providers, and orthopedic follow up as directed     Follow up as soon as possible with house providers, and orthopedic follow up as directed     Standing Status:   Standing     Number of Occurrences:   1     Order Specific Question:   Appointment in     Answer:   3 - 5 Days       Follow-up Information     Follow up With Specialties Details Why [place] [facility], [address] [address]  [address] [facility], [address] [address] [address]  [phone]              Discharge Exam:    Patient Vitals for the past 24 hrs:   Temp Pulse Resp BP SpO2   03/08/21 0750 97.7 °F (36.5 °C) 67 16 (!) 101/52 98 %   03/08/21 0605  66   96 %   03/08/21 0323 98.6 °F (37 °C) 76 20 (!) 118/92 95 %   03/07/21 2308 98.1 °F (36.7 °C) 66 20 (!) 120/49 95 %   03/07/21 2008 98.3 °F (36.8 °C) 72 22 (!) 118/52 96 %   03/07/21 1623 96.8 °F (36 °C) 67 18 (!) 134/51 94 %   03/07/21 1451     95 %     Oxygen Therapy  O2 Sat (%): 98 % (03/08/21 0750)  O2 Device: Room air (03/07/21 1451)  Skin Assessment: Clean, dry, & intact (03/05/21 0710)  Skin Protection for O2 Device: No (03/05/21 0710)  O2 Flow Rate (L/min): 0 l/min (03/07/21 0815)    Estimated body mass index is 24.89 kg/m² as calculated from the following:    Height as of 12/14/20: 4' 9\" (1.448 m). Weight as of this encounter: 52.2 kg (115 lb). Intake/Output Summary (Last 24 hours) at 3/8/2021 1113  Last data filed at 3/8/2021 0323  Gross per 24 hour   Intake    Output 600 ml   Net -600 ml       *Note that automatically entered I/Os may not be accurate; dependent on patient compliance with collection and accurate  by assistants. Physical Exam:   General: No acute distress, speaking in full sentences, no use of accessory muscles   HEENT: Pupils equal and reactive to light and accommodation, oropharynx is clear   Neck: Supple, no lymphadenopathy, no JVD   Lungs: Clear to auscultation bilaterally   Cardiovascular: Regular rate and rhythm with normal S1 and S2   Abdomen: Soft, nontender, nondistended, normoactive bowel sounds   Extremities: Neurovascular bundle intact feet bilateral.  Left elbow skin tear and pain with no significant edema or erythema. Surgical small bandages left lower extremity clean and dry.   Neuro: Nonfocal, A&O x3   Psych: Normal affect       All Labs from Last 24 Hrs:  Recent Results (from the past 24 hour(s))   GLUCOSE, POC    Collection Time: 03/07/21  4:33 PM   Result Value Ref Range    Glucose (POC) 156 (H) 65 - 100 mg/dL   GLUCOSE, POC    Collection Time: 03/07/21  8:50 PM   Result Value Ref Range    Glucose (POC) 132 (H) 65 - 100 mg/dL   GLUCOSE, POC    Collection Time: 03/08/21  7:50 AM   Result Value Ref Range    Glucose (POC) 156 (H) 65 - 100 mg/dL       All Micro Results     Procedure Component Value Units Date/Time CULTURE, URINE [[id number]]  (Abnormal)  (Susceptibility) Collected: 03/04/21 0803    Order Status: Completed Specimen: Cath Urine Updated: 03/06/21 0758     Special Requests: NO SPECIAL REQUESTS        Culture result:       >100,000 COLONIES/mL ESCHERICHIA COLI          MSSA/MRSA SC BY PCR, NASAL SWAB [[id number]] Collected: 03/03/21 2358    Order Status: Completed Specimen: Nasal swab Updated: 03/04/21 0219     Special Requests: NASAL SWAB        Culture result:       SA target not detected. A MRSA NEGATIVE, SA NEGATIVE test result does not preclude MRSA or SA nasal colonization. SARS-CoV-2 Lab Results  \"Novel Coronavirus\" Test: No results found for: COV2NT   \"Emergent Disease\" Test: No results found for: EDPR  \"SARS-COV-2\" Test: No results found for: XGCOVT  Rapid Test:   Lab Results   Component Value Date/Time    COVR Detected (AA) 03/03/2021 09:58 PM            Diagnostic Imaging/Tests:   Xr Elbow Lt Min 3 V    Result Date: 3/5/2021  LEFT ELBOW 3 view(s). HISTORY: Left elbow pain. Prior surgery. TECHNIQUE: AP and lateral and oblique views. COMPARISON: None. FINDINGS / IMPRESSION: : There is an elbow prosthesis in the distal humerus and proximal ulna. Capitellum is probably chronically fractured. No acute periostitis or acute fractures. Xr Hip Lt W Or Wo Pelv 2-3 Vws    Result Date: 3/4/2021  LEFT HIP, 3 views. HISTORY: Status post ORIF intertrochanteric fracture. TECHNIQUE: AP view of the pelvis and coned down AP and frogleg lateral of the hip. FINDINGS: Intratrochanteric fracture has been reduced and fixed with a nail through the femoral neck and long intramedullary julieta. There are skin staples. ORIF left intertrochanteric fracture. Xr Hip Lt W Or Wo Pelv 2-3 Vws    Result Date: 3/3/2021  Left hip and femur radiographs 3/3/2021 CLINICAL HISTORY: Fall with left hip pain. Unable to straighten leg.  FINDINGS: Left hip: A frontal view of the pelvis and AP and lateral view of the left hip are submitted for evaluation. The sacroiliac joints and pubic symphysis are intact. The pelvic ring is grossly intact. AP and lateral views of the left hip show an intertrochanteric fracture of the proximal left femur with only minimal displacement and varus deformity. No evidence for dislocation is seen. Left femur: AP and lateral views of the left femur once again demonstrates a left occipital femur intertrochanteric fracture. No additional fracture seen of the left femur. 1. Minimally displaced and angulated intertrochanteric fracture of the proximal femur. Xr Femur Lt 2 V    Result Date: 3/4/2021  LEFT HIP 4 view(s). HISTORY: Intertrochanteric fracture. TECHNIQUE: AP and frog-leg lateral pre and postoperative views. COMPARISON: Yesterday's exam. FINDINGS: Initial images demonstrate a reduced Intratrochanteric fracture. Subsequent images demonstrate a nail through the femoral neck and long intramedullary julieta femur. Status post ORIF intertrochanteric fracture. Xr Femur Lt 2 V    Result Date: 3/3/2021  Left hip and femur radiographs 3/3/2021 CLINICAL HISTORY: Fall with left hip pain. Unable to straighten leg. FINDINGS: Left hip: A frontal view of the pelvis and AP and lateral view of the left hip are submitted for evaluation. The sacroiliac joints and pubic symphysis are intact. The pelvic ring is grossly intact. AP and lateral views of the left hip show an intertrochanteric fracture of the proximal left femur with only minimal displacement and varus deformity. No evidence for dislocation is seen. Left femur: AP and lateral views of the left femur once again demonstrates a left occipital femur intertrochanteric fracture. No additional fracture seen of the left femur. 1. Minimally displaced and angulated intertrochanteric fracture of the proximal femur. Ct Chest Wo Cont    Result Date: 3/4/2021  EXAM: Noncontrast CT chest. INDICATION: Dyspnea. Covid 19. COMPARISON: Prior CT chest on May 26, 2016, and prior lumbar spine x-rays on January 9, 2021. TECHNIQUE: Axial noncontrast CT images of the chest were obtained. Radiation dose reduction techniques were used for this study. Our CT scanners use one or all of the following:  Automated exposure control, adjustment of the mA and/or kV according to patient size, iterative reconstruction. FINDINGS: - Pleura/pericardium: Within normal limits. - Lungs: Within normal limits. - Leslie/Mediastinum: Within normal limits. - Tracheobronchial tree: Within normal limits. - Aorta/pulmonary arteries: Within normal limits. - Heart: Within normal limits. - Coronary arteries: There are coronary artery calcifications. - Chest wall: Within normal limits. - Spine/bones: No acute process. There is an old T11 compression fracture. - Additional comments: A small hiatal hernia is present. 1. No acute process. 2. Coronary artery disease. 3. Small hiatal hernia. Xr Chest Port    Result Date: 3/3/2021  EXAM: Chest x-ray. INDICATION: Questionable pneumothorax on earlier chest x-ray. COMPARISON: Earlier chest x-ray on the same day. TECHNIQUE: Frontal view chest x-ray. FINDINGS: On a repeat view, the previously seen right lung base lucency does not persist suggesting it was not artifactual skin fold. No acute intrathoracic process, as above. Xr Chest Port    Result Date: 3/3/2021  CHEST X-RAY, single portable view  3/3/2021 History: Left hip pain after fall. Unable to straighten leg Technique: Single frontal view of the chest. Comparison: Chest x-ray 1/9/2021 Findings: The cardiac silhouette is mildly enlarged although stable. Lucency is seen at the right lateral lung base. A pneumothorax is difficult to exclude although this could result from a large skinfold. This should be further characterized. No evolving consolidative airspace process or pleural effusion is seen. 1. Abnormal lucency at the right lateral lung base.  A pneumothorax is difficult to exclude given the appearance although this could represent a skinfold. This can be further assessed with a PA and lateral chest x-ray or chest CT. Alternatively, a repeat portable chest x-ray can be attempted ensuring no skinfolds occur at this level. This report was made using voice transcription. Despite my best efforts to avoid any, transcription errors may persist. If there is any question about the accuracy of the report or need for clarification, then please call [phone], or text me through perfectserv for clarification or correction. Echocardiogram/EKG results:  No results found for this visit on 03/03/21. EKG Results     None          Results for orders placed or performed during the hospital encounter of 12/14/20   EKG, 12 LEAD, INITIAL   Result Value Ref Range    Ventricular Rate 74 BPM    Atrial Rate 77 BPM    P-R Interval 96 ms    QRS Duration 88 ms    Q-T Interval 370 ms    QTC Calculation (Bezet) 410 ms    Calculated P Axis 37 degrees    Calculated R Axis -12 degrees    Calculated T Axis -22 degrees    Diagnosis       !! AGE AND GENDER SPECIFIC ECG ANALYSIS !! Sinus rhythm with short ID  Minimal voltage criteria for LVH, may be normal variant  Nonspecific ST and T wave abnormality  Abnormal ECG  When compared with ECG of 05-JUL-2019 17:21,  ID interval has decreased  Confirmed by ROBERT ADAMS (), Cristobal Ladd ([id number]) on 12/14/2020 6:44:28 PM     Results for orders placed or performed in visit on 05/03/16   AMB POC EKG ROUTINE W/ 12 LEADS, INTER & REP    Impression    ectopic atrial rhythm- hr 76  nsst-t changes       Procedures done this admission:  Procedure(s):  LEFT HIP GAMMA NAIL INSERTION        Time spent in patient discharge planning and coordination 40 minutes.       Signed By: DO Gary Santos Hospitalist Service    March 8, 2021  11:13 AM

## 2021-03-08 NOTE — PROGRESS NOTES
Drowsy, arousing briefly with no c/o. O2 sat 88% on room air, O2 applied at 2L N/C. No distress noted.

## 2021-03-08 NOTE — PROGRESS NOTES
Discharge plan is [facility] with Pre-Auth is given with [place] call has been placed 2 times today for update on authorization. MD updated. Possible discharge 3/9/2021 if authorization is cleared.   Will continue to follow for discharge planning needs  Please consult  if any new issues arise

## 2021-03-09 VITALS
RESPIRATION RATE: 16 BRPM | DIASTOLIC BLOOD PRESSURE: 57 MMHG | HEART RATE: 74 BPM | OXYGEN SATURATION: 97 % | WEIGHT: 115 LBS | TEMPERATURE: 97.8 F | BODY MASS INDEX: 24.89 KG/M2 | SYSTOLIC BLOOD PRESSURE: 134 MMHG

## 2021-03-09 LAB
COVID-19 RAPID TEST, COVR: NOT DETECTED
GLUCOSE BLD STRIP.AUTO-MCNC: 135 MG/DL (ref 65–100)
GLUCOSE BLD STRIP.AUTO-MCNC: 176 MG/DL (ref 65–100)
SARS-COV-2, COV2: NORMAL
SOURCE, COVRS: NORMAL

## 2021-03-09 PROCEDURE — 97530 THERAPEUTIC ACTIVITIES: CPT

## 2021-03-09 PROCEDURE — 74011250637 HC RX REV CODE- 250/637: Performed by: INTERNAL MEDICINE

## 2021-03-09 PROCEDURE — 97110 THERAPEUTIC EXERCISES: CPT

## 2021-03-09 PROCEDURE — 87635 SARS-COV-2 COVID-19 AMP PRB: CPT

## 2021-03-09 PROCEDURE — 74011250636 HC RX REV CODE- 250/636: Performed by: ORTHOPAEDIC SURGERY

## 2021-03-09 PROCEDURE — 74011636637 HC RX REV CODE- 636/637: Performed by: HOSPITALIST

## 2021-03-09 PROCEDURE — 82962 GLUCOSE BLOOD TEST: CPT

## 2021-03-09 PROCEDURE — 74011250636 HC RX REV CODE- 250/636: Performed by: HOSPITALIST

## 2021-03-09 PROCEDURE — 74011250637 HC RX REV CODE- 250/637: Performed by: ORTHOPAEDIC SURGERY

## 2021-03-09 RX ORDER — ASPIRIN 325 MG
325 TABLET ORAL DAILY
Qty: 10 TAB | Refills: 0 | Status: ON HOLD | OUTPATIENT
Start: 2021-03-10 | End: 2021-10-18 | Stop reason: SDUPTHER

## 2021-03-09 RX ORDER — TRAMADOL HYDROCHLORIDE 50 MG/1
50 TABLET ORAL
Qty: 12 TAB | Refills: 0 | Status: SHIPPED | OUTPATIENT
Start: 2021-03-09 | End: 2021-03-12

## 2021-03-09 RX ADMIN — Medication 10 ML: at 05:06

## 2021-03-09 RX ADMIN — Medication 1 TABLET: at 12:06

## 2021-03-09 RX ADMIN — ESCITALOPRAM OXALATE 10 MG: 10 TABLET ORAL at 08:22

## 2021-03-09 RX ADMIN — INSULIN LISPRO 8 UNITS: 100 INJECTION, SOLUTION INTRAVENOUS; SUBCUTANEOUS at 12:06

## 2021-03-09 RX ADMIN — INSULIN LISPRO 2 UNITS: 100 INJECTION, SOLUTION INTRAVENOUS; SUBCUTANEOUS at 08:20

## 2021-03-09 RX ADMIN — ROPINIROLE HYDROCHLORIDE 1 MG: 1 TABLET, FILM COATED ORAL at 08:22

## 2021-03-09 RX ADMIN — PANTOPRAZOLE SODIUM 40 MG: 40 TABLET, DELAYED RELEASE ORAL at 08:22

## 2021-03-09 RX ADMIN — CYANOCOBALAMIN TAB 1000 MCG 2500 MCG: 1000 TAB at 08:21

## 2021-03-09 RX ADMIN — TRAMADOL HYDROCHLORIDE 50 MG: 50 TABLET, FILM COATED ORAL at 09:54

## 2021-03-09 RX ADMIN — MORPHINE SULFATE 3 MG: 4 INJECTION INTRAVENOUS at 12:07

## 2021-03-09 RX ADMIN — ATORVASTATIN CALCIUM 40 MG: 40 TABLET, FILM COATED ORAL at 08:22

## 2021-03-09 RX ADMIN — AMLODIPINE BESYLATE 5 MG: 5 TABLET ORAL at 08:22

## 2021-03-09 RX ADMIN — Medication 1 TABLET: at 08:21

## 2021-03-09 RX ADMIN — ONDANSETRON 4 MG: 2 INJECTION INTRAMUSCULAR; INTRAVENOUS at 12:07

## 2021-03-09 RX ADMIN — POLYETHYLENE GLYCOL 3350 17 G: 17 POWDER, FOR SOLUTION ORAL at 08:21

## 2021-03-09 RX ADMIN — ASPIRIN 325 MG ORAL TABLET 325 MG: 325 PILL ORAL at 08:22

## 2021-03-09 RX ADMIN — FAMOTIDINE 20 MG: 20 TABLET ORAL at 08:21

## 2021-03-09 RX ADMIN — SPIRONOLACTONE 25 MG: 25 TABLET ORAL at 08:22

## 2021-03-09 RX ADMIN — INSULIN LISPRO 8 UNITS: 100 INJECTION, SOLUTION INTRAVENOUS; SUBCUTANEOUS at 08:20

## 2021-03-09 RX ADMIN — FOLIC ACID 1 MG: 1 TABLET ORAL at 08:22

## 2021-03-09 RX ADMIN — TRAMADOL HYDROCHLORIDE 50 MG: 50 TABLET, FILM COATED ORAL at 02:44

## 2021-03-09 RX ADMIN — CARVEDILOL 12.5 MG: 6.25 TABLET, FILM COATED ORAL at 08:21

## 2021-03-09 NOTE — PROGRESS NOTES
SARS Covid Rapid test performed on 3/9/2021 results Negative. Patient originally tested Positive for Covid-19  at a different facility on 1/5/2021. When patient was admitted on 3/3/2021 she was tested results Covid Positive. [facility] Liaison notified. All 3 test results faxed to [address] [place] [address] by [facility]. MD updated.

## 2021-03-09 NOTE — DISCHARGE SUMMARY
[facility] Hospitalist Discharge Summary     Name:  Beronica Glasgow    Age:86 y.o. Sex:female  :  1934       MRN:  [id number]       Admitting Physician: Alexandre Comer MD Admit Date: 3/3/2021 10:39 PM   Attending Physician: José Antonio Jaquez DO  Primary Care Physician: Jennifer Marie MD       Discharge Physician: Olena Morse DO  Discharge date: 21   Discharged Condition: Stable    Indication for Admission: hip fracture     Reasons for hospitalization:  Hospital Problems as of 3/9/2021 Date Reviewed: 3/15/2019          Codes Class Noted - Resolved POA    UTI (urinary tract infection) ICD-10-CM: N39.0  ICD-9-CM: 599.0  3/7/2021 - Present Unknown        Acute blood loss anemia ICD-10-CM: D62  ICD-9-CM: 285.1  3/7/2021 - Present Unknown        Acute cystitis without hematuria ICD-10-CM: N30.00  ICD-9-CM: 595.0  3/4/2021 - Present Unknown        * (Principal) Closed fracture of left hip ([facility] ICD-10-CM: K37.160G  ICD-9-CM: 820.8  3/3/2021 - Present         Hip fracture requiring operative repair [facility]) ICD-10-CM: E37.684B  ICD-9-CM: 820.8  3/3/2021 - Present Unknown        Diabetic polyneuropathy associated with type 2 diabetes mellitus ([facility].) ICD-10-CM: E11.42  ICD-9-CM: 250.60, 357.2  2018 - Present Yes        Coronary artery disease involving native coronary artery of native heart with angina pectoris ([facility] ICD-10-CM: I25.119  ICD-9-CM: 414.01, 413.9  5/3/2016 - Present Yes        Dyslipidemia ICD-10-CM: E78.5  ICD-9-CM: 272.4  5/3/2016 - Present Yes        Essential hypertension with goal blood pressure less than 140/90 ICD-10-CM: I10  ICD-9-CM: 401.9  3/16/2013 - Present Yes                 Discharge Diagnosis: hip fracture s/p repair  Did Patient have Sepsis (YES OR NO): no      Hospital Course :  80year-old female history of previous lumbar compression fracture DJD hypertension diabetes mellitus requiring insulin admitted March 3,Following fall with closed left hip fracture. Underwent surgical repair. She has left elbow pain with previous left elbow prosthesis and possible chronic fracturesee x-ray report. Seen and followed by orthopedics. patient did test positive for Covid and was isolated during hospital stay. She was found to have UTI and treated with course of Rocephin and clinically improvedbroadly sensitive E. coli was isolated. Sugars were controlled with sliding scale insulin and Lantus. She is not requiring oxygen at time of discharge. She is currently postop day #5 and will require short-term rehab for rehabilitation strengthening and nutritional support. She is stable for discharge to short-term rehab facility. [facility] was held May 8 due to precertification/facility issuespatient now has bed availability and will be discharged to skilled nursing facility for short-term rehab. She offers no new complaints today. Consults: IP CONSULT TO ORTHOPEDIC SURGERY     Disposition: Skilled Nursing Facility  Diet: DIET REGULAR  Code Status: Full Code      Discharge Info:     Current Discharge Medication List      START taking these medications    Details   traMADoL (ULTRAM) 50 mg tablet Take 1 Tab by mouth every six (6) hours as needed for Pain for up to 3 days. Max Daily Amount: 200 mg. Qty: 12 Tab, Refills: 0    Associated Diagnoses: Closed fracture of left hip requiring operative repair with routine healing, subsequent encounter      !! aspirin (ASPIRIN) 325 mg tablet Take 1 Tab by mouth daily. Qty: 10 Tab, Refills: 0      calcium-vitamin D (OS-ALYCIA +D3) 500 mg-200 unit per tablet Take 1 Tab by mouth three (3) times daily (with meals).   Qty: 90 Tab, Refills: 0      insulin lispro (HUMALOG) 100 unit/mL injection INITIATE INSULIN CORRECTIVE PROTOCOL:  Normal Insulin Sensitivity   For Blood Sugar (mg/dL) of:     Less than 150 =   0 units           150 -199 =   2 units  200 -249 =   4 units  250 -299 =   6 units  300 -349 =   8 units  350 and above = 10 units and Call Physician     Initiate Hypoglycemia protocol if blood glucose is <70 mg/dL Fast Acting - Administer Immediately - or within 15 minutes of start of meal, if mealtime coverage. Qty: 1 Vial, Refills: 0      oxyCODONE IR (ROXICODONE) 10 mg tab immediate release tablet Take 1 Tab by mouth every four (4) hours as needed for Pain for up to 3 days. Max Daily Amount: 60 mg.  Qty: 18 Tab, Refills: 0    Associated Diagnoses: Closed fracture of left hip with routine healing, subsequent encounter       !! - Potential duplicate medications found. Please discuss with provider. CONTINUE these medications which have NOT CHANGED    Details   amLODIPine (NORVASC) 5 mg tablet Take 5 mg by mouth daily. Indications: high blood pressure      atorvastatin (LIPITOR) 40 mg tablet Take 40 mg by mouth daily. Indications: excessive fat in the blood, treatment to slow progression of coronary artery disease      carvediloL (Coreg) 12.5 mg tablet Take 12.5 mg by mouth two (2) times daily (with meals). escitalopram oxalate (LEXAPRO) 10 mg tablet Take 10 mg by mouth daily. famotidine (PEPCID) 20 mg tablet Take 20 mg by mouth daily. folic acid (FOLVITE) 885 mcg tablet Take 800 mcg by mouth daily. loperamide (IMMODIUM) 2 mg tablet Take 2 mg by mouth as needed for Diarrhea. omeprazole (PRILOSEC) 40 mg capsule Take 40 mg by mouth daily. rOPINIRole (REQUIP) 1 mg tablet Take 1 mg by mouth two (2) times a day. spironolactone (ALDACTONE) 25 mg tablet Take  by mouth daily. cholecalciferol (Vitamin D3) 25 mcg (1,000 unit) cap Take  by mouth every other day. multivitamin with iron tablet Take 1 Tab by mouth daily. insulin glargine (LANTUS,BASAGLAR) 100 unit/mL (3 mL) inpn 20 Units by SubCUTAneous route. cyanocobalamin 1,000 mcg tablet Take 2,500 mcg by mouth daily. B.infantis-B.ani-B.long-B.bifi (PROBIOTIC 4X) 10-15 mg Tab Take  by mouth daily. !! aspirin 81 mg Tab take by mouth. ondansetron (Zofran ODT) 4 mg disintegrating tablet Take 1 Tab by mouth every eight (8) hours as needed for Nausea. Qty: 11 Tab, Refills: 1      acetaminophen (TYLENOL EXTRA STRENGTH) 500 mg tablet Take  by mouth every six (6) hours as needed for Pain. !! - Potential duplicate medications found. Please discuss with provider. STOP taking these medications       amoxicillin-clavulanate (Augmentin) 875-125 mg per tablet Comments:   Reason for Stopping:               Medications Discontinued During This Encounter   Medication Reason    enoxaparin (LOVENOX) injection 30 mg     enoxaparin (LOVENOX) injection 30 mg     morphine injection 1 mg     oxyCODONE IR (ROXICODONE) tablet 5 mg     lactated Ringers infusion Patient Transfer    oxyCODONE IR (ROXICODONE) tablet 5 mg Patient Transfer    oxyCODONE IR (ROXICODONE) tablet 10 mg Patient Transfer    HYDROmorphone (DILAUDID) injection 0.5 mg Patient Transfer    promethazine (PHENERGAN) with saline injection 3.25 mg Patient Transfer    sodium chloride (NS) flush 7-40 mL DUPLICATE ORDER    sodium chloride (NS) flush 3-08 mL DUPLICATE ORDER    morphine injection 4 mg     oxyCODONE IR (ROXICODONE) tablet 10 mg     insulin lispro (HUMALOG) injection     insulin glargine (LANTUS) injection 12 Units     insulin lispro (HUMALOG) injection 4 Units     amoxicillin-clavulanate (Augmentin) 875-125 mg per tablet LIST CLEANUP    insulin glargine (LANTUS) injection 16 Units     insulin glargine (LANTUS) injection 10 Units     cefTRIAXone (ROCEPHIN) 1 g in 0.9% sodium chloride (MBP/ADV) 50 mL MBP     aspirin delayed-release tablet 81 mg          Follow Up Orders:   Follow-up Appointments   Procedures    FOLLOW UP VISIT Appointment in: 3 - 5 Days Follow up as soon as possible with house providers, and orthopedic follow up as directed     Follow up as soon as possible with house providers, and orthopedic follow up as directed     Standing Status: Standing     Number of Occurrences:   1     Order Specific Question:   Appointment in     Answer:   3 - 5 Days       Follow-up Information     Follow up With Specialties Details Why Zina [address] [facility] [place] [address] [address] [address]  [address] [facility] [address]   [address] [facility] [address]  [phone]              Discharge Exam:    Patient Vitals for the past 24 hrs:   Temp Pulse Resp BP SpO2   03/09/21 0751 97.9 °F (36.6 °C) 76 18 (!) 129/52 97 %   03/09/21 0316 98.1 °F (36.7 °C) 72 16 (!) 120/48 93 %   03/08/21 2350 97.8 °F (36.6 °C) 69 18 99/78 95 %   03/08/21 2100     94 %   03/08/21 1929 98 °F (36.7 °C) 65 20 (!) 112/45 94 %   03/08/21 1650 97.9 °F (36.6 °C) 60 16 (!) 148/53 95 %   03/08/21 1143 97.7 °F (36.5 °C) 66 18 (!) 117/54 97 %     Oxygen Therapy  O2 Sat (%): 97 % (03/09/21 0751)  Pulse via Oximetry: 69 beats per minute (03/08/21 2100)  O2 Device: Room air (03/08/21 2100)  Skin Assessment: Clean, dry, & intact (03/08/21 0710)  Skin Protection for O2 Device: No (03/05/21 0710)  O2 Flow Rate (L/min): 2 l/min (03/08/21 0940)    Estimated body mass index is 24.89 kg/m² as calculated from the following:    Height as of 12/14/20: 4' 9\" (1.448 m). Weight as of this encounter: 52.2 kg (115 lb). No intake or output data in the 24 hours ending 03/09/21 1035    *Note that automatically entered I/Os may not be accurate; dependent on patient compliance with collection and accurate  by assistants. Physical Exam:   General: Pleasant and cooperative alert to person intermittently place short-term recall is poor. HEENT: No corneal scarring, extraocular muscles are intact. Neck: No thyromegaly or nodularity.   No HJR  Lungs: Symmetric excursion of the chest wall, clear without consolidation bilateral.  Cardiovascular: No increased lower extremity edema heart is regular in rate and rhythm. Abdomen: No palpable localized masses. No rebound tenderness. Bowel sounds in all 4 quadrants. Extremities: She moves all extremities symmetrically. Left elbow skin tear and pain with no significant edema or erythema. Surgical small bandages left lower extremity clean and dry. Neuro: No dysdiadochokinesia, no past-pointing. Psych: Pleasant/cooperative. All Labs from Last 24 Hrs:  Recent Results (from the past 24 hour(s))   GLUCOSE, POC    Collection Time: 03/08/21 11:45 AM   Result Value Ref Range    Glucose (POC) 202 (H) 65 - 100 mg/dL   GLUCOSE, POC    Collection Time: 03/08/21  4:45 PM   Result Value Ref Range    Glucose (POC) 201 (H) 65 - 100 mg/dL   GLUCOSE, POC    Collection Time: 03/08/21  9:30 PM   Result Value Ref Range    Glucose (POC) 174 (H) 65 - 100 mg/dL   COVID-19 RAPID TEST    Collection Time: 03/09/21  5:01 AM   Result Value Ref Range    Specimen source NASAL      COVID-19 rapid test Not detected NOTD     SARS-COV-2    Collection Time: 03/09/21  5:01 AM   Result Value Ref Range    SARS-CoV-2 Please find results under separate order     GLUCOSE, POC    Collection Time: 03/09/21  7:41 AM   Result Value Ref Range    Glucose (POC) 176 (H) 65 - 100 mg/dL       All Micro Results     Procedure Component Value Units Date/Time    COVID-19 RAPID TEST [[id number]] Collected: 03/09/21 0501    Order Status: Completed Specimen: Nasopharyngeal Updated: 03/09/21 0538     Specimen source NASAL        COVID-19 rapid test Not detected        Comment:      The specimen is NEGATIVE for SARS-CoV-2, the novel coronavirus associated with COVID-19. A negative result does not rule out COVID-19. This test has been authorized by the FDA under an Emergency Use Authorization (EUA) for use by authorized laboratories.         Fact sheet for Healthcare Providers: ConventionUpdate.co.nz  Fact sheet for Patients: [facility]       Methodology: Isothermal Nucleic Acid Amplification         CULTURE, URINE [[id number]]  (Abnormal)  (Susceptibility) Collected: 03/04/21 0803    Order Status: Completed Specimen: Cath Urine Updated: 03/06/21 0756     Special Requests: NO SPECIAL REQUESTS        Culture result:       >100,000 COLONIES/mL ESCHERICHIA COLI          MSSA/MRSA SC BY PCR, NASAL SWAB [[id number]] Collected: 03/03/21 2358    Order Status: Completed Specimen: Nasal swab Updated: 03/04/21 0219     Special Requests: NASAL SWAB        Culture result:       SA target not detected. A MRSA NEGATIVE, SA NEGATIVE test result does not preclude MRSA or SA nasal colonization. SARS-CoV-2 Lab Results  \"Novel Coronavirus\" Test: No results found for: COV2NT   \"Emergent Disease\" Test: No results found for: EDPR  \"SARS-COV-2\" Test: No results found for: XGCOVT  Rapid Test:   Lab Results   Component Value Date/Time    COVR Not detected 03/09/2021 05:01 AM            Diagnostic Imaging/Tests:   Xr Elbow Lt Min 3 V    Result Date: 3/5/2021  LEFT ELBOW 3 view(s). HISTORY: Left elbow pain. Prior surgery. TECHNIQUE: AP and lateral and oblique views. COMPARISON: None. FINDINGS / IMPRESSION: : There is an elbow prosthesis in the distal humerus and proximal ulna. Capitellum is probably chronically fractured. No acute periostitis or acute fractures. Xr Hip Lt W Or Wo Pelv 2-3 Vws    Result Date: 3/4/2021  LEFT HIP, 3 views. HISTORY: Status post ORIF intertrochanteric fracture. TECHNIQUE: AP view of the pelvis and coned down AP and frogleg lateral of the hip. FINDINGS: Intratrochanteric fracture has been reduced and fixed with a nail through the femoral neck and long intramedullary julieta. There are skin staples. ORIF left intertrochanteric fracture.      Xr Hip Lt W Or Wo Pelv 2-3 Vws    Result Date: 3/3/2021  Left hip and femur radiographs 3/3/2021 CLINICAL HISTORY: Fall with left hip pain. Unable to straighten leg. FINDINGS: Left hip: A frontal view of the pelvis and AP and lateral view of the left hip are submitted for evaluation. The sacroiliac joints and pubic symphysis are intact. The pelvic ring is grossly intact. AP and lateral views of the left hip show an intertrochanteric fracture of the proximal left femur with only minimal displacement and varus deformity. No evidence for dislocation is seen. Left femur: AP and lateral views of the left femur once again demonstrates a left occipital femur intertrochanteric fracture. No additional fracture seen of the left femur. 1. Minimally displaced and angulated intertrochanteric fracture of the proximal femur. Xr Femur Lt 2 V    Result Date: 3/4/2021  LEFT HIP 4 view(s). HISTORY: Intertrochanteric fracture. TECHNIQUE: AP and frog-leg lateral pre and postoperative views. COMPARISON: Yesterday's exam. FINDINGS: Initial images demonstrate a reduced Intratrochanteric fracture. Subsequent images demonstrate a nail through the femoral neck and long intramedullary julitea femur. Status post ORIF intertrochanteric fracture. Xr Femur Lt 2 V    Result Date: 3/3/2021  Left hip and femur radiographs 3/3/2021 CLINICAL HISTORY: Fall with left hip pain. Unable to straighten leg. FINDINGS: Left hip: A frontal view of the pelvis and AP and lateral view of the left hip are submitted for evaluation. The sacroiliac joints and pubic symphysis are intact. The pelvic ring is grossly intact. AP and lateral views of the left hip show an intertrochanteric fracture of the proximal left femur with only minimal displacement and varus deformity. No evidence for dislocation is seen. Left femur: AP and lateral views of the left femur once again demonstrates a left occipital femur intertrochanteric fracture. No additional fracture seen of the left femur. 1. Minimally displaced and angulated intertrochanteric fracture of the proximal femur. Ct Chest Wo Cont    Result Date: 3/4/2021  EXAM: Noncontrast CT chest. INDICATION: Dyspnea. Covid 19. COMPARISON: Prior CT chest on May 26, 2016, and prior lumbar spine x-rays on January 9, 2021. TECHNIQUE: Axial noncontrast CT images of the chest were obtained. Radiation dose reduction techniques were used for this study. Our CT scanners use one or all of the following:  Automated exposure control, adjustment of the mA and/or kV according to patient size, iterative reconstruction. FINDINGS: - Pleura/pericardium: Within normal limits. - Lungs: Within normal limits. - Leslie/Mediastinum: Within normal limits. - Tracheobronchial tree: Within normal limits. - Aorta/pulmonary arteries: Within normal limits. - Heart: Within normal limits. - Coronary arteries: There are coronary artery calcifications. - Chest wall: Within normal limits. - Spine/bones: No acute process. There is an old T11 compression fracture. - Additional comments: A small hiatal hernia is present. 1. No acute process. 2. Coronary artery disease. 3. Small hiatal hernia. Xr Chest Port    Result Date: 3/3/2021  EXAM: Chest x-ray. INDICATION: Questionable pneumothorax on earlier chest x-ray. COMPARISON: Earlier chest x-ray on the same day. TECHNIQUE: Frontal view chest x-ray. FINDINGS: On a repeat view, the previously seen right lung base lucency does not persist suggesting it was not artifactual skin fold. No acute intrathoracic process, as above. Xr Chest Port    Result Date: 3/3/2021  CHEST X-RAY, single portable view  3/3/2021 History: Left hip pain after fall. Unable to straighten leg Technique: Single frontal view of the chest. Comparison: Chest x-ray 1/9/2021 Findings: The cardiac silhouette is mildly enlarged although stable. Lucency is seen at the right lateral lung base. A pneumothorax is difficult to exclude although this could result from a large skinfold. This should be further characterized.  No evolving consolidative airspace process or pleural effusion is seen. 1. Abnormal lucency at the right lateral lung base. A pneumothorax is difficult to exclude given the appearance although this could represent a skinfold. This can be further assessed with a PA and lateral chest x-ray or chest CT. Alternatively, a repeat portable chest x-ray can be attempted ensuring no skinfolds occur at this level. This report was made using voice transcription. Despite my best efforts to avoid any, transcription errors may persist. If there is any question about the accuracy of the report or need for clarification, then please call [phone], or text me through [employer] for clarification or correction. Echocardiogram/EKG results:  No results found for this visit on 03/03/21. EKG Results     None          Results for orders placed or performed during the hospital encounter of 12/14/20   EKG, 12 LEAD, INITIAL   Result Value Ref Range    Ventricular Rate 74 BPM    Atrial Rate 77 BPM    P-R Interval 96 ms    QRS Duration 88 ms    Q-T Interval 370 ms    QTC Calculation (Bezet) 410 ms    Calculated P Axis 37 degrees    Calculated R Axis -12 degrees    Calculated T Axis -22 degrees    Diagnosis       !! AGE AND GENDER SPECIFIC ECG ANALYSIS !! Sinus rhythm with short CA  Minimal voltage criteria for LVH, may be normal variant  Nonspecific ST and T wave abnormality  Abnormal ECG  When compared with ECG of 05-JUL-2019 17:21,  CA interval has decreased  Confirmed by ROBERT ADAMS (), Jesika Carmen ([id number]) on 12/14/2020 6:44:28 PM     Results for orders placed or performed in visit on 05/03/16   AMB POC EKG ROUTINE W/ 12 LEADS, INTER & REP    Impression    ectopic atrial rhythm- hr 76  nsst-t changes       Procedures done this admission:  Procedure(s):  LEFT HIP GAMMA NAIL INSERTION        Time spent in patient discharge planning and coordination 40 minutes.       Signed By: DO [facility]    March 9, 2021  11:13 AM

## 2021-03-09 NOTE — PROGRESS NOTES
Report called to [address] Gave report to [facility].  PT dressing due to change today. Jayna stated that they would change the dressing when PT arrives to facility.    time 1356

## 2021-03-09 NOTE — PROGRESS NOTES
Patient being discharged today tp SNF for [facility] s/p ORIF with cephalomedullary nail fixation today.   [facility]  Rm: Hector Ortiz  Transport: Keiry Alcazar  Time 1  MD, RN aware  Will continue to follow for discharge planning needs  Please consult  if any new issues arise

## 2021-03-09 NOTE — PROGRESS NOTES
ACUTE PHYSICAL THERAPY GOALS:  (Developed with and agreed upon by patient and/or caregiver.)    All goals ongoing 3/9/2021     LTG:  (1.)Ms. Curtis will move from supine to sit and sit to supine , scoot up and down and roll side to side in bed with stand by ASSIST within 7 treatment day(s). (2.)Ms. Curtis will transfer from bed to chair and chair to bed with stand by ASSIST using the least restrictive device within 7 treatment day(s). (3.)Ms. Curtis will ambulate with stand by ASSIST for  feet with the least restrictive device within 7 treatment day(s). PHYSICAL THERAPY: Daily Note and AM Treatment Day # 5    Adebayo Whittaker is a 80 y.o. female   PRIMARY DIAGNOSIS: Closed fracture of left hip (HCC)  Hip fracture ([facility] [address] [S72.009A]  Hip fracture requiring operative repair ([facility] [address] [S72.009A]  Procedure(s) (LRB):  LEFT HIP GAMMA NAIL INSERTION (Left)  5 Days Post-Op    ASSESSMENT:     REHAB RECOMMENDATIONS: CURRENT LEVEL OF FUNCTION:  (Most Recently Demonstrated)   Recommendation to date pending progress:  Setting:   Short-term Rehab  Equipment:    Rolling Walker   To Be Determined Bed Mobility:   Minimal Assistance  Sit to Stand:   Minimal Assistance  Transfers:   Minimal Assistance  Gait/Mobility:   Minimal Assistance     ASSESSMENT:  Ms. Arnold Arauz supine on arrival, RA, stats pain 8/10 L hip, WBAT. Pt performed there ex supine with AAROM L LE, min A general mobility this date. Pt required lots of verbal and tactile cues to stay alert and participating. Pt would drift off to sleep at times during therapy session. Pt assisted to [facility], able to perform chel care with CGA for static standing balance. Pt left up in chair, education provided for LE mobility, edema/pain management. PT to cont to follow for acute care needs.       SUBJECTIVE:   Ms. Arnold Arauz states, \"My leg is always hurting\"    SOCIAL HISTORY/ LIVING ENVIRONMENT: Ms. Arnold Arauz lives with her spouse and ambulates with a rollator independently in home and "[employer]  She drives and attends [employer].  Was a square dancer prior to covid.  Home Environment: Private residence  One/Two Story Residence: One story  Living Alone: No  Support Systems: Family member(s), Spouse/Significant Other/Partner  OBJECTIVE:     PAIN: VITAL SIGNS: LINES/DRAINS:   Pre Treatment: Pain Screen  Pain Scale 1: Numeric (0 - 10)  Pain Intensity 1: 8  Pain Location 1: Leg  Pain Orientation 1: Left  Pain Description 1: Aching  Pain Intervention(s) 1: Nurse notified  Post Treatment: 8 Vital Signs  O2 Device: Room air    O2 Device: Room air     MOBILITY: I Mod I S SBA CGA Min Mod Max Total  NT x2 Comments:   Bed Mobility    Rolling [] [] [] [] [] [x] [] [] [] [] []    Supine to Sit [] [] [] [] [] [x] [] [] [] [] []    Scooting [] [] [] [] [] [x] [] [] [] [] []    Sit to Supine [] [] [] [] [] [] [] [] [] [x] [] chair   Transfers    Sit to Stand [] [] [] [] [] [x] [] [] [] [] []    Bed to Chair [] [] [] [] [] [x] [] [] [] [] []    Stand to Sit [] [] [] [] [] [x] [] [] [] [] []    I=Independent, Mod I=Modified Independent, S=Supervision, SBA=Standby Assistance, CGA=Contact Guard Assistance,   Min=Minimal Assistance, Mod=Moderate Assistance, Max=Maximal Assistance, Total=Total Assistance, NT=Not Tested    BALANCE: Good Fair+ Fair Fair- Poor NT Comments   Sitting Static [x] [] [] [] [] []    Sitting Dynamic [x] [] [] [] [] []              Standing Static [] [x] [] [] [] []    Standing Dynamic [] [x] [x] [] [] []      GAIT: I Mod I S SBA CGA Min Mod Max Total  NT x2 Comments:   Level of Assistance [] [] [] [] [] [x] [] [] [] [] [] Slow valeria, cues for step length, posture   Distance 10'    DME Rolling Walker    Gait Quality Flexed trunk, very slow, step to gait    Weightbearing  Status WBAT     I=Independent, Mod I=Modified Independent, S=Supervision, SBA=Standby Assistance, CGA=Contact Guard Assistance,   Min=Minimal Assistance, Mod=Moderate Assistance, Max=Maximal Assistance, Total=Total  Assistance, NT=Not Tested    PLAN:   FREQUENCY/DURATION: PT Plan of Care: BID for duration of hospital stay or until stated goals are met, whichever comes first.  TREATMENT:     TREATMENT:   ($$ Therapeutic Activity: 8-22 mins  $$ Therapeutic Exercises: 8-22 mins    )  Therapeutic Activity (17 Minutes): Therapeutic activity included Supine to Sit, Scooting, Transfer Training, Ambulation on level ground, Sitting balance , Standing balance and toilet transfers to improve functional Mobility, Strength and Activity tolerance. Therapeutic Exercise (8 Minutes): Therapeutic exercises noted below to improve functional activity tolerance, AROM, strength and mobility.      TREATMENT GRID:      DATE: 3/6/21 early pm 3/7/21 3/8/21 am 3/9/21    Ambulation        Hip Flexion x10 AB X 10 L AA  X 10 L AA    Long Arc Quads x10 AB  2x10 AB X 10 L AA    Hip ab/ad  X 10 L AA 2x10 AR, AAL X 10 L AA    Heel Raises x20 AB X 10 A B x20 AB     Toe Raises x20 AB X 10 A B x20 AB     SAQ  X 10 A L      Glut sets  X 10 A  X 10 A             Key:  A=active, AA=active assisted, P=passive, B=bilaterally, R=right, L=left   DF=dorsiflexion, PF=plantarflexion    AFTER TREATMENT POSITION/PRECAUTIONS:  Chair, Needs within reach and RN notified    INTERDISCIPLINARY COLLABORATION:  RN/PCT and PT/PTA    TOTAL TREATMENT DURATION:  PT Patient Time In/Time Out  Time In: 0948  Time Out: 910 Ronak Cruz, PT

## 2021-03-15 ENCOUNTER — HOSPITAL ENCOUNTER (OUTPATIENT)
Dept: LAB | Age: 86
Discharge: HOME | End: 2021-03-15

## 2021-03-15 LAB
ANION GAP SERPL CALC-SCNC: 6 MMOL/L (ref 7–16)
BASOPHILS # BLD: 0.1 K/UL (ref 0–0.2)
BASOPHILS NFR BLD: 1 % (ref 0–2)
BUN SERPL-MCNC: 21 MG/DL (ref 8–23)
CALCIUM SERPL-MCNC: 9.6 MG/DL (ref 8.3–10.4)
CHLORIDE SERPL-SCNC: 106 MMOL/L (ref 98–107)
CO2 SERPL-SCNC: 25 MMOL/L (ref 21–32)
CREAT SERPL-MCNC: 1.06 MG/DL (ref 0.6–1)
DIFFERENTIAL METHOD BLD: ABNORMAL
EOSINOPHIL # BLD: 0.3 K/UL (ref 0–0.8)
EOSINOPHIL NFR BLD: 2 % (ref 0.5–7.8)
ERYTHROCYTE [DISTWIDTH] IN BLOOD BY AUTOMATED COUNT: 15.2 % (ref 11.9–14.6)
GLUCOSE SERPL-MCNC: 249 MG/DL (ref 65–100)
HCT VFR BLD AUTO: 35 % (ref 35.8–46.3)
HGB BLD-MCNC: 10.8 G/DL (ref 11.7–15.4)
IMM GRANULOCYTES # BLD AUTO: 0.9 K/UL (ref 0–0.5)
IMM GRANULOCYTES NFR BLD AUTO: 6 % (ref 0–5)
LYMPHOCYTES # BLD: 2 K/UL (ref 0.5–4.6)
LYMPHOCYTES NFR BLD: 14 % (ref 13–44)
MCH RBC QN AUTO: 29.8 PG (ref 26.1–32.9)
MCHC RBC AUTO-ENTMCNC: 30.9 G/DL (ref 31.4–35)
MCV RBC AUTO: 96.4 FL (ref 79.6–97.8)
MONOCYTES # BLD: 0.7 K/UL (ref 0.1–1.3)
MONOCYTES NFR BLD: 5 % (ref 4–12)
NEUTS SEG # BLD: 10.2 K/UL (ref 1.7–8.2)
NEUTS SEG NFR BLD: 72 % (ref 43–78)
NRBC # BLD: 0 K/UL (ref 0–0.2)
PLATELET # BLD AUTO: 464 K/UL (ref 150–450)
PLATELET COMMENTS,PCOM: ABNORMAL
PMV BLD AUTO: 9.7 FL (ref 9.4–12.3)
POTASSIUM SERPL-SCNC: 4.4 MMOL/L (ref 3.5–5.1)
RBC # BLD AUTO: 3.63 M/UL (ref 4.05–5.2)
RBC MORPH BLD: ABNORMAL
RBC MORPH BLD: ABNORMAL
SODIUM SERPL-SCNC: 137 MMOL/L (ref 136–145)
WBC # BLD AUTO: 14.2 K/UL (ref 4.3–11.1)
WBC MORPH BLD: ABNORMAL

## 2021-03-15 PROCEDURE — 80048 BASIC METABOLIC PNL TOTAL CA: CPT

## 2021-03-15 PROCEDURE — 85025 COMPLETE CBC W/AUTO DIFF WBC: CPT

## 2021-06-03 ENCOUNTER — APPOINTMENT (OUTPATIENT)
Dept: CT IMAGING | Age: 86
End: 2021-06-03
Attending: EMERGENCY MEDICINE
Payer: MEDICARE

## 2021-06-03 ENCOUNTER — HOSPITAL ENCOUNTER (EMERGENCY)
Age: 86
Discharge: HOME | End: 2021-06-03
Attending: EMERGENCY MEDICINE
Payer: MEDICARE

## 2021-06-03 ENCOUNTER — APPOINTMENT (OUTPATIENT)
Dept: GENERAL RADIOLOGY | Age: 86
End: 2021-06-03
Attending: EMERGENCY MEDICINE
Payer: MEDICARE

## 2021-06-03 VITALS
BODY MASS INDEX: 22.58 KG/M2 | RESPIRATION RATE: 20 BRPM | WEIGHT: 115 LBS | DIASTOLIC BLOOD PRESSURE: 66 MMHG | SYSTOLIC BLOOD PRESSURE: 127 MMHG | HEIGHT: 60 IN | TEMPERATURE: 98.4 F | OXYGEN SATURATION: 96 % | HEART RATE: 69 BPM

## 2021-06-03 DIAGNOSIS — S50.02XA CONTUSION OF LEFT ELBOW, INITIAL ENCOUNTER: ICD-10-CM
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
## 2021-06-03 LAB
ALBUMIN SERPL-MCNC: 3.8 G/DL (ref 3.2–4.6)
ALBUMIN/GLOB SERPL: 1.1 {RATIO} (ref 1.2–3.5)
ALP SERPL-CCNC: 115 U/L (ref 50–130)
ALT SERPL-CCNC: 51 U/L (ref 12–65)
ANION GAP SERPL CALC-SCNC: 8 MMOL/L (ref 7–16)
APPEARANCE UR: CLEAR
AST SERPL-CCNC: 16 U/L (ref 15–37)
ATRIAL RATE: 60 BPM
BASOPHILS # BLD: 0.1 K/UL (ref 0–0.2)
BASOPHILS NFR BLD: 1 % (ref 0–2)
BILIRUB SERPL-MCNC: 0.8 MG/DL (ref 0.2–1.1)
BILIRUB UR QL: NEGATIVE
BUN SERPL-MCNC: 19 MG/DL (ref 8–23)
CALCIUM SERPL-MCNC: 10.5 MG/DL (ref 8.3–10.4)
CALCULATED R AXIS, ECG10: 0 DEGREES
CALCULATED T AXIS, ECG11: -48 DEGREES
CHLORIDE SERPL-SCNC: 102 MMOL/L (ref 98–107)
CO2 SERPL-SCNC: 27 MMOL/L (ref 21–32)
COLOR UR: YELLOW
CREAT SERPL-MCNC: 0.72 MG/DL (ref 0.6–1)
DIAGNOSIS, 93000: NORMAL
DIFFERENTIAL METHOD BLD: NORMAL
EOSINOPHIL # BLD: 0.2 K/UL (ref 0–0.8)
EOSINOPHIL NFR BLD: 2 % (ref 0.5–7.8)
ERYTHROCYTE [DISTWIDTH] IN BLOOD BY AUTOMATED COUNT: 14.2 % (ref 11.9–14.6)
GLOBULIN SER CALC-MCNC: 3.4 G/DL (ref 2.3–3.5)
GLUCOSE SERPL-MCNC: 136 MG/DL (ref 65–100)
GLUCOSE UR STRIP.AUTO-MCNC: NEGATIVE MG/DL
HCT VFR BLD AUTO: 40.5 % (ref 35.8–46.3)
HGB BLD-MCNC: 13.5 G/DL (ref 11.7–15.4)
HGB UR QL STRIP: NEGATIVE
IMM GRANULOCYTES # BLD AUTO: 0.3 K/UL (ref 0–0.5)
IMM GRANULOCYTES NFR BLD AUTO: 4 % (ref 0–5)
KETONES UR QL STRIP.AUTO: NEGATIVE MG/DL
LEUKOCYTE ESTERASE UR QL STRIP.AUTO: NEGATIVE
LYMPHOCYTES # BLD: 1.8 K/UL (ref 0.5–4.6)
LYMPHOCYTES NFR BLD: 20 % (ref 13–44)
MAGNESIUM SERPL-MCNC: 1.6 MG/DL (ref 1.8–2.4)
MCH RBC QN AUTO: 29.2 PG (ref 26.1–32.9)
MCHC RBC AUTO-ENTMCNC: 33.3 G/DL (ref 31.4–35)
MCV RBC AUTO: 87.5 FL (ref 79.6–97.8)
MONOCYTES # BLD: 0.7 K/UL (ref 0.1–1.3)
MONOCYTES NFR BLD: 7 % (ref 4–12)
NEUTS SEG # BLD: 6.1 K/UL (ref 1.7–8.2)
NEUTS SEG NFR BLD: 66 % (ref 43–78)
NITRITE UR QL STRIP.AUTO: NEGATIVE
NRBC # BLD: 0 K/UL (ref 0–0.2)
PH UR STRIP: 6 [PH] (ref 5–9)
PLATELET # BLD AUTO: 256 K/UL (ref 150–450)
PMV BLD AUTO: 10 FL (ref 9.4–12.3)
POTASSIUM SERPL-SCNC: 3.9 MMOL/L (ref 3.5–5.1)
PROT SERPL-MCNC: 7.2 G/DL (ref 6.3–8.2)
PROT UR STRIP-MCNC: NEGATIVE MG/DL
Q-T INTERVAL, ECG07: 434 MS
QRS DURATION, ECG06: 104 MS
QTC CALCULATION (BEZET), ECG08: 436 MS
RBC # BLD AUTO: 4.63 M/UL (ref 4.05–5.2)
SODIUM SERPL-SCNC: 137 MMOL/L (ref 136–145)
SP GR UR REFRACTOMETRY: 1.01 (ref 1–1.02)
UROBILINOGEN UR QL STRIP.AUTO: 0.2 EU/DL (ref 0.2–1)
VENTRICULAR RATE, ECG03: 61 BPM
WBC # BLD AUTO: 9.2 K/UL (ref 4.3–11.1)

## 2021-06-03 PROCEDURE — 70450 CT HEAD/BRAIN W/O DYE: CPT

## 2021-06-03 PROCEDURE — 73080 X-RAY EXAM OF ELBOW: CPT

## 2021-06-03 PROCEDURE — 83735 ASSAY OF MAGNESIUM: CPT

## 2021-06-03 PROCEDURE — 99284 EMERGENCY DEPT VISIT MOD MDM: CPT

## 2021-06-03 PROCEDURE — 81003 URINALYSIS AUTO W/O SCOPE: CPT

## 2021-06-03 PROCEDURE — 85025 COMPLETE CBC W/AUTO DIFF WBC: CPT

## 2021-06-03 PROCEDURE — 93005 ELECTROCARDIOGRAM TRACING: CPT | Performed by: EMERGENCY MEDICINE

## 2021-06-03 PROCEDURE — 80053 COMPREHEN METABOLIC PANEL: CPT

## 2021-06-03 NOTE — ED NOTES
I have reviewed discharge instructions with the patient. The patient verbalized understanding. Patient left ED via private vehicle. Discharge Method: wheelchair to Home with daughter. Opportunity for questions and clarification provided. Patient given 0 scripts. To continue your aftercare when you leave the hospital, you may receive an automated call from our care team to check in on how you are doing. This is a free service and part of our promise to provide the best care and service to meet your aftercare needs.  If you have questions, or wish to unsubscribe from this service please call [phone]. Thank you for Choosing our [facility] Emergency Department.

## 2021-06-03 NOTE — ED PROVIDER NOTES
Patient presents to the ER with complaints of multiple falls. She is accompanied by her daughter. Reports she has had recurrent episodes of falling. States last evening she was walking not using her walker she became dizzy and fell. Reports she hit the back of her head as well as her left elbow. Daughter is concerned as patient has had multiple falls in the past couple weeks. Earlier this year fell and broke her hip. Most recently in the past couple weeks fell and fractured her left wrist.  Patient has since been able to ambulate. The history is provided by the patient. Dizziness  This is a new problem. The current episode started yesterday. The problem has not changed since onset. Pertinent negatives include no focal weakness, no movement disorder, no agitation, no visual change, no mental status change, no unresponsiveness and no disorientation. There has been no fever. Pertinent negatives include no shortness of breath, no chest pain, no vomiting, no altered mental status, no headaches, no choking and no nausea. Fall  The accident occurred 3 to 5 hours ago. The fall occurred while walking. She fell from a height of ground level. The point of impact was the head and left elbow. The pain is present in the left elbow and head. The pain is at a severity of 3/10. The pain is mild. Pertinent negatives include no visual change, no fever, no numbness, no abdominal pain, no nausea, no vomiting, no headaches and no extremity weakness. The risk factors include recurrent falls, being elderly and dementia.          Past Medical History:   Diagnosis Date    Acute kidney failure 05/2016    Acute pericarditis 2015    Arthritis     CAP (community acquired pneumonia) 5/2016    Coronary artery disease     Crohn's disease     Hypercalcemia     Hypercholesterolemia     Hypertension     Lumbar compression fracture     Osteoporosis     Type 2 diabetes mellitus        Past Surgical History:   Procedure Laterality Date    HX ARTHROPLASTY  2016    left elbow    HX HEART CATHETERIZATION  2015    no intervention    HX HYSTERECTOMY      HX KYPHOPLASTY  ~    lumbar         Family History:   Problem Relation Age of Onset    Heart Failure Mother     Diabetes Mother     Cancer Father         throat    Alcohol abuse Neg Hx     Arthritis-rheumatoid Neg Hx     Asthma Neg Hx     Bleeding Prob Neg Hx     Elevated Lipids Neg Hx     Headache Neg Hx     Hypertension Neg Hx     Lung Disease Neg Hx     Migraines Neg Hx     Psychiatric Disorder Neg Hx     Stroke Neg Hx     Mental Retardation Neg Hx     Thyroid Disease Neg Hx        Social History     Socioeconomic History    Marital status:      Spouse name: Not on file    Number of children: Not on file    Years of education: Not on file    Highest education level: Not on file   Occupational History    Not on file   Tobacco Use    Smoking status: Former Smoker     Types: Cigarettes     Start date: 1961     Quit date: 1964     Years since quittin.0    Smokeless tobacco: Never Used    Tobacco comment: 2 packs a wk   Substance and Sexual Activity    Alcohol use: Yes     Alcohol/week: 0.0 standard drinks     Comment: rarely    Drug use: No    Sexual activity: Never   Other Topics Concern    Not on file   Social History Narrative    Pt , lives with . Retired. She worked as an . She has 1 dog and 1 cat at home. Social Determinants of Health     Financial Resource Strain:     Difficulty of Paying Living Expenses:    Food Insecurity:     Worried About Running Out of Food in the Last Year:     [address] in the Last Year:    Transportation Needs:     Lack of Transportation (Medical):      Lack of Transportation (Non-Medical):    Physical Activity:     Days of Exercise per Week:     Minutes of Exercise per Session:    Stress:     Feeling of Stress :    Social Connections:     Frequency of Communication with Friends and Family:     Frequency of Social Gatherings with Friends and Family:     Attends [employer] Services:     Active Member of Clubs or Organizations:     Attends Club or Organization Meetings:     Marital Status:    Intimate Partner Violence:     Fear of Current or Ex-Partner:     Emotionally Abused:     Physically Abused:     Sexually Abused: ALLERGIES: Sulfa (sulfonamide antibiotics), Other medication, and Tape [adhesive]    Review of Systems   Constitutional: Negative for fatigue, fever and unexpected weight change. HENT: Negative for congestion, dental problem, trouble swallowing and voice change. Eyes: Negative for photophobia and redness. Respiratory: Negative for choking, chest tightness, shortness of breath and stridor. Cardiovascular: Negative for chest pain and leg swelling. Gastrointestinal: Negative for abdominal pain, nausea and vomiting. Endocrine: Negative for polyphagia and polyuria. Genitourinary: Negative for flank pain, frequency and urgency. Musculoskeletal: Negative for back pain, extremity weakness and gait problem. Skin: Negative for color change and pallor. Neurological: Positive for dizziness. Negative for tremors, focal weakness, seizures, numbness and headaches. Hematological: Negative for adenopathy. Does not bruise/bleed easily. Psychiatric/Behavioral: Negative for agitation and behavioral problems. All other systems reviewed and are negative. Vitals:    06/03/21 1021   BP: 127/66   Pulse: 69   Resp: 20   Temp: 98.4 °F (36.9 °C)   SpO2: 96%   Weight: 52.2 kg (115 lb)   Height: 5' (1.524 m)            Physical Exam  Vitals and nursing note reviewed. Constitutional:       General: She is not in acute distress. Appearance: Normal appearance. She is not ill-appearing. HENT:      Head: Normocephalic. Left Ear: External ear normal.      Nose: Nose normal. No congestion or rhinorrhea. Eyes:      Extraocular Movements: Extraocular movements intact. Pupils: Pupils are equal, round, and reactive to light. Cardiovascular:      Rate and Rhythm: Normal rate and regular rhythm. Pulses: Normal pulses. Heart sounds: Normal heart sounds. No murmur heard. Pulmonary:      Effort: Pulmonary effort is normal. No respiratory distress. Breath sounds: Normal breath sounds. No stridor. No wheezing or rhonchi. Abdominal:      General: Abdomen is flat. There is no distension. Palpations: Abdomen is soft. There is no mass. Tenderness: There is no abdominal tenderness. Hernia: No hernia is present. Musculoskeletal:         General: No swelling. Left elbow: No deformity. Tenderness present. Arms:       Cervical back: Normal range of motion and neck supple. Skin:     General: Skin is warm. Coloration: Skin is not jaundiced or pale. Neurological:      General: No focal deficit present. Mental Status: She is alert and oriented to person, place, and time. Cranial Nerves: No cranial nerve deficit. Sensory: No sensory deficit. MDM  Number of Diagnoses or Management Options  Diagnosis management comments: Will obtain labs here, CT scan of head, x-ray left elbow as well as urinalysis. 1:26 PM  Labs are stable here including normal white blood cell count. Normal chemistry panel, normal urinalysis. CT scan of head without any acute abnormality. X-ray  left elbow without any fracture. Long discussion at the bedside with patient's daughter. She is concerned about patient's ability to ambulate and care for self at home. Patient is fairly well-appearing here now. I encouraged the use of walker with ambulation.  Case management has been made aware patient and have discussed further outpatient home health services             Amount and/or Complexity of Data Reviewed  Clinical lab tests: ordered and reviewed  Tests in the radiology section of CPT®: ordered and reviewed  Independent visualization of images, tracings, or specimens: yes (EKG interpretation: Sinus rhythm, rate of 61, normal axis, no blocks, LVH noted, no acute ST segment changes noted. Comparison EKG performed 14 December 2020 )    Risk of Complications, Morbidity, and/or Mortality  Presenting problems: moderate  Diagnostic procedures: moderate  Management options: moderate    Patient Progress  Patient progress: stable         Procedures        Results Include:    Recent Results (from the past 24 hour(s))   EKG, 12 LEAD, INITIAL    Collection Time: 06/03/21 10:32 AM   Result Value Ref Range    Ventricular Rate 61 BPM    Atrial Rate 60 BPM    QRS Duration 104 ms    Q-T Interval 434 ms    QTC Calculation (Bezet) 436 ms    Calculated R Axis 0 degrees    Calculated T Axis -48 degrees    Diagnosis       Junctional rhythm  Minimal voltage criteria for LVH, may be normal variant  ST & T wave abnormality, consider inferior ischemia  ST & T wave abnormality, consider anterolateral ischemia  Abnormal ECG  When compared with ECG of 14-DEC-2020 12:34,  Junctional rhythm has replaced Sinus rhythm  T wave inversion now evident in Anterior leads     CBC WITH AUTOMATED DIFF    Collection Time: 06/03/21 11:05 AM   Result Value Ref Range    WBC 9.2 4.3 - 11.1 K/uL    RBC 4.63 4.05 - 5.2 M/uL    HGB 13.5 11.7 - 15.4 g/dL    HCT 40.5 35.8 - 46.3 %    MCV 87.5 79.6 - 97.8 FL    MCH 29.2 26.1 - 32.9 PG    MCHC 33.3 31.4 - 35.0 g/dL    RDW 14.2 11.9 - 14.6 %    PLATELET 837 871 - 901 K/uL    MPV 10.0 9.4 - 12.3 FL    ABSOLUTE NRBC 0.00 0.0 - 0.2 K/uL    DF AUTOMATED      NEUTROPHILS 66 43 - 78 %    LYMPHOCYTES 20 13 - 44 %    MONOCYTES 7 4.0 - 12.0 %    EOSINOPHILS 2 0.5 - 7.8 %    BASOPHILS 1 0.0 - 2.0 %    IMMATURE GRANULOCYTES 4 0.0 - 5.0 %    ABS. NEUTROPHILS 6.1 1.7 - 8.2 K/UL    ABS. LYMPHOCYTES 1.8 0.5 - 4.6 K/UL    ABS. MONOCYTES 0.7 0.1 - 1.3 K/UL    ABS. EOSINOPHILS 0.2 0.0 - 0.8 K/UL    ABS. BASOPHILS 0.1 0.0 - 0.2 K/UL    ABS. IMM. GRANS. 0.3 0.0 - 0.5 K/UL   METABOLIC PANEL, COMPREHENSIVE    Collection Time: 06/03/21 11:05 AM   Result Value Ref Range    Sodium 137 136 - 145 mmol/L    Potassium 3.9 3.5 - 5.1 mmol/L    Chloride 102 98 - 107 mmol/L    CO2 27 21 - 32 mmol/L    Anion gap 8 7 - 16 mmol/L    Glucose 136 (H) 65 - 100 mg/dL    BUN 19 8 - 23 MG/DL    Creatinine 0.72 0.6 - 1.0 MG/DL    GFR est AA >60 >60 ml/min/1.73m2    GFR est non-AA >60 >60 ml/min/1.73m2    Calcium 10.5 (H) 8.3 - 10.4 MG/DL    Bilirubin, total 0.8 0.2 - 1.1 MG/DL    ALT (SGPT) 51 12 - 65 U/L    AST (SGOT) 16 15 - 37 U/L    Alk. phosphatase 115 50 - 130 U/L    Protein, total 7.2 6.3 - 8.2 g/dL    Albumin 3.8 3.2 - 4.6 g/dL    Globulin 3.4 2.3 - 3.5 g/dL    A-G Ratio 1.1 (L) 1.2 - 3.5     URINALYSIS W/ RFLX MICROSCOPIC    Collection Time: 06/03/21 11:05 AM   Result Value Ref Range    Color YELLOW      Appearance CLEAR      Specific gravity 1.014 1.001 - 1.023      pH (UA) 6.0 5.0 - 9.0      Protein Negative NEG mg/dL    Glucose Negative mg/dL    Ketone Negative NEG mg/dL    Bilirubin Negative NEG      Blood Negative NEG      Urobilinogen 0.2 0.2 - 1.0 EU/dL    Nitrites Negative NEG      Leukocyte Esterase Negative NEG     MAGNESIUM    Collection Time: 06/03/21 11:05 AM   Result Value Ref Range    Magnesium 1.6 (L) 1.8 - 2.4 mg/dL     Voice dictation software was used during the making of this note. This software is not perfect and grammatical and other typographical errors may be present. This note has been proofread, but may still contain errors.   Darrell Cornelius MD; 6/3/2021 @1:27 PM   ===================================================================

## 2021-06-03 NOTE — DISCHARGE INSTRUCTIONS
Use walker with ambulation  Follow-up with primary care physician  Follow-up with home health services  Return to the ER for any new, worsening or life-threatening symptoms

## 2021-06-03 NOTE — PROGRESS NOTES
SW concerned regarding comments made by the patient's daughter that she lives alone with her  who has severe dementia and has been found on the floor with no one to help her get up following falls. Patient also reportedly has dementia. Patient's daughter states that she \"has a  and job and cannot care for her. \" Report made to [address] [place] [address] worker Dawna Reynolds.      [place] [facility] [place]    [facility]    * [email or web address]

## 2021-06-03 NOTE — PROGRESS NOTES
FRANK met with the patient and her daughter Julieta Munoz who was at the bedside. Patient states that she lives with her  who is demented. Per Julieth, the patient uses a walker to cane to ambulate and has had multiple falls recently. Julieta Munoz states that the patient's neighbors are sources of support and assist the patient as able. The patient is current with [facility] for PT. Patient is also current with Dr. Ian Fernandez for primary care. Julieth asked SW about STR. FRANK advised that STR is short term and dicussed long term options including VA aid and attendants, private caregivers, and assisted living. SW also discussed applying for Medicaid or grants that cover private caregivers. SW continuing to follow. Update: Patient does not meet criteria for admission to the hospital. Patient's daughter made aware and SW offered to send updated orders to [facility] for increased PT frequency and RN/PT/OT/CNA added. Patient's daughter in agreement.      Fannie Jarvis LMSW    [facility]    * [email or web address]

## 2021-06-06 ENCOUNTER — HOSPITAL ENCOUNTER (EMERGENCY)
Age: 86
Discharge: HOME | End: 2021-06-07
Payer: MEDICARE

## 2021-06-06 DIAGNOSIS — R07.89 ATYPICAL CHEST PAIN: Primary | ICD-10-CM

## 2021-06-06 LAB
ALBUMIN SERPL-MCNC: 3.6 G/DL (ref 3.2–4.6)
ALBUMIN/GLOB SERPL: 1 {RATIO} (ref 1.2–3.5)
ALP SERPL-CCNC: 121 U/L (ref 50–136)
ALT SERPL-CCNC: 27 U/L (ref 12–65)
ANION GAP SERPL CALC-SCNC: 9 MMOL/L (ref 7–16)
AST SERPL-CCNC: 17 U/L (ref 15–37)
BASOPHILS # BLD: 0.1 K/UL (ref 0–0.2)
BASOPHILS NFR BLD: 1 % (ref 0–2)
BILIRUB SERPL-MCNC: 0.5 MG/DL (ref 0.2–1.1)
BUN SERPL-MCNC: 19 MG/DL (ref 8–23)
CALCIUM SERPL-MCNC: 9.7 MG/DL (ref 8.3–10.4)
CHLORIDE SERPL-SCNC: 103 MMOL/L (ref 98–107)
CO2 SERPL-SCNC: 25 MMOL/L (ref 21–32)
CREAT SERPL-MCNC: 0.81 MG/DL (ref 0.6–1)
D DIMER PPP FEU-MCNC: 0.66 UG/ML(FEU)
DIFFERENTIAL METHOD BLD: NORMAL
EOSINOPHIL # BLD: 0.2 K/UL (ref 0–0.8)
EOSINOPHIL NFR BLD: 2 % (ref 0.5–7.8)
ERYTHROCYTE [DISTWIDTH] IN BLOOD BY AUTOMATED COUNT: 14.2 % (ref 11.9–14.6)
GLOBULIN SER CALC-MCNC: 3.6 G/DL (ref 2.3–3.5)
GLUCOSE SERPL-MCNC: 96 MG/DL (ref 65–100)
HCT VFR BLD AUTO: 41.8 % (ref 35.8–46.3)
HGB BLD-MCNC: 13.6 G/DL (ref 11.7–15.4)
IMM GRANULOCYTES # BLD AUTO: 0.4 K/UL (ref 0–0.5)
IMM GRANULOCYTES NFR BLD AUTO: 4 % (ref 0–5)
LIPASE SERPL-CCNC: 79 U/L (ref 73–393)
LYMPHOCYTES # BLD: 2.7 K/UL (ref 0.5–4.6)
LYMPHOCYTES NFR BLD: 26 % (ref 13–44)
MAGNESIUM SERPL-MCNC: 1.3 MG/DL (ref 1.8–2.4)
MCH RBC QN AUTO: 29.1 PG (ref 26.1–32.9)
MCHC RBC AUTO-ENTMCNC: 32.5 G/DL (ref 31.4–35)
MCV RBC AUTO: 89.3 FL (ref 79.6–97.8)
MONOCYTES # BLD: 0.8 K/UL (ref 0.1–1.3)
MONOCYTES NFR BLD: 7 % (ref 4–12)
NEUTS SEG # BLD: 6.1 K/UL (ref 1.7–8.2)
NEUTS SEG NFR BLD: 59 % (ref 43–78)
NRBC # BLD: 0 K/UL (ref 0–0.2)
PLATELET # BLD AUTO: 284 K/UL (ref 150–450)
PMV BLD AUTO: 10 FL (ref 9.4–12.3)
POTASSIUM SERPL-SCNC: 3.8 MMOL/L (ref 3.5–5.1)
PROT SERPL-MCNC: 7.2 G/DL (ref 6.3–8.2)
RBC # BLD AUTO: 4.68 M/UL (ref 4.05–5.2)
SODIUM SERPL-SCNC: 137 MMOL/L (ref 136–145)
TROPONIN-HIGH SENSITIVITY: 10.4 PG/ML (ref 0–14)
TROPONIN-HIGH SENSITIVITY: 11 PG/ML (ref 0–14)
WBC # BLD AUTO: 10.3 K/UL (ref 4.3–11.1)

## 2021-06-06 PROCEDURE — 93005 ELECTROCARDIOGRAM TRACING: CPT

## 2021-06-06 PROCEDURE — 94762 N-INVAS EAR/PLS OXIMTRY CONT: CPT

## 2021-06-06 PROCEDURE — 83690 ASSAY OF LIPASE: CPT

## 2021-06-06 PROCEDURE — 85025 COMPLETE CBC W/AUTO DIFF WBC: CPT

## 2021-06-06 PROCEDURE — 84484 ASSAY OF TROPONIN QUANT: CPT

## 2021-06-06 PROCEDURE — 99285 EMERGENCY DEPT VISIT HI MDM: CPT

## 2021-06-06 PROCEDURE — 83735 ASSAY OF MAGNESIUM: CPT

## 2021-06-06 PROCEDURE — 85379 FIBRIN DEGRADATION QUANT: CPT

## 2021-06-06 PROCEDURE — 80053 COMPREHEN METABOLIC PANEL: CPT

## 2021-06-06 RX ORDER — SODIUM CHLORIDE 0.9 % (FLUSH) 0.9 %
5-10 SYRINGE (ML) INJECTION AS NEEDED
Status: DISCONTINUED | OUTPATIENT
Start: 2021-06-06 | End: 2021-06-07 | Stop reason: HOSPADM

## 2021-06-06 RX ORDER — SODIUM CHLORIDE 0.9 % (FLUSH) 0.9 %
5-10 SYRINGE (ML) INJECTION EVERY 8 HOURS
Status: DISCONTINUED | OUTPATIENT
Start: 2021-06-06 | End: 2021-06-07 | Stop reason: HOSPADM

## 2021-06-07 VITALS
WEIGHT: 116 LBS | TEMPERATURE: 97.8 F | OXYGEN SATURATION: 96 % | HEART RATE: 62 BPM | RESPIRATION RATE: 26 BRPM | DIASTOLIC BLOOD PRESSURE: 70 MMHG | SYSTOLIC BLOOD PRESSURE: 120 MMHG | BODY MASS INDEX: 25.03 KG/M2 | HEIGHT: 57 IN

## 2021-06-07 LAB
ATRIAL RATE: 58 BPM
CALCULATED R AXIS, ECG10: 34 DEGREES
CALCULATED T AXIS, ECG11: -54 DEGREES
DIAGNOSIS, 93000: NORMAL
Q-T INTERVAL, ECG07: 438 MS
QRS DURATION, ECG06: 90 MS
QTC CALCULATION (BEZET), ECG08: 433 MS
VENTRICULAR RATE, ECG03: 59 BPM

## 2021-06-07 NOTE — ED TRIAGE NOTES
EMS reports that the pt was seen at another facility and the family members are concerned about the pain that the pt is having.   Family members stated \" we can't handle her pain at home so I guess we need to take her to the hospital.\" pt c/o chest pain and arm pain

## 2021-06-07 NOTE — ED NOTES
I have reviewed discharge instructions with the caregiver. The caregiver verbalized understanding. Patient left ED via Discharge Method: ambulatory to Home with family). Opportunity for questions and clarification provided. Patient given 0 scripts. To continue your aftercare when you leave the hospital, you may receive an automated call from our care team to check in on how you are doing. This is a free service and part of our promise to provide the best care and service to meet your aftercare needs.  If you have questions, or wish to unsubscribe from this service please call [phone]. Thank you for Choosing our [address] [place] [address] Emergency Department.

## 2021-06-07 NOTE — ED PROVIDER NOTES
63-year-old female complaining of chest pain. Patient states that chest pain    Patient fell 2 weeks ago fracturing her forearm on the left side she has a sling on it. No shortness of breath. Chest Pain   This is a recurrent problem. The current episode started 6 to 12 hours ago. The problem has not changed since onset. The pain is associated with normal activity. The pain is present in the left side. The pain is at a severity of 6/10. The pain is moderate. The quality of the pain is described as dull.         Past Medical History:   Diagnosis Date    Acute kidney failure 05/2016    Acute pericarditis 2015    Arthritis     CAP (community acquired pneumonia) 5/2016    Coronary artery disease     Crohn's disease     Hypercalcemia     Hypercholesterolemia     Hypertension     Lumbar compression fracture     Osteoporosis     Type 2 diabetes mellitus        Past Surgical History:   Procedure Laterality Date    HX ARTHROPLASTY  1/5/2016    left elbow    HX HEART CATHETERIZATION  5/12/2015    no intervention    HX HYSTERECTOMY  1978    HX KYPHOPLASTY  ~2002    lumbar         Family History:   Problem Relation Age of Onset    Heart Failure Mother     Diabetes Mother     Cancer Father         throat    Alcohol abuse Neg Hx     Arthritis-rheumatoid Neg Hx     Asthma Neg Hx     Bleeding Prob Neg Hx     Elevated Lipids Neg Hx     Headache Neg Hx     Hypertension Neg Hx     Lung Disease Neg Hx     Migraines Neg Hx     Psychiatric Disorder Neg Hx     Stroke Neg Hx     Mental Retardation Neg Hx     Thyroid Disease Neg Hx        Social History     Socioeconomic History    Marital status:      Spouse name: Not on file    Number of children: Not on file    Years of education: Not on file    Highest education level: Not on file   Occupational History    Not on file   Tobacco Use    Smoking status: Former Smoker     Types: Cigarettes     Start date: 6/14/1961     Quit date: 6/14/1964 Years since quittin.0    Smokeless tobacco: Never Used    Tobacco comment: 2 packs a wk   Substance and Sexual Activity    Alcohol use: Yes     Alcohol/week: 0.0 standard drinks     Comment: rarely    Drug use: No    Sexual activity: Never   Other Topics Concern    Not on file   Social History Narrative    Pt , lives with . Retired. She worked as an . She has 1 dog and 1 cat at home. Social Determinants of Health     Financial Resource Strain:     Difficulty of Paying Living Expenses:    Food Insecurity:     Worried About Running Out of Food in the Last Year:     [address] in the Last Year:    Transportation Needs:     Lack of Transportation (Medical):  Lack of Transportation (Non-Medical):    Physical Activity:     Days of Exercise per Week:     Minutes of Exercise per Session:    Stress:     Feeling of Stress :    Social Connections:     Frequency of Communication with Friends and Family:     Frequency of Social Gatherings with Friends and Family:     Attends Islam Services:     Active Member of Clubs or Organizations:     Attends Club or Organization Meetings:     Marital Status:    Intimate Partner Violence:     Fear of Current or Ex-Partner:     Emotionally Abused:     Physically Abused:     Sexually Abused: ALLERGIES: Sulfa (sulfonamide antibiotics), Other medication, and Tape [adhesive]    Review of Systems   Constitutional: Negative. Negative for activity change. HENT: Negative. Eyes: Negative. Respiratory: Negative. Cardiovascular: Positive for chest pain. Gastrointestinal: Negative. Genitourinary: Negative. Musculoskeletal: Negative. Skin: Negative. Neurological: Negative. Psychiatric/Behavioral: Negative. All other systems reviewed and are negative.       Vitals:    218 21   BP: 120/62    Pulse: 61    Resp: 18    Temp:  97.8 °F (36.6 °C)   SpO2: 98%    Weight: 52.6 kg (116 lb)    Height: 4' 9\" (1.448 m)             Physical Exam  Vitals and nursing note reviewed. Constitutional:       General: She is not in acute distress. Appearance: Normal appearance. She is well-developed. She is not ill-appearing, toxic-appearing or diaphoretic. HENT:      Head: Normocephalic and atraumatic. No right periorbital erythema or left periorbital erythema. Jaw: There is normal jaw occlusion. Salivary Glands: Right salivary gland is not diffusely enlarged. Left salivary gland is not diffusely enlarged. Right Ear: External ear normal.      Left Ear: External ear normal.      Nose: Nose normal. No congestion or rhinorrhea. Mouth/Throat:      Mouth: Mucous membranes are moist.      Pharynx: No oropharyngeal exudate or posterior oropharyngeal erythema. Eyes:      General: Lids are normal. No scleral icterus. Right eye: No discharge. Left eye: No discharge. Extraocular Movements: Extraocular movements intact. Conjunctiva/sclera: Conjunctivae normal.      Right eye: Right conjunctiva is not injected. Left eye: Left conjunctiva is not injected. Pupils: Pupils are equal, round, and reactive to light. Neck:      Thyroid: No thyroid mass. Vascular: No JVD. Trachea: Trachea and phonation normal.   Cardiovascular:      Rate and Rhythm: Normal rate and regular rhythm. Pulses: Normal pulses. Heart sounds: Normal heart sounds. Heart sounds not distant. No murmur heard. No friction rub. No gallop. Pulmonary:      Effort: Pulmonary effort is normal. No tachypnea, accessory muscle usage, respiratory distress or retractions. Breath sounds: No stridor. No decreased breath sounds, wheezing, rhonchi or rales. Chest:      Chest wall: No tenderness. Abdominal:      General: Abdomen is flat. Bowel sounds are normal. There is no distension. There are no signs of injury. Palpations: Abdomen is soft.  There is no fluid wave, mass or pulsatile mass. Tenderness: There is no abdominal tenderness. There is no guarding or rebound. Musculoskeletal:         General: No swelling, deformity or signs of injury. Normal range of motion. Left forearm: Tenderness present. Cervical back: Full passive range of motion without pain, normal range of motion and neck supple. No erythema, signs of trauma or rigidity. No pain with movement or muscular tenderness. Normal range of motion. Right lower leg: No edema. Left lower leg: No edema. Comments: Cast on left forearm patient has a sling on. Lymphadenopathy:      Cervical: No cervical adenopathy. Skin:     General: Skin is warm and dry. Capillary Refill: Capillary refill takes less than 2 seconds. Coloration: Skin is not jaundiced or pale. Findings: No bruising, erythema or rash. Neurological:      General: No focal deficit present. Mental Status: She is alert and oriented to person, place, and time. Mental status is at baseline. GCS: GCS eye subscore is 4. GCS verbal subscore is 5. GCS motor subscore is 6. Cranial Nerves: No dysarthria or facial asymmetry. Sensory: No sensory deficit. Motor: No weakness or tremor. Coordination: Coordination normal.   Psychiatric:         Mood and Affect: Mood normal.         Behavior: Behavior normal. Behavior is cooperative. Thought Content: Thought content normal.         Judgment: Judgment normal.          MDM  Number of Diagnoses or Management Options  Atypical chest pain: new and requires workup  Diagnosis management comments: Differential diagnosis: AMI, PE, pericarditis, viral illness, musculoskeletal pain.     Patient has a Left Arm Sling on Right Her Neck This May Be Contributing to Musculoskeletal Pain Anterior Chest Position Actually Improves the Pain When She Is Lying down and Does Not Have the Cast against Her Chest.       Amount and/or Complexity of Data Reviewed  Clinical lab tests: ordered and reviewed  Tests in the radiology section of CPT®: reviewed and ordered  Tests in the medicine section of CPT®: ordered and reviewed  Decide to obtain previous medical records or to obtain history from someone other than the patient: yes  Review and summarize past medical records: yes  Independent visualization of images, tracings, or specimens: yes    Risk of Complications, Morbidity, and/or Mortality  Presenting problems: high  Diagnostic procedures: high  Management options: high    Patient Progress  Patient progress: stable         EKG    Date/Time: 6/6/2021 11:54 PM  Performed by: Dinh Aguilar MD  Authorized by: Dinh Aguilar MD     ECG reviewed by ED Physician in the absence of a cardiologist: yes    Previous ECG:     Previous ECG:  Compared to current    Similarity:  No change  Interpretation:     Interpretation: abnormal    Rate:     ECG rate assessment: normal    Rhythm:     Rhythm: sinus rhythm    Conduction:     Conduction: normal    ST segments:     ST segments:  Non-specific  T waves:     T waves: inverted      Inverted:  V6, V5 and V4

## 2021-08-12 ENCOUNTER — HOSPITAL ENCOUNTER (OUTPATIENT)
Dept: LAB | Age: 86
Discharge: HOME | End: 2021-08-12

## 2021-08-12 PROCEDURE — 88305 TISSUE EXAM BY PATHOLOGIST: CPT

## 2021-09-02 ENCOUNTER — APPOINTMENT (OUTPATIENT)
Dept: GENERAL RADIOLOGY | Age: 86
End: 2021-09-02
Attending: EMERGENCY MEDICINE
Payer: MEDICARE

## 2021-09-02 ENCOUNTER — HOSPITAL ENCOUNTER (EMERGENCY)
Age: 86
Discharge: HOME | End: 2021-09-02
Attending: EMERGENCY MEDICINE
Payer: MEDICARE

## 2021-09-02 VITALS
DIASTOLIC BLOOD PRESSURE: 62 MMHG | HEART RATE: 72 BPM | OXYGEN SATURATION: 97 % | RESPIRATION RATE: 16 BRPM | SYSTOLIC BLOOD PRESSURE: 120 MMHG | WEIGHT: 115 LBS | BODY MASS INDEX: 24.81 KG/M2 | HEIGHT: 57 IN | TEMPERATURE: 98.8 F

## 2021-09-02 DIAGNOSIS — N39.0 URINARY TRACT INFECTION WITHOUT HEMATURIA, SITE UNSPECIFIED: Primary | ICD-10-CM

## 2021-09-02 DIAGNOSIS — S00.81XA ABRASION OF FACE, INITIAL ENCOUNTER: ICD-10-CM

## 2021-09-02 DIAGNOSIS — G25.81 RESTLESS LEG SYNDROME: ICD-10-CM

## 2021-09-02 LAB
ANION GAP SERPL CALC-SCNC: 7 MMOL/L (ref 7–16)
BACTERIA URNS QL MICRO: ABNORMAL /HPF
BASOPHILS # BLD: 0.1 K/UL (ref 0–0.2)
BASOPHILS NFR BLD: 0 % (ref 0–2)
BUN SERPL-MCNC: 16 MG/DL (ref 8–23)
CALCIUM SERPL-MCNC: 9.6 MG/DL (ref 8.3–10.4)
CASTS URNS QL MICRO: ABNORMAL /LPF
CHLORIDE SERPL-SCNC: 97 MMOL/L (ref 98–107)
CO2 SERPL-SCNC: 29 MMOL/L (ref 21–32)
CREAT SERPL-MCNC: 0.72 MG/DL (ref 0.6–1)
DIFFERENTIAL METHOD BLD: ABNORMAL
EOSINOPHIL # BLD: 0.1 K/UL (ref 0–0.8)
EOSINOPHIL NFR BLD: 1 % (ref 0.5–7.8)
EPI CELLS #/AREA URNS HPF: 0 /HPF
ERYTHROCYTE [DISTWIDTH] IN BLOOD BY AUTOMATED COUNT: 12.6 % (ref 11.9–14.6)
GLUCOSE SERPL-MCNC: 176 MG/DL (ref 65–100)
HCT VFR BLD AUTO: 39.3 % (ref 35.8–46.3)
HGB BLD-MCNC: 13.3 G/DL (ref 11.7–15.4)
IMM GRANULOCYTES # BLD AUTO: 0.4 K/UL (ref 0–0.5)
IMM GRANULOCYTES NFR BLD AUTO: 2 % (ref 0–5)
LYMPHOCYTES # BLD: 1.7 K/UL (ref 0.5–4.6)
LYMPHOCYTES NFR BLD: 10 % (ref 13–44)
MAGNESIUM SERPL-MCNC: 1.6 MG/DL (ref 1.8–2.4)
MCH RBC QN AUTO: 30.3 PG (ref 26.1–32.9)
MCHC RBC AUTO-ENTMCNC: 33.8 G/DL (ref 31.4–35)
MCV RBC AUTO: 89.5 FL (ref 79.6–97.8)
MONOCYTES # BLD: 1.3 K/UL (ref 0.1–1.3)
MONOCYTES NFR BLD: 7 % (ref 4–12)
NEUTS SEG # BLD: 14.6 K/UL (ref 1.7–8.2)
NEUTS SEG NFR BLD: 80 % (ref 43–78)
NRBC # BLD: 0 K/UL (ref 0–0.2)
PLATELET # BLD AUTO: 231 K/UL (ref 150–450)
PMV BLD AUTO: 10.1 FL (ref 9.4–12.3)
POTASSIUM SERPL-SCNC: 3.8 MMOL/L (ref 3.5–5.1)
RBC # BLD AUTO: 4.39 M/UL (ref 4.05–5.2)
RBC #/AREA URNS HPF: ABNORMAL /HPF
SODIUM SERPL-SCNC: 133 MMOL/L (ref 136–145)
WBC # BLD AUTO: 18.2 K/UL (ref 4.3–11.1)
WBC URNS QL MICRO: >100 /HPF

## 2021-09-02 PROCEDURE — 81003 URINALYSIS AUTO W/O SCOPE: CPT

## 2021-09-02 PROCEDURE — 74011250637 HC RX REV CODE- 250/637: Performed by: EMERGENCY MEDICINE

## 2021-09-02 PROCEDURE — 71045 X-RAY EXAM CHEST 1 VIEW: CPT

## 2021-09-02 PROCEDURE — 80048 BASIC METABOLIC PNL TOTAL CA: CPT

## 2021-09-02 PROCEDURE — 85025 COMPLETE CBC W/AUTO DIFF WBC: CPT

## 2021-09-02 PROCEDURE — 99285 EMERGENCY DEPT VISIT HI MDM: CPT

## 2021-09-02 PROCEDURE — 81015 MICROSCOPIC EXAM OF URINE: CPT

## 2021-09-02 PROCEDURE — 83735 ASSAY OF MAGNESIUM: CPT

## 2021-09-02 RX ORDER — HYDROCODONE BITARTRATE AND ACETAMINOPHEN 5; 325 MG/1; MG/1
1 TABLET ORAL ONCE
Status: COMPLETED | OUTPATIENT
Start: 2021-09-02 | End: 2021-09-02

## 2021-09-02 RX ORDER — LORAZEPAM 0.5 MG/1
0.5 TABLET ORAL
Status: COMPLETED | OUTPATIENT
Start: 2021-09-02 | End: 2021-09-02

## 2021-09-02 RX ORDER — CEPHALEXIN 500 MG/1
500 CAPSULE ORAL 4 TIMES DAILY
Qty: 28 CAPSULE | Refills: 0 | Status: SHIPPED | OUTPATIENT
Start: 2021-09-02 | End: 2021-09-09

## 2021-09-02 RX ORDER — ROPINIROLE 2 MG/1
2 TABLET, FILM COATED ORAL
Status: COMPLETED | OUTPATIENT
Start: 2021-09-02 | End: 2021-09-02

## 2021-09-02 RX ORDER — CEPHALEXIN 500 MG/1
500 CAPSULE ORAL
Status: COMPLETED | OUTPATIENT
Start: 2021-09-02 | End: 2021-09-02

## 2021-09-02 RX ADMIN — ROPINIROLE HYDROCHLORIDE 2 MG: 2 TABLET, FILM COATED ORAL at 06:00

## 2021-09-02 RX ADMIN — CEPHALEXIN 500 MG: 500 CAPSULE ORAL at 07:20

## 2021-09-02 RX ADMIN — LORAZEPAM 0.5 MG: 0.5 TABLET ORAL at 07:20

## 2021-09-02 RX ADMIN — HYDROCODONE BITARTRATE AND ACETAMINOPHEN 1 TABLET: 5; 325 TABLET ORAL at 06:00

## 2021-09-02 NOTE — ED NOTES
I have reviewed discharge instructions with the patient. The patient verbalized understanding. Patient left ED via Discharge Method: wheelchair to Home with self  . Opportunity for questions and clarification provided. Patient given 1 scripts. To continue your aftercare when you leave the hospital, you may receive an automated call from our care team to check in on how you are doing. This is a free service and part of our promise to provide the best care and service to meet your aftercare needs.  If you have questions, or wish to unsubscribe from this service please call [phone]. Thank you for Choosing our [facility] Department.

## 2021-09-02 NOTE — ED NOTES
Pr from home     Home address is [address] [place] [address]

## 2021-09-02 NOTE — ED NOTES
Neosporin and bandages placed on wound on left cheek and left elbow.   Pt sitting up eating crackers and drinking/ pt requesting daughter be called for ride home

## 2021-09-18 ENCOUNTER — ANESTHESIA EVENT (OUTPATIENT)
Dept: SURGERY | Age: 86
DRG: 481 | End: 2021-09-18
Payer: MEDICARE

## 2021-09-18 ENCOUNTER — APPOINTMENT (OUTPATIENT)
Dept: GENERAL RADIOLOGY | Age: 86
DRG: 481 | End: 2021-09-18
Attending: EMERGENCY MEDICINE
Payer: MEDICARE

## 2021-09-18 ENCOUNTER — HOSPITAL ENCOUNTER (INPATIENT)
Age: 86
LOS: 4 days | Discharge: NURSING, REHAB OR LONG-TERM CARE | DRG: 481 | End: 2021-09-22
Attending: EMERGENCY MEDICINE | Admitting: FAMILY MEDICINE
Payer: MEDICARE

## 2021-09-18 DIAGNOSIS — S72.142A INTERTROCHANTERIC FRACTURE OF LEFT FEMUR, CLOSED, INITIAL ENCOUNTER (HCC): ICD-10-CM

## 2021-09-18 DIAGNOSIS — S72.001A CLOSED FRACTURE OF RIGHT HIP, INITIAL ENCOUNTER (HCC): Primary | ICD-10-CM

## 2021-09-18 LAB
ABO + RH BLD: NORMAL
ALBUMIN SERPL-MCNC: 3.4 G/DL (ref 3.2–4.6)
ALBUMIN/GLOB SERPL: 0.9 {RATIO} (ref 1.2–3.5)
ALP SERPL-CCNC: 122 U/L (ref 50–136)
ALT SERPL-CCNC: 26 U/L (ref 12–65)
ANION GAP SERPL CALC-SCNC: 7 MMOL/L (ref 7–16)
APPEARANCE UR: ABNORMAL
AST SERPL-CCNC: 19 U/L (ref 15–37)
ATRIAL RATE: 66 BPM
BACTERIA URNS QL MICRO: ABNORMAL /HPF
BASOPHILS # BLD: 0.1 K/UL (ref 0–0.2)
BASOPHILS NFR BLD: 1 % (ref 0–2)
BILIRUB SERPL-MCNC: 0.9 MG/DL (ref 0.2–1.1)
BILIRUB UR QL: NEGATIVE
BLOOD GROUP ANTIBODIES SERPL: NORMAL
BUN SERPL-MCNC: 20 MG/DL (ref 8–23)
CALCIUM SERPL-MCNC: 10.2 MG/DL (ref 8.3–10.4)
CALCULATED P AXIS, ECG09: 105 DEGREES
CALCULATED R AXIS, ECG10: 44 DEGREES
CALCULATED T AXIS, ECG11: -51 DEGREES
CASTS URNS QL MICRO: ABNORMAL /LPF
CHLORIDE SERPL-SCNC: 102 MMOL/L (ref 98–107)
CO2 SERPL-SCNC: 26 MMOL/L (ref 21–32)
COLOR UR: YELLOW
COVID-19 RAPID TEST, COVR: NOT DETECTED
CREAT SERPL-MCNC: 0.94 MG/DL (ref 0.6–1)
DIAGNOSIS, 93000: NORMAL
DIFFERENTIAL METHOD BLD: ABNORMAL
EOSINOPHIL # BLD: 0.2 K/UL (ref 0–0.8)
EOSINOPHIL NFR BLD: 2 % (ref 0.5–7.8)
EPI CELLS #/AREA URNS HPF: 0 /HPF
ERYTHROCYTE [DISTWIDTH] IN BLOOD BY AUTOMATED COUNT: 12.9 % (ref 11.9–14.6)
GLOBULIN SER CALC-MCNC: 3.6 G/DL (ref 2.3–3.5)
GLUCOSE BLD STRIP.AUTO-MCNC: 202 MG/DL (ref 65–100)
GLUCOSE SERPL-MCNC: 211 MG/DL (ref 65–100)
GLUCOSE UR STRIP.AUTO-MCNC: 250 MG/DL
HCT VFR BLD AUTO: 39.5 % (ref 35.8–46.3)
HGB BLD-MCNC: 12.8 G/DL (ref 11.7–15.4)
HGB UR QL STRIP: NEGATIVE
IMM GRANULOCYTES # BLD AUTO: 0.8 K/UL (ref 0–0.5)
IMM GRANULOCYTES NFR BLD AUTO: 7 % (ref 0–5)
INR PPP: 1
KETONES UR QL STRIP.AUTO: ABNORMAL MG/DL
LEUKOCYTE ESTERASE UR QL STRIP.AUTO: ABNORMAL
LYMPHOCYTES # BLD: 0.8 K/UL (ref 0.5–4.6)
LYMPHOCYTES NFR BLD: 7 % (ref 13–44)
MCH RBC QN AUTO: 29 PG (ref 26.1–32.9)
MCHC RBC AUTO-ENTMCNC: 32.4 G/DL (ref 31.4–35)
MCV RBC AUTO: 89.4 FL (ref 79.6–97.8)
MONOCYTES # BLD: 0.4 K/UL (ref 0.1–1.3)
MONOCYTES NFR BLD: 4 % (ref 4–12)
NEUTS SEG # BLD: 8.5 K/UL (ref 1.7–8.2)
NEUTS SEG NFR BLD: 79 % (ref 43–78)
NITRITE UR QL STRIP.AUTO: POSITIVE
NRBC # BLD: 0 K/UL (ref 0–0.2)
P-R INTERVAL, ECG05: 168 MS
PH UR STRIP: 6 [PH] (ref 5–9)
PLATELET # BLD AUTO: 272 K/UL (ref 150–450)
PLATELET COMMENTS,PCOM: ADEQUATE
PMV BLD AUTO: 10.1 FL (ref 9.4–12.3)
POTASSIUM SERPL-SCNC: 4.2 MMOL/L (ref 3.5–5.1)
PROT SERPL-MCNC: 7 G/DL (ref 6.3–8.2)
PROT UR STRIP-MCNC: NEGATIVE MG/DL
PROTHROMBIN TIME: 13.7 SEC (ref 12.6–14.5)
Q-T INTERVAL, ECG07: 390 MS
QRS DURATION, ECG06: 88 MS
QTC CALCULATION (BEZET), ECG08: 408 MS
RBC # BLD AUTO: 4.42 M/UL (ref 4.05–5.2)
RBC #/AREA URNS HPF: ABNORMAL /HPF
RBC MORPH BLD: ABNORMAL
SERVICE CMNT-IMP: ABNORMAL
SODIUM SERPL-SCNC: 135 MMOL/L (ref 136–145)
SOURCE, COVRS: NORMAL
SP GR UR REFRACTOMETRY: 1.02 (ref 1–1.02)
SPECIMEN EXP DATE BLD: NORMAL
TROPONIN-HIGH SENSITIVITY: 11.5 PG/ML (ref 0–14)
UROBILINOGEN UR QL STRIP.AUTO: 0.2 EU/DL (ref 0.2–1)
VENTRICULAR RATE, ECG03: 66 BPM
WBC # BLD AUTO: 10.8 K/UL (ref 4.3–11.1)
WBC MORPH BLD: ABNORMAL
WBC URNS QL MICRO: >100 /HPF

## 2021-09-18 PROCEDURE — 87086 URINE CULTURE/COLONY COUNT: CPT

## 2021-09-18 PROCEDURE — 73502 X-RAY EXAM HIP UNI 2-3 VIEWS: CPT

## 2021-09-18 PROCEDURE — 86901 BLOOD TYPING SEROLOGIC RH(D): CPT

## 2021-09-18 PROCEDURE — 74011250637 HC RX REV CODE- 250/637: Performed by: FAMILY MEDICINE

## 2021-09-18 PROCEDURE — 87088 URINE BACTERIA CULTURE: CPT

## 2021-09-18 PROCEDURE — 74011250636 HC RX REV CODE- 250/636: Performed by: FAMILY MEDICINE

## 2021-09-18 PROCEDURE — 93005 ELECTROCARDIOGRAM TRACING: CPT | Performed by: EMERGENCY MEDICINE

## 2021-09-18 PROCEDURE — 65270000029 HC RM PRIVATE

## 2021-09-18 PROCEDURE — 74011000258 HC RX REV CODE- 258: Performed by: FAMILY MEDICINE

## 2021-09-18 PROCEDURE — 71045 X-RAY EXAM CHEST 1 VIEW: CPT

## 2021-09-18 PROCEDURE — 74011636637 HC RX REV CODE- 636/637: Performed by: FAMILY MEDICINE

## 2021-09-18 PROCEDURE — 82962 GLUCOSE BLOOD TEST: CPT

## 2021-09-18 PROCEDURE — 99284 EMERGENCY DEPT VISIT MOD MDM: CPT

## 2021-09-18 PROCEDURE — 51702 INSERT TEMP BLADDER CATH: CPT

## 2021-09-18 PROCEDURE — 81001 URINALYSIS AUTO W/SCOPE: CPT

## 2021-09-18 PROCEDURE — 85610 PROTHROMBIN TIME: CPT

## 2021-09-18 PROCEDURE — 87635 SARS-COV-2 COVID-19 AMP PRB: CPT

## 2021-09-18 PROCEDURE — 80053 COMPREHEN METABOLIC PANEL: CPT

## 2021-09-18 PROCEDURE — 2709999900 HC NON-CHARGEABLE SUPPLY

## 2021-09-18 PROCEDURE — 84484 ASSAY OF TROPONIN QUANT: CPT

## 2021-09-18 PROCEDURE — 73552 X-RAY EXAM OF FEMUR 2/>: CPT

## 2021-09-18 PROCEDURE — 87186 SC STD MICRODIL/AGAR DIL: CPT

## 2021-09-18 PROCEDURE — 85025 COMPLETE CBC W/AUTO DIFF WBC: CPT

## 2021-09-18 RX ORDER — SODIUM CHLORIDE 0.9 % (FLUSH) 0.9 %
5-40 SYRINGE (ML) INJECTION AS NEEDED
Status: DISCONTINUED | OUTPATIENT
Start: 2021-09-18 | End: 2021-09-20

## 2021-09-18 RX ORDER — ACETAMINOPHEN 650 MG/1
650 SUPPOSITORY RECTAL
Status: DISCONTINUED | OUTPATIENT
Start: 2021-09-18 | End: 2021-09-22 | Stop reason: HOSPADM

## 2021-09-18 RX ORDER — FAMOTIDINE 20 MG/1
20 TABLET, FILM COATED ORAL 2 TIMES DAILY
Status: DISCONTINUED | OUTPATIENT
Start: 2021-09-18 | End: 2021-09-20

## 2021-09-18 RX ORDER — SODIUM CHLORIDE, SODIUM LACTATE, POTASSIUM CHLORIDE, CALCIUM CHLORIDE 600; 310; 30; 20 MG/100ML; MG/100ML; MG/100ML; MG/100ML
75 INJECTION, SOLUTION INTRAVENOUS CONTINUOUS
Status: DISPENSED | OUTPATIENT
Start: 2021-09-18 | End: 2021-09-19

## 2021-09-18 RX ORDER — TRAZODONE HYDROCHLORIDE 50 MG/1
25-50 TABLET ORAL
Status: DISCONTINUED | OUTPATIENT
Start: 2021-09-18 | End: 2021-09-22 | Stop reason: HOSPADM

## 2021-09-18 RX ORDER — ONDANSETRON 2 MG/ML
4 INJECTION INTRAMUSCULAR; INTRAVENOUS
Status: DISCONTINUED | OUTPATIENT
Start: 2021-09-18 | End: 2021-09-19 | Stop reason: SDUPTHER

## 2021-09-18 RX ORDER — ACETAMINOPHEN 325 MG/1
650 TABLET ORAL
Status: DISCONTINUED | OUTPATIENT
Start: 2021-09-18 | End: 2021-09-22 | Stop reason: HOSPADM

## 2021-09-18 RX ORDER — OXYCODONE HYDROCHLORIDE 5 MG/1
5 TABLET ORAL
Status: DISCONTINUED | OUTPATIENT
Start: 2021-09-18 | End: 2021-09-19 | Stop reason: ALTCHOICE

## 2021-09-18 RX ORDER — MORPHINE SULFATE 2 MG/ML
2-4 INJECTION, SOLUTION INTRAMUSCULAR; INTRAVENOUS
Status: DISCONTINUED | OUTPATIENT
Start: 2021-09-18 | End: 2021-09-22 | Stop reason: HOSPADM

## 2021-09-18 RX ORDER — NALOXONE HYDROCHLORIDE 0.4 MG/ML
0.2 INJECTION, SOLUTION INTRAMUSCULAR; INTRAVENOUS; SUBCUTANEOUS
Status: DISCONTINUED | OUTPATIENT
Start: 2021-09-18 | End: 2021-09-22 | Stop reason: HOSPADM

## 2021-09-18 RX ORDER — POLYETHYLENE GLYCOL 3350 17 G/17G
17 POWDER, FOR SOLUTION ORAL DAILY PRN
Status: DISCONTINUED | OUTPATIENT
Start: 2021-09-18 | End: 2021-09-22 | Stop reason: HOSPADM

## 2021-09-18 RX ORDER — ONDANSETRON 4 MG/1
4 TABLET, ORALLY DISINTEGRATING ORAL
Status: DISCONTINUED | OUTPATIENT
Start: 2021-09-18 | End: 2021-09-22 | Stop reason: HOSPADM

## 2021-09-18 RX ORDER — DONEPEZIL HYDROCHLORIDE 5 MG/1
10 TABLET, FILM COATED ORAL
Status: DISCONTINUED | OUTPATIENT
Start: 2021-09-18 | End: 2021-09-22 | Stop reason: HOSPADM

## 2021-09-18 RX ORDER — MAG HYDROX/ALUMINUM HYD/SIMETH 200-200-20
15 SUSPENSION, ORAL (FINAL DOSE FORM) ORAL
Status: DISCONTINUED | OUTPATIENT
Start: 2021-09-18 | End: 2021-09-22 | Stop reason: HOSPADM

## 2021-09-18 RX ORDER — FOLIC ACID 1 MG/1
1 TABLET ORAL DAILY
Status: DISCONTINUED | OUTPATIENT
Start: 2021-09-19 | End: 2021-09-22 | Stop reason: HOSPADM

## 2021-09-18 RX ORDER — LANOLIN ALCOHOL/MO/W.PET/CERES
1000 CREAM (GRAM) TOPICAL DAILY
Status: DISCONTINUED | OUTPATIENT
Start: 2021-09-19 | End: 2021-09-22 | Stop reason: HOSPADM

## 2021-09-18 RX ORDER — MELATONIN
2000 DAILY
Status: DISCONTINUED | OUTPATIENT
Start: 2021-09-19 | End: 2021-09-22 | Stop reason: HOSPADM

## 2021-09-18 RX ORDER — CARVEDILOL 6.25 MG/1
6.25 TABLET ORAL 2 TIMES DAILY WITH MEALS
Status: DISCONTINUED | OUTPATIENT
Start: 2021-09-18 | End: 2021-09-22 | Stop reason: HOSPADM

## 2021-09-18 RX ORDER — ACETAMINOPHEN 500 MG
1000 TABLET ORAL ONCE
Status: COMPLETED | OUTPATIENT
Start: 2021-09-18 | End: 2021-09-18

## 2021-09-18 RX ORDER — INSULIN GLARGINE 100 [IU]/ML
10 INJECTION, SOLUTION SUBCUTANEOUS
Status: DISCONTINUED | OUTPATIENT
Start: 2021-09-18 | End: 2021-09-22 | Stop reason: HOSPADM

## 2021-09-18 RX ORDER — ATORVASTATIN CALCIUM 40 MG/1
40 TABLET, FILM COATED ORAL
Status: DISCONTINUED | OUTPATIENT
Start: 2021-09-18 | End: 2021-09-22 | Stop reason: HOSPADM

## 2021-09-18 RX ORDER — SODIUM CHLORIDE 0.9 % (FLUSH) 0.9 %
5-40 SYRINGE (ML) INJECTION EVERY 8 HOURS
Status: DISCONTINUED | OUTPATIENT
Start: 2021-09-18 | End: 2021-09-20

## 2021-09-18 RX ORDER — MORPHINE SULFATE 2 MG/ML
2 INJECTION, SOLUTION INTRAMUSCULAR; INTRAVENOUS ONCE
Status: COMPLETED | OUTPATIENT
Start: 2021-09-18 | End: 2021-09-18

## 2021-09-18 RX ADMIN — CARVEDILOL 6.25 MG: 6.25 TABLET, FILM COATED ORAL at 17:11

## 2021-09-18 RX ADMIN — SODIUM CHLORIDE, SODIUM LACTATE, POTASSIUM CHLORIDE, AND CALCIUM CHLORIDE 75 ML/HR: 600; 310; 30; 20 INJECTION, SOLUTION INTRAVENOUS at 17:11

## 2021-09-18 RX ADMIN — Medication 5 ML: at 17:12

## 2021-09-18 RX ADMIN — ACETAMINOPHEN 650 MG: 325 TABLET ORAL at 17:11

## 2021-09-18 RX ADMIN — THIAMINE HYDROCHLORIDE 500 MG: 100 INJECTION, SOLUTION INTRAMUSCULAR; INTRAVENOUS at 15:28

## 2021-09-18 RX ADMIN — DONEPEZIL HYDROCHLORIDE 10 MG: 5 TABLET, FILM COATED ORAL at 21:57

## 2021-09-18 RX ADMIN — ACETAMINOPHEN 1000 MG: 500 TABLET ORAL at 13:53

## 2021-09-18 RX ADMIN — INSULIN GLARGINE 10 UNITS: 100 INJECTION, SOLUTION SUBCUTANEOUS at 21:52

## 2021-09-18 RX ADMIN — MORPHINE SULFATE 2 MG: 2 INJECTION, SOLUTION INTRAMUSCULAR; INTRAVENOUS at 13:53

## 2021-09-18 RX ADMIN — MORPHINE SULFATE 2 MG: 2 INJECTION, SOLUTION INTRAMUSCULAR; INTRAVENOUS at 18:56

## 2021-09-18 RX ADMIN — CEFTRIAXONE 1 G: 1 INJECTION, POWDER, FOR SOLUTION INTRAMUSCULAR; INTRAVENOUS at 15:28

## 2021-09-18 RX ADMIN — ATORVASTATIN CALCIUM 40 MG: 40 TABLET, FILM COATED ORAL at 21:57

## 2021-09-18 RX ADMIN — FAMOTIDINE 20 MG: 20 TABLET, FILM COATED ORAL at 17:11

## 2021-09-18 RX ADMIN — OXYCODONE 5 MG: 5 TABLET ORAL at 21:57

## 2021-09-18 NOTE — ED PROVIDER NOTES
Chief complaint : hip pain    HISTORY OF PRESENT ILLNESS :  Location : right hip    Quality : Angulated    Quantity : 1    Timing : Just prior to arrival    Severity : Severe    Context : [employer] at home  Had been having leg cramps earlier which may have contributed to fall    Alleviating / exacerbating factors : Fentanyl from EMS improved her pain    Associated Symptoms : No back pain, no neck pain, no abdominal pain             Past Medical History:   Diagnosis Date    Acute kidney failure 2016    Acute pericarditis     Arthritis     CAP (community acquired pneumonia) 2016    Coronary artery disease     Crohn's disease     Hypercalcemia     Hypercholesterolemia     Hypertension     Lumbar compression fracture     Osteoporosis     Type 2 diabetes mellitus        Past Surgical History:   Procedure Laterality Date    HX ARTHROPLASTY  2016    left elbow    HX HEART CATHETERIZATION  2015    no intervention    HX HYSTERECTOMY      HX KYPHOPLASTY  ~    lumbar         Family History:   Problem Relation Age of Onset    Heart Failure Mother     Diabetes Mother     Cancer Father         throat    Alcohol abuse Neg Hx     Arthritis-rheumatoid Neg Hx     Asthma Neg Hx     Bleeding Prob Neg Hx     Elevated Lipids Neg Hx     Headache Neg Hx     Hypertension Neg Hx     Lung Disease Neg Hx     Migraines Neg Hx     Psychiatric Disorder Neg Hx     Stroke Neg Hx     Mental Retardation Neg Hx     Thyroid Disease Neg Hx        Social History     Socioeconomic History    Marital status:      Spouse name: Not on file    Number of children: Not on file    Years of education: Not on file    Highest education level: Not on file   Occupational History    Not on file   Tobacco Use    Smoking status: Former Smoker     Types: Cigarettes     Start date: 1961     Quit date: 1964     Years since quittin.4    Smokeless tobacco: Never Used    Tobacco comment: 2 packs a wk   Substance and Sexual Activity    Alcohol use: Yes     Alcohol/week: 0.0 standard drinks     Comment: rarely    Drug use: No    Sexual activity: Never   Other Topics Concern    Not on file   Social History Narrative    Pt , lives with . Retired. She worked as an . She has 1 dog and 1 cat at home. Social Determinants of Health     Financial Resource Strain:     Difficulty of Paying Living Expenses:    Food Insecurity:     Worried About Running Out of Food in the Last Year:     [address] in the Last Year:    Transportation Needs:     Lack of Transportation (Medical):  Lack of Transportation (Non-Medical):    Physical Activity:     Days of Exercise per Week:     Minutes of Exercise per Session:    Stress:     Feeling of Stress :    Social Connections:     Frequency of Communication with Friends and Family:     Frequency of Social Gatherings with Friends and Family:     Attends Moravian Services:     Active Member of Clubs or Organizations:     Attends Club or Organization Meetings:     Marital Status:    Intimate Partner Violence:     Fear of Current or Ex-Partner:     Emotionally Abused:     Physically Abused:     Sexually Abused: ALLERGIES: Sulfa (sulfonamide antibiotics), Other medication, and Tape [adhesive]    Review of Systems   Constitutional: Negative for chills and fever. HENT: Negative for rhinorrhea and sore throat. Eyes: Negative for discharge and redness. Respiratory: Negative for cough and shortness of breath. Cardiovascular: Negative for chest pain and palpitations. Gastrointestinal: Negative for abdominal pain, diarrhea, nausea and vomiting. Genitourinary: Negative for difficulty urinating and dysuria. Musculoskeletal: Positive for arthralgias and gait problem. Negative for back pain. Skin: Negative for rash. Neurological: Negative for dizziness and headaches.    All other systems reviewed and are negative. Vitals:    09/18/21 1128 09/18/21 1141   BP: (!) 171/77 126/60   Pulse: 67    Resp: 19    Temp: 97.9 °F (36.6 °C)    SpO2: 92% 94%   Weight: 52.6 kg (116 lb)    Height: 4' 9\" (1.448 m)             Physical Exam  Vitals and nursing note reviewed. Constitutional:       General: She is not in acute distress. Appearance: Normal appearance. She is well-developed. She is not ill-appearing, toxic-appearing or diaphoretic. HENT:      Head: Normocephalic and atraumatic. Right Ear: External ear normal.      Left Ear: External ear normal.   Eyes:      General: No scleral icterus. Right eye: No discharge. Left eye: No discharge. Extraocular Movements: Extraocular movements intact. Conjunctiva/sclera: Conjunctivae normal.      Pupils: Pupils are equal, round, and reactive to light. Neck:      Thyroid: No thyromegaly. Trachea: Trachea normal.   Cardiovascular:      Rate and Rhythm: Normal rate and regular rhythm. Heart sounds: Normal heart sounds. No murmur heard. No gallop. Pulmonary:      Effort: Pulmonary effort is normal. No respiratory distress. Breath sounds: Normal breath sounds. No wheezing or rales. Abdominal:      Palpations: Abdomen is soft. There is no hepatomegaly, splenomegaly or pulsatile mass. Tenderness: There is no abdominal tenderness. There is no guarding. Musculoskeletal:         General: Tenderness, deformity and signs of injury present. Cervical back: Normal range of motion and neck supple. Normal range of motion. Right hip: Deformity and bony tenderness present. Decreased range of motion. Left hip: Normal.        Legs:    Lymphadenopathy:      Cervical: No cervical adenopathy. Skin:     General: Skin is warm and dry. Neurological:      General: No focal deficit present. Mental Status: She is alert and oriented to person, place, and time. Mental status is at baseline.       Motor: No abnormal muscle tone. Comments: cni 2-12 grossly   Psychiatric:         Mood and Affect: Mood normal.         Behavior: Behavior normal.          MDM  Number of Diagnoses or Management Options  Closed fracture of right hip, initial encounter [facility]): new and requires workup  Diagnosis management comments: Medical decision making note:  Mechanical trip and fall with right hip fracture, admit to hospitalist service for orthopedic repairs  This concludes the \"medical decision making note\" part of this emergency department visit note. Amount and/or Complexity of Data Reviewed  Clinical lab tests: reviewed and ordered (Results Include:    Recent Results (from the past 24 hour(s))  -CBC WITH AUTOMATED DIFF  Collection Time: 09/18/21 11:32 AM       Result                      Value             Ref Range           WBC                         10.8              4.3 - 11.1 K*       RBC                         4.42              4.05 - 5.2 M*       HGB                         12.8              11.7 - 15.4 *       HCT                         39.5              35.8 - 46.3 %       MCV                         89.4              79.6 - 97.8 *       MCH                         29.0              26.1 - 32.9 *       MCHC                        32.4              31.4 - 35.0 *       RDW                         12.9              11.9 - 14.6 %       PLATELET                    272               150 - 450 K/*       MPV                         10.1              9.4 - 12.3 FL       ABSOLUTE NRBC               0.00              0.0 - 0.2 K/*       NEUTROPHILS                 79 (H)            43 - 78 %           LYMPHOCYTES                 7 (L)             13 - 44 %           MONOCYTES                   4                 4.0 - 12.0 %        EOSINOPHILS                 2                 0.5 - 7.8 %         BASOPHILS                   1                 0.0 - 2.0 %         IMMATURE GRANULOCYTES       7 (H)             0.0 - 5.0 %         ABS. NEUTROPHILS            8.5 (H)           1.7 - 8.2 K/*       ABS. LYMPHOCYTES            0.8               0.5 - 4.6 K/*       ABS. MONOCYTES              0.4               0.1 - 1.3 K/*       ABS. EOSINOPHILS            0.2               0.0 - 0.8 K/*       ABS. BASOPHILS              0.1               0.0 - 0.2 K/*       ABS. IMM. GRANS.            0.8 (H)           0.0 - 0.5 K/*       RBC COMMENTS                                                  NORMOCYTIC/NORMOCHROMIC       WBC COMMENTS                                                  Result Confirmed By Smear       PLATELET COMMENTS           ADEQUATE                              DF                          AUTOMATED                        -METABOLIC PANEL, COMPREHENSIVE  Collection Time: 09/18/21 11:32 AM       Result                      Value             Ref Range           Sodium                      135 (L)           136 - 145 mm*       Potassium                   4.2               3.5 - 5.1 mm*       Chloride                    102               98 - 107 mmo*       CO2                         26                21 - 32 mmol*       Anion gap                   7                 7 - 16 mmol/L       Glucose                     211 (H)           65 - 100 mg/*       BUN                         20                8 - 23 MG/DL        Creatinine                  0.94              0.6 - 1.0 MG*       GFR est AA                  >60               >60 ml/min/1*       GFR est non-AA              >60               >60 ml/min/1*       Calcium                     10.2              8.3 - 10.4 M*       Bilirubin, total            0.9               0.2 - 1.1 MG*       ALT (SGPT)                  26                12 - 65 U/L         AST (SGOT)                  19                15 - 37 U/L         Alk.  phosphatase            122               50 - 136 U/L        Protein, total              7.0               6.3 - 8.2 g/*       Albumin                     3.4               3.2 - 4.6 g/*       Globulin                    3.6 (H)           2.3 - 3.5 g/*       A-G Ratio                   0.9 (L)           1.2 - 3.5      -EKG, 12 LEAD, INITIAL  Collection Time: 09/18/21 11:35 AM       Result                      Value             Ref Range           Ventricular Rate            66                BPM                 Atrial Rate                 66                BPM                 P-R Interval                168               ms                  QRS Duration                88                ms                  Q-T Interval                390               ms                  QTC Calculation (Bezet)     408               ms                  Calculated P Axis           105               degrees             Calculated R Axis           44                degrees             Calculated T Axis           -51               degrees             Diagnosis                                                     !! AGE AND GENDER SPECIFIC ECG ANALYSIS !! Normal sinus rhythm T wave abnormality, consider inferior ischemia Abnormal ECG When compared with ECG of 06-JUN-2021 20:10, Non-specific change in ST segment in Lateral leads T wave inversion no longer evident in Anterior leads   )  Tests in the radiology section of CPT®: reviewed and ordered (XR HIP RT W OR WO PELV 2-3 VWS   Final Result    1. Intertrochanteric left hip fracture with significant anterior angulation. 2.  Mild chronic lung disease. XR FEMUR RT 2 VS   Final Result    1. Intertrochanteric left hip fracture with significant anterior angulation. 2.  Mild chronic lung disease. XR CHEST SNGL V   Final Result    1. Intertrochanteric left hip fracture with significant anterior angulation.     2.  Mild chronic lung disease.      )  Decide to obtain previous medical records or to obtain history from someone other than the patient: yes  Obtain history from someone other than the patient: yes (Daughter via telephone)  Discuss the patient with other providers: yes    Risk of Complications, Morbidity, and/or Mortality  Presenting problems: moderate  Diagnostic procedures: low  Management options: low    Patient Progress  Patient progress: improved         Procedures

## 2021-09-18 NOTE — PROGRESS NOTES
09/18/21 1623   Dual Skin Pressure Injury Assessment   Dual Skin Pressure Injury Assessment WDL   Primary Nurse Guanako Mercado, BHUPENDRA and GILBERTO  RN, RN performed a dual skin assessment on this patient No impairment noted  Joshua score is 19

## 2021-09-18 NOTE — PROGRESS NOTES
INITIAL SPIRITUAL ASSESSMENT     NOTED:      PATIENT IS BEING ADMITTED TO FLOOR FROM ER      [facility]    [facility]SE - NURIS AZAR -  [place]    PRIOR CODE    NO DIRECTIVES ON FILE          WILL ASSESS HOW WE CAN BEST SERVE THIS FAMILY

## 2021-09-18 NOTE — PROGRESS NOTES
TRANSFER - IN REPORT:    Verbal report received from [facility] RN(name) on Jose Zaragoza  being received from ED(unit) for routine progression of care      Report consisted of patients Situation, Background, Assessment and   Recommendations(SBAR). Information from the following report(s) SBAR was reviewed with the receiving nurse. Opportunity for questions and clarification was provided. Assessment completed upon patients arrival to unit and care assumed.

## 2021-09-18 NOTE — H&P
Hospitalist History and Physical   Admit Date:  2021 11:25 AM   Name:  Zully Cedeño   Age:  80 y.o. Sex:  female  :  1934   MRN:  [id number]   Room:  [place] [address]    Presenting Complaint: Fall    Reason(s) for Admission: Intertrochanteric fracture of left femur, closed, initial encounter [facility]) [S75.611A]     History of Present Illness:   Zully Cedeño is a 80 y.o. female with medical history of T2DM, Osteoporosis, Hypercalcemia, HTN, GERD, CVA presented with s/p fall from standing height trying to close back door around 11 am. She denies any symptoms of SOB, dizziness, chest pain, palpitations, LOC prior to fall. Admits she tripped and remembers events. Found to have L intertrochanteric fracture. Complaining of 10/10 hip pain on my eval asking for pain medicine. History of R hip replacement. She is DNR. Wants to die naturally w/o interventions if found pulseless. Does not want life support or intubation. Review of Systems:  10 systems reviewed and negative except as noted in HPI. Assessment & Plan:     Principal Problem:    Intertrochanteric fracture of left femur, closed, initial encounter ([facility] (2021)    S/p mechanical fall at home on      Consult orthopedic surgery     Make NPO and hold anticoagulation     Consult PT/OT     Start gentle IVF       UTI - start rocephin x 3 doses. FU urine cx. Osteoporosis with current fracture - check vitamin D, PTH, phos, and calcium in AM. Needs FU OP for treatment. HTN - BP stable. Reduce coreg 6.25 mg, hold amlodipine and aldactone     GERD - takes omeprazole 40 daily at home, start protonix     T2DM - A1c 7.7 on 21. takes 20 U lantus daily. Reduce to 10 daily while NPO plus FSBG q6h w/ regular insulin sliding scale. 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  No residual def    Dementia - new patient appointment with neurology recently. aricept 10 mg per PCP. Add trazodone prn for sleep and behavioral disturbances     Hypercalcemia - check PTH, vitamin D, phos. Crohn's disease - stable.        Dispo/Discharge Planning:     Admit to med/srug     Diet: NPO   VTE ppx: hold for pending surgery   Code status: DNR    Hospital Problems as of 9/18/2021 Date Reviewed: 7/20/2021        Codes Class Noted - Resolved POA    * (Principal) Intertrochanteric fracture of left femur, closed, initial encounter ([facility] ICD-10-CM: O02.438S  ICD-9-CM: 820.21  9/18/2021 - Present Yes        Diabetic polyneuropathy associated with type 2 diabetes mellitus ([facility] ICD-10-CM: E11.42  ICD-9-CM: 250.60, 357.2  6/11/2018 - Present Yes        Polyneuropathy ICD-10-CM: G62.9  ICD-9-CM: 356.9  11/20/2017 - Present Yes        Age-related osteoporosis with current pathological fracture with routine healing ICD-10-CM: M80.00XD  ICD-9-CM: V54.29, 733.01  7/6/2016 - Present Unknown        Non-PTH-dependent hypercalcemia ICD-10-CM: E83.52  ICD-9-CM: 275.42  5/9/2016 - Present Yes        Coronary artery disease involving native coronary artery of native heart with angina pectoris ([facility] ICD-10-CM: I25.119  ICD-9-CM: 414.01, 413.9  5/3/2016 - Present Yes        Dyslipidemia ICD-10-CM: E78.5  ICD-9-CM: 272.4  5/3/2016 - Present Yes        DM type 2 (diabetes mellitus, type 2) (HCC) (Chronic) ICD-10-CM: E11.9  ICD-9-CM: 250.00  3/16/2013 - Present Yes        Essential hypertension with goal blood pressure less than 140/90 ICD-10-CM: I10  ICD-9-CM: 401.9  3/16/2013 - Present Yes        Crohn disease ([facility] (Chronic) ICD-10-CM: K50.90  ICD-9-CM: 555.9  3/16/2013 - Present Yes              Past Medical History:   Diagnosis Date    Acute kidney failure 05/2016    Acute pericarditis 2015    Arthritis     CAP (community acquired pneumonia) 5/2016    Coronary artery disease     Crohn's disease     Hypercalcemia     Hypercholesterolemia     Hypertension  Lumbar compression fracture     Osteoporosis     Type 2 diabetes mellitus      Past Surgical History:   Procedure Laterality Date    HX ARTHROPLASTY  2016    left elbow    HX HEART CATHETERIZATION  2015    no intervention    HX HYSTERECTOMY      HX KYPHOPLASTY  ~    lumbar      Allergies   Allergen Reactions    Sulfa (Sulfonamide Antibiotics) Rash    Other Medication Unknown (comments)     \"Dypentin\"    Tape [Adhesive] Other (comments)     Blisters and skin tears      Social History     Tobacco Use    Smoking status: Former Smoker     Types: Cigarettes     Start date: 1961     Quit date: 1964     Years since quittin.4    Smokeless tobacco: Never Used    Tobacco comment: 2 packs a wk   Substance Use Topics    Alcohol use: Yes     Alcohol/week: 0.0 standard drinks     Comment: rarely      Family History   Problem Relation Age of Onset    Heart Failure Mother     Diabetes Mother     Cancer Father         throat    Alcohol abuse Neg Hx     Arthritis-rheumatoid Neg Hx     Asthma Neg Hx     Bleeding Prob Neg Hx     Elevated Lipids Neg Hx     Headache Neg Hx     Hypertension Neg Hx     Lung Disease Neg Hx     Migraines Neg Hx     Psychiatric Disorder Neg Hx     Stroke Neg Hx     Mental Retardation Neg Hx     Thyroid Disease Neg Hx       Family history reviewed and negative except as noted above. Immunization History   Administered Date(s) Administered    Covid-19, MODERNA, Mrna, Lnp-s, Pf, 100mcg/0.5mL 2021, 2021    Influenza Vaccine 10/01/2015, 10/17/2016    TB Skin Test (PPD) Intradermal 2013, 2021     PTA Medications:  Current Outpatient Medications   Medication Instructions    acetaminophen (TYLENOL EXTRA STRENGTH) 500 mg tablet Oral, EVERY 6 HOURS AS NEEDED    amLODIPine (NORVASC) 5 mg, Oral, DAILY    aspirin (ASPIRIN) 325 mg, Oral, DAILY    aspirin 81 mg Tab take by mouth.      atorvastatin (LIPITOR) 40 mg, Oral, DAILY  B.infantis-B.ani-B.long-B.bifi (PROBIOTIC 4X) 10-15 mg Tab DAILY    calcium-vitamin D (OS-ALYCIA +D3) 500 mg-200 unit per tablet 1 Tablet, Oral, 3 TIMES DAILY WITH MEALS    carvediloL (COREG) 12.5 mg, Oral, 2 TIMES DAILY WITH MEALS    cholecalciferol (Vitamin D3) 25 mcg (1,000 unit) cap Oral, EVERY OTHER DAY    cyanocobalamin 2,500 mcg, Oral, DAILY    escitalopram oxalate (LEXAPRO) 10 mg, Oral, DAILY    famotidine (PEPCID) 20 mg, Oral, DAILY    folic acid (FOLVITE) 099 mcg, Oral, DAILY    insulin glargine (LANTUS,BASAGLAR) 20 Units, SubCUTAneous    insulin lispro (HUMALOG) 100 unit/mL injection INITIATE INSULIN CORRECTIVE PROTOCOL:Normal Insulin Sensitivity For Blood Sugar (mg/dL) of:   Less than 150 =   0 units         150 -199 =   2 rexop682 -249 =   4 imjcc460 -299 =   6 ebqdo495 -349 =   8 auqes502 and above = 10 units and Call Physician Initiate Hypoglycemia protocol if blood glucose is <70 mg/dL Fast Acting - Administer Immediately - or within 15 minutes of start of meal, if mealtime coverage.  loperamide (IMMODIUM) 2 mg, Oral, AS NEEDED    multivitamin with iron tablet 1 Tablet, Oral, DAILY    omeprazole (PRILOSEC) 40 mg, Oral, DAILY    ondansetron (ZOFRAN ODT) 4 mg, Oral, EVERY 8 HOURS AS NEEDED    rOPINIRole (REQUIP) 1 mg, Oral, 2 TIMES DAILY    spironolactone (ALDACTONE) 25 mg tablet Oral, DAILY       Objective:     Patient Vitals for the past 24 hrs:   Temp Pulse Resp BP SpO2   09/18/21 1141    126/60 94 %   09/18/21 1128 97.9 °F (36.6 °C) 67 19 (!) 171/77 92 %     Oxygen Therapy  O2 Sat (%): 94 % (09/18/21 1141)  Pulse via Oximetry: 68 beats per minute (09/18/21 1141)  O2 Device: None (Room air) (09/18/21 1128)    Estimated body mass index is 25.1 kg/m² as calculated from the following:    Height as of this encounter: 4' 9\" (1.448 m). Weight as of this encounter: 52.6 kg (116 lb).   No intake or output data in the 24 hours ending 09/18/21 1518      Physical Exam:    General: Disheveled, acute distress   Head:  Normocephalic, atraumatic  Eyes:  Sclerae appear normal.  Pupils equally round. ENT:  Nares appear normal, no drainage. Moist oral mucosa  Neck:  No restricted ROM. Trachea midline   CV:   RRR. No m/r/g. No jugular venous distension. Lungs:   CTAB. No wheezing, rhonchi, or rales. Respirations even, unlabored  Abdomen: Bowel sounds present. Soft, nontender, nondistended. Extremities: No cyanosis or clubbing. No edema  Skin:     No rashes and normal coloration. Warm and dry. Neuro:  Cranial nerves II-XII grossly intact. Sensation intact  Psych:  Normal mood and affect. Alert and oriented x3    I have reviewed ordered lab tests and independently visualized imaging below:    Last 24hr Labs:  Recent Results (from the past 24 hour(s))   TYPE & SCREEN    Collection Time: 09/18/21 11:31 AM   Result Value Ref Range    Crossmatch Expiration 09/21/2021,2359     ABO/Rh(D) Courtney Sharon POSITIVE     Antibody screen NEG    PROTHROMBIN TIME + INR    Collection Time: 09/18/21 11:31 AM   Result Value Ref Range    Prothrombin time 13.7 12.6 - 14.5 sec    INR 1.0     TROPONIN-HIGH SENSITIVITY    Collection Time: 09/18/21 11:31 AM   Result Value Ref Range    Troponin-High Sensitivity 11.5 0 - 14 pg/mL   CBC WITH AUTOMATED DIFF    Collection Time: 09/18/21 11:32 AM   Result Value Ref Range    WBC 10.8 4.3 - 11.1 K/uL    RBC 4.42 4.05 - 5.2 M/uL    HGB 12.8 11.7 - 15.4 g/dL    HCT 39.5 35.8 - 46.3 %    MCV 89.4 79.6 - 97.8 FL    MCH 29.0 26.1 - 32.9 PG    MCHC 32.4 31.4 - 35.0 g/dL    RDW 12.9 11.9 - 14.6 %    PLATELET 938 346 - 372 K/uL    MPV 10.1 9.4 - 12.3 FL    ABSOLUTE NRBC 0.00 0.0 - 0.2 K/uL    NEUTROPHILS 79 (H) 43 - 78 %    LYMPHOCYTES 7 (L) 13 - 44 %    MONOCYTES 4 4.0 - 12.0 %    EOSINOPHILS 2 0.5 - 7.8 %    BASOPHILS 1 0.0 - 2.0 %    IMMATURE GRANULOCYTES 7 (H) 0.0 - 5.0 %    ABS. NEUTROPHILS 8.5 (H) 1.7 - 8.2 K/UL    ABS. LYMPHOCYTES 0.8 0.5 - 4.6 K/UL    ABS.  MONOCYTES 0.4 0.1 - 1.3 K/UL    ABS. EOSINOPHILS 0.2 0.0 - 0.8 K/UL    ABS. BASOPHILS 0.1 0.0 - 0.2 K/UL    ABS. IMM. GRANS. 0.8 (H) 0.0 - 0.5 K/UL    RBC COMMENTS NORMOCYTIC/NORMOCHROMIC      WBC COMMENTS Result Confirmed By Smear      PLATELET COMMENTS ADEQUATE      DF AUTOMATED     METABOLIC PANEL, COMPREHENSIVE    Collection Time: 09/18/21 11:32 AM   Result Value Ref Range    Sodium 135 (L) 136 - 145 mmol/L    Potassium 4.2 3.5 - 5.1 mmol/L    Chloride 102 98 - 107 mmol/L    CO2 26 21 - 32 mmol/L    Anion gap 7 7 - 16 mmol/L    Glucose 211 (H) 65 - 100 mg/dL    BUN 20 8 - 23 MG/DL    Creatinine 0.94 0.6 - 1.0 MG/DL    GFR est AA >60 >60 ml/min/1.73m2    GFR est non-AA >60 >60 ml/min/1.73m2    Calcium 10.2 8.3 - 10.4 MG/DL    Bilirubin, total 0.9 0.2 - 1.1 MG/DL    ALT (SGPT) 26 12 - 65 U/L    AST (SGOT) 19 15 - 37 U/L    Alk. phosphatase 122 50 - 136 U/L    Protein, total 7.0 6.3 - 8.2 g/dL    Albumin 3.4 3.2 - 4.6 g/dL    Globulin 3.6 (H) 2.3 - 3.5 g/dL    A-G Ratio 0.9 (L) 1.2 - 3.5     EKG, 12 LEAD, INITIAL    Collection Time: 09/18/21 11:35 AM   Result Value Ref Range    Ventricular Rate 66 BPM    Atrial Rate 66 BPM    P-R Interval 168 ms    QRS Duration 88 ms    Q-T Interval 390 ms    QTC Calculation (Bezet) 408 ms    Calculated P Axis 105 degrees    Calculated R Axis 44 degrees    Calculated T Axis -51 degrees    Diagnosis       !! AGE AND GENDER SPECIFIC ECG ANALYSIS !!   Normal sinus rhythm  T wave abnormality, consider inferior ischemia  Abnormal ECG  When compared with ECG of 06-JUN-2021 20:10,  Non-specific change in ST segment in Lateral leads  T wave inversion no longer evident in Anterior leads  Confirmed by Raffy Stratton [id number]) on 9/18/2021 2:00:00 PM     URINALYSIS W/ RFLX MICROSCOPIC    Collection Time: 09/18/21  1:11 PM   Result Value Ref Range    Color YELLOW      Appearance CLOUDY      Specific gravity 1.018 1.001 - 1.023      pH (UA) 6.0 5.0 - 9.0      Protein Negative NEG mg/dL    Glucose 250 mg/dL    Ketone TRACE (A) NEG mg/dL    Bilirubin Negative NEG      Blood Negative NEG      Urobilinogen 0.2 0.2 - 1.0 EU/dL    Nitrites Positive (A) NEG      Leukocyte Esterase MODERATE (A) NEG      WBC >100 (H) 0 /hpf    RBC 0-3 0 /hpf    Epithelial cells 0 0 /hpf    Bacteria 4+ (H) 0 /hpf    Casts 0-3 0 /lpf       All Micro Results     Procedure Component Value Units Date/Time    CULTURE, URINE [[id number]]     Order Status: Sent Specimen: Cath Urine           Other Studies:  XR CHEST SNGL V    Result Date: 9/18/2021  Chest x-ray and Right hip/femur INDICATION:  Hip pain after falling Right hip: 3 views were obtained. There is an intertrochanteric right hip fracture with apex anterior and lateral angulation. Femoral head is normally located. Bony pelvis is intact. The patient has had prior internal fixation of a left femur fracture. Right femur: AP and lateral views were obtained. The femur is intact except for the right hip fracture. No fracture or bone lesions. Chest x-ray: A single AP portable view was obtained. Mild chronic interstitial prominence in both lungs. No definite acute infiltrate. The heart size is normal.  There are no fractures. 1.  Intertrochanteric left hip fracture with significant anterior angulation. 2.  Mild chronic lung disease. XR HIP RT W OR WO PELV 2-3 VWS    Result Date: 9/18/2021  Chest x-ray and Right hip/femur INDICATION:  Hip pain after falling Right hip: 3 views were obtained. There is an intertrochanteric right hip fracture with apex anterior and lateral angulation. Femoral head is normally located. Bony pelvis is intact. The patient has had prior internal fixation of a left femur fracture. Right femur: AP and lateral views were obtained. The femur is intact except for the right hip fracture. No fracture or bone lesions. Chest x-ray: A single AP portable view was obtained. Mild chronic interstitial prominence in both lungs. No definite acute infiltrate.   The heart size is normal.  There are no fractures. 1.  Intertrochanteric left hip fracture with significant anterior angulation. 2.  Mild chronic lung disease. XR FEMUR RT 2 VS    Result Date: 9/18/2021  Chest x-ray and Right hip/femur INDICATION:  Hip pain after falling Right hip: 3 views were obtained. There is an intertrochanteric right hip fracture with apex anterior and lateral angulation. Femoral head is normally located. Bony pelvis is intact. The patient has had prior internal fixation of a left femur fracture. Right femur: AP and lateral views were obtained. The femur is intact except for the right hip fracture. No fracture or bone lesions. Chest x-ray: A single AP portable view was obtained. Mild chronic interstitial prominence in both lungs. No definite acute infiltrate. The heart size is normal.  There are no fractures. 1.  Intertrochanteric left hip fracture with significant anterior angulation. 2.  Mild chronic lung disease. Signed:  Cyndy Pinto DO    Part of this note may have been written by using a voice dictation software. The note has been proof read but may still contain some grammatical/other typographical errors.

## 2021-09-18 NOTE — PROGRESS NOTES
Patient consulted for displaced right intertroch. Patient may eat today, plan NPO after midnight today  Continue medical management per HIM   Discussed with Dr. Fela Tabor - to see tomorrow. Hold baby aspirin  Official consult note to follow.

## 2021-09-18 NOTE — ED TRIAGE NOTES
Arrives via ems from home. Pt was on patio and lost balance w/ fall to ground. No loc, no neck pain. Pain to right shoulder, arm. Lateral chest and right hip. Shortening and rotation seen to RLE. Pt has hx of right hip replacement. Arrives in c collar.      100 fentanyl (aprox 1hr ago)  4mg zofran given

## 2021-09-19 ENCOUNTER — APPOINTMENT (OUTPATIENT)
Dept: GENERAL RADIOLOGY | Age: 86
DRG: 481 | End: 2021-09-19
Attending: ORTHOPAEDIC SURGERY
Payer: MEDICARE

## 2021-09-19 ENCOUNTER — ANESTHESIA (OUTPATIENT)
Dept: SURGERY | Age: 86
DRG: 481 | End: 2021-09-19
Payer: MEDICARE

## 2021-09-19 LAB
25(OH)D3 SERPL-MCNC: 62.8 NG/ML (ref 30–100)
ALBUMIN SERPL-MCNC: 3.1 G/DL (ref 3.2–4.6)
ALBUMIN/GLOB SERPL: 0.9 {RATIO} (ref 1.2–3.5)
ALP SERPL-CCNC: 166 U/L (ref 50–136)
ALT SERPL-CCNC: 173 U/L (ref 12–65)
ANION GAP SERPL CALC-SCNC: 9 MMOL/L (ref 7–16)
AST SERPL-CCNC: 296 U/L (ref 15–37)
BILIRUB SERPL-MCNC: 1.8 MG/DL (ref 0.2–1.1)
BUN SERPL-MCNC: 17 MG/DL (ref 8–23)
CALCIUM SERPL-MCNC: 9.9 MG/DL (ref 8.3–10.4)
CALCIUM SERPL-MCNC: 9.9 MG/DL (ref 8.3–10.4)
CHLORIDE SERPL-SCNC: 100 MMOL/L (ref 98–107)
CO2 SERPL-SCNC: 25 MMOL/L (ref 21–32)
CREAT SERPL-MCNC: 0.84 MG/DL (ref 0.6–1)
ERYTHROCYTE [DISTWIDTH] IN BLOOD BY AUTOMATED COUNT: 13.1 % (ref 11.9–14.6)
EST. AVERAGE GLUCOSE BLD GHB EST-MCNC: 217 MG/DL
GLOBULIN SER CALC-MCNC: 3.6 G/DL (ref 2.3–3.5)
GLUCOSE BLD STRIP.AUTO-MCNC: 176 MG/DL (ref 65–100)
GLUCOSE BLD STRIP.AUTO-MCNC: 193 MG/DL (ref 65–100)
GLUCOSE BLD STRIP.AUTO-MCNC: 193 MG/DL (ref 65–100)
GLUCOSE BLD STRIP.AUTO-MCNC: 207 MG/DL (ref 65–100)
GLUCOSE BLD STRIP.AUTO-MCNC: 222 MG/DL (ref 65–100)
GLUCOSE BLD STRIP.AUTO-MCNC: 234 MG/DL (ref 65–100)
GLUCOSE BLD STRIP.AUTO-MCNC: 291 MG/DL (ref 65–100)
GLUCOSE SERPL-MCNC: 208 MG/DL (ref 65–100)
HBA1C MFR BLD: 9.2 % (ref 4.2–6.3)
HCT VFR BLD AUTO: 38.7 % (ref 35.8–46.3)
HGB BLD-MCNC: 10.6 G/DL (ref 11.7–15.4)
HGB BLD-MCNC: 12.6 G/DL (ref 11.7–15.4)
MAGNESIUM SERPL-MCNC: 1.3 MG/DL (ref 1.8–2.4)
MCH RBC QN AUTO: 29.3 PG (ref 26.1–32.9)
MCHC RBC AUTO-ENTMCNC: 32.6 G/DL (ref 31.4–35)
MCV RBC AUTO: 90 FL (ref 79.6–97.8)
NRBC # BLD: 0 K/UL (ref 0–0.2)
PHOSPHATE SERPL-MCNC: 1.7 MG/DL (ref 2.3–3.7)
PLATELET # BLD AUTO: 262 K/UL (ref 150–450)
PMV BLD AUTO: 10 FL (ref 9.4–12.3)
POTASSIUM SERPL-SCNC: 3.8 MMOL/L (ref 3.5–5.1)
PROT SERPL-MCNC: 6.7 G/DL (ref 6.3–8.2)
PTH-INTACT SERPL-MCNC: 31 PG/ML (ref 18.5–88)
RBC # BLD AUTO: 4.3 M/UL (ref 4.05–5.2)
SERVICE CMNT-IMP: ABNORMAL
SODIUM SERPL-SCNC: 134 MMOL/L (ref 136–145)
TSH SERPL DL<=0.005 MIU/L-ACNC: 2.52 UIU/ML (ref 0.36–3.74)
WBC # BLD AUTO: 10.6 K/UL (ref 4.3–11.1)

## 2021-09-19 PROCEDURE — 83970 ASSAY OF PARATHORMONE: CPT

## 2021-09-19 PROCEDURE — 77030003665 HC NDL SPN BBMI -A: Performed by: ANESTHESIOLOGY

## 2021-09-19 PROCEDURE — 77030017016 HC DSG ANTIMIC BARR2 S&N -B: Performed by: ORTHOPAEDIC SURGERY

## 2021-09-19 PROCEDURE — 73552 X-RAY EXAM OF FEMUR 2/>: CPT

## 2021-09-19 PROCEDURE — C1713 ANCHOR/SCREW BN/BN,TIS/BN: HCPCS | Performed by: ORTHOPAEDIC SURGERY

## 2021-09-19 PROCEDURE — 65270000029 HC RM PRIVATE

## 2021-09-19 PROCEDURE — 74011000258 HC RX REV CODE- 258: Performed by: FAMILY MEDICINE

## 2021-09-19 PROCEDURE — 77030016449 HC BIT DRL AO1 STRY -C: Performed by: ORTHOPAEDIC SURGERY

## 2021-09-19 PROCEDURE — 74011250636 HC RX REV CODE- 250/636: Performed by: FAMILY MEDICINE

## 2021-09-19 PROCEDURE — C1769 GUIDE WIRE: HCPCS | Performed by: ORTHOPAEDIC SURGERY

## 2021-09-19 PROCEDURE — 74011250636 HC RX REV CODE- 250/636: Performed by: NURSE ANESTHETIST, CERTIFIED REGISTERED

## 2021-09-19 PROCEDURE — 74011000250 HC RX REV CODE- 250: Performed by: INTERNAL MEDICINE

## 2021-09-19 PROCEDURE — 85027 COMPLETE CBC AUTOMATED: CPT

## 2021-09-19 PROCEDURE — 77030002933 HC SUT MCRYL J&J -A: Performed by: ORTHOPAEDIC SURGERY

## 2021-09-19 PROCEDURE — 84443 ASSAY THYROID STIM HORMONE: CPT

## 2021-09-19 PROCEDURE — 74011636637 HC RX REV CODE- 636/637: Performed by: FAMILY MEDICINE

## 2021-09-19 PROCEDURE — 80053 COMPREHEN METABOLIC PANEL: CPT

## 2021-09-19 PROCEDURE — 74011250636 HC RX REV CODE- 250/636: Performed by: ORTHOPAEDIC SURGERY

## 2021-09-19 PROCEDURE — 74011000250 HC RX REV CODE- 250: Performed by: ORTHOPAEDIC SURGERY

## 2021-09-19 PROCEDURE — 76060000033 HC ANESTHESIA 1 TO 1.5 HR: Performed by: ORTHOPAEDIC SURGERY

## 2021-09-19 PROCEDURE — 85018 HEMOGLOBIN: CPT

## 2021-09-19 PROCEDURE — 77030018673: Performed by: ORTHOPAEDIC SURGERY

## 2021-09-19 PROCEDURE — 0QS804Z REPOSITION RIGHT FEMORAL SHAFT WITH INTERNAL FIXATION DEVICE, OPEN APPROACH: ICD-10-PCS | Performed by: ORTHOPAEDIC SURGERY

## 2021-09-19 PROCEDURE — 76210000006 HC OR PH I REC 0.5 TO 1 HR: Performed by: ORTHOPAEDIC SURGERY

## 2021-09-19 PROCEDURE — 77030008846 HC WRE K STRY -C: Performed by: ORTHOPAEDIC SURGERY

## 2021-09-19 PROCEDURE — 77030018836 HC SOL IRR NACL ICUM -A: Performed by: ORTHOPAEDIC SURGERY

## 2021-09-19 PROCEDURE — 86580 TB INTRADERMAL TEST: CPT | Performed by: INTERNAL MEDICINE

## 2021-09-19 PROCEDURE — 77030003029 HC SUT VCRL J&J -B: Performed by: ORTHOPAEDIC SURGERY

## 2021-09-19 PROCEDURE — 82962 GLUCOSE BLOOD TEST: CPT

## 2021-09-19 PROCEDURE — 84100 ASSAY OF PHOSPHORUS: CPT

## 2021-09-19 PROCEDURE — 2709999900 HC NON-CHARGEABLE SUPPLY

## 2021-09-19 PROCEDURE — 36415 COLL VENOUS BLD VENIPUNCTURE: CPT

## 2021-09-19 PROCEDURE — 2709999900 HC NON-CHARGEABLE SUPPLY: Performed by: ORTHOPAEDIC SURGERY

## 2021-09-19 PROCEDURE — 74011000250 HC RX REV CODE- 250: Performed by: ANESTHESIOLOGY

## 2021-09-19 PROCEDURE — 83036 HEMOGLOBIN GLYCOSYLATED A1C: CPT

## 2021-09-19 PROCEDURE — 77030003028 HC SUT VCRL J&J -A: Performed by: ORTHOPAEDIC SURGERY

## 2021-09-19 PROCEDURE — 99232 SBSQ HOSP IP/OBS MODERATE 35: CPT | Performed by: ORTHOPAEDIC SURGERY

## 2021-09-19 PROCEDURE — 82306 VITAMIN D 25 HYDROXY: CPT

## 2021-09-19 PROCEDURE — 76010000161 HC OR TIME 1 TO 1.5 HR INTENSV-TIER 1: Performed by: ORTHOPAEDIC SURGERY

## 2021-09-19 PROCEDURE — 77030040922 HC BLNKT HYPOTHRM STRY -A: Performed by: ANESTHESIOLOGY

## 2021-09-19 PROCEDURE — 74011250637 HC RX REV CODE- 250/637: Performed by: FAMILY MEDICINE

## 2021-09-19 PROCEDURE — 83735 ASSAY OF MAGNESIUM: CPT

## 2021-09-19 PROCEDURE — 74011000250 HC RX REV CODE- 250: Performed by: NURSE ANESTHETIST, CERTIFIED REGISTERED

## 2021-09-19 PROCEDURE — 72170 X-RAY EXAM OF PELVIS: CPT

## 2021-09-19 PROCEDURE — 77030021107 HC SHFT RMR MOD DISP STRY -D: Performed by: ORTHOPAEDIC SURGERY

## 2021-09-19 PROCEDURE — 74011250637 HC RX REV CODE- 250/637: Performed by: ORTHOPAEDIC SURGERY

## 2021-09-19 PROCEDURE — 77030007880 HC KT SPN EPDRL BBMI -B: Performed by: ANESTHESIOLOGY

## 2021-09-19 PROCEDURE — 27245 TREAT THIGH FRACTURE: CPT | Performed by: ORTHOPAEDIC SURGERY

## 2021-09-19 DEVICE — LAG SCREW, TI
Type: IMPLANTABLE DEVICE | Site: HIP | Status: FUNCTIONAL
Brand: GAMMA

## 2021-09-19 DEVICE — LONG NAIL KIT R1.5, TI, RIGHT
Type: IMPLANTABLE DEVICE | Site: HIP | Status: FUNCTIONAL
Brand: GAMMA

## 2021-09-19 DEVICE — LOCKING SCREW, FULLY THREADED: Type: IMPLANTABLE DEVICE | Site: HIP | Status: FUNCTIONAL

## 2021-09-19 RX ORDER — DIPHENHYDRAMINE HYDROCHLORIDE 50 MG/ML
12.5 INJECTION, SOLUTION INTRAMUSCULAR; INTRAVENOUS
Status: DISCONTINUED | OUTPATIENT
Start: 2021-09-19 | End: 2021-09-19 | Stop reason: HOSPADM

## 2021-09-19 RX ORDER — OXYCODONE HYDROCHLORIDE 5 MG/1
10 TABLET ORAL
Status: DISCONTINUED | OUTPATIENT
Start: 2021-09-19 | End: 2021-09-19 | Stop reason: HOSPADM

## 2021-09-19 RX ORDER — SODIUM CHLORIDE, SODIUM LACTATE, POTASSIUM CHLORIDE, CALCIUM CHLORIDE 600; 310; 30; 20 MG/100ML; MG/100ML; MG/100ML; MG/100ML
100 INJECTION, SOLUTION INTRAVENOUS CONTINUOUS
Status: DISCONTINUED | OUTPATIENT
Start: 2021-09-19 | End: 2021-09-19 | Stop reason: HOSPADM

## 2021-09-19 RX ORDER — SODIUM CHLORIDE 9 MG/ML
100 INJECTION, SOLUTION INTRAVENOUS CONTINUOUS
Status: DISPENSED | OUTPATIENT
Start: 2021-09-19 | End: 2021-09-20

## 2021-09-19 RX ORDER — OXYCODONE HYDROCHLORIDE 5 MG/1
5 TABLET ORAL
Status: DISCONTINUED | OUTPATIENT
Start: 2021-09-19 | End: 2021-09-19 | Stop reason: HOSPADM

## 2021-09-19 RX ORDER — ONDANSETRON 2 MG/ML
4 INJECTION INTRAMUSCULAR; INTRAVENOUS ONCE
Status: DISCONTINUED | OUTPATIENT
Start: 2021-09-19 | End: 2021-09-19 | Stop reason: HOSPADM

## 2021-09-19 RX ORDER — ACETAMINOPHEN 325 MG/1
975 TABLET ORAL ONCE
Status: DISCONTINUED | OUTPATIENT
Start: 2021-09-19 | End: 2021-09-19 | Stop reason: SDUPTHER

## 2021-09-19 RX ORDER — BUPIVACAINE HYDROCHLORIDE 7.5 MG/ML
INJECTION, SOLUTION INTRASPINAL
Status: COMPLETED | OUTPATIENT
Start: 2021-09-19 | End: 2021-09-19

## 2021-09-19 RX ORDER — ACETAMINOPHEN 500 MG
1000 TABLET ORAL EVERY 6 HOURS
Status: DISCONTINUED | OUTPATIENT
Start: 2021-09-19 | End: 2021-09-22 | Stop reason: HOSPADM

## 2021-09-19 RX ORDER — NALOXONE HYDROCHLORIDE 0.4 MG/ML
0.1 INJECTION, SOLUTION INTRAMUSCULAR; INTRAVENOUS; SUBCUTANEOUS AS NEEDED
Status: DISCONTINUED | OUTPATIENT
Start: 2021-09-19 | End: 2021-09-19 | Stop reason: HOSPADM

## 2021-09-19 RX ORDER — HYDROMORPHONE HYDROCHLORIDE 2 MG/ML
0.5 INJECTION, SOLUTION INTRAMUSCULAR; INTRAVENOUS; SUBCUTANEOUS
Status: DISCONTINUED | OUTPATIENT
Start: 2021-09-19 | End: 2021-09-19 | Stop reason: HOSPADM

## 2021-09-19 RX ORDER — MIDAZOLAM HYDROCHLORIDE 1 MG/ML
2 INJECTION, SOLUTION INTRAMUSCULAR; INTRAVENOUS ONCE
Status: DISCONTINUED | OUTPATIENT
Start: 2021-09-19 | End: 2021-09-19 | Stop reason: HOSPADM

## 2021-09-19 RX ORDER — LIDOCAINE HYDROCHLORIDE 10 MG/ML
0.1 INJECTION INFILTRATION; PERINEURAL AS NEEDED
Status: DISCONTINUED | OUTPATIENT
Start: 2021-09-19 | End: 2021-09-19 | Stop reason: HOSPADM

## 2021-09-19 RX ORDER — CEFAZOLIN SODIUM/WATER 2 G/20 ML
2 SYRINGE (ML) INTRAVENOUS
Status: COMPLETED | OUTPATIENT
Start: 2021-09-19 | End: 2021-09-19

## 2021-09-19 RX ORDER — MIDAZOLAM HYDROCHLORIDE 1 MG/ML
2 INJECTION, SOLUTION INTRAMUSCULAR; INTRAVENOUS
Status: DISCONTINUED | OUTPATIENT
Start: 2021-09-19 | End: 2021-09-19 | Stop reason: HOSPADM

## 2021-09-19 RX ORDER — FENTANYL CITRATE 50 UG/ML
100 INJECTION, SOLUTION INTRAMUSCULAR; INTRAVENOUS ONCE
Status: DISCONTINUED | OUTPATIENT
Start: 2021-09-19 | End: 2021-09-19 | Stop reason: HOSPADM

## 2021-09-19 RX ORDER — ASPIRIN 325 MG
325 TABLET, DELAYED RELEASE (ENTERIC COATED) ORAL DAILY
Status: DISCONTINUED | OUTPATIENT
Start: 2021-09-20 | End: 2021-09-22 | Stop reason: HOSPADM

## 2021-09-19 RX ORDER — HYDROMORPHONE HYDROCHLORIDE 2 MG/1
2 TABLET ORAL
Status: DISCONTINUED | OUTPATIENT
Start: 2021-09-19 | End: 2021-09-22 | Stop reason: HOSPADM

## 2021-09-19 RX ORDER — ACETAMINOPHEN 650 MG/1
650 SUPPOSITORY RECTAL ONCE
Status: DISCONTINUED | OUTPATIENT
Start: 2021-09-19 | End: 2021-09-19 | Stop reason: SDUPTHER

## 2021-09-19 RX ORDER — CEFAZOLIN SODIUM/WATER 2 G/20 ML
2 SYRINGE (ML) INTRAVENOUS EVERY 8 HOURS
Status: COMPLETED | OUTPATIENT
Start: 2021-09-19 | End: 2021-09-19

## 2021-09-19 RX ORDER — AMOXICILLIN 250 MG
2 CAPSULE ORAL DAILY
Status: DISCONTINUED | OUTPATIENT
Start: 2021-09-20 | End: 2021-09-22 | Stop reason: HOSPADM

## 2021-09-19 RX ORDER — ALBUTEROL SULFATE 0.83 MG/ML
2.5 SOLUTION RESPIRATORY (INHALATION) AS NEEDED
Status: DISCONTINUED | OUTPATIENT
Start: 2021-09-19 | End: 2021-09-19 | Stop reason: HOSPADM

## 2021-09-19 RX ORDER — ONDANSETRON 2 MG/ML
4 INJECTION INTRAMUSCULAR; INTRAVENOUS
Status: DISCONTINUED | OUTPATIENT
Start: 2021-09-19 | End: 2021-09-22 | Stop reason: HOSPADM

## 2021-09-19 RX ORDER — SODIUM CHLORIDE 0.9 % (FLUSH) 0.9 %
5-40 SYRINGE (ML) INJECTION EVERY 8 HOURS
Status: DISCONTINUED | OUTPATIENT
Start: 2021-09-19 | End: 2021-09-22 | Stop reason: HOSPADM

## 2021-09-19 RX ORDER — ONDANSETRON 2 MG/ML
INJECTION INTRAMUSCULAR; INTRAVENOUS AS NEEDED
Status: DISCONTINUED | OUTPATIENT
Start: 2021-09-19 | End: 2021-09-19 | Stop reason: HOSPADM

## 2021-09-19 RX ORDER — SODIUM CHLORIDE 0.9 % (FLUSH) 0.9 %
5-40 SYRINGE (ML) INJECTION AS NEEDED
Status: DISCONTINUED | OUTPATIENT
Start: 2021-09-19 | End: 2021-09-22 | Stop reason: HOSPADM

## 2021-09-19 RX ORDER — NALOXONE HYDROCHLORIDE 0.4 MG/ML
.2-.4 INJECTION, SOLUTION INTRAMUSCULAR; INTRAVENOUS; SUBCUTANEOUS
Status: DISCONTINUED | OUTPATIENT
Start: 2021-09-19 | End: 2021-09-22 | Stop reason: HOSPADM

## 2021-09-19 RX ADMIN — CEFAZOLIN SODIUM 2 G: 100 INJECTION, POWDER, LYOPHILIZED, FOR SOLUTION INTRAVENOUS at 23:47

## 2021-09-19 RX ADMIN — PHENYLEPHRINE HYDROCHLORIDE 200 MCG: 10 INJECTION INTRAVENOUS at 08:16

## 2021-09-19 RX ADMIN — MORPHINE SULFATE 4 MG: 2 INJECTION, SOLUTION INTRAMUSCULAR; INTRAVENOUS at 12:57

## 2021-09-19 RX ADMIN — DONEPEZIL HYDROCHLORIDE 10 MG: 5 TABLET, FILM COATED ORAL at 21:23

## 2021-09-19 RX ADMIN — CYANOCOBALAMIN TAB 1000 MCG 1000 MCG: 1000 TAB at 12:42

## 2021-09-19 RX ADMIN — PHENYLEPHRINE HYDROCHLORIDE 240 MCG: 10 INJECTION INTRAVENOUS at 08:30

## 2021-09-19 RX ADMIN — CARVEDILOL 6.25 MG: 6.25 TABLET, FILM COATED ORAL at 17:05

## 2021-09-19 RX ADMIN — FAMOTIDINE 20 MG: 20 TABLET, FILM COATED ORAL at 05:47

## 2021-09-19 RX ADMIN — PHENYLEPHRINE HYDROCHLORIDE 100 MCG: 10 INJECTION INTRAVENOUS at 08:07

## 2021-09-19 RX ADMIN — ACETAMINOPHEN 1000 MG: 500 TABLET ORAL at 12:42

## 2021-09-19 RX ADMIN — HUMAN INSULIN 2 UNITS: 100 INJECTION, SOLUTION SUBCUTANEOUS at 05:54

## 2021-09-19 RX ADMIN — Medication 10 ML: at 05:47

## 2021-09-19 RX ADMIN — CEFAZOLIN SODIUM 2 G: 100 INJECTION, POWDER, LYOPHILIZED, FOR SOLUTION INTRAVENOUS at 15:33

## 2021-09-19 RX ADMIN — ACETAMINOPHEN 1000 MG: 500 TABLET ORAL at 17:05

## 2021-09-19 RX ADMIN — MORPHINE SULFATE 2 MG: 2 INJECTION, SOLUTION INTRAMUSCULAR; INTRAVENOUS at 03:42

## 2021-09-19 RX ADMIN — ONDANSETRON 4 MG: 2 INJECTION INTRAMUSCULAR; INTRAVENOUS at 12:57

## 2021-09-19 RX ADMIN — TUBERCULIN PURIFIED PROTEIN DERIVATIVE 5 UNITS: 5 INJECTION, SOLUTION INTRADERMAL at 13:00

## 2021-09-19 RX ADMIN — PHENYLEPHRINE HYDROCHLORIDE 240 MCG: 10 INJECTION INTRAVENOUS at 08:50

## 2021-09-19 RX ADMIN — Medication 10 ML: at 21:24

## 2021-09-19 RX ADMIN — HUMAN INSULIN 3 UNITS: 100 INJECTION, SOLUTION SUBCUTANEOUS at 17:12

## 2021-09-19 RX ADMIN — HYDROMORPHONE HYDROCHLORIDE 2 MG: 2 TABLET ORAL at 15:33

## 2021-09-19 RX ADMIN — PHENYLEPHRINE HYDROCHLORIDE 240 MCG: 10 INJECTION INTRAVENOUS at 08:21

## 2021-09-19 RX ADMIN — ONDANSETRON 4 MG: 2 INJECTION INTRAMUSCULAR; INTRAVENOUS at 08:24

## 2021-09-19 RX ADMIN — ACETAMINOPHEN 1000 MG: 500 TABLET ORAL at 23:47

## 2021-09-19 RX ADMIN — PHENYLEPHRINE HYDROCHLORIDE 200 MCG: 10 INJECTION INTRAVENOUS at 08:18

## 2021-09-19 RX ADMIN — Medication 5 ML: at 13:02

## 2021-09-19 RX ADMIN — CEFAZOLIN 2 G: 1 INJECTION, POWDER, FOR SOLUTION INTRAVENOUS at 08:15

## 2021-09-19 RX ADMIN — ATORVASTATIN CALCIUM 40 MG: 40 TABLET, FILM COATED ORAL at 21:23

## 2021-09-19 RX ADMIN — HUMAN INSULIN 1 UNITS: 100 INJECTION, SOLUTION SUBCUTANEOUS at 23:47

## 2021-09-19 RX ADMIN — CARVEDILOL 6.25 MG: 6.25 TABLET, FILM COATED ORAL at 05:47

## 2021-09-19 RX ADMIN — BUPIVACAINE HYDROCHLORIDE IN DEXTROSE 12.5 MG: 7.5 INJECTION, SOLUTION SUBARACHNOID at 08:05

## 2021-09-19 RX ADMIN — HUMAN INSULIN 1 UNITS: 100 INJECTION, SOLUTION SUBCUTANEOUS at 01:33

## 2021-09-19 RX ADMIN — INSULIN GLARGINE 10 UNITS: 100 INJECTION, SOLUTION SUBCUTANEOUS at 21:24

## 2021-09-19 RX ADMIN — Medication 1 AMPULE: at 12:43

## 2021-09-19 RX ADMIN — VITAMIN D, TAB 1000IU (100/BT) 2000 UNITS: 25 TAB at 12:42

## 2021-09-19 RX ADMIN — FOLIC ACID 1 MG: 1 TABLET ORAL at 12:43

## 2021-09-19 RX ADMIN — Medication 1 AMPULE: at 21:23

## 2021-09-19 RX ADMIN — OXYCODONE 5 MG: 5 TABLET ORAL at 05:32

## 2021-09-19 RX ADMIN — PHENYLEPHRINE HYDROCHLORIDE 240 MCG: 10 INJECTION INTRAVENOUS at 08:32

## 2021-09-19 RX ADMIN — PHENYLEPHRINE HYDROCHLORIDE 240 MCG: 10 INJECTION INTRAVENOUS at 08:48

## 2021-09-19 RX ADMIN — PHENYLEPHRINE HYDROCHLORIDE 240 MCG: 10 INJECTION INTRAVENOUS at 08:41

## 2021-09-19 RX ADMIN — FAMOTIDINE 20 MG: 20 TABLET, FILM COATED ORAL at 17:05

## 2021-09-19 RX ADMIN — SODIUM CHLORIDE 100 ML/HR: 900 INJECTION, SOLUTION INTRAVENOUS at 12:38

## 2021-09-19 RX ADMIN — CEFTRIAXONE 1 G: 1 INJECTION, POWDER, FOR SOLUTION INTRAMUSCULAR; INTRAVENOUS at 14:05

## 2021-09-19 RX ADMIN — PHENYLEPHRINE HYDROCHLORIDE 240 MCG: 10 INJECTION INTRAVENOUS at 08:34

## 2021-09-19 NOTE — CONSULTS
[id number] [facility]  Consultation Note    Patient ID:  Name: Valerie Quiles  MRN: [id number]  AGE: 80 y.o.  : 1934    Date of Consultation:  2021  Referring Physician: Hospitalist    Subjective: Pt complains of right hip pain. Patient fell walking through her door at home yesterday. He sustained a right intertrochanteric hip fracture. She is actually status post IM nail of the left hip for the same injury months ago. Pain is controlled. Past Medical History Includes:   Past Medical History:   Diagnosis Date    Acute kidney failure 2016    Acute pericarditis     Arthritis     CAP (community acquired pneumonia) 2016    Coronary artery disease     Crohn's disease     Hypercalcemia     Hypercholesterolemia     Hypertension     Lumbar compression fracture     Osteoporosis     Type 2 diabetes mellitus    ,   Past Surgical History:   Procedure Laterality Date    HX ARTHROPLASTY  2016    left elbow    HX HEART CATHETERIZATION  2015    no intervention    HX HYSTERECTOMY      HX KYPHOPLASTY  ~    lumbar     Family History:   Family History   Problem Relation Age of Onset    Heart Failure Mother     Diabetes Mother     Cancer Father         throat    Alcohol abuse Neg Hx     Arthritis-rheumatoid Neg Hx     Asthma Neg Hx     Bleeding Prob Neg Hx     Elevated Lipids Neg Hx     Headache Neg Hx     Hypertension Neg Hx     Lung Disease Neg Hx     Migraines Neg Hx     Psychiatric Disorder Neg Hx     Stroke Neg Hx     Mental Retardation Neg Hx     Thyroid Disease Neg Hx       Social History:   Social History     Tobacco Use    Smoking status: Former Smoker     Types: Cigarettes     Start date: 1961     Quit date: 1964     Years since quittin.4    Smokeless tobacco: Never Used    Tobacco comment: 2 packs a wk   Substance Use Topics    Alcohol use:  Yes Alcohol/week: 0.0 standard drinks     Comment: rarely       ALLERGIES:   Allergies   Allergen Reactions    Sulfa (Sulfonamide Antibiotics) Rash    Other Medication Unknown (comments)     \"Dypentin\"    Tape [Adhesive] Other (comments)     Blisters and skin tears        Patient Medications    Current Facility-Administered Medications   Medication Dose Route Frequency    lactated Ringers infusion  75 mL/hr IntraVENous CONTINUOUS    sodium chloride (NS) flush 5-40 mL  5-40 mL IntraVENous Q8H    sodium chloride (NS) flush 5-40 mL  5-40 mL IntraVENous PRN    acetaminophen (TYLENOL) tablet 650 mg  650 mg Oral Q6H PRN    Or    acetaminophen (TYLENOL) suppository 650 mg  650 mg Rectal Q6H PRN    polyethylene glycol (MIRALAX) packet 17 g  17 g Oral DAILY PRN    ondansetron (ZOFRAN ODT) tablet 4 mg  4 mg Oral Q8H PRN    Or    ondansetron (ZOFRAN) injection 4 mg  4 mg IntraVENous Q6H PRN    alum-mag hydroxide-simeth (MYLANTA) oral suspension 15 mL  15 mL Oral Q6H PRN    famotidine (PEPCID) tablet 20 mg  20 mg Oral BID    morphine injection 2-4 mg  2-4 mg IntraVENous Q4H PRN    naloxone (NARCAN) injection 0.2 mg  0.2 mg IntraVENous EVERY 2 MINUTES AS NEEDED    oxyCODONE IR (ROXICODONE) tablet 5 mg  5 mg Oral J3X PRN    folic acid (FOLVITE) tablet 1 mg  1 mg Oral DAILY    cyanocobalamin tablet 1,000 mcg  1,000 mcg Oral DAILY    carvediloL (COREG) tablet 6.25 mg  6.25 mg Oral BID WITH MEALS    atorvastatin (LIPITOR) tablet 40 mg  40 mg Oral QHS    cholecalciferol (VITAMIN D3) (1000 Units /25 mcg) tablet 2,000 Units  2,000 Units Oral DAILY    cefTRIAXone (ROCEPHIN) 1 g in 0.9% sodium chloride (MBP/ADV) 50 mL MBP  1 g IntraVENous Q24H    insulin regular (NOVOLIN R, HUMULIN R) injection   SubCUTAneous Q6H    insulin glargine (LANTUS) injection 10 Units  10 Units SubCUTAneous QHS    donepeziL (ARICEPT) tablet 10 mg  10 mg Oral QHS    traZODone (DESYREL) tablet 25-50 mg  25-50 mg Oral Q6H PRN         Review of Systems:  A comprehensive review of systems was negative except for that written in the HPI. Physical Exam:      General: NAD, Alert, Oriented x 3   Mental Status: Appropriate   Psych: Normal Affect, Normal Mood    HEENT: Normal Cephalic/Atraumatic, PERRL   Lungs: Respirations even and unlabored, Breath Sounds were clear, no respiratory distress   Heart: Regular Rate and Rhythm   Vascular: Distal pulses intact, good capillary refill   Skin: No redness, No Rashes, Skin is dry   Musculoskeletal: Exam of the right hip reveals no open wounds. No significant shortening. No gross deformities. Tender over the greater trochanter. No gross neurologic deficits. Pulses are adequate in both feet.   Lymphatic: No lympahdenopathy, No distal edema   Neuro: No gross deficits   Abdomen: Soft, Non tender, No distension      VITALS:   Patient Vitals for the past 8 hrs:   BP Temp Pulse Resp SpO2   21 0712 138/69 98.5 °F (36.9 °C) 81 19 96 %   21 0340 131/68 98.5 °F (36.9 °C) 91 16 93 %    , Temp (24hrs), Av.5 °F (36.9 °C), Min:97.9 °F (36.6 °C), Max:99.7 °F (37.6 °C)        Recent Results (from the past 12 hour(s))   GLUCOSE, POC    Collection Time: 21  9:04 PM   Result Value Ref Range    Glucose (POC) 202 (H) 65 - 100 mg/dL    Performed by [facility]    GLUCOSE, POC    Collection Time: 21  1:25 AM   Result Value Ref Range    Glucose (POC) 193 (H) 65 - 100 mg/dL    Performed by [facility]    METABOLIC PANEL, COMPREHENSIVE    Collection Time: 21  5:32 AM   Result Value Ref Range    Sodium 134 (L) 136 - 145 mmol/L    Potassium 3.8 3.5 - 5.1 mmol/L    Chloride 100 98 - 107 mmol/L    CO2 25 21 - 32 mmol/L    Anion gap 9 7 - 16 mmol/L    Glucose 208 (H) 65 - 100 mg/dL    BUN 17 8 - 23 MG/DL    Creatinine 0.84 0.6 - 1.0 MG/DL    GFR est AA >60 >60 ml/min/1.73m2    GFR est non-AA >60 >60 ml/min/1.73m2    Calcium 9.9 8.3 - 10.4 MG/DL    Bilirubin, total 1.8 (H) 0.2 - 1.1 MG/DL    ALT (SGPT) 173 (H) 12 - 65 U/L    AST (SGOT) 296 (H) 15 - 37 U/L    Alk.  phosphatase 166 (H) 50 - 136 U/L    Protein, total 6.7 6.3 - 8.2 g/dL    Albumin 3.1 (L) 3.2 - 4.6 g/dL    Globulin 3.6 (H) 2.3 - 3.5 g/dL    A-G Ratio 0.9 (L) 1.2 - 3.5     MAGNESIUM    Collection Time: 09/19/21  5:32 AM   Result Value Ref Range    Magnesium 1.3 (L) 1.8 - 2.4 mg/dL   CBC W/O DIFF    Collection Time: 09/19/21  5:32 AM   Result Value Ref Range    WBC 10.6 4.3 - 11.1 K/uL    RBC 4.30 4.05 - 5.2 M/uL    HGB 12.6 11.7 - 15.4 g/dL    HCT 38.7 35.8 - 46.3 %    MCV 90.0 79.6 - 97.8 FL    MCH 29.3 26.1 - 32.9 PG    MCHC 32.6 31.4 - 35.0 g/dL    RDW 13.1 11.9 - 14.6 %    PLATELET 734 306 - 193 K/uL    MPV 10.0 9.4 - 12.3 FL    ABSOLUTE NRBC 0.00 0.0 - 0.2 K/uL   VITAMIN D, 25 HYDROXY    Collection Time: 09/19/21  5:32 AM   Result Value Ref Range    Vitamin D 25-Hydroxy 62.8 30.0 - 100.0 ng/mL   PHOSPHORUS    Collection Time: 09/19/21  5:32 AM   Result Value Ref Range    Phosphorus 1.7 (L) 2.3 - 3.7 MG/DL   TSH 3RD GENERATION    Collection Time: 09/19/21  5:32 AM   Result Value Ref Range    TSH 2.520 0.358 - 3.740 uIU/mL   HEMOGLOBIN A1C WITH EAG    Collection Time: 09/19/21  5:32 AM   Result Value Ref Range    Hemoglobin A1c 9.2 (H) 4.2 - 6.3 %    Est. average glucose 217 mg/dL   PTH INTACT    Collection Time: 09/19/21  5:32 AM   Result Value Ref Range    Calcium 9.9 8.3 - 10.4 MG/DL    PTH, Intact 31.0 18.5 - 88.0 pg/mL   GLUCOSE, POC    Collection Time: 09/19/21  5:50 AM   Result Value Ref Range    Glucose (POC) 234 (H) 65 - 100 mg/dL    Performed by [facility]    GLUCOSE, POC    Collection Time: 09/19/21  6:14 AM   Result Value Ref Range    Glucose (POC) 222 (H) 65 - 100 mg/dL    Performed by [facility]           Diagnosis   Patient Active Problem List   Diagnosis Code    DM type 2 (diabetes mellitus, type 2) ([email or web address] E11.9    Essential hypertension with goal blood pressure less than 140/90 I10    Crohn disease ([facility] [address] K50.90    Pericarditis, acute I30.9    Precordial pain R07.2    Nonspecific abnormal electrocardiogram (ECG) (EKG) R94.31    Coronary artery disease involving native coronary artery of native heart with angina pectoris (HCC) I25.119    Dyslipidemia E78.5    CAP (community acquired pneumonia) J18.9    LIZ (acute kidney injury) ([facility] N17.9    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    Electrolyte abnormality E87.8    History of artificial joint Z96.60    History of implantation of joint prosthesis of elbow Z96.629    Supracondylar fracture of humerus S42.413A    Age-related osteoporosis with current pathological fracture with routine healing M80.00XD    Cholecystitis with cholelithiasis K80.10    CVA (cerebral vascular accident) ([facility] [address] I63.9    Dysuria R30.0    Vaginal candida B37.3    Polyneuropathy G62.9    Type 2 diabetes with nephropathy ([facility] [address] E11.21    Diabetic polyneuropathy associated with type 2 diabetes mellitus ([facility] [address] E11.42    Fall W19. Eulalia Neat Encounter for medication management O03.190    Closed fracture of left hip ([address] S72.002A    Hip fracture requiring operative repair ([facility] S72.009A    Acute cystitis without hematuria N30.00    UTI (urinary tract infection) N39.0    Acute blood loss anemia D62    Intertrochanteric fracture of left femur, closed, initial encounter ([facility] S72.142A          X-ray: X-rays right hip show an intertrochanteric hip fracture. Mildly displaced. Angulated. Assessment     Right intertrochanteric hip fracture    Medical Decision Making:     Recommend IM nail of the right hip.  Patient aware of the risks and benefits and wishes to proceed        Ellis Monson MD  9/19/2021,  7:35 AM

## 2021-09-19 NOTE — ANESTHESIA PREPROCEDURE EVALUATION
Relevant Problems   RESPIRATORY SYSTEM   (+) CAP (community acquired pneumonia)      NEUROLOGY   (+) CVA (cerebral vascular accident) ([facility] [address]      CARDIOVASCULAR   (+) Coronary artery disease involving native coronary artery of native heart with angina pectoris ([facility] [address]   (+) Essential hypertension with goal blood pressure less than 140/90      RENAL FAILURE   (+) LIZ (acute kidney injury) ([facility] [address]      ENDOCRINE   (+) DM type 2 (diabetes mellitus, type 2) (HCC)   (+) Type 2 diabetes with nephropathy (HCC)      HEMATOLOGY   (+) Acute blood loss anemia       Anesthetic History               Review of Systems / Medical History  Patient summary reviewed and pertinent labs reviewed    Pulmonary                   Neuro/Psych       CVA: no residual symptoms       Cardiovascular    Hypertension      CHF    CAD    Exercise tolerance: <4 METS     GI/Hepatic/Renal                Endo/Other    Diabetes: type 2    Arthritis     Other Findings   Comments: John Ko yesterday at home  Covid January 8           Physical Exam    Airway  Mallampati: II  TM Distance: > 6 cm  Neck ROM: normal range of motion   Mouth opening: Normal     Cardiovascular    Rhythm: regular  Rate: normal         Dental  No notable dental hx       Pulmonary  Breath sounds clear to auscultation               Abdominal         Other Findings            Anesthetic Plan    ASA: 3  Anesthesia type: spinal            Anesthetic plan and risks discussed with: Patient

## 2021-09-19 NOTE — PROGRESS NOTES
Problem: Falls - Risk of  Goal: *Absence of Falls  Description: Document Gris Spare Fall Risk and appropriate interventions in the flowsheet. Outcome: Progressing Towards Goal  Note: Fall Risk Interventions:            Medication Interventions: Bed/chair exit alarm, Patient to call before getting OOB, Teach patient to arise slowly    Elimination Interventions: Bed/chair exit alarm, Call light in reach, Patient to call for help with toileting needs    History of Falls Interventions: Bed/chair exit alarm, Door open when patient unattended         Problem: Patient Education: Go to Patient Education Activity  Goal: Patient/Family Education  Outcome: Progressing Towards Goal     Problem: Pressure Injury - Risk of  Goal: *Prevention of pressure injury  Description: Document Joshua Scale and appropriate interventions in the flowsheet.   Outcome: Progressing Towards Goal  Note: Pressure Injury Interventions:  Sensory Interventions: Float heels, Keep linens dry and wrinkle-free, Maintain/enhance activity level, Minimize linen layers    Moisture Interventions: Minimize layers, Maintain skin hydration (lotion/cream)    Activity Interventions: PT/OT evaluation, Pressure redistribution bed/mattress(bed type)    Mobility Interventions: Pressure redistribution bed/mattress (bed type), PT/OT evaluation    Nutrition Interventions: Document food/fluid/supplement intake    Friction and Shear Interventions: Minimize layers, Lift team/patient mobility team, Lift sheet                Problem: Patient Education: Go to Patient Education Activity  Goal: Patient/Family Education  Outcome: Progressing Towards Goal     Problem: Surgical Pathway Day of Surgery  Goal: Off Pathway (Use only if patient is Off Pathway)  Outcome: Progressing Towards Goal  Goal: Activity/Safety  Outcome: Progressing Towards Goal  Goal: Consults, if ordered  Outcome: Progressing Towards Goal  Goal: Nutrition/Diet  Outcome: Progressing Towards Goal  Goal: Medications  Outcome: Progressing Towards Goal     Problem: Patient Education: Go to Patient Education Activity  Goal: Patient/Family Education  Outcome: Progressing Towards Goal

## 2021-09-19 NOTE — ANESTHESIA PROCEDURE NOTES
Spinal Block    Start time: 9/19/2021 8:00 AM  End time: 9/19/2021 8:05 AM  Performed by: Chris Torres MD  Authorized by: Chris Torres MD     Pre-procedure:   Indications: primary anesthetic  Preanesthetic Checklist: patient identified, risks and benefits discussed, anesthesia consent, site marked, patient being monitored and timeout performed    Timeout Time: 08:00 EDT          Spinal Block:   Patient Position:  Left lateral decubitus  Prep Region:  Lumbar  Prep: chlorhexidine      Location:  L3-4  Technique:  Single shot    Local Dose (mL):  3    Needle:   Needle Type:  Pencan  Needle Gauge:  25 G  Attempts:  1      Events: CSF confirmed, no blood with aspiration and no paresthesia        Assessment:  Insertion:  Uncomplicated  Patient tolerance:  Patient tolerated the procedure well with no immediate complications

## 2021-09-19 NOTE — PROGRESS NOTES
Occupational Therapy Note: ordered received, chart reviewed, noted pt with L  hip fracture, surgery pending for today. Will HOLD OT evaluation until ortho evaluates/treats pt. Thank you.  Mike Crum MS, OTR/L

## 2021-09-19 NOTE — PERIOP NOTES
TRANSFER - OUT REPORT:    Verbal report given to BHUPENDRA Dickens on Sofia Bryant  being transferred to [phone] for routine post - op       Report consisted of patients Situation, Background, Assessment and   Recommendations(SBAR). Information from the following report(s) SBAR, OR Summary and Cardiac Rhythm nsr was reviewed with the receiving nurse. Lines:   Peripheral IV 09/18/21 Left Arm (Active)   Site Assessment Clean, dry, & intact 09/19/21 0920   Phlebitis Assessment 0 09/19/21 0920   Infiltration Assessment 0 09/19/21 0920   Dressing Status Clean, dry, & intact 09/19/21 0920   Dressing Type Transparent;Tape 09/19/21 0920   Hub Color/Line Status Patent 09/19/21 0920   Alcohol Cap Used No 09/19/21 0920        Opportunity for questions and clarification was provided. Patient transported with:   O2 @ 3 liters  Tech    VTE prophylaxis orders have not been written for Sofia Bryant. Patient and family given floor number and nurses name. Family updated re: pt status after security code verified.

## 2021-09-19 NOTE — PROGRESS NOTES
Problem: Falls - Risk of  Goal: *Absence of Falls  Description: Document Carlota Stoll Fall Risk and appropriate interventions in the flowsheet.   Outcome: Progressing Towards Goal  Note: Fall Risk Interventions:            Medication Interventions: Bed/chair exit alarm, Patient to call before getting OOB, Teach patient to arise slowly    Elimination Interventions: Bed/chair exit alarm, Call light in reach, Patient to call for help with toileting needs    History of Falls Interventions: Bed/chair exit alarm, Door open when patient unattended

## 2021-09-19 NOTE — PROGRESS NOTES
Physical Therapy Note:    Physical therapy evaluation orders received and chart reviewed. Patient admitted with hip fracture. Scheduled for surgical repair today. Will hold mobility assessment and evaluate post operatively per orthopedic surgery.     Thank you,  Maya Montez, PT, DPT

## 2021-09-19 NOTE — ANESTHESIA POSTPROCEDURE EVALUATION
Procedure(s): FEMORAL INTERTROCHANTERIC NAIL INSERTION RIGHT. spinal    Anesthesia Post Evaluation      Multimodal analgesia: multimodal analgesia used between 6 hours prior to anesthesia start to PACU discharge  Patient location during evaluation: PACU  Patient participation: complete - patient participated  Level of consciousness: awake and awake and alert  Pain management: adequate  Airway patency: patent  Anesthetic complications: no  Cardiovascular status: acceptable  Respiratory status: acceptable  Hydration status: acceptable  Post anesthesia nausea and vomiting:  controlled      INITIAL Post-op Vital signs:   Vitals Value Taken Time   /42 09/19/21 1011   Temp 36.7 °C (98 °F) 09/19/21 0957   Pulse 75 09/19/21 1015   Resp 18 09/19/21 0957   SpO2 97 % 09/19/21 1015   Vitals shown include unvalidated device data.

## 2021-09-19 NOTE — ROUTINE PROCESS
TRANSFER - IN REPORT:    Verbal report received from Brenna damon RN(name) on Adriana Sosa  being received from 7th floor(unit) for ordered procedure      Report consisted of patients Situation, Background, Assessment and   Recommendations(SBAR). Information from the following report(s) Kardex, Intake/Output, MAR, Recent Results, Med Rec Status, Pre Procedure Checklist and Procedure Verification was reviewed with the receiving nurse. Opportunity for questions and clarification was provided. Assessment completed upon patients arrival to unit and care assumed.

## 2021-09-19 NOTE — OP NOTES
Operative Report    Patient: Trev Miranda MRN: [id number]  SSN: [id number]    YOB: 1934  Age: 80 y.o. Sex: female       Date of Surgery: 9/19/2021     Preoperative Diagnosis: Right intertrochanteric femur fracture     Postoperative Diagnosis: Right intertrochanteric femur fracture     Surgeon(s) and Role:     Mamie Doll MD - Primary    Anesthesia: General     Procedure: Procedure(s): FEMORAL INTERTROCHANTERIC NAIL INSERTION RIGHT     Procedure in Detail: He was taken to the operating room and placed under spinal anesthesia. She was then placed on the fracture table. The left foot placed in the well-leg allan. The right foot placed in the traction device. We then reduced the hip under fluoroscopy. Right hip was prepped and draped in sterile fashion. I made a 1/2 to 2 inch incision to really just proximal to the tip of the greater trochanter. Incised down to the fascia. We then used the starting awl to make a starting point for the guidewire. We then placed a guidewire across the fracture down to the knee. This was measured in standard fashion for the nail. Was then reamed in standard fashion the femur with the 11,12, and 13 mm reamers. We then reamed proximally with a 15-1/2 mm reamer. We then placed the 11 mm x 340 mm Caridad gamma nail over the guidewire. Using the guide handle we placed a guide pin across the fracture to the central portion of the femoral head for the lag screw. We then drilled over this to the appropriate length. The screw was placed over the guidewire. There was excellent fixation. We then placed the set screw in the nail after we compressed the fracture. The guide handle was then removed. Turned our attention to the distal femur. Using fluoroscopy we drilled for and placed a distal interlocking screw in the static interlocking hole of the nail. Again this was confirmed on fluoroscopy. We then irrigated the wound.   Closed the proximal wound with 0 and 2-0 Vicryl's. The skin on all the incisions were closed with staples. This was covered with a sterile dressing. The patient was then taken to the recovery room in stable condition. There were no complications. Estimated Blood Loss: Minimal    Tourniquet Time: * No tourniquets in log *      Implants:   Implant Name Type Inv. Item Serial No.  Lot No. LRB No. Used Action   NAIL KT LNG [id number] RT -- STRL TI GAMMA 3 - [id number]  NAIL KT LNG 125D [id number] RT -- STRL TI GAMMA 3  [employer] [id number] Right 1 Implanted   SCR BNE LAG GAMMA 3 10.5X90MM -- TI STRL - [id number]  SCR BNE LAG GAMMA 3 10.5X90MM -- TI STRL  [employer] [facility] [id number] Right 1 Implanted   SCR BNE LCK T2 FT 5X35MM TI -- STRL - [id number]  SCR BNE LCK T2 FT 5X35MM TI -- STRL  [employer] [facility] [id number] Right 1 Implanted               Specimens: * No specimens in log *        Drains: None                Complications: None    Counts: Sponge and needle counts were correct times two.     Signed By:  Douglas Ogden MD     September 19, 2021

## 2021-09-19 NOTE — PROGRESS NOTES
Progress Note    Patient: Hanane Scales MRN: [id number]  SSN: [id number]    YOB: 1934  Age: 80 y.o. Sex: female      Admit Date: 9/18/2021    LOS: 1 day     Assessment and Plan:   80 y.o. female with medical history of T2DM, Osteoporosis, Hypercalcemia, HTN, GERD, CVA presented with s/p fall    1. Left intertrochanteric femoral fracture   -Status post femoral intertrochanteric nail insertion  -Pain management  -PT OT    2. UTI  -Continue ceftriaxone  -Follow urine cultures    3. HTN   -Continue Coreg   -Hold amlodipine and aldactone      4. GERD   -Continue famotidine    5. T2DM  -Basal insulin  -Insulin sliding scale  -Blood sugar checks before meals and at bedtime    6. CKD stage 3a - baseline Scr 0.9-1. 1      7. History of COVID infection - 1/5/2021 at [address] [place] [address]      8. History of CVA   -ASA, statin     VTE ppx ASA      Subjective:   80 y.o. female with medical history of T2DM, Osteoporosis, Hypercalcemia, HTN, GERD, CVA presented with s/p fall. Patient seen and examined at bedside. Still presenting with left hip pain. Otherwise denies any chest pain, no abdominal pain, no nausea.     Objective:     Vitals:    09/19/21 0957 09/19/21 1001 09/19/21 1006 09/19/21 1011   BP: (!) 111/57 (!) 93/51 (!) 97/44 (!) 104/42   Pulse: 72 72 68 65   Resp: 18      Temp: 98 °F (36.7 °C)      SpO2: 97% 96% 98% 97%   Weight:       Height:            Intake and Output:  Current Shift: 09/19 0701 - 09/19 1900  In: 770 [I.V.:770]  Out: 550 [Urine:500]  Last three shifts: 09/17 1901 - 09/19 0700  In: 61 [P.O.:60]  Out: 300 [Urine:300]    ROS  10 ROS negative except from stated on subjective    Physical Exam:   General: Alert, oriented, NAD  HEENT: NC/AT, EOM are intact  Neck: supple, no JVD  Cardiovascular: RRR, S1, S2, no murmurs  Respiratory: Lungs are clear, no wheezes or rales  Abdomen: Soft, NT, ND  Back: No CVA tenderness, no paraspinal tenderness  Extremities: LE without pedal edema, no erythema  Neuro: A&O, CN are intact, no focal deficits  Skin: no rash or ulcers  Psych: good mood and affect    Lab/Data Review:  I have personally reviewed patients laboratory data showing  Recent Results (from the past 24 hour(s))   COVID-19 RAPID TEST    Collection Time: 09/18/21  5:57 PM   Result Value Ref Range    Specimen source NASAL      COVID-19 rapid test Not detected NOTD     GLUCOSE, POC    Collection Time: 09/18/21  9:04 PM   Result Value Ref Range    Glucose (POC) 202 (H) 65 - 100 mg/dL    [facility]    GLUCOSE, POC    Collection Time: 09/19/21  1:25 AM   Result Value Ref Range    Glucose (POC) 193 (H) 65 - 100 mg/dL    Performed by [facility] [place]    METABOLIC PANEL, [facility] [place]    Collection Time: 09/19/21  5:32 AM   Result Value Ref Range    Sodium 134 (L) 136 - 145 mmol/L    Potassium 3.8 3.5 - 5.1 mmol/L    Chloride 100 98 - 107 mmol/L    CO2 25 21 - 32 mmol/L    Anion gap 9 7 - 16 mmol/L    Glucose 208 (H) 65 - 100 mg/dL    BUN 17 8 - 23 MG/DL    Creatinine 0.84 0.6 - 1.0 MG/DL    GFR est AA >60 >60 ml/min/1.73m2    GFR est non-AA >60 >60 ml/min/1.73m2    Calcium 9.9 8.3 - 10.4 MG/DL    Bilirubin, total 1.8 (H) 0.2 - 1.1 MG/DL    ALT (SGPT) 173 (H) 12 - 65 U/L    AST (SGOT) 296 (H) 15 - 37 U/L    Alk.  phosphatase 166 (H) 50 - 136 U/L    Protein, total 6.7 6.3 - 8.2 g/dL    Albumin 3.1 (L) 3.2 - 4.6 g/dL    Globulin 3.6 (H) 2.3 - 3.5 g/dL    A-G Ratio 0.9 (L) 1.2 - 3.5     MAGNESIUM    Collection Time: 09/19/21  5:32 AM   Result Value Ref Range    Magnesium 1.3 (L) 1.8 - 2.4 mg/dL   CBC W/O DIFF    Collection Time: 09/19/21  5:32 AM   Result Value Ref Range    WBC 10.6 4.3 - 11.1 K/uL    RBC 4.30 4.05 - 5.2 M/uL    HGB 12.6 11.7 - 15.4 g/dL    HCT 38.7 35.8 - 46.3 %    MCV 90.0 79.6 - 97.8 FL    MCH 29.3 26.1 - 32.9 PG    MCHC 32.6 31.4 - 35.0 g/dL    RDW 13.1 11.9 - 14.6 %    PLATELET 123 583 - 374 K/uL    MPV 10.0 9.4 - 12.3 FL    ABSOLUTE NRBC 0.00 0.0 - 0.2 K/uL   VITAMIN D, 25 HYDROXY Collection Time: 09/19/21  5:32 AM   Result Value Ref Range    Vitamin D 25-Hydroxy 62.8 30.0 - 100.0 ng/mL   PHOSPHORUS    Collection Time: 09/19/21  5:32 AM   Result Value Ref Range    Phosphorus 1.7 (L) 2.3 - 3.7 MG/DL   TSH 3RD GENERATION    Collection Time: 09/19/21  5:32 AM   Result Value Ref Range    TSH 2.520 0.358 - 3.740 uIU/mL   HEMOGLOBIN A1C WITH EAG    Collection Time: 09/19/21  5:32 AM   Result Value Ref Range    Hemoglobin A1c 9.2 (H) 4.2 - 6.3 %    Est. average glucose 217 mg/dL   PTH INTACT    Collection Time: 09/19/21  5:32 AM   Result Value Ref Range    Calcium 9.9 8.3 - 10.4 MG/DL    PTH, Intact 31.0 18.5 - 88.0 pg/mL   GLUCOSE, POC    Collection Time: 09/19/21  5:50 AM   Result Value Ref Range    Glucose (POC) 234 (H) 65 - 100 mg/dL    Performed by Sue Meyer, POC    Collection Time: 09/19/21  6:14 AM   Result Value Ref Range    Glucose (POC) 222 (H) 65 - 100 mg/dL    Performed by Patt    GLUCOSE, POC    Collection Time: 09/19/21 10:03 AM   Result Value Ref Range    Glucose (POC) 193 (H) 65 - 100 mg/dL    Performed by Roni       All Micro Results     Procedure Component Value Units Date/Time    CULTURE, URINE [[id number]] Collected: 09/18/21 1757    Order Status: Completed Specimen: Cath Urine Updated: 09/19/21 0903    COVID-19 RAPID TEST [[id number]] Collected: 09/18/21 1757    Order Status: Completed Specimen: Nasopharyngeal Updated: 09/1934     Specimen source NASAL        COVID-19 rapid test Not detected        Comment:      The specimen is NEGATIVE for SARS-CoV-2, the novel coronavirus associated with COVID-19. A negative result does not rule out COVID-19. This test has been authorized by the FDA under an Emergency Use Authorization (EUA) for use by authorized laboratories.         Fact sheet for Healthcare Providers: BagFit.fi  Fact sheet for Patients: BagFit[employer]fi Methodology: Isothermal Nucleic Acid Amplification                Image:  I have personally reviewed patients imaging showing  XR PELV AP ONLY   Final Result   Fixation of the right intertrochanteric femur fracture. XR FEMUR RT 2 VS   Final Result   Fixation of the right intertrochanteric femur fracture. Please see the operative report for further detail. XR HIP RT W OR WO PELV 2-3 VWS   Final Result   1. Intertrochanteric left hip fracture with significant anterior angulation. 2.  Mild chronic lung disease. XR FEMUR RT 2 VS   Final Result   1. Intertrochanteric left hip fracture with significant anterior angulation. 2.  Mild chronic lung disease. XR CHEST SNGL V   Final Result   1. Intertrochanteric left hip fracture with significant anterior angulation. 2.  Mild chronic lung disease.        [facility] XR TECHNOLOGIST SERVICE    (Results Pending)        Hospital problems     Patient Active Problem List   Diagnosis Code    DM type 2 (diabetes mellitus, type 2) ([facility] E11.9    Essential hypertension with goal blood pressure less than 140/90 I10    Crohn disease ([facility] K50.90    Pericarditis, acute I30.9    Precordial pain R07.2    Nonspecific abnormal electrocardiogram (ECG) (EKG) R94.31    Coronary artery disease involving native coronary artery of native heart with angina pectoris (HCC) I25.119    Dyslipidemia E78.5    CAP (community acquired pneumonia) J18.9    LIZ (acute kidney injury) ([facility] N17.9    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    Electrolyte abnormality E87.8    History of artificial joint Z96.60    History of implantation of joint prosthesis of elbow Z96.629    Supracondylar fracture of humerus S42.413A    Age-related osteoporosis with current pathological fracture with routine healing M80.00XD    Cholecystitis with cholelithiasis K80.10    CVA (cerebral vascular accident) ([facility] I63.9    Dysuria R30.0    Vaginal candida B37.3    Polyneuropathy G62.9  Type 2 diabetes with nephropathy (HCC) E11.21    Diabetic polyneuropathy associated with type 2 diabetes mellitus ([facility] E11.42    Fall W19. Hesham Lea Encounter for medication management E37.018    Closed fracture of left hip ([facility] S72.002A    Hip fracture requiring operative repair [facility]) S72.009A    Acute cystitis without hematuria N30.00    UTI (urinary tract infection) N39.0    Acute blood loss anemia D62    Intertrochanteric fracture of left femur, closed, initial encounter [facility]) S34.906X        Signed By: Jose Rodríguez MD     September 19, 2021

## 2021-09-20 LAB
GLUCOSE BLD STRIP.AUTO-MCNC: 181 MG/DL (ref 65–100)
GLUCOSE BLD STRIP.AUTO-MCNC: 190 MG/DL (ref 65–100)
GLUCOSE BLD STRIP.AUTO-MCNC: 220 MG/DL (ref 65–100)
GLUCOSE BLD STRIP.AUTO-MCNC: 225 MG/DL (ref 65–100)
GLUCOSE BLD STRIP.AUTO-MCNC: 264 MG/DL (ref 65–100)
HGB BLD-MCNC: 10.2 G/DL (ref 11.7–15.4)
SERVICE CMNT-IMP: ABNORMAL

## 2021-09-20 PROCEDURE — 36415 COLL VENOUS BLD VENIPUNCTURE: CPT

## 2021-09-20 PROCEDURE — 97530 THERAPEUTIC ACTIVITIES: CPT

## 2021-09-20 PROCEDURE — 74011000258 HC RX REV CODE- 258: Performed by: FAMILY MEDICINE

## 2021-09-20 PROCEDURE — 97535 SELF CARE MNGMENT TRAINING: CPT

## 2021-09-20 PROCEDURE — 74011250637 HC RX REV CODE- 250/637: Performed by: ORTHOPAEDIC SURGERY

## 2021-09-20 PROCEDURE — 74011250636 HC RX REV CODE- 250/636: Performed by: FAMILY MEDICINE

## 2021-09-20 PROCEDURE — 74011250636 HC RX REV CODE- 250/636: Performed by: ORTHOPAEDIC SURGERY

## 2021-09-20 PROCEDURE — 2709999900 HC NON-CHARGEABLE SUPPLY

## 2021-09-20 PROCEDURE — 82962 GLUCOSE BLOOD TEST: CPT

## 2021-09-20 PROCEDURE — 65270000029 HC RM PRIVATE

## 2021-09-20 PROCEDURE — 97165 OT EVAL LOW COMPLEX 30 MIN: CPT

## 2021-09-20 PROCEDURE — 77030038269 HC DRN EXT URIN PURWCK BARD -A

## 2021-09-20 PROCEDURE — 85018 HEMOGLOBIN: CPT

## 2021-09-20 PROCEDURE — 97112 NEUROMUSCULAR REEDUCATION: CPT

## 2021-09-20 PROCEDURE — 97161 PT EVAL LOW COMPLEX 20 MIN: CPT

## 2021-09-20 PROCEDURE — 74011636637 HC RX REV CODE- 636/637: Performed by: FAMILY MEDICINE

## 2021-09-20 PROCEDURE — 74011250637 HC RX REV CODE- 250/637: Performed by: FAMILY MEDICINE

## 2021-09-20 RX ORDER — FAMOTIDINE 20 MG/1
20 TABLET, FILM COATED ORAL DAILY
Status: DISCONTINUED | OUTPATIENT
Start: 2021-09-21 | End: 2021-09-22 | Stop reason: HOSPADM

## 2021-09-20 RX ADMIN — MORPHINE SULFATE 2 MG: 2 INJECTION, SOLUTION INTRAMUSCULAR; INTRAVENOUS at 23:24

## 2021-09-20 RX ADMIN — HYDROMORPHONE HYDROCHLORIDE 2 MG: 2 TABLET ORAL at 08:45

## 2021-09-20 RX ADMIN — ACETAMINOPHEN 1000 MG: 500 TABLET ORAL at 05:42

## 2021-09-20 RX ADMIN — HUMAN INSULIN 2 UNITS: 100 INJECTION, SOLUTION SUBCUTANEOUS at 11:28

## 2021-09-20 RX ADMIN — INSULIN GLARGINE 10 UNITS: 100 INJECTION, SOLUTION SUBCUTANEOUS at 21:26

## 2021-09-20 RX ADMIN — DONEPEZIL HYDROCHLORIDE 10 MG: 5 TABLET, FILM COATED ORAL at 21:25

## 2021-09-20 RX ADMIN — Medication 5 ML: at 14:00

## 2021-09-20 RX ADMIN — Medication 10 ML: at 21:26

## 2021-09-20 RX ADMIN — CYANOCOBALAMIN TAB 1000 MCG 1000 MCG: 1000 TAB at 08:46

## 2021-09-20 RX ADMIN — FAMOTIDINE 20 MG: 20 TABLET, FILM COATED ORAL at 08:46

## 2021-09-20 RX ADMIN — CARVEDILOL 6.25 MG: 6.25 TABLET, FILM COATED ORAL at 17:00

## 2021-09-20 RX ADMIN — FOLIC ACID 1 MG: 1 TABLET ORAL at 08:45

## 2021-09-20 RX ADMIN — CEFTRIAXONE 1 G: 1 INJECTION, POWDER, FOR SOLUTION INTRAMUSCULAR; INTRAVENOUS at 14:00

## 2021-09-20 RX ADMIN — SENNOSIDES AND DOCUSATE SODIUM 2 TABLET: 8.6; 5 TABLET ORAL at 08:46

## 2021-09-20 RX ADMIN — ATORVASTATIN CALCIUM 40 MG: 40 TABLET, FILM COATED ORAL at 21:25

## 2021-09-20 RX ADMIN — HUMAN INSULIN 1 UNITS: 100 INJECTION, SOLUTION SUBCUTANEOUS at 23:23

## 2021-09-20 RX ADMIN — Medication 1 AMPULE: at 08:44

## 2021-09-20 RX ADMIN — ACETAMINOPHEN 1000 MG: 500 TABLET ORAL at 23:23

## 2021-09-20 RX ADMIN — HYDROMORPHONE HYDROCHLORIDE 2 MG: 2 TABLET ORAL at 21:25

## 2021-09-20 RX ADMIN — HYDROMORPHONE HYDROCHLORIDE 2 MG: 2 TABLET ORAL at 13:59

## 2021-09-20 RX ADMIN — ACETAMINOPHEN 1000 MG: 500 TABLET ORAL at 11:28

## 2021-09-20 RX ADMIN — Medication 10 ML: at 05:42

## 2021-09-20 RX ADMIN — CARVEDILOL 6.25 MG: 6.25 TABLET, FILM COATED ORAL at 08:46

## 2021-09-20 RX ADMIN — Medication 1 AMPULE: at 21:25

## 2021-09-20 RX ADMIN — ACETAMINOPHEN 1000 MG: 500 TABLET ORAL at 17:01

## 2021-09-20 RX ADMIN — HUMAN INSULIN 2 UNITS: 100 INJECTION, SOLUTION SUBCUTANEOUS at 18:00

## 2021-09-20 RX ADMIN — ASPIRIN 325 MG: 325 TABLET, COATED ORAL at 08:45

## 2021-09-20 RX ADMIN — SODIUM CHLORIDE 100 ML/HR: 900 INJECTION, SOLUTION INTRAVENOUS at 08:46

## 2021-09-20 RX ADMIN — VITAMIN D, TAB 1000IU (100/BT) 2000 UNITS: 25 TAB at 08:46

## 2021-09-20 RX ADMIN — MORPHINE SULFATE 4 MG: 2 INJECTION, SOLUTION INTRAMUSCULAR; INTRAVENOUS at 01:50

## 2021-09-20 RX ADMIN — HUMAN INSULIN 1 UNITS: 100 INJECTION, SOLUTION SUBCUTANEOUS at 05:42

## 2021-09-20 NOTE — PROGRESS NOTES
ACUTE OT GOALS:  (Developed with and agreed upon by patient and/or caregiver.)  1. Patient will verbalize and demonstrate understanding of hip precautions with 100% accuracy during ADL. 2. Patient will complete functional transfers with mod A and adaptive equipment as needed. 3. Patient will complete lower body bathing and dressing with min A and adaptive equipment as needed. 4. Patient will complete toileting with min A.   5. Patient will tolerate at least 30 minutes of BUE therapeutic exercises to increase strength in BUE to aid in functional transfers. 6. Patient will tolerate at least 30 minutes of OT treatment with no rest breaks to increase activity tolerance for ADLs.      Timeframe: 7 visits       OCCUPATIONAL THERAPY ASSESSMENT: Initial Assessment and Daily Note OT Treatment Day # 1    Sunny Sevilla is a 80 y.o. female   PRIMARY DIAGNOSIS: Intertrochanteric fracture of left femur, closed, initial encounter ([facility]  Intertrochanteric fracture of left femur, closed, initial encounter ([facility] [S72.142A]  Procedure(s) (LRB):  FEMORAL INTERTROCHANTERIC NAIL INSERTION RIGHT (Right)  1 Day Post-Op  Reason for Referral:   ICD-10: Treatment Diagnosis: Generalized Muscle Weakness (M62.81)  History of falling (Z91.81)  INPATIENT: Payor: Caity Butts / Plan: [address] [place] [address] HMO / Product Type: [employer] Medicare /   ASSESSMENT:     REHAB RECOMMENDATIONS:   Recommendation to date pending progress:  Setting:   Short-term Rehab  Equipment:    To Be Determined     PRIOR LEVEL OF FUNCTION:  (Prior to Hospitalization)  INITIAL/CURRENT LEVEL OF FUNCTION:  (Based on today's evaluation)   Bathing:   Independent  Dressing:   Independent  Feeding/Grooming:   Independent  Toileting:   Independent  Functional Mobility:   Independent Bathing:   Moderate Assistance  Dressing:   Maximal Assistance  Feeding/Grooming:   Minimal Assistance  Toileting:   Maximal Assistance  Functional Mobility:   Minimal Assistance x 2 x RW     ASSESSMENT:  Ms. Armando Hsu presents with deficits in overall strength, activity tolerance, ADL performance and functional mobility. Admitted for L hip fracture, s/p fall. Currnetly WBAT in LLE. BUE AROM and strength (4/5) are generally decreased but WFL. Min A x 2 for functional bed mobility/transfers; good EOB static sitting balance. Total A for donning new socks; min A for self-grooming tasks while seated EOB including brushing teeth and combing hair. Stood with with min to mod A x 2; able to take ~5 steps to bedside chair with min A x 2 x RW with verbal cues for facilitation. At this time, Terrell Haile is functioning below baseline for ADLs and functional mobility. Pt would benefit from skilled OT services to address OT goals and and plan of care. .     SUBJECTIVE:   Ms. Armando Hsu states, \"I like to go square dancing. \"    SOCIAL HISTORY/LIVING ENVIRONMENT: Lives with  in a 1-story home with 0 steps. Independent at baseline for ADLs and functional mobility. Likes to square dance.         OBJECTIVE:     PAIN: VITAL SIGNS: LINES/DRAINS:   Pre Treatment: Pain Screen  Pain Scale 1: Numeric (0 - 10)  Pain Intensity 1: 8  Pain Onset 1: post op  Pain Location 1: Leg;Hip  Pain Orientation 1: Right  Pain Description 1: Aching  Pain Intervention(s) 1: Repositioned  Post Treatment: 8    none  O2 Device: Nasal cannula     GROSS EVALUATION:  BUEs Within Functional Limits Abnormal/ Functional Abnormal/ Non-Functional (see comments) Not Tested Comments:   AROM [] [x] [] []    PROM [] [x] [] []    Strength [] [x] [] [] 4/5   Balance [] [x] [] [] Fair EOB sitting balance; fair (-) standing balance despite assist x 2 and use of RW   Posture [] [x] [] []    Sensation [] [] [] []    Coordination [x] [] [] []    Tone [x] [] [] []    Edema [x] [] [] []    Activity Tolerance [] [x] [] []     [] [] [] []      COGNITION/  PERCEPTION: Intact Impaired   (see comments) Comments:   Orientation [x] []    Vision [x] []    Hearing [x] []    Judgment/ Insight [x] []    Attention [x] []    Memory [x] []    Command Following [x] []    Emotional Regulation [x] []     [] []      ACTIVITIES OF DAILY LIVING: I Mod I S SBA CGA Min Mod Max Total NT Comments   BASIC ADLs:              Bathing/ Showering [] [] [] [] [] [] [] [] [] [x]    Toileting [] [] [] [] [] [] [] [] [] [x]    Dressing [] [] [] [] [] [] [] [] [x] [] Donning new socks   Feeding [] [] [] [] [] [] [] [] [] [x]    Grooming [] [] [] [] [] [x] [] [] [] [] Brushing teeth and combing hair   Personal Device Care [] [] [] [] [] [] [] [] [] []    Functional Mobility [] [] [] [] [] [x] [] [] [] [] X 2 x RW   I=Independent, Mod I=Modified Independent, S=Supervision, SBA=Standby Assistance, CGA=Contact Guard Assistance,   Min=Minimal Assistance, Mod=Moderate Assistance, Max=Maximal Assistance, Total=Total Assistance, NT=Not Tested    MOBILITY: I Mod I S SBA CGA Min Mod Max Total  NT x2 Comments:   Supine to sit [] [] [] [] [] [x] [] [] [] [] [x]    Sit to supine [] [] [] [] [] [] [] [] [] [x] []    Sit to stand [] [] [] [] [] [x] [x] [] [] [] [x]    Bed to chair [] [] [] [] [] [x] [] [] [] [] [x] X RW   I=Independent, Mod I=Modified Independent, S=Supervision, SBA=Standby Assistance, CGA=Contact Guard Assistance,   Min=Minimal Assistance, Mod=Moderate Assistance, Max=Maximal Assistance, Total=Total Assistance, NT=Not [facility] 6 Clicks   Daily Activity Inpatient Short Form        How much help from another person does the patient currently need. .. Total A Lot A Little None   1. Putting on and taking off regular lower body clothing? [] 1   [x] 2   [] 3   [] 4   2. Bathing (including washing, rinsing, drying)? [] 1   [x] 2   [] 3   [] 4   3. Toileting, which includes using toilet, bedpan or urinal?   [] 1   [x] 2   [] 3   [] 4   4. Putting on and taking off regular upper body clothing? [] 1   [] 2   [x] 3   [] 4   5.   Taking care of personal grooming such as brushing teeth? [] 1   [] 2   [x] 3   [] 4   6. Eating meals? [] 1   [] 2   [x] 3   [] 4   © 2007, Trustees of [address] [place] [address] [address] [address], under license to [employer]. All rights reserved     Score:  Initial: 15 Most Recent: X (Date: -- )   Interpretation of Tool:  Represents activities that are increasingly more difficult (i.e. Bed mobility, Transfers, Gait). PLAN:   FREQUENCY/DURATION: OT Plan of Care: 6 times/week for duration of hospital stay or until stated goals are met, whichever comes first.    PROBLEM LIST:   (Skilled intervention is medically necessary to address:)  1. Decreased ADL/Functional Activities  2. Decreased Activity Tolerance  3. Decreased Balance  4. Decreased Coordination  5. Decreased Gait Ability  6. Decreased Strength  7. Decreased Transfer Abilities  8. Increased Pain15   INTERVENTIONS PLANNED:   (Benefits and precautions of occupational therapy have been discussed with the patient.)  1. Self Care Training  2. Therapeutic Activity  3. Therapeutic Exercise/HEP  4. Neuromuscular Re-education  5. Education     TREATMENT:     EVALUATION: Low Complexity : (Untimed Charge)    TREATMENT:   ($$ Self Care/Home Management: 8-22 mins$$ Neuromuscular Re-Education: 8-22 mins   )  Self Care (13 Minutes): Self care including Lower Body Dressing, Grooming and Energy Conservation Training to increase independence and decrease level of assistance required. Neuromuscular Re-education (10 Minutes): Neuromuscular Re-education included Balance Training, Coordination training, Postural training, Sitting balance training and Standing balance training to improve Balance, Coordination and Postural Control.     TREATMENT GRID:  N/A    AFTER TREATMENT POSITION/PRECAUTIONS:  Alarm Activated, Chair, Needs within reach and RN notified    INTERDISCIPLINARY COLLABORATION:  RN/PCT, PT/PTA and OT/YOUNG    TOTAL TREATMENT DURATION:  OT Patient Time In/Time Out  Time In: 0852  Time Out: [address]

## 2021-09-20 NOTE — PROGRESS NOTES
ACUTE PHYSICAL THERAPY GOALS:  (Developed with and agreed upon by patient and/or caregiver. )    LTG:  (1.)Ms. Curtis will move from supine to sit and sit to supine , scoot up and down and roll side to side in bed with STAND BY ASSIST within 7 treatment day(s). (2.)Ms. Curtis will transfer from bed to chair and chair to bed with CONTACT GUARD ASSIST using the least restrictive device within 7 treatment day(s). (3.)Ms. Curtis will ambulate with CONTACT GUARD ASSIST for 150 feet with the least restrictive device within 7 treatment day(s). (4.)Ms. Curtis will participate in therapeutic activity/exercises x 23 minutes for increased strength within 7 treatment days.     ________________________________________________________________________________________________      PHYSICAL THERAPY ASSESSMENT: Initial Assessment and AM PT Treatment Day # 1   R LE WBAT      Marion Joaquin is a 80 y.o. female   PRIMARY DIAGNOSIS: Intertrochanteric fracture of left femur, closed, initial encounter ([facility]  Intertrochanteric fracture of left femur, closed, initial encounter ([facility] [S72.142A]  Procedure(s) (LRB):  FEMORAL INTERTROCHANTERIC NAIL INSERTION RIGHT (Right)  1 Day Post-Op  Reason for Referral:    ICD-10: Treatment Diagnosis: Generalized Muscle Weakness (M62.81)  Difficulty in walking, Not elsewhere classified (R26.2)  History of falling (Z91.81)  INPATIENT: Payor: [employer] / Plan: [address] [place] [address] HMO / Product Type: Managed Care Medicare /     ASSESSMENT:     REHAB RECOMMENDATIONS:   Recommendation to date pending progress:  Setting:   Short-term Rehab  Equipment:    To Be Determined     PRIOR LEVEL OF FUNCTION:  (Prior to Hospitalization) INITIAL/CURRENT LEVEL OF FUNCTION:  (Most Recently Demonstrated)   Bed Mobility:   Independent  Sit to Stand:   Independent  Transfers:   Modified Independent  Gait/Mobility:   Modified Independent Bed Mobility:   Maximal Assistance x 2  Sit to Stand:   Maximal Assistance x 2  Transfers:   Maximal Assistance x 2  Gait/Mobility:   Not tested     ASSESSMENT:  Ms. Bo Sanchez presents to PT with decreased AROM and strength in in R LE. Pt also presents with decreased activity tolerance and balance deficits. She required max x 2 for bed mobility and modA x 2 for stand pivot with RW. Pt educated on post op R LE WBAT and verbalized understanding. Ms. Bo Sanchez could benefit from skilled PT as she is currently functioning below her baseline. SUBJECTIVE:   Ms. Bo Sanchez states, \"I square dance. \"    SOCIAL HISTORY/LIVING ENVIRONMENT: Lives with spouse and uses RW for ambulation     OBJECTIVE:     PAIN: VITAL SIGNS: LINES/DRAINS:   Pre Treatment: Pain Screen  Pain Scale 1: Numeric (0 - 10)  Pain Intensity 1: 0  Post Treatment: 0   none  O2 Device: Nasal cannula     GROSS EVALUATION:  B LE Within Functional Limits Abnormal/ Functional Abnormal/ Non-Functional (see comments) Not Tested Comments:   AROM [] [] [x] [] R LE   PROM [] [x] [] [] R LE   Strength [] [] [x] [] R LE   Balance [] [] [x] [] Poor standing   Posture [] [] [] []    Sensation [] [] [] []    Coordination [] [] [x] [] B LEs (R>L)   Tone [] [] [] []    Edema [] [] [] []    Activity Tolerance [] [x] [] [] Fatigue after transfer to chair    [] [] [] []      COGNITION/  PERCEPTION: Intact Impaired   (see comments) Comments:   Orientation [x] []    Vision [x] []    Hearing [] [x]    Command Following [x] []    Safety Awareness [x] []     [] []      MOBILITY: I Mod I S SBA CGA Min Mod Max Total  NT x2 Comments:   Bed Mobility    Rolling [] [] [] [] [] [] [] [x] [] [] [x]    Supine to Sit [] [] [] [] [] [] [] [x] [] [] [x]    Scooting [] [] [] [] [] [] [] [x] [] [] [x]    Sit to Supine [] [] [] [] [] [] [] [] [] [] []    Transfers    Sit to Stand [] [] [] [] [] [] [] [x] [] [] [x]    Bed to Chair [] [] [] [] [] [] [x] [] [] [] [x]    Stand to Sit [] [] [] [] [] [] [x] [] [] [] [x]    I=Independent, Mod I=Modified Independent, S=Supervision, SBA=Standby Assistance, CGA=Contact Guard Assistance,   Min=Minimal Assistance, Mod=Moderate Assistance, Max=Maximal Assistance, Total=Total Assistance, NT=Not [facility]       How much difficulty does the patient currently have. .. Unable A Lot A Little None   1. Turning over in bed (including adjusting bedclothes, sheets and blankets)? [] 1   [x] 2   [] 3   [] 4   2. Sitting down on and standing up from a chair with arms ( e.g., wheelchair, bedside commode, etc.)   [] 1   [x] 2   [] 3   [] 4   3. Moving from lying on back to sitting on the side of the bed? [] 1   [x] 2   [] 3   [] 4   How much help from another person does the patient currently need. .. Total A Lot A Little None   4. Moving to and from a bed to a chair (including a wheelchair)? [] 1   [x] 2   [] 3   [] 4   5. Need to walk in hospital room? [] 1   [x] 2   [] 3   [] 4   6. Climbing 3-5 steps with a railing? [x] 1   [] 2   [] 3   [] 4   © 2007, Trustees of [address] [place] [address] [address] [address], under license to [employer]. All rights reserved     Score:  Initial: 11 Most Recent: X (Date: -- )    Interpretation of Tool:  Represents activities that are increasingly more difficult (i.e. Bed mobility, Transfers, Gait). PLAN:   FREQUENCY/DURATION: PT Plan of Care: BID for duration of hospital stay or until stated goals are met, whichever comes first.    PROBLEM LIST:   (Skilled intervention is medically necessary to address:)  1. Decreased ADL/Functional Activities  2. Decreased Activity Tolerance  3. Decreased AROM/PROM  4. Decreased Balance  5. Decreased Coordination  6. Decreased Gait Ability  7. Decreased Strength  8. Decreased Transfer Abilities  9. Increased Pain   INTERVENTIONS PLANNED:   (Benefits and precautions of physical therapy have been discussed with the patient.)  1. Therapeutic Activity  2.  Therapeutic Exercise/HEP  3. Neuromuscular Re-education  4. Gait Training  5. Manual Therapy  6. Education     TREATMENT:     EVALUATION: Low Complexity : (Untimed Charge)    TREATMENT:   ($$ Therapeutic Activity: 23-37 mins    )  Co-Treatment PT/OT necessary due to patient's decreased overall endurance/tolerance levels, as well as need for high level skilled assistance to complete functional transfers/mobility and functional tasks  Therapeutic Activity (23 Minutes): Therapeutic activity included Rolling, Supine to Sit, Scooting, Transfer Training, Sitting balance  and Standing balance to improve functional Mobility, Strength and Activity tolerance.     TREATMENT GRID:     Date:  9/20/21 Date:   Date:     ACTIVITY/EXERCISE AM PM AM PM AM PM   APs 1 x 10 R                                                              B = bilateral; AA = active assistive; A = active; P = passive    AFTER TREATMENT POSITION/PRECAUTIONS:  Alarm Activated, Chair, Needs within reach and RN notified    INTERDISCIPLINARY COLLABORATION:  RN/PCT, PT/PTA and OT/YOUNG    TOTAL TREATMENT DURATION:  PT Patient Time In/Time Out  Time In: 0852  Time Out: [address] [facility] Chuckie Gu PT, DPT

## 2021-09-20 NOTE — PROGRESS NOTES
September 20, 2021         Post Op day: 1 Day Post-Op Procedure(s) (LRB):  FEMORAL INTERTROCHANTERIC NAIL INSERTION RIGHT (Right)      Admit Date: 9/18/2021  Admit Diagnosis: Intertrochanteric fracture of left femur, closed, initial encounter ([facility] [S72.142A]       Principle Problem: Intertrochanteric fracture of left femur, closed, initial encounter ([facility] Subjective: Doing well, No complaints, No SOB, No Chest Pain, No Nausea or Vomiting     Objective:   Vital Signs are Stable, No Acute Distress, Alert,  Dressing is Dry,  Neurovascular exam is normal.     Assessment / Plan :  Patient Active Problem List   Diagnosis Code    DM type 2 (diabetes mellitus, type 2) ([facility] E11.9    Essential hypertension with goal blood pressure less than 140/90 I10    Crohn disease ([facility] K50.90    Pericarditis, acute I30.9    Precordial pain R07.2    Nonspecific abnormal electrocardiogram (ECG) (EKG) R94.31    Coronary artery disease involving native coronary artery of native heart with angina pectoris (HCC) I25.119    Dyslipidemia E78.5    CAP (community acquired pneumonia) J18.9    LIZ (acute kidney injury) ([facility] N17.9    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    Electrolyte abnormality E87.8    History of artificial joint Z96.60    History of implantation of joint prosthesis of elbow Z96.629    Supracondylar fracture of humerus S42.413A    Age-related osteoporosis with current pathological fracture with routine healing M80.00XD    Cholecystitis with cholelithiasis K80.10    CVA (cerebral vascular accident) ([facility] I63.9    Dysuria R30.0    Vaginal candida B37.3    Polyneuropathy G62.9    Type 2 diabetes with nephropathy ([facility] E11.21    Diabetic polyneuropathy associated with type 2 diabetes mellitus ([facility] E11.42    Fall W19. Hezzie Lea Encounter for medication management A47.907    Closed fracture of left hip ([facility] S72.002A    Hip fracture requiring operative repair ([facility] S72.009A    Acute cystitis without hematuria N30.00    UTI (urinary tract infection) N39.0    Acute blood loss anemia D62    Intertrochanteric fracture of left femur, closed, initial encounter ([facility] S72.142A      Patient Vitals for the past 8 hrs:   BP Temp Pulse Resp SpO2   21 0731 133/67 98.8 °F (37.1 °C) 70 18 95 %   21 0350 135/60 98 °F (36.7 °C) 72 18 100 %    Temp (24hrs), Av.2 °F (36.8 °C), Min:98 °F (36.7 °C), Max:98.8 °F (37.1 °C)    Body mass index is 25.1 kg/m².     Lab Results   Component Value Date/Time    HGB 10.2 (L) 2021 05:23 AM          S/P Procedure(s) (LRB):  FEMORAL INTERTROCHANTERIC NAIL INSERTION RIGHT (Right)    Dressing change every 3 days or more frequently if needed  Medical Mgmt per hospitalist  Anticoagulation plan: ASA 325mg daily  Continue PT  Fall Precautions  DC disp: SNF placement   F/U: 2 weeks postop for wound check and staple removal        Signed By: MANOHAR Henry  2021,  9:23 AM

## 2021-09-20 NOTE — PROGRESS NOTES
Call returned from patient daughter who is agreeable to STR at [facility]. She does not want patient to return to [address] . Referrals sent to [facility], [facility] and [facility]. CM will have, Catherin Aase, a liaison with long term planning contact daughter to assist with decision making for when patient discharges from [address] [place] [address].

## 2021-09-20 NOTE — DISCHARGE INSTRUCTIONS
[address]    IF YOU HAVE ANY PROBLEMS ONCE YOU ARE AT HOME CALL THE FOLLOWING NUMBERS:   Main office number: [phone] ask for Rissa Bryant (medical assistant with Dr. Mildred Wilkes)  Office Address: [facility] [address] [address], [address] [facility]      Patient Discharge Instructions    Jesus Kitchen / [id number] : 1934    Admitted 2021 Discharged: 2021         To be given to [address] on Admission         Weight bearing status: As tolerated with walker and assistance    Activity  · Continue Physical Therapy and Occupational Therapy   · Fall precautions     Wound Care   Dry dressing changes using sterile technique every other day or more frequently if needed    Staples are to be left in and removed in our office 2 weeks postop    Diet  · Resume regular or diabetic diet      Medications    · Patient medications are listed on the medication reconciliation sheet. Follow up    Follow up in our office in 2 weeks postop    All patients are to be transported via stretcher unless they are able to independently get out of a chair and stand without assistance. Information obtained by :  I understand that if any problems occur once I am at home I am to contact my physician. I understand and acknowledge receipt of the instructions indicated above.                                                                                                                                            Physician's or R.N.'s Signature                                                                  Date/Time                                                                                                                                              Patient or Representative Signature                                                          Date/Time

## 2021-09-20 NOTE — PROGRESS NOTES
Progress Note    Patient: Orlando Heart MRN: [id number]  SSN: [id number]    YOB: 1934  Age: 80 y.o. Sex: female      Admit Date: 9/18/2021    LOS: 2 days     Assessment and Plan:   80 y.o. female with medical history of T2DM, Osteoporosis, Hypercalcemia, HTN, GERD, CVA presented with s/p fall    1. Left intertrochanteric femoral fracture   -Status post femoral intertrochanteric nail insertion  -Pain management  -PT OT    2. GNR UTI  -Continue ceftriaxone  -Follow urine cultures - GNR    3. HTN   -Continue Coreg   -Hold amlodipine and aldactone      4. GERD   -Continue famotidine    5. T2DM  -Basal insulin  -Insulin sliding scale  -Blood sugar checks before meals and at bedtime    6. CKD stage 3a - baseline Scr 0.9-1. 1      7. History of COVID infection - 1/5/2021 at [address] [place] [address]      8. History of CVA   -ASA, statin     VTE ppx ASA      Subjective:   80 y.o. female with medical history of T2DM, Osteoporosis, Hypercalcemia, HTN, GERD, CVA presented with s/p fall. Patient seen and examined at bedside. Still presenting with left hip pain. Otherwise denies any chest pain, no abdominal pain, no nausea. Objective:     Vitals:    09/19/21 1956 09/19/21 2321 09/20/21 0350 09/20/21 0731   BP: 118/65 137/69 135/60 133/67   Pulse: 81 76 72 70   Resp: 18 18 18 18   Temp: 98.3 °F (36.8 °C) 98 °F (36.7 °C) 98 °F (36.7 °C) 98.8 °F (37.1 °C)   SpO2: 96% 94% 100% 95%   Weight:       Height:            Intake and Output:  Current Shift: No intake/output data recorded.   Last three shifts: 09/18 1901 - 09/20 0700  In: 770 [I.V.:770]  Out: 1250 [Urine:1200]    ROS  10 ROS negative except from stated on subjective    Physical Exam:   General: Alert, oriented, NAD  HEENT: NC/AT, EOM are intact  Neck: supple, no JVD  Cardiovascular: RRR, S1, S2, no murmurs  Respiratory: Lungs are clear, no wheezes or rales  Abdomen: Soft, NT, ND  Back: No CVA tenderness, no paraspinal tenderness  Extremities: LE without pedal edema, no erythema  Neuro: A&O, CN are intact, no focal deficits  Skin: no rash or ulcers  Psych: good mood and affect    Lab/Data Review:  I have personally reviewed patients laboratory data showing  Recent Results (from the past 24 hour(s))   GLUCOSE, POC    Collection Time: 09/19/21  5:10 PM   Result Value Ref Range    Glucose (POC) 291 (H) 65 - 100 mg/dL    Performed by Sylvia Munoz    GLUCOSE, POC    Collection Time: 09/19/21  8:43 PM   Result Value Ref Range    Glucose (POC) 207 (H) 65 - 100 mg/dL    Performed by Albina FRANCIS Sr 25    Collection Time: 09/19/21  9:59 PM   Result Value Ref Range    HGB 10.6 (L) 11.7 - 15.4 g/dL   GLUCOSE, POC    Collection Time: 09/19/21 11:46 PM   Result Value Ref Range    Glucose (POC) 176 (H) 65 - 100 mg/dL    Performed by [address] 34    Collection Time: 09/20/21  5:23 AM   Result Value Ref Range    HGB 10.2 (L) 11.7 - 15.4 g/dL   GLUCOSE, POC    Collection Time: 09/20/21  5:41 AM   Result Value Ref Range    Glucose (POC) 181 (H) 65 - 100 mg/dL    Performed by Torri    GLUCOSE, POC    Collection Time: 09/20/21 11:16 AM   Result Value Ref Range    Glucose (POC) 264 (H) 65 - 100 mg/dL    Performed by Chilo Chávez       All Micro Results     Procedure Component Value Units Date/Time    CULTURE, URINE [[id number]]  (Abnormal) Collected: 09/18/21 1757    Order Status: Completed Specimen: Cath Urine Updated: 09/20/21 0859     Special Requests: NO SPECIAL REQUESTS        Culture result:       50,000-100,000 COLONIES/mL GRAM NEGATIVE RODS IDENTIFICATION AND SUSCEPTIBILITY TO FOLLOW          COVID-19 RAPID TEST [[id number]] Collected: 09/18/21 1757    Order Status: Completed Specimen: Nasopharyngeal Updated: 09/1934     Specimen source NASAL        COVID-19 rapid test Not detected        Comment:      The specimen is NEGATIVE for SARS-CoV-2, the novel coronavirus associated with COVID-19. A negative result does not rule out COVID-19.        This test has been authorized by the FDA under an Emergency Use Authorization (EUA) for use by authorized laboratories. Fact sheet for Healthcare Providers: iTendency.uy  Fact sheet for Patients: iTendency.uy       Methodology: Isothermal Nucleic Acid Amplification                Image:  I have personally reviewed patients imaging showing  XR PELV AP ONLY   Final Result   Fixation of the right intertrochanteric femur fracture. XR FEMUR RT 2 VS   Final Result   Fixation of the right intertrochanteric femur fracture. Please see the operative report for further detail. XR HIP RT W OR WO PELV 2-3 VWS   Final Result   1. Intertrochanteric left hip fracture with significant anterior angulation. 2.  Mild chronic lung disease. XR FEMUR RT 2 VS   Final Result   1. Intertrochanteric left hip fracture with significant anterior angulation. 2.  Mild chronic lung disease. XR CHEST SNGL V   Final Result   1. Intertrochanteric left hip fracture with significant anterior angulation. 2.  Mild chronic lung disease.        NC XR TECHNOLOGIST SERVICE    (Results Pending)        Hospital problems     Patient Active Problem List   Diagnosis Code    DM type 2 (diabetes mellitus, type 2) ([facility] E11.9    Essential hypertension with goal blood pressure less than 140/90 I10    Crohn disease ([facility] K50.90    Pericarditis, acute I30.9    Precordial pain R07.2    Nonspecific abnormal electrocardiogram (ECG) (EKG) R94.31    Coronary artery disease involving native coronary artery of native heart with angina pectoris (HCC) I25.119    Dyslipidemia E78.5    CAP (community acquired pneumonia) J18.9    LIZ (acute kidney injury) ([facility] N17.9    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 I63.9    Dysuria R30.0    Vaginal candida B37.3    Polyneuropathy G62.9    Type [facility] E11.21    Diabetic polyneuropathy associated with type 2 diabetes mellitus ([facility] E11.42    Fall W19. Rivka Rubio Encounter for medication management T67.381    Closed fracture of left hip ([facility] S72.002A    Hip fracture requiring operative repair [facility]) S72.009A    Acute cystitis without hematuria N30.00    UTI (urinary tract infection) N39.0    Acute blood loss anemia D62    Intertrochanteric fracture of left femur, closed, initial encounter [facility]) D76.093U        Signed By: Michael Painting MD     September 20, 2021

## 2021-09-20 NOTE — PROGRESS NOTES
ACUTE PHYSICAL THERAPY GOALS:  (Developed with and agreed upon by patient and/or caregiver. )  LTG:  (1.)Ms. Curtis will move from supine to sit and sit to supine , scoot up and down and roll side to side in bed with STAND BY ASSIST within 7 treatment day(s). (2.)Ms. Curtis will transfer from bed to chair and chair to bed with CONTACT GUARD ASSIST using the least restrictive device within 7 treatment day(s). (3.)Ms. Curtis will ambulate with CONTACT GUARD ASSIST for 150 feet with the least restrictive device within 7 treatment day(s). (4.)Ms. Curtis will participate in therapeutic activity/exercises x 23 minutes for increased strength within 7 treatment days. PHYSICAL THERAPY: Daily Note and PM Treatment Day # 1   HECTOR ALICE CONTEHNICOLÁS Cedeño is a 80 y.o. female   PRIMARY DIAGNOSIS: Intertrochanteric fracture of left femur, closed, initial encounter ([facility]  Intertrochanteric fracture of left femur, closed, initial encounter ([facility] [S72.142A]  Procedure(s) (LRB):  FEMORAL INTERTROCHANTERIC NAIL INSERTION RIGHT (Right)  1 Day Post-Op    ASSESSMENT:     REHAB RECOMMENDATIONS: CURRENT LEVEL OF FUNCTION:  (Most Recently Demonstrated)   Recommendation to date pending progress:  Setting:   Short-term Rehab  Equipment:    To Be Determined Bed Mobility:   Maximal Assistance x 2  Sit to Stand:   Maximal Assistance x 2  Transfers:   Maximal Assistance x 2  Gait/Mobility:   Not tested     ASSESSMENT:  Ms. Jone Parmar was sitting in recliner upon arrival.  She appeared to have increased pain and decreased strength this PM.  Pt required maxA x 2 for sit to stand and stand pivot. She struggles to take steps as well as fatigues quickly. Pt performed bed exercises once back in bed. No progress from AM session. SUBJECTIVE:   Ms. Jone Parmar states, \"I'm going to [facility] [place]. \"    SOCIAL HISTORY/ LIVING ENVIRONMENT: See eval     OBJECTIVE:     PAIN: VITAL SIGNS: LINES/DRAINS:   Pre Treatment: Pain Screen  Pain Scale 1: Numeric (0 - 10)  Pain Intensity 1: 10  Pain Onset 1: post op  Pain Location 1: Hip  Pain Orientation 1: Right  Post Treatment: 9   IV  O2 Device: Nasal cannula     MOBILITY: I Mod I S SBA CGA Min Mod Max Total  NT x2 Comments:   Bed Mobility    Rolling [] [] [] [] [] [] [] [x] [] [] [x]    Supine to Sit [] [] [] [] [] [] [] [] [] [] []    Scooting [] [] [] [] [] [] [] [x] [] [] [x]    Sit to Supine [] [] [] [] [] [] [] [x] [] [] [x]    Transfers    Sit to Stand [] [] [] [] [] [] [] [x] [] [] [x]    Bed to Chair [] [] [] [] [] [] [] [x] [] [] [x]    Stand to Sit [] [] [] [] [] [] [] [x] [] [] [x]    I=Independent, Mod I=Modified Independent, S=Supervision, SBA=Standby Assistance, CGA=Contact Guard Assistance,   Min=Minimal Assistance, Mod=Moderate Assistance, Max=Maximal Assistance, Total=Total Assistance, NT=Not Tested    BALANCE: Good Fair+ Fair Fair- Poor NT Comments   Sitting Static [] [x] [] [] [] []    Sitting Dynamic [] [] [x] [] [] []              Standing Static [] [] [] [] [x] []    Standing Dynamic [] [] [] [] [x] []      GAIT: I Mod I S SBA CGA Min Mod Max Total  NT x2 Comments:   Level of Assistance [] [] [] [] [] [] [] [] [] [x] []    Distance NT    DME N/A    Gait Quality NT    Weightbearing  Status WBAT, R LE     I=Independent, Mod I=Modified Independent, S=Supervision, SBA=Standby Assistance, CGA=Contact Guard Assistance,   Min=Minimal Assistance, Mod=Moderate Assistance, Max=Maximal Assistance, Total=Total Assistance, NT=Not Tested    PLAN:   FREQUENCY/DURATION: PT Plan of Care: BID for duration of hospital stay or until stated goals are met, whichever comes first.  TREATMENT:     TREATMENT:   ($$ Therapeutic Activity: 23-37 mins    )  Therapeutic Activity (23 Minutes): Therapeutic activity included Rolling, Sit to Supine, Scooting, Transfer Training, Sitting balance , Standing balance and bed exercises to improve functional Mobility, Strength and ROM.     TREATMENT GRID:     Date:  9/20/21 Date:   Date: ACTIVITY/EXERCISE AM PM AM PM AM PM   APs  1 x 10 R       Heel slides  1 x 10 R (AA)       Glut squeeze  1 x 10 B       Quad sets  1 x 10 R (AA)                                  B = bilateral; AA = active assistive; A = active; P = passive    AFTER TREATMENT POSITION/PRECAUTIONS:  Alarm Activated, Bed, Needs within reach and RN notified    INTERDISCIPLINARY COLLABORATION:  RN/PCT, PT/PTA and Rehab Attendant     TOTAL TREATMENT DURATION:  PT Patient Time In/Time Out  Time In: 1351  Time Out: [address], PT, DPT

## 2021-09-20 NOTE — PROGRESS NOTES
CM screened chart for potential discharge needs. No CM consult was received. Noted that patient is s/p repair for hip fracture and therapy is recommending SNF at discharge. Call placed to patient's daughter and message left. Noted that patient has previously been to [address] [place] [address]. 48hr PPD will be completed tomorrow and Covid testing was done on 9/18/2021. CM will await return call from daughter to discuss SNF choices and CM anticipates if bed is available, patient could discharge as early as tomorrow, 9/21/2021, if medically ready.      Care Management Interventions  Mode of Transport at Discharge: BLS  Transition of Care Consult (CM Consult): Discharge Planning, SNF  Discharge Durable Medical Equipment: No  Physical Therapy Consult: Yes  Occupational Therapy Consult: Yes  Discharge Location  Discharge Placement: Unable to determine at this time

## 2021-09-21 LAB
ANION GAP SERPL CALC-SCNC: 8 MMOL/L (ref 7–16)
BACTERIA SPEC CULT: ABNORMAL
BASOPHILS # BLD: 0.1 K/UL (ref 0–0.2)
BASOPHILS NFR BLD: 1 % (ref 0–2)
BUN SERPL-MCNC: 16 MG/DL (ref 8–23)
CALCIUM SERPL-MCNC: 9.1 MG/DL (ref 8.3–10.4)
CHLORIDE SERPL-SCNC: 105 MMOL/L (ref 98–107)
CO2 SERPL-SCNC: 26 MMOL/L (ref 21–32)
COVID-19 RAPID TEST, COVR: NOT DETECTED
CREAT SERPL-MCNC: 0.64 MG/DL (ref 0.6–1)
DIFFERENTIAL METHOD BLD: ABNORMAL
EOSINOPHIL # BLD: 0.4 K/UL (ref 0–0.8)
EOSINOPHIL NFR BLD: 4 % (ref 0.5–7.8)
ERYTHROCYTE [DISTWIDTH] IN BLOOD BY AUTOMATED COUNT: 13.2 % (ref 11.9–14.6)
GLUCOSE BLD STRIP.AUTO-MCNC: 153 MG/DL (ref 65–100)
GLUCOSE BLD STRIP.AUTO-MCNC: 173 MG/DL (ref 65–100)
GLUCOSE BLD STRIP.AUTO-MCNC: 227 MG/DL (ref 65–100)
GLUCOSE BLD STRIP.AUTO-MCNC: 236 MG/DL (ref 65–100)
GLUCOSE SERPL-MCNC: 160 MG/DL (ref 65–100)
HCT VFR BLD AUTO: 30.9 % (ref 35.8–46.3)
HGB BLD-MCNC: 10.4 G/DL (ref 11.7–15.4)
HGB BLD-MCNC: 9.8 G/DL (ref 11.7–15.4)
IMM GRANULOCYTES # BLD AUTO: 0.8 K/UL (ref 0–0.5)
IMM GRANULOCYTES NFR BLD AUTO: 8 % (ref 0–5)
LYMPHOCYTES # BLD: 1.7 K/UL (ref 0.5–4.6)
LYMPHOCYTES NFR BLD: 18 % (ref 13–44)
MCH RBC QN AUTO: 28.7 PG (ref 26.1–32.9)
MCHC RBC AUTO-ENTMCNC: 31.7 G/DL (ref 31.4–35)
MCV RBC AUTO: 90.6 FL (ref 79.6–97.8)
MM INDURATION POC: 0 MM (ref 0–5)
MM INDURATION POC: 0 MM (ref 0–5)
MONOCYTES # BLD: 0.9 K/UL (ref 0.1–1.3)
MONOCYTES NFR BLD: 9 % (ref 4–12)
NEUTS SEG # BLD: 5.6 K/UL (ref 1.7–8.2)
NEUTS SEG NFR BLD: 60 % (ref 43–78)
NRBC # BLD: 0 K/UL (ref 0–0.2)
PLATELET # BLD AUTO: 211 K/UL (ref 150–450)
PLATELET COMMENTS,PCOM: ADEQUATE
PMV BLD AUTO: 10.4 FL (ref 9.4–12.3)
POTASSIUM SERPL-SCNC: 3.7 MMOL/L (ref 3.5–5.1)
PPD POC: NEGATIVE NEGATIVE
PPD POC: NEGATIVE NEGATIVE
RBC # BLD AUTO: 3.41 M/UL (ref 4.05–5.2)
RBC MORPH BLD: ABNORMAL
RBC MORPH BLD: ABNORMAL
SERVICE CMNT-IMP: ABNORMAL
SODIUM SERPL-SCNC: 139 MMOL/L (ref 136–145)
SOURCE, COVRS: NORMAL
WBC # BLD AUTO: 9.5 K/UL (ref 4.3–11.1)
WBC MORPH BLD: ABNORMAL

## 2021-09-21 PROCEDURE — 87635 SARS-COV-2 COVID-19 AMP PRB: CPT

## 2021-09-21 PROCEDURE — 97110 THERAPEUTIC EXERCISES: CPT

## 2021-09-21 PROCEDURE — 74011250637 HC RX REV CODE- 250/637: Performed by: FAMILY MEDICINE

## 2021-09-21 PROCEDURE — 97530 THERAPEUTIC ACTIVITIES: CPT

## 2021-09-21 PROCEDURE — 74011250637 HC RX REV CODE- 250/637: Performed by: ORTHOPAEDIC SURGERY

## 2021-09-21 PROCEDURE — 2709999900 HC NON-CHARGEABLE SUPPLY

## 2021-09-21 PROCEDURE — 74011250637 HC RX REV CODE- 250/637: Performed by: INTERNAL MEDICINE

## 2021-09-21 PROCEDURE — 80048 BASIC METABOLIC PNL TOTAL CA: CPT

## 2021-09-21 PROCEDURE — 97112 NEUROMUSCULAR REEDUCATION: CPT

## 2021-09-21 PROCEDURE — 85025 COMPLETE CBC W/AUTO DIFF WBC: CPT

## 2021-09-21 PROCEDURE — 97535 SELF CARE MNGMENT TRAINING: CPT

## 2021-09-21 PROCEDURE — 36415 COLL VENOUS BLD VENIPUNCTURE: CPT

## 2021-09-21 PROCEDURE — 85018 HEMOGLOBIN: CPT

## 2021-09-21 PROCEDURE — 82962 GLUCOSE BLOOD TEST: CPT

## 2021-09-21 PROCEDURE — 74011636637 HC RX REV CODE- 636/637: Performed by: FAMILY MEDICINE

## 2021-09-21 PROCEDURE — 65270000029 HC RM PRIVATE

## 2021-09-21 RX ADMIN — HUMAN INSULIN 2 UNITS: 100 INJECTION, SOLUTION SUBCUTANEOUS at 11:36

## 2021-09-21 RX ADMIN — DONEPEZIL HYDROCHLORIDE 10 MG: 5 TABLET, FILM COATED ORAL at 20:58

## 2021-09-21 RX ADMIN — SENNOSIDES AND DOCUSATE SODIUM 2 TABLET: 8.6; 5 TABLET ORAL at 08:06

## 2021-09-21 RX ADMIN — VITAMIN D, TAB 1000IU (100/BT) 2000 UNITS: 25 TAB at 08:06

## 2021-09-21 RX ADMIN — FAMOTIDINE 20 MG: 20 TABLET, FILM COATED ORAL at 08:06

## 2021-09-21 RX ADMIN — HUMAN INSULIN 2 UNITS: 100 INJECTION, SOLUTION SUBCUTANEOUS at 16:42

## 2021-09-21 RX ADMIN — HYDROMORPHONE HYDROCHLORIDE 2 MG: 2 TABLET ORAL at 13:02

## 2021-09-21 RX ADMIN — Medication 10 ML: at 11:37

## 2021-09-21 RX ADMIN — ACETAMINOPHEN 1000 MG: 500 TABLET ORAL at 06:08

## 2021-09-21 RX ADMIN — ASPIRIN 325 MG: 325 TABLET, COATED ORAL at 08:06

## 2021-09-21 RX ADMIN — CARVEDILOL 6.25 MG: 6.25 TABLET, FILM COATED ORAL at 16:44

## 2021-09-21 RX ADMIN — ATORVASTATIN CALCIUM 40 MG: 40 TABLET, FILM COATED ORAL at 20:58

## 2021-09-21 RX ADMIN — CYANOCOBALAMIN TAB 1000 MCG 1000 MCG: 1000 TAB at 08:06

## 2021-09-21 RX ADMIN — HUMAN INSULIN 1 UNITS: 100 INJECTION, SOLUTION SUBCUTANEOUS at 06:08

## 2021-09-21 RX ADMIN — Medication 1 AMPULE: at 20:58

## 2021-09-21 RX ADMIN — INSULIN GLARGINE 10 UNITS: 100 INJECTION, SOLUTION SUBCUTANEOUS at 20:59

## 2021-09-21 RX ADMIN — CARVEDILOL 6.25 MG: 6.25 TABLET, FILM COATED ORAL at 08:05

## 2021-09-21 RX ADMIN — Medication 10 ML: at 06:09

## 2021-09-21 RX ADMIN — Medication 10 ML: at 21:00

## 2021-09-21 RX ADMIN — HYDROMORPHONE HYDROCHLORIDE 2 MG: 2 TABLET ORAL at 06:08

## 2021-09-21 RX ADMIN — Medication 1 AMPULE: at 08:05

## 2021-09-21 RX ADMIN — FOLIC ACID 1 MG: 1 TABLET ORAL at 08:05

## 2021-09-21 NOTE — PROGRESS NOTES
ACUTE OT GOALS:  (Developed with and agreed upon by patient and/or caregiver.)  1. Patient will verbalize and demonstrate understanding of hip precautions with 100% accuracy during ADL. 2. Patient will complete functional transfers with mod A and adaptive equipment as needed. 3. Patient will complete lower body bathing and dressing with min A and adaptive equipment as needed. 4. Patient will complete toileting with min A.   5. Patient will tolerate at least 30 minutes of BUE therapeutic exercises to increase strength in BUE to aid in functional transfers. 6. Patient will tolerate at least 30 minutes of OT treatment with no rest breaks to increase activity tolerance for ADLs.    Timeframe: 7 visits     OCCUPATIONAL THERAPY: Daily Note OT Treatment Day # 2    Christine Higgins is a 80 y.o. female   PRIMARY DIAGNOSIS: Intertrochanteric fracture of left femur, closed, initial encounter ([facility]  Intertrochanteric fracture of left femur, closed, initial encounter ([facility] [S72.142A]  Procedure(s) (LRB):  FEMORAL INTERTROCHANTERIC NAIL INSERTION RIGHT (Right)  2 Days Post-Op  Payor: [employer] / Plan: [address] [place] [address] HMO / Product Type: [employer] /   ASSESSMENT:     REHAB RECOMMENDATIONS: CURRENT LEVEL OF FUNCTION:  (Most Recently Demonstrated)   Recommendation to date pending progress:  Setting:   Short-term Rehab  Equipment:    To Be Determined Bathing:   Moderate Assistance  Dressing:   Maximal Assistance gown and socks  Feeding/Grooming:   Moderate Assistance  Toileting:   Maximal Assistance  Functional Mobility:   Moderate Assistance x 2-max     ASSESSMENT:  Ms. Cookie Fontanez is doing fair today. Pt required max A for bed mobility this session. Transferred to [facility] with mod A x2. Pt responded well to verbal cues to complete transfer. Completed ADLs while seated with mod A (LB). Monitored BP during position changes with good results. Pt required max A for bowel hygiene.  Pt left in chair with all needs within reach. Pt is making good progress towards treatment goals. Pt would continue to benefit from skilled OT during stay. SUBJECTIVE:   Ms. Anupam Moreno states, \"That feels good. \"    SOCIAL HISTORY/LIVING ENVIRONMENT:   Support Systems: Spouse/Significant Other, Child(eulalio)    OBJECTIVE:     PAIN: VITAL SIGNS: LINES/DRAINS:   Pre Treatment: Pain Screen  Pain Scale 1: Numeric (0 - 10)  Pain Intensity 1: 0  Post Treatment: 0   [place]ure[place]  O2 Device: Nasal cannula     ACTIVITIES OF DAILY LIVING: I Mod I S SBA CGA Min Mod Max Total NT Comments   BASIC ADLs:              Bathing/ Showering [] [] [] [] [] [] [x] [] [] []    Toileting [] [] [] [] [] [] [] [x] [] [] Bowel hygiene   Dressing [] [] [] [] [] [] [] [x] [] []    Feeding [] [] [] [] [] [] [] [] [] [x]    Grooming [] [] [] [] [] [] [x] [] [] []    Personal Device Care [] [] [] [] [] [] [] [] [] [x]    Functional Mobility [] [] [] [] [] [] [x] [x] [] [] x2   I=Independent, Mod I=Modified Independent, S=Supervision, SBA=Standby Assistance, CGA=Contact Guard Assistance,   Min=Minimal Assistance, Mod=Moderate Assistance, Max=Maximal Assistance, Total=Total Assistance, NT=Not Tested    MOBILITY: I Mod I S SBA CGA Min Mod Max Total  NT x2 Comments:   Supine to sit [] [] [] [] [] [] [x] [] [] [] [x]    Sit to supine [] [] [] [] [] [] [] [] [] [x] []    Sit to stand [] [] [] [] [] [] [x] [] [] [] [x]    Bed to chair [] [] [] [] [] [] [x] [] [] [] [x]    I=Independent, Mod I=Modified Independent, S=Supervision, SBA=Standby Assistance, CGA=Contact Guard Assistance,   Min=Minimal Assistance, Mod=Moderate Assistance, Max=Maximal Assistance, Total=Total Assistance, NT=Not Tested    BALANCE: Good Fair+ Fair Fair- Poor NT Comments   Sitting Static [] [x] [] [] [] []    Sitting Dynamic [] [] [x] [] [] []              Standing Static [] [] [x] [] [] []    Standing Dynamic [] [] [x] [] [] []      PLAN:   FREQUENCY/DURATION: OT Plan of Care: 6 times/week for duration of hospital stay or until stated goals are met, whichever comes first.    TREATMENT:   TREATMENT:   ($$ Self Care/Home Management: 23-37 mins$$ Neuromuscular Re-Education: 8-22 mins   )  Co-Treatment PT/OT necessary due to patient's decreased overall endurance/tolerance levels, as well as need for high level skilled assistance to complete functional transfers/mobility and functional tasks  Self Care (30 Minutes): Self care including Upper Body Bathing, Lower Body Bathing, Toileting, Upper Body Dressing, Lower Body Dressing and Grooming to increase independence and decrease level of assistance required. Neuromuscular Re-education (10 Minutes): Neuromuscular Re-education included Balance Training, Coordination training, Functional mobility with facilitation, Sitting balance training and Standing balance training to improve Balance, Coordination and Functional Mobility.     TREATMENT GRID:  N/A    AFTER TREATMENT POSITION/PRECAUTIONS:  Alarm Activated, Chair and Needs within reach    INTERDISCIPLINARY COLLABORATION:  RN/PCT, PT/PTA and OT/YOUNG    TOTAL TREATMENT DURATION:  OT Patient Time In/Time Out  Time In: 0958  Time Out: [facility]

## 2021-09-21 NOTE — PROGRESS NOTES
ACUTE PHYSICAL THERAPY GOALS:  (Developed with and agreed upon by patient and/or caregiver. )  LTG:  (1.)Ms. Curtis will move from supine to sit and sit to supine , scoot up and down and roll side to side in bed with STAND BY ASSIST within 7 treatment day(s). (2.)Ms. Curtis will transfer from bed to chair and chair to bed with CONTACT GUARD ASSIST using the least restrictive device within 7 treatment day(s). (3.)Ms. Curtis will ambulate with CONTACT GUARD ASSIST for 150 feet with the least restrictive device within 7 treatment day(s). (4.)Ms. Curtis will participate in therapeutic activity/exercises x 23 minutes for increased strength within 7 treatment days. PHYSICAL THERAPY: Daily Note and AM Treatment Day # 2   R LE WERONICOLÁS Leon is a 80 y.o. female   PRIMARY DIAGNOSIS: Intertrochanteric fracture of left femur, closed, initial encounter ([facility]  Intertrochanteric fracture of left femur, closed, initial encounter ([facility] [S72.142A]  Procedure(s) (LRB):  FEMORAL INTERTROCHANTERIC NAIL INSERTION RIGHT (Right)  2 Days Post-Op    ASSESSMENT:     REHAB RECOMMENDATIONS: CURRENT LEVEL OF FUNCTION:  (Most Recently Demonstrated)   Recommendation to date pending progress:  Setting:   Short-term Rehab  Equipment:    To Be Determined Bed Mobility:   Maximal Assistance x 2  Sit to Stand:   Minimal Assistance x 2  Transfers:   Minimal Assistance x 2  Gait/Mobility:   Not tested     ASSESSMENT:  Ms. Isidro Camp was supine in bed and lethargic upon arrival.  She performed bed mobility today with maxA x 2. Pt became more alert and participative. Pt performed several transfers today with Ceila x 2/RW. Pt continues to fatigue quickly but progressed in mobility today.        SUBJECTIVE:   Ms. Isidro Camp states, \"I'm okay\"    SOCIAL HISTORY/ LIVING ENVIRONMENT: See eval  Support Systems: Spouse/Significant Other, Child(eulalio)  OBJECTIVE:     PAIN: VITAL SIGNS: LINES/DRAINS:   Pre Treatment: Pain Screen  Pain Scale 1: Numeric (0 - 10)  Pain Intensity 1: 0  Post Treatment: 9   Purewick  O2 Device: Nasal cannula     MOBILITY: I Mod I S SBA CGA Min Mod Max Total  NT x2 Comments:   Bed Mobility    Rolling [] [] [] [] [] [] [] [x] [] [] [x]    Supine to Sit [] [] [] [] [] [] [] [x] [] [] [x]    Scooting [] [] [] [] [] [] [] [x] [] [] [x]    Sit to Supine [] [] [] [] [] [] [] [] [] [] []    Transfers    Sit to Stand [] [] [] [] [] [x] [] [] [] [] [x]    Bed to Chair [] [] [] [] [] [x] [] [] [] [] [x]    Stand to Sit [] [] [] [] [] [x] [] [] [] [] [x]    I=Independent, Mod I=Modified Independent, S=Supervision, SBA=Standby Assistance, CGA=Contact Guard Assistance,   Min=Minimal Assistance, Mod=Moderate Assistance, Max=Maximal Assistance, Total=Total Assistance, NT=Not Tested    BALANCE: Good Fair+ Fair Fair- Poor NT Comments   Sitting Static [] [x] [] [] [] []    Sitting Dynamic [] [x] [] [] [] []               [] [] [] [x] [] []    Standing DynamicStanding Static [] [] [] [] [x] []      GAIT: I Mod I S SBA CGA Min Mod Max Total  NT x2 Comments:   Level of Assistance [] [] [] [] [] [x] [] [] [] [] [x]    Distance 2 ft    DME Rolling Walker and Gait Belt    Gait Quality Antalgic     Weightbearing  Status WBAT, R LE     I=Independent, Mod I=Modified Independent, S=Supervision, SBA=Standby Assistance, CGA=Contact Guard Assistance,   Min=Minimal Assistance, Mod=Moderate Assistance, Max=Maximal Assistance, Total=Total Assistance, NT=Not Tested    PLAN:   FREQUENCY/DURATION: PT Plan of Care: BID for duration of hospital stay or until stated goals are met, whichever comes first.  TREATMENT:     TREATMENT:   ($$ Therapeutic Activity: 38-52 mins    )  Therapeutic Activity (38 Minutes): Therapeutic activity included Rolling, Supine to Sit, Scooting, Transfer Training, Ambulation on level ground, Sitting balance  and Standing balance to improve functional Mobility, Strength, ROM and Activity tolerance.     TREATMENT GRID:     Date:  9/20/21 Date:   Date: ACTIVITY/EXERCISE AM PM AM PM AM PM   APs  1 x 10 R       Heel slides  1 x 10 R (AA)       Glut squeeze  1 x 10 B       Quad sets  1 x 10 R (AA)                                  B = bilateral; AA = active assistive; A = active; P = passive    AFTER TREATMENT POSITION/PRECAUTIONS:  Alarm Activated, Chair and Needs within reach    INTERDISCIPLINARY COLLABORATION:  RN/PCT, PT/PTA and OT/YOUNG    TOTAL TREATMENT DURATION:  PT Patient Time In/Time Out  Time In: 0958  Time Out: [address] PT, DPT

## 2021-09-21 NOTE — PROGRESS NOTES
September 21, 2021         Post Op day: 2 Days Post-Op Procedure(s) (LRB):  FEMORAL INTERTROCHANTERIC NAIL INSERTION RIGHT (Right)      Admit Date: 9/18/2021  Admit Diagnosis: Intertrochanteric fracture of left femur, closed, initial encounter ([facility] [S72.142A]       Principle Problem: Intertrochanteric fracture of left femur, closed, initial encounter ([facility] Subjective: Doing well, No complaints, No SOB, No Chest Pain, No Nausea or Vomiting     Objective:   Vital Signs are Stable, No Acute Distress, Alert,  Dressing is Dry,  Neurovascular exam is normal.     Assessment / Plan :  Patient Active Problem List   Diagnosis Code    DM [facility]di[facility] mellitus, [facility] 2[facility] E11.9    Essential hypertension with goal blood pressure less than 140/90 I10    Crohn disease ([facility] K50.90    Pericarditis, acute I30.9    Precordial pain R07.2    Nonspecific abnormal electrocardiogram (ECG) (EKG) R94.31    Coronary artery disease involving native coronary artery of native heart with angina pectoris (HCC) I25.119    Dyslipidemia E78.5    CAP (community acquired pneumonia) J18.9    LIZ (acute kidney injury) ([facility] N17.9    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    Electrolyte abnormality E87.8    History of artificial joint Z96.60    History of implantation of joint prosthesis of elbow Z96.629    Supracondylar fracture of humerus S42.413A    Age-related osteoporosis with current pathological fracture with routine healing M80.00XD    Cholecystitis with cholelithiasis K80.10    CVA (cerebral vascular accident) ([facility] I63.9    Dysuria R30.0    Vaginal candida B37.3    Polyneuropathy G62.9    Type 2 diabetes with nephropathy ([facility] E11.21    Diabetic polyneuropathy associated with type 2 diabetes mellitus ([facility] E11.42    Fall W19. Andry Woody Encounter for medication management C20.704    Closed fracture of left hip ([facility] S72.002A    Hip fracture requiring operative repair ([facility] S72.009A    Acute cystitis without hematuria N30.00    UTI (urinary tract infection) N39.0    Acute blood loss anemia D62    Intertrochanter[facility] fracture of left femur, closed, [facility] S72.142A      Patient Vitals for the past 8 hrs:   BP Temp Pulse Resp SpO2   21 0736 (!) 108/42 98.5 °F (36.9 °C) 70 18 94 %   21 0428 (!) 120/52 98.2 °F (36.8 °C) 69 18 94 %    Temp (24hrs), Av.1 °F (36.7 °C), Min:97.5 °F (36.4 °C), Max:98.5 °F (36.9 °C)    Body mass index is 25.1 kg/m².     Lab Results   Component Value Date/Time    HGB 9.8 (L) 2021 06:36 AM          S/P Procedure(s) (LRB):  FEMORAL INTERTROCHANTERIC NAIL INSERTION RIGHT (Right)      Medical Mgmt per hospitalist  Anticoagulation plan: 325mg daily  Continue PT  Fall Precautions  DC disp: rehab placement   F/U: 2 weeks postop for wound check and staple removal        Signed By: MANOHAR Diaz  2021,  9:54 AM

## 2021-09-21 NOTE — PROGRESS NOTES
Physician Progress Note      Suki Hernández  CSN #:                  [id number]  :                       1934  ADMIT DATE:       2021 11:25 AM  DISCH DATE:  RESPONDING  PROVIDER #:        Aster Jaffe MD          QUERY TEXT:    Pt admitted with  RHip fracture after a fall. . Pt noted to have a history of  Osteoporosis. If possible, please document in progress notes and discharge summary if you are evaluating and/or treating any of the following: The medical record reflects the following:  Risk 80 YOF, HX of Osteoporosis, non-PTH dependent  hypercalcemia, 2016--Age-related osteoporosis with current pathological fracture with routine healing PTA: Oscal + D3 500mg TID,  Clinical Indicators: intertrochanteric left hip fracture falling a fall from ground level   PN: X-rays right hip show an intertrochanteric hip fracture. Mildly displaced. Angulated.  ED Nursing note: Shortening and rotation seen to RLE on arrival  Treatment: Monitoring, Ortho consult, ORIF R hip    Thank you,  Calista Bailey RN,C BSN  Clinical   [email or web address]  Options provided:  -- Osteoporotic intertrochanteric right hip fracture  following fall which would not usually break a normal, healthy bone  -- Traumatic intertrochanteric right hip fracture  -- Other - I will add my own diagnosis  -- Disagree - Not applicable / Not valid  -- Disagree - Clinically unable to determine / Unknown  -- Refer to Clinical Documentation Reviewer    PROVIDER RESPONSE TEXT:    This patient has a traumatic intertrochanteric right hip fracture . Query created by:  Dickson Ricketts on 2021 8:14 PM      Electronically signed by:  Aster Jaffe MD 2021 7:08 AM

## 2021-09-21 NOTE — PROGRESS NOTES
Progress Note    Patient: Frederick Kennedy MRN: [id number]  SSN: [id number]    YOB: 1934  Age: 80 y.o. Sex: female      Admit Date: 9/18/2021    LOS: 3 days     Assessment and Plan:   80 y.o. female with medical history of T2DM, Osteoporosis, Hypercalcemia, HTN, GERD, CVA presented with s/p fall    1. Left intertrochanteric femoral fracture   -Status post femoral intertrochanteric nail insertion  -Pain management  -PT OT    2.E. coli UTI  -Continue ceftriaxone    3. HTN   -Continue Coreg   -Hold amlodipine and aldactone      4. GERD   -Continue famotidine    5. T2DM  -Basal insulin  -Insulin sliding scale  -Blood sugar checks before meals and at bedtime    6. CKD stage 3a - baseline Scr 0.9-1.1   -Monitor renal function     7. History of COVID infection - 1/5/2021 at [address] [place] [address]      8. History of CVA   -ASA, statin     VTE ppx ASA      Subjective:   80 y.o. female with medical history of T2DM, Osteoporosis, Hypercalcemia, HTN, GERD, CVA presented with s/p fall. Patient seen and examined at bedside. Still presenting with left hip pain. Otherwise denies any chest pain, no abdominal pain, no nausea.     Objective:     Vitals:    09/21/21 0024 09/21/21 0428 09/21/21 0736 09/21/21 1126   BP: (!) 102/55 (!) 120/52 (!) 108/42 111/64   Pulse: 80 69 70 64   Resp: 18 18 18 18   Temp: 98.1 °F (36.7 °C) 98.2 °F (36.8 °C) 98.5 °F (36.9 °C) 97.3 °F (36.3 °C)   SpO2: 93% 94% 94% 98%   Weight:       Height:            Intake and Output:  Current Shift: 09/21 0701 - 09/21 1900  In: -   Out: 350 [Urine:350]  Last three shifts: 09/19 1901 - 09/21 0700  In: -   Out: 700 [Urine:700]    ROS  10 ROS negative except from stated on subjective    Physical Exam:   General: Alert, oriented, NAD  HEENT: NC/AT, EOM are intact  Neck: supple, no JVD  Cardiovascular: RRR, S1, S2, no murmurs  Respiratory: Lungs are clear, no wheezes or rales  Abdomen: Soft, NT, ND  Back: No CVA tenderness, no paraspinal tenderness  Extremities: LE without pedal edema, no erythema  Neuro: A&O, CN are intact, no focal deficits  Skin: no rash or ulcers  Psych: good mood and affect    Lab/Data Review:  I have personally reviewed patients laboratory data showing  Recent Results (from the past 24 hour(s))   GLUCOSE, POC    Collection Time: 09/20/21  6:17 PM   Result Value Ref Range    Glucose (POC) 225 (H) 65 - 100 mg/dL    Performed by Gio    GLUCOSE, POC    Collection Time: 09/20/21  9:24 PM   Result Value Ref Range    Glucose (POC) 220 (H) 65 - 100 mg/dL    Performed by Loree    GLUCOSE, POC    Collection Time: 09/20/21 11:23 PM   Result Value Ref Range    Glucose (POC) 190 (H) 65 - 100 mg/dL    Performed by Brandon    GLUCOSE, POC    Collection Time: 09/21/21 12:36 AM   Result Value Ref Range    Glucose (POC) 173 (H) 65 - 100 mg/dL    Performed by Tierra    GLUCOSE, POC    Collection Time: 09/21/21  6:07 AM   Result Value Ref Range    Glucose (POC) 153 (H) 65 - 100 mg/dL    Performed by Tierra    CBC WITH AUTOMATED DIFF    Collection Time: 09/21/21  6:36 AM   Result Value Ref Range    WBC 9.5 4.3 - 11.1 K/uL    RBC 3.41 (L) 4.05 - 5.2 M/uL    HGB 9.8 (L) 11.7 - 15.4 g/dL    HCT 30.9 (L) 35.8 - 46.3 %    MCV 90.6 79.6 - 97.8 FL    MCH 28.7 26.1 - 32.9 PG    MCHC 31.7 31.4 - 35.0 g/dL    RDW 13.2 11.9 - 14.6 %    PLATELET 224 210 - 135 K/uL    MPV 10.4 9.4 - 12.3 FL    ABSOLUTE NRBC 0.00 0.0 - 0.2 K/uL    NEUTROPHILS 60 43 - 78 %    LYMPHOCYTES 18 13 - 44 %    MONOCYTES 9 4.0 - 12.0 %    EOSINOPHILS 4 0.5 - 7.8 %    BASOPHILS 1 0.0 - 2.0 %    IMMATURE GRANULOCYTES 8 (H) 0.0 - 5.0 %    ABS. NEUTROPHILS 5.6 1.7 - 8.2 K/UL    ABS. LYMPHOCYTES 1.7 0.5 - 4.6 K/UL    ABS. MONOCYTES 0.9 0.1 - 1.3 K/UL    ABS. EOSINOPHILS 0.4 0.0 - 0.8 K/UL    ABS. BASOPHILS 0.1 0.0 - 0.2 K/UL    ABS. IMM.  GRANS. 0.8 (H) 0.0 - 0.5 K/UL    RBC COMMENTS SLIGHT  ANISOCYTOSIS + POIKILOCYTOSIS        RBC COMMENTS OCCASIONAL  POLYCHROMASIA WBC COMMENTS Result Confirmed By Smear      PLATELET COMMENTS ADEQUATE      DF AUTOMATED     METABOLIC PANEL, BASIC    Collection Time: 09/21/21  6:36 AM   Result Value Ref Range    Sodium 139 136 - 145 mmol/L    Potassium 3.7 3.5 - 5.1 mmol/L    Chloride 105 98 - 107 mmol/L    CO2 26 21 - 32 mmol/L    Anion gap 8 7 - 16 mmol/L    Glucose 160 (H) 65 - 100 mg/dL    BUN 16 8 - 23 MG/DL    Creatinine 0.64 0.6 - 1.0 MG/DL    GFR est AA >60 >60 ml/min/1.73m2    GFR est non-AA >60 >60 ml/min/1.73m2    Calcium 9.1 8.3 - 10.4 MG/DL   PLEASE READ & DOCUMENT PPD TEST IN 48 HRS    Collection Time: 09/21/21  9:00 AM   Result Value Ref Range    PPD Negative Negative    mm Induration 0 0 - 5 mm   COVID-19 RAPID TEST    Collection Time: 09/21/21 10:20 AM   Result Value Ref Range    Specimen source NASAL      COVID-19 rapid test Not detected NOTD     GLUCOSE, POC    Collection Time: 09/21/21 11:11 AM   Result Value Ref Range    Glucose (POC) 227 (H) 65 - 100 mg/dL    Performed by [id number]    Collection Time: 09/21/21 11:33 AM   Result Value Ref Range    HGB 10.4 (L) 11.7 - 15.4 g/dL      All Micro Results     Procedure Component Value Units Date/Time    COVID-19 RAPID TEST [[id number]] Collected: 09/21/21 1020    Order Status: Completed Specimen: Nasopharyngeal Updated: 09/21/21 1102     Specimen source NASAL        COVID-19 rapid test Not detected        Comment:      The specimen is NEGATIVE for SARS-CoV-2, the novel coronavirus associated with COVID-19. A negative result does not rule out COVID-19. This test has been authorized by the FDA under an Emergency Use Authorization (EUA) for use by authorized laboratories.         Fact sheet for Healthcare Providers: [employer]Update[employer]co[employer]nz  Fact sheet for Patients: [employer]co.nz       Methodology: Isothermal Nucleic Acid Amplification         CULTURE, URINE [[id number]]  (Abnormal)  (Susceptibility) Collected: 09/18/21 1757    Order Status: Completed Specimen: Cath Urine Updated: 09/21/21 0731     Special Requests: NO SPECIAL REQUESTS        Culture result:       50,000-100,000 COLONIES/mL ESCHERICHIA COLI          COVID-19 RAPID TEST [[id number]] Collected: 09/18/21 1757    Order Status: Completed Specimen: Nasopharyngeal Updated: 09/1934     Specimen source NASAL        COVID-19 rapid test Not detected        Comment:      The specimen is NEGATIVE for SARS-CoV-2, the novel coronavirus associated with COVID-19. A negative result does not rule out COVID-19. This test has been authorized by the FDA under an Emergency Use Authorization (EUA) for use by authorized laboratories. Fact sheet for Healthcare Providers: [employer]  Fact sheet for Patients: [employer]       Methodology: Isothermal Nucleic Acid Amplification                Image:  I have personally reviewed patients imaging showing  XR PELV AP ONLY   Final Result   Fixation of the right intertrochanteric femur fracture. XR FEMUR RT 2 VS   Final Result   Fixation of the right intertrochanteric femur fracture. Please see the operative report for further detail. NC XR TECHNOLOGIST SERVICE   Final Result      A Radiologist has not reviewed images associated with this exam.      XR HIP RT W OR WO PELV 2-3 VWS   Final Result   1. Intertrochanteric left hip fracture with significant anterior angulation. 2.  Mild chronic lung disease. XR FEMUR RT 2 VS   Final Result   1. Intertrochanteric left hip fracture with significant anterior angulation. 2.  Mild chronic lung disease. XR CHEST SNGL V   Final Result   1. Intertrochanteric left hip fracture with significant anterior angulation. 2.  Mild chronic lung disease.             Hospital problems     Patient Active Problem List   Diagnosis Code    DM type 2 (diabetes mellitus, type 2) ([facility] E11.9    Essential hypertension with goal blood pressure less than 140/90 I10    Crohn disease ([facility]) K50.90    Pericarditis, acute I30.9    Precordial pain R07.2    Nonspecific abnormal electrocardiogram (ECG) (EKG) R94.31    Coronary artery disease involving native coronary artery of native heart with angina pectoris ([facility]) I25.119    Dyslipidemia E78.5    CAP (community acquired pneumonia) J18.9    LIZ (acute kidney injury) ([facility] N17.9    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    Electrolyte abnormality E87.8    History of artificial joint Z96.60    History of implantation of joint prosthesis of elbow Z96.629    Supracondylar fracture of humerus S42.413A    Age-related osteoporosis with current pathological fracture with routine healing M80.00XD    Cholecystitis with cholelithiasis K80.10    CVA (cerebral vascular accident) ([facility] I63.9    Dysuria R30.0    Vaginal candida B37.3    Polyneuropathy G62.9    Type 2 diabetes with nephropathy ([facility]) E11.21    Diabetic polyneuropathy associated with type 2 diabetes mellitus ([facility] E11.42    Fall W19. Ermalinda Montse Encounter for medication management R91.809    [facility] of [facility] S72.002A    Hip fracture requiring operative repair [facility]) S72.009A    Acute cystitis without hematuria N30.00    UTI (urinary tract infection) N39.0    Acute blood loss anemia D62    Intertrochanteric fracture of left femur, closed, initial encounter [facility]) J68.862Z        Signed By: Fidel Pascual MD     September 21, 2021

## 2021-09-21 NOTE — PROGRESS NOTES
ACUTE PHYSICAL THERAPY GOALS:  (Developed with and agreed upon by patient and/or caregiver. )  LTG:  (1.)Ms. Curtis will move from supine to sit and sit to supine , scoot up and down and roll side to side in bed with STAND BY ASSIST within 7 treatment day(s). (2.)Ms. Curtis will transfer from bed to chair and chair to bed with CONTACT GUARD ASSIST using the least restrictive device within 7 treatment day(s). (3.)Ms. Curtis will ambulate with CONTACT GUARD ASSIST for 150 feet with the least restrictive device within 7 treatment day(s). (4.)Ms. Curtis will participate in therapeutic activity/exercises x 23 minutes for increased strength within 7 treatment days. PHYSICAL THERAPY: Daily Note and PM Treatment Day # 2   HECTOR Nur is a 80 y.o. female   PRIMARY DIAGNOSIS: Intertrochanteric fracture of left femur, closed, initial encounter ([facility]  Intertrochanteric fracture of left femur, closed, initial encounter ([facility] [S72.142A]  Procedure(s) (LRB):  FEMORAL INTERTROCHANTERIC NAIL INSERTION RIGHT (Right)  2 Days Post-Op    ASSESSMENT:     REHAB RECOMMENDATIONS: CURRENT LEVEL OF FUNCTION:  (Most Recently Demonstrated)   Recommendation to date pending progress:  Setting:   Short-term Rehab  Equipment:    To Be Determined Bed Mobility:   Maximal Assistance x 2  Sit to Stand:   Minimal Assistance x 2  Transfers:   Minimal Assistance x 2  Gait/Mobility:   Not tested     ASSESSMENT:  Ms. Cookie Fontanez was sitting in recliner upon arrival.  She required Celia x 2 for STS transfer with RW and stand pivot to bed. She did better with taking steps today but exhibited decreased step clearance with increased fatigue. Pt then participated in exercises with slightly improved AROM. Will continue efforts.      SUBJECTIVE:   Ms. Cookie Fontanez states, \"Okay\"    SOCIAL HISTORY/ LIVING ENVIRONMENT: See eval  Support Systems: Spouse/Significant Other, Child(eulalio)  OBJECTIVE:     PAIN: VITAL SIGNS: LINES/DRAINS:   Pre Treatment: Pain Screen  Pain Scale 1: Numeric (0 - 10)  Pain Intensity 1: 8  Pain Onset 1: post op  Pain Location 1: Hip  Pain Orientation 1: Right  Post Treatment 8/10:    Purewick  O2 Device: Nasal cannula     MOBILITY: I Mod I S SBA CGA Min Mod Max Total  NT x2 Comments:   Bed Mobility    Rolling [] [] [] [] [] [] [] [] [] [] [x]    Supine to Sit [] [] [] [] [] [] [] [] [] [] []    Scooting [] [] [] [] [] [] [] [x] [] [] [x]    Sit to Supine [] [] [] [] [] [] [] [x] [] [] [x]    Transfers    Sit to Stand [] [] [] [] [] [x] [] [] [] [] [x]    Bed to Chair [] [] [] [] [] [x] [] [] [] [] [x]    Stand to Sit [] [] [] [] [] [x] [] [] [] [] [x]    I=Independent, Mod I=Modified Independent, S=Supervision, SBA=Standby Assistance, CGA=Contact Guard Assistance,   Min=Minimal Assistance, Mod=Moderate Assistance, Max=Maximal Assistance, Total=Total Assistance, NT=Not Tested    BALANCE: Good Fair+ Fair Fair- Poor NT Comments   Sitting Static [] [x] [] [] [] []    Sitting Dynamic [] [x] [] [] [] []               [] [] [] [] [x] []    Standing DynamicStanding Static [] [] [] [] [x] []      GAIT: I Mod I S SBA CGA Min Mod Max Total  NT x2 Comments:   Level of Assistance [] [] [] [] [] [x] [] [] [] [] [x]    Distance 2 ft    DME Rolling Walker and Gait Belt    Gait Quality Antalgic     Weightbearing  Status WBAT, R LE     I=Independent, Mod I=Modified Independent, S=Supervision, SBA=Standby Assistance, CGA=Contact Guard Assistance,   Min=Minimal Assistance, Mod=Moderate Assistance, Max=Maximal Assistance, Total=Total Assistance, NT=Not Tested    PLAN:   FREQUENCY/DURATION: PT Plan of Care: BID for duration of hospital stay or until stated goals are met, whichever comes first.  TREATMENT:     TREATMENT:   ($$ Therapeutic Activity: 8-22 mins  $$ Therapeutic Exercises: 8-22 mins    )  Therapeutic Activity (13 Minutes):  Therapeutic activity included Rolling, Sit to Supine, Scooting, Transfer Training, Ambulation on level ground and Standing balance to improve functional Mobility, Strength and Activity tolerance. Therapeutic Exercise (10 Minutes): Therapeutic exercises noted below to improve functional activity tolerance, AROM, strength and mobility.      TREATMENT GRID:     Date:  9/20/21 Date: 9/21/21   Date:     ACTIVITY/EXERCISE AM PM AM PM AM PM   APs  1 x 10 R  2 x 20 R     Heel slides  1 x 10 R (AA)  1 x 10 R (AA)     Glut squeeze  1 x 10 B  1 x 10 B     Quad sets  1 x 10 R (AA)       SAQ    2 x 10 R     Hip ABD/ADD    1 x 10 R (AA)              B = bilateral; AA = active assistive; A = active; P = passive    AFTER TREATMENT POSITION/PRECAUTIONS:  Alarm Activated, Bed and Needs within reach    INTERDISCIPLINARY COLLABORATION:  RN/PCT, PT/PTA and Rehab Attendant     TOTAL TREATMENT DURATION:  PT Patient Time In/Time Out  Time In: 1328  Time Out: [address], PT, DPT

## 2021-09-21 NOTE — PROGRESS NOTES
Bed Offers received from [facility] and [place]. Call placed to patient's daughter without answer and voicemail is full.

## 2021-09-21 NOTE — PROGRESS NOTES
CM in to speak with patient who is alert and oriented. Patient is agreeable to [facility] for STR and is grateful for the long term care assistance that will be provided by Ulises Strickland. Plan is for patient to discharge tomorrow.

## 2021-09-22 ENCOUNTER — APPOINTMENT (OUTPATIENT)
Dept: GENERAL RADIOLOGY | Age: 86
DRG: 481 | End: 2021-09-22
Attending: INTERNAL MEDICINE
Payer: MEDICARE

## 2021-09-22 VITALS
WEIGHT: 116 LBS | DIASTOLIC BLOOD PRESSURE: 69 MMHG | OXYGEN SATURATION: 100 % | SYSTOLIC BLOOD PRESSURE: 146 MMHG | BODY MASS INDEX: 25.03 KG/M2 | RESPIRATION RATE: 18 BRPM | TEMPERATURE: 97.4 F | HEIGHT: 57 IN | HEART RATE: 74 BPM

## 2021-09-22 LAB
ANION GAP SERPL CALC-SCNC: 9 MMOL/L (ref 7–16)
BASOPHILS # BLD: 0.1 K/UL (ref 0–0.2)
BASOPHILS NFR BLD: 1 % (ref 0–2)
BNP SERPL-MCNC: 853 PG/ML
BUN SERPL-MCNC: 17 MG/DL (ref 8–23)
CALCIUM SERPL-MCNC: 9.4 MG/DL (ref 8.3–10.4)
CHLORIDE SERPL-SCNC: 103 MMOL/L (ref 98–107)
CO2 SERPL-SCNC: 25 MMOL/L (ref 21–32)
CREAT SERPL-MCNC: 0.65 MG/DL (ref 0.6–1)
DIFFERENTIAL METHOD BLD: ABNORMAL
EOSINOPHIL # BLD: 0.2 K/UL (ref 0–0.8)
EOSINOPHIL NFR BLD: 2 % (ref 0.5–7.8)
ERYTHROCYTE [DISTWIDTH] IN BLOOD BY AUTOMATED COUNT: 13.3 % (ref 11.9–14.6)
GLUCOSE BLD STRIP.AUTO-MCNC: 117 MG/DL (ref 65–100)
GLUCOSE BLD STRIP.AUTO-MCNC: 149 MG/DL (ref 65–100)
GLUCOSE BLD STRIP.AUTO-MCNC: 354 MG/DL (ref 65–100)
GLUCOSE SERPL-MCNC: 115 MG/DL (ref 65–100)
HCT VFR BLD AUTO: 32.5 % (ref 35.8–46.3)
HGB BLD-MCNC: 10 G/DL (ref 11.7–15.4)
IMM GRANULOCYTES # BLD AUTO: 1 K/UL (ref 0–0.5)
IMM GRANULOCYTES NFR BLD AUTO: 9 % (ref 0–5)
LYMPHOCYTES # BLD: 1.8 K/UL (ref 0.5–4.6)
LYMPHOCYTES NFR BLD: 16 % (ref 13–44)
MCH RBC QN AUTO: 28.6 PG (ref 26.1–32.9)
MCHC RBC AUTO-ENTMCNC: 30.8 G/DL (ref 31.4–35)
MCV RBC AUTO: 92.9 FL (ref 79.6–97.8)
MONOCYTES # BLD: 0.9 K/UL (ref 0.1–1.3)
MONOCYTES NFR BLD: 8 % (ref 4–12)
NEUTS SEG # BLD: 7.3 K/UL (ref 1.7–8.2)
NEUTS SEG NFR BLD: 64 % (ref 43–78)
NRBC # BLD: 0 K/UL (ref 0–0.2)
PLATELET # BLD AUTO: 272 K/UL (ref 150–450)
PLATELET COMMENTS,PCOM: ADEQUATE
PMV BLD AUTO: 10.6 FL (ref 9.4–12.3)
POTASSIUM SERPL-SCNC: 3.8 MMOL/L (ref 3.5–5.1)
RBC # BLD AUTO: 3.5 M/UL (ref 4.05–5.2)
RBC MORPH BLD: ABNORMAL
SERVICE CMNT-IMP: ABNORMAL
SODIUM SERPL-SCNC: 137 MMOL/L (ref 136–145)
WBC # BLD AUTO: 11.3 K/UL (ref 4.3–11.1)
WBC MORPH BLD: ABNORMAL

## 2021-09-22 PROCEDURE — 74011250637 HC RX REV CODE- 250/637: Performed by: ORTHOPAEDIC SURGERY

## 2021-09-22 PROCEDURE — 36415 COLL VENOUS BLD VENIPUNCTURE: CPT

## 2021-09-22 PROCEDURE — 71045 X-RAY EXAM CHEST 1 VIEW: CPT

## 2021-09-22 PROCEDURE — 83880 ASSAY OF NATRIURETIC PEPTIDE: CPT

## 2021-09-22 PROCEDURE — 97530 THERAPEUTIC ACTIVITIES: CPT

## 2021-09-22 PROCEDURE — 77030038269 HC DRN EXT URIN PURWCK BARD -A

## 2021-09-22 PROCEDURE — 97535 SELF CARE MNGMENT TRAINING: CPT

## 2021-09-22 PROCEDURE — 74011250637 HC RX REV CODE- 250/637: Performed by: INTERNAL MEDICINE

## 2021-09-22 PROCEDURE — 97110 THERAPEUTIC EXERCISES: CPT

## 2021-09-22 PROCEDURE — 82962 GLUCOSE BLOOD TEST: CPT

## 2021-09-22 PROCEDURE — 80048 BASIC METABOLIC PNL TOTAL CA: CPT

## 2021-09-22 PROCEDURE — 85025 COMPLETE CBC W/AUTO DIFF WBC: CPT

## 2021-09-22 PROCEDURE — 74011250637 HC RX REV CODE- 250/637: Performed by: FAMILY MEDICINE

## 2021-09-22 PROCEDURE — 74011636637 HC RX REV CODE- 636/637: Performed by: FAMILY MEDICINE

## 2021-09-22 RX ORDER — OXYCODONE HYDROCHLORIDE 5 MG/1
5 TABLET ORAL
Qty: 9 TABLET | Refills: 0 | Status: SHIPPED | OUTPATIENT
Start: 2021-09-22 | End: 2021-09-25

## 2021-09-22 RX ADMIN — ACETAMINOPHEN 1000 MG: 500 TABLET ORAL at 08:15

## 2021-09-22 RX ADMIN — ASPIRIN 325 MG: 325 TABLET, COATED ORAL at 08:14

## 2021-09-22 RX ADMIN — VITAMIN D, TAB 1000IU (100/BT) 2000 UNITS: 25 TAB at 08:15

## 2021-09-22 RX ADMIN — HYDROMORPHONE HYDROCHLORIDE 2 MG: 2 TABLET ORAL at 08:15

## 2021-09-22 RX ADMIN — CARVEDILOL 6.25 MG: 6.25 TABLET, FILM COATED ORAL at 08:15

## 2021-09-22 RX ADMIN — ACETAMINOPHEN 1000 MG: 500 TABLET ORAL at 00:07

## 2021-09-22 RX ADMIN — HYDROMORPHONE HYDROCHLORIDE 2 MG: 2 TABLET ORAL at 13:33

## 2021-09-22 RX ADMIN — FOLIC ACID 1 MG: 1 TABLET ORAL at 08:15

## 2021-09-22 RX ADMIN — Medication 1 AMPULE: at 08:20

## 2021-09-22 RX ADMIN — Medication 10 ML: at 05:22

## 2021-09-22 RX ADMIN — Medication 10 ML: at 11:56

## 2021-09-22 RX ADMIN — FAMOTIDINE 20 MG: 20 TABLET, FILM COATED ORAL at 08:15

## 2021-09-22 RX ADMIN — HUMAN INSULIN 5 UNITS: 100 INJECTION, SOLUTION SUBCUTANEOUS at 11:56

## 2021-09-22 RX ADMIN — SENNOSIDES AND DOCUSATE SODIUM 2 TABLET: 8.6; 5 TABLET ORAL at 08:15

## 2021-09-22 RX ADMIN — CYANOCOBALAMIN TAB 1000 MCG 1000 MCG: 1000 TAB at 08:15

## 2021-09-22 NOTE — PROGRESS NOTES
September 22, 2021         Post Op day: 3 Days Post-Op Procedure(s) (LRB):  FEMORAL INTERTROCHANTERIC NAIL INSERTION RIGHT (Right)      Admit Date: 9/18/2021  Admit Diagnosis: Intertrochanteric fracture of left femur, closed, initial encounter ([facility] [S72.142A]       Principle Problem: Intertrochanteric fracture of left femur, closed, initial encounter ([facility] Subjective: Doing well, No complaints,  When asked about chest pain patient says she started having some yesterday that she localizes to her sternum and says comes and goes and describes as pressure. No SOB, No Nausea or Vomiting     Objective:   Vital Signs are Stable, No Acute Distress, Alert,  Dressing is Dry,  Neurovascular exam is normal.     Assessment / Plan :  Patient Active Problem List   Diagnosis Code    DM type 2 (diabetes mellitus, type 2) ([facility] E11.9    Essential hypertension with goal blood pressure less than 140/90 I10    Crohn disease ([facility] K50.90    Pericarditis, acute I30.9    Precordial pain R07.2    Nonspecific abnormal electrocardiogram (ECG) (EKG) R94.31    Coronary artery disease involving native coronary artery of native heart with angina pectoris (HCC) I25.119    Dyslipidemia E78.5    CAP (community acquired pneumonia) J18.9    LIZ (acute kidney injury) ([facility] N17.9    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    Electrolyte abnormality E87.8    History of artificial joint Z96.60    History of implantation of joint prosthesis of elbow Z96.629    Supracondylar fracture of humerus S42.413A    Age-related osteoporosis with current pathological fracture with routine healing M80.00XD    Cholecystitis with cholelithiasis K80.10    CVA (cerebral vascular accident) ([facility] I63.9    Dysuria R30.0    Vaginal candida B37.3    Polyneuropathy G62.9    Type 2 diabetes with nephropathy ([facility] E11.21    Diabetic polyneuropathy associated with type 2 diabetes mellitus ([facility] E11.42    Fall W19. Otisie Valencia Encounter for medication management Z79.899    Closed fracture of left hip ([facility] S72.002A    Hip fracture requiring operative repair ([facility] S72.009A    Acute cystitis without hematuria N30.00    UTI (urinary tract infection) N39.0    Acute blood loss anemia D62    Intertrochanteric fracture of left femur, closed, initial encounter ([facility] S72.142A      Patient Vitals for the past 8 hrs:   BP Temp Pulse Resp SpO2   21 0412 123/65 98.8 °F (37.1 °C) 63 20 92 %    Temp (24hrs), Av.5 °F (36.9 °C), Min:97.3 °F (36.3 °C), Max:99.3 °F (37.4 °C)    Body mass index is 25.1 kg/m².     Lab Results   Component Value Date/Time    HGB 10.0 (L) 2021 04:29 AM          S/P Procedure(s) (LRB):  FEMORAL INTERTROCHANTERIC NAIL INSERTION RIGHT (Right)       I notified Dr. Glenis Matos of the transient chest pain  Medical Mgmt per hospitalist  Anticoagulation plan: 325mg daily  Continue PT  Fall Precautions  DC disp: rehab placement   F/U: 2 weeks postop for wound check and staple removal      Signed By: MANOHAR Genao  2021,  8:30 AM

## 2021-09-22 NOTE — PROGRESS NOTES
ACUTE OT GOALS:  (Developed with and agreed upon by patient and/or caregiver.)  1. Patient will verbalize and demonstrate understanding of hip precautions with 100% accuracy during ADL. 2. Patient will complete functional transfers with mod A and adaptive equipment as needed. 3. Patient will complete lower body bathing and dressing with min A and adaptive equipment as needed. 4. Patient will complete toileting with min A.   5. Patient will tolerate at least 30 minutes of BUE therapeutic exercises to increase strength in BUE to aid in functional transfers. 6. Patient will tolerate at least 30 minutes of OT treatment with no rest breaks to increase activity tolerance for ADLs.    Timeframe: 7 visits     OCCUPATIONAL THERAPY: Daily Note OT Treatment Day # 3    Chidi Gonzalez is a 80 y.o. female   PRIMARY DIAGNOSIS: Intertrochanteric fracture of left femur, closed, initial encounter ([facility]  Intertrochanteric fracture of left femur, closed, initial encounter ([facility] [S72.142A]  Procedure(s) (LRB):  FEMORAL INTERTROCHANTERIC NAIL INSERTION RIGHT (Right)  3 Days Post-Op  Payor: [employer] MEDICARE / Plan: [address] [place] [address] HMO / Product Type: [employer] /   ASSESSMENT:     REHAB RECOMMENDATIONS: CURRENT LEVEL OF FUNCTION:  (Most Recently Demonstrated)   Recommendation to date pending progress:  Setting:   Short-term Rehab  Equipment:    To Be Determined Bathing:   Moderate Assistance  Dressing:   Maximal Assistance gown and socks  Feeding/Grooming:   Minimal Assistance  Toileting:   Maximal Assistance  Functional Mobility:   Minimal Assistance-CGA     ASSESSMENT:  Ms. Lisette Swain is doing fair today. Pt greeted seated in recliner. Completed ADLs while seated with mod A (LB). Pt then transferred to [facility] for BM. Improved transfers this session (min-CGA). Max A for bowel hygiene. Pt left in chair with all needs within reach. Pt to be discharged today.      SUBJECTIVE:   Ms. Lisette Swain states, \"I need to pee.\"    SOCIAL HISTORY/LIVING ENVIRONMENT:   Support Systems: Spouse/Significant Other, Child(eulalio)    OBJECTIVE:     PAIN: VITAL SIGNS: LINES/DRAINS:   Pre Treatment: Pain Screen  Pain Scale 1: Numeric (0 - 10)  Pain Intensity 1: 0  Post Treatment: 0   N/A  O2 Device: None (Room air)     ACTIVITIES OF DAILY LIVING: I Mod I S SBA CGA Min Mod Max Total NT Comments   BASIC ADLs:              Bathing/ Showering [] [] [] [] [] [] [x] [] [] []    Toileting [] [] [] [] [] [] [] [x] [] [] Bowel hygiene   Dressing [] [] [] [] [] [] [] [x] [] []    Feeding [] [] [] [] [] [] [] [] [] [x]    Grooming [] [] [] [] [] [x] [] [] [] []    Personal Device Care [] [] [] [] [] [] [] [] [] [x]    Functional Mobility [] [] [] [] [x] [x] [] [] [] []    I=Independent, Mod I=Modified Independent, S=Supervision, SBA=Standby Assistance, CGA=Contact Guard Assistance,   Min=Minimal Assistance, Mod=Moderate Assistance, Max=Maximal Assistance, Total=Total Assistance, NT=Not Tested    MOBILITY: I Mod I S SBA CGA Min Mod Max Total  NT x2 Comments:   Supine to sit [] [] [] [] [] [] [] [] [] [x] []    Sit to supine [] [] [] [] [] [] [] [] [] [x] []    Sit to stand [] [] [] [] [x] [x] [] [] [] [] []    Bed to chair [] [] [] [] [] [] [] [] [] [x] []    I=Independent, Mod I=Modified Independent, S=Supervision, SBA=Standby Assistance, CGA=Contact Guard Assistance,   Min=Minimal Assistance, Mod=Moderate Assistance, Max=Maximal Assistance, Total=Total Assistance, NT=Not Tested    BALANCE: Good Fair+ Fair Fair- Poor NT Comments   Sitting Static [] [x] [] [] [] []    Sitting Dynamic [] [] [x] [] [] []              Standing Static [] [] [x] [] [] []    Standing Dynamic [] [] [x] [] [] []      PLAN:   FREQUENCY/DURATION: OT Plan of Care: 6 times/week for duration of hospital stay or until stated goals are met, whichever comes first.    TREATMENT:   TREATMENT:   ($$ Self Care/Home Management: 23-37 mins    )  Self Care (35 Minutes): Self care including Upper Body Bathing, Lower Body Bathing, Toileting, Upper Body Dressing, Lower Body Dressing and Grooming to increase independence and decrease level of assistance required.     TREATMENT GRID:  N/A    AFTER TREATMENT POSITION/PRECAUTIONS:  Alarm Activated, Chair and Needs within reach    INTERDISCIPLINARY COLLABORATION:  RN/PCT and OT/YOUNG    TOTAL TREATMENT DURATION:  OT Patient Time In/Time Out  Time In: 1100  Time Out: [address]

## 2021-09-22 NOTE — DISCHARGE SUMMARY
Date of Admission: 9/18/2021  Date of Discharge: 9/22/2021    Discharge Diagnoses: Active Hospital Problems    Diagnosis Date Noted    Intertrochanteric fracture of left femur, closed, initial encounter ([facility] 09/18/2021    Diabetic polyneuropathy associated with type 2 diabetes mellitus ([place] [address].) 06/11/2018    Polyneuropathy 11/20/2017    Age-related osteoporosis with current pathological fracture with routine healing 07/06/2016    Non-PTH-dependent hypercalcemia 05/09/2016    Coronary artery disease involving native coronary artery of native heart with angina pectoris ([facility] 05/03/2016    Dyslipidemia 05/03/2016    Essential hypertension with goal blood pressure less than 140/90 03/16/2013    DM type 2 (diabetes mellitus, type 2) ([place] [address].) 03/16/2013    Crohn disease ([place] [address].) 03/16/2013        Discharge Medications:  Current Discharge Medication List      START taking these medications    Details   oxyCODONE IR (ROXICODONE) 5 mg immediate release tablet Take 1 Tablet by mouth every eight (8) hours as needed for Pain (severe) for up to 3 days. Max Daily Amount: 15 mg.  Qty: 9 Tablet, Refills: 0  Start date: 9/22/2021, End date: 9/25/2021    Associated Diagnoses: Intertrochanteric fracture of left femur, closed, initial encounter ([place] [address].)         CONTINUE these medications which have NOT CHANGED    Details   aspirin (ASPIRIN) 325 mg tablet Take 1 Tab by mouth daily. Qty: 10 Tab, Refills: 0      calcium-vitamin D (OS-ALYCIA +D3) 500 mg-200 unit per tablet Take 1 Tab by mouth three (3) times daily (with meals).   Qty: 90 Tab, Refills: 0      insulin lispro (HUMALOG) 100 unit/mL injection INITIATE INSULIN CORRECTIVE PROTOCOL:  Normal Insulin Sensitivity   For Blood Sugar (mg/dL) of:     Less than 150 =   0 units           150 -199 =   2 units  200 -249 =   4 units  250 -299 =   6 units  300 -349 =   8 units  350 and above = 10 units and Call Physician     Initiate Hypoglycemia protocol if blood glucose is <70 mg/dL Fast Acting - Administer Immediately - or within 15 minutes of start of meal, if mealtime coverage. Qty: 1 Vial, Refills: 0      amLODIPine (NORVASC) 5 mg tablet Take 5 mg by mouth daily. Indications: high blood pressure      atorvastatin (LIPITOR) 40 mg tablet Take 40 mg by mouth daily. Indications: excessive fat in the blood, treatment to slow progression of coronary artery disease      carvediloL (Coreg) 12.5 mg tablet Take 12.5 mg by mouth two (2) times daily (with meals). escitalopram oxalate (LEXAPRO) 10 mg tablet Take 10 mg by mouth daily. famotidine (PEPCID) 20 mg tablet Take 20 mg by mouth daily. folic acid (FOLVITE) 946 mcg tablet Take 800 mcg by mouth daily. loperamide (IMMODIUM) 2 mg tablet Take 2 mg by mouth as needed for Diarrhea. omeprazole (PRILOSEC) 40 mg capsule Take 40 mg by mouth daily. rOPINIRole (REQUIP) 1 mg tablet Take 1 mg by mouth two (2) times a day. spironolactone (ALDACTONE) 25 mg tablet Take  by mouth daily. cholecalciferol (Vitamin D3) 25 mcg (1,000 unit) cap Take  by mouth every other day. multivitamin with iron tablet Take 1 Tab by mouth daily. ondansetron (Zofran ODT) 4 mg disintegrating tablet Take 1 Tab by mouth every eight (8) hours as needed for Nausea. Qty: 11 Tab, Refills: 1      insulin glargine (LANTUS,BASAGLAR) 100 unit/mL (3 mL) inpn 20 Units by SubCUTAneous route. acetaminophen (TYLENOL EXTRA STRENGTH) 500 mg tablet Take  by mouth every six (6) hours as needed for Pain. cyanocobalamin 1,000 mcg tablet Take 2,500 mcg by mouth daily. B.infantis-B.ani-B.long-B.bifi (PROBIOTIC 4X) 10-15 mg Tab Take  by mouth daily. Pending Labs:  None    Follow-up (including scheduled tests):   Follow-up Information     Follow up With Specialties Details Why Contact Info    Mahi Tsang, [address] [facility], Physician Assistant On 10/5/2021 12:[address], [address]  [place] [id number] [phone]      [place] [facility] [address] [facility]) [address] [place] [address]   [id number]  R Bertha Patel [address]elia MD Family Medicine   [address] [place] [id number] [phone]             History of Present Illness:  80 y.o. female with medical history of T2DM, Osteoporosis, Hypercalcemia, HTN, GERD, CVA presented with s/p fall     1. Left intertrochanteric femoral fracture   -Ortho consulted   -Status post femoral intertrochanteric nail insertion  -PT and OT recommended STR  -Hemodynamically stable on discharge     2.E. coli UTI  -S/p ceftriaxone    Past Medical History:  Past Medical History:   Diagnosis Date    Acute kidney failure 05/2016    Acute pericarditis 2015    Arthritis     CAP (community acquired pneumonia) 5/2016    Coronary artery disease     Crohn's disease     Hypercalcemia     Hypercholesterolemia     Hypertension     Lumbar compression fracture     Osteoporosis     Type 2 diabetes mellitus        Allergies: Allergies   Allergen Reactions    Sulfa (Sulfonamide Antibiotics) Rash    Other Medication Unknown (comments)     \"Dypentin\"    Tape [Adhesive] Other (comments)     Blisters and skin tears       Hospital Course:  80 y.o. female with medical history of T2DM, Osteoporosis, Hypercalcemia, HTN, GERD, CVA presented with s/p fall     1.   Left intertrochanteric femoral fracture   -Ortho consulted   -Status post femoral intertrochanteric nail insertion  -PT and OT recommended STR  -Hemodynamically stable on discharge     2.E. coli UTI  -S/p ceftriaxone    Procedures:  Femoral intertrochanteric nail insertion    Discharge Day Information:  Follow with PMD    Diet: Cardiac    Activity: As tolerated    Discharge Physical Exam:  General: Alert, oriented, NAD  HEENT: NC/AT, EOM are intact  Neck: supple, no JVD  Cardiovascular: RRR, S1, S2, no murmurs  Respiratory: Lungs are clear, no wheezes or rales  Abdomen: Soft, NT, ND  Back: No CVA tenderness, no paraspinal tenderness  Extremities: LE without pedal edema, no erythema  Neuro: A&O, CN are intact, no focal deficits  Skin: no rash or ulcers  Psych: good mood and affect    Recent Results (from the past 24 hour(s))   GLUCOSE, POC    Collection Time: 09/21/21  4:12 PM   Result Value Ref Range    Glucose (POC) 236 (H) 65 - 100 mg/dL    Performed by Jayesh Toscano    GLUCOSE, POC    Collection Time: 09/22/21 12:00 AM   Result Value Ref Range    Glucose (POC) 117 (H) 65 - 100 mg/dL    Performed by Kumar    CBC WITH AUTOMATED DIFF    Collection Time: 09/22/21  4:29 AM   Result Value Ref Range    WBC 11.3 (H) 4.3 - 11.1 K/uL    RBC 3.50 (L) 4.05 - 5.2 M/uL    HGB 10.0 (L) 11.7 - 15.4 g/dL    HCT 32.5 (L) 35.8 - 46.3 %    MCV 92.9 79.6 - 97.8 FL    MCH 28.6 26.1 - 32.9 PG    MCHC 30.8 (L) 31.4 - 35.0 g/dL    RDW 13.3 11.9 - 14.6 %    PLATELET 863 574 - 611 K/uL    MPV 10.6 9.4 - 12.3 FL    ABSOLUTE NRBC 0.00 0.0 - 0.2 K/uL    NEUTROPHILS 64 43 - 78 %    LYMPHOCYTES 16 13 - 44 %    MONOCYTES 8 4.0 - 12.0 %    EOSINOPHILS 2 0.5 - 7.8 %    BASOPHILS 1 0.0 - 2.0 %    IMMATURE GRANULOCYTES 9 (H) 0.0 - 5.0 %    ABS. NEUTROPHILS 7.3 1.7 - 8.2 K/UL    ABS. LYMPHOCYTES 1.8 0.5 - 4.6 K/UL    ABS. MONOCYTES 0.9 0.1 - 1.3 K/UL    ABS. EOSINOPHILS 0.2 0.0 - 0.8 K/UL    ABS. BASOPHILS 0.1 0.0 - 0.2 K/UL    ABS. IMM. GRANS.  1.0 (H) 0.0 - 0.5 K/UL    RBC COMMENTS NORMOCYTIC/NORMOCHROMIC      WBC COMMENTS Result Confirmed By Smear      PLATELET COMMENTS ADEQUATE      DF AUTOMATED     METABOLIC PANEL, BASIC    Collection Time: 09/22/21  4:29 AM   Result Value Ref Range    Sodium 137 136 - 145 mmol/L    Potassium 3.8 3.5 - 5.1 mmol/L    Chloride 103 98 - 107 mmol/L    CO2 25 21 - 32 mmol/L    Anion gap 9 7 - 16 mmol/L    Glucose 115 (H) 65 - 100 mg/dL    BUN 17 8 - 23 MG/DL    Creatinine 0.65 0.6 - 1.0 MG/DL    GFR est AA >60 >60 ml/min/1.73m2    GFR est non-AA >60 >60 ml/min/1.73m2    Calcium 9.4 8.3 - 10.4 MG/DL NT-PRO BNP    Collection Time: 09/22/21  4:29 AM   Result Value Ref Range    NT pro- (H) <450 PG/ML   GLUCOSE, POC    Collection Time: 09/22/21  6:18 AM   Result Value Ref Range    Glucose (POC) 149 (H) 65 - 100 mg/dL    Performed by Jose Rueda    GLUCOSE, POC    Collection Time: 09/22/21 11:16 AM   Result Value Ref Range    Glucose (POC) 354 (H) 65 - 100 mg/dL    Performed by Dilan         XR CHEST SNGL V   Final Result   Stable chronic appearing changes in the lung fields. No definite infiltrate to   suggest pneumonia. XR PELV AP ONLY   Final Result   Fixation of the right intertrochanteric femur fracture. XR FEMUR RT 2 VS   Final Result   Fixation of the right intertrochanteric femur fracture. Please see the operative report for further detail. NC XR TECHNOLOGIST SERVICE   Final Result      A Radiologist has not reviewed images associated with this exam.      XR HIP RT W OR WO PELV 2-3 VWS   Final Result   1. Intertrochanteric left hip fracture with significant anterior angulation. 2.  Mild chronic lung disease. XR FEMUR RT 2 VS   Final Result   1. Intertrochanteric left hip fracture with significant anterior angulation. 2.  Mild chronic lung disease. XR CHEST SNGL V   Final Result   1. Intertrochanteric left hip fracture with significant anterior angulation. 2.  Mild chronic lung disease.             Condition: Improved    Disposition: Short-term rehab    Consultants During This Hospitalization: Orthopedic surgery           Spent 31 minutes on discharge services

## 2021-09-22 NOTE — PROGRESS NOTES
Patient will discharge via [facility] ambulance service to [facility] room 330B. Report will be called to [phone]. Call placed to daughter with update. Call placed to son in law with update. Patient update at bedside. Kae barillas who is assisting patient and family with long term plan. Care Management Interventions  Mode of Transport at Discharge: [address]  [address] Time of Discharge: Lenny Weinstein (CM Consult): SNF  Discharge Durable Medical Equipment: No  Physical Therapy Consult: Yes  Occupational Therapy Consult: Yes  Support Systems: Spouse/Significant Other, Child(eulalio)  Confirm Follow Up Transport: Family  The Plan for Transition of Care is Related to the Following Treatment Goals : Patient will participate in STR therapies in order to return to baseline independence in ADLs. The Patient and/or Patient Representative was Provided with a Choice of Provider and Agrees with the Discharge Plan?: Yes  Name of the Patient Representative Who was Provided with a Choice of Provider and Agrees with the Discharge Plan: Daughter, Sydell School.   [place] of Choice List was Provided with Basic Dialogue that Supports the Patient's Individualized Plan of Care/Goals, Treatment Preferences and Shares the Quality Data Associated with the Providers?: Yes  Discharge Location  Discharge Placement: Rehab Unit Subacute

## 2021-09-22 NOTE — PROGRESS NOTES
TRANSFER - OUT REPORT:    Verbal report given to RN on [facility]  being transferred to [address] [place] [address] for routine progression of care       Report consisted of patients Situation, Background, Assessment and   Recommendations(SBAR). Information from the following report(s) SBAR was reviewed with the receiving nurse. Opportunity for questions and clarification was provided.       Patient transported with:  [facility]

## 2021-09-22 NOTE — PROGRESS NOTES
Problem: Falls - Risk of  Goal: *Absence of Falls  Description: Document [place] Fall Risk and appropriate interventions in the flowsheet. Outcome: Progressing Towards Goal  Note: Fall Risk Interventions:  Mobility Interventions: OT consult for ADLs, Patient to call before getting OOB, PT Consult for mobility concerns         Medication Interventions: Teach patient to arise slowly, Patient to call before getting OOB    Elimination Interventions: Call light in reach, Patient to call for help with toileting needs    History of Falls Interventions: Evaluate medications/consider consulting pharmacy         Problem: Lower Extremity Fracture:Post-op Day 2  Goal: Diagnostic Test/Procedures  Outcome: Progressing Towards Goal     Problem: Lower Extremity Fracture:Post-op Day 2  Goal: Medications  Outcome: Progressing Towards Goal     Problem: Pressure Injury - Risk of  Goal: *Prevention of pressure injury  Description: Document Joshua Scale and appropriate interventions in the flowsheet.   Note: Pressure Injury Interventions:  Sensory Interventions: Minimize linen layers, Maintain/enhance activity level, Discuss PT/OT consult with provider    Moisture Interventions: Check for incontinence Q2 hours and as needed    Activity Interventions: Pressure redistribution bed/mattress(bed type), PT/OT evaluation, Increase time out of bed    Mobility Interventions: HOB 30 degrees or less, Pressure redistribution bed/mattress (bed type), PT/OT evaluation    Nutrition Interventions: Document food/fluid/supplement intake, Offer support with meals,snacks and hydration    Friction and Shear Interventions: Minimize layers, Lift team/patient mobility team, Lift sheet, Apply protective barrier, creams and emollients

## 2021-09-22 NOTE — PROGRESS NOTES
ACUTE PHYSICAL THERAPY GOALS:  (Developed with and agreed upon by patient and/or caregiver. )  LTG:  (1.)Ms. Curtis will move from supine to sit and sit to supine , scoot up and down and roll side to side in bed with STAND BY ASSIST within 7 treatment day(s). (2.)Ms. Curtis will transfer from bed to chair and chair to bed with CONTACT GUARD ASSIST using the least restrictive device within 7 treatment day(s). (3.)Ms. Curtis will ambulate with CONTACT GUARD ASSIST for 150 feet with the least restrictive device within 7 treatment day(s). (4.)Ms. Curtis will participate in therapeutic activity/exercises x 23 minutes for increased strength within 7 treatment days. PHYSICAL THERAPY: Daily Note and AM Treatment Day # 3   R LE WBNICOLÁS Ruiz is a 80 y.o. female   PRIMARY DIAGNOSIS: Intertrochanteric fracture of left femur, closed, initial encounter ([facility] [address]  Intertrochanteric fracture of left femur, closed, initial encounter ([facility] [address] [S72.142A]  Procedure(s) (LRB):  FEMORAL INTERTROCHANTERIC NAIL INSERTION RIGHT (Right)  3 Days Post-Op    ASSESSMENT:     REHAB RECOMMENDATIONS: CURRENT LEVEL OF FUNCTION:  (Most Recently Demonstrated)   Recommendation to date pending progress:  Setting:   Short-term Rehab  Equipment:    To Be Determined Bed Mobility:   Moderate Assistance  Sit to Stand:   Moderate Assistance  Transfers:   Minimal Assistance to mod assist  Gait/Mobility:   Not tested     ASSESSMENT:  Ms. Nora Shaw was supine in bed, agrees to therapy. She required mod assist for supine to sit, then min/mod assist to stand and transfer to the chair using rolling walker. She shuffles her feet mostly. She does fatigue quickly with upright mobility. She performed LE Exercises in chair and was left with needs in reach. She is making slow progress towards goals. will continue with POC. SUBJECTIVE:   Ms. Nora Shaw is with no complaints.     SOCIAL HISTORY/ LIVING ENVIRONMENT: See eval  Support Systems: Spouse/Significant Other, Child(eulalio)  OBJECTIVE:     PAIN: VITAL SIGNS: LINES/DRAINS:   Pre Treatment:  0  Post Treatment: 4   Purewick  O2 Device: None (Room air)     MOBILITY: I Mod I S SBA CGA Min Mod Max Total  NT x2 Comments:   Bed Mobility    Rolling [] [] [] [] [] [] [] [] [] [] []    Supine to Sit [] [] [] [] [] [] [x] [] [] [] []    Scooting [] [] [] [] [] [] [x] [] [] [] []    Sit to Supine [] [] [] [] [] [] [] [] [] [] []    Transfers    Sit to Stand [] [] [] [] [] [x] [x] [] [] [] []    Bed to Chair [] [] [] [] [] [x] [x] [] [] [] []    Stand to Sit [] [] [] [] [] [x] [x] [] [] [] []    I=Independent, Mod I=Modified Independent, S=Supervision, SBA=Standby Assistance, CGA=Contact Guard Assistance,   Min=Minimal Assistance, Mod=Moderate Assistance, Max=Maximal Assistance, Total=Total Assistance, NT=Not Tested    BALANCE: Good Fair+ Fair Fair- Poor NT Comments   Sitting Static [] [x] [] [] [] []    Sitting Dynamic [] [x] [] [] [] []               [] [] [] [x] [] []    Standing DynamicStanding Static [] [] [] [] [x] []      GAIT: I Mod I S SBA CGA Min Mod Max Total  NT x2 Comments:   Level of Assistance [] [] [] [] [] [x] [x] [] [] [] []    Distance 2 ft    DME Rolling Walker and Gait Belt    Gait Quality Antalgic     Weightbearing  Status WBAT, R LE     I=Independent, Mod I=Modified Independent, S=Supervision, SBA=Standby Assistance, CGA=Contact Guard Assistance,   Min=Minimal Assistance, Mod=Moderate Assistance, Max=Maximal Assistance, Total=Total Assistance, NT=Not Tested    PLAN:   FREQUENCY/DURATION: PT Plan of Care: BID for duration of hospital stay or until stated goals are met, whichever comes first.  TREATMENT:     TREATMENT:   ($$ Therapeutic Activity: 8-22 mins  $$ Therapeutic Exercises: 8-22 mins    )  Therapeutic Activity (15 Minutes):  Therapeutic activity included Rolling, Supine to Sit, Scooting, Transfer Training, Ambulation on level ground and Standing balance to improve functional Mobility, Strength and Activity tolerance. Therapeutic Exercise (11 Minutes): Therapeutic exercises noted below to improve functional activity tolerance, AROM, strength and mobility.      TREATMENT GRID:     Date:  9/20/21 Date: 9/21/21   Date:  9/22/21   ACTIVITY/EXERCISE AM PM AM PM AM PM   APs  1 x 10 R  2 x 20 R X 20 B    Heel slides  1 x 10 R (AA)  1 x 10 R (AA)     Glut squeeze  1 x 10 B  1 x 10 B     Quad sets  1 x 10 R (AA)       SAQ    2 x 10 R     Hip ABD/ADD    1 x 10 R (AA) X 10 B    Hip flexion     X 10 B, AA-R    LAQ     X 10 B                      B = bilateral; AA = active assistive; A = active; P = passive    AFTER TREATMENT POSITION/PRECAUTIONS:  Alarm Activated, Chair, Needs within reach and RN notified    INTERDISCIPLINARY COLLABORATION:  RN/PCT and PT/PTA    TOTAL TREATMENT DURATION:  PT Patient Time In/Time Out  Time In: 1000  Time Out: Garrett 17, PTA

## 2021-10-12 ENCOUNTER — HOSPITAL ENCOUNTER (INPATIENT)
Age: 86
LOS: 7 days | Discharge: NURSING, REHAB OR LONG-TERM CARE | DRG: 690 | End: 2021-10-19
Attending: ORTHOPAEDIC SURGERY | Admitting: ORTHOPAEDIC SURGERY
Payer: MEDICARE

## 2021-10-12 PROBLEM — R53.81 DEBILITY: Status: ACTIVE | Noted: 2021-10-12

## 2021-10-12 PROCEDURE — 85025 COMPLETE CBC W/AUTO DIFF WBC: CPT

## 2021-10-12 PROCEDURE — 80048 BASIC METABOLIC PNL TOTAL CA: CPT

## 2021-10-12 PROCEDURE — 74011250636 HC RX REV CODE- 250/636: Performed by: NURSE PRACTITIONER

## 2021-10-12 PROCEDURE — 65270000029 HC RM PRIVATE

## 2021-10-12 PROCEDURE — 36415 COLL VENOUS BLD VENIPUNCTURE: CPT

## 2021-10-12 RX ORDER — ACETAMINOPHEN 325 MG/1
650 TABLET ORAL
Status: DISCONTINUED | OUTPATIENT
Start: 2021-10-12 | End: 2021-10-19 | Stop reason: HOSPADM

## 2021-10-12 RX ORDER — PROMETHAZINE HYDROCHLORIDE 25 MG/1
12.5 TABLET ORAL
Status: DISCONTINUED | OUTPATIENT
Start: 2021-10-12 | End: 2021-10-19 | Stop reason: HOSPADM

## 2021-10-12 RX ORDER — OXYCODONE HYDROCHLORIDE 5 MG/1
5 TABLET ORAL
Status: DISCONTINUED | OUTPATIENT
Start: 2021-10-12 | End: 2021-10-19 | Stop reason: HOSPADM

## 2021-10-12 RX ORDER — ACETAMINOPHEN 650 MG/1
650 SUPPOSITORY RECTAL
Status: DISCONTINUED | OUTPATIENT
Start: 2021-10-12 | End: 2021-10-19 | Stop reason: HOSPADM

## 2021-10-12 RX ORDER — ONDANSETRON 2 MG/ML
4 INJECTION INTRAMUSCULAR; INTRAVENOUS
Status: DISCONTINUED | OUTPATIENT
Start: 2021-10-12 | End: 2021-10-19 | Stop reason: HOSPADM

## 2021-10-12 RX ORDER — SODIUM CHLORIDE 0.9 % (FLUSH) 0.9 %
5-40 SYRINGE (ML) INJECTION EVERY 8 HOURS
Status: DISCONTINUED | OUTPATIENT
Start: 2021-10-12 | End: 2021-10-19 | Stop reason: HOSPADM

## 2021-10-12 RX ORDER — HYDROMORPHONE HYDROCHLORIDE 1 MG/ML
0.5 INJECTION, SOLUTION INTRAMUSCULAR; INTRAVENOUS; SUBCUTANEOUS
Status: DISCONTINUED | OUTPATIENT
Start: 2021-10-12 | End: 2021-10-19 | Stop reason: HOSPADM

## 2021-10-12 RX ORDER — SODIUM CHLORIDE 0.9 % (FLUSH) 0.9 %
5-40 SYRINGE (ML) INJECTION AS NEEDED
Status: DISCONTINUED | OUTPATIENT
Start: 2021-10-12 | End: 2021-10-19 | Stop reason: HOSPADM

## 2021-10-12 RX ORDER — POLYETHYLENE GLYCOL 3350 17 G/17G
17 POWDER, FOR SOLUTION ORAL DAILY PRN
Status: DISCONTINUED | OUTPATIENT
Start: 2021-10-12 | End: 2021-10-19 | Stop reason: HOSPADM

## 2021-10-12 RX ADMIN — HYDROMORPHONE HYDROCHLORIDE 0.5 MG: 1 INJECTION, SOLUTION INTRAMUSCULAR; INTRAVENOUS; SUBCUTANEOUS at 23:45

## 2021-10-12 RX ADMIN — SODIUM CHLORIDE 10 ML: 9 INJECTION, SOLUTION INTRAMUSCULAR; INTRAVENOUS; SUBCUTANEOUS at 23:45

## 2021-10-13 ENCOUNTER — APPOINTMENT (OUTPATIENT)
Dept: CT IMAGING | Age: 86
DRG: 690 | End: 2021-10-13
Attending: PHYSICIAN ASSISTANT
Payer: MEDICARE

## 2021-10-13 PROBLEM — Z87.81 HISTORY OF FRACTURE OF RIGHT HIP: Status: ACTIVE | Noted: 2021-10-13

## 2021-10-13 LAB
ANION GAP SERPL CALC-SCNC: 5 MMOL/L (ref 7–16)
APPEARANCE UR: ABNORMAL
BACTERIA URNS QL MICRO: ABNORMAL /HPF
BASOPHILS # BLD: 0.1 K/UL (ref 0–0.2)
BASOPHILS NFR BLD: 1 % (ref 0–2)
BILIRUB UR QL: NEGATIVE
BUN SERPL-MCNC: 15 MG/DL (ref 8–23)
CALCIUM SERPL-MCNC: 10 MG/DL (ref 8.3–10.4)
CASTS URNS QL MICRO: ABNORMAL /LPF
CHLORIDE SERPL-SCNC: 107 MMOL/L (ref 98–107)
CO2 SERPL-SCNC: 28 MMOL/L (ref 21–32)
COLOR UR: YELLOW
CREAT SERPL-MCNC: 1.07 MG/DL (ref 0.6–1)
DIFFERENTIAL METHOD BLD: ABNORMAL
EOSINOPHIL # BLD: 0.3 K/UL (ref 0–0.8)
EOSINOPHIL NFR BLD: 3 % (ref 0.5–7.8)
EPI CELLS #/AREA URNS HPF: 0 /HPF
ERYTHROCYTE [DISTWIDTH] IN BLOOD BY AUTOMATED COUNT: 14.1 % (ref 11.9–14.6)
GLUCOSE BLD STRIP.AUTO-MCNC: 148 MG/DL (ref 65–100)
GLUCOSE BLD STRIP.AUTO-MCNC: 199 MG/DL (ref 65–100)
GLUCOSE BLD STRIP.AUTO-MCNC: 226 MG/DL (ref 65–100)
GLUCOSE SERPL-MCNC: 247 MG/DL (ref 65–100)
GLUCOSE UR STRIP.AUTO-MCNC: NEGATIVE MG/DL
HCT VFR BLD AUTO: 36.7 % (ref 35.8–46.3)
HGB BLD-MCNC: 11.3 G/DL (ref 11.7–15.4)
HGB UR QL STRIP: ABNORMAL
IMM GRANULOCYTES # BLD AUTO: 0.2 K/UL (ref 0–0.5)
IMM GRANULOCYTES NFR BLD AUTO: 2 % (ref 0–5)
KETONES UR QL STRIP.AUTO: NEGATIVE MG/DL
LEUKOCYTE ESTERASE UR QL STRIP.AUTO: ABNORMAL
LYMPHOCYTES # BLD: 1.9 K/UL (ref 0.5–4.6)
LYMPHOCYTES NFR BLD: 19 % (ref 13–44)
MCH RBC QN AUTO: 28.6 PG (ref 26.1–32.9)
MCHC RBC AUTO-ENTMCNC: 30.8 G/DL (ref 31.4–35)
MCV RBC AUTO: 92.9 FL (ref 79.6–97.8)
MONOCYTES # BLD: 0.6 K/UL (ref 0.1–1.3)
MONOCYTES NFR BLD: 6 % (ref 4–12)
NEUTS SEG # BLD: 6.9 K/UL (ref 1.7–8.2)
NEUTS SEG NFR BLD: 69 % (ref 43–78)
NITRITE UR QL STRIP.AUTO: NEGATIVE
NRBC # BLD: 0 K/UL (ref 0–0.2)
PH UR STRIP: 6 [PH] (ref 5–9)
PLATELET # BLD AUTO: 332 K/UL (ref 150–450)
PMV BLD AUTO: 9.7 FL (ref 9.4–12.3)
POTASSIUM SERPL-SCNC: 4.6 MMOL/L (ref 3.5–5.1)
PROT UR STRIP-MCNC: 30 MG/DL
RBC # BLD AUTO: 3.95 M/UL (ref 4.05–5.2)
RBC #/AREA URNS HPF: ABNORMAL /HPF
SERVICE CMNT-IMP: ABNORMAL
SODIUM SERPL-SCNC: 140 MMOL/L (ref 136–145)
SP GR UR REFRACTOMETRY: 1.02 (ref 1–1.02)
UROBILINOGEN UR QL STRIP.AUTO: 0.2 EU/DL (ref 0.2–1)
WBC # BLD AUTO: 10 K/UL (ref 4.3–11.1)
WBC URNS QL MICRO: ABNORMAL /HPF

## 2021-10-13 PROCEDURE — 87186 SC STD MICRODIL/AGAR DIL: CPT

## 2021-10-13 PROCEDURE — 74011250637 HC RX REV CODE- 250/637: Performed by: FAMILY MEDICINE

## 2021-10-13 PROCEDURE — 87088 URINE BACTERIA CULTURE: CPT

## 2021-10-13 PROCEDURE — 74011250637 HC RX REV CODE- 250/637: Performed by: PHYSICIAN ASSISTANT

## 2021-10-13 PROCEDURE — 97535 SELF CARE MNGMENT TRAINING: CPT

## 2021-10-13 PROCEDURE — 74011636637 HC RX REV CODE- 636/637: Performed by: FAMILY MEDICINE

## 2021-10-13 PROCEDURE — 74011250637 HC RX REV CODE- 250/637: Performed by: NURSE PRACTITIONER

## 2021-10-13 PROCEDURE — 72192 CT PELVIS W/O DYE: CPT

## 2021-10-13 PROCEDURE — 97166 OT EVAL MOD COMPLEX 45 MIN: CPT

## 2021-10-13 PROCEDURE — 86580 TB INTRADERMAL TEST: CPT | Performed by: FAMILY MEDICINE

## 2021-10-13 PROCEDURE — 65270000029 HC RM PRIVATE

## 2021-10-13 PROCEDURE — 87086 URINE CULTURE/COLONY COUNT: CPT

## 2021-10-13 PROCEDURE — 82962 GLUCOSE BLOOD TEST: CPT

## 2021-10-13 PROCEDURE — 74011000258 HC RX REV CODE- 258: Performed by: FAMILY MEDICINE

## 2021-10-13 PROCEDURE — 74011000250 HC RX REV CODE- 250: Performed by: FAMILY MEDICINE

## 2021-10-13 PROCEDURE — 74011250636 HC RX REV CODE- 250/636: Performed by: FAMILY MEDICINE

## 2021-10-13 PROCEDURE — 81001 URINALYSIS AUTO W/SCOPE: CPT

## 2021-10-13 RX ORDER — GUAIFENESIN 100 MG/5ML
81 LIQUID (ML) ORAL DAILY
Status: DISCONTINUED | OUTPATIENT
Start: 2021-10-14 | End: 2021-10-19 | Stop reason: HOSPADM

## 2021-10-13 RX ORDER — INSULIN GLARGINE 100 [IU]/ML
10 INJECTION, SOLUTION SUBCUTANEOUS
Status: DISCONTINUED | OUTPATIENT
Start: 2021-10-13 | End: 2021-10-15

## 2021-10-13 RX ORDER — INSULIN LISPRO 100 [IU]/ML
INJECTION, SOLUTION INTRAVENOUS; SUBCUTANEOUS
Status: DISCONTINUED | OUTPATIENT
Start: 2021-10-13 | End: 2021-10-18

## 2021-10-13 RX ORDER — ATORVASTATIN CALCIUM 40 MG/1
40 TABLET, FILM COATED ORAL DAILY
Status: DISCONTINUED | OUTPATIENT
Start: 2021-10-14 | End: 2021-10-19 | Stop reason: HOSPADM

## 2021-10-13 RX ORDER — FAMOTIDINE 20 MG/1
20 TABLET, FILM COATED ORAL DAILY
Status: DISCONTINUED | OUTPATIENT
Start: 2021-10-14 | End: 2021-10-19 | Stop reason: HOSPADM

## 2021-10-13 RX ORDER — CARVEDILOL 12.5 MG/1
12.5 TABLET ORAL 2 TIMES DAILY WITH MEALS
Status: DISCONTINUED | OUTPATIENT
Start: 2021-10-13 | End: 2021-10-19 | Stop reason: HOSPADM

## 2021-10-13 RX ORDER — ROPINIROLE 0.5 MG/1
1 TABLET, FILM COATED ORAL 2 TIMES DAILY
Status: DISCONTINUED | OUTPATIENT
Start: 2021-10-13 | End: 2021-10-19 | Stop reason: HOSPADM

## 2021-10-13 RX ORDER — ESCITALOPRAM OXALATE 10 MG/1
10 TABLET ORAL DAILY
Status: DISCONTINUED | OUTPATIENT
Start: 2021-10-14 | End: 2021-10-19 | Stop reason: HOSPADM

## 2021-10-13 RX ORDER — SPIRONOLACTONE 25 MG/1
25 TABLET ORAL DAILY
Status: DISCONTINUED | OUTPATIENT
Start: 2021-10-14 | End: 2021-10-19 | Stop reason: HOSPADM

## 2021-10-13 RX ORDER — AMLODIPINE BESYLATE 5 MG/1
5 TABLET ORAL DAILY
Status: DISCONTINUED | OUTPATIENT
Start: 2021-10-14 | End: 2021-10-16

## 2021-10-13 RX ADMIN — OXYCODONE 5 MG: 5 TABLET ORAL at 07:04

## 2021-10-13 RX ADMIN — ACETAMINOPHEN 650 MG: 325 TABLET ORAL at 11:27

## 2021-10-13 RX ADMIN — SODIUM CHLORIDE 10 ML: 9 INJECTION, SOLUTION INTRAMUSCULAR; INTRAVENOUS; SUBCUTANEOUS at 13:23

## 2021-10-13 RX ADMIN — ROPINIROLE HYDROCHLORIDE 1 MG: 0.5 TABLET, FILM COATED ORAL at 17:16

## 2021-10-13 RX ADMIN — CEFTRIAXONE 1 G: 1 INJECTION, POWDER, FOR SOLUTION INTRAMUSCULAR; INTRAVENOUS at 13:05

## 2021-10-13 RX ADMIN — SODIUM CHLORIDE 10 ML: 9 INJECTION, SOLUTION INTRAMUSCULAR; INTRAVENOUS; SUBCUTANEOUS at 06:37

## 2021-10-13 RX ADMIN — INSULIN GLARGINE 10 UNITS: 100 INJECTION, SOLUTION SUBCUTANEOUS at 21:53

## 2021-10-13 RX ADMIN — INSULIN LISPRO 2 UNITS: 100 INJECTION, SOLUTION INTRAVENOUS; SUBCUTANEOUS at 11:28

## 2021-10-13 RX ADMIN — SODIUM CHLORIDE 10 ML: 9 INJECTION, SOLUTION INTRAMUSCULAR; INTRAVENOUS; SUBCUTANEOUS at 21:54

## 2021-10-13 RX ADMIN — OXYCODONE 5 MG: 5 TABLET ORAL at 20:17

## 2021-10-13 RX ADMIN — TUBERCULIN PURIFIED PROTEIN DERIVATIVE 5 UNITS: 5 INJECTION, SOLUTION INTRADERMAL at 12:56

## 2021-10-13 RX ADMIN — OXYCODONE 5 MG: 5 TABLET ORAL at 11:27

## 2021-10-13 RX ADMIN — INSULIN LISPRO 4 UNITS: 100 INJECTION, SOLUTION INTRAVENOUS; SUBCUTANEOUS at 21:54

## 2021-10-13 RX ADMIN — CARVEDILOL 12.5 MG: 12.5 TABLET, FILM COATED ORAL at 17:16

## 2021-10-13 NOTE — PROGRESS NOTES
Pt is resting in bed at this time. Pt is on RA. Pt has no s/sx of acute distress at this time. Pt has no requests at this time. Pt has safety measures in place. Pt has call light within reach and is encouraged to call for assistance if needed. Will prepare report for oncoming nurse.

## 2021-10-13 NOTE — H&P
H and P     Patient: Kimo Barnhart MRN: [id number]  SSN: [id number]    YOB: 1934  Age: 80 y.o. Sex: female       Subjective:      Kimo Barnhart is a 80 y.o. female who is now several months out from a left cephalomedullary nail fixation for a left proximal femur fracture and approximately 4 weeks out from a right proximal femur fracture and cephalomedullary nail fixation of this. She has just recently been discharged from rehab and is at home and is really struggling at home. Her family that is with her today says she is followed multiple times. She is in obvious pain and cannot put any weight on her right lower extremity. She cannot do any sort of mobilization at home and she is in significant pain around her right hip. Her left hip seems to be doing pretty well. .          Past Medical History:   Diagnosis Date    Acute kidney failure 2016    Acute pericarditis     Arthritis      CAP (community acquired pneumonia) 2016    Coronary artery disease      Crohn's disease      Hypercalcemia      Hypercholesterolemia      Hypertension      Lumbar compression fracture      Osteoporosis      Type 2 diabetes mellitus              Past Surgical History:   Procedure Laterality Date    HX ARTHROPLASTY   2016     left elbow    HX HEART CATHETERIZATION   2015     no intervention    HX HYSTERECTOMY       HX KYPHOPLASTY   ~     lumbar      FAMHX -No history of inflammatory arthritis   Social History            Tobacco Use    Smoking status: Former Smoker       Types: Cigarettes       Start date: 1961       Quit date: 1964       Years since quittin.3    Smokeless tobacco: Never Used    Tobacco comment: 2 packs a wk   Substance Use Topics    Alcohol use:  Yes       Alcohol/week: 0.0 standard drinks       Comment: rarely             Current Outpatient Medications   Medication Sig Dispense Refill    aspirin (ASPIRIN) 325 mg tablet Take 1 Tab by mouth daily. 10 Tab 0    calcium-vitamin D (OS-ALYCIA +D3) 500 mg-200 unit per tablet Take 1 Tab by mouth three (3) times daily (with meals). 90 Tab 0    insulin lispro (HUMALOG) 100 unit/mL injection INITIATE INSULIN CORRECTIVE PROTOCOL:  Normal Insulin Sensitivity   For Blood Sugar (mg/dL) of:     Less than 150 =   0 units           150 -199 =   2 units  200 -249 =   4 units  250 -299 =   6 units  300 -349 =   8 units  350 and above = 10 units and Call Physician     Initiate Hypoglycemia protocol if blood glucose is <70 mg/dL Fast Acting - Administer Immediately - or within 15 minutes of start of meal, if mealtime coverage. 1 Vial 0    amLODIPine (NORVASC) 5 mg tablet Take 5 mg by mouth daily. Indications: high blood pressure        atorvastatin (LIPITOR) 40 mg tablet Take 40 mg by mouth daily. Indications: excessive fat in the blood, treatment to slow progression of coronary artery disease        carvediloL (Coreg) 12.5 mg tablet Take 12.5 mg by mouth two (2) times daily (with meals).        escitalopram oxalate (LEXAPRO) 10 mg tablet Take 10 mg by mouth daily.        famotidine (PEPCID) 20 mg tablet Take 20 mg by mouth daily.        folic acid (FOLVITE) 676 mcg tablet Take 800 mcg by mouth daily.        loperamide (IMMODIUM) 2 mg tablet Take 2 mg by mouth as needed for Diarrhea.        omeprazole (PRILOSEC) 40 mg capsule Take 40 mg by mouth daily.        rOPINIRole (REQUIP) 1 mg tablet Take 1 mg by mouth two (2) times a day.        spironolactone (ALDACTONE) 25 mg tablet Take  by mouth daily.        cholecalciferol (Vitamin D3) 25 mcg (1,000 unit) cap Take  by mouth every other day.        multivitamin with iron tablet Take 1 Tab by mouth daily.        ondansetron (Zofran ODT) 4 mg disintegrating tablet Take 1 Tab by mouth every eight (8) hours as needed for Nausea.  11 Tab 1    insulin glargine (LANTUS,BASAGLAR) 100 unit/mL (3 mL) inpn 20 Units by SubCUTAneous route.        acetaminophen (TYLENOL EXTRA STRENGTH) 500 mg tablet Take  by mouth every six (6) hours as needed for Pain.        cyanocobalamin 1,000 mcg tablet Take 2,500 mcg by mouth daily.        B.infantis-B.ani-B.long-B.bifi (PROBIOTIC 4X) 10-15 mg Tab Take  by mouth daily.                   Allergies   Allergen Reactions    Sulfa (Sulfonamide Antibiotics) Rash    Other Medication Unknown (comments)       \"Dypentin\"    Tape [Adhesive] Other (comments)       Blisters and skin tears         Review of Systems:  A comprehensive review of systems was negative except for that written in the History of Present Illness.     Objective:      There were no vitals filed for this visit.      Physical Exam:  Physical Exam:  General:  Alert, cooperative, no distress, appears stated age. Orientation she is alert and oriented person and situation at least   Eyes:  Conjunctivae/corneas clear. PERRL, EOMs intact. Fundi benign   Ears:  Normal TMs and external ear canals both ears. Nose: Nares normal. Septum midline. Mucosa normal. No drainage or sinus tenderness. Mouth/Throat: Lips, mucosa, and tongue normal. Teeth and gums normal.   Neck: Supple, symmetrical, trachea midline, no adenopathy, thyroid: no enlargment/tenderness/nodules, no carotid bruit and no JVD. Back:   Symmetric, no curvature. ROM normal. No CVA tenderness. Lungs:   Clear to auscultation bilaterally. Heart:  Regular rate and rhythm, S1, S2 normal, no murmur, click, rub or gallop. Abdomen:   Soft, non-tender. Bowel sounds normal. No masses,  No organomegaly.         No lymphadenopathy in all 4 extremities  Alignment-she has near normal alignment of her right hip  Range of motion-she has pain with any range of motion of the right hip  Vascular-distal pulses palpable in right lower extremity  Sensory/motor-deep tendon reflexes normal right lower extremity. Motor and sensory function intact.   Stability-no obvious instability around the right hip but she is very painful for her so it is difficult to tell  Tenderness to palpation throughout the right hip area especially over the greater trochanter  Skin-surgical incisions have completely healed  Gait-she cannot put any weight on her right lower extremity     Assessment:           Hospital Problems  Date Reviewed: 9/19/2021     None          Right hip pain after multiple falls 4 weeks after cephalomedullary nail fixation of right proximal femur fracture     Xrays and or studies:     [id number] right hip intertrochanteric fracture, findings-AP and lateral views of the right hip shows the hardware is intact with no evidence of loosening or failure. The overall alignment is excellent. There does appear to be significant evidence of healing at the primary fracture lines impression-healing well aligned right intertrochanteric proximal femur fracture  Plan:      I have reviewed her plain film x-rays of the femur and these show no obvious new fractures around her right hip. Her hardware is in good position however it is very difficult to tell if she has any sort of pelvic ring injury. I do not see anything obvious on plain film x-ray of her pelvis. Since she is so uncomfortable I think she has a high suspicion for perhaps having some sort of pelvic ring or perhaps even a new minimally displaced fracture through the femur. Either 1 of these would likely be treated nonoperatively or at least with no further operative intervention. I think it would be reasonable to admit her to see if we get her more comfortable and to get a CT scan of her pelvis and see if she has anything like a pelvic ring injury which I think I have a high suspicion for.   So we will admit her today get the CT scan and then go from there.     Signed By: Danielle Nieto MD      October 12, 2021

## 2021-10-13 NOTE — PROGRESS NOTES
Pt received 1 tab of PO Roxicodone 5mg for complaint of right hip pain 8/10.  Will continue to monitor

## 2021-10-13 NOTE — CONSULTS
[place] Hospitalist Consult   Admit Date:  10/12/2021 10:00 PM   Name:  Val Cleary   Age:  80 y.o. Sex:  female  :  1934   MRN:  [id number]   Room:  [facility]    Presenting Complaint: No chief complaint on file. Reason(s) for Admission: Debility [R53.81]     Hospitalists consulted by Oralia Andrews MD for: Medical management    History of Presenting Illness:   Val Cleary is a 80 y.o. female with history of type 2 diabetes mellitus, osteoporosis, hypercalcemia, hypertension, GERD, CVA admitted by Ortho service for Rt hip pain. Patient is 4 weeks status post ORIF with cephalomedullary nail fixation of the right hip by Dr. Lilibeth Sun. Patient was recently discharged home with home health from rehab and is not able to care for herself. Patient is with persistent right hip pain and Ortho admitted patient for concern for possible pelvic fracture. Patient denies any other active complaint other than right hip pain and right leg pain. Denies chest pain, palpitation, nausea, vomiting or abdominal pain. Hospitalist consulted for medical management. Review of Systems:  10 systems reviewed and negative except as noted in HPI. Assessment & Plan:     History of fracture of right hip:  Debility:  4 weeks status post ORIF with cephalomedullary nail fixation of the right hip by Dr. Lilibeth Sun.   CT pelvis with no clear evidence of hardware complication  PT OT  Rest as per primary    UTI:  UA suggestive of UTI  Urine culture ordered  Start patient on ceftriaxone    Diabetes mellitus  A1c 9.2 on   Patient is on 20 units Lantus daily at home  Start patient on Lantus 10 units at bedtime and sliding scale  Adjust insulin accordingly    Hypertension:  Continue home meds: Coreg, amlodipine and Aldactone    History of CVA:   No residual deficiency  Continue aspirin and statin    History of Crohn's disease:  Stable    GERD:  Continue famotidine    Depression:  [facility] Problems as of 10/13/2021 Date Reviewed: 10/12/2021        Codes Class Noted - Resolved POA    History of fracture of right hip ICD-10-CM: Z87.81  ICD-9-CM: V15.51  10/13/2021 - Present Yes        * (Principal) Debility ICD-10-CM: R53.81  ICD-9-CM: 799.3  10/12/2021 - Present Unknown        UTI (urinary tract infection) ICD-10-CM: N39.0  ICD-9-CM: 599.0  3/7/2021 - Present Yes        CVA (cerebral vascular accident) [facility]) ICD-10-CM: I63.9  ICD-9-CM: 434.91  10/23/2017 - Present Yes    Overview Signed 10/27/2017 12:11 PM by Yong Casillas     Probable chronic right parietal occipital CVA containing some petechial hemosiderin, pontine and supratentorial microischemia.              Coronary artery disease involving native coronary artery of native heart with angina pectoris [facility]) ICD-10-CM: I25.119  ICD-9-CM: 414.01, 413.9  5/3/2016 - Present Yes        DM type 2 (diabetes mellitus, type 2) (HCC) (Chronic) ICD-10-CM: E11.9  ICD-9-CM: 250.00  3/16/2013 - Present Yes        Crohn disease ([facility] [address] (Chronic) ICD-10-CM: K50.90  ICD-9-CM: 555.9  3/16/2013 - Present Yes              Past Medical History:   Diagnosis Date    Acute kidney failure 05/2016    Acute pericarditis 2015    Arthritis     CAP (community acquired pneumonia) 5/2016    Coronary artery disease     Crohn's disease     Hypercalcemia     Hypercholesterolemia     Hypertension     Lumbar compression fracture     Osteoporosis     Type 2 diabetes mellitus       Past Surgical History:   Procedure Laterality Date    HX ARTHROPLASTY  1/5/2016    left elbow    HX HEART CATHETERIZATION  5/12/2015    no intervention    HX HYSTERECTOMY  1978    HX KYPHOPLASTY  ~2002    lumbar      Allergies   Allergen Reactions    Sulfa (Sulfonamide Antibiotics) Rash    Other Medication Unknown (comments)     \"Dypentin\"    Tape [Adhesive] Other (comments)     Blisters and skin tears      Social History     Tobacco Use    Smoking status: Former Smoker     Types: Cigarettes     Start date: 1961     Quit date: 1964     Years since quittin.4    Smokeless tobacco: Never Used    Tobacco comment: 2 packs a wk   Substance Use Topics    Alcohol use: Yes     Alcohol/week: 0.0 standard drinks     Comment: rarely      Family History   Problem Relation Age of Onset    Heart Failure Mother     Diabetes Mother     Cancer Father         throat    Alcohol abuse Neg Hx     Arthritis-rheumatoid Neg Hx     Asthma Neg Hx     Bleeding Prob Neg Hx     Elevated Lipids Neg Hx     Headache Neg Hx     Hypertension Neg Hx     Lung Disease Neg Hx     Migraines Neg Hx     Psychiatric Disorder Neg Hx     Stroke Neg Hx     Mental Retardation Neg Hx     Thyroid Disease Neg Hx       Family history reviewed and negative unless otherwise noted above. Immunization History   Administered Date(s) Administered    Covid-19, MODERNA, Mrna, Lnp-s, Pf, 100mcg/0.5mL 2021, 2021    Influenza Vaccine 10/01/2015, 10/17/2016    TB Skin Test (PPD) Intradermal 2013, 2021, 2021       Objective:     Patient Vitals for the past 24 hrs:   Temp Pulse Resp BP SpO2   10/13/21 1132 97.7 °F (36.5 °C) 69 18 137/74 100 %   10/13/21 0722 97.7 °F (36.5 °C) 80 18 (!) 117/57 95 %   10/13/21 0444 98 °F (36.7 °C) 70 18 (!) 124/54 95 %   10/12/21 2205 97.8 °F (36.6 °C) (!) 110 18 (!) 135/51 100 %     Oxygen Therapy  O2 Sat (%): 100 % (10/13/21 1132)    Estimated body mass index is 25.1 kg/m² as calculated from the following:    Height as of this encounter: 4' 9\" (1.448 m). Weight as of this encounter: 52.6 kg (116 lb). Intake/Output Summary (Last 24 hours) at 10/13/2021 1224  Last data filed at 10/13/2021 1132  Gross per 24 hour   Intake --   Output 200 ml   Net -200 ml         Physical Exam:    Blood pressure 137/74, pulse 69, temperature 97.7 °F (36.5 °C), resp. rate 18, height 4' 9\" (1.448 m), weight 52.6 kg (116 lb), last menstrual period 2008, SpO2 100 %.   General:    Well nourished. No overt distress  Head:  Normocephalic, atraumatic  Eyes:  Sclerae appear normal.  Pupils equally round. ENT:  Nares appear normal, no drainage. Moist oral mucosa  Neck:  No restricted ROM. Trachea midline   CV:   RRR. No m/r/g. No jugular venous distension. Lungs:   CTAB. No wheezing, rhonchi, or rales. Respirations even, unlabored  Abdomen: Bowel sounds present. Soft, nontender, nondistended. Extremities: No cyanosis or clubbing. No edema  Skin:     No rashes and normal coloration. Warm and dry. Neuro:  CN II-XII grossly intact. Sensation intact. A&Ox3  Psych:  Normal mood and affect. I have reviewed ordered lab tests and independently visualized imaging below:    Recent Labs:  Recent Results (from the past 48 hour(s))   METABOLIC PANEL, BASIC    Collection Time: 10/12/21 11:51 PM   Result Value Ref Range    Sodium 140 136 - 145 mmol/L    Potassium 4.6 3.5 - 5.1 mmol/L    Chloride 107 98 - 107 mmol/L    CO2 28 21 - 32 mmol/L    Anion gap 5 (L) 7 - 16 mmol/L    Glucose 247 (H) 65 - 100 mg/dL    BUN 15 8 - 23 MG/DL    Creatinine 1.07 (H) 0.6 - 1.0 MG/DL    GFR est AA >60 >60 ml/min/1.73m2    GFR est non-AA 52 (L) >60 ml/min/1.73m2    Calcium 10.0 8.3 - 10.4 MG/DL   CBC WITH AUTOMATED DIFF    Collection Time: 10/12/21 11:51 PM   Result Value Ref Range    WBC 10.0 4.3 - 11.1 K/uL    RBC 3.95 (L) 4.05 - 5.2 M/uL    HGB 11.3 (L) 11.7 - 15.4 g/dL    HCT 36.7 35.8 - 46.3 %    MCV 92.9 79.6 - 97.8 FL    MCH 28.6 26.1 - 32.9 PG    MCHC 30.8 (L) 31.4 - 35.0 g/dL    RDW 14.1 11.9 - 14.6 %    PLATELET 830 502 - 834 K/uL    MPV 9.7 9.4 - 12.3 FL    ABSOLUTE NRBC 0.00 0.0 - 0.2 K/uL    DF AUTOMATED      NEUTROPHILS 69 43 - 78 %    LYMPHOCYTES 19 13 - 44 %    MONOCYTES 6 4.0 - 12.0 %    EOSINOPHILS 3 0.5 - 7.8 %    BASOPHILS 1 0.0 - 2.0 %    IMMATURE GRANULOCYTES 2 0.0 - 5.0 %    ABS. NEUTROPHILS 6.9 1.7 - 8.2 K/UL    ABS. LYMPHOCYTES 1.9 0.5 - 4.6 K/UL    ABS.  MONOCYTES 0.6 0.1 - 1.3 K/UL ABS. EOSINOPHILS 0.3 0.0 - 0.8 K/UL    ABS. BASOPHILS 0.1 0.0 - 0.2 K/UL    ABS. IMM. GRANS. 0.2 0.0 - 0.5 K/UL   URINALYSIS W/ RFLX MICROSCOPIC    Collection Time: 10/13/21 10:28 AM   Result Value Ref Range    Color YELLOW      Appearance CLOUDY      Specific gravity 1.017 1.001 - 1.023      pH (UA) 6.0 5.0 - 9.0      Protein 30 (A) NEG mg/dL    Glucose Negative mg/dL    Ketone Negative NEG mg/dL    Bilirubin Negative NEG      Blood TRACE (A) NEG      Urobilinogen 0.2 0.2 - 1.0 EU/dL    Nitrites Negative NEG      Leukocyte Esterase LARGE (A) NEG      WBC 20-50 0 /hpf    RBC 5-10 0 /hpf    Epithelial cells 0 0 /hpf    Bacteria 4+ (H) 0 /hpf    Casts 0-3 0 /lpf   GLUCOSE, POC    Collection Time: 10/13/21 11:12 AM   Result Value Ref Range    Glucose (POC) 199 (H) 65 - 100 mg/dL    Performed by [facility]        All Micro Results     Procedure Component Value Units Date/Time    CULTURE, URINE [[id number]]     Order Status: Sent Specimen: Urine from Clean catch           Other Studies:  CT PELV WO CONT    Result Date: 10/13/2021  CT pelvis without IV contrast INDICATION: Right pelvic pain. History of prior hip fractures COMPARISON: Radiographs 9/19/2021, CT abdomen pelvis 12/14/2020 TECHNIQUE: Axial CT images were obtained through the pelvis without IV contrast. Coronal and sagittal reformats are provided. Radiation dose reduction techniques were used for this study. Our CT scanners use one or all of the following: Automated exposure control, adjustment of the mA and/or kV according to patient size, iterative reconstruction. FINDINGS: The bones are diffusely demineralized. Status post intramedullary nail fixation for a recent intertrochanteric right hip fracture. Persistence of fracture lucencies with only minimal bone callus formation present. There is no clear evidence of hardware complication. Changes related to prior left hip ORIF seen without evidence of hardware complication.  There is degeneration of the pubic symphysis, bilateral sacroiliac joints, and visual is lower lumbar spine. Aortic atherosclerotic calcifications without aneurysm. Sigmoid diverticulosis. Bilateral simple appearing renal cysts again demonstrated. 1.  Recent intertrochanteric right hip fracture status post ORIF with persistence of fracture lucencies and absence of significant bone callus formation. No clear evidence of hardware complication. 2.  Prior left hip ORIF without hardware complication.       Current Meds:  Current Facility-Administered Medications   Medication Dose Route Frequency    insulin lispro (HUMALOG) injection   SubCUTAneous AC&HS    tuberculin injection 5 Units  5 Units IntraDERMal ONCE    cefTRIAXone (ROCEPHIN) 1 g in 0.9% sodium chloride (MBP/ADV) 50 mL MBP  1 g IntraVENous Q24H    [START ON 10/14/2021] amLODIPine (NORVASC) tablet 5 mg  5 mg Oral DAILY    [START ON 10/14/2021] atorvastatin (LIPITOR) tablet 40 mg  40 mg Oral DAILY    carvediloL (COREG) tablet 12.5 mg  12.5 mg Oral BID WITH MEALS    [START ON 10/14/2021] escitalopram oxalate (LEXAPRO) tablet 10 mg  10 mg Oral DAILY    [START ON 10/14/2021] famotidine (PEPCID) tablet 20 mg  20 mg Oral DAILY    insulin glargine (LANTUS) injection 10 Units  10 Units SubCUTAneous QHS    rOPINIRole (REQUIP) tablet 1 mg  1 mg Oral BID    [START ON 10/14/2021] spironolactone (ALDACTONE) tablet 25 mg  25 mg Oral DAILY    [START ON 10/14/2021] aspirin chewable tablet 81 mg  81 mg Oral DAILY    sodium chloride (NS) flush 5-40 mL  5-40 mL IntraVENous Q8H    sodium chloride (NS) flush 5-40 mL  5-40 mL IntraVENous PRN    acetaminophen (TYLENOL) tablet 650 mg  650 mg Oral Q6H PRN    Or    acetaminophen (TYLENOL) suppository 650 mg  650 mg Rectal Q6H PRN    polyethylene glycol (MIRALAX) packet 17 g  17 g Oral DAILY PRN    promethazine (PHENERGAN) tablet 12.5 mg  12.5 mg Oral Q6H PRN    Or    ondansetron (ZOFRAN) injection 4 mg  4 mg IntraVENous Q6H PRN    HYDROmorphone (DILAUDID) injection 0.5 mg  0.5 mg IntraVENous Q4H PRN    oxyCODONE IR (ROXICODONE) tablet 5 mg  5 mg Oral Q4H PRN       Signed:  Nikky Cobb MD    Part of this note may have been written by using a voice dictation software. The note has been proof read but may still contain some grammatical/other typographical errors.

## 2021-10-13 NOTE — PROGRESS NOTES
[address] [facility]    Patient ID:  Name: Veronika Beckett  MRN: [id number]  AGE: 80 y.o.  : 1934    Date:  2021      Subjective: Pt complains continues to complain of right hip and leg pain. She is 4 weeks status post ORIF with cephalomedullary nail fixation of the right hip by Dr. Naman Priest. Postoperatively she went to a rehab facility but was discharged home with home health and since being discharged home with home health she is unable to care for herself and her family is not able to care for her. She presented to the office yesterday with persistent right hip pain. We were concerned for possible pelvic fracture. Patient was admitted to  [facility] where CT was obtained that is negative for new fracture.        Past Medical History Includes:   Past Medical History:   Diagnosis Date    Acute kidney failure 2016    Acute pericarditis     Arthritis     CAP (community acquired pneumonia) 2016    Coronary artery disease     Crohn's disease     Hypercalcemia     Hypercholesterolemia     Hypertension     Lumbar compression fracture     Osteoporosis     Type 2 diabetes mellitus    ,   Past Surgical History:   Procedure Laterality Date    HX ARTHROPLASTY  2016    left elbow    HX HEART CATHETERIZATION  2015    no intervention    HX HYSTERECTOMY      HX KYPHOPLASTY  ~    lumbar     Family History:   Family History   Problem Relation Age of Onset    Heart Failure Mother     Diabetes Mother     Cancer Father         throat    Alcohol abuse Neg Hx     Arthritis-rheumatoid Neg Hx     Asthma Neg Hx     Bleeding Prob Neg Hx     Elevated Lipids Neg Hx     Headache Neg Hx     Hypertension Neg Hx     Lung Disease Neg Hx     Migraines Neg Hx     Psychiatric Disorder Neg Hx     Stroke Neg Hx     Mental Retardation Neg Hx     Thyroid Disease Neg Hx       Social History:   Social History     Tobacco Use    Smoking status: Former Smoker     Types: Cigarettes     Start date: 1961     Quit date: 1964     Years since quittin.3    Smokeless tobacco: Never Used    Tobacco comment: 2 packs a wk   Substance Use Topics    Alcohol use: Yes     Alcohol/week: 0.0 standard drinks     Comment: rarely       ALLERGIES:   Allergies   Allergen Reactions    Sulfa (Sulfonamide Antibiotics) Rash    Other Medication Unknown (comments)     \"Dypentin\"    Tape [Adhesive] Other (comments)     Blisters and skin tears        Patient Medications    Current Facility-Administered Medications   Medication Dose Route Frequency    insulin lispro (HUMALOG) injection   SubCUTAneous AC&HS    sodium chloride (NS) flush 5-40 mL  5-40 mL IntraVENous Q8H    sodium chloride (NS) flush 5-40 mL  5-40 mL IntraVENous PRN    acetaminophen (TYLENOL) tablet 650 mg  650 mg Oral Q6H PRN    Or    acetaminophen (TYLENOL) suppository 650 mg  650 mg Rectal Q6H PRN    polyethylene glycol (MIRALAX) packet 17 g  17 g Oral DAILY PRN    promethazine (PHENERGAN) tablet 12.5 mg  12.5 mg Oral Q6H PRN    Or    ondansetron (ZOFRAN) injection 4 mg  4 mg IntraVENous Q6H PRN    HYDROmorphone (DILAUDID) injection 0.5 mg  0.5 mg IntraVENous Q4H PRN    oxyCODONE IR (ROXICODONE) tablet 5 mg  5 mg Oral Q4H PRN         Review of Systems:  Pertinent items are noted in HPI.     Physical Exam:      General: Frail-appearing 80year-old female in NAD, Alert, Oriented x 2   Mental Status: Appropriate   Psych: Normal Affect, Normal Mood    HEENT: Normal Cephalic/Atraumatic, PERRL   Lungs: Respirations even and unlabored, Breath Sounds were clear, no respiratory distress   Heart: Regular Rate and Rhythm   Vascular: Distal pulses intact, good capillary refill   Skin: No redness, No Rashes, Skin is dry   Musculoskeletal: Bilateral lower extremity and hip flexor muscle weakness  Lymphatic: No lympahdenopathy, No distal edema Neuro: No gross deficits   Abdomen: Soft, Non tender, No distension      VITALS:   Patient Vitals for the past 8 hrs:   BP Temp Pulse Resp SpO2   10/13/21 0722 (!) 117/57 97.7 °F (36.5 °C) 80 18 95 %   10/13/21 0444 (!) 124/54 98 °F (36.7 °C) 70 18 95 %    , Temp (24hrs), Av.8 °F (36.6 °C), Min:97.7 °F (36.5 °C), Max:98 °F (36.7 °C)           Diagnosis   Patient Active Problem List   Diagnosis Code    DM type 2 (diabetes mellitus, type 2) ([facility]) E11.9    Essential hypertension with goal blood pressure less than 140/90 I10    Crohn disease ([facility]) K50.90    Pericarditis, acute I30.9    Precordial pain R07.2    Nonspecific abnormal electrocardiogram (ECG) (EKG) R94.31    Coronary artery disease involving native coronary artery of native heart with angina pectoris (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    Electrolyte abnormality E87.8    History of artificial joint Z96.60    History of implantation of joint prosthesis of elbow Z96.629    Supracondylar fracture of humerus S42.413A    Age-related osteoporosis with current pathological fracture with routine healing M80.00XD    Cholecystitis with cholelithiasis K80.10    CVA (cerebral vascular accident) ([address] I63.9    Dysuria R30.0    Vaginal candida B37.3    Polyneuropathy G62.9    Type 2 diabetes with nephropathy ([address] E11.21    Diabetic polyneuropathy associated with type 2 diabetes mellitus ([address] E11.42    Fall W19. [facility] Encounter for medication management X63.978    Closed fracture of left hip ([address] S72.002A    Hip fracture requiring operative repair ([address] S72.009A    Acute cystitis without hematuria N30.00    UTI (urinary tract infection) N39.0    Acute blood loss anemia D62    Intertrochanteric fracture of left femur, closed, initial encounter ([facility] [address] S72.142A    Debility R53.81          Assessment Debility  Status post right hip gamma nail    Medical Decision Making:     CT and chart have been reviewed. The patient was discussed with Dr. Hernan Mahoney. The patient is unable to care for herself at home and is a danger to herself due to her weakness, poor coordination and fragility. I spoke with the daughter yesterday and recommended long-term skilled nursing placement. The daughter is in agreement and was meeting yesterday with  to assist with this. At this time the patient can be weightbearing as tolerated with physical therapy and assistance at all times. Hospitalist consult placed for medical management. Social work consult placed to assist with long-term skilled nursing facility placement.            MANOHAR Bruno  10/13/2021,  10:36 AM

## 2021-10-13 NOTE — PROGRESS NOTES
Pt has arrived via wheelchair to room 515. Pt is alert and oriented times 4. Pt is on RA. Pt states she has very bad pain in her right leg and cant really walk on it. Pt was able to get in bed with a assistx2 with max assistance. Pt complains of pain 8/10 in the right leg. Pt skin has been assessed with Noa Drake. Pt has 3 steri strip over 3 incisions from the previous femur repair about 4 weeks ago. Pt has bruising to right buttock. Pt has ecchymosis on all 4 extremities. Pt skin is dry and flaky. Pt has 2+pitting edema to right foot and a quarter sized abrasion on left leg from hitting it on the wheelchair at home the pt says. Pt purwick in place for toileting needs. Pt has been oriented to room and surroundings. Pt has call light within reach and has verbalized understanding on how to use it. Pt has safety measures in place. Dr. Mookie Raphael has been notified via perfect serve of pt arrival. Will continue to monitor.

## 2021-10-13 NOTE — PROGRESS NOTES
ACUTE OT GOALS:  (Developed with and agreed upon by patient and/or caregiver.)  1. Pt will toilet with min A  2. Pt will complete functional mobility for ADLs with min A  3. Pt will complete lower body dressing with min A using AE as needed  4. Pt will complete UB bathing and dressing with set up using AE as needed  5. Pt will demonstrate independence with HEP to promote increased BUE strength and functional use for ADLs  6. Pt will tolerate 23 minutes functional activity with min or fewer rest breaks to promote increased endurance for ADLs  7. Pt will independently demonstrate/ verbalize 2+ energy conservation techniques to promote increased endurance for ADLs      Timeframe: 7 days      OCCUPATIONAL THERAPY ASSESSMENT: Initial Assessment and Daily Note OT Treatment Day # 1    Mackenzie Ip is a 80 y.o. female   PRIMARY DIAGNOSIS: [address] [R53.81]       Reason for Referral:  Impaired ADLs, debility  ICD-10: Treatment Diagnosis: Generalized Muscle Weakness (M62.81)  Repeated Falls (R29.6)  History of falling (Z91.81)  INPATIENT: Payor: [facility] MEDICARE / Plan: [address] [place] [address] HMO / Product Type: Managed Care Medicare /   ASSESSMENT:     REHAB RECOMMENDATIONS:   Recommendation to date pending progress:  Setting:   Short-term Rehab  Equipment:    None     PRIOR LEVEL OF FUNCTION:  (Prior to Hospitalization)  INITIAL/CURRENT LEVEL OF FUNCTION:  (Based on today's evaluation)   Bathing:   Minimal Assistance  Dressing:   Minimal Assistance  Feeding/Grooming:   Independent  Toileting:   Minimal Assistance  Functional Mobility:   Minimal Assistance Bathing:   Maximal Assistance  Dressing:   Maximal Assistance  Feeding/Grooming:   Contact Guard Assistance  Toileting:   Maximal Assistance  Functional Mobility:   Maximal Assistance     ASSESSMENT:  Ms. Brian De La Garza was admitted with debility, R hip pain, and inability to care for herself at home.  Pt is several weeks post R hip fracture and ORIF, was recently d/c'ed home from rehab. RLE WBAT. Pt presented generally weak with deficits in mobility, balance, strength, and endurance impacting ADLs. Pt required max A to stand and take sidesteps w/ RW, had difficulty WB through RLE and tolerated standing ~2 minutes or less. Pt very unsteady w/ posterior lean. Max A for toileting, total A to don socks. Pt is below her functional baseline and would benefit from skilled OT services to address deficits. SUBJECTIVE:   Ms. Fausto Tariq states, [facility] you find out what's happening with my ? \"    SOCIAL HISTORY/LIVING ENVIRONMENT: Recently d/c'ed home from rehab and reports decreased ADLs and mobility since R hip ORIF. Lives w/ spouse, daughter comes over and assists w/ bathing and dressing. WIS, shower chair, grab bars. RW and WC. Spouse is currently hospitalized.        OBJECTIVE:     PAIN: VITAL SIGNS: LINES/DRAINS:   Pre Treatment: Pain Screen  Pain Scale 1: Numeric (0 - 10)  Pain Intensity 1: 8  Pain Location 1: Generalized  Pain Intervention(s) 1: Nurse notified  Post Treatment: 8         GROSS EVALUATION:  BUE Within Functional Limits Abnormal/ Functional Abnormal/ Non-Functional (see comments) Not Tested Comments:   AROM [x] [] [] []    PROM [] [] [] []    Strength [] [x] [] []    Balance [] [] [x] []    Posture [] [] [] []    Sensation [] [] [] []    Coordination [x] [] [] []    Tone [] [] [] []    Edema [] [] [] []    Activity Tolerance [] [x] [x] []     [] [] [] []      COGNITION/  PERCEPTION: Intact Impaired   (see comments) Comments:   Orientation [] []    Vision [] []    Hearing [] []    Judgment/ Insight [] []    Attention [x] []    Memory [] []    Command Following [x] []    Emotional Regulation [] []     [] []      ACTIVITIES OF DAILY LIVING: I Mod I S SBA CGA Min Mod Max Total NT Comments   BASIC ADLs:              Bathing/ Showering [] [] [] [] [] [] [] [] [] []    Toileting [] [] [] [] [] [] [] [x] [] []    Dressing [] [] [] [] [] [] [] [x] [] [] Feeding [] [] [] [] [] [] [] [] [] []    Grooming [] [] [] [] [] [] [] [] [] []    Personal Device Care [] [] [] [] [] [] [] [] [] []    Functional Mobility [] [] [] [] [] [] [] [x] [] []    I=Independent, Mod I=Modified Independent, S=Supervision, SBA=Standby Assistance, CGA=Contact Guard Assistance,   Min=Minimal Assistance, Mod=Moderate Assistance, Max=Maximal Assistance, Total=Total Assistance, NT=Not Tested    MOBILITY: I Mod I S SBA CGA Min Mod Max Total  NT x2 Comments:   Supine to sit [] [] [] [] [] [x] [] [] [] [] []    Sit to supine [] [] [] [] [] [x] [] [] [] [] []    Sit to stand [] [] [] [] [] [] [] [x] [] [] []    Bed to chair [] [] [] [] [] [] [] [] [] [] []    I=Independent, Mod I=Modified Independent, S=Supervision, SBA=Standby Assistance, CGA=Contact Guard Assistance,   Min=Minimal Assistance, Mod=Moderate Assistance, Max=Maximal Assistance, Total=Total Assistance, NT=Not Tested    [address] [place] [address] [address] [id number] AM-PAC 6 Clicks   Daily Activity Inpatient Short Form        How much help from another person does the patient currently need. .. Total A Lot A Little None   1. Putting on and taking off regular lower body clothing? [] 1   [x] 2   [] 3   [] 4   2. Bathing (including washing, rinsing, drying)? [] 1   [x] 2   [] 3   [] 4   3. Toileting, which includes using toilet, bedpan or urinal?   [] 1   [x] 2   [] 3   [] 4   4. Putting on and taking off regular upper body clothing? [] 1   [] 2   [x] 3   [] 4   5. Taking care of personal grooming such as brushing teeth? [] 1   [] 2   [x] 3   [] 4   6. Eating meals? [] 1   [] 2   [] 3   [x] 4   © 2007, Trustees of [address] [place] [address] [address] [id number], under license to [employer]. All rights reserved     Score:  Initial: 16 Most Recent: X (Date: -- )   Interpretation of Tool:  Represents activities that are increasingly more difficult (i.e. Bed mobility, Transfers, Gait).     PLAN:   FREQUENCY/DURATION: OT Plan of Care: 3 times/week for duration of hospital stay or until stated goals are met, whichever comes first.    PROBLEM LIST:   (Skilled intervention is medically necessary to address:)  1. Decreased ADL/Functional Activities  2. Decreased Activity Tolerance  3. Decreased Balance  4. Decreased Strength  5. Decreased Transfer Abilities  6. Increased Pain   INTERVENTIONS PLANNED:   (Benefits and precautions of occupational therapy have been discussed with the patient.)  1. Self Care Training  2. Therapeutic Activity  3. Therapeutic Exercise/HEP  4. Neuromuscular Re-education  5. Education     TREATMENT:     EVALUATION: Moderate Complexity : (Untimed Charge)    TREATMENT:   ($$ Self Care/Home Management: 8-22 mins    )  Self Care (10 Minutes): Self care including Toileting, Lower Body Dressing and Grooming to increase independence and decrease level of assistance required.     TREATMENT GRID:  N/A    AFTER TREATMENT POSITION/PRECAUTIONS:  Alarm Activated, Bed, Needs within reach and RN notified    INTERDISCIPLINARY COLLABORATION:  RN/PCT    TOTAL TREATMENT DURATION:  OT Patient Time In/Time Out  Time In: 1118  Time Out: [address] Sandra Reyes

## 2021-10-14 LAB
ANION GAP SERPL CALC-SCNC: 7 MMOL/L (ref 7–16)
BASOPHILS # BLD: 0.1 K/UL (ref 0–0.2)
BASOPHILS NFR BLD: 1 % (ref 0–2)
BUN SERPL-MCNC: 13 MG/DL (ref 8–23)
CALCIUM SERPL-MCNC: 10.2 MG/DL (ref 8.3–10.4)
CHLORIDE SERPL-SCNC: 105 MMOL/L (ref 98–107)
CO2 SERPL-SCNC: 25 MMOL/L (ref 21–32)
CREAT SERPL-MCNC: 0.95 MG/DL (ref 0.6–1)
DIFFERENTIAL METHOD BLD: ABNORMAL
EOSINOPHIL # BLD: 0.3 K/UL (ref 0–0.8)
EOSINOPHIL NFR BLD: 4 % (ref 0.5–7.8)
ERYTHROCYTE [DISTWIDTH] IN BLOOD BY AUTOMATED COUNT: 14 % (ref 11.9–14.6)
GLUCOSE BLD STRIP.AUTO-MCNC: 141 MG/DL (ref 65–100)
GLUCOSE BLD STRIP.AUTO-MCNC: 164 MG/DL (ref 65–100)
GLUCOSE BLD STRIP.AUTO-MCNC: 175 MG/DL (ref 65–100)
GLUCOSE BLD STRIP.AUTO-MCNC: 208 MG/DL (ref 65–100)
GLUCOSE SERPL-MCNC: 226 MG/DL (ref 65–100)
HCT VFR BLD AUTO: 39 % (ref 35.8–46.3)
HGB BLD-MCNC: 11.8 G/DL (ref 11.7–15.4)
IMM GRANULOCYTES # BLD AUTO: 0.2 K/UL (ref 0–0.5)
IMM GRANULOCYTES NFR BLD AUTO: 2 % (ref 0–5)
LYMPHOCYTES # BLD: 1.3 K/UL (ref 0.5–4.6)
LYMPHOCYTES NFR BLD: 14 % (ref 13–44)
MCH RBC QN AUTO: 28.2 PG (ref 26.1–32.9)
MCHC RBC AUTO-ENTMCNC: 30.3 G/DL (ref 31.4–35)
MCV RBC AUTO: 93.3 FL (ref 79.6–97.8)
MM INDURATION POC: 0 MM (ref 0–5)
MONOCYTES # BLD: 0.5 K/UL (ref 0.1–1.3)
MONOCYTES NFR BLD: 5 % (ref 4–12)
NEUTS SEG # BLD: 6.9 K/UL (ref 1.7–8.2)
NEUTS SEG NFR BLD: 74 % (ref 43–78)
NRBC # BLD: 0 K/UL (ref 0–0.2)
PLATELET # BLD AUTO: 348 K/UL (ref 150–450)
PMV BLD AUTO: 10 FL (ref 9.4–12.3)
POTASSIUM SERPL-SCNC: 4.2 MMOL/L (ref 3.5–5.1)
PPD POC: NEGATIVE NEGATIVE
RBC # BLD AUTO: 4.18 M/UL (ref 4.05–5.2)
SERVICE CMNT-IMP: ABNORMAL
SODIUM SERPL-SCNC: 137 MMOL/L (ref 136–145)
WBC # BLD AUTO: 9.4 K/UL (ref 4.3–11.1)

## 2021-10-14 PROCEDURE — 82962 GLUCOSE BLOOD TEST: CPT

## 2021-10-14 PROCEDURE — 80048 BASIC METABOLIC PNL TOTAL CA: CPT

## 2021-10-14 PROCEDURE — 97162 PT EVAL MOD COMPLEX 30 MIN: CPT

## 2021-10-14 PROCEDURE — 85025 COMPLETE CBC W/AUTO DIFF WBC: CPT

## 2021-10-14 PROCEDURE — 74011250637 HC RX REV CODE- 250/637: Performed by: FAMILY MEDICINE

## 2021-10-14 PROCEDURE — 74011250636 HC RX REV CODE- 250/636: Performed by: NURSE PRACTITIONER

## 2021-10-14 PROCEDURE — 74011000250 HC RX REV CODE- 250: Performed by: PHYSICIAN ASSISTANT

## 2021-10-14 PROCEDURE — 74011250636 HC RX REV CODE- 250/636: Performed by: FAMILY MEDICINE

## 2021-10-14 PROCEDURE — 74011636637 HC RX REV CODE- 636/637: Performed by: FAMILY MEDICINE

## 2021-10-14 PROCEDURE — 65270000029 HC RM PRIVATE

## 2021-10-14 PROCEDURE — 74011250637 HC RX REV CODE- 250/637: Performed by: PHYSICIAN ASSISTANT

## 2021-10-14 PROCEDURE — 36415 COLL VENOUS BLD VENIPUNCTURE: CPT

## 2021-10-14 PROCEDURE — 74011250637 HC RX REV CODE- 250/637: Performed by: NURSE PRACTITIONER

## 2021-10-14 PROCEDURE — 74011000258 HC RX REV CODE- 258: Performed by: FAMILY MEDICINE

## 2021-10-14 PROCEDURE — 97530 THERAPEUTIC ACTIVITIES: CPT

## 2021-10-14 RX ADMIN — OXYCODONE 5 MG: 5 TABLET ORAL at 08:51

## 2021-10-14 RX ADMIN — ESCITALOPRAM OXALATE 10 MG: 10 TABLET ORAL at 08:19

## 2021-10-14 RX ADMIN — INSULIN GLARGINE 10 UNITS: 100 INJECTION, SOLUTION SUBCUTANEOUS at 21:59

## 2021-10-14 RX ADMIN — ROPINIROLE HYDROCHLORIDE 1 MG: 0.5 TABLET, FILM COATED ORAL at 08:19

## 2021-10-14 RX ADMIN — ASPIRIN 81 MG: 81 TABLET, CHEWABLE ORAL at 08:19

## 2021-10-14 RX ADMIN — SODIUM CHLORIDE 10 ML: 9 INJECTION, SOLUTION INTRAMUSCULAR; INTRAVENOUS; SUBCUTANEOUS at 05:14

## 2021-10-14 RX ADMIN — ACETAMINOPHEN 650 MG: 325 TABLET ORAL at 11:27

## 2021-10-14 RX ADMIN — AMLODIPINE BESYLATE 5 MG: 5 TABLET ORAL at 08:19

## 2021-10-14 RX ADMIN — SODIUM CHLORIDE 10 ML: 9 INJECTION, SOLUTION INTRAMUSCULAR; INTRAVENOUS; SUBCUTANEOUS at 20:09

## 2021-10-14 RX ADMIN — INSULIN LISPRO 4 UNITS: 100 INJECTION, SOLUTION INTRAVENOUS; SUBCUTANEOUS at 11:28

## 2021-10-14 RX ADMIN — HYDROMORPHONE HYDROCHLORIDE 0.5 MG: 1 INJECTION, SOLUTION INTRAMUSCULAR; INTRAVENOUS; SUBCUTANEOUS at 20:09

## 2021-10-14 RX ADMIN — FAMOTIDINE 20 MG: 20 TABLET ORAL at 08:19

## 2021-10-14 RX ADMIN — SODIUM CHLORIDE 10 ML: 9 INJECTION, SOLUTION INTRAMUSCULAR; INTRAVENOUS; SUBCUTANEOUS at 14:30

## 2021-10-14 RX ADMIN — INSULIN LISPRO 2 UNITS: 100 INJECTION, SOLUTION INTRAVENOUS; SUBCUTANEOUS at 21:59

## 2021-10-14 RX ADMIN — ATORVASTATIN CALCIUM 40 MG: 40 TABLET, FILM COATED ORAL at 08:19

## 2021-10-14 RX ADMIN — ROPINIROLE HYDROCHLORIDE 1 MG: 0.5 TABLET, FILM COATED ORAL at 17:03

## 2021-10-14 RX ADMIN — CARVEDILOL 12.5 MG: 12.5 TABLET, FILM COATED ORAL at 17:03

## 2021-10-14 RX ADMIN — CEFTRIAXONE 1 G: 1 INJECTION, POWDER, FOR SOLUTION INTRAMUSCULAR; INTRAVENOUS at 12:47

## 2021-10-14 RX ADMIN — CARVEDILOL 12.5 MG: 12.5 TABLET, FILM COATED ORAL at 08:19

## 2021-10-14 RX ADMIN — SPIRONOLACTONE 25 MG: 25 TABLET ORAL at 08:19

## 2021-10-14 RX ADMIN — INSULIN LISPRO 2 UNITS: 100 INJECTION, SOLUTION INTRAVENOUS; SUBCUTANEOUS at 16:49

## 2021-10-14 NOTE — PROGRESS NOTES
Pt is a readmission from Sep. 18-22, 2021. Readmission assessment completed. CM met with pt at the bedside to discuss d/c planning. A/O x3. Demographics verified. Per pt she was residing with her spouse, however his Dementia has worsened and he is currently either hospitalized or possibly at [facility]. Pt is unsure of pt's whereabouts or status. She states that her sister-in-law recently called and stated the she visited with pt. CM gave pt the phone number for [facility] and a pen to take notes. CM explained that if CM were to call to ask about her spouse they would not be allowed to give CM any information due to HIPPA. Prior to hospitalization pt's daughter Lani Correa [phone] was coming over to assist pt with bathing and dressing. Pt has the following home DME: rollator, straight cane, shower chair, grab bars in the bathroom, and a wheelchair. Previous CM notes state that Coral Abad stated she has a job and a spouse and cannot care for pt. When CM discussed this with pt she confirmed that she does not have anyone with her at home to assist her. CM expressed grave concern for pt's safety and used the example of pt falling and possibly lying on the floor for hours until she was found. Pt states she applied for Medicaid \"a month ago\" for [place]. Unsure if pt is aware that the vast majority of ALFs do not accept Medicaid. CM will email [employer] for assistance with looking to see if an application has been submitted. CM asked pt if she has thought about going to live at a facility herself where she will have 24/7 care and she said \"yeah I have thought about it because now my  is not home and my daughter can't take care of me. I'm all alone with no one to help me. \"  PT and OT have evaluated pt. PT recommends STR vs LTC at d/c.  OT recommends STR at d/c.   Pt was at [facility] for STR from Sep. 22-Oct 9, 2021 and was then d/c due to insurance denial.  She was d/c home with [facility].  Pt has had multiple falls and is not safe to return home alone. Pt admitted with Dx of Debility, Hx R hip Fx (s/p 4 weeks ORIF with cephalomedullary nail fixation of the right hip by Dr. Lilibeth Sun), UTI, DM-II, HTN, Hx CVA (no residual deficiency), Hx Crohn's Dz, GERD, CAD, and Depression. CM will be calling pt's daughter to discuss d/c planning and the need for future planning for pt's wellbeing. CM will continue to follow and remain available if any needs arise. Care Management Interventions  PCP Verified by CM:  Yes Beatrice Davila MD)  Transition of Care Consult (CM Consult): Discharge Planning  Physical Therapy Consult: Yes  Occupational Therapy Consult: Yes  Speech Therapy Consult: No  Support Systems: Spouse/Significant Other, Child(eulalio)  The Patient and/or Patient Representative was Provided with a Choice of Provider and Agrees with the Discharge Plan?: Yes  Name of the Patient Representative Who was Provided with a Choice of Provider and Agrees with the Discharge Plan: Jasmin Curtis/Julieth Rust 53 of Choice List was Provided with Basic Dialogue that Supports the Patient's Individualized Plan of Care/Goals, Treatment Preferences and Shares the Quality Data Associated with the Providers?: Yes   Resource Information Provided?: No  Discharge Location  Discharge Placement: Unable to determine at this time

## 2021-10-14 NOTE — ACP (ADVANCE CARE PLANNING)
Advance Care Planning     General Advance Care Planning (ACP) Conversation      Date of Conversation: 10/12/2021  Conducted with: Patient with Decision Making Capacity    Healthcare Decision Maker:   No healthcare decision makers have been documented. Click here to complete [address] including selection of the Healthcare Decision Maker Relationship (ie \"Primary\")    Today we documented Decision Maker(s) consistent with Legal Next of Kin hierarchy. Content/Action Overview:   Has NO ACP documents/care preferences - refer to ACP Clinical Specialist  Reviewed DNR/DNI and patient elects Full Code (Attempt Resuscitation)  Topics discussed: hospitalization preferences and resuscitation preferences  Additional Comments: Per pt her spouse has advanced Dementia and is being placed in a facility for LTC.      Length of Voluntary ACP Conversation in minutes:  <16 minutes (Non-Billable)    Karyna Pope LMSW

## 2021-10-14 NOTE — PROGRESS NOTES
ACUTE PHYSICAL THERAPY GOALS:  (Developed with and agreed upon by patient and/or caregiver.)  1. Ms. Brian De La Garza will perform supine to sit and sit to supine independently in 7 days. 2.  Ms. Brian De La Garza will perform sit to stand and bed to chair with rolling walker independently in 7 days. 3.  Ms. Brian De La Garza will perform gait with rolling walker 150 ft independently in 7 days. PHYSICAL THERAPY ASSESSMENT: Initial Assessment, Daily Note and AM PT Treatment Day # 1      Mackenzie Beckman is a 80 y.o. female   PRIMARY DIAGNOSIS: [address] [R53.81]       Reason for Referral:    ICD-10: Treatment Diagnosis: Repeated Falls (R29.6)  INPATIENT: Payor: [address] / Plan: [address] [place] [address] HMO / Product Type: [employer] Medicare /     ASSESSMENT:     REHAB RECOMMENDATIONS:   Recommendation to date pending progress:  Setting:   Short-term Rehab   or long term care  Equipment:    To Be Determined     PRIOR LEVEL OF FUNCTION:  (Prior to Hospitalization) INITIAL/CURRENT LEVEL OF FUNCTION:  (Most Recently Demonstrated)   Bed Mobility:   Modified Independent  Sit to Stand:   Modified Independent  Transfers:   Modified Independent  Gait/Mobility:   Modified Independent Bed Mobility:   Standby Assistance  Sit to Stand:   Contact Guard Assistance  Transfers:   Minimal Assistance with rolling walker. Gait/Mobility:   Minimal Assistance with rolling walker     ASSESSMENT:  Ms. Brian De La Garza presents anxious to get out of bed. She tells me she went home after rehab for hip fracture and has had several falls. She tells me \"I have a balance problem\"  Her daughter has been there when she has fallen. She does live alone and her daughter comes over each day. I have big concerns about her managing in this environment. She is at high risk for further falls. Today she was mostly contact guard for all activity however 2 times she did lose her balance posteriorly while standing and need min assist for recovery.   At the very least she would benefit from return to rehab. Ms. Viridiana Briggs is functioning below baseline and is therefore appropriate for skilled  PT to maximize her rehab potential.     SUBJECTIVE:   Ms. Viridiana Briggs states, \"I have balance issues. \"    SOCIAL HISTORY/LIVING ENVIRONMENT: Lives alone but daughter comes over every day.   Uses rolling walker      OBJECTIVE:     PAIN: VITAL SIGNS: LINES/DRAINS:   Pre Treatment: Pain Screen  Pain Scale 1: Numeric (0 - 10)  Post Treatment: 4     O2 Device: None (Room air)     GROSS EVALUATION:   Within Functional Limits Abnormal/ Functional Abnormal/ Non-Functional (see comments) Not Tested Comments:   AROM [x] [] [] []    PROM [] [] [] []    Strength [x] [] [] []    Balance [] [] [] []    Posture [] [] [] []    Sensation [x] [] [] []    Coordination [] [] [] []    Tone [] [] [] []    Edema [] [] [] []    Activity Tolerance [] [] [] []     [] [] [] []      COGNITION/  PERCEPTION: Intact Impaired   (see comments) Comments:   Orientation [x] []    Vision [] []    Hearing [] [x] [place]ighini   Command Following [x] []    Safety Awareness [] []     [] []      MOBILITY: I Mod I S SBA CGA Min Mod Max Total  NT x2 Comments:   Bed Mobility    Rolling [] [] [x] [] [] [] [] [] [] [] []    Supine to Sit [] [] [] [x] [] [] [] [] [] [] []    Scooting [] [] [] [x] [] [] [] [] [] [] []    Sit to Supine [] [] [] [] [] [] [] [] [] [x] []    Transfers    Sit to Stand [] [] [] [] [x] [x] [] [] [] [] []    Bed to Chair [] [] [] [] [x] [] [] [] [] [] []    Stand to Sit [] [] [] [] [x] [] [] [] [] [] []    I=Independent, Mod I=Modified Independent, S=Supervision, SBA=Standby Assistance, CGA=Contact Guard Assistance,   Min=Minimal Assistance, Mod=Moderate Assistance, Max=Maximal Assistance, Total=Total Assistance, NT=Not Tested  GAIT: I Mod I S SBA CGA Min Mod Max Total  NT x2 Comments:   Level of Assistance [] [] [] [] [x] [x] [] [] [] [] []    Distance 5 ft     DME Rolling Walker    Gait Quality slow    Weightbearing Status N/A     I=Independent, Mod I=Modified Independent, S=Supervision, SBA=Standby Assistance, CGA=Contact Guard Assistance,   Min=Minimal Assistance, Mod=Moderate Assistance, Max=Maximal Assistance, Total=Total Assistance, NT=Not [facility]       How much difficulty does the patient currently have. .. Unable A Lot A Little None   1. Turning over in bed (including adjusting bedclothes, sheets and blankets)? [] 1   [] 2   [x] 3   [] 4   2. Sitting down on and standing up from a chair with arms ( e.g., wheelchair, bedside commode, etc.)   [] 1   [] 2   [x] 3   [] 4   3. Moving from lying on back to sitting on the side of the bed? [] 1   [] 2   [x] 3   [] 4   How much help from another person does the patient currently need. .. Total A Lot A Little None   4. Moving to and from a bed to a chair (including a wheelchair)? [] 1   [] 2   [x] 3   [] 4   5. Need to walk in hospital room? [] 1   [] 2   [x] 3   [] 4   6. Climbing 3-5 steps with a railing? [] 1   [] 2   [x] 3   [] 4   © 2007, Trustees of [address] [place] [address], under license to [employer]. All rights reserved     Score:  Initial: 18 Most Recent: X (Date: -- )    Interpretation of Tool:  Represents activities that are increasingly more difficult (i.e. Bed mobility, Transfers, Gait). PLAN:   FREQUENCY/DURATION: PT Plan of Care: 3 times/week for duration of hospital stay or until stated goals are met, whichever comes first.    PROBLEM LIST:   (Skilled intervention is medically necessary to address:)  1. Decreased Activity Tolerance  2. Decreased AROM/PROM  3. Decreased Balance  4. Decreased Gait Ability  5. Decreased Strength  6. Decreased Transfer Abilities   INTERVENTIONS PLANNED:   (Benefits and precautions of physical therapy have been discussed with the patient.)  1. Therapeutic Activity  2. Therapeutic Exercise/HEP  3. Neuromuscular Re-education  4. Gait Training  5.  Education TREATMENT:     EVALUATION: Moderate Complexity : (Untimed Charge)    TREATMENT:   ($$ Therapeutic Activity: 8-22 mins    )  Therapeutic Activity (10 Minutes): Therapeutic activity included Supine to Sit, Transfer Training, Ambulation on level ground, Sitting balance  and Standing balance to improve functional Mobility.     TREATMENT GRID:  N/A    AFTER TREATMENT POSITION/PRECAUTIONS:  Chair, Needs within reach and RN notified    INTERDISCIPLINARY COLLABORATION:  RN/PCT and PT/PTA    TOTAL TREATMENT DURATION:  PT Patient Time In/Time Out  Time In: 6247  Time Out: [address], PT

## 2021-10-14 NOTE — PROGRESS NOTES
CM received a call from Sofía Mckeon, the  at [facility] in [place], [place].  Pt's daughter contacted the facility about possible placement for pt. Skylar Solorzano called to request updated records and CM faxed these to her. Bruna's contact info: ph: [phone] and fax: [phone]. CM will wait to for an update from Skylar Solorzano once the records are reviewed. Skylar Solorzano also stated that the admissions nurse, Jong Beltran, is working to schedule a face-time assessment of pt but is unsure with whom Jong Beltran has spoken to set this up. SONY provided Bruna with CM's contact information. As SONY and Bruna were discussing the sad situation in which pt has found herself with no one to care for her 24/7 CM stated that pt has been asking about her , stating she does not know where he is and she believes he is at [facility]. Skylar Solorzano stated that pt's daughter told her Bassam Flynn is at a hospice house\" (did not state which one) and \"is not expected to live through the weekend. \"  CM will not speak of this with pt as CM has not spoken with pt's daughter nor been asked to discuss this with pt. CM will continue to follow and remain available if any needs arise.

## 2021-10-14 NOTE — PROGRESS NOTES
[place] Hospitalist Consult   Admit Date:  10/12/2021 10:00 PM   Name:  Rickey Mcelroy   Age:  80 y.o. Sex:  female  :  1934   MRN:  [id number]   Room:  [facility]    Presenting Complaint: No chief complaint on file. Reason(s) for Admission: Debility [R53.81]     Hospitalists consulted by Jose Bailey MD for: Medical management    History of Presenting Illness:   Rickey Mcelroy is a 80 y.o. female with history of type 2 diabetes mellitus, osteoporosis, hypercalcemia, hypertension, GERD, CVA admitted by Ortho service for Rt hip pain. Patient is 4 weeks status post ORIF with cephalomedullary nail fixation of the right hip by Dr. Bashir Gallardo. Readmitted as not able to take care of herself at home. 10/14: Patient is seen at bedside. Complaining of persistent right leg pain with movement. No other complaint. She is worried about her  as he is sick and she is not able to contact him for 3-4 days    Review of Systems:  10 systems reviewed and negative except as noted in HPI. Assessment & Plan:     History of fracture of right hip:  Debility:  4 weeks status post ORIF with cephalomedullary nail fixation of the right hip by Dr. Bashir Gallardo.   CT pelvis with no clear evidence of hardware complication  PT OT  Rest as per primary    UTI:  UA suggestive of UTI  Urine culture growing gm negative rods  Continue ceftriaxone    Diabetes mellitus  A1c 9.2 on   Patient is on 20 units Lantus daily at home  Continue Lantus 10 units at bedtime and sliding scale  Adjust insulin accordingly    Hypertension:  Continue home meds: Coreg, amlodipine and Aldactone    History of CVA:   No residual deficiency  Continue aspirin and statin    History of Crohn's disease:  Stable    GERD:  Continue famotidine    Depression:  [facility] Problems as of 10/14/2021 Date Reviewed: 10/12/2021        Codes Class Noted - Resolved POA    History of fracture of right hip ICD-10-CM: Z87.81  ICD-9-CM: V15.51  10/13/2021 - Present Yes        * (Principal) Debility ICD-10-CM: R53.81  ICD-9-CM: 799.3  10/12/2021 - Present Unknown        UTI (urinary tract infection) ICD-10-CM: N39.0  ICD-9-CM: 599.0  3/7/2021 - Present Yes        CVA (cerebral vascular accident) [facility]) ICD-10-CM: I63.9  ICD-9-CM: 434.91  10/23/2017 - Present Yes    Overview Signed 10/27/2017 12:11 PM by Virgene Stairs     Probable chronic right parietal occipital CVA containing some petechial hemosiderin, pontine and supratentorial microischemia.              Coronary artery disease involving native coronary artery of native heart with angina pectoris [facility]) ICD-10-CM: I25.119  ICD-9-CM: 414.01, 413.9  5/3/2016 - Present Yes        DM type 2 (diabetes mellitus, type 2) (HCC) (Chronic) ICD-10-CM: E11.9  ICD-9-CM: 250.00  3/16/2013 - Present Yes        Crohn disease ([facility] [address] (Chronic) ICD-10-CM: K50.90  ICD-9-CM: 555.9  3/16/2013 - Present Yes              Past Medical History:   Diagnosis Date    Acute kidney failure 2016    Acute pericarditis     Arthritis     CAP (community acquired pneumonia) 2016    Coronary artery disease     Crohn's disease     Hypercalcemia     Hypercholesterolemia     Hypertension     Lumbar compression fracture     Osteoporosis     Type 2 diabetes mellitus       Past Surgical History:   Procedure Laterality Date    HX ARTHROPLASTY  2016    left elbow    HX HEART CATHETERIZATION  2015    no intervention    HX HYSTERECTOMY  1978    HX KYPHOPLASTY  ~    lumbar      Allergies   Allergen Reactions    Sulfa (Sulfonamide Antibiotics) Rash    Other Medication Unknown (comments)     \"Dypentin\"    Tape [Adhesive] Other (comments)     Blisters and skin tears      Social History     Tobacco Use    Smoking status: Former Smoker     Types: Cigarettes     Start date: 1961     Quit date: 1964     Years since quittin.3    Smokeless tobacco: Never Used    Tobacco comment: 2 packs a wk Substance Use Topics    Alcohol use: Yes     Alcohol/week: 0.0 standard drinks     Comment: rarely      Family History   Problem Relation Age of Onset    Heart Failure Mother     Diabetes Mother     Cancer Father         throat    Alcohol abuse Neg Hx     Arthritis-rheumatoid Neg Hx     Asthma Neg Hx     Bleeding Prob Neg Hx     Elevated Lipids Neg Hx     Headache Neg Hx     Hypertension Neg Hx     Lung Disease Neg Hx     Migraines Neg Hx     Psychiatric Disorder Neg Hx     Stroke Neg Hx     Mental Retardation Neg Hx     Thyroid Disease Neg Hx       Family history reviewed and negative unless otherwise noted above. Immunization History   Administered Date(s) Administered    Covid-19, MODERNA, Mrna, Lnp-s, Pf, 100mcg/0.5mL 04/20/2021, 05/18/2021    Influenza Vaccine 10/01/2015, 10/17/2016    TB Skin Test (PPD) Intradermal 03/17/2013, 03/04/2021, 09/19/2021, 10/13/2021       Objective:     Patient Vitals for the past 24 hrs:   Temp Pulse Resp BP SpO2   10/14/21 1109 98.7 °F (37.1 °C) 71 18 112/63 94 %   10/14/21 0724 98.1 °F (36.7 °C) 77 18 (!) 156/67 98 %   10/14/21 0545 -- -- -- 123/69 --   10/14/21 0355 98.3 °F (36.8 °C) 64 18 (!) 140/44 93 %   10/13/21 2351 98.7 °F (37.1 °C) 69 20 (!) 151/58 99 %   10/13/21 1958 97.7 °F (36.5 °C) 66 18 (!) 118/56 98 %   10/13/21 1607 98.1 °F (36.7 °C) 65 18 (!) 127/56 96 %     Oxygen Therapy  O2 Sat (%): 94 % (10/14/21 1109)  O2 Device: None (Room air) (10/14/21 8008)    Estimated body mass index is 25.1 kg/m² as calculated from the following:    Height as of this encounter: 4' 9\" (1.448 m). Weight as of this encounter: 52.6 kg (116 lb). Intake/Output Summary (Last 24 hours) at 10/14/2021 1340  Last data filed at 10/14/2021 1247  Gross per 24 hour   Intake 290 ml   Output 925 ml   Net -635 ml         Physical Exam:    Blood pressure 112/63, pulse 71, temperature 98.7 °F (37.1 °C), resp.  rate 18, height 4' 9\" (1.448 m), weight 52.6 kg (116 lb), last menstrual period 09/08/2008, SpO2 94 %. General:    Well nourished. No overt distress  Head:  Normocephalic, atraumatic  Eyes:  Sclerae appear normal.  Pupils equally round. ENT:  Nares appear normal, no drainage. Moist oral mucosa  Neck:  No restricted ROM. Trachea midline   CV:   RRR. No m/r/g. No jugular venous distension. Lungs:   CTAB. No wheezing, rhonchi, or rales. Respirations even, unlabored  Abdomen: Bowel sounds present. Soft, nontender, nondistended. Extremities: No cyanosis or clubbing. No edema  Skin:     No rashes and normal coloration. Warm and dry. Neuro:  CN II-XII grossly intact. Sensation intact. A&Ox3  Psych:  Normal mood and affect. I have reviewed ordered lab tests and independently visualized imaging below:    Recent Labs:  Recent Results (from the past 48 hour(s))   METABOLIC PANEL, BASIC    Collection Time: 10/12/21 11:51 PM   Result Value Ref Range    Sodium 140 136 - 145 mmol/L    Potassium 4.6 3.5 - 5.1 mmol/L    Chloride 107 98 - 107 mmol/L    CO2 28 21 - 32 mmol/L    Anion gap 5 (L) 7 - 16 mmol/L    Glucose 247 (H) 65 - 100 mg/dL    BUN 15 8 - 23 MG/DL    Creatinine 1.07 (H) 0.6 - 1.0 MG/DL    GFR est AA >60 >60 ml/min/1.73m2    GFR est non-AA 52 (L) >60 ml/min/1.73m2    Calcium 10.0 8.3 - 10.4 MG/DL   CBC WITH AUTOMATED DIFF    Collection Time: 10/12/21 11:51 PM   Result Value Ref Range    WBC 10.0 4.3 - 11.1 K/uL    RBC 3.95 (L) 4.05 - 5.2 M/uL    HGB 11.3 (L) 11.7 - 15.4 g/dL    HCT 36.7 35.8 - 46.3 %    MCV 92.9 79.6 - 97.8 FL    MCH 28.6 26.1 - 32.9 PG    MCHC 30.8 (L) 31.4 - 35.0 g/dL    RDW 14.1 11.9 - 14.6 %    PLATELET 936 993 - 341 K/uL    MPV 9.7 9.4 - 12.3 FL    ABSOLUTE NRBC 0.00 0.0 - 0.2 K/uL    DF AUTOMATED      NEUTROPHILS 69 43 - 78 %    LYMPHOCYTES 19 13 - 44 %    MONOCYTES 6 4.0 - 12.0 %    EOSINOPHILS 3 0.5 - 7.8 %    BASOPHILS 1 0.0 - 2.0 %    IMMATURE GRANULOCYTES 2 0.0 - 5.0 %    ABS. NEUTROPHILS 6.9 1.7 - 8.2 K/UL    ABS.  LYMPHOCYTES 1.9 0.5 - 4.6 K/UL    ABS. MONOCYTES 0.6 0.1 - 1.3 K/UL    ABS. EOSINOPHILS 0.3 0.0 - 0.8 K/UL    ABS. BASOPHILS 0.1 0.0 - 0.2 K/UL    ABS. IMM.  GRANS. 0.2 0.0 - 0.5 K/UL   URINALYSIS W/ RFLX MICROSCOPIC    Collection Time: 10/13/21 10:28 AM   Result Value Ref Range    Color YELLOW      Appearance CLOUDY      Specific gravity 1.017 1.001 - 1.023      pH (UA) 6.0 5.0 - 9.0      Protein 30 (A) NEG mg/dL    Glucose Negative mg/dL    Ketone Negative NEG mg/dL    Bilirubin Negative NEG      Blood TRACE (A) NEG      Urobilinogen 0.2 0.2 - 1.0 EU/dL    Nitrites Negative NEG      Leukocyte Esterase LARGE (A) NEG      WBC 20-50 0 /hpf    RBC 5-10 0 /hpf    Epithelial cells 0 0 /hpf    Bacteria 4+ (H) 0 /hpf    Casts 0-3 0 /lpf   CULTURE, URINE    Collection Time: 10/13/21 10:45 AM    Specimen: Clean catch; Urine   Result Value Ref Range    Special Requests: NO SPECIAL REQUESTS      Culture result: (A)       >100,000 COLONIES/mL GRAM NEGATIVE RODS SUBCULTURE IN PROGRESS    Culture result:        10,000 to 50,000 COLONIES/mL MIXED SKIN ANNETTE ISOLATED   GLUCOSE, POC    Collection Time: 10/13/21 11:12 AM   Result Value Ref Range    Glucose (POC) 199 (H) 65 - 100 mg/dL    Performed by [employer]    GLUCOSE, POC    Collection Time: 10/13/21  5:21 PM   Result Value Ref Range    Glucose (POC) 148 (H) 65 - 100 mg/dL    Performed by [employer]    GLUCOSE, POC    Collection Time: 10/13/21  9:20 PM   Result Value Ref Range    Glucose (POC) 226 (H) 65 - 100 mg/dL    Performed by "[employer]elissa    GLUCOSE, POC    Collection Time: 10/14/21  6:07 AM   Result Value Ref Range    Glucose (POC) 141 (H) 65 - 100 mg/dL    Performed by [employer]    CBC WITH AUTOMATED DIFF    Collection Time: 10/14/21 10:28 AM   Result Value Ref Range    WBC 9.4 4.3 - 11.1 K/uL    RBC 4.18 4.05 - 5.2 M/uL    HGB 11.8 11.7 - 15.4 g/dL    HCT 39.0 35.8 - 46.3 %    MCV 93.3 79.6 - 97.8 FL    MCH 28.2 26.1 - 32.9 PG    MCHC 30.3 (L) 31.4 - 35.0 g/dL    RDW 14.0 11.9 - 14.6 %    PLATELET 129 885 - 534 K/uL    MPV 10.0 9.4 - 12.3 FL    ABSOLUTE NRBC 0.00 0.0 - 0.2 K/uL    DF AUTOMATED      NEUTROPHILS 74 43 - 78 %    LYMPHOCYTES 14 13 - 44 %    MONOCYTES 5 4.0 - 12.0 %    EOSINOPHILS 4 0.5 - 7.8 %    BASOPHILS 1 0.0 - 2.0 %    IMMATURE GRANULOCYTES 2 0.0 - 5.0 %    ABS. NEUTROPHILS 6.9 1.7 - 8.2 K/UL    ABS. LYMPHOCYTES 1.3 0.5 - 4.6 K/UL    ABS. MONOCYTES 0.5 0.1 - 1.3 K/UL    ABS. EOSINOPHILS 0.3 0.0 - 0.8 K/UL    ABS. BASOPHILS 0.1 0.0 - 0.2 K/UL    ABS. IMM. GRANS. 0.2 0.0 - 0.5 K/UL   METABOLIC PANEL, BASIC    Collection Time: 10/14/21 10:28 AM   Result Value Ref Range    Sodium 137 136 - 145 mmol/L    Potassium 4.2 3.5 - 5.1 mmol/L    Chloride 105 98 - 107 mmol/L    CO2 25 21 - 32 mmol/L    Anion gap 7 7 - 16 mmol/L    Glucose 226 (H) 65 - 100 mg/dL    BUN 13 8 - 23 MG/DL    Creatinine 0.95 0.6 - 1.0 MG/DL    GFR est AA >60 >60 ml/min/1.73m2    GFR est non-AA 59 (L) >60 ml/min/1.73m2    Calcium 10.2 8.3 - 10.4 MG/DL   GLUCOSE, POC    Collection Time: 10/14/21 11:08 AM   Result Value Ref Range    Glucose (POC) 208 (H) 65 - 100 mg/dL    Performed by [employer]        All Micro Results     Procedure Component Value Units Date/Time    CULTURE, URINE [[id number]]  (Abnormal) Collected: 10/13/21 1045    Order Status: Completed Specimen: Urine from Clean catch Updated: 10/14/21 0701     Special Requests: NO SPECIAL REQUESTS        Culture result:       >100,000 COLONIES/mL GRAM NEGATIVE RODS SUBCULTURE IN PROGRESS                  10,000 to 50,000 COLONIES/mL MIXED SKIN ANNETTE ISOLATED                Other Studies:  No results found.     Current Meds:  Current Facility-Administered Medications   Medication Dose Route Frequency    insulin lispro (HUMALOG) injection   SubCUTAneous AC&HS    cefTRIAXone (ROCEPHIN) 1 g in 0.9% sodium chloride (MBP/ADV) 50 mL MBP  1 g IntraVENous Q24H    amLODIPine (NORVASC) tablet 5 mg  5 mg Oral DAILY    atorvastatin (LIPITOR) tablet 40 mg  40 mg Oral DAILY    carvediloL (COREG) tablet 12.5 mg  12.5 mg Oral BID WITH MEALS    escitalopram oxalate (LEXAPRO) tablet 10 mg  10 mg Oral DAILY    famotidine (PEPCID) tablet 20 mg  20 mg Oral DAILY    insulin glargine (LANTUS) injection 10 Units  10 Units SubCUTAneous QHS    rOPINIRole (REQUIP) tablet 1 mg  1 mg Oral BID    spironolactone (ALDACTONE) tablet 25 mg  25 mg Oral DAILY    aspirin chewable tablet 81 mg  81 mg Oral DAILY    sodium chloride (NS) flush 5-40 mL  5-40 mL IntraVENous Q8H    sodium chloride (NS) flush 5-40 mL  5-40 mL IntraVENous PRN    acetaminophen (TYLENOL) tablet 650 mg  650 mg Oral Q6H PRN    Or    acetaminophen (TYLENOL) suppository 650 mg  650 mg Rectal Q6H PRN    polyethylene glycol (MIRALAX) packet 17 g  17 g Oral DAILY PRN    promethazine (PHENERGAN) tablet 12.5 mg  12.5 mg Oral Q6H PRN    Or    ondansetron (ZOFRAN) injection 4 mg  4 mg IntraVENous Q6H PRN    HYDROmorphone (DILAUDID) injection 0.5 mg  0.5 mg IntraVENous Q4H PRN    oxyCODONE IR (ROXICODONE) tablet 5 mg  5 mg Oral Q4H PRN       Signed:  Dale Barbour MD    Part of this note may have been written by using a voice dictation software. The note has been proof read but may still contain some grammatical/other typographical errors.

## 2021-10-14 NOTE — PROGRESS NOTES
Problem: Falls - Risk of  Goal: *Absence of Falls  Description: Document Thora Bare Fall Risk and appropriate interventions in the flowsheet. Outcome: Progressing Towards Goal  Note: Fall Risk Interventions: PT/OT working with pt. Does better medicated prior to activity. Pain with movement only. Problem: Pressure Injury - Risk of  Goal: *Prevention of pressure injury  Description: Document Joshua Scale and appropriate interventions in the flowsheet. Outcome: Progressing Towards Goal  Note: Pressure Injury Interventions: OOB to chair. Repositioning. Activity Interventions: PT/OT evaluation    Mobility Interventions: PT/OT evaluation    Planning discharge to SNF.

## 2021-10-14 NOTE — PROGRESS NOTES
October 14, 2021         Admission Day 2      Admit Date: 10/12/2021  Admit Diagnosis: Debility [R53.81]       Principle Problem: Debility. Subjective: Doing ok, No new complaints, No SOB, No Chest Pain, No Nausea or Vomiting     Objective:   Vital Signs are Stable, No Acute Distress, Alert,  Neurovascular exam is normal.     Assessment / Plan :  Patient Active Problem List   Diagnosis Code    DM type 2 (diabetes mellitus, type 2) ([facility] [address] E11.9    Essential hypertension with goal blood pressure less than 140/90 I10    Crohn disease ([facility] [address] K50.90    Pericarditis, acute I30.9    Precordial pain R07.2    Nonspecific abnormal electrocardiogram (ECG) (EKG) R94.31    Coronary artery disease involving native coronary artery of native heart with angina pectoris (HCC) I25.119    Dyslipidemia E78.5    CAP (community acquired pneumonia) J18.9    LIZ (acute kidney injury) ([facility] [address] N17.9    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    Electrolyte abnormality E87.8    History of artificial joint Z96.60    History of implantation of joint prosthesis of elbow Z96.629    Supracondylar fracture of humerus S42.413A    Age-related osteoporosis with current pathological fracture with routine healing M80.00XD    Cholecystitis with cholelithiasis K80.10    CVA (cerebral vascular accident) ([facility] [address] I63.9    Dysuria R30.0    Vaginal candida B37.3    Polyneuropathy G62.9    Type 2 diabetes with nephropathy ([facility] [address] E11.21    Diabetic polyneuropathy associated with type 2 diabetes mellitus ([facility] [address] E11.42    Fall W19. Dewey Lowery Encounter for medication management S74.096    Closed fracture of left hip ([facility] [address] S72.002A    Hip fracture requiring operative repair ([facility] [address] S72.009A    Acute cystitis without hematuria N30.00    UTI (urinary tract infection) N39.0    Acute blood loss anemia D62    Intertrochanteric fracture of left femur, closed, initial encounter ([facility] [address] S72.142A    Debility R53.81    History of fracture of right hip Z87.81      Patient Vitals for the past 8 hrs:   BP Temp Pulse Resp SpO2   10/14/21 0724 (!) 156/67 98.1 °F (36.7 °C) 77 18 98 %   10/14/21 0545 123/69       10/14/21 0355 (!) 140/44 98.3 °F (36.8 °C) 64 18 93 %    Temp (24hrs), Av.1 °F (36.7 °C), Min:97.7 °F (36.5 °C), Max:98.7 °F (37.1 °C)    Body mass index is 25.1 kg/m².     Lab Results   Component Value Date/Time    HGB 11.3 (L) 10/12/2021 11:51 PM          Debility  S/P right hip gamma nail: readmission due to debility as patient is unable to care for her self at home  Medical mgmt per hopsitalist   Continue PT   Discharge Disp: social work for assistance with longterm SNF placement      Signed By: MANOHAR Damico  10/14/2021,  9:48 AM

## 2021-10-15 LAB
ANION GAP SERPL CALC-SCNC: 6 MMOL/L (ref 7–16)
BASOPHILS # BLD: 0.1 K/UL (ref 0–0.2)
BASOPHILS NFR BLD: 1 % (ref 0–2)
BUN SERPL-MCNC: 14 MG/DL (ref 8–23)
CALCIUM SERPL-MCNC: 9.9 MG/DL (ref 8.3–10.4)
CHLORIDE SERPL-SCNC: 105 MMOL/L (ref 98–107)
CO2 SERPL-SCNC: 27 MMOL/L (ref 21–32)
CREAT SERPL-MCNC: 0.82 MG/DL (ref 0.6–1)
DIFFERENTIAL METHOD BLD: ABNORMAL
EOSINOPHIL # BLD: 0.4 K/UL (ref 0–0.8)
EOSINOPHIL NFR BLD: 4 % (ref 0.5–7.8)
ERYTHROCYTE [DISTWIDTH] IN BLOOD BY AUTOMATED COUNT: 13.7 % (ref 11.9–14.6)
GLUCOSE BLD STRIP.AUTO-MCNC: 112 MG/DL (ref 65–100)
GLUCOSE BLD STRIP.AUTO-MCNC: 171 MG/DL (ref 65–100)
GLUCOSE BLD STRIP.AUTO-MCNC: 227 MG/DL (ref 65–100)
GLUCOSE BLD STRIP.AUTO-MCNC: 243 MG/DL (ref 65–100)
GLUCOSE SERPL-MCNC: 207 MG/DL (ref 65–100)
HCT VFR BLD AUTO: 36.6 % (ref 35.8–46.3)
HGB BLD-MCNC: 11 G/DL (ref 11.7–15.4)
IMM GRANULOCYTES # BLD AUTO: 0.2 K/UL (ref 0–0.5)
IMM GRANULOCYTES NFR BLD AUTO: 2 % (ref 0–5)
LYMPHOCYTES # BLD: 1.6 K/UL (ref 0.5–4.6)
LYMPHOCYTES NFR BLD: 18 % (ref 13–44)
MCH RBC QN AUTO: 27.4 PG (ref 26.1–32.9)
MCHC RBC AUTO-ENTMCNC: 30.1 G/DL (ref 31.4–35)
MCV RBC AUTO: 91.3 FL (ref 79.6–97.8)
MM INDURATION POC: 0 MM (ref 0–5)
MONOCYTES # BLD: 0.5 K/UL (ref 0.1–1.3)
MONOCYTES NFR BLD: 6 % (ref 4–12)
NEUTS SEG # BLD: 6.2 K/UL (ref 1.7–8.2)
NEUTS SEG NFR BLD: 69 % (ref 43–78)
NRBC # BLD: 0 K/UL (ref 0–0.2)
PLATELET # BLD AUTO: 295 K/UL (ref 150–450)
PMV BLD AUTO: 9.8 FL (ref 9.4–12.3)
POTASSIUM SERPL-SCNC: 3.9 MMOL/L (ref 3.5–5.1)
PPD POC: NEGATIVE NEGATIVE
RBC # BLD AUTO: 4.01 M/UL (ref 4.05–5.2)
SARS-COV-2, COV2: NORMAL
SERVICE CMNT-IMP: ABNORMAL
SODIUM SERPL-SCNC: 138 MMOL/L (ref 136–145)
WBC # BLD AUTO: 9 K/UL (ref 4.3–11.1)

## 2021-10-15 PROCEDURE — 74011250637 HC RX REV CODE- 250/637: Performed by: NURSE PRACTITIONER

## 2021-10-15 PROCEDURE — 97535 SELF CARE MNGMENT TRAINING: CPT

## 2021-10-15 PROCEDURE — 74011250636 HC RX REV CODE- 250/636: Performed by: FAMILY MEDICINE

## 2021-10-15 PROCEDURE — 80048 BASIC METABOLIC PNL TOTAL CA: CPT

## 2021-10-15 PROCEDURE — U0005 INFEC AGEN DETEC AMPLI PROBE: HCPCS

## 2021-10-15 PROCEDURE — 74011000258 HC RX REV CODE- 258: Performed by: FAMILY MEDICINE

## 2021-10-15 PROCEDURE — 97530 THERAPEUTIC ACTIVITIES: CPT

## 2021-10-15 PROCEDURE — 65270000029 HC RM PRIVATE

## 2021-10-15 PROCEDURE — 74011250637 HC RX REV CODE- 250/637: Performed by: FAMILY MEDICINE

## 2021-10-15 PROCEDURE — 82962 GLUCOSE BLOOD TEST: CPT

## 2021-10-15 PROCEDURE — 36415 COLL VENOUS BLD VENIPUNCTURE: CPT

## 2021-10-15 PROCEDURE — 85025 COMPLETE CBC W/AUTO DIFF WBC: CPT

## 2021-10-15 PROCEDURE — 74011636637 HC RX REV CODE- 636/637: Performed by: FAMILY MEDICINE

## 2021-10-15 RX ORDER — INSULIN GLARGINE 100 [IU]/ML
12 INJECTION, SOLUTION SUBCUTANEOUS
Status: DISCONTINUED | OUTPATIENT
Start: 2021-10-15 | End: 2021-10-18

## 2021-10-15 RX ADMIN — OXYCODONE 5 MG: 5 TABLET ORAL at 15:20

## 2021-10-15 RX ADMIN — INSULIN LISPRO 4 UNITS: 100 INJECTION, SOLUTION INTRAVENOUS; SUBCUTANEOUS at 08:10

## 2021-10-15 RX ADMIN — ASPIRIN 81 MG: 81 TABLET, CHEWABLE ORAL at 08:07

## 2021-10-15 RX ADMIN — CARVEDILOL 12.5 MG: 12.5 TABLET, FILM COATED ORAL at 17:53

## 2021-10-15 RX ADMIN — SODIUM CHLORIDE 10 ML: 9 INJECTION, SOLUTION INTRAMUSCULAR; INTRAVENOUS; SUBCUTANEOUS at 06:30

## 2021-10-15 RX ADMIN — CARVEDILOL 12.5 MG: 12.5 TABLET, FILM COATED ORAL at 08:06

## 2021-10-15 RX ADMIN — SPIRONOLACTONE 25 MG: 25 TABLET ORAL at 08:07

## 2021-10-15 RX ADMIN — OXYCODONE 5 MG: 5 TABLET ORAL at 08:07

## 2021-10-15 RX ADMIN — INSULIN LISPRO 4 UNITS: 100 INJECTION, SOLUTION INTRAVENOUS; SUBCUTANEOUS at 17:56

## 2021-10-15 RX ADMIN — FAMOTIDINE 20 MG: 20 TABLET ORAL at 08:07

## 2021-10-15 RX ADMIN — ROPINIROLE HYDROCHLORIDE 1 MG: 0.5 TABLET, FILM COATED ORAL at 17:53

## 2021-10-15 RX ADMIN — AMLODIPINE BESYLATE 5 MG: 5 TABLET ORAL at 08:07

## 2021-10-15 RX ADMIN — INSULIN GLARGINE 12 UNITS: 100 INJECTION, SOLUTION SUBCUTANEOUS at 21:39

## 2021-10-15 RX ADMIN — ATORVASTATIN CALCIUM 40 MG: 40 TABLET, FILM COATED ORAL at 08:07

## 2021-10-15 RX ADMIN — ROPINIROLE HYDROCHLORIDE 1 MG: 0.5 TABLET, FILM COATED ORAL at 08:06

## 2021-10-15 RX ADMIN — SODIUM CHLORIDE 10 ML: 9 INJECTION, SOLUTION INTRAMUSCULAR; INTRAVENOUS; SUBCUTANEOUS at 22:00

## 2021-10-15 RX ADMIN — INSULIN LISPRO 2 UNITS: 100 INJECTION, SOLUTION INTRAVENOUS; SUBCUTANEOUS at 11:30

## 2021-10-15 RX ADMIN — ESCITALOPRAM OXALATE 10 MG: 10 TABLET ORAL at 08:07

## 2021-10-15 RX ADMIN — CEFTRIAXONE 1 G: 1 INJECTION, POWDER, FOR SOLUTION INTRAMUSCULAR; INTRAVENOUS at 14:11

## 2021-10-15 NOTE — PROGRESS NOTES
ACUTE OT GOALS:  (Developed with and agreed upon by patient and/or caregiver.)  1. Pt will toilet with min A  2. Pt will complete functional mobility for ADLs with min A  3. Pt will complete lower body dressing with min A using AE as needed  4. Pt will complete UB bathing and dressing with set up using AE as needed  5. Pt will demonstrate independence with HEP to promote increased BUE strength and functional use for ADLs  6. Pt will tolerate 23 minutes functional activity with min or fewer rest breaks to promote increased endurance for ADLs  7. Pt will independently demonstrate/ verbalize 2+ energy conservation techniques to promote increased endurance for ADLs        Timeframe: 7 days    OCCUPATIONAL THERAPY: Daily Note OT Treatment Day # 2    Priscilla Shabazz is a 80 y.o. female   PRIMARY DIAGNOSIS: Debility  Debility [R53.81]       Payor: Inessa Kovacs / Plan: [address] [place] [id number] HMO / Product Type: Managed Care Medicare /   ASSESSMENT:     REHAB RECOMMENDATIONS: CURRENT LEVEL OF FUNCTION:  (Most Recently Demonstrated)   Recommendation to date pending progress:  Setting:   Short-term Rehab  Equipment:    To Be Determined Bathing:   Minimal Assistance  Dressing:   Maximal Assistance  Feeding/Grooming:   Contact Guard Assistance  Toileting:   Moderate Assistance brief change/pericare  Functional Mobility:   Minimal Assistance- CGA     ASSESSMENT:  Ms. Gely Diaz is doing well today. Pt greeted supine in bed. Assisted pt in calling sister-in-law. Pt completed functional mobility and sit <> stand transfers with min/CGA today. Pt transferred to shower chair and completed bathing with min A for LB. Fair balance noted while completing pericare with CGA for balance. Pt completed grooming this session with CGA also for balance while standing at the sink. Max A for dressing secondary to impaired direction following. Pt completed 3 sit <> stand transfers for brief management with decreased assistance needed (CGA).  Pt left in recliner with all needs within reach. Pt made good progress towards goals this session. Would continue to benefit from skilled OT during stay. SUBJECTIVE:   Ms. Deloris Rand states, \"This is my dog. \"    SOCIAL HISTORY/LIVING ENVIRONMENT:   Support Systems: Spouse/Significant Other, Child(eulalio)    OBJECTIVE:     PAIN: VITAL SIGNS: LINES/DRAINS:   Pre Treatment: Pain Screen  Pain Scale 1: Numeric (0 - 10)  Pain Intensity 1: 0  Post Treatment: 0   N/A  O2 Device: None (Room air)     ACTIVITIES OF DAILY LIVING: I Mod I S SBA CGA Min Mod Max Total NT Comments   BASIC ADLs:              Bathing/ Showering [] [] [] [] [] [x] [] [] [] []    Toileting [] [] [] [] [] [] [x] [] [] []    Dressing [] [] [] [] [] [] [] [x] [] []    Feeding [] [] [] [] [] [] [] [] [] [x]    Grooming [] [] [] [] [x] [] [] [] [] []    Personal Device Care [] [] [] [] [] [] [] [] [] [x]    Functional Mobility [] [] [] [] [x] [x] [] [] [] []    I=Independent, Mod I=Modified Independent, S=Supervision, SBA=Standby Assistance, CGA=Contact Guard Assistance,   Min=Minimal Assistance, Mod=Moderate Assistance, Max=Maximal Assistance, Total=Total Assistance, NT=Not Tested    MOBILITY: I Mod I S SBA CGA Min Mod Max Total  NT x2 Comments:   Supine to sit [] [x] [] [] [] [] [] [] [] [] []    Sit to supine [] [] [] [] [] [] [] [] [] [x] []    Sit to stand [] [] [] [] [] [x] [] [] [] [] []    Bed to chair [] [] [] [] [x] [x] [] [] [] [] []    I=Independent, Mod I=Modified Independent, S=Supervision, SBA=Standby Assistance, CGA=Contact Guard Assistance,   Min=Minimal Assistance, Mod=Moderate Assistance, Max=Maximal Assistance, Total=Total Assistance, NT=Not Tested    BALANCE: Good Fair+ Fair Fair- Poor NT Comments   Sitting Static [] [x] [] [] [] []    Sitting Dynamic [] [] [x] [] [] []              Standing Static [] [] [x] [] [] []    Standing Dynamic [] [] [x] [] [] []      PLAN:   FREQUENCY/DURATION: OT Plan of Care: 3 times/week for duration of hospital stay or until stated goals are met, whichever comes first.    TREATMENT:   TREATMENT:   (     )  Therapeutic Activity (10 Minutes): Therapeutic activity included Supine to Sit, Scooting, Ambulation on level ground, Sitting balance  and Standing balance to improve functional Mobility, Strength and Activity tolerance. Self Care (50 Minutes): Self care including Upper Body Bathing, Lower Body Bathing, Toileting, Upper Body Dressing, Lower Body Dressing and Grooming to increase independence and decrease level of assistance required.     TREATMENT GRID:  N/A    AFTER TREATMENT POSITION/PRECAUTIONS:  Chair and Needs within reach    INTERDISCIPLINARY COLLABORATION:  RN/PCT and OT/YOUNG    TOTAL TREATMENT DURATION:  OT Patient Time In/Time Out  Time In: 0925  Time Out: 2109 Krista Cruz, YESSENIAS

## 2021-10-15 NOTE — PROGRESS NOTES
's initial visit. Ms. Severa Coombes was on the phone and she voiced no needs for spiritual care. Chaplains remain available.         Ervin Carrington 68  Board Certified

## 2021-10-15 NOTE — PROGRESS NOTES
[facility] Hospitalist Consult   Admit Date:  10/12/2021 10:00 PM   Name:  Mackenzie Beckman   Age:  80 y.o. Sex:  female  :  1934   MRN:  [id number]   [place]  [facility] [place]    Presenting Complaint: No chief complaint on file. Reason(s) for Admission: Debility [R53.81]     Hospitalists consulted by Calixto Vincent MD for: Medical management    History of Presenting Illness:   Mackenzie Beckman is a 80 y.o. female with history of type 2 diabetes mellitus, osteoporosis, hypercalcemia, hypertension, GERD, CVA admitted by Ortho service for Rt hip pain. Patient is 4 weeks status post ORIF with cephalomedullary nail fixation of the right hip by Dr. Katherine Centeno. Readmitted as not able to take care of herself at home. 10/15: Patient is seen at bedside. No active complaint except persistent right lower extremity pain. Denies chest pain, palpitation, nausea, vomiting or abdominal pain. Review of Systems:  10 systems reviewed and negative except as noted in HPI. Assessment & Plan:     History of fracture of right hip:  Debility:  4 weeks status post ORIF with cephalomedullary nail fixation of the right hip by Dr. Katherine Centeno. CT pelvis with no clear evidence of hardware complication  PT OT  Rest as per primary    UTI:  UA suggestive of UTI  Urine culture growing gm negative rods  Continue ceftriaxone    Diabetes mellitus  A1c 9.2 on   Patient is on 20 units Lantus daily at home  10/15: Increase Lantus to 12 units. Continue sliding scale.   Adjust insulin accordingly    Hypertension:  Continue home meds: Coreg, amlodipine and Aldactone    History of CVA:   No residual deficiency  Continue aspirin and statin    History of Crohn's disease:  Stable    GERD:  Continue famotidine    Depression:  [facility] Problems as of 10/15/2021 Date Reviewed: 10/12/2021        Codes Class Noted - Resolved POA    History of fracture of right hip ICD-10-CM: Z87.81  ICD-9-CM: V15.51  10/13/2021 - Present Yes * (Principal) Debility ICD-10-CM: R53.81  ICD-9-CM: 799.3  10/12/2021 - Present Unknown        UTI (urinary tract infection) ICD-10-CM: N39.0  ICD-9-CM: 599.0  3/7/2021 - Present Yes        CVA (cerebral vascular accident) [facility]) ICD-10-CM: I63.9  ICD-9-CM: 434.91  10/23/2017 - Present Yes    Overview Signed 10/27/2017 12:11 PM by Cara French     Probable chronic right parietal occipital CVA containing some petechial hemosiderin, pontine and supratentorial microischemia.              Coronary artery disease involving native coronary artery of native heart with angina pectoris [facility]) ICD-10-CM: I25.119  ICD-9-CM: 414.01, 413.9  5/3/2016 - Present Yes        DM type 2 (diabetes mellitus, type 2) (HCC) (Chronic) ICD-10-CM: E11.9  ICD-9-CM: 250.00  3/16/2013 - Present Yes        Crohn disease ([facility] [address] (Chronic) ICD-10-CM: K50.90  ICD-9-CM: 555.9  3/16/2013 - Present Yes              Past Medical History:   Diagnosis Date    Acute kidney failure 2016    Acute pericarditis     Arthritis     CAP (community acquired pneumonia) 2016    Coronary artery disease     Crohn's disease     Hypercalcemia     Hypercholesterolemia     Hypertension     Lumbar compression fracture     Osteoporosis     Type 2 diabetes mellitus       Past Surgical History:   Procedure Laterality Date    HX ARTHROPLASTY  2016    left elbow    HX HEART CATHETERIZATION  2015    no intervention    HX HYSTERECTOMY      HX KYPHOPLASTY  ~    lumbar      Allergies   Allergen Reactions    Sulfa (Sulfonamide Antibiotics) Rash    Other Medication Unknown (comments)     \"Dypentin\"    Tape [Adhesive] Other (comments)     Blisters and skin tears      Social History     Tobacco Use    Smoking status: Former Smoker     Types: Cigarettes     Start date: 1961     Quit date: 1964     Years since quittin.3    Smokeless tobacco: Never Used    Tobacco comment: 2 packs a wk   Substance Use Topics    Alcohol use: Yes     Alcohol/week: 0.0 standard drinks     Comment: rarely      Family History   Problem Relation Age of Onset    Heart Failure Mother     Diabetes Mother     Cancer Father         throat    Alcohol abuse Neg Hx     Arthritis-rheumatoid Neg Hx     Asthma Neg Hx     Bleeding Prob Neg Hx     Elevated Lipids Neg Hx     Headache Neg Hx     Hypertension Neg Hx     Lung Disease Neg Hx     Migraines Neg Hx     Psychiatric Disorder Neg Hx     Stroke Neg Hx     Mental Retardation Neg Hx     Thyroid Disease Neg Hx       Family history reviewed and negative unless otherwise noted above. Immunization History   Administered Date(s) Administered    Covid-19, MODERNA, Mrna, Lnp-s, Pf, 100mcg/0.5mL 04/20/2021, 05/18/2021    Influenza Vaccine 10/01/2015, 10/17/2016    TB Skin Test (PPD) Intradermal 03/17/2013, 03/04/2021, 09/19/2021, 10/13/2021       Objective:     Patient Vitals for the past 24 hrs:   Temp Pulse Resp BP SpO2   10/15/21 1050 98.4 °F (36.9 °C) 65 18 (!) 126/46 99 %   10/15/21 0706 98.4 °F (36.9 °C) 78 18 (!) 137/52 95 %   10/15/21 0349 97.9 °F (36.6 °C) (!) 103 16 104/74 96 %   10/14/21 2325 98.1 °F (36.7 °C) 84 16 (!) 118/43 95 %   10/14/21 2010 98.2 °F (36.8 °C) 99 18 (!) 133/41 99 %   10/14/21 1618 -- -- -- (!) 127/46 --   10/14/21 1617 98.4 °F (36.9 °C) 69 20 (!) 123/46 96 %     Oxygen Therapy  O2 Sat (%): 99 % (10/15/21 1050)  O2 Device: None (Room air) (10/14/21 1915)    Estimated body mass index is 25.1 kg/m² as calculated from the following:    Height as of this encounter: 4' 9\" (1.448 m). Weight as of this encounter: 52.6 kg (116 lb). No intake or output data in the 24 hours ending 10/15/21 1335      Physical Exam:    Blood pressure (!) 126/46, pulse 65, temperature 98.4 °F (36.9 °C), resp. rate 18, height 4' 9\" (1.448 m), weight 52.6 kg (116 lb), last menstrual period 09/08/2008, SpO2 99 %. General:    Well nourished.   No overt distress  Head:  Normocephalic, atraumatic  Eyes:  Sclerae appear normal.  Pupils equally round. ENT:  Nares appear normal, no drainage. Moist oral mucosa  Neck:  No restricted ROM. Trachea midline   CV:   RRR. No m/r/g. No jugular venous distension. Lungs:   CTAB. No wheezing, rhonchi, or rales. Respirations even, unlabored  Abdomen: Bowel sounds present. Soft, nontender, nondistended. Extremities: No cyanosis or clubbing. No edema  Skin:     No rashes and normal coloration. Warm and dry. Neuro:  CN II-XII grossly intact. Sensation intact. A&Ox3  Psych:  Normal mood and affect. I have reviewed ordered lab tests and independently visualized imaging below:    Recent Labs:  Recent Results (from the past 48 hour(s))   GLUCOSE, POC    Collection Time: 10/13/21  5:21 PM   Result Value Ref Range    Glucose (POC) 148 (H) 65 - 100 mg/dL    Performed by [address] [place] [address] Way, POC    Collection Time: 10/13/21  9:20 PM   Result Value Ref Range    Glucose (POC) 226 (H) 65 - 100 mg/dL    Performed by [employer]    GLUCOSE, POC    Collection Time: 10/14/21  6:07 AM   Result Value Ref Range    Glucose (POC) 141 (H) 65 - 100 mg/dL    Performed by [facility]    CBC WITH AUTOMATED DIFF    Collection Time: 10/14/21 10:28 AM   Result Value Ref Range    WBC 9.4 4.3 - 11.1 K/uL    RBC 4.18 4.05 - 5.2 M/uL    HGB 11.8 11.7 - 15.4 g/dL    HCT 39.0 35.8 - 46.3 %    MCV 93.3 79.6 - 97.8 FL    MCH 28.2 26.1 - 32.9 PG    MCHC 30.3 (L) 31.4 - 35.0 g/dL    RDW 14.0 11.9 - 14.6 %    PLATELET 787 589 - 266 K/uL    MPV 10.0 9.4 - 12.3 FL    ABSOLUTE NRBC 0.00 0.0 - 0.2 K/uL    DF AUTOMATED      NEUTROPHILS 74 43 - 78 %    LYMPHOCYTES 14 13 - 44 %    MONOCYTES 5 4.0 - 12.0 %    EOSINOPHILS 4 0.5 - 7.8 %    BASOPHILS 1 0.0 - 2.0 %    IMMATURE GRANULOCYTES 2 0.0 - 5.0 %    ABS. NEUTROPHILS 6.9 1.7 - 8.2 K/UL    ABS. LYMPHOCYTES 1.3 0.5 - 4.6 K/UL    ABS. MONOCYTES 0.5 0.1 - 1.3 K/UL    ABS. EOSINOPHILS 0.3 0.0 - 0.8 K/UL    ABS. BASOPHILS 0.1 0.0 - 0.2 K/UL    ABS. IMM. GRANS. 0.2 0.0 - 0.5 K/UL   METABOLIC PANEL, BASIC    Collection Time: 10/14/21 10:28 AM   Result Value Ref Range    Sodium 137 136 - 145 mmol/L    Potassium 4.2 3.5 - 5.1 mmol/L    Chloride 105 98 - 107 mmol/L    CO2 25 21 - 32 mmol/L    Anion gap 7 7 - 16 mmol/L    Glucose 226 (H) 65 - 100 mg/dL    BUN 13 8 - 23 MG/DL    Creatinine 0.95 0.6 - 1.0 MG/DL    GFR est AA >60 >60 ml/min/1.73m2    GFR est non-AA 59 (L) >60 ml/min/1.73m2    Calcium 10.2 8.3 - 10.4 MG/DL   GLUCOSE, POC    Collection Time: 10/14/21 11:08 AM   Result Value Ref Range    Glucose (POC) 208 (H) 65 - 100 mg/dL    Performed by [facility]    PLEASE READ & DOCUMENT PPD TEST IN 24 HRS    Collection Time: 10/14/21  2:00 PM   Result Value Ref Range    PPD Negative Negative    mm Induration 0 0 - 5 mm   GLUCOSE, POC    Collection Time: 10/14/21  4:34 PM   Result Value Ref Range    Glucose (POC) 175 (H) 65 - 100 mg/dL    Performed by [facility]    GLUCOSE, POC    Collection Time: 10/14/21  8:41 PM   Result Value Ref Range    Glucose (POC) 164 (H) 65 - 100 mg/dL    Performed by [facility]    CBC WITH AUTOMATED DIFF    Collection Time: 10/15/21  5:24 AM   Result Value Ref Range    WBC 9.0 4.3 - 11.1 K/uL    RBC 4.01 (L) 4.05 - 5.2 M/uL    HGB 11.0 (L) 11.7 - 15.4 g/dL    HCT 36.6 35.8 - 46.3 %    MCV 91.3 79.6 - 97.8 FL    MCH 27.4 26.1 - 32.9 PG    MCHC 30.1 (L) 31.4 - 35.0 g/dL    RDW 13.7 11.9 - 14.6 %    PLATELET 115 469 - 112 K/uL    MPV 9.8 9.4 - 12.3 FL    ABSOLUTE NRBC 0.00 0.0 - 0.2 K/uL    DF AUTOMATED      NEUTROPHILS 69 43 - 78 %    LYMPHOCYTES 18 13 - 44 %    MONOCYTES 6 4.0 - 12.0 %    EOSINOPHILS 4 0.5 - 7.8 %    BASOPHILS 1 0.0 - 2.0 %    IMMATURE GRANULOCYTES 2 0.0 - 5.0 %    ABS. NEUTROPHILS 6.2 1.7 - 8.2 K/UL    ABS. LYMPHOCYTES 1.6 0.5 - 4.6 K/UL    ABS. MONOCYTES 0.5 0.1 - 1.3 K/UL    ABS. EOSINOPHILS 0.4 0.0 - 0.8 K/UL    ABS. BASOPHILS 0.1 0.0 - 0.2 K/UL    ABS. IMM.  GRANS. 0.2 0.0 - 0.5 K/UL   METABOLIC PANEL, BASIC    Collection Time: 10/15/21  5:24 AM   Result Value Ref Range    Sodium 138 136 - 145 mmol/L    Potassium 3.9 3.5 - 5.1 mmol/L    Chloride 105 98 - 107 mmol/L    CO2 27 21 - 32 mmol/L    Anion gap 6 (L) 7 - 16 mmol/L    Glucose 207 (H) 65 - 100 mg/dL    BUN 14 8 - 23 MG/DL    Creatinine 0.82 0.6 - 1.0 MG/DL    GFR est AA >60 >60 ml/min/1.73m2    GFR est non-AA >60 >60 ml/min/1.73m2    Calcium 9.9 8.3 - 10.4 MG/DL   GLUCOSE, POC    Collection Time: 10/15/21  6:42 AM   Result Value Ref Range    Glucose (POC) 243 (H) 65 - 100 mg/dL    Performed by [facility]    GLUCOSE, POC    Collection Time: 10/15/21 11:25 AM   Result Value Ref Range    Glucose (POC) 171 (H) 65 - 100 mg/dL    Performed by [facility]    PLEASE READ & DOCUMENT PPD TEST IN 48 HRS    Collection Time: 10/15/21 12:23 PM   Result Value Ref Range    PPD Negative Negative    mm Induration 0 0 - 5 mm       All Micro Results     Procedure Component Value Units Date/Time    RESPIRATORY VIRUS PANEL W/COVID-19, PCR [[id number]]     Order Status: Sent Specimen: NASOPHARYNGEAL SWAB     CULTURE, URINE [[id number]]  (Abnormal) Collected: 10/13/21 1045    Order Status: Completed Specimen: Urine from Clean catch Updated: 10/15/21 0808     Special Requests: NO SPECIAL REQUESTS        Culture result:       >100,000 COLONIES/mL GRAM NEGATIVE RODS IDENTIFICATION AND SUSCEPTIBILITY TO FOLLOW                  10,000 to 50,000 COLONIES/mL MIXED SKIN ANNETTE ISOLATED                Other Studies:  No results found.     Current Meds:  Current Facility-Administered Medications   Medication Dose Route Frequency    insulin glargine (LANTUS) injection 12 Units  12 Units SubCUTAneous QHS    insulin lispro (HUMALOG) injection   SubCUTAneous AC&HS    cefTRIAXone (ROCEPHIN) 1 g in 0.9% sodium chloride (MBP/ADV) 50 mL MBP  1 g IntraVENous Q24H    amLODIPine (NORVASC) tablet 5 mg  5 mg Oral DAILY    atorvastatin (LIPITOR) tablet 40 mg  40 mg Oral DAILY    carvediloL (COREG) tablet 12.5 mg  12.5 mg Oral BID WITH MEALS    escitalopram oxalate (LEXAPRO) tablet 10 mg  10 mg Oral DAILY    famotidine (PEPCID) tablet 20 mg  20 mg Oral DAILY    rOPINIRole (REQUIP) tablet 1 mg  1 mg Oral BID    spironolactone (ALDACTONE) tablet 25 mg  25 mg Oral DAILY    aspirin chewable tablet 81 mg  81 mg Oral DAILY    sodium chloride (NS) flush 5-40 mL  5-40 mL IntraVENous Q8H    sodium chloride (NS) flush 5-40 mL  5-40 mL IntraVENous PRN    acetaminophen (TYLENOL) tablet 650 mg  650 mg Oral Q6H PRN    Or    acetaminophen (TYLENOL) suppository 650 mg  650 mg Rectal Q6H PRN    polyethylene glycol (MIRALAX) packet 17 g  17 g Oral DAILY PRN    promethazine (PHENERGAN) tablet 12.5 mg  12.5 mg Oral Q6H PRN    Or    ondansetron (ZOFRAN) injection 4 mg  4 mg IntraVENous Q6H PRN    HYDROmorphone (DILAUDID) injection 0.5 mg  0.5 mg IntraVENous Q4H PRN    oxyCODONE IR (ROXICODONE) tablet 5 mg  5 mg Oral Q4H PRN       Signed:  Carla Abdul MD    Part of this note may have been written by using a voice dictation software. The note has been proof read but may still contain some grammatical/other typographical errors.

## 2021-10-15 NOTE — PROGRESS NOTES
CM had a face-time conference call with Willy Cortes - nurse from [facility] and pt. Pt's first shift nurse joined for a few minutes. Carola Harrison asked multiple assessment questions to determine if pt met LOC for the TIAN. Allyson informed CM that as long as the sliding scale insulin can be d/c prior to d/c the half-way can accept pt. Per the facility's request CM faxed pt's 24 and 48-hour PPDs. Carola Harrison will be faxing CM paperwork that must be completed and signed by either NP or MD for the half-way. CM attempted to call pt's daughter but was unable to reach her. CM will continue to follow and remain available if any needs arise.

## 2021-10-15 NOTE — PROGRESS NOTES
October 15, 2021             Admit Date: 10/12/2021  Admit Diagnosis: Debility [R53.81]       Principle Problem: Debility. Subjective: Doing well, No complaints, No SOB, No Chest Pain, No Nausea or Vomiting     Objective:   Vital Signs are Stable, No Acute Distress, Alert, Neurovascular exam is normal.     Assessment / Plan :  Patient Active Problem List   Diagnosis Code    DM type 2 (diabetes mellitus, type 2) ([facility] [address] E11.9    Essential hypertension with goal blood pressure less than 140/90 I10    Crohn disease ([facility] [address] K50.90    Pericarditis, acute I30.9    Precordial pain R07.2    Nonspecific abnormal electrocardiogram (ECG) (EKG) R94.31    Coronary artery disease involving native coronary artery of native heart with angina pectoris (HCC) I25.119    Dyslipidemia E78.5    CAP (community acquired pneumonia) J18.9    LIZ (acute kidney injury) ([facility] [address] N17.9    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    Electrolyte abnormality E87.8    History of artificial joint Z96.60    History of implantation of joint prosthesis of elbow Z96.629    Supracondylar fracture of humerus S42.413A    Age-related osteoporosis with current pathological fracture with routine healing M80.00XD    Cholecystitis with cholelithiasis K80.10    CVA (cerebral vascular accident) ([facility] [address] I63.9    Dysuria R30.0    Vaginal candida B37.3    Polyneuropathy G62.9    Type 2 diabetes with nephropathy ([facility] [address] E11.21    Diabetic polyneuropathy associated with type 2 diabetes mellitus ([facility] [address] E11.42    Fall W19. Nyla Canavan Encounter for medication management Q20.164    Closed fracture of left hip ([address] S72.002A    Hip fracture requiring operative repair ([facility] [address] S72.009A    Acute cystitis without hematuria N30.00    UTI (urinary tract infection) N39.0    Acute blood loss anemia D62    Intertrochanteric fracture of left femur, closed, initial encounter ([facility] [address] S72.142A    Debility R53.81    History of fracture of right hip Z87.81 Patient Vitals for the past 8 hrs:   BP Temp Pulse Resp SpO2   10/15/21 0706 (!) 137/52 98.4 °F (36.9 °C) 78 18 95 %   10/15/21 0349 104/74 97.9 °F (36.6 °C) (!) 103 16 96 %    Temp (24hrs), Av.2 °F (36.8 °C), Min:97.9 °F (36.6 °C), Max:98.4 °F (36.9 °C)    Body mass index is 25.1 kg/m².     Lab Results   Component Value Date/Time    HGB 11.0 (L) 10/15/2021 05:24 AM        Debility  S/P right hip gamma nail: readmission due to debility as patient is unable to care for her self at home  Medical mgmt per hopsitalist   Continue PT   Discharge Disp: social work for assistance with longterm SNF placement              Signed By: MANOHAR Calderon  10/15/2021,  11:28 AM

## 2021-10-15 NOTE — PROGRESS NOTES
SONY spoke with the ED at [facility] to inquire if anyone would be faxing CM the forms to complete and have the physician sign for pt to be placed at the facility. She stated that \"her family is unsure if they can afford an TIAN and will let us know on Monday. \"  CM will await to hear back from the [facility]. CM will continue to follow and remain available if any needs arise.

## 2021-10-16 LAB
ANION GAP SERPL CALC-SCNC: 6 MMOL/L (ref 7–16)
BACTERIA SPEC CULT: ABNORMAL
BACTERIA SPEC CULT: ABNORMAL
BASOPHILS # BLD: 0.1 K/UL (ref 0–0.2)
BASOPHILS NFR BLD: 1 % (ref 0–2)
BUN SERPL-MCNC: 14 MG/DL (ref 8–23)
CALCIUM SERPL-MCNC: 10.1 MG/DL (ref 8.3–10.4)
CHLORIDE SERPL-SCNC: 105 MMOL/L (ref 98–107)
CO2 SERPL-SCNC: 28 MMOL/L (ref 21–32)
CREAT SERPL-MCNC: 0.93 MG/DL (ref 0.6–1)
DIFFERENTIAL METHOD BLD: ABNORMAL
EOSINOPHIL # BLD: 0.5 K/UL (ref 0–0.8)
EOSINOPHIL NFR BLD: 5 % (ref 0.5–7.8)
ERYTHROCYTE [DISTWIDTH] IN BLOOD BY AUTOMATED COUNT: 13.9 % (ref 11.9–14.6)
GLUCOSE BLD STRIP.AUTO-MCNC: 129 MG/DL (ref 65–100)
GLUCOSE BLD STRIP.AUTO-MCNC: 148 MG/DL (ref 65–100)
GLUCOSE BLD STRIP.AUTO-MCNC: 170 MG/DL (ref 65–100)
GLUCOSE BLD STRIP.AUTO-MCNC: 260 MG/DL (ref 65–100)
GLUCOSE SERPL-MCNC: 152 MG/DL (ref 65–100)
HCT VFR BLD AUTO: 37.4 % (ref 35.8–46.3)
HGB BLD-MCNC: 11.5 G/DL (ref 11.7–15.4)
IMM GRANULOCYTES # BLD AUTO: 0.3 K/UL (ref 0–0.5)
IMM GRANULOCYTES NFR BLD AUTO: 3 % (ref 0–5)
LYMPHOCYTES # BLD: 2 K/UL (ref 0.5–4.6)
LYMPHOCYTES NFR BLD: 22 % (ref 13–44)
MCH RBC QN AUTO: 27.8 PG (ref 26.1–32.9)
MCHC RBC AUTO-ENTMCNC: 30.7 G/DL (ref 31.4–35)
MCV RBC AUTO: 90.3 FL (ref 79.6–97.8)
MONOCYTES # BLD: 0.7 K/UL (ref 0.1–1.3)
MONOCYTES NFR BLD: 8 % (ref 4–12)
NEUTS SEG # BLD: 5.5 K/UL (ref 1.7–8.2)
NEUTS SEG NFR BLD: 61 % (ref 43–78)
NRBC # BLD: 0 K/UL (ref 0–0.2)
PLATELET # BLD AUTO: 306 K/UL (ref 150–450)
PMV BLD AUTO: 9.9 FL (ref 9.4–12.3)
POTASSIUM SERPL-SCNC: 3.6 MMOL/L (ref 3.5–5.1)
RBC # BLD AUTO: 4.14 M/UL (ref 4.05–5.2)
SARS-COV-2, COV2: NOT DETECTED
SERVICE CMNT-IMP: ABNORMAL
SODIUM SERPL-SCNC: 139 MMOL/L (ref 136–145)
SPECIMEN SOURCE, FCOV2M: NORMAL
WBC # BLD AUTO: 9 K/UL (ref 4.3–11.1)

## 2021-10-16 PROCEDURE — 85025 COMPLETE CBC W/AUTO DIFF WBC: CPT

## 2021-10-16 PROCEDURE — 80048 BASIC METABOLIC PNL TOTAL CA: CPT

## 2021-10-16 PROCEDURE — 74011250637 HC RX REV CODE- 250/637: Performed by: NURSE PRACTITIONER

## 2021-10-16 PROCEDURE — 65270000029 HC RM PRIVATE

## 2021-10-16 PROCEDURE — 36415 COLL VENOUS BLD VENIPUNCTURE: CPT

## 2021-10-16 PROCEDURE — 99024 POSTOP FOLLOW-UP VISIT: CPT | Performed by: PHYSICIAN ASSISTANT

## 2021-10-16 PROCEDURE — 74011250637 HC RX REV CODE- 250/637: Performed by: FAMILY MEDICINE

## 2021-10-16 PROCEDURE — 74011000258 HC RX REV CODE- 258: Performed by: FAMILY MEDICINE

## 2021-10-16 PROCEDURE — 82962 GLUCOSE BLOOD TEST: CPT

## 2021-10-16 PROCEDURE — 74011250636 HC RX REV CODE- 250/636: Performed by: NURSE PRACTITIONER

## 2021-10-16 PROCEDURE — 74011636637 HC RX REV CODE- 636/637: Performed by: FAMILY MEDICINE

## 2021-10-16 PROCEDURE — 74011250636 HC RX REV CODE- 250/636: Performed by: FAMILY MEDICINE

## 2021-10-16 RX ORDER — AMLODIPINE BESYLATE 10 MG/1
10 TABLET ORAL DAILY
Status: DISCONTINUED | OUTPATIENT
Start: 2021-10-16 | End: 2021-10-19 | Stop reason: HOSPADM

## 2021-10-16 RX ADMIN — AMLODIPINE BESYLATE 10 MG: 10 TABLET ORAL at 08:03

## 2021-10-16 RX ADMIN — SPIRONOLACTONE 25 MG: 25 TABLET ORAL at 08:03

## 2021-10-16 RX ADMIN — CARVEDILOL 12.5 MG: 12.5 TABLET, FILM COATED ORAL at 17:29

## 2021-10-16 RX ADMIN — FAMOTIDINE 20 MG: 20 TABLET ORAL at 08:03

## 2021-10-16 RX ADMIN — INSULIN LISPRO 6 UNITS: 100 INJECTION, SOLUTION INTRAVENOUS; SUBCUTANEOUS at 17:48

## 2021-10-16 RX ADMIN — OXYCODONE 5 MG: 5 TABLET ORAL at 17:29

## 2021-10-16 RX ADMIN — HYDROMORPHONE HYDROCHLORIDE 0.5 MG: 1 INJECTION, SOLUTION INTRAMUSCULAR; INTRAVENOUS; SUBCUTANEOUS at 19:35

## 2021-10-16 RX ADMIN — ESCITALOPRAM OXALATE 10 MG: 10 TABLET ORAL at 08:03

## 2021-10-16 RX ADMIN — SODIUM CHLORIDE 10 ML: 9 INJECTION, SOLUTION INTRAMUSCULAR; INTRAVENOUS; SUBCUTANEOUS at 05:22

## 2021-10-16 RX ADMIN — CEFTRIAXONE 1 G: 1 INJECTION, POWDER, FOR SOLUTION INTRAMUSCULAR; INTRAVENOUS at 13:35

## 2021-10-16 RX ADMIN — INSULIN GLARGINE 12 UNITS: 100 INJECTION, SOLUTION SUBCUTANEOUS at 21:51

## 2021-10-16 RX ADMIN — ASPIRIN 81 MG: 81 TABLET, CHEWABLE ORAL at 08:03

## 2021-10-16 RX ADMIN — INSULIN LISPRO 2 UNITS: 100 INJECTION, SOLUTION INTRAVENOUS; SUBCUTANEOUS at 12:04

## 2021-10-16 RX ADMIN — SODIUM CHLORIDE 10 ML: 9 INJECTION, SOLUTION INTRAMUSCULAR; INTRAVENOUS; SUBCUTANEOUS at 21:52

## 2021-10-16 RX ADMIN — ROPINIROLE HYDROCHLORIDE 1 MG: 0.5 TABLET, FILM COATED ORAL at 08:03

## 2021-10-16 RX ADMIN — CARVEDILOL 12.5 MG: 12.5 TABLET, FILM COATED ORAL at 08:03

## 2021-10-16 RX ADMIN — SODIUM CHLORIDE 10 ML: 9 INJECTION, SOLUTION INTRAMUSCULAR; INTRAVENOUS; SUBCUTANEOUS at 13:35

## 2021-10-16 RX ADMIN — ROPINIROLE HYDROCHLORIDE 1 MG: 0.5 TABLET, FILM COATED ORAL at 17:29

## 2021-10-16 RX ADMIN — ATORVASTATIN CALCIUM 40 MG: 40 TABLET, FILM COATED ORAL at 13:34

## 2021-10-16 NOTE — PROGRESS NOTES
CM faxed pt's resulted negative COVID-19 PCR to the ED at [facility]. Please consult CM if any issues arise.

## 2021-10-16 NOTE — PROGRESS NOTES
October 16, 2021             Admit Date: 10/12/2021  Admit Diagnosis: Debility [R53.81]       Principle Problem: Debility. Subjective: Doing well, No complaints, No SOB, No Chest Pain, No Nausea or Vomiting     Objective:   Vital Signs are Stable, No Acute Distress, Alert, Neurovascular exam is normal.     Assessment / Plan :  Patient Active Problem List   Diagnosis Code    DM type 2 (diabetes mellitus, type 2) ([facility] [address] E11.9    Essential hypertension with goal blood pressure less than 140/90 I10    Crohn disease ([facility] [address] K50.90    Pericarditis, acute I30.9    Precordial pain R07.2    Nonspecific abnormal electrocardiogram (ECG) (EKG) R94.31    Coronary artery disease involving native coronary artery of native heart with angina pectoris (HCC) I25.119    Dyslipidemia E78.5    CAP (community acquired pneumonia) J18.9    LIZ (acute kidney injury) (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 [address] I63.9    Dysuria R30.0    Vaginal candida B37.3    Polyneuropathy G62.9    Type 2 diabetes with nephropathy ([facility] [address] E11.21    Diabetic polyneuropathy associated with type 2 diabetes mellitus ([facility] [address] E11.42    Fall W19. Osiel Diamond Encounter for medication management U23.420    Closed fracture of left hip ([facility] [address] S72.002A    Hip fracture requiring operative repair ([facility] [address] S72.009A    Acute cystitis without hematuria N30.00    UTI (urinary tract infection) N39.0    Acute blood loss anemia D62    Intertrochanteric fracture of left femur, closed, initial encounter ([facility] [address] S72.142A    Debility R53.81    History of fracture of right hip Z87.81 Patient Vitals for the past 8 hrs:   BP Temp Pulse Resp SpO2   10/16/21 0734 (!) 141/72 98.4 °F (36.9 °C) 68 16 98 %   10/16/21 0319 (!) 154/58 98.2 °F (36.8 °C) 77 16     Temp (24hrs), Av.2 °F (36.8 °C), Min:97.5 °F (36.4 °C), Max:98.4 °F (36.9 °C)    Body mass index is 25.1 kg/m².     Lab Results   Component Value Date/Time    HGB 11.5 (L) 10/16/2021 03:49 AM      Debility  S/P right hip gamma nail: readmission due to debility as patient is unable to care for her self at home  Medical mgmt per hopsitalist   Continue PT   Discharge Disp: social work for assistance with longterm SNF placement          Signed By: MANOHAR Mcintosh  10/16/2021,  8:42 AM

## 2021-10-16 NOTE — PROGRESS NOTES
Awake with c/o right hip, right leg pain; Dilaudid 0.5 mg given IVP slowly. Right hip incision intact, steri strip in place. Incontinent of urine with care given, purewick changed. Repositioned, reporting more comfortable. Call light in reach, bed alarm set, door open.

## 2021-10-17 LAB
GLUCOSE BLD STRIP.AUTO-MCNC: 115 MG/DL (ref 65–100)
GLUCOSE BLD STRIP.AUTO-MCNC: 140 MG/DL (ref 65–100)
GLUCOSE BLD STRIP.AUTO-MCNC: 170 MG/DL (ref 65–100)
GLUCOSE BLD STRIP.AUTO-MCNC: 189 MG/DL (ref 65–100)
SERVICE CMNT-IMP: ABNORMAL

## 2021-10-17 PROCEDURE — 74011250636 HC RX REV CODE- 250/636: Performed by: FAMILY MEDICINE

## 2021-10-17 PROCEDURE — 97110 THERAPEUTIC EXERCISES: CPT

## 2021-10-17 PROCEDURE — 74011000258 HC RX REV CODE- 258: Performed by: FAMILY MEDICINE

## 2021-10-17 PROCEDURE — 82962 GLUCOSE BLOOD TEST: CPT

## 2021-10-17 PROCEDURE — 65270000029 HC RM PRIVATE

## 2021-10-17 PROCEDURE — 74011636637 HC RX REV CODE- 636/637: Performed by: FAMILY MEDICINE

## 2021-10-17 PROCEDURE — 74011250637 HC RX REV CODE- 250/637: Performed by: FAMILY MEDICINE

## 2021-10-17 PROCEDURE — 74011250637 HC RX REV CODE- 250/637: Performed by: NURSE PRACTITIONER

## 2021-10-17 PROCEDURE — 97530 THERAPEUTIC ACTIVITIES: CPT

## 2021-10-17 RX ADMIN — CEFTRIAXONE 1 G: 1 INJECTION, POWDER, FOR SOLUTION INTRAMUSCULAR; INTRAVENOUS at 14:16

## 2021-10-17 RX ADMIN — CARVEDILOL 12.5 MG: 12.5 TABLET, FILM COATED ORAL at 08:22

## 2021-10-17 RX ADMIN — INSULIN LISPRO 2 UNITS: 100 INJECTION, SOLUTION INTRAVENOUS; SUBCUTANEOUS at 16:52

## 2021-10-17 RX ADMIN — CARVEDILOL 12.5 MG: 12.5 TABLET, FILM COATED ORAL at 16:52

## 2021-10-17 RX ADMIN — INSULIN LISPRO 2 UNITS: 100 INJECTION, SOLUTION INTRAVENOUS; SUBCUTANEOUS at 12:00

## 2021-10-17 RX ADMIN — AMLODIPINE BESYLATE 10 MG: 10 TABLET ORAL at 08:21

## 2021-10-17 RX ADMIN — SODIUM CHLORIDE 10 ML: 9 INJECTION, SOLUTION INTRAMUSCULAR; INTRAVENOUS; SUBCUTANEOUS at 22:07

## 2021-10-17 RX ADMIN — INSULIN GLARGINE 12 UNITS: 100 INJECTION, SOLUTION SUBCUTANEOUS at 22:07

## 2021-10-17 RX ADMIN — ATORVASTATIN CALCIUM 40 MG: 40 TABLET, FILM COATED ORAL at 08:21

## 2021-10-17 RX ADMIN — SPIRONOLACTONE 25 MG: 25 TABLET ORAL at 08:22

## 2021-10-17 RX ADMIN — SODIUM CHLORIDE 10 ML: 9 INJECTION, SOLUTION INTRAMUSCULAR; INTRAVENOUS; SUBCUTANEOUS at 14:20

## 2021-10-17 RX ADMIN — ROPINIROLE HYDROCHLORIDE 1 MG: 0.5 TABLET, FILM COATED ORAL at 16:57

## 2021-10-17 RX ADMIN — OXYCODONE 5 MG: 5 TABLET ORAL at 16:56

## 2021-10-17 RX ADMIN — ASPIRIN 81 MG: 81 TABLET, CHEWABLE ORAL at 08:21

## 2021-10-17 RX ADMIN — FAMOTIDINE 20 MG: 20 TABLET ORAL at 08:22

## 2021-10-17 RX ADMIN — ROPINIROLE HYDROCHLORIDE 1 MG: 0.5 TABLET, FILM COATED ORAL at 08:21

## 2021-10-17 RX ADMIN — ESCITALOPRAM OXALATE 10 MG: 10 TABLET ORAL at 08:22

## 2021-10-17 NOTE — PROGRESS NOTES
Problem: Diabetes Self-Management  Goal: *Disease process and treatment process  Description: Define diabetes and identify own type of diabetes; list 3 options for treating diabetes. 10/17/2021 0943 by Lavern Limon RN  Outcome: Progressing Towards Goal  10/17/2021 0942 by Lavern Limon RN  Outcome: Progressing Towards Goal  Goal: *Incorporating nutritional management into lifestyle  Description: Describe effect of type, amount and timing of food on blood glucose; list 3 methods for planning meals. 10/17/2021 0943 by Lavern Limon RN  Outcome: Progressing Towards Goal  10/17/2021 0942 by Lavern Limon RN  Outcome: Progressing Towards Goal  Goal: *Incorporating physical activity into lifestyle  Description: State effect of exercise on blood glucose levels. 10/17/2021 0943 by Lavern Limon RN  Outcome: Progressing Towards Goal  10/17/2021 0942 by Lavern Limon RN  Outcome: Progressing Towards Goal  Goal: *Developing strategies to promote health/change behavior  Description: Define the ABC's of diabetes; identify appropriate screenings, schedule and personal plan for screenings. 10/17/2021 0943 by Lavern Limon RN  Outcome: Progressing Towards Goal  10/17/2021 0942 by Lavern Limon RN  Outcome: Progressing Towards Goal  Goal: *Using medications safely  Description: State effect of diabetes medications on diabetes; name diabetes medication taking, action and side effects. 10/17/2021 0943 by Lavern Limon RN  Outcome: Progressing Towards Goal  10/17/2021 0942 by Lavern Limon RN  Outcome: Progressing Towards Goal  Goal: *Monitoring blood glucose, interpreting and using results  Description: Identify recommended blood glucose targets  and personal targets.   10/17/2021 0943 by Lavern Limon RN  Outcome: Progressing Towards Goal  10/17/2021 0942 by Lavern Limon RN  Outcome: Progressing Towards Goal  Goal: *Prevention, detection, treatment of acute complications  Description: List symptoms of hyper- and hypoglycemia; describe how to treat low blood sugar and actions for lowering  high blood glucose level. 10/17/2021 0943 by Danisha Slater RN  Outcome: Progressing Towards Goal  10/17/2021 0942 by Danisha Slater RN  Outcome: Progressing Towards Goal  Goal: *Prevention, detection and treatment of chronic complications  Description: Define the natural course of diabetes and describe the relationship of blood glucose levels to long term complications of diabetes.   10/17/2021 0943 by Danisha Slater RN  Outcome: Progressing Towards Goal  10/17/2021 0942 by Danisha Slater RN  Outcome: Progressing Towards Goal  Goal: *Developing strategies to address psychosocial issues  Description: Describe feelings about living with diabetes; identify support needed and support network  10/17/2021 0943 by Danisha Slater RN  Outcome: Progressing Towards Goal  10/17/2021 0942 by Danisha Slater RN  Outcome: Progressing Towards Goal  Goal: *Insulin pump training  10/17/2021 0943 by Danisha Slater RN  Outcome: Progressing Towards Goal  10/17/2021 0942 by Danisha Slater RN  Outcome: Progressing Towards Goal  Goal: *Sick day guidelines  10/17/2021 0943 by Danisha Slater RN  Outcome: Progressing Towards Goal  10/17/2021 0942 by Danisha Slater RN  Outcome: Progressing Towards Goal  Goal: *Patient Specific Goal (EDIT GOAL, INSERT TEXT)  10/17/2021 0943 by Danisha Slater RN  Outcome: Progressing Towards Goal  10/17/2021 0942 by Danisha Slater RN  Outcome: Progressing Towards Goal

## 2021-10-17 NOTE — PROGRESS NOTES
Resting quietly, eyes closed, resp even, unlab, facial expression relaxed with no distress noted. Call light in reach, bed alarm set, door open. Report given to Tad Perez RN.

## 2021-10-17 NOTE — PROGRESS NOTES
[place] Hospitalist Consult   Admit Date:  10/12/2021 10:00 PM   Name:  Live Dan   Age:  80 y.o. Sex:  female  :  1934   MRN:  [id number]   Room:  [facility]    Presenting Complaint: No chief complaint on file. Reason(s) for Admission: Debility [R53.81]     Hospitalists consulted by Torri Serna MD for: Medical management    History of Presenting Illness:   Live Dan is a 80 y.o. female with history of type 2 diabetes mellitus, osteoporosis, hypercalcemia, hypertension, GERD, CVA admitted by Ortho service for Rt hip pain. Patient is 4 weeks status post ORIF with cephalomedullary nail fixation of the right hip by Dr. Ant Leavitt. Readmitted as not able to take care of herself at home. 10/17: Patient is seen at bedside. No active complaint. Pending placement. Review of Systems:  10 systems reviewed and negative except as noted in HPI. Assessment & Plan:     History of fracture of right hip:  Debility:  4 weeks status post ORIF with cephalomedullary nail fixation of the right hip by Dr. Ant Leavitt. CT pelvis with no clear evidence of hardware complication  PT OT  Rest as per primary    UTI:  UA suggestive of UTI  Urine culture Ecoli pansensitive except Bactrim  Completed ceftriaxone    Diabetes mellitus  A1c 9.2 on   Patient is on 20 units Lantus daily at home  10/17: Continue Lantus to 12 units. Continue sliding scale. Adjust insulin accordingly    Hypertension:  Continue home meds: Coreg, amlodipine and Aldactone  10/17: Optimally controlled today.  Continue current antihypertensives    History of CVA:   No residual deficiency  Continue aspirin and statin    History of Crohn's disease:  Stable    GERD:  Continue famotidine    Depression:  [facility] Problems as of 10/17/2021 Date Reviewed: 10/12/2021        Codes Class Noted - Resolved POA    History of fracture of right hip ICD-10-CM: Z87.81  ICD-9-CM: V15.51  10/13/2021 - Present Yes        * (Principal) Debility ICD-10-CM: R53.81  ICD-9-CM: 799.3  10/12/2021 - Present Unknown        UTI (urinary tract infection) ICD-10-CM: N39.0  ICD-9-CM: 599.0  3/7/2021 - Present Yes        CVA (cerebral vascular accident) [facility]) ICD-10-CM: I63.9  ICD-9-CM: 434.91  10/23/2017 - Present Yes    Overview Signed 10/27/2017 12:11 PM by Santa Stairs     Probable chronic right parietal occipital CVA containing some petechial hemosiderin, pontine and supratentorial microischemia. Coronary artery disease involving native coronary artery of native heart with angina pectoris [facility]) ICD-10-CM: I25.119  ICD-9-CM: 414.01, 413.9  5/3/2016 - Present Yes        DM type 2 (diabetes mellitus, type 2) (HCC) (Chronic) ICD-10-CM: E11.9  ICD-9-CM: 250.00  3/16/2013 - Present Yes        Crohn disease ([facility] [address] (Chronic) ICD-10-CM: K50.90  ICD-9-CM: 555.9  3/16/2013 - Present Yes              Past Medical History:   Diagnosis Date    Acute kidney failure 2016    Acute pericarditis     Arthritis     CAP (community acquired pneumonia) 2016    Coronary artery disease     Crohn's disease     Hypercalcemia     Hypercholesterolemia     Hypertension     Lumbar compression fracture     Osteoporosis     Type 2 diabetes mellitus       Past Surgical History:   Procedure Laterality Date    HX ARTHROPLASTY  2016    left elbow    HX HEART CATHETERIZATION  2015    no intervention    HX HYSTERECTOMY      HX KYPHOPLASTY  ~    lumbar      Allergies   Allergen Reactions    Sulfa (Sulfonamide Antibiotics) Rash    Other Medication Unknown (comments)     \"Dypentin\"    Tape [Adhesive] Other (comments)     Blisters and skin tears      Social History     Tobacco Use    Smoking status: Former Smoker     Types: Cigarettes     Start date: 1961     Quit date: 1964     Years since quittin.3    Smokeless tobacco: Never Used    Tobacco comment: 2 packs a wk   Substance Use Topics    Alcohol use:  Yes Alcohol/week: 0.0 standard drinks     Comment: rarely      Family History   Problem Relation Age of Onset    Heart Failure Mother     Diabetes Mother     Cancer Father         throat    Alcohol abuse Neg Hx     Arthritis-rheumatoid Neg Hx     Asthma Neg Hx     Bleeding Prob Neg Hx     Elevated Lipids Neg Hx     Headache Neg Hx     Hypertension Neg Hx     Lung Disease Neg Hx     Migraines Neg Hx     Psychiatric Disorder Neg Hx     Stroke Neg Hx     Mental Retardation Neg Hx     Thyroid Disease Neg Hx       Family history reviewed and negative unless otherwise noted above. Immunization History   Administered Date(s) Administered    Covid-19, MODERNA, Mrna, Lnp-s, Pf, 100mcg/0.5mL 04/20/2021, 05/18/2021    Influenza Vaccine 10/01/2015, 10/17/2016    TB Skin Test (PPD) Intradermal 03/17/2013, 03/04/2021, 09/19/2021, 10/13/2021       Objective:     Patient Vitals for the past 24 hrs:   Temp Pulse Resp BP SpO2   10/17/21 1059 98.5 °F (36.9 °C) 63 -- (!) 122/53 100 %   10/17/21 0715 -- -- -- (!) 123/50 --   10/17/21 0711 98 °F (36.7 °C) 65 16 (!) 132/44 97 %   10/17/21 0405 97.8 °F (36.6 °C) 63 16 (!) 136/58 96 %   10/16/21 2320 97.4 °F (36.3 °C) (!) 56 16 123/69 94 %   10/16/21 2028 97.5 °F (36.4 °C) 61 16 (!) 137/56 92 %   10/16/21 1530 98.3 °F (36.8 °C) 70 16 (!) 146/67 94 %   10/16/21 1127 98.4 °F (36.9 °C) 80 14 (!) 130/56 95 %     Oxygen Therapy  O2 Sat (%): 100 % (10/17/21 1059)  O2 Device: None (Room air) (10/14/21 1915)    Estimated body mass index is 25.1 kg/m² as calculated from the following:    Height as of this encounter: 4' 9\" (1.448 m). Weight as of this encounter: 52.6 kg (116 lb). No intake or output data in the 24 hours ending 10/17/21 1121      Physical Exam:    Blood pressure (!) 122/53, pulse 63, temperature 98.5 °F (36.9 °C), resp. rate 16, height 4' 9\" (1.448 m), weight 52.6 kg (116 lb), last menstrual period 09/08/2008, SpO2 100 %. General:    Well nourished.   No overt distress  Head:  Normocephalic, atraumatic  Eyes:  Sclerae appear normal.  Pupils equally round. ENT:  Nares appear normal, no drainage. Moist oral mucosa  Neck:  No restricted ROM. Trachea midline   CV:   RRR. No m/r/g. No jugular venous distension. Lungs:   CTAB. No wheezing, rhonchi, or rales. Respirations even, unlabored  Abdomen: Bowel sounds present. Soft, nontender, nondistended. Extremities: No cyanosis or clubbing. No edema  Skin:     No rashes and normal coloration. Warm and dry. Neuro:  CN II-XII grossly intact. Sensation intact. A&Ox3  Psych:  Normal mood and affect.       I have reviewed ordered lab tests and independently visualized imaging below:    Recent Labs:  Recent Results (from the past 48 hour(s))   GLUCOSE, POC    Collection Time: 10/15/21 11:25 AM   Result Value Ref Range    Glucose (POC) 171 (H) 65 - 100 mg/dL    Performed by [email or web address] xAd PPD TEST IN 48 HRS    Collection Time: 10/15/21 12:23 PM   Result Value Ref Range    PPD Negative Negative    mm Induration 0 0 - 5 mm   SARS-COV-2    Collection Time: 10/15/21  2:42 PM   Result Value Ref Range    SARS-CoV-2 Please find results under separate order     SARS-COV-2, PCR    Collection Time: 10/15/21  2:42 PM    Specimen: Nasopharyngeal   Result Value Ref Range    Specimen source Nasopharyngeal      SARS-CoV-2 Not detected NOTD     GLUCOSE, POC    Collection Time: 10/15/21  4:46 PM   Result Value Ref Range    Glucose (POC) 227 (H) 65 - 100 mg/dL    Performed by Lashae    GLUCOSE, POC    Collection Time: 10/15/21  8:50 PM   Result Value Ref Range    Glucose (POC) 112 (H) 65 - 100 mg/dL    Performed by Alexandrea Dawn    CBC WITH AUTOMATED DIFF    Collection Time: 10/16/21  3:49 AM   Result Value Ref Range    WBC 9.0 4.3 - 11.1 K/uL    RBC 4.14 4.05 - 5.2 M/uL    HGB 11.5 (L) 11.7 - 15.4 g/dL    HCT 37.4 35.8 - 46.3 %    MCV 90.3 79.6 - 97.8 FL    MCH 27.8 26.1 - 32.9 PG    MCHC 30.7 (L) 31.4 - 35.0 g/dL    RDW 13.9 11.9 - 14.6 %    PLATELET 012 759 - 102 K/uL    MPV 9.9 9.4 - 12.3 FL    ABSOLUTE NRBC 0.00 0.0 - 0.2 K/uL    DF AUTOMATED      NEUTROPHILS 61 43 - 78 %    LYMPHOCYTES 22 13 - 44 %    MONOCYTES 8 4.0 - 12.0 %    EOSINOPHILS 5 0.5 - 7.8 %    BASOPHILS 1 0.0 - 2.0 %    IMMATURE GRANULOCYTES 3 0.0 - 5.0 %    ABS. NEUTROPHILS 5.5 1.7 - 8.2 K/UL    ABS. LYMPHOCYTES 2.0 0.5 - 4.6 K/UL    ABS. MONOCYTES 0.7 0.1 - 1.3 K/UL    ABS. EOSINOPHILS 0.5 0.0 - 0.8 K/UL    ABS. BASOPHILS 0.1 0.0 - 0.2 K/UL    ABS. IMM.  GRANS. 0.3 0.0 - 0.5 K/UL   METABOLIC PANEL, BASIC    Collection Time: 10/16/21  3:49 AM   Result Value Ref Range    Sodium 139 136 - 145 mmol/L    Potassium 3.6 3.5 - 5.1 mmol/L    Chloride 105 98 - 107 mmol/L    CO2 28 21 - 32 mmol/L    Anion gap 6 (L) 7 - 16 mmol/L    Glucose 152 (H) 65 - 100 mg/dL    BUN 14 8 - 23 MG/DL    Creatinine 0.93 0.6 - 1.0 MG/DL    GFR est AA >60 >60 ml/min/1.73m2    GFR est non-AA >60 >60 ml/min/1.73m2    Calcium 10.1 8.3 - 10.4 MG/DL   GLUCOSE, POC    Collection Time: 10/16/21  6:34 AM   Result Value Ref Range    Glucose (POC) 148 (H) 65 - 100 mg/dL    Performed by Rapid Pathogen ScreeningLuz    GLUCOSE, POC    Collection Time: 10/16/21 11:28 AM   Result Value Ref Range    Glucose (POC) 170 (H) 65 - 100 mg/dL    Performed by Kam    GLUCOSE, POC    Collection Time: 10/16/21  4:19 PM   Result Value Ref Range    Glucose (POC) 260 (H) 65 - 100 mg/dL    Performed by Kam    GLUCOSE, POC    Collection Time: 10/16/21  9:11 PM   Result Value Ref Range    Glucose (POC) 129 (H) 65 - 100 mg/dL    Performed by Austin    GLUCOSE, POC    Collection Time: 10/17/21  6:34 AM   Result Value Ref Range    Glucose (POC) 115 (H) 65 - 100 mg/dL    Performed by Isaias Reid        All Micro Results     Procedure Component Value Units Date/Time    SARS-COV-2, PCR [[id number]] Collected: 10/15/21 1442    Order Status: Completed Specimen: Nasopharyngeal Updated: 10/16/21 1249     Specimen source Nasopharyngeal        SARS-CoV-2 Not detected        Comment:      The specimen is NEGATIVE for SARS-CoV-2, the novel coronavirus associated with COVID-19. This test has been authorized by the FDA under an Emergency Use Authorization (EUA) for use by authorized laboratories. Fact sheet for Healthcare Providers: In[employer] Vision[employer]Nextance.co.nz       Fact sheet for Patients: Dizkon.co.nz       Methodology: RT-PCR         CULTURE, URINE [[id number]]  (Abnormal)  (Susceptibility) Collected: 10/13/21 1045    Order Status: Completed Specimen: Urine from Clean catch Updated: 10/16/21 1156     Special Requests: NO SPECIAL REQUESTS        Culture result:       >100,000 COLONIES/mL ESCHERICHIA COLI                  10,000 to 50,000 COLONIES/mL MIXED SKIN ANNETTE ISOLATED          RESPIRATORY VIRUS PANEL W/COVID-19, PCR [[id number]]     Order Status: Sent Specimen: NASOPHARYNGEAL SWAB           Other Studies:  No results found.     Current Meds:  Current Facility-Administered Medications   Medication Dose Route Frequency    amLODIPine (NORVASC) tablet 10 mg  10 mg Oral DAILY    insulin glargine (LANTUS) injection 12 Units  12 Units SubCUTAneous QHS    insulin lispro (HUMALOG) injection   SubCUTAneous AC&HS    cefTRIAXone (ROCEPHIN) 1 g in 0.9% sodium chloride (MBP/ADV) 50 mL MBP  1 g IntraVENous Q24H    atorvastatin (LIPITOR) tablet 40 mg  40 mg Oral DAILY    carvediloL (COREG) tablet 12.5 mg  12.5 mg Oral BID WITH MEALS    escitalopram oxalate (LEXAPRO) tablet 10 mg  10 mg Oral DAILY    famotidine (PEPCID) tablet 20 mg  20 mg Oral DAILY    rOPINIRole (REQUIP) tablet 1 mg  1 mg Oral BID    spironolactone (ALDACTONE) tablet 25 mg  25 mg Oral DAILY    aspirin chewable tablet 81 mg  81 mg Oral DAILY    sodium chloride (NS) flush 5-40 mL  5-40 mL IntraVENous Q8H    sodium chloride (NS) flush 5-40 mL  5-40 mL IntraVENous PRN    acetaminophen (TYLENOL) tablet 650 mg  650 mg Oral Q6H PRN    Or    acetaminophen (TYLENOL) suppository 650 mg  650 mg Rectal Q6H PRN    polyethylene glycol (MIRALAX) packet 17 g  17 g Oral DAILY PRN    promethazine (PHENERGAN) tablet 12.5 mg  12.5 mg Oral Q6H PRN    Or    ondansetron (ZOFRAN) injection 4 mg  4 mg IntraVENous Q6H PRN    HYDROmorphone (DILAUDID) injection 0.5 mg  0.5 mg IntraVENous Q4H PRN    oxyCODONE IR (ROXICODONE) tablet 5 mg  5 mg Oral Q4H PRN       Signed:  Ketan Abdalla MD    Part of this note may have been written by using a voice dictation software. The note has been proof read but may still contain some grammatical/other typographical errors.

## 2021-10-17 NOTE — PROGRESS NOTES
ACUTE PHYSICAL THERAPY GOALS:  (Developed with and agreed upon by patient and/or caregiver.)  1. Ms. Alba Govea will perform supine to sit and sit to supine independently in 7 days. 2.  Ms. Alba Govea will perform sit to stand and bed to chair with rolling walker independently in 7 days. 3.  Ms. Alba Govea will perform gait with rolling walker 150 ft independently in 7 days. PHYSICAL THERAPY: Daily Note and PM Treatment Day # 2    Gina De La Torre is a 80 y.o. female   PRIMARY DIAGNOSIS: Debility  Debility [R53.81]         ASSESSMENT:     REHAB RECOMMENDATIONS: CURRENT LEVEL OF FUNCTION:  (Most Recently Demonstrated)   Recommendation to date pending progress:  Setting:   Short-term Rehab  Equipment:    To Be Determined Bed Mobility:   Modified Independent  Sit to Stand:  [employer] Assistance  Transfers:   Contact Guard Assistance  Gait/Mobility:   Contact Guard Assistance     ASSESSMENT:  Ms. Alba Govea is supine and happy to see me. She performed supine exercises with good ability then sat up without my help. She stood into the walker and walked up the dumont and back with CGA. Moving well and happy to be up and moving. She would benefit from a rehab stay due to all the falls she has been having at home. SUBJECTIVE:   Ms. Alba Govea states, \"you made my day. \"    SOCIAL HISTORY/ LIVING ENVIRONMENT: Lives alone but daughter comes over every day.   Uses rolling walker  Support Systems: Spouse/Significant Other, Child(eulalio)  OBJECTIVE:     PAIN: VITAL SIGNS: LINES/DRAINS:   Pre Treatment: Pain Screen  Pain Scale 1: FLACC  Pain Intensity 1: 0  Post Treatment: 0   none  O2 Device: None (Room air)     MOBILITY: I Mod I S SBA CGA Min Mod Max Total  NT x2 Comments:   Bed Mobility    Rolling [] [] [] [] [] [] [] [] [] [] []    Supine to Sit [] [x] [] [] [] [] [] [] [] [] []    Scooting [] [x] [] [] [] [] [] [] [] [] []    Sit to Supine [] [] [] [] [] [] [] [] [] [] []    Transfers    Sit to Stand [] [] [] [] [x] [] [] [] [] [] []    Bed to Chair [] [] [] [] [] [] [] [] [] [] []    Stand to Sit [] [] [] [x] [] [] [] [] [] [] []    I=Independent, Mod I=Modified Independent, S=Supervision, SBA=Standby Assistance, CGA=Contact Guard Assistance,   Min=Minimal Assistance, Mod=Moderate Assistance, Max=Maximal Assistance, Total=Total Assistance, NT=Not Tested    BALANCE: Good Fair+ Fair Fair- Poor NT Comments   Sitting Static [x] [] [] [] [] []    Sitting Dynamic [x] [] [] [] [] []              Standing Static [] [] [x] [] [] []    Standing Dynamic [] [] [x] [] [] []      GAIT: I Mod I S SBA CGA Min Mod Max Total  NT x2 Comments:   Level of Assistance [] [] [] [] [x] [] [] [] [] [] []    Distance 80    DME Rolling Walker    Gait Quality fair    Weightbearing  Status WBAT     I=Independent, Mod I=Modified Independent, S=Supervision, SBA=Standby Assistance, CGA=Contact Guard Assistance,   Min=Minimal Assistance, Mod=Moderate Assistance, Max=Maximal Assistance, Total=Total Assistance, NT=Not Tested    PLAN:   FREQUENCY/DURATION: PT Plan of Care: 3 times/week for duration of hospital stay or until stated goals are met, whichever comes first.  TREATMENT:     TREATMENT:   ($$ Therapeutic Activity: 8-22 mins  $$ Therapeutic Exercises: 8-22 mins    )  Therapeutic Activity (15 Minutes): Therapeutic activity included Supine to Sit, Scooting, Transfer Training and Ambulation on level ground to improve functional Mobility, Strength and Activity tolerance. Therapeutic Exercise (10 Minutes): Therapeutic exercises noted below to improve functional activity tolerance, AROM, strength and mobility.      TREATMENT GRID:   Date:  10/17/21 Date:   Date:     Activity/Exercise Parameters Parameters Parameters   Supine AP 20x B     Supine heel slides 10x B     Supine SAQ 10x B     Supine hip abd 10x B     bridging 10x B                       AFTER TREATMENT POSITION/PRECAUTIONS:  Chair, Needs within reach and RN notified    INTERDISCIPLINARY COLLABORATION:  RN/PCT and PT/PTA    TOTAL TREATMENT DURATION:  PT Patient Time In/Time Out  Time In: 1330  Time Out: 1355    Bannock Hallmark, PTA

## 2021-10-17 NOTE — PROGRESS NOTES
Orthopaedic Surgery Progress Note     Krzysztof  Ever   female   1934     Patient Active Problem List    Diagnosis Date Noted    History of fracture of right hip 10/13/2021    Debility 10/12/2021    Intertrochanteric fracture of left femur, closed, initial encounter ([facility] [address] 09/18/2021    UTI (urinary tract infection) 03/07/2021    Acute blood loss anemia 03/07/2021    Acute cystitis without hematuria 03/04/2021    Closed fracture of left hip ([facility] [address] 03/03/2021    Hip fracture requiring operative repair ([facility] [address] 03/03/2021    Diabetic polyneuropathy associated with type 2 diabetes mellitus ([facility] [address] 06/11/2018    Fall 06/11/2018    Encounter for medication management 06/11/2018    Type 2 diabetes with nephropathy ([facility] [address] 02/09/2018    Polyneuropathy 11/20/2017    Vaginal candida 10/26/2017    Dysuria 10/25/2017    CVA (cerebral vascular accident) ([facility] [address] 10/23/2017    Cholecystitis with cholelithiasis 10/06/2017    Age-related osteoporosis with current pathological fracture with routine healing 07/06/2016    History of implantation of joint prosthesis of elbow 05/18/2016    Electrolyte abnormality 05/11/2016    CAP (community acquired pneumonia) 05/09/2016    LIZ (acute kidney injury) ([facility] [address] 05/09/2016    Non-PTH-dependent hypercalcemia 05/09/2016    Coronary artery disease involving native coronary artery of native heart with angina pectoris ([facility] [address] 05/03/2016    Dyslipidemia 05/03/2016    History of artificial joint 02/03/2016    Supracondylar fracture of humerus 01/04/2016    Precordial pain 09/04/2015    Nonspecific abnormal electrocardiogram (ECG) (EKG) 09/04/2015    Pericarditis, acute 05/12/2015    DM type 2 (diabetes mellitus, type 2) ([facility] [address] 03/16/2013    Essential hypertension with goal blood pressure less than 140/90 03/16/2013    Crohn disease ([address] 03/16/2013         Subjective:   Pain well controlled, no acute distress. Objective:   Afebrile, vital signs stable.    Visit Vitals  BP (!) 123/50   Pulse 65   Temp 98 °F (36.7 °C)   Resp 16   Ht 4' 9\" (1.448 m)   Wt 116 lb (52.6 kg)   SpO2 97%   BMI 25.10 kg/m²        GENERAL:     AAOx3, NAD    EXTREMITIES:       Right Lower Extremity  Healed surgical incision  Intact EHL/FHL/TA/GS   Sensation intact to light touch DP/SP   2+ DP pulse   Toes WWP  No pedal edema          CBC:  No results found for: WBC, RBC, HGB, HCT, PLT, HGBEXT, HCTEXT, PLTEXT   BMP: No results found for: NA, K, CL, CO2, BUN, GLU   CMP: No results found for: NA, K, CL, CO2, BUN, GLU, ALBUMIN, AST, ALT     Assessment/Plan     80 y.o. female with the following musculoskeletal problems:       Debility following IMN R intertrochanteric femur fracture        Weightbearing status   WBAT   DVT prophylaxis   ASA 325mg q d   Dressing changes   none   Drain status   none   Physical/Occupational Therapy   continue   Pain control      Disposition    for longterm SNF placement      Patricia Sorto MD  10/17/21  10:36 AM    Orthopaedic Hand Surgery    [facility]  Phone: [phone]

## 2021-10-18 LAB
GLUCOSE BLD STRIP.AUTO-MCNC: 136 MG/DL (ref 65–100)
GLUCOSE BLD STRIP.AUTO-MCNC: 188 MG/DL (ref 65–100)
GLUCOSE BLD STRIP.AUTO-MCNC: 200 MG/DL (ref 65–100)
GLUCOSE BLD STRIP.AUTO-MCNC: 216 MG/DL (ref 65–100)
SERVICE CMNT-IMP: ABNORMAL

## 2021-10-18 PROCEDURE — 74011636637 HC RX REV CODE- 636/637: Performed by: FAMILY MEDICINE

## 2021-10-18 PROCEDURE — 74011250637 HC RX REV CODE- 250/637: Performed by: NURSE PRACTITIONER

## 2021-10-18 PROCEDURE — 82962 GLUCOSE BLOOD TEST: CPT

## 2021-10-18 PROCEDURE — 65270000029 HC RM PRIVATE

## 2021-10-18 PROCEDURE — 74011250637 HC RX REV CODE- 250/637: Performed by: FAMILY MEDICINE

## 2021-10-18 PROCEDURE — 97535 SELF CARE MNGMENT TRAINING: CPT

## 2021-10-18 RX ORDER — INSULIN GLARGINE 100 [IU]/ML
18 INJECTION, SOLUTION SUBCUTANEOUS
Status: DISCONTINUED | OUTPATIENT
Start: 2021-10-18 | End: 2021-10-19 | Stop reason: HOSPADM

## 2021-10-18 RX ORDER — INSULIN GLARGINE 100 [IU]/ML
16 INJECTION, SOLUTION SUBCUTANEOUS
Status: DISCONTINUED | OUTPATIENT
Start: 2021-10-18 | End: 2021-10-18

## 2021-10-18 RX ADMIN — AMLODIPINE BESYLATE 10 MG: 10 TABLET ORAL at 08:59

## 2021-10-18 RX ADMIN — SODIUM CHLORIDE 10 ML: 9 INJECTION, SOLUTION INTRAMUSCULAR; INTRAVENOUS; SUBCUTANEOUS at 13:02

## 2021-10-18 RX ADMIN — ROPINIROLE HYDROCHLORIDE 1 MG: 0.5 TABLET, FILM COATED ORAL at 17:08

## 2021-10-18 RX ADMIN — ASPIRIN 81 MG: 81 TABLET, CHEWABLE ORAL at 08:59

## 2021-10-18 RX ADMIN — ROPINIROLE HYDROCHLORIDE 1 MG: 0.5 TABLET, FILM COATED ORAL at 09:01

## 2021-10-18 RX ADMIN — ATORVASTATIN CALCIUM 40 MG: 40 TABLET, FILM COATED ORAL at 09:00

## 2021-10-18 RX ADMIN — ESCITALOPRAM OXALATE 10 MG: 10 TABLET ORAL at 08:59

## 2021-10-18 RX ADMIN — INSULIN LISPRO 4 UNITS: 100 INJECTION, SOLUTION INTRAVENOUS; SUBCUTANEOUS at 11:17

## 2021-10-18 RX ADMIN — INSULIN GLARGINE 18 UNITS: 100 INJECTION, SOLUTION SUBCUTANEOUS at 21:08

## 2021-10-18 RX ADMIN — SPIRONOLACTONE 25 MG: 25 TABLET ORAL at 09:00

## 2021-10-18 RX ADMIN — FAMOTIDINE 20 MG: 20 TABLET ORAL at 09:04

## 2021-10-18 RX ADMIN — CARVEDILOL 12.5 MG: 12.5 TABLET, FILM COATED ORAL at 09:00

## 2021-10-18 RX ADMIN — CARVEDILOL 12.5 MG: 12.5 TABLET, FILM COATED ORAL at 17:08

## 2021-10-18 RX ADMIN — OXYCODONE 5 MG: 5 TABLET ORAL at 19:58

## 2021-10-18 RX ADMIN — SODIUM CHLORIDE 10 ML: 9 INJECTION, SOLUTION INTRAMUSCULAR; INTRAVENOUS; SUBCUTANEOUS at 21:08

## 2021-10-18 NOTE — PROGRESS NOTES
October 18, 2021         Admit Date: 10/12/2021  Admit Diagnosis: Debility [R53.81]       Principle Problem: Debility. Subjective: Doing ok, complains of being depressed, No new complaints, No SOB, No Chest Pain, No Nausea or Vomiting     Objective:   Vital Signs are Stable, No Acute Distress, Alert,  Neurovascular exam is normal.     Assessment / Plan :  Patient Active Problem List   Diagnosis Code    DM type 2 (diabetes mellitus, type 2) ([facility] [address] E11.9    Essential hypertension with goal blood pressure less than 140/90 I10    Crohn disease ([facility] [address] K50.90    Pericarditis, acute I30.9    Precordial pain R07.2    Nonspecific abnormal electrocardiogram (ECG) (EKG) R94.31    Coronary artery disease involving native coronary artery of native heart with angina pectoris (HCC) I25.119    Dyslipidemia E78.5    CAP (community acquired pneumonia) J18.9    LIZ (acute kidney injury) ([facility] [address] N17.9    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    Electrolyte abnormality E87.8    History of artificial joint Z96.60    History of implantation of joint prosthesis of elbow Z96.629    Supracondylar fracture of humerus S42.413A    Age-related osteoporosis with current pathological fracture with routine healing M80.00XD    Cholecystitis with cholelithiasis K80.10    CVA (cerebral vascular accident) ([facility] [address] I63.9    Dysuria R30.0    Vaginal candida B37.3    Polyneuropathy G62.9    Type 2 diabetes with nephropathy ([facility] [address] E11.21    Diabetic polyneuropathy associated with type 2 diabetes mellitus ([facility] [address] E11.42    Fall W19. Adaline Bream Encounter for medication management U06.566    Closed fracture of left hip ([facility] [address] S72.002A    Hip fracture requiring operative repair ([facility] [address] S72.009A    Acute cystitis without hematuria N30.00    UTI (urinary tract infection) N39.0    Acute blood loss anemia D62    Intertrochanteric fracture of left femur, closed, initial encounter ([facility] [address] S72.142A    Debility R53.81    History of fracture of right hip Z87.81      Patient Vitals for the past 8 hrs:   BP Temp Pulse Resp SpO2   10/18/21 1114 130/65 98.4 °F (36.9 °C) 69 18 96 %   10/18/21 0735 137/62 98.3 °F (36.8 °C) 71 16 99 %    Temp (24hrs), Av.2 °F (36.8 °C), Min:98.1 °F (36.7 °C), Max:98.4 °F (36.9 °C)    Body mass index is 25.1 kg/m².     Lab Results   Component Value Date/Time    HGB 11.5 (L) 10/16/2021 03:49 AM        Debility  S/P right hip gamma nail: readmission due to debility as patient is unable to care for her self at home  Medical mgmt per hopsitalist: patient on lexapro for depression   Continue PT   Discharge Disp: social work for assistance with longterm SNF placement        Signed By: MANOHAR Clay  10/18/2021,  1:25 PM

## 2021-10-18 NOTE — PROGRESS NOTES
[place] Hospitalist Consult   Admit Date:  10/12/2021 10:00 PM   Name:  Ruth Ann Lopez   Age:  80 y.o. Sex:  female  :  1934   MRN:  [id number]   Room:  [facility]    Presenting Complaint: No chief complaint on file. Reason(s) for Admission: Debility [R53.81]     Hospitalists consulted by Peri Momin MD for: Medical management    History of Presenting Illness:   Ruth Ann Lopez is a 80 y.o. female with history of type 2 diabetes mellitus, osteoporosis, hypercalcemia, hypertension, GERD, CVA admitted by Ortho service for Rt hip pain. Patient is 4 weeks status post ORIF with cephalomedullary nail fixation of the right hip by Dr. Nancy Medellin. Readmitted as not able to take care of herself at home. 10/18: Patient is seen at bedside. No active complaint. Pending placement. Review of Systems:  10 systems reviewed and negative except as noted in HPI. Assessment & Plan:     History of fracture of right hip:  Debility:  4 weeks status post ORIF with cephalomedullary nail fixation of the right hip by Dr. Nancy Medellin. CT pelvis with no clear evidence of hardware complication  PT OT  Rest as per primary    UTI:  UA suggestive of UTI  Urine culture Ecoli pansensitive except Bactrim  Completed ceftriaxone    Diabetes mellitus  A1c 9.2 on 9/19  10/18: Increase Lantus to 18 units. Monitor a.m. labs. Hypertension:  Continue home meds: Coreg, amlodipine and Aldactone  10/18: Optimally controlled today.  Continue current antihypertensives    History of CVA:   No residual deficiency  Continue aspirin and statin    History of Crohn's disease:  Stable    GERD:  Continue famotidine    Depression:  [facility] Problems as of 10/18/2021 Date Reviewed: 10/12/2021        Codes Class Noted - Resolved POA    History of fracture of right hip ICD-10-CM: Z87.81  ICD-9-CM: V15.51  10/13/2021 - Present Yes        * (Principal) Debility ICD-10-CM: R53.81  ICD-9-CM: 799.3  10/12/2021 - Present Unknown UTI (urinary tract infection) ICD-10-CM: N39.0  ICD-9-CM: 599.0  3/7/2021 - Present Yes        CVA (cerebral vascular accident) [facility]) ICD-10-CM: I63.9  ICD-9-CM: 434.91  10/23/2017 - Present Yes    Overview Signed 10/27/2017 12:11 PM by Danni Capone     Probable chronic right parietal occipital CVA containing some petechial hemosiderin, pontine and supratentorial microischemia. Coronary artery disease involving native coronary artery of native heart with angina pectoris [facility]) ICD-10-CM: I25.119  ICD-9-CM: 414.01, 413.9  5/3/2016 - Present Yes        DM type 2 (diabetes mellitus, type 2) (HCC) (Chronic) ICD-10-CM: E11.9  ICD-9-CM: 250.00  3/16/2013 - Present Yes        Crohn disease ([facility] [address] (Chronic) ICD-10-CM: K50.90  ICD-9-CM: 555.9  3/16/2013 - Present Yes              Past Medical History:   Diagnosis Date    Acute kidney failure 2016    Acute pericarditis     Arthritis     CAP (community acquired pneumonia) 2016    Coronary artery disease     Crohn's disease     Hypercalcemia     Hypercholesterolemia     Hypertension     Lumbar compression fracture     Osteoporosis     Type 2 diabetes mellitus       Past Surgical History:   Procedure Laterality Date    HX ARTHROPLASTY  2016    left elbow    HX HEART CATHETERIZATION  2015    no intervention    HX HYSTERECTOMY      HX KYPHOPLASTY  ~    lumbar      Allergies   Allergen Reactions    Sulfa (Sulfonamide Antibiotics) Rash    Other Medication Unknown (comments)     \"Dypentin\"    Tape [Adhesive] Other (comments)     Blisters and skin tears      Social History     Tobacco Use    Smoking status: Former Smoker     Types: Cigarettes     Start date: 1961     Quit date: 1964     Years since quittin.3    Smokeless tobacco: Never Used    Tobacco comment: 2 packs a wk   Substance Use Topics    Alcohol use:  Yes     Alcohol/week: 0.0 standard drinks     Comment: rarely      Family History   Problem Relation Age of Onset    Heart Failure Mother     Diabetes Mother     Cancer Father         throat    Alcohol abuse Neg Hx     Arthritis-rheumatoid Neg Hx     Asthma Neg Hx     Bleeding Prob Neg Hx     Elevated Lipids Neg Hx     Headache Neg Hx     Hypertension Neg Hx     Lung Disease Neg Hx     Migraines Neg Hx     Psychiatric Disorder Neg Hx     Stroke Neg Hx     Mental Retardation Neg Hx     Thyroid Disease Neg Hx       Family history reviewed and negative unless otherwise noted above. Immunization History   Administered Date(s) Administered    Covid-19, MODERNA, Mrna, Lnp-s, Pf, 100mcg/0.5mL 04/20/2021, 05/18/2021    Influenza Vaccine 10/01/2015, 10/17/2016    TB Skin Test (PPD) Intradermal 03/17/2013, 03/04/2021, 09/19/2021, 10/13/2021       Objective:     Patient Vitals for the past 24 hrs:   Temp Pulse Resp BP SpO2   10/18/21 1114 98.4 °F (36.9 °C) 69 18 130/65 96 %   10/18/21 0735 98.3 °F (36.8 °C) 71 16 137/62 99 %   10/18/21 0309 98.2 °F (36.8 °C) 64 20 (!) 148/60 97 %   10/18/21 0005 98.1 °F (36.7 °C) 64 20 133/74 98 %   10/17/21 1933 98.2 °F (36.8 °C) 66 16 (!) 131/52 97 %   10/17/21 1558 98.1 °F (36.7 °C) 72 16 (!) 147/64 94 %     Oxygen Therapy  O2 Sat (%): 96 % (10/18/21 1114)  O2 Device: None (Room air) (10/18/21 0730)    Estimated body mass index is 25.1 kg/m² as calculated from the following:    Height as of this encounter: 4' 9\" (1.448 m). Weight as of this encounter: 52.6 kg (116 lb). Intake/Output Summary (Last 24 hours) at 10/18/2021 1426  Last data filed at 10/18/2021 1304  Gross per 24 hour   Intake 600 ml   Output 600 ml   Net 0 ml         Physical Exam:    Blood pressure 130/65, pulse 69, temperature 98.4 °F (36.9 °C), resp. rate 18, height 4' 9\" (1.448 m), weight 52.6 kg (116 lb), last menstrual period 09/08/2008, SpO2 96 %. General:    Well nourished.   No overt distress  Head:  Normocephalic, atraumatic  Eyes:  Sclerae appear normal.  Pupils equally round.  ENT:  Nares appear normal, no drainage. Moist oral mucosa  Neck:  No restricted ROM. Trachea midline   CV:   RRR. No m/r/g. No jugular venous distension. Lungs:   CTAB. No wheezing, rhonchi, or rales. Respirations even, unlabored  Abdomen: Bowel sounds present. Soft, nontender, nondistended. Extremities: No cyanosis or clubbing. No edema  Skin:     No rashes and normal coloration. Warm and dry. Neuro:  CN II-XII grossly intact. Sensation intact. A&Ox3  Psych:  Normal mood and affect.       I have reviewed ordered lab tests and independently visualized imaging below:    Recent Labs:  Recent Results (from the past 48 hour(s))   GLUCOSE, POC    Collection Time: 10/16/21  4:19 PM   Result Value Ref Range    Glucose (POC) 260 (H) 65 - 100 mg/dL    Performed by Keshia Leary    GLUCOSE, POC    Collection Time: 10/16/21  9:11 PM   Result Value Ref Range    Glucose (POC) 129 (H) 65 - 100 mg/dL    Performed by [employer]    GLUCOSE, POC    Collection Time: 10/17/21  6:34 AM   Result Value Ref Range    Glucose (POC) 115 (H) 65 - 100 mg/dL    Performed by [employer]    GLUCOSE, POC    Collection Time: 10/17/21 11:31 AM   Result Value Ref Range    Glucose (POC) 170 (H) 65 - 100 mg/dL    Performed by [employer]    GLUCOSE, POC    Collection Time: 10/17/21  4:42 PM   Result Value Ref Range    Glucose (POC) 189 (H) 65 - 100 mg/dL    Performed by [employer]    GLUCOSE, POC    Collection Time: 10/17/21  9:41 PM   Result Value Ref Range    Glucose (POC) 140 (H) 65 - 100 mg/dL    Performed by [employer]    GLUCOSE, POC    Collection Time: 10/18/21  6:38 AM   Result Value Ref Range    Glucose (POC) 136 (H) 65 - 100 mg/dL    Performed by [employer]    GLUCOSE, POC    Collection Time: 10/18/21 11:02 AM   Result Value Ref Range    Glucose (POC) 200 (H) 65 - 100 mg/dL    Performed by Daryl        All Micro Results     Procedure Component Value Units Date/Time    SARS-COV-2, PCR [[id number]] Collected: 10/15/21 1442    Order Status: Completed Specimen: Nasopharyngeal Updated: 10/16/21 1249     Specimen source Nasopharyngeal        SARS-CoV-2 Not detected        Comment:      The specimen is NEGATIVE for SARS-CoV-2, the novel coronavirus associated with COVID-19. This test has been authorized by the FDA under an Emergency Use Authorization (EUA) for use by authorized laboratories. Fact sheet for Healthcare Providers: [employer]       Fact sheet for Patients: [employer]       Methodology: RT-PCR         CULTURE, URINE [[id number]]  (Abnormal)  (Susceptibility) Collected: 10/13/21 1045    Order Status: Completed Specimen: Urine from Clean catch Updated: 10/16/21 1156     Special Requests: NO SPECIAL REQUESTS        Culture result:       >100,000 COLONIES/mL ESCHERICHIA COLI                  10,000 to 50,000 COLONIES/mL MIXED SKIN ANNETTE ISOLATED          RESPIRATORY VIRUS PANEL W/COVID-19, PCR [[id number]]     Order Status: Sent Specimen: NASOPHARYNGEAL SWAB           Other Studies:  No results found.     Current Meds:  Current Facility-Administered Medications   Medication Dose Route Frequency    insulin glargine (LANTUS) injection 16 Units  16 Units SubCUTAneous QHS    amLODIPine (NORVASC) tablet 10 mg  10 mg Oral DAILY    atorvastatin (LIPITOR) tablet 40 mg  40 mg Oral DAILY    carvediloL (COREG) tablet 12.5 mg  12.5 mg Oral BID WITH MEALS    escitalopram oxalate (LEXAPRO) tablet 10 mg  10 mg Oral DAILY    famotidine (PEPCID) tablet 20 mg  20 mg Oral DAILY    rOPINIRole (REQUIP) tablet 1 mg  1 mg Oral BID    spironolactone (ALDACTONE) tablet 25 mg  25 mg Oral DAILY    aspirin chewable tablet 81 mg  81 mg Oral DAILY    sodium chloride (NS) flush 5-40 mL  5-40 mL IntraVENous Q8H    sodium chloride (NS) flush 5-40 mL  5-40 mL IntraVENous PRN    acetaminophen (TYLENOL) tablet 650 mg  650 mg Oral Q6H PRN    Or    acetaminophen (TYLENOL) suppository 650 mg  650 mg Rectal Q6H PRN    polyethylene glycol (MIRALAX) packet 17 g  17 g Oral DAILY PRN    promethazine (PHENERGAN) tablet 12.5 mg  12.5 mg Oral Q6H PRN    Or    ondansetron (ZOFRAN) injection 4 mg  4 mg IntraVENous Q6H PRN    HYDROmorphone (DILAUDID) injection 0.5 mg  0.5 mg IntraVENous Q4H PRN    oxyCODONE IR (ROXICODONE) tablet 5 mg  5 mg Oral Q4H PRN       Signed:  Gabby Andrade MD    Part of this note may have been written by using a voice dictation software. The note has been proof read but may still contain some grammatical/other typographical errors.

## 2021-10-18 NOTE — PROGRESS NOTES
Resting quietly, resp even, unlab with no distress noted. Call light in reach, bed alarm set, door open. Report given to Eleonora Richardson RN.

## 2021-10-18 NOTE — PROGRESS NOTES
ACUTE OT GOALS:  (Developed with and agreed upon by patient and/or caregiver.)  1. Pt will toilet with min A  2. Pt will complete functional mobility for ADLs with min A  3. Pt will complete lower body dressing with min A using AE as needed  4. Pt will complete UB bathing and dressing with set up using AE as needed  5. Pt will demonstrate independence with HEP to promote increased BUE strength and functional use for ADLs  6. Pt will tolerate 23 minutes functional activity with min or fewer rest breaks to promote increased endurance for ADLs  7. Pt will independently demonstrate/ verbalize 2+ energy conservation techniques to promote increased endurance for ADLs        Timeframe: 7 days    OCCUPATIONAL THERAPY: Daily Note and PM OT Treatment Day # 3     WBAT RLE    Suma Rhoades is a 80 y.o. female   PRIMARY DIAGNOSIS: Debility  Debility [R53.81]       Payor: Mercedes Aguiar / Plan: [address] [place] [address] HMO / Product Type: Managed Care Medicare /   ASSESSMENT:     REHAB RECOMMENDATIONS: CURRENT LEVEL OF FUNCTION:  (Most Recently Demonstrated)   Recommendation to date pending progress:  Setting:   Pt would benefit from STR but per pt, family, and  plan is for pt to go to [facility]. Would benefit from Clovis Bear at [facility]. Equipment:    To Be Determined Bathing:   Not tested  Dressing:   Contact Guard Assistance for pull up management. Feeding/Grooming:   Standby Assistance to wash hands and brush teeth at sink. Toileting:   Contact Guard Assistance wiping and pull up management in standing. Functional Mobility:   Min A sit <> stand from commode; CGA all other mobility. ASSESSMENT:  Ms. Deloris Rand is making good progress towards acute care OT goals. Pt with R ORIF about 4-5 weeks ago and in WBAT in RLE. Requires SBA to complete bed mobility and CGA to complete sit <> stand transfers from bed and recliner chair. Requires CGA x RW for all in room and bathroom mobility.  Requires CGA to complete stand <> sit on commode and Min A to complete sit <> stand from commode. Requires CGA to complete genital wiping and pull up management in standing. Requires CGA x RW to walk from commode to sink and SBA to wash hands at sink. Requires SBA to brush teeth at sink with no loss of balance observed. Requires CGA x RW to return to recliner chair. Pt would benefit from continued skilled OT to increase independence and safety for performance of ADLs and functional mobility. SUBJECTIVE:   Ms. Abdirahman Rubalcava states, \"I'm going to assisted living. \"     SOCIAL HISTORY/LIVING ENVIRONMENT: Recently d/c'ed home from rehab and reports decreased ADLs and mobility since R hip ORIF. Lives w/ spouse, daughter comes over and assists w/ bathing and dressing. WIS, shower chair, grab bars. RW and WC. Spouse is currently hospitalized. Support Systems: Spouse/Significant Other, Child(eulalio)    OBJECTIVE:     PAIN: VITAL SIGNS: LINES/DRAINS:   Pre Treatment: Pain Screen  Pain Scale 1: Numeric (0 - 10)  Pain Intensity 1: 0 (At rest)  Post Treatment: 0   N/A  O2 Device: None (Room air)     ACTIVITIES OF DAILY LIVING: I Mod I S SBA CGA Min Mod Max Total NT Comments   BASIC ADLs:              Bathing/ Showering [] [] [] [] [] [] [] [] [] [x]    Toileting [] [] [] [] [x] [] [] [] [] [] Wiping and pull up management in standing. Dressing [] [] [] [] [x] [] [] [] [] [] Pull up management in standing. Feeding [] [] [] [] [] [] [] [] [] [x]    Grooming [] [] [] [x] [] [] [] [] [] [] Wash hands and brush teeth at sink. Personal Device Care [] [] [] [] [] [] [] [] [] [x]    Functional Mobility [] [] [] [] [x] [x] [] [] [] [] CGA most mobility; Min A sit <> stand from commode.    I=Independent, Mod I=Modified Independent, S=Supervision, SBA=Standby Assistance, CGA=Contact Guard Assistance,   Min=Minimal Assistance, Mod=Moderate Assistance, Max=Maximal Assistance, Total=Total Assistance, NT=Not Tested    MOBILITY: I Mod I S SBA CGA Min Mod Max Total  NT x2 Comments: Supine to sit [] [] [] [x] [] [] [] [] [] [] []    Sit to supine [] [] [] [] [] [] [] [] [] [x] []    Sit to stand [] [] [] [] [x] [x] [] [] [] [] [] X RW   Bed to chair [] [] [] [] [x] [] [] [] [] [] [] X RW   I=Independent, Mod I=Modified Independent, S=Supervision, SBA=Standby Assistance, CGA=Contact Guard Assistance,   Min=Minimal Assistance, Mod=Moderate Assistance, Max=Maximal Assistance, Total=Total Assistance, NT=Not Tested    BALANCE: Good Fair+ Fair Fair- Poor NT Comments   Sitting Static [] [x] [] [] [] []    Sitting Dynamic [] [] [x] [] [] []              Standing Static [] [x] [] [] [] []    Standing Dynamic [] [] [x] [] [] []      PLAN:   FREQUENCY/DURATION: OT Plan of Care: 3 times/week for duration of hospital stay or until stated goals are met, whichever comes first.    TREATMENT:   TREATMENT:   ($$ Self Care/Home Management: 23-37 mins    )  Self Care (33 Minutes): Self care including Toileting, Lower Body Dressing and Grooming to increase independence, decrease level of assistance required and increase activity tolerance. .    TREATMENT GRID:  N/A    AFTER TREATMENT POSITION/PRECAUTIONS:  Alarm Activated, Chair, Needs within reach and pt daughter seated adjacent.     INTERDISCIPLINARY COLLABORATION:  RN/PCT and OT/YOUNG    TOTAL TREATMENT DURATION:  OT Patient Time In/Time Out  Time In: 1400  Time Out: 1433    Geraldine Garza OTR/L

## 2021-10-18 NOTE — PROGRESS NOTES
Problem: Diabetes Self-Management  Goal: *Disease process and treatment process  Description: Define diabetes and identify own type of diabetes; list 3 options for treating diabetes. Outcome: Progressing Towards Goal  Goal: *Incorporating nutritional management into lifestyle  Description: Describe effect of type, amount and timing of food on blood glucose; list 3 methods for planning meals. Outcome: Progressing Towards Goal  Goal: *Incorporating physical activity into lifestyle  Description: State effect of exercise on blood glucose levels. Outcome: Progressing Towards Goal  Goal: *Developing strategies to promote health/change behavior  Description: Define the ABC's of diabetes; identify appropriate screenings, schedule and personal plan for screenings. Outcome: Progressing Towards Goal  Goal: *Using medications safely  Description: State effect of diabetes medications on diabetes; name diabetes medication taking, action and side effects. Outcome: Progressing Towards Goal  Goal: *Monitoring blood glucose, interpreting and using results  Description: Identify recommended blood glucose targets  and personal targets. Outcome: Progressing Towards Goal  Goal: *Prevention, detection, treatment of acute complications  Description: List symptoms of hyper- and hypoglycemia; describe how to treat low blood sugar and actions for lowering  high blood glucose level. Outcome: Progressing Towards Goal  Goal: *Prevention, detection and treatment of chronic complications  Description: Define the natural course of diabetes and describe the relationship of blood glucose levels to long term complications of diabetes.   Outcome: Progressing Towards Goal  Goal: *Developing strategies to address psychosocial issues  Description: Describe feelings about living with diabetes; identify support needed and support network  Outcome: Progressing Towards Goal  Goal: *Insulin pump training  Outcome: Progressing Towards Goal  Goal: *Sick day guidelines  Outcome: Progressing Towards Goal  Goal: *Patient Specific Goal (EDIT GOAL, INSERT TEXT)  Outcome: Progressing Towards Goal     Problem: Patient Education: Go to Patient Education Activity  Goal: Patient/Family Education  Outcome: Progressing Towards Goal     Problem: Falls - Risk of  Goal: *Absence of Falls  Description: Document Yadiel Sol Fall Risk and appropriate interventions in the flowsheet. Outcome: Progressing Towards Goal  Note: Fall Risk Interventions:  Mobility Interventions: Bed/chair exit alarm    Mentation Interventions: Adequate sleep, hydration, pain control    Medication Interventions: Bed/chair exit alarm    Elimination Interventions: Bed/chair exit alarm    History of Falls Interventions: Bed/chair exit alarm         Problem: Patient Education: Go to Patient Education Activity  Goal: Patient/Family Education  Outcome: Progressing Towards Goal     Problem: Pressure Injury - Risk of  Goal: *Prevention of pressure injury  Description: Document Joshua Scale and appropriate interventions in the flowsheet.   Outcome: Progressing Towards Goal  Note: Pressure Injury Interventions:  Sensory Interventions: Assess changes in LOC    Moisture Interventions: Absorbent underpads    Activity Interventions: Increase time out of bed    Mobility Interventions: Assess need for specialty bed    Nutrition Interventions: Document food/fluid/supplement intake    Friction and Shear Interventions: HOB 30 degrees or less                Problem: Patient Education: Go to Patient Education Activity  Goal: Patient/Family Education  Outcome: Progressing Towards Goal     Problem: Patient Education: Go to Patient Education Activity  Goal: Patient/Family Education  Outcome: Progressing Towards Goal

## 2021-10-18 NOTE — PROGRESS NOTES
SONY spoke with [facility] Cervantes ED at [facility] today. Pt's daughter has decided to place pt at this facility. Per Titus Quintero the facility can accept pt tomorrow as the daughter must sign paperwork and bring furniture/belongings for pt. Bruna requested pt's most recent med list, physician plan of care (which she faxed to SONY), d/c summary when available, 30-day prescriptions for all of pt's medications. SONY spoke with Dr. Bessy Gong about which physician should complete the physician plan of care since Dr. Dahlia Mendoza is primary. Dr. Bessy Gong asked CM to notify Dr. Tiana Barragan that the forms need to be completed. CM send him a message via [employer] and is awaiting a response. CM will continue to follow and remain available if any needs arise. Dr. Tiana Barragan agreed to complete/sign the physician plan of care and write pt's prescriptions for d/c. Dr. Bessy Gong to notify Dr. Tiana Barragan that pt must be d/c from [facility] at d/c.    SONY received a message from Titus Quintero stating that per [facility] regulations the plan of care must be sent back to her today.  requested these forms Friday and Bruna's response was \"the family is not sure if they can afford an TIAN. They will let me know on Monday. \"    SONY is working to have these forms completed and signed today. Current d/c plan is for pt to d/c to [facility] tomorrow. SONY spoke with pt's daughter at the bedside today and she stated that she is ordering pt a bed. CM provided her with 's contact info and requested she call  when the bed is delivered. Daughter requested that CM set up transport to the [facility]. Pt's apartment number is [id number]. CM will set up transport once it is certain that pt is cleared to d/c. Dr. Tiana Barragan signed the [address] forms for the TIAN and provided Rx for every medication pt will require at d/c per the [facility] regulations. SONY faxed the POC and Rx to Tituschrista Quintero and the originals will be sent with pt in transport packet.

## 2021-10-19 VITALS
HEIGHT: 57 IN | WEIGHT: 116 LBS | RESPIRATION RATE: 13 BRPM | TEMPERATURE: 98.3 F | SYSTOLIC BLOOD PRESSURE: 140 MMHG | HEART RATE: 69 BPM | DIASTOLIC BLOOD PRESSURE: 57 MMHG | BODY MASS INDEX: 25.03 KG/M2 | OXYGEN SATURATION: 96 %

## 2021-10-19 LAB
GLUCOSE BLD STRIP.AUTO-MCNC: 123 MG/DL (ref 65–100)
GLUCOSE BLD STRIP.AUTO-MCNC: 185 MG/DL (ref 65–100)
SERVICE CMNT-IMP: ABNORMAL
SERVICE CMNT-IMP: ABNORMAL

## 2021-10-19 PROCEDURE — 82962 GLUCOSE BLOOD TEST: CPT

## 2021-10-19 PROCEDURE — 74011250637 HC RX REV CODE- 250/637: Performed by: FAMILY MEDICINE

## 2021-10-19 RX ORDER — GUAIFENESIN 100 MG/5ML
81 LIQUID (ML) ORAL DAILY
Qty: 30 TABLET | Refills: 0 | Status: SHIPPED | OUTPATIENT
Start: 2021-10-19 | End: 2021-11-19

## 2021-10-19 RX ADMIN — ATORVASTATIN CALCIUM 40 MG: 40 TABLET, FILM COATED ORAL at 08:49

## 2021-10-19 RX ADMIN — ASPIRIN 81 MG: 81 TABLET, CHEWABLE ORAL at 08:48

## 2021-10-19 RX ADMIN — SODIUM CHLORIDE 10 ML: 9 INJECTION, SOLUTION INTRAMUSCULAR; INTRAVENOUS; SUBCUTANEOUS at 06:10

## 2021-10-19 RX ADMIN — FAMOTIDINE 20 MG: 20 TABLET ORAL at 08:49

## 2021-10-19 RX ADMIN — ROPINIROLE HYDROCHLORIDE 1 MG: 0.5 TABLET, FILM COATED ORAL at 17:27

## 2021-10-19 RX ADMIN — ESCITALOPRAM OXALATE 10 MG: 10 TABLET ORAL at 08:49

## 2021-10-19 RX ADMIN — CARVEDILOL 12.5 MG: 12.5 TABLET, FILM COATED ORAL at 08:48

## 2021-10-19 RX ADMIN — AMLODIPINE BESYLATE 10 MG: 10 TABLET ORAL at 08:49

## 2021-10-19 RX ADMIN — CARVEDILOL 12.5 MG: 12.5 TABLET, FILM COATED ORAL at 17:26

## 2021-10-19 RX ADMIN — SPIRONOLACTONE 25 MG: 25 TABLET ORAL at 08:49

## 2021-10-19 RX ADMIN — ROPINIROLE HYDROCHLORIDE 1 MG: 0.5 TABLET, FILM COATED ORAL at 08:49

## 2021-10-19 NOTE — PROGRESS NOTES
Pt to d/c today to [facility]. [address]: [address]. Report: [id number]. Transport scheduled via [employer] [facility] for 1430 per [facility] ED's request.  Pt is on RA. [facility] uses [facility]. [facility]: RN, PT, OT ordered. Milestones met. Care Management Interventions  PCP Verified by CM: Yes Bacilio Robison MD)  Mode of Transport at Discharge: BLS ([employer])  Transition of Care Consult (CM Consult): Discharge Planning, [address] ([facility])  [address]: No  Reason Outside [place]: Physician referred to specific agency ([facility]  Discharge Durable Medical Equipment: No  Physical Therapy Consult: Yes  Occupational Therapy Consult: Yes  Speech Therapy Consult: No  Support Systems: Spouse/Significant Other, Child(eulalio)  Confirm Follow Up Transport: Other (see comment) [facility])  The Plan for Transition of Care is Related to the Following Treatment Goals : Pt to obtain care to become medically stable and to d/c to [facility].   The Patient and/or Patient Representative was Provided with a Choice of Provider and Agrees with the Discharge Plan?: Yes  Name of the Patient Representative Who was Provided with a Choice of Provider and Agrees with the Discharge Plan: Kai Curtis/Julieth Rust 53 of Choice List was Provided with Basic Dialogue that Supports the Patient's Individualized Plan of Care/Goals, Treatment Preferences and Shares the Quality Data Associated with the Providers?: Yes  [place] Resource Information Provided?: No  Discharge Location  Discharge Placement: Assisted Living ([facility])

## 2021-10-19 NOTE — DISCHARGE INSTRUCTIONS
[address]    IF YOU HAVE ANY PROBLEMS ONCE YOU ARE AT HOME CALL THE FOLLOWING NUMBERS:   Main office number: [phone] ask for Peg Fetch (medical assistant with Dr. Tan Guerin)  Office Address: [facility] [address] [address] [address] [address] , [address] [facility]      Patient Discharge Instructions    Damon Urban / [id number] : 1934    Admitted 10/12/2021 Discharged: 10/19/2021         To be given to [address] on Admission         Weight bearing status: As tolerated with walker and assistance    Activity  · Continue Physical Therapy and Occupational Therapy   · Fall precautions     Wound Care   No known wounds    Diet  · Resume regular or diabetic diet      Medications    · Patient medications are listed on the medication reconciliation sheet. Follow up    Follow up in our office in 2 weeks    All patients are to be transported via stretcher unless they are able to independently get out of a chair and stand without assistance. Information obtained by :  I understand that if any problems occur once I am at home I am to contact my physician. I understand and acknowledge receipt of the instructions indicated above.                                                                                                                                            Physician's or R.N.'s Signature                                                                  Date/Time                                                                                                                                              Patient or Representative Signature                                                          Date/Time

## 2021-10-19 NOTE — DISCHARGE SUMMARY
[facility]  Discharge Summary         Patient ID:  Sloan Gerard  [id number]  72 y.o.  1934    Admit date: 10/12/2021  Discharge date and time:    Admitting Physician: Sheryl Boss MD  Surgeon: Georgette Patel Course    Admission Diagnoses: Debility   Discharge Diagnoses: Debility. Chronic and Acute medical problems addressed during this hospital stay by consulting physicians include: They underwent  for this. Principle Problem: Debility. 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       UTI (urinary tract infection) (3/7/2021)      History of fracture of right hip (10/13/2021)          Hospital Medications:   Current Facility-Administered Medications   Medication Dose Route Frequency    insulin glargine (LANTUS) injection 18 Units  18 Units SubCUTAneous QHS    amLODIPine (NORVASC) tablet 10 mg  10 mg Oral DAILY    atorvastatin (LIPITOR) tablet 40 mg  40 mg Oral DAILY    carvediloL (COREG) tablet 12.5 mg  12.5 mg Oral BID WITH MEALS    escitalopram oxalate (LEXAPRO) tablet 10 mg  10 mg Oral DAILY    famotidine (PEPCID) tablet 20 mg  20 mg Oral DAILY    rOPINIRole (REQUIP) tablet 1 mg  1 mg Oral BID    spironolactone (ALDACTONE) tablet 25 mg  25 mg Oral DAILY    aspirin chewable tablet 81 mg  81 mg Oral DAILY    sodium chloride (NS) flush 5-40 mL  5-40 mL IntraVENous Q8H    sodium chloride (NS) flush 5-40 mL  5-40 mL IntraVENous PRN    acetaminophen (TYLENOL) tablet 650 mg  650 mg Oral Q6H PRN    Or    acetaminophen (TYLENOL) suppository 650 mg  650 mg Rectal Q6H PRN    polyethylene glycol (MIRALAX) packet 17 g  17 g Oral DAILY PRN    promethazine (PHENERGAN) tablet 12.5 mg  12.5 mg Oral Q6H PRN    Or    ondansetron (ZOFRAN) injection 4 mg  4 mg IntraVENous Q6H PRN    HYDROmorphone (DILAUDID) injection 0.5 mg  0.5 mg IntraVENous Q4H PRN    oxyCODONE IR (ROXICODONE) tablet 5 mg  5 mg Oral Q4H PRN         Additional DVT Prophylaxis:  MIN Hose, SCDs     Postoperative Blood Report: If the patient received blood products during their admission they are listed below:     Lab Results   Component Value Date/Time    Crossmatch Expiration 09/21/2021,2359 09/18/2021 11:31 AM     Lab Results   Component Value Date/Time    ABO/Rh(D) Chencho Ottoniel POSITIVE 09/18/2021 11:31 AM     Lab Results   Component Value Date/Time    ABO/Rh(D) Chencho Ottoniel POSITIVE 09/18/2021 11:31 AM     No results found for: PCTUN  No results found for: PCTCT  No results found for: PCTUDIV  No results found for: PCTXM    Post Op complications: none       Physical Therapy: PT was started on the day of surgery and progressed. PT/OT:       Activity Response: Tolerated well (to bathroom w one person asst and use of walker)  Assistive Device: Walker (comment)                  Disposition: Good     Hemoglobin at discharge:   Lab Results   Component Value Date/Time    HGB 11.5 (L) 10/16/2021 03:49 AM       Pt Discharged to: [facility]. Discharge Medications:   Current Discharge Medication List      CONTINUE these medications which have NOT CHANGED    Details   amLODIPine (NORVASC) 5 mg tablet Take 1 Tablet by mouth daily. Indications: high blood pressure  Qty: 30 Tablet, Refills: 0  Start date: 10/18/2021      aspirin (ASPIRIN) 325 mg tablet Take 1 Tablet by mouth daily. Qty: 10 Tablet, Refills: 0  Start date: 10/18/2021      atorvastatin (LIPITOR) 40 mg tablet Take 1 Tablet by mouth daily. Indications: excessive fat in the blood, treatment to slow progression of coronary artery disease  Qty: 30 Tablet, Refills: 0  Start date: 10/18/2021      carvediloL (Coreg) 12.5 mg tablet Take 1 Tablet by mouth two (2) times daily (with meals). Qty: 60 Tablet, Refills: 0  Start date: 10/18/2021      cyanocobalamin 1,000 mcg tablet Take 2.5 Tablets by mouth daily. Qty: 75 Tablet, Refills: 0  Start date: 10/18/2021      calcium-vitamin D (OS-ALYCIA +D3) 500 mg-200 unit per tablet Take 1 Tablet by mouth three (3) times daily (with meals). Qty: 90 Tablet, Refills: 0  Start date: 10/18/2021      escitalopram oxalate (LEXAPRO) 10 mg tablet Take 1 Tablet by mouth daily. Qty: 30 Tablet, Refills: 0  Start date: 10/18/2021      famotidine (PEPCID) 20 mg tablet Take 1 Tablet by mouth daily. Qty: 30 Tablet, Refills: 0  Start date: 82/77/9087      folic acid (FOLVITE) 332 mcg tablet Take 1 Tablet by mouth daily. Qty: 30 Tablet, Refills: 0  Start date: 10/18/2021      insulin glargine (LANTUS,BASAGLAR) 100 unit/mL (3 mL) inpn 20 Units by SubCUTAneous route daily for 30 days. Qty: 6 mL, Refills: 0  Start date: 10/18/2021, End date: 11/17/2021      multivitamin with iron tablet Take 1 Tablet by mouth daily. Qty: 30 Tablet, Refills: 0  Start date: 10/18/2021      omeprazole (PRILOSEC) 40 mg capsule Take 1 Capsule by mouth daily. Qty: 30 Capsule, Refills: 0  Start date: 10/18/2021      ondansetron (Zofran ODT) 4 mg disintegrating tablet Take 1 Tablet by mouth every eight (8) hours as needed for Nausea. Qty: 11 Tablet, Refills: 1  Start date: 10/18/2021      rOPINIRole (REQUIP) 1 mg tablet Take 1 Tablet by mouth two (2) times a day. Qty: 60 Tablet, Refills: 0  Start date: 10/18/2021      spironolactone (ALDACTONE) 25 mg tablet Take 1 Tablet by mouth daily. Qty: 30 Tablet, Refills: 0  Start date: 10/18/2021      B.infantis-B.ani-B.long-B.bifi (Probiotic 4X) 10-15 mg TbEC Take 1 Tablet by mouth daily.   Qty: 30 Tablet, Refills: 0  Start date: 10/18/2021      HYDROcodone-acetaminophen (NORCO) 5-325 mg per tablet Take 1 Tablet by mouth every six (6) hours as needed for Pain for up to 5 days. Max Daily Amount: 4 Tablets. Qty: 20 Tablet, Refills: 0  Start date: 10/18/2021, End date: 10/23/2021    Associated Diagnoses: Closed hip fracture requiring operative repair, right, initial encounter ([facility] [address]      cholecalciferol (Vitamin D3) 25 mcg (1,000 unit) cap Take  by mouth every other day. acetaminophen (TYLENOL EXTRA STRENGTH) 500 mg tablet Take  by mouth every six (6) hours as needed for Pain.                Discharge instructions:  - Refer to the Medication Reconciliation sheet for all discharge medications   -Resume pre hospital diet              -Ambulate with walker and skilled assistance  -Follow up in office as scheduled       Signed:  MANOHAR Kelley  10/19/2021  9:33 AM

## 2021-10-19 NOTE — PROGRESS NOTES
Problem: Diabetes Self-Management  Goal: *Disease process and treatment process  Description: Define diabetes and identify own type of diabetes; list 3 options for treating diabetes. Outcome: Progressing Towards Goal  Goal: *Incorporating nutritional management into lifestyle  Description: Describe effect of type, amount and timing of food on blood glucose; list 3 methods for planning meals. Outcome: Progressing Towards Goal  Goal: *Incorporating physical activity into lifestyle  Description: State effect of exercise on blood glucose levels. Outcome: Progressing Towards Goal  Goal: *Developing strategies to promote health/change behavior  Description: Define the ABC's of diabetes; identify appropriate screenings, schedule and personal plan for screenings. Outcome: Progressing Towards Goal  Goal: *Using medications safely  Description: State effect of diabetes medications on diabetes; name diabetes medication taking, action and side effects. Outcome: Progressing Towards Goal  Goal: *Monitoring blood glucose, interpreting and using results  Description: Identify recommended blood glucose targets  and personal targets. Outcome: Progressing Towards Goal  Goal: *Prevention, detection, treatment of acute complications  Description: List symptoms of hyper- and hypoglycemia; describe how to treat low blood sugar and actions for lowering  high blood glucose level. Outcome: Progressing Towards Goal  Goal: *Prevention, detection and treatment of chronic complications  Description: Define the natural course of diabetes and describe the relationship of blood glucose levels to long term complications of diabetes.   Outcome: Progressing Towards Goal  Goal: *Developing strategies to address psychosocial issues  Description: Describe feelings about living with diabetes; identify support needed and support network  Outcome: Progressing Towards Goal  Goal: *Insulin pump training  Outcome: Progressing Towards Goal  Goal: *Sick day guidelines  Outcome: Progressing Towards Goal  Goal: *Patient Specific Goal (EDIT GOAL, INSERT TEXT)  Outcome: Progressing Towards Goal     Problem: Patient Education: Go to Patient Education Activity  Goal: Patient/Family Education  Outcome: Progressing Towards Goal     Problem: Falls - Risk of  Goal: *Absence of Falls  Description: Document Bard Buckley Fall Risk and appropriate interventions in the flowsheet. Outcome: Progressing Towards Goal  Note: Fall Risk Interventions:  Mobility Interventions: Patient to call before getting OOB    Mentation Interventions: Door open when patient unattended, Toileting rounds    Medication Interventions: Patient to call before getting OOB, Teach patient to arise slowly    Elimination Interventions: Call light in reach, Patient to call for help with toileting needs, Toileting schedule/hourly rounds    History of Falls Interventions: Bed/chair exit alarm, Room close to nurse's station         Problem: Patient Education: Go to Patient Education Activity  Goal: Patient/Family Education  Outcome: Progressing Towards Goal     Problem: Pressure Injury - Risk of  Goal: *Prevention of pressure injury  Description: Document Joshua Scale and appropriate interventions in the flowsheet.   Outcome: Progressing Towards Goal  Note: Pressure Injury Interventions:  Sensory Interventions: Assess changes in LOC, Pressure redistribution bed/mattress (bed type)    Moisture Interventions: Absorbent underpads, Internal/External urinary devices    Activity Interventions: Pressure redistribution bed/mattress(bed type)    Mobility Interventions: HOB 30 degrees or less, Pressure redistribution bed/mattress (bed type)    Nutrition Interventions: Offer support with meals,snacks and hydration    Friction and Shear Interventions: HOB 30 degrees or less, Lift sheet, Apply protective barrier, creams and emollients                Problem: Patient Education: Go to Patient Education Activity  Goal: Patient/Family Education  Outcome: Progressing Towards Goal     Problem: Patient Education: Go to Patient Education Activity  Goal: Patient/Family Education  Outcome: Progressing Towards Goal

## 2021-10-19 NOTE — PROGRESS NOTES
[place] Hospitalist Consult   Admit Date:  10/12/2021 10:00 PM   Name:  Tamika Lopez   Age:  80 y.o. Sex:  female  :  1934   MRN:  [id number]   Room:  [facility]    Presenting Complaint: No chief complaint on file. Reason(s) for Admission: Debility [R53.81]     Hospitalists consulted by Massimo Garcia MD for: Medical management    History of Presenting Illness:   Tamika Lopez is a 80 y.o. female with history of type 2 diabetes mellitus, osteoporosis, hypercalcemia, hypertension, GERD, CVA admitted by Ortho service for Rt hip pain. Patient is 4 weeks status post ORIF with cephalomedullary nail fixation of the right hip by Dr. Arthur Alvarez. Readmitted as not able to take care of herself at home. 10/19: Patient is seen at bedside. No active complaint. Pending placement, possible discharge today. Review of Systems:  10 systems reviewed and negative except as noted in HPI. Assessment & Plan:     History of fracture of right hip:  Debility:  4 weeks status post ORIF with cephalomedullary nail fixation of the right hip by Dr. Arthur Alvarez. CT pelvis with no clear evidence of hardware complication  PT OT  Rest as per primary    UTI:  UA suggestive of UTI  Urine culture Ecoli pansensitive except Bactrim  Completed ceftriaxone    Diabetes mellitus  A1c 9.2 on 9/19  10/19: Continue Lantus to 18 units on discharge. Hypertension:  Continue home meds: Coreg, amlodipine and Aldactone  10/19: Optimally controlled today.  Continue current antihypertensives    History of CVA:   No residual deficiency  Continue aspirin and statin    History of Crohn's disease:  Stable    GERD:  Continue famotidine    Depression:  [facility] Problems as of 10/19/2021 Date Reviewed: 10/12/2021        Codes Class Noted - Resolved POA    History of fracture of right hip ICD-10-CM: Z87.81  ICD-9-CM: V15.51  10/13/2021 - Present Yes        * (Principal) Debility ICD-10-CM: R53.81  ICD-9-CM: 799.3  10/12/2021 - Present Unknown        UTI (urinary tract infection) ICD-10-CM: N39.0  ICD-9-CM: 599.0  3/7/2021 - Present Yes        CVA (cerebral vascular accident) 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 Coronary artery disease involving native coronary artery of native heart with angina pectoris [facility]) ICD-10-CM: I25.119  ICD-9-CM: 414.01, 413.9  5/3/2016 - Present Yes        DM type 2 (diabetes mellitus, type 2) (HCC) (Chronic) ICD-10-CM: E11.9  ICD-9-CM: 250.00  3/16/2013 - Present Yes        Crohn disease ([facility] [address] (Chronic) ICD-10-CM: K50.90  ICD-9-CM: 555.9  3/16/2013 - Present Yes              Past Medical History:   Diagnosis Date    Acute kidney failure 2016    Acute pericarditis     Arthritis     CAP (community acquired pneumonia) 2016    Coronary artery disease     Crohn's disease     Hypercalcemia     Hypercholesterolemia     Hypertension     Lumbar compression fracture     Osteoporosis     Type 2 diabetes mellitus       Past Surgical History:   Procedure Laterality Date    HX ARTHROPLASTY  2016    left elbow    HX HEART CATHETERIZATION  2015    no intervention    HX HYSTERECTOMY      HX KYPHOPLASTY  ~    lumbar      Allergies   Allergen Reactions    Sulfa (Sulfonamide Antibiotics) Rash    Other Medication Unknown (comments)     \"Dypentin\"    Tape [Adhesive] Other (comments)     Blisters and skin tears      Social History     Tobacco Use    Smoking status: Former Smoker     Types: Cigarettes     Start date: 1961     Quit date: 1964     Years since quittin.3    Smokeless tobacco: Never Used    Tobacco comment: 2 packs a wk   Substance Use Topics    Alcohol use:  Yes     Alcohol/week: 0.0 standard drinks     Comment: rarely      Family History   Problem Relation Age of Onset    Heart Failure Mother     Diabetes Mother     Cancer Father         throat    Alcohol abuse Neg Hx     Arthritis-rheumatoid Neg Hx     Asthma Neg Hx     Bleeding Prob Neg Hx     Elevated Lipids Neg Hx     Headache Neg Hx     Hypertension Neg Hx     Lung Disease Neg Hx     Migraines Neg Hx     Psychiatric Disorder Neg Hx     Stroke Neg Hx     Mental Retardation Neg Hx     Thyroid Disease Neg Hx       Family history reviewed and negative unless otherwise noted above. Immunization History   Administered Date(s) Administered    Covid-19, MODERNA, Mrna, Lnp-s, Pf, 100mcg/0.5mL 04/20/2021, 05/18/2021    Influenza Vaccine 10/01/2015, 10/17/2016    TB Skin Test (PPD) Intradermal 03/17/2013, 03/04/2021, 09/19/2021, 10/13/2021       Objective:     Patient Vitals for the past 24 hrs:   Temp Pulse Resp BP SpO2   10/19/21 0739 98.7 °F (37.1 °C) 66 16 (!) 133/54 94 %   10/19/21 0420 97.8 °F (36.6 °C) 64 18 139/73 99 %   10/18/21 2347 98.1 °F (36.7 °C) 70 18 139/70 97 %   10/18/21 1930 97.6 °F (36.4 °C) 72 20 (!) 138/56 98 %   10/18/21 1512 97.4 °F (36.3 °C) 68 16 138/74 97 %   10/18/21 1114 98.4 °F (36.9 °C) 69 18 130/65 96 %     Oxygen Therapy  O2 Sat (%): 94 % (10/19/21 0739)  O2 Device: None (Room air) (10/19/21 0854)    Estimated body mass index is 25.1 kg/m² as calculated from the following:    Height as of this encounter: 4' 9\" (1.448 m). Weight as of this encounter: 52.6 kg (116 lb). Intake/Output Summary (Last 24 hours) at 10/19/2021 1000  Last data filed at 10/19/2021 0420  Gross per 24 hour   Intake 240 ml   Output 900 ml   Net -660 ml         Physical Exam:    Blood pressure (!) 133/54, pulse 66, temperature 98.7 °F (37.1 °C), resp. rate 16, height 4' 9\" (1.448 m), weight 52.6 kg (116 lb), last menstrual period 09/08/2008, SpO2 94 %. General:    Well nourished.   No overt distress  Head:  Normocephalic, atraumatic  Eyes:  Sclerae appear normal.  Pupils equally round. ENT:  Nares appear normal, no drainage. Moist oral mucosa  Neck:  No restricted ROM. Trachea midline   CV:   RRR. No m/r/g. No jugular venous distension. Lungs:   CTAB. No wheezing, rhonchi, or rales. Respirations even, unlabored  Abdomen: Bowel sounds present. Soft, nontender, nondistended. Extremities: No cyanosis or clubbing. No edema  Skin:     No rashes and normal coloration. Warm and dry. Neuro:  CN II-XII grossly intact. Sensation intact. A&Ox3  Psych:  Normal mood and affect.       I have reviewed ordered lab tests and independently visualized imaging below:    Recent Labs:  Recent Results (from the past 48 hour(s))   GLUCOSE, POC    Collection Time: 10/17/21 11:31 AM   Result Value Ref Range    Glucose (POC) 170 (H) 65 - 100 mg/dL    Performed by Infor    GLUCOSE, POC    Collection Time: 10/17/21  4:42 PM   Result Value Ref Range    Glucose (POC) 189 (H) 65 - 100 mg/dL    Performed by Infor    GLUCOSE, POC    Collection Time: 10/17/21  9:41 PM   Result Value Ref Range    Glucose (POC) 140 (H) 65 - 100 mg/dL    Performed by miLibris    GLUCOSE, POC    Collection Time: 10/18/21  6:38 AM   Result Value Ref Range    Glucose (POC) 136 (H) 65 - 100 mg/dL    Performed by [employer]is    GLUCOSE, POC    Collection Time: 10/18/21 11:02 AM   Result Value Ref Range    Glucose (POC) 200 (H) 65 - 100 mg/dL    Performed by [address] [place] [address], POC    Collection Time: 10/18/21  4:09 PM   Result Value Ref Range    Glucose (POC) 188 (H) 65 - 100 mg/dL    Performed by JulianaCNA    GLUCOSE, POC    Collection Time: 10/18/21  9:25 PM   Result Value Ref Range    Glucose (POC) 216 (H) 65 - 100 mg/dL    Performed by [employer]    GLUCOSE, POC    Collection Time: 10/19/21  6:27 AM   Result Value Ref Range    Glucose (POC) 123 (H) 65 - 100 mg/dL    Performed by [employer]        All Micro Results     Procedure Component Value Units Date/Time SARS-COV-2, PCR [[id number]] Collected: 10/15/21 1442    Order Status: Completed Specimen: Nasopharyngeal Updated: 10/16/21 1249     Specimen source Nasopharyngeal        SARS-CoV-2 Not detected        Comment:      The specimen is NEGATIVE for SARS-CoV-2, the novel coronavirus associated with COVID-19. This test has been authorized by the FDA under an Emergency Use Authorization (EUA) for use by authorized laboratories. Fact sheet for Healthcare Providers: [facility] [facility]       Fact sheet for Patients: [employer]       Methodology: RT-PCR         CULTURE, URINE [[id number]]  (Abnormal)  (Susceptibility) Collected: 10/13/21 1045    Order Status: Completed Specimen: Urine from Clean catch Updated: 10/16/21 1156     Special Requests: NO SPECIAL REQUESTS        Culture result:       >100,000 COLONIES/mL ESCHERICHIA COLI                  10,000 to 50,000 COLONIES/mL MIXED SKIN ANNETTE ISOLATED          RESPIRATORY VIRUS PANEL W/COVID-19, PCR [[id number]] Collected: 10/15/21 1145    Order Status: Canceled Specimen: NASOPHARYNGEAL SWAB           Other Studies:  No results found.     Current Meds:  Current Facility-Administered Medications   Medication Dose Route Frequency    insulin glargine (LANTUS) injection 18 Units  18 Units SubCUTAneous QHS    amLODIPine (NORVASC) tablet 10 mg  10 mg Oral DAILY    atorvastatin (LIPITOR) tablet 40 mg  40 mg Oral DAILY    carvediloL (COREG) tablet 12.5 mg  12.5 mg Oral BID WITH MEALS    escitalopram oxalate (LEXAPRO) tablet 10 mg  10 mg Oral DAILY    famotidine (PEPCID) tablet 20 mg  20 mg Oral DAILY    rOPINIRole (REQUIP) tablet 1 mg  1 mg Oral BID    spironolactone (ALDACTONE) tablet 25 mg  25 mg Oral DAILY    aspirin chewable tablet 81 mg  81 mg Oral DAILY    sodium chloride (NS) flush 5-40 mL  5-40 mL IntraVENous Q8H    sodium chloride (NS) flush 5-40 mL  5-40 mL IntraVENous PRN    acetaminophen (TYLENOL) tablet 650 mg  650 mg Oral Q6H PRN    Or    acetaminophen (TYLENOL) suppository 650 mg  650 mg Rectal Q6H PRN    polyethylene glycol (MIRALAX) packet 17 g  17 g Oral DAILY PRN    promethazine (PHENERGAN) tablet 12.5 mg  12.5 mg Oral Q6H PRN    Or    ondansetron (ZOFRAN) injection 4 mg  4 mg IntraVENous Q6H PRN    HYDROmorphone (DILAUDID) injection 0.5 mg  0.5 mg IntraVENous Q4H PRN    oxyCODONE IR (ROXICODONE) tablet 5 mg  5 mg Oral Q4H PRN       Signed:  Елена Martinez MD    Part of this note may have been written by using a voice dictation software. The note has been proof read but may still contain some grammatical/other typographical errors.

## 2021-10-19 NOTE — PROGRESS NOTES
Problem: Diabetes Self-Management  Goal: *Disease process and treatment process  Description: Define diabetes and identify own type of diabetes; list 3 options for treating diabetes. 10/19/2021 1548 by Aure Sparrow RN  Outcome: Resolved/Met  10/19/2021 0853 by Aure Sparrow RN  Outcome: Progressing Towards Goal  Goal: *Incorporating nutritional management into lifestyle  Description: Describe effect of type, amount and timing of food on blood glucose; list 3 methods for planning meals. 10/19/2021 1548 by Aure Sparrow RN  Outcome: Resolved/Met  10/19/2021 0853 by Aure Sparrow RN  Outcome: Progressing Towards Goal  Goal: *Incorporating physical activity into lifestyle  Description: State effect of exercise on blood glucose levels. 10/19/2021 1548 by Aure Sparrow RN  Outcome: Resolved/Met  10/19/2021 0853 by Aure Sparrow RN  Outcome: Progressing Towards Goal  Goal: *Developing strategies to promote health/change behavior  Description: Define the ABC's of diabetes; identify appropriate screenings, schedule and personal plan for screenings. 10/19/2021 1548 by Aure Sparrow RN  Outcome: Resolved/Met  10/19/2021 0853 by Aure Sparrow RN  Outcome: Progressing Towards Goal  Goal: *Using medications safely  Description: State effect of diabetes medications on diabetes; name diabetes medication taking, action and side effects. 10/19/2021 1548 by Aure Sparrow RN  Outcome: Resolved/Met  10/19/2021 0853 by Aure Sparrow RN  Outcome: Progressing Towards Goal  Goal: *Monitoring blood glucose, interpreting and using results  Description: Identify recommended blood glucose targets  and personal targets.   10/19/2021 1548 by Aure Sparrow RN  Outcome: Resolved/Met  10/19/2021 0853 by Aure Sparrow RN  Outcome: Progressing Towards Goal  Goal: *Prevention, detection, treatment of acute complications  Description: List symptoms of hyper- and hypoglycemia; describe how to treat low blood sugar and actions for lowering  high blood glucose level. 10/19/2021 1548 by Saarh Chong RN  Outcome: Resolved/Met  10/19/2021 0853 by Sarah Chong RN  Outcome: Progressing Towards Goal  Goal: *Prevention, detection and treatment of chronic complications  Description: Define the natural course of diabetes and describe the relationship of blood glucose levels to long term complications of diabetes. 10/19/2021 1548 by Sarah Chong RN  Outcome: Resolved/Met  10/19/2021 0853 by Sarah Chong RN  Outcome: Progressing Towards Goal  Goal: *Developing strategies to address psychosocial issues  Description: Describe feelings about living with diabetes; identify support needed and support network  10/19/2021 1548 by Sarah Chong RN  Outcome: Resolved/Met  10/19/2021 0853 by Sarah Chong RN  Outcome: Progressing Towards Goal  Goal: *Insulin pump training  10/19/2021 1548 by Sarah Chong RN  Outcome: Resolved/Met  10/19/2021 0853 by Sarah Chong RN  Outcome: Progressing Towards Goal  Goal: *Sick day guidelines  10/19/2021 1548 by Sarah Chong RN  Outcome: Resolved/Met  10/19/2021 0853 by Sarah Chong RN  Outcome: Progressing Towards Goal  Goal: *Patient Specific Goal (EDIT GOAL, INSERT TEXT)  10/19/2021 1548 by Sarah Chong RN  Outcome: Resolved/Met  10/19/2021 0853 by Sarah Chong RN  Outcome: Progressing Towards Goal     Problem: Patient Education: Go to Patient Education Activity  Goal: Patient/Family Education  10/19/2021 6230 by Sarah Chong RN  Outcome: Resolved/Met  10/19/2021 0853 by Sarah Chong RN  Outcome: Progressing Towards Goal     Problem: Falls - Risk of  Goal: *Absence of Falls  Description: Document Lizzeth Led Fall Risk and appropriate interventions in the flowsheet.   10/19/2021 1548 by Sarah Chong RN  Outcome: Resolved/Met  Note: Fall Risk Interventions:  Mobility Interventions: Patient to call before getting OOB, Bed/chair exit alarm    Mentation Interventions: Door open when patient unattended, Toileting rounds    Medication Interventions: Patient to call before getting OOB, Teach patient to arise slowly    Elimination Interventions: Call light in reach, Patient to call for help with toileting needs    History of Falls Interventions: Bed/chair exit alarm      10/19/2021 0853 by Sudheer Beltran RN  Outcome: Progressing Towards Goal  Note: Fall Risk Interventions:  Mobility Interventions: Patient to call before getting OOB    Mentation Interventions: Door open when patient unattended, Toileting rounds    Medication Interventions: Patient to call before getting OOB, Teach patient to arise slowly    Elimination Interventions: Call light in reach, Patient to call for help with toileting needs, Toileting schedule/hourly rounds    History of Falls Interventions: Bed/chair exit alarm, Room close to nurse's station         Problem: Patient Education: Go to Patient Education Activity  Goal: Patient/Family Education  10/19/2021 1548 by Sudheer Beltran RN  Outcome: Resolved/Met  10/19/2021 0853 by Sudheer Beltran RN  Outcome: Progressing Towards Goal     Problem: Pressure Injury - Risk of  Goal: *Prevention of pressure injury  Description: Document Joshua Scale and appropriate interventions in the flowsheet.   10/19/2021 1548 by Sudheer Beltran RN  Outcome: Resolved/Met  Note: Pressure Injury Interventions:  Sensory Interventions: Assess changes in LOC, Pressure redistribution bed/mattress (bed type)    Moisture Interventions: Absorbent underpads, Minimize layers    Activity Interventions: PT/OT evaluation, Increase time out of bed    Mobility Interventions: PT/OT evaluation    Nutrition Interventions: Document food/fluid/supplement intake    Friction and Shear Interventions: HOB 30 degrees or less, Lift sheet, Apply protective barrier, creams and emollients             10/19/2021 0853 by Jill Emery BHUPENDRA Mattson  Outcome: Progressing Towards Goal  Note: Pressure Injury Interventions:  Sensory Interventions: Assess changes in LOC, Pressure redistribution bed/mattress (bed type)    Moisture Interventions: Absorbent underpads, Internal/External urinary devices    Activity Interventions: Pressure redistribution bed/mattress(bed type)    Mobility Interventions: HOB 30 degrees or less, Pressure redistribution bed/mattress (bed type)    Nutrition Interventions: Offer support with meals,snacks and hydration    Friction and Shear Interventions: HOB 30 degrees or less, Lift sheet, Apply protective barrier, creams and emollients                Problem: Patient Education: Go to Patient Education Activity  Goal: Patient/Family Education  10/19/2021 1548 by Christelle Key RN  Outcome: Resolved/Met  10/19/2021 0853 by Christelle Key RN  Outcome: Progressing Towards Goal     Problem: Patient Education: Go to Patient Education Activity  Goal: Patient/Family Education  10/19/2021 0450 by Christelle Key RN  Outcome: Resolved/Met  10/19/2021 0853 by Christelle Key RN  Outcome: Progressing Towards Goal

## 2021-10-19 NOTE — PROGRESS NOTES
Problem: Diabetes Self-Management  Goal: *Disease process and treatment process  Description: Define diabetes and identify own type of diabetes; list 3 options for treating diabetes. Outcome: Progressing Towards Goal  Goal: *Incorporating nutritional management into lifestyle  Description: Describe effect of type, amount and timing of food on blood glucose; list 3 methods for planning meals. Outcome: Progressing Towards Goal  Goal: *Incorporating physical activity into lifestyle  Description: State effect of exercise on blood glucose levels. Outcome: Progressing Towards Goal  Goal: *Developing strategies to promote health/change behavior  Description: Define the ABC's of diabetes; identify appropriate screenings, schedule and personal plan for screenings. Outcome: Progressing Towards Goal  Goal: *Using medications safely  Description: State effect of diabetes medications on diabetes; name diabetes medication taking, action and side effects. Outcome: Progressing Towards Goal  Goal: *Monitoring blood glucose, interpreting and using results  Description: Identify recommended blood glucose targets  and personal targets. Outcome: Progressing Towards Goal  Goal: *Prevention, detection, treatment of acute complications  Description: List symptoms of hyper- and hypoglycemia; describe how to treat low blood sugar and actions for lowering  high blood glucose level. Outcome: Progressing Towards Goal  Goal: *Prevention, detection and treatment of chronic complications  Description: Define the natural course of diabetes and describe the relationship of blood glucose levels to long term complications of diabetes.   Outcome: Progressing Towards Goal  Goal: *Developing strategies to address psychosocial issues  Description: Describe feelings about living with diabetes; identify support needed and support network  Outcome: Progressing Towards Goal  Goal: *Insulin pump training  Outcome: Progressing Towards Goal  Goal: *Sick day guidelines  Outcome: Progressing Towards Goal  Goal: *Patient Specific Goal (EDIT GOAL, INSERT TEXT)  Outcome: Progressing Towards Goal     Problem: Patient Education: Go to Patient Education Activity  Goal: Patient/Family Education  Outcome: Progressing Towards Goal     Problem: Falls - Risk of  Goal: *Absence of Falls  Description: Document Vik Montanez Fall Risk and appropriate interventions in the flowsheet. Outcome: Progressing Towards Goal  Note: Fall Risk Interventions:  Mobility Interventions: Patient to call before getting OOB    Mentation Interventions: Door open when patient unattended, Toileting rounds    Medication Interventions: Patient to call before getting OOB, Teach patient to arise slowly    Elimination Interventions: Call light in reach, Patient to call for help with toileting needs, Toileting schedule/hourly rounds    History of Falls Interventions: Bed/chair exit alarm, Room close to nurse's station         Problem: Patient Education: Go to Patient Education Activity  Goal: Patient/Family Education  Outcome: Progressing Towards Goal     Problem: Pressure Injury - Risk of  Goal: *Prevention of pressure injury  Description: Document Joshua Scale and appropriate interventions in the flowsheet.   Outcome: Progressing Towards Goal  Note: Pressure Injury Interventions:  Sensory Interventions: Assess changes in LOC, Pressure redistribution bed/mattress (bed type)    Moisture Interventions: Absorbent underpads, Internal/External urinary devices    Activity Interventions: Pressure redistribution bed/mattress(bed type)    Mobility Interventions: HOB 30 degrees or less, Pressure redistribution bed/mattress (bed type)    Nutrition Interventions: Offer support with meals,snacks and hydration    Friction and Shear Interventions: HOB 30 degrees or less, Lift sheet, Apply protective barrier, creams and emollients                Problem: Patient Education: Go to Patient Education Activity  Goal: Patient/Family Education  Outcome: Progressing Towards Goal     Problem: Patient Education: Go to Patient Education Activity  Goal: Patient/Family Education  Outcome: Progressing Towards Goal

## 2021-10-19 NOTE — PROGRESS NOTES
Report called to [address]  at [facility] she received complete report on patient coming into [address] today. She had no further concerns after complete history and current complication and medication list given. Patient will be transported at 1430 today. IV removed with catheter intact and pressure dressing applied. Education is complete.  Will monitor

## 2021-10-29 ENCOUNTER — APPOINTMENT (OUTPATIENT)
Dept: GENERAL RADIOLOGY | Age: 86
End: 2021-10-29
Attending: EMERGENCY MEDICINE
Payer: MEDICARE

## 2021-10-29 ENCOUNTER — HOSPITAL ENCOUNTER (EMERGENCY)
Age: 86
Discharge: HOME | End: 2021-10-29
Attending: EMERGENCY MEDICINE
Payer: MEDICARE

## 2021-10-29 VITALS
OXYGEN SATURATION: 96 % | DIASTOLIC BLOOD PRESSURE: 83 MMHG | SYSTOLIC BLOOD PRESSURE: 132 MMHG | HEIGHT: 57 IN | TEMPERATURE: 98.1 F | HEART RATE: 77 BPM | RESPIRATION RATE: 20 BRPM | WEIGHT: 120 LBS | BODY MASS INDEX: 25.89 KG/M2
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
## 2021-10-29 DIAGNOSIS — M25.559 HIP PAIN: ICD-10-CM

## 2021-10-29 PROCEDURE — 99284 EMERGENCY DEPT VISIT MOD MDM: CPT

## 2021-10-29 PROCEDURE — 73502 X-RAY EXAM HIP UNI 2-3 VIEWS: CPT

## 2021-10-29 NOTE — ED TRIAGE NOTES
Pt arrives via [facility] from assisted living facility, [facility]. [place] about 1 hour ago in her bathroom. C/O of R hip pain. No shortening or rotation noted. No LOC. States she did hit her head during fall. Does take aspirin daily. Recent R hip replacement. Per EMS limping when ambulating. . VSS.

## 2021-10-29 NOTE — ED PROVIDER NOTES
Patient is an 27-year-old female brought to the emergency department this afternoon by EMS from her assisted living facility after an accidental fall. She states she was hurrying to get ready for a hollowing party when she slipped and fell landed on her buttock and her right hip. Was unable to get up without assistance. Complains of pain only in the right hip. No loss of consciousness    The history is provided by the patient. Fall  The accident occurred less than 1 hour ago. The fall occurred while walking. She fell from a height of ground level. She landed on hard floor. There was no blood loss. The point of impact was the right hip. The pain is mild. She was not ambulatory at the scene. There was no entrapment after the fall. There was no drug use involved in the accident. Pertinent negatives include no fever, no abdominal pain, no nausea, no vomiting, no headaches, no extremity weakness, no loss of consciousness and no laceration. The risk factors include being elderly. The symptoms are aggravated by use of injured limb. She has tried nothing for the symptoms.         Past Medical History:   Diagnosis Date    Acute kidney failure 05/2016    Acute pericarditis 2015    Arthritis     CAP (community acquired pneumonia) 5/2016    Coronary artery disease     Crohn's disease     Hypercalcemia     Hypercholesterolemia     Hypertension     Lumbar compression fracture     Osteoporosis     Type 2 diabetes mellitus        Past Surgical History:   Procedure Laterality Date    HX ARTHROPLASTY  1/5/2016    left elbow    HX HEART CATHETERIZATION  5/12/2015    no intervention    HX HYSTERECTOMY  1978    HX KYPHOPLASTY  ~2002    lumbar         Family History:   Problem Relation Age of Onset    Heart Failure Mother     Diabetes Mother     Cancer Father         throat    Alcohol abuse Neg Hx     Arthritis-rheumatoid Neg Hx     Asthma Neg Hx     Bleeding Prob Neg Hx     Elevated Lipids Neg Hx     Headache Neg Hx     Hypertension Neg Hx     Lung Disease Neg Hx     Migraines Neg Hx     Psychiatric Disorder Neg Hx     Stroke Neg Hx     Mental Retardation Neg Hx     Thyroid Disease Neg Hx        Social History     Socioeconomic History    Marital status:      Spouse name: Not on file    Number of children: Not on file    Years of education: Not on file    Highest education level: Not on file   Occupational History    Not on file   Tobacco Use    Smoking status: Former Smoker     Types: Cigarettes     Start date: 1961     Quit date: 1964     Years since quittin.4    Smokeless tobacco: Never Used    Tobacco comment: 2 packs a wk   Substance and Sexual Activity    Alcohol use: Yes     Alcohol/week: 0.0 standard drinks     Comment: rarely    Drug use: No    Sexual activity: Never   Other Topics Concern    Not on file   Social History Narrative    Pt , lives with . Retired. She worked as an . She has 1 dog and 1 cat at home. Social Determinants of Health     Financial Resource Strain:     Difficulty of Paying Living Expenses:    Food Insecurity:     Worried About Running Out of Food in the Last Year:     [address] in the Last Year:    Transportation Needs:     Lack of Transportation (Medical):  Lack of Transportation (Non-Medical):    Physical Activity:     Days of Exercise per Week:     Minutes of Exercise per Session:    Stress:     Feeling of Stress :    Social Connections:     Frequency of Communication with Friends and Family:     Frequency of Social Gatherings with Friends and Family:     Attends Christian Services:     Active Member of Clubs or Organizations:     Attends Club or Organization Meetings:     Marital Status:    Intimate Partner Violence:     Fear of Current or Ex-Partner:     Emotionally Abused:     Physically Abused:     Sexually Abused:           ALLERGIES: Sulfa (sulfonamide antibiotics), Other medication, and Tape [adhesive]    Review of Systems   Constitutional: Negative for chills, fatigue and fever. HENT: Negative for congestion, rhinorrhea and sore throat. Eyes: Negative for pain, discharge and visual disturbance. Respiratory: Negative for cough and shortness of breath. Cardiovascular: Negative for chest pain and palpitations. Gastrointestinal: Negative for abdominal pain, diarrhea, nausea and vomiting. Endocrine: Negative for polydipsia and polyuria. Genitourinary: Negative for dysuria, frequency and urgency. Musculoskeletal: Negative for back pain, extremity weakness and neck pain. Skin: Negative for rash. Neurological: Negative for seizures, loss of consciousness, syncope, weakness and headaches. Hematological: Negative. Vitals:    10/29/21 1700   BP: (!) 169/76   Pulse: 81   Resp: 20   Temp: 98.1 °F (36.7 °C)   SpO2: 98%   Weight: 54.4 kg (120 lb)   Height: 4' 9\" (1.448 m)            Physical Exam  Vitals and nursing note reviewed. Constitutional:       Appearance: She is well-developed. HENT:      Head: Normocephalic and atraumatic. Eyes:      Conjunctiva/sclera: Conjunctivae normal.      Pupils: Pupils are equal, round, and reactive to light. Cardiovascular:      Rate and Rhythm: Normal rate and regular rhythm. Pulmonary:      Effort: Pulmonary effort is normal.      Breath sounds: Normal breath sounds. Abdominal:      Palpations: Abdomen is soft. Tenderness: There is no abdominal tenderness. There is no guarding or rebound. Musculoskeletal:         General: Tenderness present. No deformity. Normal range of motion. Cervical back: Normal range of motion and neck supple. No tenderness. Comments: Very minimal tenderness to the lateral right hip. Has range of motion of the hip without discomfort. Lymphadenopathy:      Cervical: No cervical adenopathy. Skin:     General: Skin is warm and dry.       Capillary Refill: Capillary refill takes less than 2 seconds. Findings: No laceration or rash. Neurological:      General: No focal deficit present. Mental Status: She is alert and oriented to person, place, and time. GCS: GCS eye subscore is 4. GCS verbal subscore is 5. GCS motor subscore is 6. Cranial Nerves: No cranial nerve deficit. Sensory: No sensory deficit. Motor: No weakness. MDM  Number of Diagnoses or Management Options  Diagnosis management comments: I wore appropriate PPE throughout this patient's ED visit. Marina Medeiros MD, 5:06 PM    7:15 PM  XR HIP RT W OR WO PELV 2-3 VWS   Final Result    1. No acute fracture. 2. Interval healing of the right proximal femur fracture. 3. Low bone density, chronic changes. Voice dictation software was used during the making of this note. This software is not perfect and grammatical and other typographical errors may be present. This note has been proofread, but may still contain errors.   Marina Medeiros MD; 10/29/2021 @7:15 PM   ===================================================================         Amount and/or Complexity of Data Reviewed  Tests in the radiology section of CPT®: ordered and reviewed  Independent visualization of images, tracings, or specimens: yes    Risk of Complications, Morbidity, and/or Mortality  Presenting problems: low  Diagnostic procedures: low  Management options: low    Patient Progress  Patient progress: stable         Procedures

## 2021-10-29 NOTE — DISCHARGE INSTRUCTIONS
X-rays were negative for any fracture today. Use Tylenol as needed for pain. Continue with your other care medications.

## 2021-11-14 ENCOUNTER — APPOINTMENT (OUTPATIENT)
Dept: CT IMAGING | Age: 86
DRG: 871 | End: 2021-11-14
Attending: EMERGENCY MEDICINE
Payer: MEDICARE

## 2021-11-14 ENCOUNTER — HOSPITAL ENCOUNTER (INPATIENT)
Age: 86
LOS: 5 days | Discharge: NURSING, REHAB OR LONG-TERM CARE | DRG: 871 | End: 2021-11-19
Attending: EMERGENCY MEDICINE | Admitting: STUDENT IN AN ORGANIZED HEALTH CARE EDUCATION/TRAINING PROGRAM
Payer: MEDICARE

## 2021-11-14 DIAGNOSIS — N12 PYELONEPHRITIS: ICD-10-CM

## 2021-11-14 DIAGNOSIS — R65.20 SEVERE SEPSIS (HCC): Primary | ICD-10-CM

## 2021-11-14 DIAGNOSIS — A41.9 SEVERE SEPSIS (HCC): Primary | ICD-10-CM

## 2021-11-14 LAB
ALBUMIN SERPL-MCNC: 3.3 G/DL (ref 3.2–4.6)
ALBUMIN/GLOB SERPL: 0.8 {RATIO} (ref 1.2–3.5)
ALP SERPL-CCNC: 137 U/L (ref 50–130)
ALT SERPL-CCNC: 14 U/L (ref 12–65)
ANION GAP SERPL CALC-SCNC: 9 MMOL/L (ref 7–16)
APPEARANCE UR: CLEAR
AST SERPL-CCNC: 9 U/L (ref 15–37)
BACTERIA URNS QL MICRO: ABNORMAL /HPF
BASOPHILS # BLD: 0.1 K/UL (ref 0–0.2)
BASOPHILS NFR BLD: 0 % (ref 0–2)
BILIRUB SERPL-MCNC: 0.8 MG/DL (ref 0.2–1.1)
BILIRUB UR QL: NEGATIVE
BUN SERPL-MCNC: 18 MG/DL (ref 8–23)
CALCIUM SERPL-MCNC: 10.6 MG/DL (ref 8.3–10.4)
CASTS URNS QL MICRO: ABNORMAL /LPF
CHLORIDE SERPL-SCNC: 101 MMOL/L (ref 98–107)
CO2 SERPL-SCNC: 24 MMOL/L (ref 21–32)
COLOR UR: YELLOW
COVID-19 RAPID TEST, COVR: NOT DETECTED
CREAT SERPL-MCNC: 0.93 MG/DL (ref 0.6–1)
DIFFERENTIAL METHOD BLD: ABNORMAL
EOSINOPHIL # BLD: 0.1 K/UL (ref 0–0.8)
EOSINOPHIL NFR BLD: 0 % (ref 0.5–7.8)
EPI CELLS #/AREA URNS HPF: 0 /HPF
ERYTHROCYTE [DISTWIDTH] IN BLOOD BY AUTOMATED COUNT: 13.7 % (ref 11.9–14.6)
GLOBULIN SER CALC-MCNC: 4.1 G/DL (ref 2.3–3.5)
GLUCOSE BLD STRIP.AUTO-MCNC: 123 MG/DL (ref 65–100)
GLUCOSE BLD STRIP.AUTO-MCNC: 156 MG/DL (ref 65–100)
GLUCOSE BLD STRIP.AUTO-MCNC: 165 MG/DL (ref 65–100)
GLUCOSE BLD STRIP.AUTO-MCNC: 99 MG/DL (ref 65–100)
GLUCOSE SERPL-MCNC: 195 MG/DL (ref 65–100)
GLUCOSE UR STRIP.AUTO-MCNC: 100 MG/DL
HCT VFR BLD AUTO: 38 % (ref 35.8–46.3)
HGB BLD-MCNC: 12.4 G/DL (ref 11.7–15.4)
HGB UR QL STRIP: ABNORMAL
IMM GRANULOCYTES # BLD AUTO: 0.3 K/UL (ref 0–0.5)
IMM GRANULOCYTES NFR BLD AUTO: 1 % (ref 0–5)
KETONES UR QL STRIP.AUTO: NEGATIVE MG/DL
LACTATE SERPL-SCNC: 1.2 MMOL/L (ref 0.4–2)
LACTATE SERPL-SCNC: 2 MMOL/L (ref 0.4–2)
LACTATE SERPL-SCNC: 2.7 MMOL/L (ref 0.4–2)
LACTATE SERPL-SCNC: 2.9 MMOL/L (ref 0.4–2)
LACTATE SERPL-SCNC: 3.6 MMOL/L (ref 0.4–2)
LEUKOCYTE ESTERASE UR QL STRIP.AUTO: ABNORMAL
LIPASE SERPL-CCNC: 64 U/L (ref 73–393)
LYMPHOCYTES # BLD: 1.9 K/UL (ref 0.5–4.6)
LYMPHOCYTES NFR BLD: 8 % (ref 13–44)
MCH RBC QN AUTO: 27.9 PG (ref 26.1–32.9)
MCHC RBC AUTO-ENTMCNC: 32.6 G/DL (ref 31.4–35)
MCV RBC AUTO: 85.4 FL (ref 79.6–97.8)
MONOCYTES # BLD: 1.5 K/UL (ref 0.1–1.3)
MONOCYTES NFR BLD: 6 % (ref 4–12)
NEUTS SEG # BLD: 20.4 K/UL (ref 1.7–8.2)
NEUTS SEG NFR BLD: 84 % (ref 43–78)
NITRITE UR QL STRIP.AUTO: POSITIVE
NRBC # BLD: 0 K/UL (ref 0–0.2)
PH UR STRIP: 5 [PH] (ref 5–9)
PLATELET # BLD AUTO: 299 K/UL (ref 150–450)
PMV BLD AUTO: 10.2 FL (ref 9.4–12.3)
POTASSIUM SERPL-SCNC: 4 MMOL/L (ref 3.5–5.1)
PROT SERPL-MCNC: 7.4 G/DL (ref 6.3–8.2)
PROT UR STRIP-MCNC: ABNORMAL MG/DL
RBC # BLD AUTO: 4.45 M/UL (ref 4.05–5.2)
RBC #/AREA URNS HPF: ABNORMAL /HPF
SERVICE CMNT-IMP: ABNORMAL
SERVICE CMNT-IMP: NORMAL
SODIUM SERPL-SCNC: 134 MMOL/L (ref 136–145)
SOURCE, COVRS: NORMAL
SP GR UR REFRACTOMETRY: 1.04 (ref 1–1.02)
UROBILINOGEN UR QL STRIP.AUTO: 0.2 EU/DL (ref 0.2–1)
WBC # BLD AUTO: 24.3 K/UL (ref 4.3–11.1)
WBC URNS QL MICRO: ABNORMAL /HPF

## 2021-11-14 PROCEDURE — 83690 ASSAY OF LIPASE: CPT

## 2021-11-14 PROCEDURE — 87324 CLOSTRIDIUM AG IA: CPT

## 2021-11-14 PROCEDURE — 81001 URINALYSIS AUTO W/SCOPE: CPT

## 2021-11-14 PROCEDURE — 87186 SC STD MICRODIL/AGAR DIL: CPT

## 2021-11-14 PROCEDURE — 74177 CT ABD & PELVIS W/CONTRAST: CPT

## 2021-11-14 PROCEDURE — 80053 COMPREHEN METABOLIC PANEL: CPT

## 2021-11-14 PROCEDURE — 99284 EMERGENCY DEPT VISIT MOD MDM: CPT

## 2021-11-14 PROCEDURE — 96375 TX/PRO/DX INJ NEW DRUG ADDON: CPT

## 2021-11-14 PROCEDURE — 96365 THER/PROPH/DIAG IV INF INIT: CPT

## 2021-11-14 PROCEDURE — 74011636637 HC RX REV CODE- 636/637: Performed by: STUDENT IN AN ORGANIZED HEALTH CARE EDUCATION/TRAINING PROGRAM

## 2021-11-14 PROCEDURE — 83605 ASSAY OF LACTIC ACID: CPT

## 2021-11-14 PROCEDURE — 87040 BLOOD CULTURE FOR BACTERIA: CPT

## 2021-11-14 PROCEDURE — 74011000636 HC RX REV CODE- 636: Performed by: EMERGENCY MEDICINE

## 2021-11-14 PROCEDURE — 74011250637 HC RX REV CODE- 250/637: Performed by: STUDENT IN AN ORGANIZED HEALTH CARE EDUCATION/TRAINING PROGRAM

## 2021-11-14 PROCEDURE — 65270000029 HC RM PRIVATE

## 2021-11-14 PROCEDURE — 85025 COMPLETE CBC W/AUTO DIFF WBC: CPT

## 2021-11-14 PROCEDURE — 74011250636 HC RX REV CODE- 250/636: Performed by: EMERGENCY MEDICINE

## 2021-11-14 PROCEDURE — 87205 SMEAR GRAM STAIN: CPT

## 2021-11-14 PROCEDURE — 87635 SARS-COV-2 COVID-19 AMP PRB: CPT

## 2021-11-14 PROCEDURE — 87077 CULTURE AEROBIC IDENTIFY: CPT

## 2021-11-14 PROCEDURE — 74011250636 HC RX REV CODE- 250/636: Performed by: STUDENT IN AN ORGANIZED HEALTH CARE EDUCATION/TRAINING PROGRAM

## 2021-11-14 PROCEDURE — 82962 GLUCOSE BLOOD TEST: CPT

## 2021-11-14 PROCEDURE — 87086 URINE CULTURE/COLONY COUNT: CPT

## 2021-11-14 PROCEDURE — 96361 HYDRATE IV INFUSION ADD-ON: CPT

## 2021-11-14 PROCEDURE — 74011000258 HC RX REV CODE- 258: Performed by: EMERGENCY MEDICINE

## 2021-11-14 PROCEDURE — 87088 URINE BACTERIA CULTURE: CPT

## 2021-11-14 PROCEDURE — 74011000258 HC RX REV CODE- 258: Performed by: STUDENT IN AN ORGANIZED HEALTH CARE EDUCATION/TRAINING PROGRAM

## 2021-11-14 PROCEDURE — 36415 COLL VENOUS BLD VENIPUNCTURE: CPT

## 2021-11-14 RX ORDER — ACETAMINOPHEN 325 MG/1
650 TABLET ORAL
Status: DISCONTINUED | OUTPATIENT
Start: 2021-11-14 | End: 2021-11-19 | Stop reason: HOSPADM

## 2021-11-14 RX ORDER — SODIUM CHLORIDE 0.9 % (FLUSH) 0.9 %
5-40 SYRINGE (ML) INJECTION EVERY 8 HOURS
Status: DISCONTINUED | OUTPATIENT
Start: 2021-11-14 | End: 2021-11-19 | Stop reason: HOSPADM

## 2021-11-14 RX ORDER — ONDANSETRON 4 MG/1
4 TABLET, ORALLY DISINTEGRATING ORAL
Status: DISCONTINUED | OUTPATIENT
Start: 2021-11-14 | End: 2021-11-19 | Stop reason: HOSPADM

## 2021-11-14 RX ORDER — SODIUM CHLORIDE 0.9 % (FLUSH) 0.9 %
5-10 SYRINGE (ML) INJECTION AS NEEDED
Status: DISCONTINUED | OUTPATIENT
Start: 2021-11-14 | End: 2021-11-19 | Stop reason: HOSPADM

## 2021-11-14 RX ORDER — ENOXAPARIN SODIUM 100 MG/ML
40 INJECTION SUBCUTANEOUS EVERY 24 HOURS
Status: DISCONTINUED | OUTPATIENT
Start: 2021-11-14 | End: 2021-11-14 | Stop reason: SDUPTHER

## 2021-11-14 RX ORDER — AMLODIPINE BESYLATE 5 MG/1
5 TABLET ORAL DAILY
Status: DISCONTINUED | OUTPATIENT
Start: 2021-11-15 | End: 2021-11-19 | Stop reason: HOSPADM

## 2021-11-14 RX ORDER — SPIRONOLACTONE 25 MG/1
25 TABLET ORAL DAILY
Status: DISCONTINUED | OUTPATIENT
Start: 2021-11-15 | End: 2021-11-19 | Stop reason: HOSPADM

## 2021-11-14 RX ORDER — HYDROCODONE BITARTRATE AND ACETAMINOPHEN 5; 325 MG/1; MG/1
1 TABLET ORAL
Status: DISCONTINUED | OUTPATIENT
Start: 2021-11-14 | End: 2021-11-19 | Stop reason: HOSPADM

## 2021-11-14 RX ORDER — MORPHINE SULFATE 2 MG/ML
1 INJECTION, SOLUTION INTRAMUSCULAR; INTRAVENOUS
Status: DISCONTINUED | OUTPATIENT
Start: 2021-11-14 | End: 2021-11-19 | Stop reason: HOSPADM

## 2021-11-14 RX ORDER — SODIUM CHLORIDE 0.9 % (FLUSH) 0.9 %
5-10 SYRINGE (ML) INJECTION EVERY 8 HOURS
Status: DISCONTINUED | OUTPATIENT
Start: 2021-11-14 | End: 2021-11-19 | Stop reason: HOSPADM

## 2021-11-14 RX ORDER — SODIUM CHLORIDE 0.9 % (FLUSH) 0.9 %
5-40 SYRINGE (ML) INJECTION EVERY 8 HOURS
Status: DISCONTINUED | OUTPATIENT
Start: 2021-11-14 | End: 2021-11-14 | Stop reason: SDUPTHER

## 2021-11-14 RX ORDER — ENOXAPARIN SODIUM 100 MG/ML
30 INJECTION SUBCUTANEOUS DAILY
Status: DISCONTINUED | OUTPATIENT
Start: 2021-11-14 | End: 2021-11-16

## 2021-11-14 RX ORDER — SODIUM CHLORIDE 0.9 % (FLUSH) 0.9 %
5-40 SYRINGE (ML) INJECTION AS NEEDED
Status: DISCONTINUED | OUTPATIENT
Start: 2021-11-14 | End: 2021-11-19 | Stop reason: HOSPADM

## 2021-11-14 RX ORDER — CARVEDILOL 6.25 MG/1
12.5 TABLET ORAL 2 TIMES DAILY WITH MEALS
Status: DISCONTINUED | OUTPATIENT
Start: 2021-11-14 | End: 2021-11-19 | Stop reason: HOSPADM

## 2021-11-14 RX ORDER — SODIUM CHLORIDE 0.9 % (FLUSH) 0.9 %
5-40 SYRINGE (ML) INJECTION AS NEEDED
Status: DISCONTINUED | OUTPATIENT
Start: 2021-11-14 | End: 2021-11-14 | Stop reason: SDUPTHER

## 2021-11-14 RX ORDER — INSULIN GLARGINE 100 [IU]/ML
20 INJECTION, SOLUTION SUBCUTANEOUS DAILY
Status: DISCONTINUED | OUTPATIENT
Start: 2021-11-14 | End: 2021-11-19 | Stop reason: HOSPADM

## 2021-11-14 RX ORDER — ONDANSETRON 2 MG/ML
4 INJECTION INTRAMUSCULAR; INTRAVENOUS
Status: DISCONTINUED | OUTPATIENT
Start: 2021-11-14 | End: 2021-11-19 | Stop reason: HOSPADM

## 2021-11-14 RX ORDER — ACETAMINOPHEN 650 MG/1
650 SUPPOSITORY RECTAL
Status: DISCONTINUED | OUTPATIENT
Start: 2021-11-14 | End: 2021-11-19 | Stop reason: HOSPADM

## 2021-11-14 RX ORDER — GUAIFENESIN 100 MG/5ML
81 LIQUID (ML) ORAL DAILY
Status: DISCONTINUED | OUTPATIENT
Start: 2021-11-15 | End: 2021-11-19 | Stop reason: HOSPADM

## 2021-11-14 RX ORDER — POLYETHYLENE GLYCOL 3350 17 G/17G
17 POWDER, FOR SOLUTION ORAL DAILY PRN
Status: DISCONTINUED | OUTPATIENT
Start: 2021-11-14 | End: 2021-11-19 | Stop reason: HOSPADM

## 2021-11-14 RX ORDER — SODIUM CHLORIDE 9 MG/ML
100 INJECTION, SOLUTION INTRAVENOUS CONTINUOUS
Status: DISCONTINUED | OUTPATIENT
Start: 2021-11-14 | End: 2021-11-16

## 2021-11-14 RX ORDER — SODIUM CHLORIDE 0.9 % (FLUSH) 0.9 %
10 SYRINGE (ML) INJECTION
Status: COMPLETED | OUTPATIENT
Start: 2021-11-14 | End: 2021-11-14

## 2021-11-14 RX ORDER — ESCITALOPRAM OXALATE 10 MG/1
10 TABLET ORAL DAILY
Status: DISCONTINUED | OUTPATIENT
Start: 2021-11-15 | End: 2021-11-19 | Stop reason: HOSPADM

## 2021-11-14 RX ORDER — ROPINIROLE 1 MG/1
1 TABLET, FILM COATED ORAL 2 TIMES DAILY
Status: DISCONTINUED | OUTPATIENT
Start: 2021-11-14 | End: 2021-11-19 | Stop reason: HOSPADM

## 2021-11-14 RX ORDER — INSULIN LISPRO 100 [IU]/ML
INJECTION, SOLUTION INTRAVENOUS; SUBCUTANEOUS
Status: DISCONTINUED | OUTPATIENT
Start: 2021-11-14 | End: 2021-11-19 | Stop reason: HOSPADM

## 2021-11-14 RX ORDER — ATORVASTATIN CALCIUM 40 MG/1
40 TABLET, FILM COATED ORAL
Status: DISCONTINUED | OUTPATIENT
Start: 2021-11-14 | End: 2021-11-19 | Stop reason: HOSPADM

## 2021-11-14 RX ORDER — ONDANSETRON 2 MG/ML
4 INJECTION INTRAMUSCULAR; INTRAVENOUS
Status: COMPLETED | OUTPATIENT
Start: 2021-11-14 | End: 2021-11-14

## 2021-11-14 RX ADMIN — INSULIN LISPRO 2 UNITS: 100 INJECTION, SOLUTION INTRAVENOUS; SUBCUTANEOUS at 22:00

## 2021-11-14 RX ADMIN — INSULIN GLARGINE 20 UNITS: 100 INJECTION, SOLUTION SUBCUTANEOUS at 08:38

## 2021-11-14 RX ADMIN — Medication 10 ML: at 13:30

## 2021-11-14 RX ADMIN — CARVEDILOL 12.5 MG: 6.25 TABLET, FILM COATED ORAL at 17:44

## 2021-11-14 RX ADMIN — ENOXAPARIN SODIUM 30 MG: 30 INJECTION SUBCUTANEOUS at 10:19

## 2021-11-14 RX ADMIN — Medication 10 ML: at 10:21

## 2021-11-14 RX ADMIN — PIPERACILLIN AND TAZOBACTAM 3.38 G: 3; .375 INJECTION, POWDER, LYOPHILIZED, FOR SOLUTION INTRAVENOUS at 15:51

## 2021-11-14 RX ADMIN — ONDANSETRON 4 MG: 2 INJECTION INTRAMUSCULAR; INTRAVENOUS at 04:40

## 2021-11-14 RX ADMIN — Medication 10 ML: at 21:45

## 2021-11-14 RX ADMIN — SODIUM CHLORIDE 1000 ML: 900 INJECTION, SOLUTION INTRAVENOUS at 04:41

## 2021-11-14 RX ADMIN — IOPAMIDOL 100 ML: 755 INJECTION, SOLUTION INTRAVENOUS at 05:01

## 2021-11-14 RX ADMIN — PIPERACILLIN AND TAZOBACTAM 3.38 G: 3; .375 INJECTION, POWDER, LYOPHILIZED, FOR SOLUTION INTRAVENOUS at 23:54

## 2021-11-14 RX ADMIN — PIPERACILLIN SODIUM AND TAZOBACTAM SODIUM 4.5 G: 4; .5 INJECTION, POWDER, LYOPHILIZED, FOR SOLUTION INTRAVENOUS at 05:31

## 2021-11-14 RX ADMIN — Medication 10 ML: at 05:02

## 2021-11-14 RX ADMIN — PIPERACILLIN AND TAZOBACTAM 3.38 G: 3; .375 INJECTION, POWDER, LYOPHILIZED, FOR SOLUTION INTRAVENOUS at 08:38

## 2021-11-14 RX ADMIN — INSULIN LISPRO 2 UNITS: 100 INJECTION, SOLUTION INTRAVENOUS; SUBCUTANEOUS at 11:30

## 2021-11-14 RX ADMIN — SODIUM CHLORIDE 100 ML/HR: 900 INJECTION, SOLUTION INTRAVENOUS at 08:38

## 2021-11-14 RX ADMIN — ACETAMINOPHEN 650 MG: 325 TABLET ORAL at 20:01

## 2021-11-14 RX ADMIN — ATORVASTATIN CALCIUM 40 MG: 40 TABLET, FILM COATED ORAL at 21:44

## 2021-11-14 RX ADMIN — ROPINIROLE HYDROCHLORIDE 1 MG: 1 TABLET, FILM COATED ORAL at 17:44

## 2021-11-14 RX ADMIN — Medication 10 ML: at 21:44

## 2021-11-14 NOTE — ED NOTES
Per daughters request, nurse called [facility] and let them know the patient was being admitted.

## 2021-11-14 NOTE — ED NOTES
TRANSFER - OUT REPORT:    Verbal report given to Coby on Marda Mass  being transferred to [facility] for routine progression of care       Report consisted of patients Situation, Background, Assessment and   Recommendations(SBAR). Information from the following report(s) SBAR was reviewed with the receiving nurse. Lines:   Peripheral IV 11/14/21 Left Antecubital (Active)        Opportunity for questions and clarification was provided.       Patient transported with:   Nomanini

## 2021-11-14 NOTE — ED PROVIDER NOTES
Mask was worn during the entire patient examination. Swetha James is a 80 y.o. female who presents to the ED with a chief complaint of nausea and vomiting. Patient reports 2 episodes of brown vomit. One episode of diarrhea tonight. She denies any bleeding. She states she had some abdominal pain earlier. She also complains of some right-sided abdominal pain earlier but states it is gone now. She does report recent right hip fracture and she is in rehab for this. She denied any abdominal pain at all currently.   She denies any fever or other past abdominal surgeries however reviewing her chart it does appear she had a hysterectomy in the distant past.           Past Medical History:   Diagnosis Date    Acute kidney failure 05/2016    Acute pericarditis 2015    Arthritis     CAP (community acquired pneumonia) 5/2016    Coronary artery disease     Crohn's disease     Hypercalcemia     Hypercholesterolemia     Hypertension     Lumbar compression fracture     Osteoporosis     Type 2 diabetes mellitus        Past Surgical History:   Procedure Laterality Date    HX ARTHROPLASTY  1/5/2016    left elbow    HX HEART CATHETERIZATION  5/12/2015    no intervention    HX HYSTERECTOMY  1978    HX KYPHOPLASTY  ~2002    lumbar         Family History:   Problem Relation Age of Onset    Heart Failure Mother     Diabetes Mother     Cancer Father         throat    Alcohol abuse Neg Hx     Arthritis-rheumatoid Neg Hx     Asthma Neg Hx     Bleeding Prob Neg Hx     Elevated Lipids Neg Hx     Headache Neg Hx     Hypertension Neg Hx     Lung Disease Neg Hx     Migraines Neg Hx     Psychiatric Disorder Neg Hx     Stroke Neg Hx     Mental Retardation Neg Hx     Thyroid Disease Neg Hx        Social History     Socioeconomic History    Marital status:      Spouse name: Not on file    Number of children: Not on file    Years of education: Not on file    Highest education level: Not on file Occupational History    Not on file   Tobacco Use    Smoking status: Former Smoker     Types: Cigarettes     Start date: 1961     Quit date: 1964     Years since quittin.4    Smokeless tobacco: Never Used    Tobacco comment: 2 packs a wk   Substance and Sexual Activity    Alcohol use: Yes     Alcohol/week: 0.0 standard drinks     Comment: rarely    Drug use: No    Sexual activity: Never   Other Topics Concern    Not on file   Social History Narrative    Pt , lives with . Retired. She worked as an . She has 1 dog and 1 cat at home. Social Determinants of Health     Financial Resource Strain:     Difficulty of Paying Living Expenses: Not on file   Food Insecurity:     Worried About Running Out of Food in the Last Year: Not on file    Annabelle of Food in the Last Year: Not on file   Transportation Needs:     Lack of Transportation (Medical): Not on file    Lack of Transportation (Non-Medical):  Not on file   Physical Activity:     Days of Exercise per Week: Not on file    Minutes of Exercise per Session: Not on file   Stress:     Feeling of Stress : Not on file   Social Connections:     Frequency of Communication with Friends and Family: Not on file    Frequency of Social Gatherings with Friends and Family: Not on file    Attends [employer] Services: Not on file    Active Member of [address] [place] [address] [employer] or Organizations: Not on file    Attends Club or Organization Meetings: Not on file    Marital Status: Not on file   Intimate Partner Violence:     Fear of Current or Ex-Partner: Not on file    Emotionally Abused: Not on file    Physically Abused: Not on file    Sexually Abused: Not on file   Housing Stability:     Unable to Pay for Housing in the Last Year: Not on file    Number of [place] in the Last Year: Not on file    Unstable Housing in the Last Year: Not on file         ALLERGIES: Sulfa (sulfonamide antibiotics), Other medication, and Tape [adhesive]    Review of Systems   Constitutional: Negative for activity change, chills, fatigue and fever. Respiratory: Negative for chest tightness, shortness of breath, wheezing and stridor. Cardiovascular: Negative for chest pain and palpitations. Gastrointestinal: Positive for abdominal pain, diarrhea, nausea and vomiting. Negative for abdominal distention, anal bleeding, blood in stool and rectal pain. Musculoskeletal: Negative for arthralgias, back pain, neck pain and neck stiffness. Skin: Negative for color change, pallor and wound. Neurological: Negative for weakness and numbness. All other systems reviewed and are negative. Vitals:    11/14/21 0358   BP: (!) 120/57   Pulse: (!) 107   Resp: 16   Temp: 97.6 °F (36.4 °C)   SpO2: 96%   Weight: 54.4 kg (119 lb 14.9 oz)   Height: 4' 9\" (1.448 m)            Physical Exam  Vitals and nursing note reviewed. Constitutional:       General: She is not in acute distress. Appearance: She is well-developed. She is not ill-appearing, toxic-appearing or diaphoretic. HENT:      Head: Normocephalic and atraumatic. Comments: Patient has decreased hearing requires talking somewhat loud given the masks. Eyes:      General: No scleral icterus. Conjunctiva/sclera: Conjunctivae normal.   Pulmonary:      Effort: Pulmonary effort is normal. No respiratory distress. Breath sounds: No stridor. Abdominal:      Tenderness: There is generalized abdominal tenderness and tenderness in the right upper quadrant, right lower quadrant, periumbilical area and suprapubic area. There is no right CVA tenderness, left CVA tenderness, guarding or rebound. Hernia: No hernia is present. Genitourinary:     Rectum: Normal.   Musculoskeletal:      Cervical back: No rigidity. Skin:     Capillary Refill: Capillary refill takes less than 2 seconds. Coloration: Skin is not cyanotic, jaundiced, mottled or pale. Findings: No erythema or rash. Neurological:      General: No focal deficit present. Mental Status: She is alert. Mental status is at baseline. Psychiatric:         Mood and Affect: Mood normal.         Behavior: Behavior normal.          MDM  Number of Diagnoses or Management Options  Diagnosis management comments: Patient denied any abdominal pain but on my exam she was fairly tender. White count came back at 24,000 I did order blood cultures and antibiotics I gave 30 mL/kg using ideal body weight. Patient CT scan is pending and we are awaiting results of this while treating. Dairl Lennox, MD 5:18 AM 11/14/2021       Patient CT scan shows likely pyelonephritis. We will cath patient to obtain urine. I will continue treatment for severe sepsis. I plan to admit patient for continued care. Sepsis Reassessment note:     Nellie Curtis meets criteria for Sepsis -    Patient is receiving IV Fluids and Antibiotics per sepsis protocol. I did the Sepsis Reassessment at 6:10 AM 11/14/21.     Dairl Lennox, MD           Amount and/or Complexity of Data Reviewed  Clinical lab tests: ordered and reviewed (Results for orders placed or performed during the hospital encounter of 11/14/21  -CBC WITH AUTOMATED DIFF:        Result                      Value             Ref Range           WBC                         24.3 (H)          4.3 - 11.1 K*       RBC                         4.45              4.05 - 5.2 M*       HGB                         12.4              11.7 - 15.4 *       HCT                         38.0              35.8 - 46.3 %       MCV                         85.4              79.6 - 97.8 *       MCH                         27.9              26.1 - 32.9 *       MCHC                        32.6              31.4 - 35.0 *       RDW                         13.7              11.9 - 14.6 %       PLATELET                    299               150 - 450 K/*       MPV                         10.2              9.4 - 12.3 FL ABSOLUTE NRBC               0.00              0.0 - 0.2 K/*       DF                          AUTOMATED                             NEUTROPHILS                 84 (H)            43 - 78 %           LYMPHOCYTES                 8 (L)             13 - 44 %           MONOCYTES                   6                 4.0 - 12.0 %        EOSINOPHILS                 0 (L)             0.5 - 7.8 %         BASOPHILS                   0                 0.0 - 2.0 %         IMMATURE GRANULOCYTES       1                 0.0 - 5.0 %         ABS. NEUTROPHILS            20.4 (H)          1.7 - 8.2 K/*       ABS. LYMPHOCYTES            1.9               0.5 - 4.6 K/*       ABS. MONOCYTES              1.5 (H)           0.1 - 1.3 K/*       ABS. EOSINOPHILS            0.1               0.0 - 0.8 K/*       ABS. BASOPHILS              0.1               0.0 - 0.2 K/*       ABS. IMM.  GRANS.            0.3               0.0 - 0.5 K/*  -METABOLIC PANEL, COMPREHENSIVE:        Result                      Value             Ref Range           Sodium                      134 (L)           136 - 145 mm*       Potassium                   4.0               3.5 - 5.1 mm*       Chloride                    101               98 - 107 mmo*       CO2                         24                21 - 32 mmol*       Anion gap                   9                 7 - 16 mmol/L       Glucose                     195 (H)           65 - 100 mg/*       BUN                         18                8 - 23 MG/DL        Creatinine                  0.93              0.6 - 1.0 MG*       GFR est AA                  >60               >60 ml/min/1*       GFR est non-AA              >60               >60 ml/min/1*       Calcium                     10.6 (H)          8.3 - 10.4 M*       Bilirubin, total            0.8               0.2 - 1.1 MG*       ALT (SGPT)                  14                12 - 65 U/L         AST (SGOT)                  9 (L)             15 - 37 U/L Alk. phosphatase            137 (H)           50 - 130 U/L        Protein, total              7.4               6.3 - 8.2 g/*       Albumin                     3.3               3.2 - 4.6 g/*       Globulin                    4.1 (H)           2.3 - 3.5 g/*       A-G Ratio                   0.8 (L)           1.2 - 3.5      -LIPASE:        Result                      Value             Ref Range           Lipase                      64 (L)            73 - 393 U/L  )  Tests in the radiology section of [facility]: ordered and reviewed (CT ABD PELV W CONT    Result Date: 11/14/2021  CT abdomen and pelvis with contrast COMPARISON: Number 2020 and October 2021. INDICATION: Abdominal pain, nausea and vomiting. TECHNIQUE: CT imaging was performed of the abdomen and pelvis following the uncomplicated administration of intravenous contrast (Isovue 370, 100 mL). Oral contrast was administered. Radiation dose reduction techniques were used for this study:  Our CT scanners use one or all of the following: Automated exposure control, adjustment of the mA and/or kVp according to patient's size, iterative reconstruction. FINDINGS: Visualized lung bases are unremarkable. Abdomen findings: There is left perinephric fat stranding. There is small area of heterogeneous hypoenhancement at the lower pole of the left kidney. No perinephric fluid collection/abscess. There are bilateral renal cysts. Largest cyst in the left kidney measures 3 cm. Largest cyst in the right kidney measures 2 cm. Gallbladder is surgically absent. The liver, pancreas, and adrenal glands are unremarkable. Tiny low density in the spleen, similar to 2020 study and probably cyst or hemangioma. Spleen is not enlarged. Abdominal aorta is normal in course and caliber with atherosclerotic calcifications. No evidence of lymphadenopathy. There is small hiatal hernia. Stomach is otherwise normal in contour. Small bowel loops are normal in caliber. No small bowel obstruction. Pelvic findings: Urinary bladder is normal in contour. There is sigmoid diverticulosis without diverticulitis. Colon is normal in course and caliber. No evidence of appendicitis. There is no free air or free fluid. Bones are osteopenic. Post surgical changes of the hips are noted. Kyphoplasty changes at L1. Compression deformities of T11, T12 and L2 are age-indeterminate. 1. Small area of heterogeneous hypoenhancement in the lower pole of the left kidney and left perinephric fat stranding. Findings suspicious for pyelonephritis. Correlate with urinalysis. 2. Multiple bilateral renal cysts. Negative for hydronephrosis or perinephric fluid/abscess. 3. Negative for colitis, diverticulitis or bowel obstruction. 4. Cholecystectomy. 5. Multiple compression deformities in the thoracolumbar spine, age indeterminate. Osteopenia.    )  Discuss the patient with other providers: yes    Risk of Complications, Morbidity, and/or Mortality  General comments: Sepsis Reassessment note:     Jeremy Age meets criteria for Sepsis -    Patient is receiving IV Fluids and Antibiotics per sepsis protocol. I did the Sepsis Reassessment at 5:17 AM 11/14/21.     Jacy Brownlee MD               Procedures

## 2021-11-14 NOTE — ED TRIAGE NOTES
Pt is a resident at [facility] in [facility] and went to dinner with her family last night.   States that she had a ice cream sundae with dinner and began feeling nauseated afterwards;  Masked on  arrival

## 2021-11-14 NOTE — PROGRESS NOTES
TRANSFER - IN REPORT:    Verbal report received from Citlali 4 on Rickey Mcelroy  being received from Ed       Report consisted of patients Situation, Background, Assessment and   Recommendations(SBAR). Information from the following report(s) SBAR was reviewed with the receiving nurse. Opportunity for questions and clarification was provided. Assessment completed upon patients arrival to unit and care assumed.

## 2021-11-14 NOTE — Clinical Note
Status[de-identified] INPATIENT [101]   Type of Bed: Remote Telemetry [29]   Cardiac Monitoring Required?: Yes   Inpatient Hospitalization Certified Necessary for the Following Reasons: 3.  Patient receiving treatment that can only be provided in an inpatient setting (further clarification in H&P documentation)   Admitting Diagnosis: Pyelonephritis [428859]   Admitting Physician: Loren Bear   Attending Physician: Loren Bear   Estimated Length of Stay: 3-4 Midnights   Discharge Plan[de-identified] Home with Office Follow-up

## 2021-11-14 NOTE — H&P
Hospitalist History and Physical   Admit Date:  2021  3:57 AM   Name:  Beronica Glasgow   Age:  80 y.o. Sex:  female  :  1934   MRN:  [id number]   Room:  [facility]/    Presenting Complaint: Abdominal Pain, Nausea, and Vomiting    Reason(s) for Admission: Pyelonephritis [N12]     History of Present Illness:   Beronica Glasgow is a 80 y.o. female with medical history of hypertension, diabetes who presented with feelings of nausea and vomiting which started last night after having dinner with her family. Patient stated she had 2 episodes of brownish colored vomit and also had an episode of diarrhea. Patient denies any evidence of melena or hematochezia. Patient was also complaining of some right-sided flank and abdominal pain that would come and go. Does report recent hip fracture and is currently in rehab for this. Patient denied any cardiac chest pain, shortness of breath, blurry vision, fever/chills. Review of Systems:  10 systems reviewed and negative except as noted in HPI.   Assessment & Plan:   Severe sepsis secondary to pyelonephritis (2021)  Patient with pulse of 107, white count of 24.3 and positive urinalysis, lactic acid of 3.6  Patient started on sepsis bolus  Continue IV fluids  Blood and urine cultures pending  CT scan positive for pyelonephritis  Continue on Zosyn  Repeat lactic acids trending down    Hypertension  Continue home blood pressure medications    Diabetes  Sliding scale insulin  Lantus 20 units  Continue to monitor blood glucose levels and titrate accordingly    Diarrhea  Imodium    New onset atrial fibrillation  Rate controlled  EKG pending  [address] cardiology consult if patient does not convert        Dispo/Discharge Planning:   Dispo pending clinical course    Diet: ADULT DIET Easy to Chew; Low Fat/Low Chol/High Fiber/RONEL  VTE ppx: Lovenox  Code status: Full Code    Hospital Problems as of 2021 Date Reviewed: 10/12/2021          Codes Class Noted - Resolved POA    Pyelonephritis ICD-10-CM: N12  ICD-9-CM: 590.80  2021 - Present Unknown              Past History:  Past Medical History:   Diagnosis Date    Acute kidney failure 2016    Acute pericarditis     Arthritis     CAP (community acquired pneumonia) 2016    Coronary artery disease     Crohn's disease     Hypercalcemia     Hypercholesterolemia     Hypertension     Lumbar compression fracture     Osteoporosis     Type 2 diabetes mellitus      Past Surgical History:   Procedure Laterality Date    HX ARTHROPLASTY  2016    left elbow    HX HEART CATHETERIZATION  2015    no intervention    HX HYSTERECTOMY      HX KYPHOPLASTY  ~    lumbar      Allergies   Allergen Reactions    Sulfa (Sulfonamide Antibiotics) Rash    Other Medication Unknown (comments)     \"Dypentin\"    Tape [Adhesive] Other (comments)     Blisters and skin tears      Social History     Tobacco Use    Smoking status: Former Smoker     Types: Cigarettes     Start date: 1961     Quit date: 1964     Years since quittin.4    Smokeless tobacco: Never Used    Tobacco comment: 2 packs a wk   Substance Use Topics    Alcohol use: Yes     Alcohol/week: 0.0 standard drinks     Comment: rarely      Family History   Problem Relation Age of Onset    Heart Failure Mother     Diabetes Mother     Cancer Father         throat    Alcohol abuse Neg Hx     Arthritis-rheumatoid Neg Hx     Asthma Neg Hx     Bleeding Prob Neg Hx     Elevated Lipids Neg Hx     Headache Neg Hx     Hypertension Neg Hx     Lung Disease Neg Hx     Migraines Neg Hx     Psychiatric Disorder Neg Hx     Stroke Neg Hx     Mental Retardation Neg Hx     Thyroid Disease Neg Hx       Family history reviewed and negative except as otherwise noted.     Immunization History   Administered Date(s) Administered    Influenza Vaccine 10/01/2015, 10/17/2016    TB Skin Test (PPD) Intradermal 2013, 03/04/2021, 09/19/2021, 10/13/2021     Prior to Admit Medications:  Current Outpatient Medications   Medication Instructions    acetaminophen (TYLENOL EXTRA STRENGTH) 500 mg tablet Oral, EVERY 6 HOURS AS NEEDED    amLODIPine (NORVASC) 5 mg, Oral, DAILY    aspirin (ASPIRIN) 325 mg, Oral, DAILY    aspirin 81 mg, Oral, DAILY    atorvastatin (LIPITOR) 40 mg, Oral, DAILY    B.infantis-B.ani-B.long-B.bifi (Probiotic 4X) 10-15 mg TbEC 1 Tablet, Oral, DAILY    calcium-vitamin D (OS-ALYCIA +D3) 500 mg-200 unit per tablet 1 Tablet, Oral, 3 TIMES DAILY WITH MEALS    carvediloL (COREG) 12.5 mg, Oral, 2 TIMES DAILY WITH MEALS    cholecalciferol (Vitamin D3) 25 mcg (1,000 unit) cap Oral, EVERY OTHER DAY    cyanocobalamin 2,500 mcg, Oral, DAILY    escitalopram oxalate (LEXAPRO) 10 mg, Oral, DAILY    famotidine (PEPCID) 20 mg, Oral, DAILY    folic acid (FOLVITE) 666 mcg, Oral, DAILY    insulin glargine (LANTUS,BASAGLAR) 20 Units, SubCUTAneous, DAILY    multivitamin with iron tablet 1 Tablet, Oral, DAILY    omeprazole (PRILOSEC) 40 mg, Oral, DAILY    ondansetron (ZOFRAN ODT) 4 mg, Oral, EVERY 8 HOURS AS NEEDED    rOPINIRole (REQUIP) 1 mg, Oral, 2 TIMES DAILY    spironolactone (ALDACTONE) 25 mg, Oral, DAILY       Objective:     Patient Vitals for the past 24 hrs:   Temp Pulse Resp BP SpO2   11/14/21 1528 98.7 °F (37.1 °C) 82 20 (!) 104/53 93 %   11/14/21 1036 100.3 °F (37.9 °C) 85 20 (!) 146/70 95 %   11/14/21 0943 100.4 °F (38 °C) 89 22 (!) 145/74 95 %   11/14/21 0733    (!) 101/49    11/14/21 0730    (!) 95/51    11/14/21 0727    (!) 114/46    11/14/21 0706   14 (!) 98/44 97 %   11/14/21 0358 97.6 °F (36.4 °C) (!) 107 16 (!) 120/57 96 %     Oxygen Therapy  O2 Sat (%): 93 % (11/14/21 1528)  Pulse via Oximetry: 81 beats per minute (11/14/21 0706)  O2 Device: None (Room air) (11/14/21 0706)    Estimated body mass index is 25.95 kg/m² as calculated from the following:    Height as of this encounter: 4' 9\" (1.448 m). Weight as of this encounter: 54.4 kg (119 lb 14.9 oz). Intake/Output Summary (Last 24 hours) at 11/14/2021 1814  Last data filed at 11/14/2021 1746  Gross per 24 hour   Intake 1244 ml   Output    Net 1244 ml         Physical Exam:  Blood pressure (!) 104/53, pulse 82, temperature 98.7 °F (37.1 °C), resp. rate 20, height 4' 9\" (1.448 m), weight 54.4 kg (119 lb 14.9 oz), last menstrual period 09/08/2008, SpO2 93 %, not currently breastfeeding. General:    Well nourished. No overt distress  Head:  Normocephalic, atraumatic  Eyes:  Sclerae appear normal.  Pupils equally round. ENT:  Nares appear normal, no drainage. Moist oral mucosa  Neck:  No restricted ROM. Trachea midline   CV:   Irregular. No m/r/g. No jugular venous distension. Lungs:   CTAB. No wheezing, rhonchi, or rales. Respirations even, unlabored  Abdomen: Bowel sounds present. Soft, nontender, nondistended. Extremities: No cyanosis or clubbing. No edema  Skin:     No rashes and normal coloration. Warm and dry. Neuro:  CN II-XII grossly intact. Sensation intact. A&Ox3  Psych:  Normal mood and affect. I have reviewed ordered lab tests and independently visualized imaging below:    Last 24hr Labs:  Recent Results (from the past 24 hour(s))   CBC WITH AUTOMATED DIFF    Collection Time: 11/14/21  4:08 AM   Result Value Ref Range    WBC 24.3 (H) 4.3 - 11.1 K/uL    RBC 4.45 4.05 - 5.2 M/uL    HGB 12.4 11.7 - 15.4 g/dL    HCT 38.0 35.8 - 46.3 %    MCV 85.4 79.6 - 97.8 FL    MCH 27.9 26.1 - 32.9 PG    MCHC 32.6 31.4 - 35.0 g/dL    RDW 13.7 11.9 - 14.6 %    PLATELET 813 545 - 108 K/uL    MPV 10.2 9.4 - 12.3 FL    ABSOLUTE NRBC 0.00 0.0 - 0.2 K/uL    DF AUTOMATED      NEUTROPHILS 84 (H) 43 - 78 %    LYMPHOCYTES 8 (L) 13 - 44 %    MONOCYTES 6 4.0 - 12.0 %    EOSINOPHILS 0 (L) 0.5 - 7.8 %    BASOPHILS 0 0.0 - 2.0 %    IMMATURE GRANULOCYTES 1 0.0 - 5.0 %    ABS. NEUTROPHILS 20.4 (H) 1.7 - 8.2 K/UL    ABS.  LYMPHOCYTES 1.9 0.5 - 4.6 K/UL    ABS. MONOCYTES 1.5 (H) 0.1 - 1.3 K/UL    ABS. EOSINOPHILS 0.1 0.0 - 0.8 K/UL    ABS. BASOPHILS 0.1 0.0 - 0.2 K/UL    ABS. IMM. GRANS. 0.3 0.0 - 0.5 K/UL   METABOLIC PANEL, COMPREHENSIVE    Collection Time: 11/14/21  4:08 AM   Result Value Ref Range    Sodium 134 (L) 136 - 145 mmol/L    Potassium 4.0 3.5 - 5.1 mmol/L    Chloride 101 98 - 107 mmol/L    CO2 24 21 - 32 mmol/L    Anion gap 9 7 - 16 mmol/L    Glucose 195 (H) 65 - 100 mg/dL    BUN 18 8 - 23 MG/DL    Creatinine 0.93 0.6 - 1.0 MG/DL    GFR est AA >60 >60 ml/min/1.73m2    GFR est non-AA >60 >60 ml/min/1.73m2    Calcium 10.6 (H) 8.3 - 10.4 MG/DL    Bilirubin, total 0.8 0.2 - 1.1 MG/DL    ALT (SGPT) 14 12 - 65 U/L    AST (SGOT) 9 (L) 15 - 37 U/L    Alk.  phosphatase 137 (H) 50 - 130 U/L    Protein, total 7.4 6.3 - 8.2 g/dL    Albumin 3.3 3.2 - 4.6 g/dL    Globulin 4.1 (H) 2.3 - 3.5 g/dL    A-G Ratio 0.8 (L) 1.2 - 3.5     LIPASE    Collection Time: 11/14/21  4:08 AM   Result Value Ref Range    Lipase 64 (L) 73 - 393 U/L   LACTIC ACID    Collection Time: 11/14/21  4:08 AM   Result Value Ref Range    Lactic acid 3.6 (HH) 0.4 - 2.0 MMOL/L   URINALYSIS W/ RFLX MICROSCOPIC    Collection Time: 11/14/21  6:31 AM   Result Value Ref Range    Color YELLOW      Appearance CLEAR      Specific gravity 1.039 (H) 1.001 - 1.023      pH (UA) 5.0 5.0 - 9.0      Protein TRACE (A) NEG mg/dL    Glucose 100 mg/dL    Ketone Negative NEG mg/dL    Bilirubin Negative NEG      Blood SMALL (A) NEG      Urobilinogen 0.2 0.2 - 1.0 EU/dL    Nitrites Positive (A) NEG      Leukocyte Esterase MODERATE (A) NEG      WBC  0 /hpf    RBC 0-3 0 /hpf    Epithelial cells 0 0 /hpf    Bacteria 4+ (H) 0 /hpf    Casts 0-3 0 /lpf   LACTIC ACID    Collection Time: 11/14/21  7:14 AM   Result Value Ref Range    Lactic acid 2.9 (H) 0.4 - 2.0 MMOL/L   COVID-19 RAPID TEST    Collection Time: 11/14/21  8:04 AM   Result Value Ref Range    Specimen source Nasopharyngeal      COVID-19 rapid test Not detected NOTD     LACTIC ACID    Collection Time: 11/14/21 10:47 AM   Result Value Ref Range    Lactic acid 2.7 (H) 0.4 - 2.0 MMOL/L   GLUCOSE, POC    Collection Time: 11/14/21 11:19 AM   Result Value Ref Range    Glucose (POC) 165 (H) 65 - 100 mg/dL    Performed by Ollie    LACTIC ACID    Collection Time: 11/14/21  2:16 PM   Result Value Ref Range    Lactic acid 2.0 0.4 - 2.0 MMOL/L   GLUCOSE, POC    Collection Time: 11/14/21  4:25 PM   Result Value Ref Range    Glucose (POC) 99 65 - 100 mg/dL    Performed by Luci    GLUCOSE, POC    Collection Time: 11/14/21  4:38 PM   Result Value Ref Range    Glucose (POC) 123 (H) 65 - 100 mg/dL    Performed by Ollie        All Micro Results     Procedure Component Value Units Date/Time    CULTURE, URINE [[id number]] Collected: 11/14/21 0631    Order Status: Completed Specimen: Urine from Clean catch Updated: 11/14/21 1157    COVID-19 RAPID TEST [[id number]] Collected: 11/14/21 0804    Order Status: Completed Specimen: Nasopharyngeal Updated: 11/14/21 0829     Specimen source Nasopharyngeal        COVID-19 rapid test Not detected        Comment:      The specimen is NEGATIVE for SARS-CoV-2, the novel coronavirus associated with COVID-19. A negative result does not rule out COVID-19. This test has been authorized by the FDA under an Emergency Use Authorization (EUA) for use by authorized laboratories.         Fact sheet for Healthcare Providers: ConventionUpdate.co.nz  Fact sheet for Patients: ConventionUpdate.co.nz       Methodology: Isothermal Nucleic Acid Amplification         CULTURE, BLOOD [[id number]] Collected: 11/14/21 0551    Order Status: Completed Specimen: Blood Updated: 11/14/21 0617    CULTURE, BLOOD [[id number]] Collected: 11/14/21 0551    Order Status: Completed Specimen: Blood Updated: 11/14/21 0616          Other Studies:  CT ABD PELV W CONT    Result Date: 11/14/2021  CT abdomen and pelvis with contrast COMPARISON: Number 2020 and October 2021. INDICATION: Abdominal pain, nausea and vomiting. TECHNIQUE: CT imaging was performed of the abdomen and pelvis following the uncomplicated administration of intravenous contrast (Isovue 370, 100 mL). Oral contrast was administered. Radiation dose reduction techniques were used for this study:  Our CT scanners use one or all of the following: Automated exposure control, adjustment of the mA and/or kVp according to patient's size, iterative reconstruction. FINDINGS: Visualized lung bases are unremarkable. Abdomen findings: There is left perinephric fat stranding. There is small area of heterogeneous hypoenhancement at the lower pole of the left kidney. No perinephric fluid collection/abscess. There are bilateral renal cysts. Largest cyst in the left kidney measures 3 cm. Largest cyst in the right kidney measures 2 cm. Gallbladder is surgically absent. The liver, pancreas, and adrenal glands are unremarkable. Tiny low density in the spleen, similar to 2020 study and probably cyst or hemangioma. Spleen is not enlarged. Abdominal aorta is normal in course and caliber with atherosclerotic calcifications. No evidence of lymphadenopathy. There is small hiatal hernia. Stomach is otherwise normal in contour. Small bowel loops are normal in caliber. No small bowel obstruction. Pelvic findings: Urinary bladder is normal in contour. There is sigmoid diverticulosis without diverticulitis. Colon is normal in course and caliber. No evidence of appendicitis. There is no free air or free fluid. Bones are osteopenic. Post surgical changes of the hips are noted. Kyphoplasty changes at L1. Compression deformities of T11, T12 and L2 are age-indeterminate. 1. Small area of heterogeneous hypoenhancement in the lower pole of the left kidney and left perinephric fat stranding. Findings suspicious for pyelonephritis. Correlate with urinalysis. 2. Multiple bilateral renal cysts.  Negative for hydronephrosis or perinephric fluid/abscess. 3. Negative for colitis, diverticulitis or bowel obstruction. 4. Cholecystectomy. 5. Multiple compression deformities in the thoracolumbar spine, age indeterminate. Osteopenia.        Medications Administered     0.9% sodium chloride infusion     Admin Date  11/14/2021 Action  New Bag Dose  100 mL/hr Rate  100 mL/hr Route  IntraVENous Administered By  Tyler Barraza RN          carvediloL (COREG) tablet 12.5 mg     Admin Date  11/14/2021 Action  Given Dose  12.5 mg Route  Oral Administered By  Silver Gentile RN          enoxaparin (LOVENOX) injection 30 mg     Admin Date  11/14/2021 Action  Given Dose  30 mg Route  SubCUTAneous Administered By  Silver Gentile RN          insulin glargine (LANTUS) injection 20 Units     Admin Date  11/14/2021 Action  Given Dose  20 Units Route  SubCUTAneous Administered By  Tyler Barraza RN          insulin lispro (HUMALOG) injection     Admin Date  11/14/2021 Action  Given Dose  2 Units Route  SubCUTAneous Administered By  Silver Gentile RN          iopamidoL (ISOVUE-370) 76 % injection 100 mL     Admin Date  11/14/2021 Action  Given Dose  100 mL Route  IntraVENous Administered By  Tomasa Cranker          ondansetron [facility]) injection 4 mg     Admin Date  11/14/2021 Action  Given Dose  4 mg Route  IntraVENous Administered By  Namita Colon RN          piperacillin-tazobactam (ZOSYN) 3.375 g in 0.9% sodium chloride (MBP/ADV) 100 mL MBP     Admin Date  11/14/2021 Action  New Bag Dose  3.375 g Rate  25 mL/hr Route  IntraVENous Administered By  Tyler Barraza RN           Admin Date  11/14/2021 Action  New Bag Dose  3.375 g Rate  25 mL/hr Route  IntraVENous Administered By  Silver Gentile RN          piperacillin-tazobactam (ZOSYN) 4.5 g in 0.9% sodium chloride (MBP/ADV) 100 mL MBP     Admin Date  11/14/2021 Action  New Bag Dose  4.5 g Rate  200 mL/hr Route  IntraVENous Administered By  Namita Colon RN rOPINIRole (REQUIP) tablet 1 mg     Admin Date  11/14/2021 Action  Given Dose  1 mg Route  Oral Administered By  Pablo Jaimes RN          saline peripheral flush soln 10 mL     Admin Date  11/14/2021 Action  Given Dose  10 mL Route  InterCATHeter Administered By  Thad Soler          sodium chloride (NS) flush 5-10 mL     Admin Date  11/14/2021 Action  Given Dose  10 mL Route  IntraVENous Administered By  Pablo Jaimes RN          sodium chloride (NS) flush 5-40 mL     Admin Date  11/14/2021 Action  Given Dose  10 mL Route  IntraVENous Administered By  Pablo Jaimes RN           Admin Date  11/14/2021 Action  Given Dose  10 mL Route  IntraVENous Administered By  Pablo Jaimes RN          sodium chloride 0.9 % bolus infusion 1,000 mL     Admin Date  11/14/2021 Action  New Bag Dose  1,000 mL Rate  1,000 mL/hr Route  IntraVENous Administered By  Razia Pelletier RN                Signed:  Keri Blunt MD    Part of this note may have been written by using a voice dictation software. The note has been proof read but may still contain some grammatical/other typographical errors.

## 2021-11-15 LAB
ACC. NO. FROM MICRO ORDER, ACCP: ABNORMAL
ALBUMIN SERPL-MCNC: 2.5 G/DL (ref 3.2–4.6)
ALBUMIN/GLOB SERPL: 0.8 {RATIO} (ref 1.2–3.5)
ALP SERPL-CCNC: 38 U/L (ref 50–136)
ALT SERPL-CCNC: 11 U/L (ref 12–65)
ANION GAP SERPL CALC-SCNC: 10 MMOL/L (ref 7–16)
AST SERPL-CCNC: 4 U/L (ref 15–37)
BILIRUB SERPL-MCNC: 0.7 MG/DL (ref 0.2–1.1)
BUN SERPL-MCNC: 13 MG/DL (ref 8–23)
C DIFF GDH STL QL: NORMAL
C DIFF TOX A+B STL QL IA: NORMAL
CALCIUM SERPL-MCNC: 8.1 MG/DL (ref 8.3–10.4)
CHLORIDE SERPL-SCNC: 106 MMOL/L (ref 98–107)
CLINICAL CONSIDERATION: NORMAL
CO2 SERPL-SCNC: 21 MMOL/L (ref 21–32)
CREAT SERPL-MCNC: 0.77 MG/DL (ref 0.6–1)
ERYTHROCYTE [DISTWIDTH] IN BLOOD BY AUTOMATED COUNT: 14.1 % (ref 11.9–14.6)
ESCHERICHIA COLI: DETECTED
GLOBULIN SER CALC-MCNC: 3.3 G/DL (ref 2.3–3.5)
GLUCOSE BLD STRIP.AUTO-MCNC: 126 MG/DL (ref 65–100)
GLUCOSE BLD STRIP.AUTO-MCNC: 132 MG/DL (ref 65–100)
GLUCOSE BLD STRIP.AUTO-MCNC: 135 MG/DL (ref 65–100)
GLUCOSE BLD STRIP.AUTO-MCNC: 156 MG/DL (ref 65–100)
GLUCOSE SERPL-MCNC: 120 MG/DL (ref 65–100)
HCT VFR BLD AUTO: 31.6 % (ref 35.8–46.3)
HGB BLD-MCNC: 10.2 G/DL (ref 11.7–15.4)
INTERPRETATION: ABNORMAL
INTERPRETATION: NORMAL
KPC (CARBAPENEM RESISTANCE GENE): NOT DETECTED
MAGNESIUM SERPL-MCNC: 0.8 MG/DL (ref 1.8–2.4)
MCH RBC QN AUTO: 28.3 PG (ref 26.1–32.9)
MCHC RBC AUTO-ENTMCNC: 32.3 G/DL (ref 31.4–35)
MCV RBC AUTO: 87.8 FL (ref 79.6–97.8)
NRBC # BLD: 0 K/UL (ref 0–0.2)
PCR REFLEX: NORMAL
PLATELET # BLD AUTO: 213 K/UL (ref 150–450)
PMV BLD AUTO: 11.1 FL (ref 9.4–12.3)
POTASSIUM SERPL-SCNC: 3 MMOL/L (ref 3.5–5.1)
PROT SERPL-MCNC: 5.8 G/DL (ref 6.3–8.2)
RBC # BLD AUTO: 3.6 M/UL (ref 4.05–5.2)
SERVICE CMNT-IMP: ABNORMAL
SODIUM SERPL-SCNC: 137 MMOL/L (ref 136–145)
WBC # BLD AUTO: 16.6 K/UL (ref 4.3–11.1)

## 2021-11-15 PROCEDURE — 36415 COLL VENOUS BLD VENIPUNCTURE: CPT

## 2021-11-15 PROCEDURE — 74011250636 HC RX REV CODE- 250/636: Performed by: STUDENT IN AN ORGANIZED HEALTH CARE EDUCATION/TRAINING PROGRAM

## 2021-11-15 PROCEDURE — 87150 DNA/RNA AMPLIFIED PROBE: CPT

## 2021-11-15 PROCEDURE — 74011000258 HC RX REV CODE- 258: Performed by: STUDENT IN AN ORGANIZED HEALTH CARE EDUCATION/TRAINING PROGRAM

## 2021-11-15 PROCEDURE — 74011250637 HC RX REV CODE- 250/637: Performed by: STUDENT IN AN ORGANIZED HEALTH CARE EDUCATION/TRAINING PROGRAM

## 2021-11-15 PROCEDURE — 82962 GLUCOSE BLOOD TEST: CPT

## 2021-11-15 PROCEDURE — 83735 ASSAY OF MAGNESIUM: CPT

## 2021-11-15 PROCEDURE — 85027 COMPLETE CBC AUTOMATED: CPT

## 2021-11-15 PROCEDURE — 80053 COMPREHEN METABOLIC PANEL: CPT

## 2021-11-15 PROCEDURE — 74011636637 HC RX REV CODE- 636/637: Performed by: STUDENT IN AN ORGANIZED HEALTH CARE EDUCATION/TRAINING PROGRAM

## 2021-11-15 PROCEDURE — 65270000029 HC RM PRIVATE

## 2021-11-15 PROCEDURE — 93005 ELECTROCARDIOGRAM TRACING: CPT

## 2021-11-15 RX ORDER — LOPERAMIDE HYDROCHLORIDE 2 MG/1
2 CAPSULE ORAL
Status: DISCONTINUED | OUTPATIENT
Start: 2021-11-15 | End: 2021-11-19 | Stop reason: HOSPADM

## 2021-11-15 RX ORDER — POTASSIUM CHLORIDE 20 MEQ/1
40 TABLET, EXTENDED RELEASE ORAL
Status: COMPLETED | OUTPATIENT
Start: 2021-11-15 | End: 2021-11-15

## 2021-11-15 RX ORDER — POTASSIUM CHLORIDE 20 MEQ/1
40 TABLET, EXTENDED RELEASE ORAL ONCE
Status: COMPLETED | OUTPATIENT
Start: 2021-11-15 | End: 2021-11-15

## 2021-11-15 RX ORDER — MAGNESIUM SULFATE HEPTAHYDRATE 40 MG/ML
4 INJECTION, SOLUTION INTRAVENOUS ONCE
Status: COMPLETED | OUTPATIENT
Start: 2021-11-15 | End: 2021-11-15

## 2021-11-15 RX ADMIN — ROPINIROLE HYDROCHLORIDE 1 MG: 1 TABLET, FILM COATED ORAL at 09:00

## 2021-11-15 RX ADMIN — ROPINIROLE HYDROCHLORIDE 1 MG: 1 TABLET, FILM COATED ORAL at 17:28

## 2021-11-15 RX ADMIN — ATORVASTATIN CALCIUM 40 MG: 40 TABLET, FILM COATED ORAL at 21:31

## 2021-11-15 RX ADMIN — AMLODIPINE BESYLATE 5 MG: 5 TABLET ORAL at 09:00

## 2021-11-15 RX ADMIN — Medication 10 ML: at 21:32

## 2021-11-15 RX ADMIN — INSULIN GLARGINE 20 UNITS: 100 INJECTION, SOLUTION SUBCUTANEOUS at 09:01

## 2021-11-15 RX ADMIN — POTASSIUM CHLORIDE 40 MEQ: 20 TABLET, EXTENDED RELEASE ORAL at 17:28

## 2021-11-15 RX ADMIN — ENOXAPARIN SODIUM 30 MG: 30 INJECTION SUBCUTANEOUS at 09:01

## 2021-11-15 RX ADMIN — PIPERACILLIN AND TAZOBACTAM 3.38 G: 3; .375 INJECTION, POWDER, LYOPHILIZED, FOR SOLUTION INTRAVENOUS at 16:53

## 2021-11-15 RX ADMIN — ESCITALOPRAM OXALATE 10 MG: 10 TABLET ORAL at 09:01

## 2021-11-15 RX ADMIN — ASPIRIN 81 MG CHEWABLE TABLET 81 MG: 81 TABLET CHEWABLE at 09:01

## 2021-11-15 RX ADMIN — Medication 10 ML: at 05:15

## 2021-11-15 RX ADMIN — CARVEDILOL 12.5 MG: 6.25 TABLET, FILM COATED ORAL at 09:00

## 2021-11-15 RX ADMIN — Medication 10 ML: at 05:16

## 2021-11-15 RX ADMIN — INSULIN LISPRO 2 UNITS: 100 INJECTION, SOLUTION INTRAVENOUS; SUBCUTANEOUS at 21:31

## 2021-11-15 RX ADMIN — POTASSIUM CHLORIDE 40 MEQ: 20 TABLET, EXTENDED RELEASE ORAL at 08:59

## 2021-11-15 RX ADMIN — LOPERAMIDE HYDROCHLORIDE 2 MG: 2 CAPSULE ORAL at 14:09

## 2021-11-15 RX ADMIN — MAGNESIUM SULFATE HEPTAHYDRATE 4 G: 40 INJECTION, SOLUTION INTRAVENOUS at 16:52

## 2021-11-15 RX ADMIN — PIPERACILLIN AND TAZOBACTAM 3.38 G: 3; .375 INJECTION, POWDER, LYOPHILIZED, FOR SOLUTION INTRAVENOUS at 09:03

## 2021-11-15 RX ADMIN — ONDANSETRON 4 MG: 2 INJECTION INTRAMUSCULAR; INTRAVENOUS at 14:09

## 2021-11-15 NOTE — PROGRESS NOTES
Patient chart reviewed. SW met with patient to confirm demographic information and discuss dispo. Patient presents confused and could not answer where she was from or who her daughter is. Per ER triage note patient is a resident at [facility] an [facility], but SW is unfamiliar with this particular agency. FRANK called the patient's daughter and emergency contact Julieth to conduct a baseline assessment and ACP conversation, no answer. LVM. No family at bedside. PT/OT have been ordered. Care Management Interventions  PCP Verified by CM:  Yes  Mode of Transport at Discharge: Self  Transition of Care Consult (CM Consult): Discharge Planning  Physical Therapy Consult: Yes  Occupational Therapy Consult: Yes  Support Systems: Child(eulalio)  Confirm Follow Up Transport: Family  Discharge Location  Discharge Placement: Unable to determine at this time (Pending conversation with daughter)      Marine Del Rosario, [address] [facility]    [place]    * [email or web address]

## 2021-11-15 NOTE — PROGRESS NOTES
Care assumed of patient around 1400. Cdiff resulted negative. PRN imodium given x's 1 for diarrhea. PRN zofran 4mg IV given x's 1 for nausea. MD notified of low levels of K and Mg. MD relayed OK to replace. 40meq po given. Mg 4gm transfused per recommendation of pharmacy for mg . 8    Bedside shift report given to Kar Ford on-coming RN

## 2021-11-15 NOTE — PROGRESS NOTES
EOS    - tmax 102.9, MD Jv notified   - prn po tylenol given x1  - stool sample for cdiff sent  - abx given as directed  - multiple incontinent BM episodes  - pt resting quietly, vss, no needs voiced  - report given to oncoming nurse

## 2021-11-15 NOTE — PROGRESS NOTES
Problem: Falls - Risk of  Goal: *Absence of Falls  Description: Document Rohan Massey Fall Risk and appropriate interventions in the flowsheet.   Outcome: Progressing Towards Goal  Note: Fall Risk Interventions:  Mobility Interventions: Communicate number of staff needed for ambulation/transfer, Patient to call before getting OOB, Bed/chair exit alarm         Medication Interventions: Teach patient to arise slowly, Patient to call before getting OOB, Evaluate medications/consider consulting pharmacy, Bed/chair exit alarm    Elimination Interventions: Call light in reach, Bed/chair exit alarm, Toileting schedule/hourly rounds, Toilet paper/wipes in reach, Patient to call for help with toileting needs    History of Falls Interventions: Bed/chair exit alarm, Door open when patient unattended, Room close to nurse's station

## 2021-11-15 NOTE — PROGRESS NOTES
Hospitalist Progress Note   Admit Date:  2021  3:57 AM   Name:  Jaylon Garcia   Age:  80 y.o. Sex:  female  :  1934   MRN:  [id number]   Room:  [facility]/    Presenting Complaint: Abdominal Pain, Nausea, and Vomiting    Reason(s) for Admission: Pyelonephritis [N12]     Hospital Course & Interval History:   Jaylon Garcia is a 80 y.o. female with medical history of hypertension, diabetes who presented with feelings of nausea and vomiting which started last night after having dinner with her family. Patient stated she had 2 episodes of brownish colored vomit and also had an episode of diarrhea. Patient denies any evidence of melena or hematochezia. Patient was also complaining of some right-sided flank and abdominal pain that would come and go. Does report recent hip fracture and is currently in rehab for this. Patient denied any cardiac chest pain, shortness of breath, blurry vision, fever/chills. Subjective (11/15/21):  Patient was seen and examined at the bedside. Patient overnight had a fever of 102. Patient had some confusion this morning when asking about bowel movement. Per nursing staff patient was having multiple loose bowels. Patient denied any cardiac chest pain, shortness of breath, abdominal pain. Per nursing staff patient was still complaining of some burning with urination. Assessment & Plan:   Severe sepsis secondary to pyelonephritis (2021)  Patient with pulse of 107, white count of 24.3 and positive urinalysis, lactic acid of 3.6  Patient started on sepsis bolus  Continue IV fluids  Blood and urine cultures pending  CT scan positive for pyelonephritis  Continue on Zosyn  Repeat lactic acids trending down  11/15 patient with some confusion. Per nursing staff patient complained of some burning with urination  Continue with antibiotics and continue to monitor for any worsening symptoms.   PT/OT     Hypertension  Continue home blood pressure medications    Hypokalemia  Replete as needed     Diabetes  Sliding scale insulin  Lantus 20 units  Continue to monitor blood glucose levels and titrate accordingly     Diarrhea  Imodium  C. difficile negative     New onset atrial fibrillation  Rate controlled  EKG pending  [address] cardiology consult if patient does not convert    Diet:  ADULT DIET Easy to Chew; Low Fat/Low Chol/High Fiber/RONEL  DVT PPx: Lovenox  Code status: Full Code    Hospital Problems as of 11/15/2021 Date Reviewed: 10/12/2021          Codes Class Noted - Resolved POA    Pyelonephritis ICD-10-CM: N12  ICD-9-CM: 590.80  11/14/2021 - Present Unknown              Objective:     Patient Vitals for the past 24 hrs:   Temp Pulse Resp BP SpO2   11/15/21 1115 99.6 °F (37.6 °C) 69 18 (!) 115/51 94 %   11/15/21 1009  81      11/15/21 0747 99.8 °F (37.7 °C) 92 20 (!) 128/57 94 %   11/15/21 0400  89      11/15/21 0322 99.7 °F (37.6 °C)       11/15/21 0000  83      11/14/21 2316 99.3 °F (37.4 °C) 85 16 (!) 113/51 94 %   11/14/21 2100 99.5 °F (37.5 °C)       11/14/21 2000  86      11/14/21 1953 (!) 102.1 °F (38.9 °C) 90 15 (!) 121/49 94 %   11/14/21 1528 98.7 °F (37.1 °C) 82 20 (!) 104/53 93 %     Oxygen Therapy  O2 Sat (%): 94 % (11/15/21 1115)  Pulse via Oximetry: 81 beats per minute (11/14/21 0706)  O2 Device: None (Room air) (11/14/21 1953)    Estimated body mass index is 26.91 kg/m² as calculated from the following:    Height as of this encounter: 4' 9\" (1.448 m). Weight as of this encounter: 56.4 kg (124 lb 5.4 oz). Intake/Output Summary (Last 24 hours) at 11/15/2021 1248  Last data filed at 11/15/2021 1044  Gross per 24 hour   Intake 2582.19 ml   Output 50 ml   Net 2532.19 ml         Physical Exam:   Blood pressure (!) 115/51, pulse 69, temperature 99.6 °F (37.6 °C), resp.  rate 18, height 4' 9\" (1.448 m), weight 56.4 kg (124 lb 5.4 oz), last menstrual period 09/08/2008, SpO2 94 %, not currently breastfeeding. General:    Well nourished. No overt distress  Head:  Normocephalic, atraumatic  Eyes:  Sclerae appear normal.  Pupils equally round. ENT:  Nares appear normal, no drainage. Moist oral mucosa  Neck:  No restricted ROM. Trachea midline   CV:   RRR. No m/r/g. No jugular venous distension. Lungs:   CTAB. No wheezing, rhonchi, or rales. Respirations even, unlabored  Abdomen: Bowel sounds present. Soft, nontender, nondistended. Extremities: No cyanosis or clubbing. No edema  Skin:     No rashes and normal coloration. Warm and dry. Neuro:  CN II-XII grossly intact. Sensation intact. Patient was a little altered this morning on rounds. Psych:  Normal mood and affect. I have reviewed ordered lab tests and independently visualized imaging below:    Recent Labs:  Recent Results (from the past 48 hour(s))   CBC WITH AUTOMATED DIFF    Collection Time: 11/14/21  4:08 AM   Result Value Ref Range    WBC 24.3 (H) 4.3 - 11.1 K/uL    RBC 4.45 4.05 - 5.2 M/uL    HGB 12.4 11.7 - 15.4 g/dL    HCT 38.0 35.8 - 46.3 %    MCV 85.4 79.6 - 97.8 FL    MCH 27.9 26.1 - 32.9 PG    MCHC 32.6 31.4 - 35.0 g/dL    RDW 13.7 11.9 - 14.6 %    PLATELET 345 208 - 884 K/uL    MPV 10.2 9.4 - 12.3 FL    ABSOLUTE NRBC 0.00 0.0 - 0.2 K/uL    DF AUTOMATED      NEUTROPHILS 84 (H) 43 - 78 %    LYMPHOCYTES 8 (L) 13 - 44 %    MONOCYTES 6 4.0 - 12.0 %    EOSINOPHILS 0 (L) 0.5 - 7.8 %    BASOPHILS 0 0.0 - 2.0 %    IMMATURE GRANULOCYTES 1 0.0 - 5.0 %    ABS. NEUTROPHILS 20.4 (H) 1.7 - 8.2 K/UL    ABS. LYMPHOCYTES 1.9 0.5 - 4.6 K/UL    ABS. MONOCYTES 1.5 (H) 0.1 - 1.3 K/UL    ABS. EOSINOPHILS 0.1 0.0 - 0.8 K/UL    ABS. BASOPHILS 0.1 0.0 - 0.2 K/UL    ABS. IMM.  GRANS. 0.3 0.0 - 0.5 K/UL   METABOLIC PANEL, COMPREHENSIVE    Collection Time: 11/14/21  4:08 AM   Result Value Ref Range    Sodium 134 (L) 136 - 145 mmol/L    Potassium 4.0 3.5 - 5.1 mmol/L    Chloride 101 98 - 107 mmol/L    CO2 24 21 - 32 mmol/L    Anion gap 9 7 - 16 mmol/L    Glucose 195 (H) 65 - 100 mg/dL    BUN 18 8 - 23 MG/DL    Creatinine 0.93 0.6 - 1.0 MG/DL    GFR est AA >60 >60 ml/min/1.73m2    GFR est non-AA >60 >60 ml/min/1.73m2    Calcium 10.6 (H) 8.3 - 10.4 MG/DL    Bilirubin, total 0.8 0.2 - 1.1 MG/DL    ALT (SGPT) 14 12 - 65 U/L    AST (SGOT) 9 (L) 15 - 37 U/L    Alk.  phosphatase 137 (H) 50 - 130 U/L    Protein, total 7.4 6.3 - 8.2 g/dL    Albumin 3.3 3.2 - 4.6 g/dL    Globulin 4.1 (H) 2.3 - 3.5 g/dL    A-G Ratio 0.8 (L) 1.2 - 3.5     LIPASE    Collection Time: 11/14/21  4:08 AM   Result Value Ref Range    Lipase 64 (L) 73 - 393 U/L   LACTIC ACID    Collection Time: 11/14/21  4:08 AM   Result Value Ref Range    Lactic acid 3.6 (HH) 0.4 - 2.0 MMOL/L   CULTURE, BLOOD    Collection Time: 11/14/21  5:51 AM    Specimen: Blood   Result Value Ref Range    Special Requests: LEFT ANTECUBITAL      Culture result: NO GROWTH 1 DAY     CULTURE, BLOOD    Collection Time: 11/14/21  5:51 AM    Specimen: Blood   Result Value Ref Range    Special Requests: RIGHT  FOREARM        GRAM STAIN AEROBIC BOTTLE POSITIVE      GRAM STAIN GRAM NEGATIVE RODS      GRAM STAIN        RESULTS VERIFIED, PHONED TO AND READ BACK BY Cedrick Leal RN @9962 [phone] TO [address]     Culture result: CULTURE IN [facility] UPDATES TO FOLLOW      Culture result:        Refer to Blood Culture ID Panel Accession  [id number]     URINALYSIS W/ RFLX MICROSCOPIC    Collection Time: 11/14/21  6:31 AM   Result Value Ref Range    Color YELLOW      Appearance CLEAR      Specific gravity 1.039 (H) 1.001 - 1.023      pH (UA) 5.0 5.0 - 9.0      Protein TRACE (A) NEG mg/dL    Glucose 100 mg/dL    Ketone Negative NEG mg/dL    Bilirubin Negative NEG      Blood SMALL (A) NEG      Urobilinogen 0.2 0.2 - 1.0 EU/dL    Nitrites Positive (A) NEG      Leukocyte Esterase MODERATE (A) NEG      WBC  0 /hpf    RBC 0-3 0 /hpf    Epithelial cells 0 0 /hpf    Bacteria 4+ (H) 0 /hpf    Casts 0-3 0 /lpf   CULTURE, URINE    Collection Time: 11/14/21  6:31 AM    Specimen: Urine    CATH URINE   Result Value Ref Range    Special Requests: NO SPECIAL REQUESTS      Culture result: (A)       >100,000 COLONIES/mL GRAM NEGATIVE RODS IDENTIFICATION AND SUSCEPTIBILITY TO FOLLOW   LACTIC ACID    Collection Time: 11/14/21  7:14 AM   Result Value Ref Range    Lactic acid 2.9 (H) 0.4 - 2.0 MMOL/L   COVID-19 RAPID TEST    Collection Time: 11/14/21  8:04 AM   Result Value Ref Range    Specimen source Nasopharyngeal      COVID-19 rapid test Not detected NOTD     LACTIC ACID    Collection Time: 11/14/21 10:47 AM   Result Value Ref Range    Lactic acid 2.7 (H) 0.4 - 2.0 MMOL/L   GLUCOSE, POC    Collection Time: 11/14/21 11:19 AM   Result Value Ref Range    Glucose (POC) 165 (H) 65 - 100 mg/dL    Performed by Ollie    LACTIC ACID    Collection Time: 11/14/21  2:16 PM   Result Value Ref Range    Lactic acid 2.0 0.4 - 2.0 MMOL/L   GLUCOSE, POC    Collection Time: 11/14/21  4:25 PM   Result Value Ref Range    Glucose (POC) 99 65 - 100 mg/dL    Performed by Luci    GLUCOSE, POC    Collection Time: 11/14/21  4:38 PM   Result Value Ref Range    Glucose (POC) 123 (H) 65 - 100 mg/dL    Performed by Ollie    GLUCOSE, POC    Collection Time: 11/14/21  8:39 PM   Result Value Ref Range    Glucose (POC) 156 (H) 65 - 100 mg/dL    Performed by Chacha    LACTIC ACID    Collection Time: 11/14/21  9:52 PM   Result Value Ref Range    Lactic acid 1.2 0.4 - 2.0 MMOL/L   C. DIFFICILE AG & TOXIN A/B    Collection Time: 11/14/21 10:01 PM   Result Value Ref Range    GDH ANTIGEN C. DIFFICILE GDH ANTIGEN-NEGATIVE      C. difficile toxin C. DIFFICILE TOXIN-NEGATIVE      PCR Reflex NOT APPLICABLE      INTERPRETATION NEGATIVE FOR TOXIGENIC C. DIFFICILE NTXCD      Clinical Consideration NEGATIVE FOR TOXIGENIC C. DIFFICILE     BLOOD CULTURE ID PANEL    Collection Time: 11/15/21  2:35 AM    Specimen: Blood   Result Value Ref Range    Acc. no. from [email or web address] [id number]     Escherichia coli Detected (A) NOTDET      KPC (Carbapenem Resistance Gene) NOT DETECTED NOTDET      INTERPRETATION        Gram negative julieta. Identified by realtime PCR as E. coli   CBC W/O DIFF    Collection Time: 11/15/21  3:48 AM   Result Value Ref Range    WBC 16.6 (H) 4.3 - 11.1 K/uL    RBC 3.60 (L) 4.05 - 5.2 M/uL    HGB 10.2 (L) 11.7 - 15.4 g/dL    HCT 31.6 (L) 35.8 - 46.3 %    MCV 87.8 79.6 - 97.8 FL    MCH 28.3 26.1 - 32.9 PG    MCHC 32.3 31.4 - 35.0 g/dL    RDW 14.1 11.9 - 14.6 %    PLATELET 223 711 - 291 K/uL    MPV 11.1 9.4 - 12.3 FL    ABSOLUTE NRBC 0.00 0.0 - 0.2 K/uL   MAGNESIUM    Collection Time: 11/15/21  5:42 AM   Result Value Ref Range    Magnesium 0.8 (L) 1.8 - 2.4 mg/dL   METABOLIC PANEL, COMPREHENSIVE    Collection Time: 11/15/21  5:42 AM   Result Value Ref Range    Sodium 137 136 - 145 mmol/L    Potassium 3.0 (L) 3.5 - 5.1 mmol/L    Chloride 106 98 - 107 mmol/L    CO2 21 21 - 32 mmol/L    Anion gap 10 7 - 16 mmol/L    Glucose 120 (H) 65 - 100 mg/dL    BUN 13 8 - 23 MG/DL    Creatinine 0.77 0.6 - 1.0 MG/DL    GFR est AA >60 >60 ml/min/1.73m2    GFR est non-AA >60 >60 ml/min/1.73m2    Calcium 8.1 (L) 8.3 - 10.4 MG/DL    Bilirubin, total 0.7 0.2 - 1.1 MG/DL    ALT (SGPT) 11 (L) 12 - 65 U/L    AST (SGOT) 4 (L) 15 - 37 U/L    Alk.  phosphatase 38 (L) 50 - 136 U/L    Protein, total 5.8 (L) 6.3 - 8.2 g/dL    Albumin 2.5 (L) 3.2 - 4.6 g/dL    Globulin 3.3 2.3 - 3.5 g/dL    A-G Ratio 0.8 (L) 1.2 - 3.5     GLUCOSE, POC    Collection Time: 11/15/21  7:55 AM   Result Value Ref Range    Glucose (POC) 126 (H) 65 - 100 mg/dL    Performed by Lucy    GLUCOSE, POC    Collection Time: 11/15/21 11:08 AM   Result Value Ref Range    Glucose (POC) 135 (H) 65 - 100 mg/dL    Performed by Lucy        All Micro Results     Procedure Component Value Units Date/Time    C. DIFFICILE AG & TOXIN A/B [[id number]] Collected: 11/14/21 2201    Order Status: Completed Specimen: Stool Updated: 11/15/21 1153     [address] ANTIGEN       C. DIFFICILE GDH ANTIGEN-NEGATIVE           C. difficile toxin       C. DIFFICILE TOXIN-NEGATIVE           PCR Reflex NOT APPLICABLE        INTERPRETATION       NEGATIVE FOR TOXIGENIC C. DIFFICILE           Clinical Consideration       NEGATIVE FOR TOXIGENIC C. DIFFICILE          CULTURE, BLOOD [[id number]] Collected: 11/14/21 0551    Order Status: Completed Specimen: Blood Updated: 11/15/21 0802     Special Requests: LEFT ANTECUBITAL        Culture result: NO GROWTH 1 DAY       BLOOD CULTURE ID PANEL [[id number]]  (Abnormal) Collected: 11/15/21 0235    Order Status: Completed Specimen: Blood Updated: 11/15/21 0720     Acc. no. from Micro Order [id number]     Escherichia coli Detected        Comment: RESULTS VERIFIED, PHONED TO AND READ BACK BY  Omid Cruz RN AT 7869 ON 11/15/21 ACL          KPC (Carbapenem Resistance Gene) NOT DETECTED        Comment: WARNING:  A Not Detected result for the KPC gene does not indicate susceptibility to carbapenems. Gram negative bacteria can be resistant to carbapenems by mechanisms other than carrying the KPC gene. INTERPRETATION       Gram negative julieta.  Identified by realtime PCR as E. coli          CULTURE, URINE [[id number]]  (Abnormal) Collected: 11/14/21 0631    Order Status: Completed Specimen: Urine Updated: 11/15/21 0649     Special Requests: NO SPECIAL REQUESTS        Culture result:       >100,000 COLONIES/mL GRAM NEGATIVE RODS IDENTIFICATION AND SUSCEPTIBILITY TO FOLLOW          CULTURE, BLOOD [[id number]] Collected: 11/14/21 0551    Order Status: Completed Specimen: Blood Updated: 11/15/21 0355     Special Requests: --        RIGHT  FOREARM       GRAM STAIN AEROBIC BOTTLE POSITIVE         GRAM NEGATIVE RODS               RESULTS VERIFIED, PHONED TO AND READ BACK BY Vicente Zafar RN @0108 [phone] TO [address]            Culture result:       CULTURE IN [address] UPDATES TO FOLLOW                  Refer to Blood Culture ID Panel Accession  [id number]      CLOSTRIDIUM DIFF TOXIN A & B [[id number]] Collected: 11/14/21 2201    Order Status: Canceled Specimen: Stool     COVID-19 RAPID TEST [[id number]] Collected: 11/14/21 0804    Order Status: Completed Specimen: Nasopharyngeal Updated: 11/14/21 0829     Specimen source Nasopharyngeal        COVID-19 rapid test Not detected        Comment:      The specimen is NEGATIVE for SARS-CoV-2, the novel coronavirus associated with COVID-19. A negative result does not rule out COVID-19. This test has been authorized by the FDA under an Emergency Use Authorization (EUA) for use by authorized laboratories. Fact sheet for Healthcare Providers: [employer]  Fact sheet for Patients: [employer]       Methodology: Isothermal Nucleic Acid Amplification               Other Studies:  No results found.     Current Meds:  Current Facility-Administered Medications   Medication Dose Route Frequency    sodium chloride (NS) flush 5-10 mL  5-10 mL IntraVENous Q8H    sodium chloride (NS) flush 5-10 mL  5-10 mL IntraVENous PRN    sodium chloride (NS) flush 5-40 mL  5-40 mL IntraVENous Q8H    sodium chloride (NS) flush 5-40 mL  5-40 mL IntraVENous PRN    acetaminophen (TYLENOL) tablet 650 mg  650 mg Oral Q6H PRN    Or    acetaminophen (TYLENOL) suppository 650 mg  650 mg Rectal Q6H PRN    polyethylene glycol (MIRALAX) packet 17 g  17 g Oral DAILY PRN    ondansetron (ZOFRAN ODT) tablet 4 mg  4 mg Oral Q8H PRN    Or    ondansetron (ZOFRAN) injection 4 mg  4 mg IntraVENous Q6H PRN    enoxaparin (LOVENOX) injection 30 mg  30 mg SubCUTAneous DAILY    insulin glargine (LANTUS) injection 20 Units  20 Units SubCUTAneous DAILY    insulin lispro (HUMALOG) injection   SubCUTAneous AC&HS    piperacillin-tazobactam (ZOSYN) 3.375 g in 0.9% sodium chloride (MBP/ADV) 100 mL MBP  3.375 g IntraVENous Q8H    0.9% sodium chloride infusion  100 mL/hr IntraVENous CONTINUOUS    amLODIPine (NORVASC) tablet 5 mg  5 mg Oral DAILY    atorvastatin (LIPITOR) tablet 40 mg  40 mg Oral QHS    aspirin chewable tablet 81 mg  81 mg Oral DAILY    carvediloL (COREG) tablet 12.5 mg  12.5 mg Oral BID WITH MEALS    escitalopram oxalate (LEXAPRO) tablet 10 mg  10 mg Oral DAILY    rOPINIRole (REQUIP) tablet 1 mg  1 mg Oral BID    [Held by provider] spironolactone (ALDACTONE) tablet 25 mg  25 mg Oral DAILY    HYDROcodone-acetaminophen (NORCO) 5-325 mg per tablet 1 Tablet  1 Tablet Oral Q6H PRN    morphine injection 1 mg  1 mg IntraVENous Q4H PRN       Signed:  Aylin De Oliveira MD    Part of this note may have been written by using a voice dictation software. The note has been proof read but may still contain some grammatical/other typographical errors.

## 2021-11-16 ENCOUNTER — APPOINTMENT (OUTPATIENT)
Dept: GENERAL RADIOLOGY | Age: 86
DRG: 871 | End: 2021-11-16
Attending: EMERGENCY MEDICINE
Payer: MEDICARE

## 2021-11-16 PROBLEM — J81.1 PULMONARY EDEMA: Status: ACTIVE | Noted: 2021-11-16

## 2021-11-16 PROBLEM — I48.91 ATRIAL FIBRILLATION (HCC): Status: ACTIVE | Noted: 2021-11-16

## 2021-11-16 PROBLEM — R05.9 COUGH: Status: ACTIVE | Noted: 2021-11-16

## 2021-11-16 LAB
ALBUMIN SERPL-MCNC: 2.4 G/DL (ref 3.2–4.6)
ALBUMIN/GLOB SERPL: 0.8 {RATIO} (ref 1.2–3.5)
ALP SERPL-CCNC: 86 U/L (ref 50–130)
ALT SERPL-CCNC: 11 U/L (ref 12–65)
ANION GAP SERPL CALC-SCNC: 7 MMOL/L (ref 7–16)
AST SERPL-CCNC: 11 U/L (ref 15–37)
ATRIAL RATE: 108 BPM
BACTERIA SPEC CULT: ABNORMAL
BILIRUB SERPL-MCNC: 0.5 MG/DL (ref 0.2–1.1)
BNP SERPL-MCNC: 3306 PG/ML
BUN SERPL-MCNC: 9 MG/DL (ref 8–23)
CALCIUM SERPL-MCNC: 8.3 MG/DL (ref 8.3–10.4)
CALCULATED R AXIS, ECG10: -14 DEGREES
CALCULATED T AXIS, ECG11: -28 DEGREES
CHLORIDE SERPL-SCNC: 108 MMOL/L (ref 98–107)
CO2 SERPL-SCNC: 18 MMOL/L (ref 21–32)
CREAT SERPL-MCNC: 0.69 MG/DL (ref 0.6–1)
DIAGNOSIS, 93000: NORMAL
ERYTHROCYTE [DISTWIDTH] IN BLOOD BY AUTOMATED COUNT: 14.1 % (ref 11.9–14.6)
GLOBULIN SER CALC-MCNC: 3.1 G/DL (ref 2.3–3.5)
GLUCOSE BLD STRIP.AUTO-MCNC: 107 MG/DL (ref 65–100)
GLUCOSE BLD STRIP.AUTO-MCNC: 127 MG/DL (ref 65–100)
GLUCOSE BLD STRIP.AUTO-MCNC: 128 MG/DL (ref 65–100)
GLUCOSE BLD STRIP.AUTO-MCNC: 140 MG/DL (ref 65–100)
GLUCOSE SERPL-MCNC: 114 MG/DL (ref 65–100)
HCT VFR BLD AUTO: 31.2 % (ref 35.8–46.3)
HGB BLD-MCNC: 10.1 G/DL (ref 11.7–15.4)
MAGNESIUM SERPL-MCNC: 2.1 MG/DL (ref 1.8–2.4)
MCH RBC QN AUTO: 28.1 PG (ref 26.1–32.9)
MCHC RBC AUTO-ENTMCNC: 32.4 G/DL (ref 31.4–35)
MCV RBC AUTO: 86.7 FL (ref 79.6–97.8)
NRBC # BLD: 0 K/UL (ref 0–0.2)
PLATELET # BLD AUTO: 217 K/UL (ref 150–450)
PMV BLD AUTO: 10.1 FL (ref 9.4–12.3)
POTASSIUM SERPL-SCNC: 4.1 MMOL/L (ref 3.5–5.1)
PROT SERPL-MCNC: 5.5 G/DL (ref 6.3–8.2)
Q-T INTERVAL, ECG07: 380 MS
QRS DURATION, ECG06: 88 MS
QTC CALCULATION (BEZET), ECG08: 410 MS
RBC # BLD AUTO: 3.6 M/UL (ref 4.05–5.2)
SERVICE CMNT-IMP: ABNORMAL
SODIUM SERPL-SCNC: 133 MMOL/L (ref 136–145)
VENTRICULAR RATE, ECG03: 70 BPM
WBC # BLD AUTO: 11.7 K/UL (ref 4.3–11.1)

## 2021-11-16 PROCEDURE — 74011000258 HC RX REV CODE- 258: Performed by: STUDENT IN AN ORGANIZED HEALTH CARE EDUCATION/TRAINING PROGRAM

## 2021-11-16 PROCEDURE — 36415 COLL VENOUS BLD VENIPUNCTURE: CPT

## 2021-11-16 PROCEDURE — 85027 COMPLETE CBC AUTOMATED: CPT

## 2021-11-16 PROCEDURE — 97161 PT EVAL LOW COMPLEX 20 MIN: CPT

## 2021-11-16 PROCEDURE — 97165 OT EVAL LOW COMPLEX 30 MIN: CPT

## 2021-11-16 PROCEDURE — 97530 THERAPEUTIC ACTIVITIES: CPT

## 2021-11-16 PROCEDURE — 83880 ASSAY OF NATRIURETIC PEPTIDE: CPT

## 2021-11-16 PROCEDURE — 65270000029 HC RM PRIVATE

## 2021-11-16 PROCEDURE — 74011250637 HC RX REV CODE- 250/637: Performed by: EMERGENCY MEDICINE

## 2021-11-16 PROCEDURE — 71045 X-RAY EXAM CHEST 1 VIEW: CPT

## 2021-11-16 PROCEDURE — 74011250636 HC RX REV CODE- 250/636: Performed by: STUDENT IN AN ORGANIZED HEALTH CARE EDUCATION/TRAINING PROGRAM

## 2021-11-16 PROCEDURE — 80053 COMPREHEN METABOLIC PANEL: CPT

## 2021-11-16 PROCEDURE — 74011250637 HC RX REV CODE- 250/637: Performed by: STUDENT IN AN ORGANIZED HEALTH CARE EDUCATION/TRAINING PROGRAM

## 2021-11-16 PROCEDURE — 74011636637 HC RX REV CODE- 636/637: Performed by: STUDENT IN AN ORGANIZED HEALTH CARE EDUCATION/TRAINING PROGRAM

## 2021-11-16 PROCEDURE — 82962 GLUCOSE BLOOD TEST: CPT

## 2021-11-16 PROCEDURE — 83735 ASSAY OF MAGNESIUM: CPT

## 2021-11-16 RX ORDER — IPRATROPIUM BROMIDE AND ALBUTEROL SULFATE 2.5; .5 MG/3ML; MG/3ML
3 SOLUTION RESPIRATORY (INHALATION)
Status: DISCONTINUED | OUTPATIENT
Start: 2021-11-16 | End: 2021-11-19 | Stop reason: HOSPADM

## 2021-11-16 RX ORDER — BENZONATATE 100 MG/1
100 CAPSULE ORAL
Status: DISCONTINUED | OUTPATIENT
Start: 2021-11-16 | End: 2021-11-19 | Stop reason: HOSPADM

## 2021-11-16 RX ORDER — CODEINE PHOSPHATE AND GUAIFENESIN 10; 100 MG/5ML; MG/5ML
5 SOLUTION ORAL
Status: DISCONTINUED | OUTPATIENT
Start: 2021-11-16 | End: 2021-11-19 | Stop reason: HOSPADM

## 2021-11-16 RX ORDER — PHENAZOPYRIDINE HYDROCHLORIDE 95 MG/1
95 TABLET ORAL
Status: DISCONTINUED | OUTPATIENT
Start: 2021-11-16 | End: 2021-11-19 | Stop reason: HOSPADM

## 2021-11-16 RX ORDER — FUROSEMIDE 10 MG/ML
40 INJECTION INTRAMUSCULAR; INTRAVENOUS ONCE
Status: COMPLETED | OUTPATIENT
Start: 2021-11-16 | End: 2021-11-16

## 2021-11-16 RX ADMIN — URINARY PAIN RELIEF 95 MG: 95 TABLET ORAL at 05:41

## 2021-11-16 RX ADMIN — PIPERACILLIN AND TAZOBACTAM 3.38 G: 3; .375 INJECTION, POWDER, LYOPHILIZED, FOR SOLUTION INTRAVENOUS at 17:29

## 2021-11-16 RX ADMIN — ROPINIROLE HYDROCHLORIDE 1 MG: 1 TABLET, FILM COATED ORAL at 08:45

## 2021-11-16 RX ADMIN — BENZONATATE 100 MG: 100 CAPSULE ORAL at 03:06

## 2021-11-16 RX ADMIN — CARVEDILOL 12.5 MG: 6.25 TABLET, FILM COATED ORAL at 08:45

## 2021-11-16 RX ADMIN — PIPERACILLIN AND TAZOBACTAM 3.38 G: 3; .375 INJECTION, POWDER, LYOPHILIZED, FOR SOLUTION INTRAVENOUS at 00:04

## 2021-11-16 RX ADMIN — GUAIFENESIN AND CODEINE PHOSPHATE 5 ML: 10; 100 LIQUID ORAL at 18:46

## 2021-11-16 RX ADMIN — PIPERACILLIN AND TAZOBACTAM 3.38 G: 3; .375 INJECTION, POWDER, LYOPHILIZED, FOR SOLUTION INTRAVENOUS at 23:24

## 2021-11-16 RX ADMIN — Medication 10 ML: at 05:29

## 2021-11-16 RX ADMIN — ACETAMINOPHEN 650 MG: 325 TABLET ORAL at 18:46

## 2021-11-16 RX ADMIN — ENOXAPARIN SODIUM 30 MG: 30 INJECTION SUBCUTANEOUS at 08:45

## 2021-11-16 RX ADMIN — ROPINIROLE HYDROCHLORIDE 1 MG: 1 TABLET, FILM COATED ORAL at 17:31

## 2021-11-16 RX ADMIN — Medication 10 ML: at 21:27

## 2021-11-16 RX ADMIN — ESCITALOPRAM OXALATE 10 MG: 10 TABLET ORAL at 08:45

## 2021-11-16 RX ADMIN — GUAIFENESIN AND CODEINE PHOSPHATE 5 ML: 10; 100 LIQUID ORAL at 23:24

## 2021-11-16 RX ADMIN — Medication 10 ML: at 13:44

## 2021-11-16 RX ADMIN — INSULIN GLARGINE 20 UNITS: 100 INJECTION, SOLUTION SUBCUTANEOUS at 09:00

## 2021-11-16 RX ADMIN — ATORVASTATIN CALCIUM 40 MG: 40 TABLET, FILM COATED ORAL at 21:27

## 2021-11-16 RX ADMIN — FUROSEMIDE 40 MG: 10 INJECTION, SOLUTION INTRAMUSCULAR; INTRAVENOUS at 12:40

## 2021-11-16 RX ADMIN — LOPERAMIDE HYDROCHLORIDE 2 MG: 2 CAPSULE ORAL at 23:43

## 2021-11-16 RX ADMIN — LOPERAMIDE HYDROCHLORIDE 2 MG: 2 CAPSULE ORAL at 03:06

## 2021-11-16 RX ADMIN — HYDROCODONE BITARTRATE AND ACETAMINOPHEN 1 TABLET: 5; 325 TABLET ORAL at 04:46

## 2021-11-16 RX ADMIN — PIPERACILLIN AND TAZOBACTAM 3.38 G: 3; .375 INJECTION, POWDER, LYOPHILIZED, FOR SOLUTION INTRAVENOUS at 08:46

## 2021-11-16 RX ADMIN — ASPIRIN 81 MG CHEWABLE TABLET 81 MG: 81 TABLET CHEWABLE at 08:45

## 2021-11-16 RX ADMIN — BENZONATATE 100 MG: 100 CAPSULE ORAL at 22:04

## 2021-11-16 RX ADMIN — AMLODIPINE BESYLATE 5 MG: 5 TABLET ORAL at 08:45

## 2021-11-16 RX ADMIN — CARVEDILOL 12.5 MG: 6.25 TABLET, FILM COATED ORAL at 17:31

## 2021-11-16 NOTE — PROGRESS NOTES
Problem: Mobility Impaired (Adult and Pediatric)  Goal: *Acute Goals and Plan of Care (Insert Text)  Outcome: Progressing Towards Goal  Note:   LTG:  (1.)Ms. Curtis will move from supine to sit and sit to supine  in bed with CONTACT GUARD ASSIST within 7 treatment day(s). (2.)Ms. Curtis will transfer from bed to chair and chair to bed with CONTACT GUARD ASSIST using the least restrictive device within 7 treatment day(s). (3.)Ms. Curtis will ambulate with CONTACT GUARD ASSIST for 50 feet with the least restrictive device within 7 treatment day(s). (4)Ms. Curtis will perform HEP with cues and assistance to increase safety on her feet in 7 days. ________________________________________________________________________________________________       PHYSICAL THERAPY: Initial Assessment and AM 11/16/2021  INPATIENT: PT Visit Days : 1  Payor: Vicky Riley / Plan: [address] [place] [address] HMO / Product Type: Managed Care Medicare /       NAME/AGE/GENDER: Damon Urban is a 80 y.o. female   PRIMARY DIAGNOSIS: Pyelonephritis [N12] <principal problem not specified> <principal problem not specified>        ICD-10: Treatment Diagnosis:    Generalized Muscle Weakness (M62.81)  Other abnormalities of gait and mobility (R26.89)   Precaution/Allergies:  Sulfa (sulfonamide antibiotics), Other medication, and Tape [adhesive]      ASSESSMENT:     Ms. Cheryl Emerson presents with general weakness and decreased functional mobility. She reports she lives at [facility] and is independent with a RW and doing her adl's. She does use a w/c for long distances. This am, she reports feeling ok but is drowsy. She needed assistance to get to side of bed, stand, and take some steps with RW. She shuffles her feet with decreased weight on right LE. She is not safe without assistance. Some SOB with activity but sats over 90%. It appears she had a hip pinning about 2 months ago and reports she has been walking with a RW. Will follow. This section established at most recent assessment   PROBLEM LIST (Impairments causing functional limitations):  Decreased Strength  Decreased ADL/Functional Activities  Decreased Transfer Abilities  Decreased Ambulation Ability/Technique  Decreased Balance  Increased Pain  Decreased Activity Tolerance  Increased Fatigue  Increased Shortness of Breath  Decreased Flexibility/Joint Mobility  Decreased Laurel with Home Exercise Program   INTERVENTIONS PLANNED: (Benefits and precautions of physical therapy have been discussed with the patient.)  Balance Exercise  Bed Mobility  Gait Training  Home Exercise Program (HEP)  Range of Motion (ROM)  Therapeutic Activites  Therapeutic Exercise/Strengthening  Transfer Training     TREATMENT PLAN: Frequency/Duration: daily for duration of hospital stay  Rehabilitation Potential For Stated Goals: Good     REHAB RECOMMENDATIONS (at time of discharge pending progress):    Placement: To be determined- snf? Equipment:   None at this time              HISTORY:   History of Present Injury/Illness (Reason for Referral):  Presenting Complaint: Abdominal Pain, Nausea, and Vomiting     Reason(s) for Admission: Pyelonephritis [N12]      History of Present Illness:   Jenean Heimlich is a 80 y.o. female with medical history of hypertension, diabetes who presented with feelings of nausea and vomiting which started last night after having dinner with her family. Patient stated she had 2 episodes of brownish colored vomit and also had an episode of diarrhea. Patient denies any evidence of melena or hematochezia. Patient was also complaining of some right-sided flank and abdominal pain that would come and go. Does report recent hip fracture and is currently in rehab for this. Patient denied any cardiac chest pain, shortness of breath, blurry vision, fever/chills.   Past Medical History/Comorbidities:   Ms. Zuleyma Rosado  has a past medical history of Acute kidney failure (05/2016), Acute pericarditis (2015), Arthritis, CAP (community acquired pneumonia) (5/2016), Coronary artery disease, Crohn's disease, Hypercalcemia, Hypercholesterolemia, Hypertension, Lumbar compression fracture, Osteoporosis, and Type 2 diabetes mellitus. Ms. Marleny Escalante  has a past surgical history that includes hx hysterectomy (1978); hx heart catheterization (5/12/2015); hx arthroplasty (1/5/2016); and hx kyphoplasty (~2002). Social History/Living Environment:   [facility] Environment[address] [facility] [address] Name: [address]  # Steps to Enter: 0  One/Two Story Residence: One story  Living Alone: No  Support Systems: Child(eulalio)  Patient Expects to be Discharged to[de-identified] Assisted living  Current DME Used/Available at Home: Wheelchair  Prior Level of Function/Work/Activity:  Pt. Says independent with RW, limited distance     Number of Personal Factors/Comorbidities that affect the Plan of Care: 1-2: MODERATE COMPLEXITY   EXAMINATION:   Most Recent Physical Functioning:   Gross Assessment:  AROM: Generally decreased, functional (LE's)  Strength: Generally decreased, functional (LE's)               Posture:     Balance:  Sitting: Intact; High guard  Standing: Pull to stand; With support Bed Mobility:  Supine to Sit: Additional time; Minimum assistance  Sit to Supine: Additional time; Moderate assistance  Wheelchair Mobility:     Transfers:  Sit to Stand: Minimum assistance  Stand to Sit: Minimum assistance  Duration: 8 Minutes  Gait:     Speed/Sammie: Delayed  Step Length: Left shortened; Right shortened  Stance: Right decreased  Gait Abnormalities: Decreased step clearance; Antalgic  Distance (ft): 6 Feet (ft)  Assistive Device: Walker, rolling  Ambulation - Level of Assistance: Minimal assistance  Interventions: Safety awareness training; Verbal cues; Manual cues; Tactile cues      Body Structures Involved:  Bones  Joints  Muscles  Ligaments Body Functions Affected:   Movement Related Activities and Participation Affected: Mobility   Number of elements that affect the Plan of Care: 3: MODERATE COMPLEXITY   CLINICAL PRESENTATION:   Presentation: Stable and uncomplicated: LOW COMPLEXITY   CLINICAL DECISION MAKIN [facility] [address] [address] AM-PAC 6 Clicks   Basic Mobility Inpatient Short Form  How much difficulty does the patient currently have. .. Unable A Lot A Little None   1. Turning over in bed (including adjusting bedclothes, sheets and blankets)? [] 1   [] 2   [x] 3   [] 4   2. Sitting down on and standing up from a chair with arms ( e.g., wheelchair, bedside commode, etc.)   [] 1   [] 2   [x] 3   [] 4   3. Moving from lying on back to sitting on the side of the bed? [] 1   [] 2   [x] 3   [] 4   How much help from another person does the patient currently need. .. Total A Lot A Little None   4. Moving to and from a bed to a chair (including a wheelchair)? [] 1   [] 2   [x] 3   [] 4   5. Need to walk in hospital room? [] 1   [] 2   [x] 3   [] 4   6. Climbing 3-5 steps with a railing? [] 1   [x] 2   [] 3   [] 4   © , Trustees of [address] [facility] [address] [address], under license to [employer]. All rights reserved      Score:  Initial: 17 Most Recent: X (Date: -- )    Interpretation of Tool:  Represents activities that are increasingly more difficult (i.e. Bed mobility, Transfers, Gait). Medical Necessity:     Patient is expected to demonstrate progress in   strength, range of motion, and balance   to   decrease assistance required with exercises and functional mobility  . Reason for Services/Other Comments:  Patient   continues to require present interventions due to patient's inability to perform exercises and functional mobility independently  .    Use of outcome tool(s) and clinical judgement create a POC that gives a: Questionable prediction of patient's progress: MODERATE COMPLEXITY            TREATMENT:   (In addition to Assessment/Re-Assessment sessions the following treatments were rendered)   Pre-treatment Symptoms/Complaints:  none  Pain: Initial:      Post Session:  0   assessment  Therapeutic Activity: (  8 Minutes ):  Therapeutic activities including Bed transfers, Ambulation on level ground, and standing to improve mobility, strength, and balance. Required minimal Safety awareness training; Verbal cues; Manual cues; Tactile cues to promote static and dynamic balance in standing. Braces/Orthotics/Lines/Etc:   IV  purwick  O2 Device: None (Room air)  Treatment/Session Assessment:    Response to Treatment:  did fine  Interdisciplinary Collaboration:   Occupational Therapist  Registered Nurse  After treatment position/precautions:   Supine in bed  Bed alarm/tab alert on  Bed/Chair-wheels locked  Bed in low position  Call light within reach   Compliance with Program/Exercises: Will assess as treatment progresses  Recommendations/Intent for next treatment session: \"Next visit will focus on advancements to more challenging activities and reduction in assistance provided\".   Total Treatment Duration:  PT Patient Time In/Time Out  Time In: 1010  Time Out: [address] [facility],

## 2021-11-16 NOTE — PROGRESS NOTES
[facility] SW to review PT/OT notes. Facility needs to be called regarding pt's return and her abilities PTA.   STACI LiW

## 2021-11-16 NOTE — PROGRESS NOTES
Nutrition Note      Received referral from [address] [place], [place] [address] for Malnutrition Screening Tool: Recently Lost Weight Without Trying: No, If Yes, How Much Weight Loss: Unsure, Eating Poorly Due to Decreased Appetite: Yes     Weight hx per EMR ( Based on [facility] care functionality, RD cannot know if these weight are actual versus stated):   Note weight of 11/15 was a bed scale weight  WT / BMI WEIGHT   11/15/2021 124 lb 5.4 oz   10/29/2021 120 lb   10/12/2021 116 lb   9/18/2021 116 lb   9/2/2021 115 lb   6/6/2021 116 lb   6/3/2021 115 lb   3/5/2021 115 lb   12/14/2020 116 lb   No weight loss identified based on above information. Patient confused at time of RD visit rolf FIELDS was her granddaughter. Information form pt is therefore not likely reliable, but she reports UBW of 113#, denies weight loss or change in appette or intake and reports appetite as good. .  Recorded leroy intake since admission:51-75% of 2 recorded meal  Therefore will defer nutrition assessment at this time. F/U per nutrition standard of care.         Electronically signed by Catherine Abdalla RD on 11/16/2021 at 10:27 AM    Contact: [phone]

## 2021-11-16 NOTE — PROGRESS NOTES
Problem: Self Care Deficits Care Plan (Adult)  Goal: *Acute Goals and Plan of Care (Insert Text)  Outcome: Progressing Towards Goal  Note: 1. Patient will perform grooming with supervision. 2. Patient will perform Upper body dressing with supervision  3. Patient will perform lower body dressing with min assist  4. Patient will perform upper body bathing with supervision. 5. Patient will perform toilet transfers with CGA. 6. Patient will perform shower transfer with CGA. 7. Patient will participate in 30 + minutes of ADL/ therapeutic exercise/therapeutic activity with min rest breaks to increase activity tolerance for self care. 8. Patient will perform ADL functional mobility in room with CGA. Goals to be achieved in 7 days. OCCUPATIONAL THERAPY: Initial Assessment and AM 11/16/2021  INPATIENT:    Payor: Robert Noonan / Plan: [address] [place] [address] HMO / Product Type: Managed Care Medicare /      NAME/AGE/GENDER: Sloan Gerard is a 80 y.o. female   PRIMARY DIAGNOSIS:  Pyelonephritis [N12] <principal problem not specified> <principal problem not specified>        ICD-10: Treatment Diagnosis:    Generalized Muscle Weakness (M62.81)   Precautions/Allergies:    Sulfa (sulfonamide antibiotics), Other medication, and Tape [adhesive]      ASSESSMENT:     Ms. Leonor Pederson presents with general weakness and decreased functional mobility and self care. She reports she lives at [facility] and is independent with a RW and doing her adl's. She does use a w/c for long distances. Pt would benefit from skilled OT to increase independence.         This section established at most recent assessment   PROBLEM LIST (Impairments causing functional limitations):  Decreased Strength  Decreased ADL/Functional Activities  Decreased Transfer Abilities  Decreased Ambulation Ability/Technique  Decreased Balance  Decreased Activity Tolerance   INTERVENTIONS PLANNED: (Benefits and precautions of occupational therapy have been discussed with the patient.)  Activities of daily living training  Adaptive equipment training  Balance training  Clothing management  Therapeutic activity  Therapeutic exercise     TREATMENT PLAN: Frequency/Duration: Follow patient 3x/week to address above goals. Rehabilitation Potential For Stated Goals: Good     REHAB RECOMMENDATIONS (at time of discharge pending progress):    Placement: It is my opinion, based on this patient's performance to date, that Ms. Curtis may benefit from participating in 1-2 additional therapy sessions in order to continue to assess for rehab potential and then make recommendation for disposition at discharge. Equipment:   none              OCCUPATIONAL PROFILE AND HISTORY:   History of Present Injury/Illness (Reason for Referral):  Pyelonephritis  Past Medical History/Comorbidities:   Ms. Cuauhtemoc Darling  has a past medical history of Acute kidney failure (05/2016), Acute pericarditis (2015), Arthritis, CAP (community acquired pneumonia) (5/2016), Coronary artery disease, Crohn's disease, Hypercalcemia, Hypercholesterolemia, Hypertension, Lumbar compression fracture, Osteoporosis, and Type 2 diabetes mellitus. Ms. Cuauhtemoc Darling  has a past surgical history that includes hx hysterectomy (1978); hx heart catheterization (5/12/2015); hx arthroplasty (1/5/2016); and hx kyphoplasty (~2002). Social History/Living Environment:   Home Environment: [address] Name: [place]  # Steps to Enter: 0  One/Two Story Residence: One story  Living Alone: No  Support Systems: Child(eulalio)  Patient Expects to be Discharged to[de-identified] Assisted living  Current DME Used/Available at Home: Wheelchair  Prior Level of Function/Work/Activity:  Independent with ADLs per pt.  Lives at [facility]      Number of Personal Factors/Comorbidities that affect the Plan of Care: Brief history (0):  LOW COMPLEXITY   ASSESSMENT OF OCCUPATIONAL PERFORMANCE[de-identified]   Activities of Daily Living:   Basic ADLs (From Assessment) Complex ADLs (From Assessment)   Feeding: Setup  Oral Facial Hygiene/Grooming: Setup  Bathing: Minimum assistance  Upper Body Dressing: Setup  Lower Body Dressing: Minimum assistance  Toileting: Moderate assistance     Grooming/Bathing/Dressing Activities of Daily Living     Cognitive Retraining  Safety/Judgement: Fall prevention                 Functional Transfers  Bathroom Mobility: Minimum assistance  Toilet Transfer : Minimum assistance  Shower Transfer: Minimum assistance     Bed/Mat Mobility  Supine to Sit: Additional time; Minimum assistance  Sit to Supine: Additional time; Moderate assistance  Sit to Stand: Minimum assistance  Stand to Sit: Minimum assistance  Bed to Chair: Minimum assistance     Most Recent Physical Functioning:   Gross Assessment:  AROM: Generally decreased, functional (LE's and UEs)  Strength: Generally decreased, functional (LE's and UEs)               Posture:     Balance:  Sitting: Intact; High guard  Standing: Pull to stand; With support Bed Mobility:  Supine to Sit: Additional time; Minimum assistance  Sit to Supine: Additional time;  Moderate assistance  Wheelchair Mobility:     Transfers:  Sit to Stand: Minimum assistance  Stand to Sit: Minimum assistance  Bed to Chair: Minimum assistance  Duration: 8 Minutes            Patient Vitals for the past 6 hrs:   BP BP Patient Position SpO2 Pulse   11/16/21 0735 124/64 Supine 94 % 63       Mental Status  Neurologic State: Alert  Orientation Level: Oriented to person, Oriented to place  Cognition: Follows commands  Perception: Appears intact  Perseveration: No perseveration noted  Safety/Judgement: Fall prevention                          Physical Skills Involved:  Balance  Strength  Activity Tolerance Cognitive Skills Affected (resulting in the inability to perform in a timely and safe manner):  none Psychosocial Skills Affected:  none   Number of elements that affect the Plan of Care: 1-3:  LOW COMPLEXITY   CLINICAL DECISION MAKING: [address] [place] [address] AM-PAC 6 Clicks   Daily Activity Inpatient Short Form  How much help from another person does the patient currently need. .. Total A Lot A Little None   1. Putting on and taking off regular lower body clothing? [] 1   [] 2   [x] 3   [] 4   2. Bathing (including washing, rinsing, drying)? [] 1   [] 2   [x] 3   [] 4   3. Toileting, which includes using toilet, bedpan or urinal?   [] 1   [x] 2   [] 3   [] 4   4. Putting on and taking off regular upper body clothing? [] 1   [] 2   [x] 3   [] 4   5. Taking care of personal grooming such as brushing teeth? [] 1   [] 2   [x] 3   [] 4   6. Eating meals? [] 1   [] 2   [] 3   [x] 4   © 2007, Trustees of [address] [place] [address], under license to [employer]. All rights reserved      Score:  Initial: 18 Most Recent: X (Date: -- )    Interpretation of Tool:  Represents activities that are increasingly more difficult (i.e. Bed mobility, Transfers, Gait). Medical Necessity:     Patient is expected to demonstrate progress in   strength, balance, coordination, and functional technique   to   increase independence with self care and functional mobility   . Reason for Services/Other Comments:  Patient continues to require skilled intervention due to   Decrease self care and functional mobility   .    Use of outcome tool(s) and clinical judgement create a POC that gives a: LOW COMPLEXITY         TREATMENT:   (In addition to Assessment/Re-Assessment sessions the following treatments were rendered)     Pre-treatment Symptoms/Complaints:  tolerated standing by bed  Pain: Initial:   Pain Intensity 1: 0  Post Session:  0     Assessment/Reassessment only, no treatment provided today    Braces/Orthotics/Lines/Etc:   IV  purwicke  O2 Device: None (Room air)  Treatment/Session Assessment:    Response to Treatment:  tolerated fair  Interdisciplinary Collaboration:   Physical Therapist  Occupational Therapist  Registered Nurse  After treatment position/precautions:   Supine in bed  Bed alarm/tab alert on  Bed/Chair-wheels locked  Bed in low position  Call light within reach  RN notified  Side rails x 3   Compliance with Program/Exercises: Compliant all of the time. Recommendations/Intent for next treatment session: \"Next visit will focus on advancements to more challenging activities and reduction in assistance provided\".   Total Treatment Duration:  OT Patient Time In/Time Out  Time In: 0410  Time Out: [facility]

## 2021-11-16 NOTE — PROGRESS NOTES
Hospitalist Progress Note   Admit Date:  2021  3:57 AM   Name:  Rosendo Jones   Age:  80 y.o. Sex:  female  :  1934   MRN:  [id number]   Room:  [facility]/    Presenting Complaint: Abdominal Pain, Nausea, and Vomiting    Reason(s) for Admission: Pyelonephritis [N12]     Hospital Course & Interval History:   Rosendo Jones is a 80 y.o. female with medical history of hypertension, diabetes who presented with feelings of nausea and vomiting which started last night after having dinner with her family. Patient stated she had 2 episodes of brownish colored vomit and also had an episode of diarrhea. Patient denies any evidence of melena or hematochezia. Patient was also complaining of some right-sided flank and abdominal pain that would come and go. Does report recent hip fracture and is currently in rehab for this. Patient denied any cardiac chest pain, shortness of breath, blurry vision, fever/chills. Subjective (21): Patient was seen and examined at the bedside. Patient still with some mild confusion this morning. Patient denied any cardiac chest pain, shortness of breath, abdominal pain. Per nursing staff patient has not been eating much of her food. Nutrition to see patient. Assessment & Plan:   Severe sepsis secondary to pyelonephritis (2021)  Patient with pulse of 107, white count of 24.3 and positive urinalysis, lactic acid of 3.6  Patient started on sepsis bolus  Continue IV fluids  Blood cultures nothing growing to date  Urine culture grew E. coli, sensitive to Zosyn  CT scan positive for pyelonephritis  Continue on Zosyn  Repeat lactic acids trending down  11/15 patient with some confusion. Per nursing staff patient complained of some burning with urination  Continue with antibiotics and continue to monitor for any worsening symptoms.   PT/OT  Per physical therapy recommendation patient will likely require rehab placement since not safe for her to go home  Patient still having issues with confusion, can be secondary to underlying dementia versus urinary tract infection    Pulmonary edema  Chest x-ray positive for mild pulmonary edema, no consolidation or infiltrate  Patient started on fluids on admission for pyelonephritis  Fluids stopped  Given one-time dose Lasix 40 IV  We will check pro NT BNP, consider echo if elevated    Cough  Robitussin, Tessalon Perle   Chest x-ray results above     Hypertension  Continue home blood pressure medications    Hypokalemia  Replete as needed     Diabetes  Sliding scale insulin  Lantus 20 units  Continue to monitor blood glucose levels and titrate accordingly     Diarrhea  Imodium  C. difficile negative     New onset atrial fibrillation  Rate controlled  EKG A. fib, rate controlled  Consider cardiology consult if patient does not convert  Start patient on Eliquis    Dispo  Dispo pending clinical course. PT/OT. Patient likely going to require rehab. Case management made aware. Referrals will need to be sent prior to DC.     Diet:  ADULT DIET Easy to Chew; Low Fat/Low Chol/High Fiber/RONEL  DVT PPx: Eliquis  Code status: Full Code    Hospital Problems as of 11/16/2021 Date Reviewed: 10/12/2021          Codes Class Noted - Resolved POA    Pyelonephritis ICD-10-CM: N12  ICD-9-CM: 590.80  11/14/2021 - Present Unknown              Objective:     Patient Vitals for the past 24 hrs:   Temp Pulse Resp BP SpO2   11/16/21 1417 98.8 °F (37.1 °C) 85 20 132/79 93 %   11/16/21 1105 98.2 °F (36.8 °C) 79 16 121/60 93 %   11/16/21 0735 98.6 °F (37 °C) 63 16 124/64 94 %   11/16/21 0347  85      11/16/21 0245 98.9 °F (37.2 °C) 75 16 137/67 97 %   11/15/21 2356  94      11/15/21 2305 99 °F (37.2 °C) 86 16 135/76 95 %   11/15/21 2000  84      11/15/21 1907 99.2 °F (37.3 °C) 80 16 124/67 96 %   11/15/21 1500 99.3 °F (37.4 °C) 78 16 (!) 113/53 96 %     Oxygen Therapy  O2 Sat (%): 93 % (11/16/21 1417)  Pulse via Oximetry: 81 beats per minute (11/14/21 0706)  O2 Device: None (Room air) (11/16/21 1105)    Estimated body mass index is 26.91 kg/m² as calculated from the following:    Height as of this encounter: 4' 9\" (1.448 m). Weight as of this encounter: 56.4 kg (124 lb 5.4 oz). Intake/Output Summary (Last 24 hours) at 11/16/2021 1445  Last data filed at 11/16/2021 0915  Gross per 24 hour   Intake 3278.33 ml   Output 200 ml   Net 3078.33 ml         Physical Exam:   Blood pressure 132/79, pulse 85, temperature 98.8 °F (37.1 °C), resp. rate 20, height 4' 9\" (1.448 m), weight 56.4 kg (124 lb 5.4 oz), last menstrual period 09/08/2008, SpO2 93 %, not currently breastfeeding. General:    Well nourished. No overt distress  Head:  Normocephalic, atraumatic  Eyes:  Sclerae appear normal.  Pupils equally round. ENT:  Nares appear normal, no drainage. Moist oral mucosa  Neck:  No restricted ROM. Trachea midline   CV:   Irregular. No m/r/g. No jugular venous distension. Lungs:   CTAB. No wheezing, rhonchi, or rales. Respirations even, unlabored  Abdomen: Bowel sounds present. Soft, nontender, nondistended. Extremities: No cyanosis or clubbing. No edema  Skin:     No rashes and normal coloration. Warm and dry. Neuro:  CN II-XII grossly intact. Sensation intact. Confused   psych:  Normal mood and affect.       I have reviewed ordered lab tests and independently visualized imaging below:    Recent Labs:  Recent Results (from the past 48 hour(s))   GLUCOSE, POC    Collection Time: 11/14/21  4:25 PM   Result Value Ref Range    Glucose (POC) 99 65 - 100 mg/dL    Performed by Luci    GLUCOSE, POC    Collection Time: 11/14/21  4:38 PM   Result Value Ref Range    Glucose (POC) 123 (H) 65 - 100 mg/dL    Performed by Ollie    GLUCOSE, POC    Collection Time: 11/14/21  8:39 PM   Result Value Ref Range    Glucose (POC) 156 (H) 65 - 100 mg/dL    Performed by [facility]    LACTIC ACID    Collection Time: 11/14/21  9:52 PM   Result Value Ref Range    Lactic acid 1.2 0.4 - 2.0 MMOL/L   C. DIFFICILE AG & TOXIN A/B    Collection Time: 11/14/21 10:01 PM   Result Value Ref Range    GDH ANTIGEN C. DIFFICILE GDH ANTIGEN-NEGATIVE      C. difficile toxin C. DIFFICILE TOXIN-NEGATIVE      PCR Reflex NOT APPLICABLE      INTERPRETATION NEGATIVE FOR TOXIGENIC C. DIFFICILE NTXCD      Clinical Consideration NEGATIVE FOR TOXIGENIC C. DIFFICILE     BLOOD CULTURE ID PANEL    Collection Time: 11/15/21  2:35 AM    Specimen: Blood   Result Value Ref Range    Acc. no. from Micro Order [id number]     Escherichia coli Detected (A) NOTDET      KPC (Carbapenem Resistance Gene) NOT DETECTED NOTDET      INTERPRETATION        Gram negative julieta. Identified by realtime PCR as E. coli   CBC W/O DIFF    Collection Time: 11/15/21  3:48 AM   Result Value Ref Range    WBC 16.6 (H) 4.3 - 11.1 K/uL    RBC 3.60 (L) 4.05 - 5.2 M/uL    HGB 10.2 (L) 11.7 - 15.4 g/dL    HCT 31.6 (L) 35.8 - 46.3 %    MCV 87.8 79.6 - 97.8 FL    MCH 28.3 26.1 - 32.9 PG    MCHC 32.3 31.4 - 35.0 g/dL    RDW 14.1 11.9 - 14.6 %    PLATELET 989 749 - 384 K/uL    MPV 11.1 9.4 - 12.3 FL    ABSOLUTE NRBC 0.00 0.0 - 0.2 K/uL   MAGNESIUM    Collection Time: 11/15/21  5:42 AM   Result Value Ref Range    Magnesium 0.8 (L) 1.8 - 2.4 mg/dL   METABOLIC PANEL, COMPREHENSIVE    Collection Time: 11/15/21  5:42 AM   Result Value Ref Range    Sodium 137 136 - 145 mmol/L    Potassium 3.0 (L) 3.5 - 5.1 mmol/L    Chloride 106 98 - 107 mmol/L    CO2 21 21 - 32 mmol/L    Anion gap 10 7 - 16 mmol/L    Glucose 120 (H) 65 - 100 mg/dL    BUN 13 8 - 23 MG/DL    Creatinine 0.77 0.6 - 1.0 MG/DL    GFR est AA >60 >60 ml/min/1.73m2    GFR est non-AA >60 >60 ml/min/1.73m2    Calcium 8.1 (L) 8.3 - 10.4 MG/DL    Bilirubin, total 0.7 0.2 - 1.1 MG/DL    ALT (SGPT) 11 (L) 12 - 65 U/L    AST (SGOT) 4 (L) 15 - 37 U/L    Alk.  phosphatase 38 (L) 50 - 136 U/L    Protein, total 5.8 (L) 6.3 - 8.2 g/dL    Albumin 2.5 (L) 3.2 - 4.6 g/dL    Globulin 3.3 2.3 - 3.5 g/dL    A-G Ratio 0.8 (L) 1.2 - 3.5     GLUCOSE, POC    Collection Time: 11/15/21  7:55 AM   Result Value Ref Range    Glucose (POC) 126 (H) 65 - 100 mg/dL    Performed by [facility]    GLUCOSE, POC    Collection Time: 11/15/21 11:08 AM   Result Value Ref Range    Glucose (POC) 135 (H) 65 - 100 mg/dL    Performed by Lucy    EKG, 12 LEAD, INITIAL    Collection Time: 11/15/21  2:24 PM   Result Value Ref Range    Ventricular Rate 70 BPM    Atrial Rate 108 BPM    QRS Duration 88 ms    Q-T Interval 380 ms    QTC Calculation (Bezet) 410 ms    Calculated R Axis -14 degrees    Calculated T Axis -28 degrees    Diagnosis       Atrial fibrillation with a competing junctional pacemaker  Septal infarct , age undetermined  Abnormal ECG  When compared with ECG of 18-SEP-2021 11:35,  Atrial fibrillation has replaced Sinus rhythm  Septal infarct is now Present  Confirmed by Darshan De La Paz ([id number]) on 11/16/2021 7:53:29 AM     GLUCOSE, POC    Collection Time: 11/15/21  4:31 PM   Result Value Ref Range    Glucose (POC) 132 (H) 65 - 100 mg/dL    Performed by [employer]    GLUCOSE, POC    Collection Time: 11/15/21  8:28 PM   Result Value Ref Range    Glucose (POC) 156 (H) 65 - 100 mg/dL    Performed by Chacha    CBC W/O DIFF    Collection Time: 11/16/21  6:15 AM   Result Value Ref Range    WBC 11.7 (H) 4.3 - 11.1 K/uL    RBC 3.60 (L) 4.05 - 5.2 M/uL    HGB 10.1 (L) 11.7 - 15.4 g/dL    HCT 31.2 (L) 35.8 - 46.3 %    MCV 86.7 79.6 - 97.8 FL    MCH 28.1 26.1 - 32.9 PG    MCHC 32.4 31.4 - 35.0 g/dL    RDW 14.1 11.9 - 14.6 %    PLATELET 264 638 - 280 K/uL    MPV 10.1 9.4 - 12.3 FL    ABSOLUTE NRBC 0.00 0.0 - 0.2 K/uL   METABOLIC PANEL, COMPREHENSIVE    Collection Time: 11/16/21  6:15 AM   Result Value Ref Range    Sodium 133 (L) 136 - 145 mmol/L    Potassium 4.1 3.5 - 5.1 mmol/L    Chloride 108 (H) 98 - 107 mmol/L    CO2 18 (L) 21 - 32 mmol/L    Anion gap 7 7 - 16 mmol/L    Glucose 114 (H) 65 - 100 mg/dL    BUN 9 8 - 23 MG/DL    Creatinine 0.69 0.6 - 1.0 MG/DL    GFR est AA >60 >60 ml/min/1.73m2    GFR est non-AA >60 >60 ml/min/1.73m2    Calcium 8.3 8.3 - 10.4 MG/DL    Bilirubin, total 0.5 0.2 - 1.1 MG/DL    ALT (SGPT) 11 (L) 12 - 65 U/L    AST (SGOT) 11 (L) 15 - 37 U/L    Alk.  phosphatase 86 50 - 130 U/L    Protein, total 5.5 (L) 6.3 - 8.2 g/dL    Albumin 2.4 (L) 3.2 - 4.6 g/dL    Globulin 3.1 2.3 - 3.5 g/dL    A-G Ratio 0.8 (L) 1.2 - 3.5     MAGNESIUM    Collection Time: 11/16/21  6:15 AM   Result Value Ref Range    Magnesium 2.1 1.8 - 2.4 mg/dL   GLUCOSE, POC    Collection Time: 11/16/21  7:49 AM   Result Value Ref Range    Glucose (POC) 107 (H) 65 - 100 mg/dL    Performed by [email or web address]    GLUCOSE, POC    Collection Time: 11/16/21 11:27 AM   Result Value Ref Range    Glucose (POC) 128 (H) 65 - 100 mg/dL    Performed by [email or web address]        All Micro Results     Procedure Component Value Units Date/Time    CULTURE, BLOOD [[id number]]  (Abnormal) Collected: 11/14/21 0551    Order Status: Completed Specimen: Blood Updated: 11/16/21 0802     Special Requests: --        RIGHT  FOREARM       GRAM STAIN AEROBIC BOTTLE POSITIVE         GRAM NEGATIVE RODS               RESULTS VERIFIED, PHONED TO AND READ BACK BY Mason Combs RN @[phone] [phone] TO Juan Vaughan            Culture result:       GRAM NEGATIVE RODS IDENTIFICATION AND SUSCEPTIBILITY TO FOLLOW                  Refer to Blood Culture ID Panel Accession  [id number]      CULTURE, BLOOD [[id number]] Collected: 11/14/21 0551    Order Status: Completed Specimen: Blood Updated: 11/16/21 0758     Special Requests: LEFT ANTECUBITAL        Culture result: NO GROWTH 2 DAYS       CULTURE, URINE [[id number]]  (Abnormal)  (Susceptibility) Collected: 11/14/21 0631    Order Status: Completed Specimen: Urine Updated: 11/16/21 0728     Special Requests: NO SPECIAL REQUESTS        Culture result:       >100,000 COLONIES/mL ESCHERICHIA COLI C. DIFFICILE AG & TOXIN A/B [[id number]] Collected: 11/14/21 2201    Order Status: Completed Specimen: Stool Updated: 11/15/21 1153     [id number] ANTIGEN       C. DIFFICILE GDH ANTIGEN-NEGATIVE           C. difficile toxin       C. DIFFICILE TOXIN-NEGATIVE           PCR Reflex NOT APPLICABLE        INTERPRETATION       NEGATIVE FOR TOXIGENIC C. DIFFICILE           Clinical Consideration       NEGATIVE FOR TOXIGENIC C. DIFFICILE          BLOOD CULTURE ID PANEL [[id number]]  (Abnormal) Collected: 11/15/21 0235    Order Status: Completed Specimen: Blood Updated: 11/15/21 0720     Acc. no. from Micro Order [id number]     Escherichia coli Detected        Comment: RESULTS VERIFIED, PHONED TO AND READ BACK BY  Chandler KRAFT RN AT [id number] ON 11/15/21 ACL          KPC (Carbapenem Resistance Gene) NOT DETECTED        Comment: WARNING:  A Not Detected result for the KPC gene does not indicate susceptibility to carbapenems. Gram negative bacteria can be resistant to carbapenems by mechanisms other than carrying the KPC gene. INTERPRETATION       Gram negative julieta. Identified by realtime PCR as E. coli          CLOSTRIDIUM DIFF TOXIN A & B [[id number]] Collected: 11/14/21 2201    Order Status: Canceled Specimen: Stool     COVID-19 RAPID TEST [[id number]] Collected: 11/14/21 0804    Order Status: Completed Specimen: Nasopharyngeal Updated: 11/14/21 0829     Specimen source Nasopharyngeal        COVID-19 rapid test Not detected        Comment:      The specimen is NEGATIVE for SARS-CoV-2, the novel coronavirus associated with COVID-19. A negative result does not rule out COVID-19. This test has been authorized by the FDA under an Emergency Use Authorization (EUA) for use by authorized laboratories.         Fact sheet for Healthcare Providers: BagFit.fi  Fact sheet for Patients: BagFit.fi       Methodology: Isothermal Nucleic Acid Amplification               Other Studies:  XR CHEST SNGL V    Result Date: 11/16/2021   Portable view of the chest COMPARISON: September 22, 2021 Clinical history: Cough. FINDINGS: Mild bilateral interstitial densities, suspicious for pulmonary edema. No focal pulmonary consolidation or pneumothorax. No large pleural effusion. Heart is mildly enlarged. Mediastinal contour is within normal limits. Bones are osteopenic. 1. Mild interstitial densities, suspicious for mild pulmonary edema. 2. No focal pulmonary consolidation/infiltrate.       Current Meds:  Current Facility-Administered Medications   Medication Dose Route Frequency    benzonatate (TESSALON) capsule 100 mg  100 mg Oral TID PRN    guaiFENesin-codeine (ROBITUSSIN AC) 100-10 mg/5 mL solution 5 mL  5 mL Oral Q4H PRN    phenazopyridine (PYRIDIUM) tab 95 mg  95 mg Oral BID PRN    albuterol-ipratropium (DUO-NEB) 2.5 MG-0.5 MG/3 ML  3 mL Nebulization Q4H PRN    loperamide (IMODIUM) capsule 2 mg  2 mg Oral Q4H PRN    sodium chloride (NS) flush 5-10 mL  5-10 mL IntraVENous Q8H    sodium chloride (NS) flush 5-10 mL  5-10 mL IntraVENous PRN    sodium chloride (NS) flush 5-40 mL  5-40 mL IntraVENous Q8H    sodium chloride (NS) flush 5-40 mL  5-40 mL IntraVENous PRN    acetaminophen (TYLENOL) tablet 650 mg  650 mg Oral Q6H PRN    Or    acetaminophen (TYLENOL) suppository 650 mg  650 mg Rectal Q6H PRN    polyethylene glycol (MIRALAX) packet 17 g  17 g Oral DAILY PRN    ondansetron (ZOFRAN ODT) tablet 4 mg  4 mg Oral Q8H PRN    Or    ondansetron (ZOFRAN) injection 4 mg  4 mg IntraVENous Q6H PRN    enoxaparin (LOVENOX) injection 30 mg  30 mg SubCUTAneous DAILY    insulin glargine (LANTUS) injection 20 Units  20 Units SubCUTAneous DAILY    insulin lispro (HUMALOG) injection   SubCUTAneous AC&HS    piperacillin-tazobactam (ZOSYN) 3.375 g in 0.9% sodium chloride (MBP/ADV) 100 mL MBP  3.375 g IntraVENous Q8H    amLODIPine (NORVASC) tablet 5 mg  5 mg Oral DAILY    atorvastatin (LIPITOR) tablet 40 mg  40 mg Oral QHS    aspirin chewable tablet 81 mg  81 mg Oral DAILY    carvediloL (COREG) tablet 12.5 mg  12.5 mg Oral BID WITH MEALS    escitalopram oxalate (LEXAPRO) tablet 10 mg  10 mg Oral DAILY    rOPINIRole (REQUIP) tablet 1 mg  1 mg Oral BID    [Held by provider] spironolactone (ALDACTONE) tablet 25 mg  25 mg Oral DAILY    HYDROcodone-acetaminophen (NORCO) 5-325 mg per tablet 1 Tablet  1 Tablet Oral Q6H PRN    morphine injection 1 mg  1 mg IntraVENous Q4H PRN       Signed:  Rubi Berry MD    Part of this note may have been written by using a voice dictation software. The note has been proof read but may still contain some grammatical/other typographical errors.

## 2021-11-16 NOTE — PROGRESS NOTES
EOS    - prn imodium given x1  - prn tessalon given x1 for new onset productive cough  - CXR completed  - prn norco given x1  - prn pyridium given x1  - pt resting quietly, vss, no needs voiced  - report given to oncoming nurse

## 2021-11-17 PROBLEM — A41.9 SEPSIS (HCC): Status: ACTIVE | Noted: 2021-11-17

## 2021-11-17 PROBLEM — I47.20 V-TACH: Status: ACTIVE | Noted: 2021-11-17

## 2021-11-17 PROBLEM — B96.20 E COLI BACTEREMIA: Status: ACTIVE | Noted: 2021-11-17

## 2021-11-17 PROBLEM — R78.81 E COLI BACTEREMIA: Status: ACTIVE | Noted: 2021-11-17

## 2021-11-17 LAB
ALBUMIN SERPL-MCNC: 2.4 G/DL (ref 3.2–4.6)
ALBUMIN/GLOB SERPL: 0.6 {RATIO} (ref 1.2–3.5)
ALP SERPL-CCNC: 96 U/L (ref 50–130)
ALT SERPL-CCNC: 11 U/L (ref 12–65)
ANION GAP SERPL CALC-SCNC: 5 MMOL/L (ref 7–16)
ANION GAP SERPL CALC-SCNC: 7 MMOL/L (ref 7–16)
AST SERPL-CCNC: 16 U/L (ref 15–37)
BACTERIA SPEC CULT: ABNORMAL
BACTERIA SPEC CULT: ABNORMAL
BILIRUB SERPL-MCNC: 0.6 MG/DL (ref 0.2–1.1)
BNP SERPL-MCNC: 2503 PG/ML
BUN SERPL-MCNC: 10 MG/DL (ref 8–23)
BUN SERPL-MCNC: 11 MG/DL (ref 8–23)
CALCIUM SERPL-MCNC: 8.8 MG/DL (ref 8.3–10.4)
CALCIUM SERPL-MCNC: 8.8 MG/DL (ref 8.3–10.4)
CHLORIDE SERPL-SCNC: 103 MMOL/L (ref 98–107)
CHLORIDE SERPL-SCNC: 104 MMOL/L (ref 98–107)
CO2 SERPL-SCNC: 23 MMOL/L (ref 21–32)
CO2 SERPL-SCNC: 24 MMOL/L (ref 21–32)
CREAT SERPL-MCNC: 0.69 MG/DL (ref 0.6–1)
CREAT SERPL-MCNC: 0.8 MG/DL (ref 0.6–1)
ERYTHROCYTE [DISTWIDTH] IN BLOOD BY AUTOMATED COUNT: 13.8 % (ref 11.9–14.6)
GLOBULIN SER CALC-MCNC: 4.1 G/DL (ref 2.3–3.5)
GLUCOSE BLD STRIP.AUTO-MCNC: 107 MG/DL (ref 65–100)
GLUCOSE BLD STRIP.AUTO-MCNC: 123 MG/DL (ref 65–100)
GLUCOSE BLD STRIP.AUTO-MCNC: 137 MG/DL (ref 65–100)
GLUCOSE BLD STRIP.AUTO-MCNC: 172 MG/DL (ref 65–100)
GLUCOSE SERPL-MCNC: 109 MG/DL (ref 65–100)
GLUCOSE SERPL-MCNC: 131 MG/DL (ref 65–100)
GRAM STN SPEC: ABNORMAL
HCT VFR BLD AUTO: 32.4 % (ref 35.8–46.3)
HGB BLD-MCNC: 10.5 G/DL (ref 11.7–15.4)
MAGNESIUM SERPL-MCNC: 1.6 MG/DL (ref 1.8–2.4)
MAGNESIUM SERPL-MCNC: 2.2 MG/DL (ref 1.8–2.4)
MCH RBC QN AUTO: 27.6 PG (ref 26.1–32.9)
MCHC RBC AUTO-ENTMCNC: 32.4 G/DL (ref 31.4–35)
MCV RBC AUTO: 85.3 FL (ref 79.6–97.8)
NRBC # BLD: 0 K/UL (ref 0–0.2)
PHOSPHATE SERPL-MCNC: 2.2 MG/DL (ref 2.3–3.7)
PLATELET # BLD AUTO: 267 K/UL (ref 150–450)
PMV BLD AUTO: 9.9 FL (ref 9.4–12.3)
POTASSIUM SERPL-SCNC: 3.4 MMOL/L (ref 3.5–5.1)
POTASSIUM SERPL-SCNC: 3.6 MMOL/L (ref 3.5–5.1)
PROCALCITONIN SERPL-MCNC: 0.09 NG/ML (ref 0–0.49)
PROT SERPL-MCNC: 6.5 G/DL (ref 6.3–8.2)
RBC # BLD AUTO: 3.8 M/UL (ref 4.05–5.2)
SERVICE CMNT-IMP: ABNORMAL
SODIUM SERPL-SCNC: 133 MMOL/L (ref 136–145)
SODIUM SERPL-SCNC: 133 MMOL/L (ref 136–145)
WBC # BLD AUTO: 10.8 K/UL (ref 4.3–11.1)

## 2021-11-17 PROCEDURE — 74011250636 HC RX REV CODE- 250/636: Performed by: STUDENT IN AN ORGANIZED HEALTH CARE EDUCATION/TRAINING PROGRAM

## 2021-11-17 PROCEDURE — 80053 COMPREHEN METABOLIC PANEL: CPT

## 2021-11-17 PROCEDURE — 74011250637 HC RX REV CODE- 250/637: Performed by: STUDENT IN AN ORGANIZED HEALTH CARE EDUCATION/TRAINING PROGRAM

## 2021-11-17 PROCEDURE — 74011250636 HC RX REV CODE- 250/636: Performed by: EMERGENCY MEDICINE

## 2021-11-17 PROCEDURE — 83880 ASSAY OF NATRIURETIC PEPTIDE: CPT

## 2021-11-17 PROCEDURE — 74011000250 HC RX REV CODE- 250: Performed by: EMERGENCY MEDICINE

## 2021-11-17 PROCEDURE — 84145 PROCALCITONIN (PCT): CPT

## 2021-11-17 PROCEDURE — 82962 GLUCOSE BLOOD TEST: CPT

## 2021-11-17 PROCEDURE — 85027 COMPLETE CBC AUTOMATED: CPT

## 2021-11-17 PROCEDURE — 84100 ASSAY OF PHOSPHORUS: CPT

## 2021-11-17 PROCEDURE — 74011636637 HC RX REV CODE- 636/637: Performed by: STUDENT IN AN ORGANIZED HEALTH CARE EDUCATION/TRAINING PROGRAM

## 2021-11-17 PROCEDURE — 83735 ASSAY OF MAGNESIUM: CPT

## 2021-11-17 PROCEDURE — 36415 COLL VENOUS BLD VENIPUNCTURE: CPT

## 2021-11-17 PROCEDURE — 74011000258 HC RX REV CODE- 258: Performed by: STUDENT IN AN ORGANIZED HEALTH CARE EDUCATION/TRAINING PROGRAM

## 2021-11-17 PROCEDURE — 74011250637 HC RX REV CODE- 250/637: Performed by: EMERGENCY MEDICINE

## 2021-11-17 PROCEDURE — 65270000029 HC RM PRIVATE

## 2021-11-17 RX ORDER — POTASSIUM CHLORIDE 20 MEQ/1
40 TABLET, EXTENDED RELEASE ORAL
Status: COMPLETED | OUTPATIENT
Start: 2021-11-17 | End: 2021-11-17

## 2021-11-17 RX ORDER — MAGNESIUM SULFATE HEPTAHYDRATE 40 MG/ML
2 INJECTION, SOLUTION INTRAVENOUS ONCE
Status: COMPLETED | OUTPATIENT
Start: 2021-11-17 | End: 2021-11-17

## 2021-11-17 RX ORDER — POTASSIUM CHLORIDE 14.9 MG/ML
20 INJECTION INTRAVENOUS
Status: COMPLETED | OUTPATIENT
Start: 2021-11-17 | End: 2021-11-18

## 2021-11-17 RX ADMIN — INSULIN LISPRO 2 UNITS: 100 INJECTION, SOLUTION INTRAVENOUS; SUBCUTANEOUS at 11:30

## 2021-11-17 RX ADMIN — APIXABAN 2.5 MG: 2.5 TABLET, FILM COATED ORAL at 08:53

## 2021-11-17 RX ADMIN — AMLODIPINE BESYLATE 5 MG: 5 TABLET ORAL at 08:54

## 2021-11-17 RX ADMIN — ASPIRIN 81 MG CHEWABLE TABLET 81 MG: 81 TABLET CHEWABLE at 08:56

## 2021-11-17 RX ADMIN — LOPERAMIDE HYDROCHLORIDE 2 MG: 2 CAPSULE ORAL at 04:04

## 2021-11-17 RX ADMIN — APIXABAN 2.5 MG: 2.5 TABLET, FILM COATED ORAL at 22:43

## 2021-11-17 RX ADMIN — Medication 10 ML: at 22:43

## 2021-11-17 RX ADMIN — ESCITALOPRAM OXALATE 10 MG: 10 TABLET ORAL at 08:58

## 2021-11-17 RX ADMIN — GUAIFENESIN AND CODEINE PHOSPHATE 5 ML: 10; 100 LIQUID ORAL at 12:46

## 2021-11-17 RX ADMIN — INSULIN GLARGINE 20 UNITS: 100 INJECTION, SOLUTION SUBCUTANEOUS at 09:21

## 2021-11-17 RX ADMIN — Medication 10 ML: at 06:18

## 2021-11-17 RX ADMIN — Medication 10 ML: at 14:00

## 2021-11-17 RX ADMIN — PIPERACILLIN AND TAZOBACTAM 3.38 G: 3; .375 INJECTION, POWDER, LYOPHILIZED, FOR SOLUTION INTRAVENOUS at 08:59

## 2021-11-17 RX ADMIN — POTASSIUM CHLORIDE 40 MEQ: 20 TABLET, EXTENDED RELEASE ORAL at 16:44

## 2021-11-17 RX ADMIN — POTASSIUM CHLORIDE 20 MEQ: 14.9 INJECTION, SOLUTION INTRAVENOUS at 16:45

## 2021-11-17 RX ADMIN — CARVEDILOL 12.5 MG: 6.25 TABLET, FILM COATED ORAL at 08:52

## 2021-11-17 RX ADMIN — MAGNESIUM SULFATE HEPTAHYDRATE 2 G: 40 INJECTION, SOLUTION INTRAVENOUS at 02:00

## 2021-11-17 RX ADMIN — SODIUM PHOSPHATE, MONOBASIC, MONOHYDRATE 20 MMOL: 276; 142 INJECTION, SOLUTION INTRAVENOUS at 02:34

## 2021-11-17 RX ADMIN — Medication 10 ML: at 22:44

## 2021-11-17 RX ADMIN — PIPERACILLIN AND TAZOBACTAM 3.38 G: 3; .375 INJECTION, POWDER, LYOPHILIZED, FOR SOLUTION INTRAVENOUS at 16:33

## 2021-11-17 RX ADMIN — ATORVASTATIN CALCIUM 40 MG: 40 TABLET, FILM COATED ORAL at 22:43

## 2021-11-17 RX ADMIN — POTASSIUM CHLORIDE 20 MEQ: 14.9 INJECTION, SOLUTION INTRAVENOUS at 22:36

## 2021-11-17 RX ADMIN — CARVEDILOL 12.5 MG: 6.25 TABLET, FILM COATED ORAL at 16:36

## 2021-11-17 RX ADMIN — Medication 10 ML: at 16:33

## 2021-11-17 RX ADMIN — PIPERACILLIN AND TAZOBACTAM 3.38 G: 3; .375 INJECTION, POWDER, LYOPHILIZED, FOR SOLUTION INTRAVENOUS at 23:53

## 2021-11-17 NOTE — PROGRESS NOTES
EOS    - nagging cough throughout night  - prn tessalon given x1, prn Robitussin given x1, prn po immodium given x2  - runs of vtach per margot, MD Arianna Farias aware  - mag and phosphate replaced  - abx given as directed  - multiple incontinent loose stools  - pt resting quietly, vss, no needs voiced  - report given to oncoming nurse

## 2021-11-17 NOTE — PROGRESS NOTES
Pt continues to moan in bed but denies pain. Pt said she is sick in her stomach but refused nausea medicine. Dr. Johnie Montes notified and said to give pt Tylenol. Will continue to monitor.

## 2021-11-17 NOTE — PROGRESS NOTES
Hospitalist Progress Note   Admit Date:  2021  3:57 AM   Name:  Gina De La Torre   Age:  80 y.o. Sex:  female  :  1934   MRN:  [id number]   Room:  [facility]/    Presenting Complaint: Abdominal Pain, Nausea, and Vomiting    Reason(s) for Admission: Pyelonephritis [N12]     Hospital Course & Interval History:   Gina De La Torre is a 80 y.o. female with medical history of hypertension, diabetes who presented with feelings of nausea and vomiting which started last night after having dinner with her family. Patient stated she had 2 episodes of brownish colored vomit and also had an episode of diarrhea. Patient denies any evidence of melena or hematochezia. Patient was also complaining of some right-sided flank and abdominal pain that would come and go. Does report recent hip fracture and is currently in rehab for this. Patient denied any cardiac chest pain, shortness of breath, blurry vision, fever/chills. Subjective (21): Patient was seen and examined at the bedside. Patient alert and oriented x3 this morning with confusion seeming to have improved from days prior. Per nursing note patient had runs of V. tach on monitor last night and had mag and Phos replaced. K low this morning. Patient and nurse does report multiple episodes of incontinent loose stools that were nonbloody and nonmelenic last night. Patient states that this is quite common with her history of Crohn's. Pt sitting up in bed this AM eating her breakfast, in no acute distress. Denies any fevers, chills, anorexia, nausea, vomiting, abdominal pain, dysuria, increased or decreased urination. Denies chest pain or pressure, shortness of breath, lightheadedness, dizziness, near syncope or syncope. .    Assessment & Plan:   Severe sepsis secondary to E.  Coli Bacteremia and Pyelonephritis  Patient with pulse of 107, white count of 24.3 and positive urinalysis, lactic acid of 3.6  Patient started on sepsis bolus  Continue IV fluids  Urine and Blood cultures positive with E. Coli, sensitives to Zosyn  CT scan positive for pyelonephritis  Continue on Zosyn  Repeat lactic acids trending down  11/15 patient with some confusion. Per nursing staff patient complained of some burning with urination  Continue with antibiotics and continue to monitor for any worsening symptoms - confusion improving this AM   PT/OT  Per physical therapy recommendation patient will likely require rehab placement since not safe for her to go home    Pulmonary edema vs infiltrate  Chest x-ray positive for mild pulmonary edema, no consolidation or infiltrate  Patient started on fluids on admission for pyelonephritis  Fluids stopped, S/p one-time dose Lasix 40 IV on 11/16  NT pro-BNP up to 3k, stable respiratory exam this Am after lasix yesterday , recheck NT pro-BNP today and consider repeat dose of Lasix if remains elevated  - check ECHO   - check procal and consider Abx if elevated    Cough  Robitussin, Tessalon Perle   Chest x-ray results above  - recheck NT pro-BNP and procal today   - consider repeat lasix vs Abx pending w/u above     Hypertension  Continue home blood pressure medications    Hypokalemia  Replete as needed     Diabetes  Sliding scale insulin  Lantus 20 units  Continue to monitor blood glucose levels and titrate accordingly     Diarrhea  Imodium  C. difficile negative     New onset atrial fibrillation  Rate controlled  EKG A. fib, rate controlled  Consider cardiology consult if patient does not convert  --Cont patient on Eliquis    VTach  - runs of Vtach last night  - Mag and phos replaced  - K down to 3.4, replace with IV and PO potassium  - check ECHO     Dispo  Dispo pending clinical course. PT/OT. Patient likely going to require rehab. Case management made aware. Referrals sent and hopeful for discharge in next 24-48 hours.      Diet:  ADULT DIET Easy to Chew; Low Fat/Low Chol/High Fiber/RONEL  DVT PPx: Eliquis  Code status: Full Code    Hospital Problems as of 11/17/2021 Date Reviewed: 10/12/2021          Codes Class Noted - Resolved POA    V-tach [facility]) ICD-10-CM: I47.2  ICD-9-CM: 427.1  11/17/2021 - Present Unknown        E coli bacteremia ICD-10-CM: R78.81, B96.20  ICD-9-CM: 790.7, 041.49  11/17/2021 - Present Unknown        Sepsis ([address] ICD-10-CM: A41.9  ICD-9-CM: 038.9, 995.91  11/17/2021 - Present Unknown        Pulmonary edema ICD-10-CM: J81.1  ICD-9-CM: 353  11/16/2021 - Present Unknown        Cough ICD-10-CM: R05.9  ICD-9-CM: 786.2  11/16/2021 - Present Unknown        Atrial fibrillation (HCC) ICD-10-CM: I48.91  ICD-9-CM: 427.31  11/16/2021 - Present Unknown        Pyelonephritis ICD-10-CM: N12  ICD-9-CM: 590.80  11/14/2021 - Present Unknown              Objective:     Patient Vitals for the past 24 hrs:   Temp Pulse Resp BP SpO2   11/17/21 1635  75  131/68    11/17/21 1502 98.6 °F (37 °C) 76 18 (!) 118/53 93 %   11/17/21 1107 99 °F (37.2 °C) 75 18 (!) 114/54 94 %   11/17/21 0854  81  (!) 130/46    11/17/21 0852  81  (!) 130/46    11/17/21 0825    (!) 130/46    11/17/21 0739 99.7 °F (37.6 °C) 81 17 (!) 128/43 96 %   11/17/21 0326 98.2 °F (36.8 °C) 73 19 135/75 95 %   11/16/21 2321 97.8 °F (36.6 °C) 63 20 109/63 95 %   11/16/21 2307 97.9 °F (36.6 °C) 63 18 (!) 110/54 94 %   11/16/21 1931 100.1 °F (37.8 °C) 80 17 (!) 104/57 95 %     Oxygen Therapy  O2 Sat (%): 93 % (11/17/21 1502)  Pulse via Oximetry: 81 beats per minute (11/14/21 0706)  O2 Device: None (Room air) (11/17/21 0739)    Estimated body mass index is 28.1 kg/m² as calculated from the following:    Height as of this encounter: 4' 9\" (1.448 m). Weight as of this encounter: 58.9 kg (129 lb 13.6 oz).     Intake/Output Summary (Last 24 hours) at 11/17/2021 1640  Last data filed at 11/17/2021 1308  Gross per 24 hour   Intake 1936.08 ml   Output 1200 ml   Net 736.08 ml         Physical Exam:   Blood pressure 131/68, pulse 75, temperature 98.6 °F (37 °C), resp. rate 18, height 4' 9\" (1.448 m), weight 58.9 kg (129 lb 13.6 oz), last menstrual period 09/08/2008, SpO2 93 %, not currently breastfeeding. General:    Well nourished. No overt distress  Head:  Normocephalic, atraumatic  Eyes:  Sclerae appear normal.  Pupils equally round. ENT:  Nares appear normal, no drainage. Moist oral mucosa  Neck:  No restricted ROM. Trachea midline   CV:   Irregular. No m/r/g. No jugular venous distension. Lungs:   CTAB. No wheezing, rhonchi, or rales. Respirations even, unlabored  Abdomen: Bowel sounds present. Soft, nontender, nondistended. Extremities: No cyanosis or clubbing. No edema  Skin:     No rashes and normal coloration. Warm and dry. Neuro:  CN II-XII grossly intact. Sensation intact. AAO x3  psych:  Normal mood and affect.       I have reviewed ordered lab tests and independently visualized imaging below:    Recent Labs:  Recent Results (from the past 48 hour(s))   GLUCOSE, POC    Collection Time: 11/15/21  8:28 PM   Result Value Ref Range    Glucose (POC) 156 (H) 65 - 100 mg/dL    Performed by [facility]    CBC W/O DIFF    Collection Time: 11/16/21  6:15 AM   Result Value Ref Range    WBC 11.7 (H) 4.3 - 11.1 K/uL    RBC 3.60 (L) 4.05 - 5.2 M/uL    HGB 10.1 (L) 11.7 - 15.4 g/dL    HCT 31.2 (L) 35.8 - 46.3 %    MCV 86.7 79.6 - 97.8 FL    MCH 28.1 26.1 - 32.9 PG    MCHC 32.4 31.4 - 35.0 g/dL    RDW 14.1 11.9 - 14.6 %    PLATELET 022 986 - 604 K/uL    MPV 10.1 9.4 - 12.3 FL    ABSOLUTE NRBC 0.00 0.0 - 0.2 K/uL   METABOLIC PANEL, COMPREHENSIVE    Collection Time: 11/16/21  6:15 AM   Result Value Ref Range    Sodium 133 (L) 136 - 145 mmol/L    Potassium 4.1 3.5 - 5.1 mmol/L    Chloride 108 (H) 98 - 107 mmol/L    CO2 18 (L) 21 - 32 mmol/L    Anion gap 7 7 - 16 mmol/L    Glucose 114 (H) 65 - 100 mg/dL    BUN 9 8 - 23 MG/DL    Creatinine 0.69 0.6 - 1.0 MG/DL    GFR est AA >60 >60 ml/min/1.73m2    GFR est non-AA >60 >60 ml/min/1.73m2 Calcium 8.3 8.3 - 10.4 MG/DL    Bilirubin, total 0.5 0.2 - 1.1 MG/DL    ALT (SGPT) 11 (L) 12 - 65 U/L    AST (SGOT) 11 (L) 15 - 37 U/L    Alk.  phosphatase 86 50 - 130 U/L    Protein, total 5.5 (L) 6.3 - 8.2 g/dL    Albumin 2.4 (L) 3.2 - 4.6 g/dL    Globulin 3.1 2.3 - 3.5 g/dL    A-G Ratio 0.8 (L) 1.2 - 3.5     MAGNESIUM    Collection Time: 11/16/21  6:15 AM   Result Value Ref Range    Magnesium 2.1 1.8 - 2.4 mg/dL   NT-PRO BNP    Collection Time: 11/16/21  6:15 AM   Result Value Ref Range    NT pro-BNP 3,306 (H) <450 PG/ML   GLUCOSE, POC    Collection Time: 11/16/21  7:49 AM   Result Value Ref Range    Glucose (POC) 107 (H) 65 - 100 mg/dL    Performed by [facility]    GLUCOSE, POC    Collection Time: 11/16/21 11:27 AM   Result Value Ref Range    Glucose (POC) 128 (H) 65 - 100 mg/dL    Performed by [employer]    GLUCOSE, POC    Collection Time: 11/16/21  4:37 PM   Result Value Ref Range    Glucose (POC) 127 (H) 65 - 100 mg/dL    Performed by [employer]    GLUCOSE, POC    Collection Time: 11/16/21  9:18 PM   Result Value Ref Range    Glucose (POC) 140 (H) 65 - 100 mg/dL    Performed by [facility]    METABOLIC PANEL, BASIC    Collection Time: 11/17/21 12:26 AM   Result Value Ref Range    Sodium 133 (L) 136 - 145 mmol/L    Potassium 3.6 3.5 - 5.1 mmol/L    Chloride 104 98 - 107 mmol/L    CO2 24 21 - 32 mmol/L    Anion gap 5 (L) 7 - 16 mmol/L    Glucose 131 (H) 65 - 100 mg/dL    BUN 11 8 - 23 MG/DL    Creatinine 0.80 0.6 - 1.0 MG/DL    GFR est AA >60 >60 ml/min/1.73m2    GFR est non-AA >60 >60 ml/min/1.73m2    Calcium 8.8 8.3 - 10.4 MG/DL   MAGNESIUM    Collection Time: 11/17/21 12:26 AM   Result Value Ref Range    Magnesium 1.6 (L) 1.8 - 2.4 mg/dL   PHOSPHORUS    Collection Time: 11/17/21 12:26 AM   Result Value Ref Range    Phosphorus 2.2 (L) 2.3 - 3.7 MG/DL   GLUCOSE, POC    Collection Time: 11/17/21  8:15 AM   Result Value Ref Range    Glucose (POC) 107 (H) 65 - 100 mg/dL Performed by Mariela    CBC W/O DIFF    Collection Time: 11/17/21  8:33 AM   Result Value Ref Range    WBC 10.8 4.3 - 11.1 K/uL    RBC 3.80 (L) 4.05 - 5.2 M/uL    HGB 10.5 (L) 11.7 - 15.4 g/dL    HCT 32.4 (L) 35.8 - 46.3 %    MCV 85.3 79.6 - 97.8 FL    MCH 27.6 26.1 - 32.9 PG    MCHC 32.4 31.4 - 35.0 g/dL    RDW 13.8 11.9 - 14.6 %    PLATELET 740 731 - 877 K/uL    MPV 9.9 9.4 - 12.3 FL    ABSOLUTE NRBC 0.00 0.0 - 0.2 K/uL   METABOLIC PANEL, COMPREHENSIVE    Collection Time: 11/17/21  8:33 AM   Result Value Ref Range    Sodium 133 (L) 136 - 145 mmol/L    Potassium 3.4 (L) 3.5 - 5.1 mmol/L    Chloride 103 98 - 107 mmol/L    CO2 23 21 - 32 mmol/L    Anion gap 7 7 - 16 mmol/L    Glucose 109 (H) 65 - 100 mg/dL    BUN 10 8 - 23 MG/DL    Creatinine 0.69 0.6 - 1.0 MG/DL    GFR est AA >60 >60 ml/min/1.73m2    GFR est non-AA >60 >60 ml/min/1.73m2    Calcium 8.8 8.3 - 10.4 MG/DL    Bilirubin, total 0.6 0.2 - 1.1 MG/DL    ALT (SGPT) 11 (L) 12 - 65 U/L    AST (SGOT) 16 15 - 37 U/L    Alk. phosphatase 96 50 - 130 U/L    Protein, total 6.5 6.3 - 8.2 g/dL    Albumin 2.4 (L) 3.2 - 4.6 g/dL    Globulin 4.1 (H) 2.3 - 3.5 g/dL    A-G Ratio 0.6 (L) 1.2 - 3.5     MAGNESIUM    Collection Time: 11/17/21  8:33 AM   Result Value Ref Range    Magnesium 2.2 1.8 - 2.4 mg/dL   GLUCOSE, POC    Collection Time: 11/17/21 10:59 AM   Result Value Ref Range    Glucose (POC) 172 (H) 65 - 100 mg/dL    Performed by Raman Lui    GLUCOSE, POC    Collection Time: 11/17/21  3:54 PM   Result Value Ref Range    Glucose (POC) 123 (H) 65 - 100 mg/dL    Performed by [facility].         All Micro Results     Procedure Component Value Units Date/Time    CULTURE, BLOOD [[id number]] Collected: 11/14/21 0551    Order Status: Completed Specimen: Blood Updated: 11/17/21 0722     Special Requests: LEFT ANTECUBITAL        Culture result: NO GROWTH 3 DAYS       CULTURE, BLOOD [[id number]]  (Abnormal)  (Susceptibility) Collected: 11/14/21 0551    Order Status: Completed Specimen: Blood Updated: 11/17/21 0713     Special Requests: --        RIGHT  FOREARM       GRAM STAIN AEROBIC BOTTLE POSITIVE         GRAM NEGATIVE RODS               RESULTS VERIFIED, PHONED TO AND READ BACK BY Mason Combs RN @0079 [phone] TO Juan Vaughan            Culture result: ESCHERICHIA COLI               Refer to Blood Culture ID Panel Accession  [id number]      CULTURE, URINE [[id number]]  (Abnormal)  (Susceptibility) Collected: 11/14/21 0631    Order Status: Completed Specimen: Urine Updated: 11/16/21 0728     Special Requests: NO SPECIAL REQUESTS        Culture result:       >100,000 COLONIES/mL ESCHERICHIA COLI          C. DIFFICILE AG & TOXIN A/B [[id number]] Collected: 11/14/21 2201    Order Status: Completed Specimen: Stool Updated: 11/15/21 1153     [address] [place] ANTIGEN       C. DIFFICILE [facility] ANTIGEN-NEGATIVE           C. difficile toxin       C. DIFFICILE TOXIN-NEGATIVE           PCR Reflex NOT APPLICABLE        INTERPRETATION       NEGATIVE FOR TOXIGENIC C. DIFFICILE           Clinical Consideration       NEGATIVE FOR TOXIGENIC C. DIFFICILE          BLOOD CULTURE ID PANEL [[id number]]  (Abnormal) Collected: 11/15/21 0235    Order Status: Completed Specimen: Blood Updated: 11/15/21 0720     Acc. no. from Micro Order [id number]     Escherichia coli Detected        Comment: RESULTS VERIFIED, PHONED TO AND READ BACK BY  Moni KRAFT RN AT 9905 ON 11/15/21 ACL          KPC (Carbapenem Resistance Gene) NOT DETECTED        Comment: WARNING:  A Not Detected result for the KPC gene does not indicate susceptibility to carbapenems. Gram negative bacteria can be resistant to carbapenems by mechanisms other than carrying the KPC gene. INTERPRETATION       Gram negative julieta.  Identified by realtime PCR as E. coli          CLOSTRIDIUM DIFF TOXIN A & B [[id number]] Collected: 11/14/21 2201    Order Status: Canceled Specimen: Stool     COVID-19 RAPID TEST [[id number]] Collected: 11/14/21 0804    Order Status: Completed Specimen: Nasopharyngeal Updated: 11/14/21 0829     Specimen source Nasopharyngeal        COVID-19 rapid test Not detected        Comment:      The specimen is NEGATIVE for SARS-CoV-2, the novel coronavirus associated with COVID-19. A negative result does not rule out COVID-19. This test has been authorized by the FDA under an Emergency Use Authorization (EUA) for use by authorized laboratories. Fact sheet for Healthcare Providers: mydoodle.comco.nz  Fact sheet for Patients: INFOGRAPHIQS.nz       Methodology: Isothermal Nucleic Acid Amplification               Other Studies:  No results found.     Current Meds:  Current Facility-Administered Medications   Medication Dose Route Frequency    potassium chloride (K-DUR, KLOR-CON M20) SR tablet 40 mEq  40 mEq Oral NOW    potassium chloride 20 mEq in 100 ml IVPB  20 mEq IntraVENous Q2H    benzonatate (TESSALON) capsule 100 mg  100 mg Oral TID PRN    guaiFENesin-codeine (ROBITUSSIN AC) 100-10 mg/5 mL solution 5 mL  5 mL Oral Q4H PRN    phenazopyridine (PYRIDIUM) tab 95 mg  95 mg Oral BID PRN    albuterol-ipratropium (DUO-NEB) 2.5 MG-0.5 MG/3 ML  3 mL Nebulization Q4H PRN    apixaban (ELIQUIS) tablet 2.5 mg  2.5 mg Oral Q12H    loperamide (IMODIUM) capsule 2 mg  2 mg Oral Q4H PRN    sodium chloride (NS) flush 5-10 mL  5-10 mL IntraVENous Q8H    sodium chloride (NS) flush 5-10 mL  5-10 mL IntraVENous PRN    sodium chloride (NS) flush 5-40 mL  5-40 mL IntraVENous Q8H    sodium chloride (NS) flush 5-40 mL  5-40 mL IntraVENous PRN    acetaminophen (TYLENOL) tablet 650 mg  650 mg Oral Q6H PRN    Or    acetaminophen (TYLENOL) suppository 650 mg  650 mg Rectal Q6H PRN    polyethylene glycol (MIRALAX) packet 17 g  17 g Oral DAILY PRN    ondansetron (ZOFRAN ODT) tablet 4 mg  4 mg Oral Q8H PRN    Or    ondansetron (ZOFRAN) injection 4 mg  4 mg IntraVENous Q6H PRN    insulin glargine (LANTUS) injection 20 Units  20 Units SubCUTAneous DAILY    insulin lispro (HUMALOG) injection   SubCUTAneous AC&HS    piperacillin-tazobactam (ZOSYN) 3.375 g in 0.9% sodium chloride (MBP/ADV) 100 mL MBP  3.375 g IntraVENous Q8H    amLODIPine (NORVASC) tablet 5 mg  5 mg Oral DAILY    atorvastatin (LIPITOR) tablet 40 mg  40 mg Oral QHS    aspirin chewable tablet 81 mg  81 mg Oral DAILY    carvediloL (COREG) tablet 12.5 mg  12.5 mg Oral BID WITH MEALS    escitalopram oxalate (LEXAPRO) tablet 10 mg  10 mg Oral DAILY    [Held by provider] rOPINIRole (REQUIP) tablet 1 mg  1 mg Oral BID    [Held by provider] spironolactone (ALDACTONE) tablet 25 mg  25 mg Oral DAILY    HYDROcodone-acetaminophen (NORCO) 5-325 mg per tablet 1 Tablet  1 Tablet Oral Q6H PRN    morphine injection 1 mg  1 mg IntraVENous Q4H PRN       Signed:  Paul Griffith MD    Part of this note may have been written by using a voice dictation software. The note has been proof read but may still contain some grammatical/other typographical errors.

## 2021-11-18 ENCOUNTER — APPOINTMENT (OUTPATIENT)
Dept: NON INVASIVE DIAGNOSTICS | Age: 86
DRG: 871 | End: 2021-11-18
Attending: STUDENT IN AN ORGANIZED HEALTH CARE EDUCATION/TRAINING PROGRAM
Payer: MEDICARE

## 2021-11-18 LAB
ALBUMIN SERPL-MCNC: 2.4 G/DL (ref 3.2–4.6)
ALBUMIN/GLOB SERPL: 0.6 {RATIO} (ref 1.2–3.5)
ALP SERPL-CCNC: 104 U/L (ref 50–136)
ALT SERPL-CCNC: 12 U/L (ref 12–65)
ANION GAP SERPL CALC-SCNC: 5 MMOL/L (ref 7–16)
ANION GAP SERPL CALC-SCNC: 7 MMOL/L (ref 7–16)
AST SERPL-CCNC: 9 U/L (ref 15–37)
BASOPHILS # BLD: 0.1 K/UL (ref 0–0.2)
BASOPHILS NFR BLD: 1 % (ref 0–2)
BILIRUB DIRECT SERPL-MCNC: 0.2 MG/DL
BILIRUB SERPL-MCNC: 0.7 MG/DL (ref 0.2–1.1)
BUN SERPL-MCNC: 12 MG/DL (ref 8–23)
BUN SERPL-MCNC: 14 MG/DL (ref 8–23)
CALCIUM SERPL-MCNC: 10.2 MG/DL (ref 8.3–10.4)
CALCIUM SERPL-MCNC: 8.5 MG/DL (ref 8.3–10.4)
CHLORIDE SERPL-SCNC: 102 MMOL/L (ref 98–107)
CHLORIDE SERPL-SCNC: 103 MMOL/L (ref 98–107)
CO2 SERPL-SCNC: 21 MMOL/L (ref 21–32)
CO2 SERPL-SCNC: 26 MMOL/L (ref 21–32)
COVID-19 RAPID TEST, COVR: NOT DETECTED
CREAT SERPL-MCNC: 0.83 MG/DL (ref 0.6–1)
CREAT SERPL-MCNC: 0.96 MG/DL (ref 0.6–1)
DIFFERENTIAL METHOD BLD: ABNORMAL
ECHO AO ASC DIAM: 3.2 CM
ECHO AO ROOT DIAM: 3 CM
ECHO AR MAX VEL PISA: 306 CM/S
ECHO AV AREA PEAK VELOCITY: 1.97 CM2
ECHO AV AREA VTI: 2.11 CM2
ECHO AV AREA/BSA PEAK VELOCITY: 1.3 CM2/M2
ECHO AV AREA/BSA VTI: 1.4 CM2/M2
ECHO AV MEAN GRADIENT: 4 MMHG
ECHO AV PEAK GRADIENT: 7 MMHG
ECHO AV PEAK VELOCITY: 133 CM/S
ECHO AV REGURGITANT PHT: 431 MS
ECHO AV VTI: 28.9 CM
ECHO EST RA PRESSURE: 8 MMHG
ECHO LA AREA 2C: 20.9 CM2
ECHO LA AREA 4C: 19 CM2
ECHO LA MAJOR AXIS: 5.5 CM
ECHO LA MINOR AXIS: 3.77 CM
ECHO LV E' LATERAL VELOCITY: 7.7 CM/S
ECHO LV E' SEPTAL VELOCITY: 6.14 CM/S
ECHO LV EDV A2C: 54 CM3
ECHO LV EDV A4C: 66 CM3
ECHO LV ESV A2C: 24 CM3
ECHO LV ESV A4C: 26 CM3
ECHO LV INTERNAL DIMENSION DIASTOLIC: 3.89 CM (ref 3.9–5.3)
ECHO LV INTERNAL DIMENSION SYSTOLIC: 2.71 CM
ECHO LV IVSD: 0.99 CM (ref 0.6–0.9)
ECHO LV MASS 2D: 118.2 G (ref 67–162)
ECHO LV MASS INDEX 2D: 81 G/M2 (ref 43–95)
ECHO LV POSTERIOR WALL DIASTOLIC: 0.97 CM (ref 0.6–0.9)
ECHO LVOT DIAM: 1.9 CM
ECHO LVOT PEAK GRADIENT: 3 MMHG
ECHO LVOT VTI: 21.5 CM
ECHO MV E DECELERATION TIME (DT): 211 MS
ECHO MV E VELOCITY: 101 CM/S
ECHO MV E/E' LATERAL: 13.12
ECHO MV E/E' RATIO (AVERAGED): 14.78
ECHO MV E/E' SEPTAL: 16.45
ECHO MV MEAN GRADIENT: 3 MMHG
ECHO MV REGURGITANT VTIA: 166 CM
ECHO MV VTI: 29.6 CM
ECHO PV ACCELERATION TIME (AT): 108 MS
ECHO PV PEAK INSTANTANEOUS GRADIENT SYSTOLIC: 4 MMHG
ECHO PV REGURGITANT MAX VELOCITY: 110 CM/S
ECHO RIGHT VENTRICULAR SYSTOLIC PRESSURE (RVSP): 67 MMHG
ECHO RV INTERNAL DIMENSION: 3.07 CM
ECHO RV TAPSE: 2.3 CM (ref 1.5–2)
ECHO TV REGURGITANT MAX VELOCITY: 385 CM/S
ECHO TV REGURGITANT PEAK GRADIENT: 59 MMHG
EOSINOPHIL # BLD: 0.4 K/UL (ref 0–0.8)
EOSINOPHIL NFR BLD: 5 % (ref 0.5–7.8)
ERYTHROCYTE [DISTWIDTH] IN BLOOD BY AUTOMATED COUNT: 13.9 % (ref 11.9–14.6)
GLOBULIN SER CALC-MCNC: 4.2 G/DL (ref 2.3–3.5)
GLUCOSE BLD STRIP.AUTO-MCNC: 120 MG/DL (ref 65–100)
GLUCOSE BLD STRIP.AUTO-MCNC: 126 MG/DL (ref 65–100)
GLUCOSE BLD STRIP.AUTO-MCNC: 145 MG/DL (ref 65–100)
GLUCOSE BLD STRIP.AUTO-MCNC: 154 MG/DL (ref 65–100)
GLUCOSE SERPL-MCNC: 147 MG/DL (ref 65–100)
GLUCOSE SERPL-MCNC: 191 MG/DL (ref 65–100)
HCT VFR BLD AUTO: 31.5 % (ref 35.8–46.3)
HGB BLD-MCNC: 10.1 G/DL (ref 11.7–15.4)
IMM GRANULOCYTES # BLD AUTO: 0.1 K/UL (ref 0–0.5)
IMM GRANULOCYTES NFR BLD AUTO: 1 % (ref 0–5)
LYMPHOCYTES # BLD: 1.2 K/UL (ref 0.5–4.6)
LYMPHOCYTES NFR BLD: 12 % (ref 13–44)
MAGNESIUM SERPL-MCNC: 1.6 MG/DL (ref 1.8–2.4)
MCH RBC QN AUTO: 27.4 PG (ref 26.1–32.9)
MCHC RBC AUTO-ENTMCNC: 32.1 G/DL (ref 31.4–35)
MCV RBC AUTO: 85.4 FL (ref 79.6–97.8)
MONOCYTES # BLD: 1 K/UL (ref 0.1–1.3)
MONOCYTES NFR BLD: 10 % (ref 4–12)
NEUTS SEG # BLD: 7.1 K/UL (ref 1.7–8.2)
NEUTS SEG NFR BLD: 72 % (ref 43–78)
NRBC # BLD: 0 K/UL (ref 0–0.2)
PLATELET # BLD AUTO: 309 K/UL (ref 150–450)
PMV BLD AUTO: 10.3 FL (ref 9.4–12.3)
POTASSIUM SERPL-SCNC: 4.4 MMOL/L (ref 3.5–5.1)
POTASSIUM SERPL-SCNC: 4.5 MMOL/L (ref 3.5–5.1)
PROT SERPL-MCNC: 6.6 G/DL (ref 6.3–8.2)
RBC # BLD AUTO: 3.69 M/UL (ref 4.05–5.2)
SARS-COV-2, COV2: NORMAL
SARS-COV-2, COV2: NOT DETECTED
SERVICE CMNT-IMP: ABNORMAL
SODIUM SERPL-SCNC: 131 MMOL/L (ref 136–145)
SODIUM SERPL-SCNC: 133 MMOL/L (ref 136–145)
SOURCE, COVRS: NORMAL
SPECIMEN SOURCE, FCOV2M: NORMAL
WBC # BLD AUTO: 9.9 K/UL (ref 4.3–11.1)

## 2021-11-18 PROCEDURE — 74011250637 HC RX REV CODE- 250/637: Performed by: STUDENT IN AN ORGANIZED HEALTH CARE EDUCATION/TRAINING PROGRAM

## 2021-11-18 PROCEDURE — 74011000250 HC RX REV CODE- 250: Performed by: STUDENT IN AN ORGANIZED HEALTH CARE EDUCATION/TRAINING PROGRAM

## 2021-11-18 PROCEDURE — P9047 ALBUMIN (HUMAN), 25%, 50ML: HCPCS | Performed by: STUDENT IN AN ORGANIZED HEALTH CARE EDUCATION/TRAINING PROGRAM

## 2021-11-18 PROCEDURE — 36415 COLL VENOUS BLD VENIPUNCTURE: CPT

## 2021-11-18 PROCEDURE — 97530 THERAPEUTIC ACTIVITIES: CPT

## 2021-11-18 PROCEDURE — 80076 HEPATIC FUNCTION PANEL: CPT

## 2021-11-18 PROCEDURE — 74011250636 HC RX REV CODE- 250/636: Performed by: STUDENT IN AN ORGANIZED HEALTH CARE EDUCATION/TRAINING PROGRAM

## 2021-11-18 PROCEDURE — 74011000258 HC RX REV CODE- 258: Performed by: STUDENT IN AN ORGANIZED HEALTH CARE EDUCATION/TRAINING PROGRAM

## 2021-11-18 PROCEDURE — 93306 TTE W/DOPPLER COMPLETE: CPT

## 2021-11-18 PROCEDURE — 74011636637 HC RX REV CODE- 636/637: Performed by: STUDENT IN AN ORGANIZED HEALTH CARE EDUCATION/TRAINING PROGRAM

## 2021-11-18 PROCEDURE — 85025 COMPLETE CBC W/AUTO DIFF WBC: CPT

## 2021-11-18 PROCEDURE — 80048 BASIC METABOLIC PNL TOTAL CA: CPT

## 2021-11-18 PROCEDURE — 86580 TB INTRADERMAL TEST: CPT | Performed by: STUDENT IN AN ORGANIZED HEALTH CARE EDUCATION/TRAINING PROGRAM

## 2021-11-18 PROCEDURE — 65270000029 HC RM PRIVATE

## 2021-11-18 PROCEDURE — 87635 SARS-COV-2 COVID-19 AMP PRB: CPT

## 2021-11-18 PROCEDURE — U0005 INFEC AGEN DETEC AMPLI PROBE: HCPCS

## 2021-11-18 PROCEDURE — 83735 ASSAY OF MAGNESIUM: CPT

## 2021-11-18 PROCEDURE — 82962 GLUCOSE BLOOD TEST: CPT

## 2021-11-18 RX ORDER — CEFPODOXIME PROXETIL 200 MG/1
200 TABLET, FILM COATED ORAL EVERY 12 HOURS
Status: DISCONTINUED | OUTPATIENT
Start: 2021-11-18 | End: 2021-11-19 | Stop reason: HOSPADM

## 2021-11-18 RX ORDER — MAGNESIUM SULFATE HEPTAHYDRATE 40 MG/ML
4 INJECTION, SOLUTION INTRAVENOUS ONCE
Status: COMPLETED | OUTPATIENT
Start: 2021-11-18 | End: 2021-11-18

## 2021-11-18 RX ORDER — FUROSEMIDE 10 MG/ML
20 INJECTION INTRAMUSCULAR; INTRAVENOUS 2 TIMES DAILY
Status: DISCONTINUED | OUTPATIENT
Start: 2021-11-18 | End: 2021-11-19 | Stop reason: HOSPADM

## 2021-11-18 RX ORDER — ALBUMIN HUMAN 250 G/1000ML
12.5 SOLUTION INTRAVENOUS EVERY 6 HOURS
Status: COMPLETED | OUTPATIENT
Start: 2021-11-18 | End: 2021-11-18

## 2021-11-18 RX ADMIN — PIPERACILLIN AND TAZOBACTAM 3.38 G: 3; .375 INJECTION, POWDER, LYOPHILIZED, FOR SOLUTION INTRAVENOUS at 08:45

## 2021-11-18 RX ADMIN — INSULIN LISPRO 2 UNITS: 100 INJECTION, SOLUTION INTRAVENOUS; SUBCUTANEOUS at 12:10

## 2021-11-18 RX ADMIN — TUBERCULIN PURIFIED PROTEIN DERIVATIVE 5 UNITS: 5 INJECTION, SOLUTION INTRADERMAL at 16:12

## 2021-11-18 RX ADMIN — FUROSEMIDE 20 MG: 10 INJECTION, SOLUTION INTRAMUSCULAR; INTRAVENOUS at 08:44

## 2021-11-18 RX ADMIN — CARVEDILOL 12.5 MG: 6.25 TABLET, FILM COATED ORAL at 08:43

## 2021-11-18 RX ADMIN — APIXABAN 2.5 MG: 2.5 TABLET, FILM COATED ORAL at 08:44

## 2021-11-18 RX ADMIN — CEFPODOXIME PROXETIL 200 MG: 200 TABLET, FILM COATED ORAL at 21:25

## 2021-11-18 RX ADMIN — CEFPODOXIME PROXETIL 200 MG: 200 TABLET, FILM COATED ORAL at 16:12

## 2021-11-18 RX ADMIN — AMLODIPINE BESYLATE 5 MG: 5 TABLET ORAL at 08:43

## 2021-11-18 RX ADMIN — ALBUMIN (HUMAN) 12.5 G: 0.25 INJECTION, SOLUTION INTRAVENOUS at 08:44

## 2021-11-18 RX ADMIN — INSULIN GLARGINE 20 UNITS: 100 INJECTION, SOLUTION SUBCUTANEOUS at 09:15

## 2021-11-18 RX ADMIN — ESCITALOPRAM OXALATE 10 MG: 10 TABLET ORAL at 08:44

## 2021-11-18 RX ADMIN — FUROSEMIDE 20 MG: 10 INJECTION, SOLUTION INTRAMUSCULAR; INTRAVENOUS at 17:26

## 2021-11-18 RX ADMIN — Medication 10 ML: at 06:00

## 2021-11-18 RX ADMIN — Medication 10 ML: at 13:34

## 2021-11-18 RX ADMIN — ASPIRIN 81 MG CHEWABLE TABLET 81 MG: 81 TABLET CHEWABLE at 08:43

## 2021-11-18 RX ADMIN — Medication 10 ML: at 22:00

## 2021-11-18 RX ADMIN — ALBUMIN (HUMAN) 12.5 G: 0.25 INJECTION, SOLUTION INTRAVENOUS at 13:30

## 2021-11-18 RX ADMIN — Medication 10 ML: at 13:33

## 2021-11-18 RX ADMIN — APIXABAN 2.5 MG: 2.5 TABLET, FILM COATED ORAL at 21:25

## 2021-11-18 RX ADMIN — MAGNESIUM SULFATE HEPTAHYDRATE 4 G: 40 INJECTION, SOLUTION INTRAVENOUS at 08:45

## 2021-11-18 RX ADMIN — ALBUMIN (HUMAN) 12.5 G: 0.25 INJECTION, SOLUTION INTRAVENOUS at 21:24

## 2021-11-18 RX ADMIN — CARVEDILOL 12.5 MG: 6.25 TABLET, FILM COATED ORAL at 16:12

## 2021-11-18 RX ADMIN — INSULIN LISPRO 2 UNITS: 100 INJECTION, SOLUTION INTRAVENOUS; SUBCUTANEOUS at 16:15

## 2021-11-18 NOTE — PROGRESS NOTES
Problem: Mobility Impaired (Adult and Pediatric)  Goal: *Acute Goals and Plan of Care (Insert Text)  Outcome: Progressing Towards Goal  Note:   LTG:  (1.)Ms. Curtis will move from supine to sit and sit to supine  in bed with CONTACT GUARD ASSIST within 7 treatment day(s). (2.)Ms. Curtis will transfer from bed to chair and chair to bed with CONTACT GUARD ASSIST using the least restrictive device within 7 treatment day(s). (3.)Ms. Curtis will ambulate with CONTACT GUARD ASSIST for 50 feet with the least restrictive device within 7 treatment day(s). (4)Ms. Curtis will perform HEP with cues and assistance to increase safety on her feet in 7 days. ________________________________________________________________________________________________       PHYSICAL THERAPY: Daily Note and AM 11/18/2021  INPATIENT: PT Visit Days : 2  Payor: Christine Lundberg / Plan: [address] [place] [address] HMO / Product Type: Managed Care Medicare /       NAME/AGE/GENDER: Adebayo Whittaker is a 80 y.o. female   PRIMARY DIAGNOSIS: Pyelonephritis [N12] <principal problem not specified> <principal problem not specified>       ICD-10: Treatment Diagnosis:    · Generalized Muscle Weakness (M62.81)  · Other abnormalities of gait and mobility (R26.89)   Precaution/Allergies:  Sulfa (sulfonamide antibiotics), Other medication, and Tape [adhesive]      ASSESSMENT:     Ms. Arnold Arauz presents with general weakness and decreased functional mobility. She reports she lives at [facility] and is independent with a RW and doing her adl's. She does use a w/c for long distances. 11/18/21 - pt. Supine in bed and making conversation. Some confusion with conversation but oriented to self, place and year. She took some steps with RW and assistance to chair and then later to [facility] and back with RN assistance. She did some LE exercises. Pt. Not safe with mobility without assistance. Pt. With mild/moderate wheezing/SOB with minimal activity. Will follow.      This section established at most recent assessment   PROBLEM LIST (Impairments causing functional limitations):  1. Decreased Strength  2. Decreased ADL/Functional Activities  3. Decreased Transfer Abilities  4. Decreased Ambulation Ability/Technique  5. Decreased Balance  6. Increased Pain  7. Decreased Activity Tolerance  8. Increased Fatigue  9. Increased Shortness of Breath  10. Decreased Flexibility/Joint Mobility  11. Decreased [place] with Home Exercise Program   INTERVENTIONS PLANNED: (Benefits and precautions of physical therapy have been discussed with the patient.)  1. Balance Exercise  2. Bed Mobility  3. Gait Training  4. Home Exercise Program (HEP)  5. Range of Motion (ROM)  6. Therapeutic Activites  7. Therapeutic Exercise/Strengthening  8. Transfer Training     TREATMENT PLAN: Frequency/Duration: daily for duration of hospital stay  Rehabilitation Potential For Stated Goals: Good     REHAB RECOMMENDATIONS (at time of discharge pending progress):    Placement: To be determined- snf  Equipment:    None at this time              HISTORY:   History of Present Injury/Illness (Reason for Referral):  Presenting Complaint: Abdominal Pain, Nausea, and Vomiting     Reason(s) for Admission: Pyelonephritis [N12]      History of Present Illness:   Rajinder Ozuna is a 80 y.o. female with medical history of hypertension, diabetes who presented with feelings of nausea and vomiting which started last night after having dinner with her family. Patient stated she had 2 episodes of brownish colored vomit and also had an episode of diarrhea. Patient denies any evidence of melena or hematochezia. Patient was also complaining of some right-sided flank and abdominal pain that would come and go. Does report recent hip fracture and is currently in rehab for this. Patient denied any cardiac chest pain, shortness of breath, blurry vision, fever/chills.   Past Medical History/Comorbidities:   Ms. Barry Francois  has a past medical history of Acute kidney failure (05/2016), Acute pericarditis (2015), Arthritis, CAP (community acquired pneumonia) (5/2016), Coronary artery disease, Crohn's disease, Hypercalcemia, Hypercholesterolemia, Hypertension, Lumbar compression fracture, Osteoporosis, and Type 2 diabetes mellitus. Ms. Hill High  has a past surgical history that includes hx hysterectomy (1978); hx heart catheterization (5/12/2015); hx arthroplasty (1/5/2016); and hx kyphoplasty (~2002). Social History/Living Environment:   Home Environment: [address] [place] [address] Road Name: [address] [facility] [employer]  # Steps to Enter: 0  One/Two Story Residence: One story  Living Alone: No  Support Systems: Child(eulalio)  Patient Expects to be Discharged to[de-identified] Assisted living  Current DME Used/Available at Home: Wheelchair  Prior Level of Function/Work/Activity:  Pt. Says independent with RW, limited distance     Number of Personal Factors/Comorbidities that affect the Plan of Care: 1-2: MODERATE COMPLEXITY   EXAMINATION:   Most Recent Physical Functioning:   Gross Assessment:                  Posture:     Balance:  Sitting: Intact  Standing: With support Bed Mobility:  Supine to Sit: Minimum assistance; Additional time  Wheelchair Mobility:     Transfers:  Sit to Stand: Contact guard assistance; Minimum assistance  Stand to Sit: Contact guard assistance; Minimum assistance  Bed to Chair: Contact guard assistance; Minimum assistance  Duration: 30 Minutes  Gait:     Speed/Sammie: Delayed  Step Length: Left shortened; Right shortened  Stance: Right decreased  Gait Abnormalities: Decreased step clearance; Shuffling gait  Distance (ft): 3 Feet (ft) (x 3)  Assistive Device: Walker, rolling  Ambulation - Level of Assistance: Contact guard assistance; Minimal assistance  Interventions: Safety awareness training; Verbal cues      Body Structures Involved:  1. Bones  2. Joints  3. Muscles  4. Ligaments Body Functions Affected:  1.  Movement Related Activities and Participation Affected:  1. Mobility   Number of elements that affect the Plan of Care: 3: MODERATE COMPLEXITY   CLINICAL PRESENTATION:   Presentation: Stable and uncomplicated: LOW COMPLEXITY   CLINICAL DECISION [facility] [address] [address] AM-PAC 6 Clicks   Basic Mobility Inpatient Short Form  How much difficulty does the patient currently have. .. Unable A Lot A Little None   1. Turning over in bed (including adjusting bedclothes, sheets and blankets)? [] 1   [] 2   [x] 3   [] 4   2. Sitting down on and standing up from a chair with arms ( e.g., wheelchair, bedside commode, etc.)   [] 1   [] 2   [x] 3   [] 4   3. Moving from lying on back to sitting on the side of the bed? [] 1   [] 2   [x] 3   [] 4   How much help from another person does the patient currently need. .. Total A Lot A Little None   4. Moving to and from a bed to a chair (including a wheelchair)? [] 1   [] 2   [x] 3   [] 4   5. Need to walk in hospital room? [] 1   [] 2   [x] 3   [] 4   6. Climbing 3-5 steps with a railing? [] 1   [x] 2   [] 3   [] 4   © , Trustees of [facility] [address] [address], under license to [employer]. All rights reserved      Score:  Initial: 17 Most Recent: X (Date: -- )    Interpretation of Tool:  Represents activities that are increasingly more difficult (i.e. Bed mobility, Transfers, Gait). Medical Necessity:     · Patient is expected to demonstrate progress in   · strength, range of motion, and balance  ·  to   · decrease assistance required with exercises and functional mobility  · . Reason for Services/Other Comments:  · Patient   · continues to require present interventions due to patient's inability to perform exercises and functional mobility independently  · .    Use of outcome tool(s) and clinical judgement create a POC that gives a: Questionable prediction of patient's progress: MODERATE COMPLEXITY            TREATMENT:   (In addition to Assessment/Re-Assessment sessions the following treatments were rendered)   Pre-treatment Symptoms/Complaints:  none  Pain: Initial:      Post Session:  0     Therapeutic Activity: (  30 Minutes ):  Therapeutic activities including Bed transfers, Ambulation on level ground, BSC transfers and standing and exercises for ankle pumps, quad sets, hip abduction, long arc quads and seated marching x 10 to improve mobility, strength, and balance. Required minimal Safety awareness training; Verbal cues to promote static and dynamic balance in standing. Braces/Orthotics/Lines/Etc:   · IV  · O2 Device: None (Room air)  Treatment/Session Assessment:    · Response to Treatment:  Did ok  · Interdisciplinary Collaboration:   o Registered Nurse  · After treatment position/precautions:   o Up in chair  o Bed alarm/tab alert on  o Bed/Chair-wheels locked  o Bed in low position  o Call light within reach  o RN notified   · Compliance with Program/Exercises: Will assess as treatment progresses  · Recommendations/Intent for next treatment session: \"Next visit will focus on advancements to more challenging activities and reduction in assistance provided\".   Total Treatment Duration:  PT Patient Time In/Time Out  Time In: 0930  Time Out: [address] Lucretia Bennett PT

## 2021-11-18 NOTE — PROGRESS NOTES
Hospitalist Progress Note   Admit Date:  2021  3:57 AM   Name:  Kathy Maxwell   Age:  80 y.o. Sex:  female  :  1934   MRN:  [id number]   Room:  [facility]/    Presenting Complaint: Abdominal Pain, Nausea, and Vomiting    Reason(s) for Admission: Pyelonephritis [N12]     Hospital Course & Interval History:   Kathy Maxwell is a 80 y.o. female with medical history of hypertension, diabetes who presented with feelings of nausea and vomiting which started last night after having dinner with her family. Patient stated she had 2 episodes of brownish colored vomit and also had an episode of diarrhea. Patient denies any evidence of melena or hematochezia. Patient was also complaining of some right-sided flank and abdominal pain that would come and go. Does report recent hip fracture and is currently in rehab for this. Patient denied any cardiac chest pain, shortness of breath, blurry vision, fever/chills. Subjective (21): Patient was seen and examined at the bedside. Patient alert and oriented x3 this morning with confusion seeming to have improved from days prior. Patient sitting up in bedside chair after working with PT today. She states she feels well without any shortness of breath, palpitations, lightheadedness, dizziness, chest pain or pressure. She denies any fevers or chills overnight. Reports good appetite without any nausea or vomiting. She did states she had multiple episodes of diarrhea overnight. Assessment & Plan:   Severe sepsis secondary to E. Coli Bacteremia and Pyelonephritis  Patient with pulse of 107, white count of 24.3 and positive urinalysis, lactic acid of 3.6 on admission  - s/p sepsis bolus and mIVF  Repeat lactic acids trending down  Urine and Blood cultures positive with E. Coli, sensitives to Zosyn  CT scan positive for pyelonephritis  11/15 patient with some confusion.   Per nursing staff patient complained of some burning with urination - confusion improving  on Zosyn, transition to Cefepime today to finish course of bacteremia/pyelo   PT/OT recommending rehab vs SNF    Pulmonary edema vs infiltrate  CXR 11/16 positive for mild pulmonary edema, no consolidation or infiltrate  Patient started on fluids on admission for pyelonephritis  Fluids stopped, S/p one-time dose Lasix 40 IV on 11/16  NT pro-BNP up to 3k, stable respiratory exam after lasix   - recheck NT pro-BNP down to 2,500 and procal negative   - ECHO pending  - give dose of Albumin + Lasix today with worsening Hyponatremia     Hyponatremia  - was stable ~133 previous days  - down to 131 this Am with multiple episodes of diarrhea overnight    - pt hypovolemic-euvolemic but previously s/s volume overload  - give Albumin + Lasix today   - recheck BMP this afternoon    Cough  Robitussin, Tessalon Perle   Chest x-ray results above  - recheck NT pro-BNP elevated, procal negative  - Alb+ Lasix today      Hypertension  Continue home blood pressure medications    Hypokalemia/Hypomagnesemia  - Mg low today, replace with IV Mag  Replete as needed     Diabetes  Sliding scale insulin  Lantus 20 units  Continue to monitor blood glucose levels and titrate accordingly     Diarrhea  Imodium  C. difficile negative     New onset atrial fibrillation  Rate controlled  EKG A. fib, rate controlled  --Cont patient on Eliquis    VTach  - runs of Vtach  11/16 overnight  - Mag and phos replaced  - K down to 3.4, replaced and now improved 4.4 this AM   - ECHO pending    Dispo  Dispo pending clinical course. PT/OT. Patient likely going to require rehab vs PT/OT at [facility]. Case management made aware. Hopeful for discharge tomorrow.    Diet:  ADULT DIET Easy to Chew; Low Fat/Low Chol/High Fiber/RONEL  DVT PPx: Eliquis  Code status: Full Code    Hospital Problems as of 11/18/2021 Date Reviewed: 10/12/2021          Codes Class Noted - Resolved POA    V-tach [facility]) ICD-10-CM: W91.1  ICD-9-CM: 427.1  11/17/2021 - Present Unknown        E coli bacteremia ICD-10-CM: R78.81, B96.20  ICD-9-CM: 790.7, 041.49  11/17/2021 - Present Unknown        Sepsis ([facility] ICD-10-CM: A41.9  ICD-9-CM: 038.9, 995.91  11/17/2021 - Present Unknown        Pulmonary edema ICD-10-CM: J81.1  ICD-9-CM: 954  11/16/2021 - Present Unknown        Cough ICD-10-CM: R05.9  ICD-9-CM: 786.2  11/16/2021 - Present Unknown        Atrial fibrillation (HCC) ICD-10-CM: I48.91  ICD-9-CM: 427.31  11/16/2021 - Present Unknown        Pyelonephritis ICD-10-CM: N12  ICD-9-CM: 590.80  11/14/2021 - Present Unknown              Objective:     Patient Vitals for the past 24 hrs:   Temp Pulse Resp BP SpO2   11/18/21 0800  87      11/18/21 0715 97.7 °F (36.5 °C) 67 18     11/18/21 0222  77   93 %   11/17/21 2335 98.7 °F (37.1 °C) 72 17 110/62 93 %   11/17/21 2005 98.3 °F (36.8 °C) 77  104/67 92 %   11/17/21 1635  75  131/68    11/17/21 1502 98.6 °F (37 °C) 76 18 (!) 118/53 93 %     Oxygen Therapy  O2 Sat (%): 93 % (11/18/21 0222)  Pulse via Oximetry: 91 beats per minute (11/18/21 0715)  O2 Device: None (Room air) (11/18/21 0715)    Estimated body mass index is 26.67 kg/m² as calculated from the following:    Height as of this encounter: 4' 9\" (1.448 m). Weight as of this encounter: 55.9 kg (123 lb 3.8 oz). Intake/Output Summary (Last 24 hours) at 11/18/2021 1406  Last data filed at 11/18/2021 1255  Gross per 24 hour   Intake 120 ml   Output 200 ml   Net -80 ml         Physical Exam:   Blood pressure 110/62, pulse 87, temperature 97.7 °F (36.5 °C), resp. rate 18, height 4' 9\" (1.448 m), weight 55.9 kg (123 lb 3.8 oz), last menstrual period 09/08/2008, SpO2 93 %, not currently breastfeeding. General:    Well nourished. No overt distress  Head:  Normocephalic, atraumatic  Eyes:  Sclerae appear normal.  Pupils equally round. ENT:  Nares appear normal, no drainage. Moist oral mucosa  Neck:  No restricted ROM. Trachea midline   CV:   Irregular. No m/r/g.   No jugular venous distension. Lungs:   CTAB. Slight crackles at bases. No wheezing, rhonchi, or rales. Respirations even, unlabored  Abdomen: Bowel sounds present. Soft, nontender, nondistended. Extremities: No cyanosis or clubbing. No edema  Skin:     No rashes and normal coloration. Warm and dry. Neuro:  CN II-XII grossly intact. Sensation intact. AAO x3  psych:  Normal mood and affect.       I have reviewed ordered lab tests and independently visualized imaging below:    Recent Labs:  Recent Results (from the past 48 hour(s))   GLUCOSE, POC    Collection Time: 11/16/21  4:37 PM   Result Value Ref Range    Glucose (POC) 127 (H) 65 - 100 mg/dL    Performed by [employer]    GLUCOSE, POC    Collection Time: 11/16/21  9:18 PM   Result Value Ref Range    Glucose (POC) 140 (H) 65 - 100 mg/dL    Performed by [employer]    METABOLIC PANEL, BASIC    Collection Time: 11/17/21 12:26 AM   Result Value Ref Range    Sodium 133 (L) 136 - 145 mmol/L    Potassium 3.6 3.5 - 5.1 mmol/L    Chloride 104 98 - 107 mmol/L    CO2 24 21 - 32 mmol/L    Anion gap 5 (L) 7 - 16 mmol/L    Glucose 131 (H) 65 - 100 mg/dL    BUN 11 8 - 23 MG/DL    Creatinine 0.80 0.6 - 1.0 MG/DL    GFR est AA >60 >60 ml/min/1.73m2    GFR est non-AA >60 >60 ml/min/1.73m2    Calcium 8.8 8.3 - 10.4 MG/DL   MAGNESIUM    Collection Time: 11/17/21 12:26 AM   Result Value Ref Range    Magnesium 1.6 (L) 1.8 - 2.4 mg/dL   PHOSPHORUS    Collection Time: 11/17/21 12:26 AM   Result Value Ref Range    Phosphorus 2.2 (L) 2.3 - 3.7 MG/DL   GLUCOSE, POC    Collection Time: 11/17/21  8:15 AM   Result Value Ref Range    Glucose (POC) 107 (H) 65 - 100 mg/dL    Performed by [employer]    NT-PRO BNP    Collection Time: 11/17/21  8:29 AM   Result Value Ref Range    NT pro-BNP 2,503 (H) <450 PG/ML   PROCALCITONIN    Collection Time: 11/17/21  8:29 AM   Result Value Ref Range    Procalcitonin 0.09 0.00 - 0.49 ng/mL   CBC W/O DIFF    Collection Time: 11/17/21  8:33 AM   Result Value Ref Range    WBC 10.8 4.3 - 11.1 K/uL    RBC 3.80 (L) 4.05 - 5.2 M/uL    HGB 10.5 (L) 11.7 - 15.4 g/dL    HCT 32.4 (L) 35.8 - 46.3 %    MCV 85.3 79.6 - 97.8 FL    MCH 27.6 26.1 - 32.9 PG    MCHC 32.4 31.4 - 35.0 g/dL    RDW 13.8 11.9 - 14.6 %    PLATELET 120 986 - 188 K/uL    MPV 9.9 9.4 - 12.3 FL    ABSOLUTE NRBC 0.00 0.0 - 0.2 K/uL   METABOLIC PANEL, COMPREHENSIVE    Collection Time: 11/17/21  8:33 AM   Result Value Ref Range    Sodium 133 (L) 136 - 145 mmol/L    Potassium 3.4 (L) 3.5 - 5.1 mmol/L    Chloride 103 98 - 107 mmol/L    CO2 23 21 - 32 mmol/L    Anion gap 7 7 - 16 mmol/L    Glucose 109 (H) 65 - 100 mg/dL    BUN 10 8 - 23 MG/DL    Creatinine 0.69 0.6 - 1.0 MG/DL    GFR est AA >60 >60 ml/min/1.73m2    GFR est non-AA >60 >60 ml/min/1.73m2    Calcium 8.8 8.3 - 10.4 MG/DL    Bilirubin, total 0.6 0.2 - 1.1 MG/DL    ALT (SGPT) 11 (L) 12 - 65 U/L    AST (SGOT) 16 15 - 37 U/L    Alk. phosphatase 96 50 - 130 U/L    Protein, total 6.5 6.3 - 8.2 g/dL    Albumin 2.4 (L) 3.2 - 4.6 g/dL    Globulin 4.1 (H) 2.3 - 3.5 g/dL    A-G Ratio 0.6 (L) 1.2 - 3.5     MAGNESIUM    Collection Time: 11/17/21  8:33 AM   Result Value Ref Range    Magnesium 2.2 1.8 - 2.4 mg/dL   GLUCOSE, POC    Collection Time: 11/17/21 10:59 AM   Result Value Ref Range    Glucose (POC) 172 (H) 65 - 100 mg/dL    Performed by Alex Clay    GLUCOSE, POC    Collection Time: 11/17/21  3:54 PM   Result Value Ref Range    Glucose (POC) 123 (H) 65 - 100 mg/dL    Performed by [employer]lla     GLUCOSE, POC    Collection Time: 11/17/21  9:01 PM   Result Value Ref Range    Glucose (POC) 137 (H) 65 - 100 mg/dL    Performed by [employer]    METABOLIC PANEL, BASIC    Collection Time: 11/18/21  2:32 AM   Result Value Ref Range    Sodium 131 (L) 136 - 145 mmol/L    Potassium 4.4 3.5 - 5.1 mmol/L    Chloride 103 98 - 107 mmol/L    CO2 21 21 - 32 mmol/L    Anion gap 7 7 - 16 mmol/L    Glucose 147 (H) 65 - 100 mg/dL    BUN 12 8 - 23 MG/DL    Creatinine 0.83 0.6 - 1.0 MG/DL    GFR est AA >60 >60 ml/min/1.73m2    GFR est non-AA >60 >60 ml/min/1.73m2    Calcium 8.5 8.3 - 10.4 MG/DL   CBC WITH AUTOMATED DIFF    Collection Time: 11/18/21  2:32 AM   Result Value Ref Range    WBC 9.9 4.3 - 11.1 K/uL    RBC 3.69 (L) 4.05 - 5.2 M/uL    HGB 10.1 (L) 11.7 - 15.4 g/dL    HCT 31.5 (L) 35.8 - 46.3 %    MCV 85.4 79.6 - 97.8 FL    MCH 27.4 26.1 - 32.9 PG    MCHC 32.1 31.4 - 35.0 g/dL    RDW 13.9 11.9 - 14.6 %    PLATELET 774 812 - 581 K/uL    MPV 10.3 9.4 - 12.3 FL    ABSOLUTE NRBC 0.00 0.0 - 0.2 K/uL    DF AUTOMATED      NEUTROPHILS 72 43 - 78 %    LYMPHOCYTES 12 (L) 13 - 44 %    MONOCYTES 10 4.0 - 12.0 %    EOSINOPHILS 5 0.5 - 7.8 %    BASOPHILS 1 0.0 - 2.0 %    IMMATURE GRANULOCYTES 1 0.0 - 5.0 %    ABS. NEUTROPHILS 7.1 1.7 - 8.2 K/UL    ABS. LYMPHOCYTES 1.2 0.5 - 4.6 K/UL    ABS. MONOCYTES 1.0 0.1 - 1.3 K/UL    ABS. EOSINOPHILS 0.4 0.0 - 0.8 K/UL    ABS. BASOPHILS 0.1 0.0 - 0.2 K/UL    ABS. IMM. GRANS. 0.1 0.0 - 0.5 K/UL   MAGNESIUM    Collection Time: 11/18/21  2:32 AM   Result Value Ref Range    Magnesium 1.6 (L) 1.8 - 2.4 mg/dL   HEPATIC FUNCTION PANEL    Collection Time: 11/18/21  2:32 AM   Result Value Ref Range    Protein, total 6.6 6.3 - 8.2 g/dL    Albumin 2.4 (L) 3.2 - 4.6 g/dL    Globulin 4.2 (H) 2.3 - 3.5 g/dL    A-G Ratio 0.6 (L) 1.2 - 3.5      Bilirubin, total 0.7 0.2 - 1.1 MG/DL    Bilirubin, direct 0.2 <0.4 MG/DL    Alk.  phosphatase 104 50 - 136 U/L    AST (SGOT) 9 (L) 15 - 37 U/L    ALT (SGPT) 12 12 - 65 U/L   GLUCOSE, POC    Collection Time: 11/18/21  8:21 AM   Result Value Ref Range    Glucose (POC) 120 (H) 65 - 100 mg/dL    Performed by [employer]    ECHO ADULT COMPLETE    Collection Time: 11/18/21 11:51 AM   Result Value Ref Range    LV ED Vol A2C 54.00 cm3    LV ED Vol A4C 66.00 cm3    LV ES Vol A2C 24.00 cm3    LV ES Vol A4C 26.00 cm3    IVSd 0.99 (A) 0.6 - 0.9 cm    LVIDd 3.89 (A) 3.9 - 5.3 cm    LVIDs 2.71 cm LVOT d 1.90 cm    LVOT VTI 21.50 cm    LVOT Peak Gradient 3.00 mmHg    LVPWd 0.97 (A) 0.6 - 0.9 cm    LV E' Lateral Velocity 7.70 cm/s    LV E' Septal Velocity 6.14 cm/s    Left Atrium Minor Axis 3.77 cm    Left Atrium Major Axis 5.50 cm    LA Area 2C 20.90 cm2    LA Area 4C 19.00 cm2    Aortic Regurgitant Pressure Half-time 431.00 ms    AR Max Orestes 306.00 cm/s    Aortic Valve Systolic Peak Velocity 511.90 cm/s    AoV PG 7.00 mmHg    Aortic Valve Systolic Mean Gradient 7.62 mmHg    AoV VTI 28.90 cm    Aortic Valve Area by Continuity of VTI 2.11 cm2    Aortic Valve Area by Continuity of Peak Velocity 1.97 cm2    Ao Root D 3.00 cm    AO ASC D 3.20 cm    Mitral Regurgitant Velocity Time Integral 166.00 cm    MV Mean Gradient 3.00 mmHg    Mitral Valve Annulus Velocity Time Integral 29.60 cm    Mitral Valve E Wave Deceleration Time 211.00 ms    MV E Orestes 101.00 cm/s    Pulmonic Regurgitant End Max Velocity 110.00 cm/s    Pulmonic Valve Acceleration Time 108.00 ms    Pulmonic Valve Systolic Peak Instantaneous Gradient 4.00 mmHg    Est. RA Pressure 3.00 mmHg    RVIDd 3.07 cm    TR Max Velocity 385.00 cm/s    Triscuspid Valve Regurgitation Peak Gradient 59.00 mmHg    Tapse 2.30 (A) 1.5 - 2.0 cm    LV Mass .2 67 - 162 g    LV Mass AL Index 81.0 43 - 95 g/m2    E/E' lateral 13.12     E/E' septal 16.45     RVSP 62.0 mmHg    E/E' ratio (averaged) 14.78     MARIA DEL ROSARIO/BSA Pk Orestes 1.3 cm2/m2    MARIA DEL ROSARIO/BSA VTI 1.4 cm2/m2   GLUCOSE, POC    Collection Time: 11/18/21 12:03 PM   Result Value Ref Range    Glucose (POC) 154 (H) 65 - 100 mg/dL    Performed by [employer]        All Micro Results     Procedure Component Value Units Date/Time    CULTURE, BLOOD [[id number]] Collected: 11/14/21 0551    Order Status: Completed Specimen: Blood Updated: 11/18/21 0715     Special Requests: LEFT ANTECUBITAL        Culture result: NO GROWTH 4 DAYS       CULTURE, BLOOD [[id number]]  (Abnormal)  (Susceptibility) Collected: 11/14/21 0551    Order Status: Completed Specimen: Blood Updated: 11/17/21 0713     Special Requests: --        RIGHT  FOREARM       GRAM STAIN AEROBIC BOTTLE POSITIVE         GRAM NEGATIVE RODS               RESULTS VERIFIED, PHONED TO AND READ BACK BY Omar Chang RN @6207 [phone] TO Isabel Noonan            Culture result: ESCHERICHIA COLI               Refer to Blood Culture ID Panel Accession  [id number]      CULTURE, URINE [[id number]]  (Abnormal)  (Susceptibility) Collected: 11/14/21 0631    Order Status: Completed Specimen: Urine Updated: 11/16/21 0728     Special Requests: NO SPECIAL REQUESTS        Culture result:       >100,000 COLONIES/mL ESCHERICHIA COLI          C. DIFFICILE AG & TOXIN A/B [[id number]] Collected: 11/14/21 2201    Order Status: Completed Specimen: Stool Updated: 11/15/21 1153     [address] [place] ANTIGEN       C. DIFFICILE GDH ANTIGEN-NEGATIVE           C. difficile toxin       C. DIFFICILE TOXIN-NEGATIVE           PCR Reflex NOT APPLICABLE        INTERPRETATION       NEGATIVE FOR TOXIGENIC C. DIFFICILE           Clinical Consideration       NEGATIVE FOR TOXIGENIC C. DIFFICILE          BLOOD CULTURE ID PANEL [[id number]]  (Abnormal) Collected: 11/15/21 0235    Order Status: Completed Specimen: Blood Updated: 11/15/21 0720     Acc. no. from Micro Order [id number]     Escherichia coli Detected        Comment: RESULTS VERIFIED, PHONED TO AND READ BACK BY  Kalen KRAFT RN AT 3171 ON 11/15/21 ACL          KPC (Carbapenem Resistance Gene) NOT DETECTED        Comment: WARNING:  A Not Detected result for the KPC gene does not indicate susceptibility to carbapenems. Gram negative bacteria can be resistant to carbapenems by mechanisms other than carrying the KPC gene. INTERPRETATION       Gram negative julieta.  Identified by realtime PCR as E. coli          CLOSTRIDIUM DIFF TOXIN A & B [[id number]] Collected: 11/14/21 2201    Order Status: Canceled Specimen: Stool     COVID-19 RAPID TEST [[id number]] Collected: 11/14/21 0804    Order Status: Completed Specimen: Nasopharyngeal Updated: 11/14/21 0829     Specimen source Nasopharyngeal        COVID-19 rapid test Not detected        Comment:      The specimen is NEGATIVE for SARS-CoV-2, the novel coronavirus associated with COVID-19. A negative result does not rule out COVID-19. This test has been authorized by the FDA under an Emergency Use Authorization (EUA) for use by authorized laboratories. Fact sheet for Healthcare Providers: Factor.ioco.nz  Fact sheet for Patients: Application Experts.nz       Methodology: Isothermal Nucleic Acid Amplification               Other Studies:  No results found.     Current Meds:  Current Facility-Administered Medications   Medication Dose Route Frequency    albumin human 25% (BUMINATE) solution 12.5 g  12.5 g IntraVENous Q6H    furosemide (LASIX) injection 20 mg  20 mg IntraVENous BID    benzonatate (TESSALON) capsule 100 mg  100 mg Oral TID PRN    guaiFENesin-codeine (ROBITUSSIN AC) 100-10 mg/5 mL solution 5 mL  5 mL Oral Q4H PRN    phenazopyridine (PYRIDIUM) tab 95 mg  95 mg Oral BID PRN    albuterol-ipratropium (DUO-NEB) 2.5 MG-0.5 MG/3 ML  3 mL Nebulization Q4H PRN    apixaban (ELIQUIS) tablet 2.5 mg  2.5 mg Oral Q12H    loperamide (IMODIUM) capsule 2 mg  2 mg Oral Q4H PRN    sodium chloride (NS) flush 5-10 mL  5-10 mL IntraVENous Q8H    sodium chloride (NS) flush 5-10 mL  5-10 mL IntraVENous PRN    sodium chloride (NS) flush 5-40 mL  5-40 mL IntraVENous Q8H    sodium chloride (NS) flush 5-40 mL  5-40 mL IntraVENous PRN    acetaminophen (TYLENOL) tablet 650 mg  650 mg Oral Q6H PRN    Or    acetaminophen (TYLENOL) suppository 650 mg  650 mg Rectal Q6H PRN    polyethylene glycol (MIRALAX) packet 17 g  17 g Oral DAILY PRN    ondansetron (ZOFRAN ODT) tablet 4 mg  4 mg Oral Q8H PRN    Or    ondansetron (ZOFRAN) injection 4 mg  4 mg IntraVENous Q6H PRN    insulin glargine (LANTUS) injection 20 Units  20 Units SubCUTAneous DAILY    insulin lispro (HUMALOG) injection   SubCUTAneous AC&HS    piperacillin-tazobactam (ZOSYN) 3.375 g in 0.9% sodium chloride (MBP/ADV) 100 mL MBP  3.375 g IntraVENous Q8H    amLODIPine (NORVASC) tablet 5 mg  5 mg Oral DAILY    atorvastatin (LIPITOR) tablet 40 mg  40 mg Oral QHS    aspirin chewable tablet 81 mg  81 mg Oral DAILY    carvediloL (COREG) tablet 12.5 mg  12.5 mg Oral BID WITH MEALS    escitalopram oxalate (LEXAPRO) tablet 10 mg  10 mg Oral DAILY    [Held by provider] rOPINIRole (REQUIP) tablet 1 mg  1 mg Oral BID    [Held by provider] spironolactone (ALDACTONE) tablet 25 mg  25 mg Oral DAILY    HYDROcodone-acetaminophen (NORCO) 5-325 mg per tablet 1 Tablet  1 Tablet Oral Q6H PRN    morphine injection 1 mg  1 mg IntraVENous Q4H PRN       Signed:  Darwin Rodríguez MD    Part of this note may have been written by using a voice dictation software. The note has been proof read but may still contain some grammatical/other typographical errors.

## 2021-11-18 NOTE — PROGRESS NOTES
END OF SHIFT NOTE:    Intake/Output  11/18 0701 - 11/18 1900  In: 56 [P.O.:240; I.V.:250]  Out: 850 [Urine:850]   Voiding: YES  Catheter: NO  Drain:   External Urinary Catheter 11/16/21 (Active)   Site Assessment Clean, dry, & intact 11/18/21 0502   Repositioned Yes 11/18/21 0502   Perineal Care Yes 11/18/21 0502   Wick Changed Yes 11/18/21 0502   Suction Canister/Tubing Changed No 11/18/21 0502   Urine Output (mL) 200 ml 11/17/21 1922       Stool:  0 occurrences. Stool Assessment  Stool Color: Levonia Arbour (11/17/21 6101)  Stool Appearance: Loose; Watery (11/18/21 0730)  Stool Amount: Small (11/17/21 1041)  Stool Source/Status: Incontinence; Rectum (11/17/21 7021)    Emesis:  0 occurrences. VITAL SIGNS  Patient Vitals for the past 12 hrs:   Temp Pulse Resp BP SpO2   11/18/21 1758  67      11/18/21 1603 97.3 °F (36.3 °C) 66 20 128/75 95 %   11/18/21 1130 97.7 °F (36.5 °C) 64 18 133/71 92 %   11/18/21 0800  87      11/18/21 0715 97.7 °F (36.5 °C) 67 18         Pain Assessment  Pain 1  Pain Scale 1: Numeric (0 - 10) (11/18/21 0502)  Pain Intensity 1: 0 (11/18/21 0502)  Patient Stated Pain Goal: 0 (11/18/21 0502)  Pain Reassessment 1: Yes (11/16/21 2121)  Pain Onset 1: acute (11/16/21 0446)  Pain Location 1: Hip (11/16/21 0446)  Pain Orientation 1: Right (11/16/21 0446)  Pain Description 1: Aching (11/16/21 0446)  Pain Intervention(s) 1: Massage (11/16/21 1931)    Ambulating  Yes    Additional Information:     Pt became frustrated and wanted to get up constantly while pts daughter was visiting, once daughter left pt settled down. Plan is for D/C tomorrow, pt will be returning to [facility]. TB placed, covid accomplished, see  notes and physician suggestions. Pt is resting comfortably at this time with not questions or concerns and calm. Shift report given to oncoming nurse Krystal Hernandez RN at the bedside.     Omar Kelly

## 2021-11-18 NOTE — PROGRESS NOTES
Problem: Falls - Risk of  Goal: *Absence of Falls  Description: Document Gokul Sharp Fall Risk and appropriate interventions in the flowsheet.   Outcome: Progressing Towards Goal  Note: Fall Risk Interventions:  Mobility Interventions: Patient to call before getting OOB    Mentation Interventions: Bed/chair exit alarm, Room close to nurse's station    Medication Interventions: Bed/chair exit alarm    Elimination Interventions: Patient to call for help with toileting needs    History of Falls Interventions: Bed/chair exit alarm, Door open when patient unattended, Investigate reason for fall         Problem: Patient Education: Go to Patient Education Activity  Goal: Patient/Family Education  Outcome: Progressing Towards Goal

## 2021-11-18 NOTE — PROGRESS NOTES
In to see patient and daughter, Gulshan Means. She wants patient to go to STR at [facility]. Pt lives at [facility] and has received PT/OT at her home in this community. Daughter asking many questions about meds and why is she falling so much. States the sister in law, Zo Zepeda @ [phone] is POA and she pays $6000 a month to have someone come by every 2 hours. Daughter says she doesn't get enough PT at [facility] and wants her to have more so that she can build up her strength. Per Dr. Bobby Allison, who discussed with Gulshan Means, he advised that she can go back to her senior care with PT/OT. Called and spoke with Jerome Sorensen about getting a higher level of care like skilled rehab. Although the web site says they have Short term rehab, Jerome Sorensen at [facility] they send these type of patients out and they have to use their Medicare bed days. Jerome Sorensen states that she doesn't even know if pt has any Medicare bed days left. She has only been in [facility] x about 30 days. Jerome Sorensen knows the daughter and she states she is going to call her at [address] tomorrow morning to discuss her care. States from what this CM has said regarding PT/OT level at present, this is her baseline level of min assist and she could definitely come back to receive PT/OT in her home there. Jerome Sorensen states she will call me as soon as she get off phone with daughter in the AM. Theron Pacheco that discharge date is planned for tomorrow. Verb understanding and that would work for them, as well.

## 2021-11-19 PROBLEM — I50.30 HEART FAILURE WITH PRESERVED LEFT VENTRICULAR FUNCTION (HFPEF) (HCC): Status: ACTIVE | Noted: 2021-11-19

## 2021-11-19 PROBLEM — R65.20 SEVERE SEPSIS (HCC): Status: ACTIVE | Noted: 2021-11-17

## 2021-11-19 LAB
ANION GAP SERPL CALC-SCNC: 6 MMOL/L (ref 7–16)
BACTERIA SPEC CULT: NORMAL
BUN SERPL-MCNC: 14 MG/DL (ref 8–23)
CALCIUM SERPL-MCNC: 10.9 MG/DL (ref 8.3–10.4)
CHLORIDE SERPL-SCNC: 101 MMOL/L (ref 98–107)
CO2 SERPL-SCNC: 26 MMOL/L (ref 21–32)
CREAT SERPL-MCNC: 0.81 MG/DL (ref 0.6–1)
GLUCOSE BLD STRIP.AUTO-MCNC: 117 MG/DL (ref 65–100)
GLUCOSE SERPL-MCNC: 169 MG/DL (ref 65–100)
POTASSIUM SERPL-SCNC: 4.3 MMOL/L (ref 3.5–5.1)
SERVICE CMNT-IMP: ABNORMAL
SERVICE CMNT-IMP: NORMAL
SODIUM SERPL-SCNC: 133 MMOL/L (ref 136–145)

## 2021-11-19 PROCEDURE — 82962 GLUCOSE BLOOD TEST: CPT

## 2021-11-19 PROCEDURE — 74011250637 HC RX REV CODE- 250/637: Performed by: STUDENT IN AN ORGANIZED HEALTH CARE EDUCATION/TRAINING PROGRAM

## 2021-11-19 PROCEDURE — 74011250636 HC RX REV CODE- 250/636: Performed by: STUDENT IN AN ORGANIZED HEALTH CARE EDUCATION/TRAINING PROGRAM

## 2021-11-19 PROCEDURE — 74011636637 HC RX REV CODE- 636/637: Performed by: STUDENT IN AN ORGANIZED HEALTH CARE EDUCATION/TRAINING PROGRAM

## 2021-11-19 PROCEDURE — 80048 BASIC METABOLIC PNL TOTAL CA: CPT

## 2021-11-19 RX ORDER — CEFPODOXIME PROXETIL 200 MG/1
200 TABLET, FILM COATED ORAL EVERY 12 HOURS
Qty: 16 TABLET | Refills: 0 | Status: SHIPPED | OUTPATIENT
Start: 2021-11-19 | End: 2021-11-27

## 2021-11-19 RX ORDER — FUROSEMIDE 20 MG/1
20 TABLET ORAL
Qty: 30 TABLET | Refills: 0 | Status: SHIPPED | OUTPATIENT
Start: 2021-11-19 | End: 2021-12-19

## 2021-11-19 RX ORDER — CEFPODOXIME PROXETIL 200 MG/1
200 TABLET, FILM COATED ORAL EVERY 12 HOURS
Qty: 16 TABLET | Refills: 0 | Status: SHIPPED | OUTPATIENT
Start: 2021-11-19 | End: 2021-11-19

## 2021-11-19 RX ORDER — INSULIN GLARGINE 100 [IU]/ML
20 INJECTION, SOLUTION SUBCUTANEOUS DAILY
Qty: 6 ML | Refills: 0 | Status: SHIPPED | OUTPATIENT
Start: 2021-11-19 | End: 2021-12-19

## 2021-11-19 RX ORDER — LOPERAMIDE HYDROCHLORIDE 2 MG/1
2 CAPSULE ORAL
Qty: 30 CAPSULE | Refills: 0 | Status: SHIPPED | OUTPATIENT
Start: 2021-11-19 | End: 2021-11-29

## 2021-11-19 RX ORDER — FUROSEMIDE 20 MG/1
20 TABLET ORAL
Qty: 30 TABLET | Refills: 0 | Status: SHIPPED | OUTPATIENT
Start: 2021-11-19 | End: 2021-11-19 | Stop reason: SDUPTHER

## 2021-11-19 RX ORDER — LOPERAMIDE HYDROCHLORIDE 2 MG/1
2 CAPSULE ORAL
Qty: 30 CAPSULE | Refills: 0 | Status: SHIPPED | OUTPATIENT
Start: 2021-11-19 | End: 2021-11-19

## 2021-11-19 RX ADMIN — INSULIN GLARGINE 20 UNITS: 100 INJECTION, SOLUTION SUBCUTANEOUS at 08:44

## 2021-11-19 RX ADMIN — FUROSEMIDE 20 MG: 10 INJECTION, SOLUTION INTRAMUSCULAR; INTRAVENOUS at 08:44

## 2021-11-19 RX ADMIN — Medication 10 ML: at 06:00

## 2021-11-19 RX ADMIN — AMLODIPINE BESYLATE 5 MG: 5 TABLET ORAL at 08:44

## 2021-11-19 RX ADMIN — APIXABAN 2.5 MG: 2.5 TABLET, FILM COATED ORAL at 08:44

## 2021-11-19 RX ADMIN — CARVEDILOL 12.5 MG: 6.25 TABLET, FILM COATED ORAL at 08:44

## 2021-11-19 RX ADMIN — CEFPODOXIME PROXETIL 200 MG: 200 TABLET, FILM COATED ORAL at 08:44

## 2021-11-19 RX ADMIN — ESCITALOPRAM OXALATE 10 MG: 10 TABLET ORAL at 08:44

## 2021-11-19 RX ADMIN — ASPIRIN 81 MG CHEWABLE TABLET 81 MG: 81 TABLET CHEWABLE at 08:46

## 2021-11-19 NOTE — DISCHARGE INSTRUCTIONS
You had  An E. coli kidney infection leading to a bloodstream infection. You should continue to take Vantin twice daily for the next 8 days to finish off treatment for your infection. You were also found to have atrial fibrillation during this hospitalization and was started on appropriately dosed Eliquis. You should continue to take Eliquis 2.5 mg twice daily and stop taking your aspirin to prevent stroke. I have also prescribed you Lasix only to be used as needed if you begin to get short of breath, swelling or gain weight in relation to your diastolic heart failure. You should adhere to low-salt diet and limit the amount of fluid to around 2 L total daily. please continue taking all other medications as previously prescribed. Please follow-up with your primary care physician within the next week to discuss recent hospitalization and new medication changes.

## 2021-11-19 NOTE — PROGRESS NOTES
Henny Finley from [facility] called to say patient can come today. She gave me info on what she needs in packet. Patient will go back to her [facility] with [facility] transport [facility]. Advised pt she will be going today. Dr. Angelika Harper came and signed all med orders. Getting him to write hard copy scripts for any new meds (4). Also gave Eliquix free 30 day trial card and $10 copay card for this new script. Xochitl Boyce called this CM and asked if pt would be picked up around 2:15 - 2:30 today. Advised yes that is fine. [facility] know. Monica Tenorio.

## 2021-11-19 NOTE — PROGRESS NOTES
Pt discharge instructions given. All questions answered. Port/IV de-accessed. Appointments verified. Pt discharged via [facility].

## 2021-11-19 NOTE — PROGRESS NOTES
END OF SHIFT NOTE:    Intake/Output  No intake/output data recorded. Voiding: YES  Catheter: NO  Drain:   External Urinary Catheter 11/16/21 (Active)   Site Assessment Clean, dry, & intact 11/18/21 0502   Repositioned Yes 11/18/21 0502   Perineal Care Yes 11/18/21 0502   Wick Changed Yes 11/18/21 0502   Suction Canister/Tubing Changed No 11/18/21 0502   Urine Output (mL) 200 ml 11/17/21 1922               Stool:  2 occurrences. Stool Assessment  Stool Color: Elbridge Norlander (11/17/21 0627)  Stool Appearance: Loose (11/18/21 1950)  Stool Amount: Small (11/17/21 0627)  Stool Source/Status: Incontinence; Rectum (11/17/21 5490)    Emesis:  0 occurrences. VITAL SIGNS  Patient Vitals for the past 12 hrs:   Temp Pulse Resp BP SpO2   11/19/21 0303 97.9 °F (36.6 °C) 63 18 (!) 141/69 93 %   11/19/21 0004 98.5 °F (36.9 °C) 63 18 132/71 99 %   11/18/21 2002 98.1 °F (36.7 °C) 76 19 (!) 141/71 95 %       Pain Assessment  Pain 1  Pain Scale 1: Numeric (0 - 10) (11/19/21 0413)  Pain Intensity 1: 0 (11/19/21 0413)  Patient Stated Pain Goal: 0 (11/19/21 0413)  Pain Reassessment 1: Yes (11/16/21 2121)  Pain Onset 1: acute (11/16/21 0446)  Pain Location 1: Hip (11/16/21 0446)  Pain Orientation 1: Right (11/16/21 0446)  Pain Description 1: Aching (11/16/21 0446)  Pain Intervention(s) 1: Massage (11/16/21 1931)    Ambulating  Yes    Additional Information: pt continues to have loose bowel movements, bed alarm on, pt confused throughout night    Shift report given to oncoming nurse at the bedside.     Linda Quick RN

## 2021-11-19 NOTE — DISCHARGE SUMMARY
Hospitalist Discharge Summary   Admit Date:  2021  3:57 AM   DC Note date: 2021  Name:  Damon Urban   Age:  80 y.o. Sex:  female  :  1934   MRN:  [id number]   Room:  [facility]  PCP:  Surekha Rosa MD    Presenting Complaint: Abdominal Pain, Nausea, and Vomiting    Initial Admission Diagnosis: Pyelonephritis [N12]     Problem List for this Hospitalization:  Hospital Problems as of 2021 Date Reviewed: 10/12/2021          Codes Class Noted - Resolved POA    Heart failure with preserved left ventricular function (HFpEF) ([facility] ICD-10-CM: I50.30  ICD-9-CM: 428.9  2021 - Present Unknown        V-tach ([facility] ICD-10-CM: I47.2  ICD-9-CM: 427.1  2021 - Present Unknown        E coli bacteremia ICD-10-CM: R78.81, B96.20  ICD-9-CM: 790.7, 041.49  2021 - Present Unknown        Sepsis ([facility] ICD-10-CM: A41.9  ICD-9-CM: 038.9, 995.91  2021 - Present Unknown        Pulmonary edema ICD-10-CM: J81.1  ICD-9-CM: 615  2021 - Present Unknown        Cough ICD-10-CM: R05.9  ICD-9-CM: 786.2  2021 - Present Unknown        Atrial fibrillation ([facility] ICD-10-CM: I48.91  ICD-9-CM: 427.31  2021 - Present Unknown        Pyelonephritis ICD-10-CM: N12  ICD-9-CM: 590.80  2021 - Present Unknown            Did Patient have Sepsis (YES OR NO): yes    Hospital Course:  HPI: \"Alexandria Elmore is a 80 y.o. female with medical history of hypertension, diabetes who presented with feelings of nausea and vomiting which started last night after having dinner with her family. Patient stated she had 2 episodes of brownish colored vomit and also had an episode of diarrhea. Patient denies any evidence of melena or hematochezia. Patient was also complaining of some right-sided flank and abdominal pain that would come and go. Does report recent hip fracture and is currently in rehab for this.   Patient denied any cardiac chest pain, shortness of breath, blurry vision, fever/chills. \"    Patient was admitted to severe sepsis secondary to pyelonephritis with CT scan on admission showing evidence of pyelonephritis with positive UA. Lactic acid was 3.6 on admission and improved with sepsis bolus and maintenance IV fluids. She had blood and urine culture drawn on admission which returned positive for pansensitive E. coli. She was empirically started on Zosyn on admission and was continued on this until eventually transitioning to [facility] when culture resulted. Did have some confusion during hospitalization which was attributed to her UTI and this eventually improved prior to discharge. After initial IV fluid resuscitation due to sepsis, patient did have a chest x-ray which showed mild pulmonary edema and IV fluids were stopped. She had elevated NT proBNP which improved with Lasix. She had echo which showed normal EF and diastolic dysfunction. She received multiple doses of IV Lasix during hospitalization for fluid overload with improvement in her respiratory status as well as hyponatremia. She did suffer from several episodes of tachyarrhythmia including nonsustained V. tach and new onset atrial fibrillation. These are likely exacerbated in the setting of E. coli bacteremia. She was started on appropriately dosed Eliquis 2.5 mg twice daily due to her renal function and age. She tolerated this new medication well without any evidence of worsening anemia. Otherwise rate controlled without any need for aggressive therapy of her atrial fibrillation. PT/OT saw patient who recommended skilled nursing facility due to safety at home alone. Lives at assisted living facility and has private caregiver, every 2 hours to her home to assist her with home needs.  discussed with assisted living facility who stated there was separate PT/OT at the facility that they could do on regular assist multiple times a week.   She is being discharged back to assisted living facility with frequent care and regular PT/OT visits. Disposition: Intermediate Care Facility  Diet: ADULT DIET Easy to Chew; Low Fat/Low Chol/High Fiber/RONEL  Code Status: Full Code    Follow Up Orders: Follow-up Appointments   Procedures    FOLLOW UP VISIT Appointment in: One Week     Standing Status:   Standing     Number of Occurrences:   1     Order Specific Question:   Appointment in     Answer: One Week       Follow-up Information     Follow up With Specialties Details Why Contact Info    Bartolo Bear MD Family Medicine In 1 week LEFT A MESSAGE FOR FOLLOW UP APPT. [email or web address]  [id number]  [phone]            Follow up labs/diagnostics (ultimately defer to outpatient provider):  Hemoglobin check   Creatinine check   Sodium Check     Time spent in patient discharge and coordination 37 minutes. Plan was discussed with patient and case management. All questions answered. Patient was stable at time of discharge. Instructions given to call a physician or return if any concerns. Discharge Info:   Current Discharge Medication List      START taking these medications    Details   apixaban (ELIQUIS) 2.5 mg tablet Take 1 Tablet by mouth every twelve (12) hours for 60 days. Qty: 120 Tablet, Refills: 0  Start date: 11/19/2021, End date: 1/18/2022      cefpodoxime (VANTIN) 200 mg tablet Take 1 Tablet by mouth every twelve (12) hours for 8 days. Qty: 16 Tablet, Refills: 0  Start date: 11/19/2021, End date: 11/27/2021      loperamide (IMODIUM) 2 mg capsule Take 1 Capsule by mouth every four (4) hours as needed for Diarrhea (do not give if any abdominal pain) for up to 10 days. Qty: 30 Capsule, Refills: 0  Start date: 11/19/2021, End date: 11/29/2021      furosemide (LASIX) 20 mg tablet Take 1 Tablet by mouth daily as needed for PRN Reason (Other) (Shortness of breath, lower extremity swelling, or weight gain) for up to 30 days.   Qty: 30 Tablet, Refills: 0  Start date: 11/19/2021, End date: 12/19/2021         CONTINUE these medications which have CHANGED    Details   insulin glargine (LANTUS,BASAGLAR) 100 unit/mL (3 mL) inpn 20 Units by SubCUTAneous route daily for 30 days. Qty: 6 mL, Refills: 0  Start date: 11/19/2021, End date: 12/19/2021         CONTINUE these medications which have NOT CHANGED    Details   amLODIPine (NORVASC) 5 mg tablet Take 1 Tablet by mouth daily. Indications: high blood pressure  Qty: 30 Tablet, Refills: 0      atorvastatin (LIPITOR) 40 mg tablet Take 1 Tablet by mouth daily. Indications: excessive fat in the blood, treatment to slow progression of coronary artery disease  Qty: 30 Tablet, Refills: 0      carvediloL (Coreg) 12.5 mg tablet Take 1 Tablet by mouth two (2) times daily (with meals). Qty: 60 Tablet, Refills: 0      cyanocobalamin 1,000 mcg tablet Take 2.5 Tablets by mouth daily. Qty: 75 Tablet, Refills: 0      calcium-vitamin D (OS-ALYCIA +D3) 500 mg-200 unit per tablet Take 1 Tablet by mouth three (3) times daily (with meals). Qty: 90 Tablet, Refills: 0      escitalopram oxalate (LEXAPRO) 10 mg tablet Take 1 Tablet by mouth daily. Qty: 30 Tablet, Refills: 0      famotidine (PEPCID) 20 mg tablet Take 1 Tablet by mouth daily. Qty: 30 Tablet, Refills: 0      folic acid (FOLVITE) 931 mcg tablet Take 1 Tablet by mouth daily. Qty: 30 Tablet, Refills: 0      multivitamin with iron tablet Take 1 Tablet by mouth daily. Qty: 30 Tablet, Refills: 0      omeprazole (PRILOSEC) 40 mg capsule Take 1 Capsule by mouth daily. Qty: 30 Capsule, Refills: 0      ondansetron (Zofran ODT) 4 mg disintegrating tablet Take 1 Tablet by mouth every eight (8) hours as needed for Nausea. Qty: 11 Tablet, Refills: 1      rOPINIRole (REQUIP) 1 mg tablet Take 1 Tablet by mouth two (2) times a day. Qty: 60 Tablet, Refills: 0      spironolactone (ALDACTONE) 25 mg tablet Take 1 Tablet by mouth daily.   Qty: 30 Tablet, Refills: 0      B.infantis-B.ani-B.long-B.bifi (Probiotic 4X) 10-15 mg TbEC Take 1 Tablet by mouth daily. Qty: 30 Tablet, Refills: 0      cholecalciferol (Vitamin D3) 25 mcg (1,000 unit) cap Take  by mouth every other day. acetaminophen (TYLENOL EXTRA STRENGTH) 500 mg tablet Take  by mouth every six (6) hours as needed for Pain. STOP taking these medications       aspirin 81 mg chewable tablet Comments:   Reason for Stopping:         aspirin (ASPIRIN) 325 mg tablet Comments:   Reason for Stopping:               Procedures done this admission:  * No surgery found *    Consults this admission:  None    Echocardiogram/EKG results:  Results from Hospital Encounter encounter on 11/14/21    ECHO ADULT COMPLETE    Interpretation Summary  · LV: Estimated LVEF is 60 - 65%. Normal cavity size and systolic function (ejection fraction normal). Mild concentric hypertrophy. Wall motion: normal. Left ventricular diastolic dysfunction E/e'= 15.  · TV: Mild to moderate tricuspid valve regurgitation is present. Right Ventricular Arterial Pressure (RVSP) is 62 mmHg. Pulmonary hypertension found to be moderate to severe. · MV: Mild to moderate mitral valve regurgitation is present. · LA: Moderately dilated left atrium. · IVC: Mildly elevated central venous pressure (8 mmHg); IVC diameter is less than 21 mm and collapses less than 50% with respiration.        EKG Results     Procedure 720 Value Units Date/Time    EKG, 12 LEAD, INITIAL [[id number]] Collected: 11/15/21 1424    Order Status: Completed Updated: 11/16/21 0753     Ventricular Rate 70 BPM      Atrial Rate 108 BPM      QRS Duration 88 ms      Q-T Interval 380 ms      QTC Calculation (Bezet) 410 ms      Calculated R Axis -14 degrees      Calculated T Axis -28 degrees      Diagnosis --     Atrial fibrillation with a competing junctional pacemaker  Septal infarct , age undetermined  Abnormal ECG  When compared with ECG of 18-SEP-2021 11:35,  Atrial fibrillation has replaced Sinus rhythm  Septal infarct is now Present  Confirmed by LEIA Fabián Ariel ([id number]) on 11/16/2021 7:53:29 AM      EKG, 12 LEAD, INITIAL [[id number]]     Order Status: Sent           Diagnostic Imaging/Tests:   CT ABD PELV W CONT    Result Date: 11/14/2021  CT abdomen and pelvis with contrast COMPARISON: Number 2020 and October 2021. INDICATION: Abdominal pain, nausea and vomiting. TECHNIQUE: CT imaging was performed of the abdomen and pelvis following the uncomplicated administration of intravenous contrast (Isovue 370, 100 mL). Oral contrast was administered. Radiation dose reduction techniques were used for this study:  Our CT scanners use one or all of the following: Automated exposure control, adjustment of the mA and/or kVp according to patient's size, iterative reconstruction. FINDINGS: Visualized lung bases are unremarkable. Abdomen findings: There is left perinephric fat stranding. There is small area of heterogeneous hypoenhancement at the lower pole of the left kidney. No perinephric fluid collection/abscess. There are bilateral renal cysts. Largest cyst in the left kidney measures 3 cm. Largest cyst in the right kidney measures 2 cm. Gallbladder is surgically absent. The liver, pancreas, and adrenal glands are unremarkable. Tiny low density in the spleen, similar to 2020 study and probably cyst or hemangioma. Spleen is not enlarged. Abdominal aorta is normal in course and caliber with atherosclerotic calcifications. No evidence of lymphadenopathy. There is small hiatal hernia. Stomach is otherwise normal in contour. Small bowel loops are normal in caliber. No small bowel obstruction. Pelvic findings: Urinary bladder is normal in contour. There is sigmoid diverticulosis without diverticulitis. Colon is normal in course and caliber. No evidence of appendicitis. There is no free air or free fluid. Bones are osteopenic. Post surgical changes of the hips are noted. Kyphoplasty changes at L1. Compression deformities of T11, T12 and L2 are age-indeterminate.      1. Small area of heterogeneous hypoenhancement in the lower pole of the left kidney and left perinephric fat stranding. Findings suspicious for pyelonephritis. Correlate with urinalysis. 2. Multiple bilateral renal cysts. Negative for hydronephrosis or perinephric fluid/abscess. 3. Negative for colitis, diverticulitis or bowel obstruction. 4. Cholecystectomy. 5. Multiple compression deformities in the thoracolumbar spine, age indeterminate. Osteopenia. All Micro Results     Procedure Component Value Units Date/Time    CULTURE, BLOOD [[id number]] Collected: 11/14/21 0551    Order Status: Completed Specimen: Blood Updated: 11/19/21 0800     Special Requests: LEFT ANTECUBITAL        Culture result: NO GROWTH 5 DAYS       SARS-COV-2, PCR [[id number]] Collected: 11/18/21 1635    Order Status: Completed Specimen: Nasopharyngeal Updated: 11/18/21 2342     Specimen source Nasopharyngeal        SARS-CoV-2 Not detected        Comment:      The specimen is NEGATIVE for SARS-CoV-2, the novel coronavirus associated with COVID-19. This test has been authorized by the FDA under an Emergency Use Authorization (EUA) for use by authorized laboratories. Fact sheet for Healthcare Providers: [employer]nz       Fact sheet for Patients: [employer]       Methodology: RT-PCR         COVID-19 RAPID TEST [[id number]] Collected: 11/18/21 1635    Order Status: Completed Specimen: Nasopharyngeal Updated: 11/18/21 1713     Specimen source Nasopharyngeal        COVID-19 rapid test Not detected        Comment:      The specimen is NEGATIVE for SARS-CoV-2, the novel coronavirus associated with COVID-19. A negative result does not rule out COVID-19. This test has been authorized by the FDA under an Emergency Use Authorization (EUA) for use by authorized laboratories.         Fact sheet for Healthcare Providers: [employer]  Fact sheet for Patients: [email or web address]       Methodology: Isothermal Nucleic Acid Amplification         CULTURE, BLOOD [[id number]]  (Abnormal)  (Susceptibility) Collected: 11/14/21 0551    Order Status: Completed Specimen: Blood Updated: 11/17/21 0713     Special Requests: --        RIGHT  FOREARM       GRAM STAIN AEROBIC BOTTLE POSITIVE         GRAM NEGATIVE RODS               RESULTS VERIFIED, PHONED TO AND READ BACK BY Gio Curran RN @[phone] [phone] TO [facility]            Culture result: ESCHERICHIA COLI               Refer to Blood Culture ID Panel Accession  [id number]      CULTURE, URINE [[id number]]  (Abnormal)  (Susceptibility) Collected: 11/14/21 0631    Order Status: Completed Specimen: Urine Updated: 11/16/21 0728     Special Requests: NO SPECIAL REQUESTS        Culture result:       >100,000 COLONIES/mL ESCHERICHIA COLI          C. DIFFICILE AG & TOXIN A/B [[id number]] Collected: 11/14/21 2201    Order Status: Completed Specimen: Stool Updated: 11/15/21 1153     [address] ANTIGEN       C. DIFFICILE GDH ANTIGEN-NEGATIVE           C. difficile toxin       C. DIFFICILE TOXIN-NEGATIVE           PCR Reflex NOT APPLICABLE        INTERPRETATION       NEGATIVE FOR TOXIGENIC C. DIFFICILE           Clinical Consideration       NEGATIVE FOR TOXIGENIC C. DIFFICILE          BLOOD CULTURE ID PANEL [[id number]]  (Abnormal) Collected: 11/15/21 0235    Order Status: Completed Specimen: Blood Updated: 11/15/21 0720     Acc. no. from Micro Order [id number]     Escherichia coli Detected        Comment: RESULTS VERIFIED, PHONED TO AND READ BACK BY  Cristal KRAFT RN AT 0939 ON 11/15/21 ACL          KPC (Carbapenem Resistance Gene) NOT DETECTED        Comment: WARNING:  A Not Detected result for the KPC gene does not indicate susceptibility to carbapenems. Gram negative bacteria can be resistant to carbapenems by mechanisms other than carrying the KPC gene. INTERPRETATION       Gram negative julieta.  Identified by realtime PCR as E. coli CLOSTRIDIUM DIFF TOXIN A & B [[id number]] Collected: 11/14/21 2201    Order Status: Canceled Specimen: Stool     COVID-19 RAPID TEST [[id number]] Collected: 11/14/21 0804    Order Status: Completed Specimen: Nasopharyngeal Updated: 11/14/21 0829     Specimen source Nasopharyngeal        COVID-19 rapid test Not detected        Comment:      The specimen is NEGATIVE for SARS-CoV-2, the novel coronavirus associated with COVID-19. A negative result does not rule out COVID-19. This test has been authorized by the FDA under an Emergency Use Authorization (EUA) for use by authorized laboratories. Fact sheet for Healthcare Providers: [employer]co.nz  Fact sheet for Patients: [employer]co.nz       Methodology: Isothermal Nucleic Acid Amplification               Labs: Results:       BMP, Mg, Phos Recent Labs     11/19/21  0852 11/18/21  1440 11/18/21  0232 11/17/21  0833 11/17/21  0833 11/17/21  0026 11/17/21  0026   * 133* 131*   < > 133*   < > 133*   K 4.3 4.5 4.4   < > 3.4*   < > 3.6    102 103   < > 103   < > 104   CO2 26 26 21   < > 23   < > 24   AGAP 6* 5* 7   < > 7   < > 5*   BUN 14 14 12   < > 10   < > 11   CREA 0.81 0.96 0.83   < > 0.69   < > 0.80   CA 10.9* 10.2 8.5   < > 8.8   < > 8.8   * 191* 147*   < > 109*   < > 131*   MG  --   --  1.6*  --  2.2  --  1.6*   PHOS  --   --   --   --   --   --  2.2*    < > = values in this interval not displayed.       CBC Recent Labs     11/18/21 0232 11/17/21 0833   WBC 9.9 10.8   RBC 3.69* 3.80*   HGB 10.1* 10.5*   HCT 31.5* 32.4*    267   GRANS 72  --    LYMPH 12*  --    EOS 5  --    MONOS 10  --    BASOS 1  --    IG 1  --    ANEU 7.1  --    ABL 1.2  --    LIZY 0.4  --    ABM 1.0  --    ABB 0.1  --    AIG 0.1  --       LFT Recent Labs     11/18/21  0232 11/17/21  0833   ALT 12 11*    96   TP 6.6 6.5   ALB 2.4* 2.4*   GLOB 4.2* 4.1*   AGRAT 0.6* 0.6*      Cardiac Testing Lab Results   Component Value Date/Time    CK 25 10/24/2017 03:29 AM    Troponin-I, Qt. <0.02 (L) 11/17/2018 10:10 PM    Troponin-I, Qt. <0.02 (L) 05/09/2016 07:40 PM    Troponin-I, Qt. <0.02 (L) 05/12/2015 09:45 PM      Coagulation Tests Lab Results   Component Value Date/Time    Prothrombin time 13.7 09/18/2021 11:31 AM    Prothrombin time 10.2 03/19/2009 04:40 AM    INR 1.0 09/18/2021 11:31 AM    INR 1.0 03/19/2009 04:40 AM    aPTT 43.3 (H) 05/13/2015 07:05 AM    aPTT 47.4 (H) 05/13/2015 12:51 AM    aPTT 36.1 (H) 05/12/2015 06:05 PM      A1c Lab Results   Component Value Date/Time    Hemoglobin A1c 9.2 (H) 09/19/2021 05:32 AM    Hemoglobin A1c 7.3 (H) 10/24/2017 03:29 AM    Hemoglobin A1c 7.4 (H) 10/23/2017 08:17 PM      Lipid Panel Lab Results   Component Value Date/Time    Cholesterol, total 95 10/24/2017 03:29 AM    HDL Cholesterol 32 (L) 10/24/2017 03:29 AM    LDL, calculated 43.4 10/24/2017 03:29 AM    VLDL, calculated 19.6 10/24/2017 03:29 AM    Triglyceride 98 10/24/2017 03:29 AM    CHOL/HDL Ratio 3.0 10/24/2017 03:29 AM      Thyroid Panel Lab Results   Component Value Date/Time    TSH 2.520 09/19/2021 05:32 AM    TSH 0.399 10/24/2017 03:29 AM        Most Recent UA Lab Results   Component Value Date/Time    Color YELLOW 11/14/2021 06:31 AM    Appearance CLEAR 11/14/2021 06:31 AM    Specific gravity 1.039 (H) 11/14/2021 06:31 AM    pH (UA) 5.0 11/14/2021 06:31 AM    Protein TRACE (A) 11/14/2021 06:31 AM    Glucose 100 11/14/2021 06:31 AM    Ketone Negative 11/14/2021 06:31 AM    Bilirubin Negative 11/14/2021 06:31 AM    Blood SMALL (A) 11/14/2021 06:31 AM    Urobilinogen 0.2 11/14/2021 06:31 AM    Nitrites Positive (A) 11/14/2021 06:31 AM    Leukocyte Esterase MODERATE (A) 11/14/2021 06:31 AM    WBC  11/14/2021 06:31 AM    RBC 0-3 11/14/2021 06:31 AM    Epithelial cells 0 11/14/2021 06:31 AM    Bacteria 4+ (H) 11/14/2021 06:31 AM    Casts 0-3 11/14/2021 06:31 AM    Crystals, urine 0 01/09/2021 02:38 PM Mucus 0 01/09/2021 02:38 PM    Other observations RESULTS VERIFIED MANUALLY 10/23/2017 07:30 PM          All Labs from Last 24 Hrs:  Recent Results (from the past 24 hour(s))   METABOLIC PANEL, BASIC    Collection Time: 11/18/21  2:40 PM   Result Value Ref Range    Sodium 133 (L) 136 - 145 mmol/L    Potassium 4.5 3.5 - 5.1 mmol/L    Chloride 102 98 - 107 mmol/L    CO2 26 21 - 32 mmol/L    Anion gap 5 (L) 7 - 16 mmol/L    Glucose 191 (H) 65 - 100 mg/dL    BUN 14 8 - 23 MG/DL    Creatinine 0.96 0.6 - 1.0 MG/DL    GFR est AA >60 >60 ml/min/1.73m2    GFR est non-AA 59 (L) >60 ml/min/1.73m2    Calcium 10.2 8.3 - 10.4 MG/DL   GLUCOSE, POC    Collection Time: 11/18/21  4:04 PM   Result Value Ref Range    Glucose (POC) 145 (H) 65 - 100 mg/dL    Performed by Shantelle    SARS-COV-2    Collection Time: 11/18/21  4:35 PM   Result Value Ref Range    SARS-CoV-2 Please find results under separate order     SARS-COV-2, PCR    Collection Time: 11/18/21  4:35 PM    Specimen: Nasopharyngeal   Result Value Ref Range    Specimen source Nasopharyngeal      SARS-CoV-2 Not detected NOTD     COVID-19 RAPID TEST    Collection Time: 11/18/21  4:35 PM   Result Value Ref Range    Specimen source Nasopharyngeal      COVID-19 rapid test Not detected NOTD     GLUCOSE, POC    Collection Time: 11/18/21 11:04 PM   Result Value Ref Range    Glucose (POC) 126 (H) 65 - 100 mg/dL    Performed by Caitlyn    GLUCOSE, POC    Collection Time: 11/19/21  8:00 AM   Result Value Ref Range    Glucose (POC) 117 (H) 65 - 100 mg/dL    [facility]    METABOLIC PANEL, BASIC    Collection Time: 11/19/21  8:52 AM   Result Value Ref Range    Sodium 133 (L) 136 - 145 mmol/L    Potassium 4.3 3.5 - 5.1 mmol/L    Chloride 101 98 - 107 mmol/L    CO2 26 21 - 32 mmol/L    Anion gap 6 (L) 7 - 16 mmol/L    Glucose 169 (H) 65 - 100 mg/dL    BUN 14 8 - 23 MG/DL    Creatinine 0.81 0.6 - 1.0 MG/DL    GFR est AA >60 >60 ml/min/1.73m2    GFR est non-AA >60 >60 ml/min/1.73m2    Calcium 10.9 (H) 8.3 - 10.4 MG/DL       Current Med List in Hospital:   Current Facility-Administered Medications   Medication Dose Route Frequency    furosemide (LASIX) injection 20 mg  20 mg IntraVENous BID    cefpodoxime (VANTIN) tablet 200 mg  200 mg Oral Q12H    tuberculin injection 5 Units  5 Units IntraDERMal ONCE    lip protectant (BLISTEX) ointment 1 Each  1 Each Topical PRN    benzonatate (TESSALON) capsule 100 mg  100 mg Oral TID PRN    guaiFENesin-codeine (ROBITUSSIN AC) 100-10 mg/5 mL solution 5 mL  5 mL Oral Q4H PRN    phenazopyridine (PYRIDIUM) tab 95 mg  95 mg Oral BID PRN    albuterol-ipratropium (DUO-NEB) 2.5 MG-0.5 MG/3 ML  3 mL Nebulization Q4H PRN    apixaban (ELIQUIS) tablet 2.5 mg  2.5 mg Oral Q12H    loperamide (IMODIUM) capsule 2 mg  2 mg Oral Q4H PRN    sodium chloride (NS) flush 5-10 mL  5-10 mL IntraVENous Q8H    sodium chloride (NS) flush 5-10 mL  5-10 mL IntraVENous PRN    sodium chloride (NS) flush 5-40 mL  5-40 mL IntraVENous Q8H    sodium chloride (NS) flush 5-40 mL  5-40 mL IntraVENous PRN    acetaminophen (TYLENOL) tablet 650 mg  650 mg Oral Q6H PRN    Or    acetaminophen (TYLENOL) suppository 650 mg  650 mg Rectal Q6H PRN    polyethylene glycol (MIRALAX) packet 17 g  17 g Oral DAILY PRN    ondansetron (ZOFRAN ODT) tablet 4 mg  4 mg Oral Q8H PRN    Or    ondansetron (ZOFRAN) injection 4 mg  4 mg IntraVENous Q6H PRN    insulin glargine (LANTUS) injection 20 Units  20 Units SubCUTAneous DAILY    insulin lispro (HUMALOG) injection   SubCUTAneous AC&HS    amLODIPine (NORVASC) tablet 5 mg  5 mg Oral DAILY    atorvastatin (LIPITOR) tablet 40 mg  40 mg Oral QHS    aspirin chewable tablet 81 mg  81 mg Oral DAILY    carvediloL (COREG) tablet 12.5 mg  12.5 mg Oral BID WITH MEALS    escitalopram oxalate (LEXAPRO) tablet 10 mg  10 mg Oral DAILY    [Held by provider] rOPINIRole (REQUIP) tablet 1 mg  1 mg Oral BID    [Held by provider] spironolactone (ALDACTONE) tablet 25 mg  25 mg Oral DAILY    HYDROcodone-acetaminophen (NORCO) 5-325 mg per tablet 1 Tablet  1 Tablet Oral Q6H PRN    morphine injection 1 mg  1 mg IntraVENous Q4H PRN       Allergies   Allergen Reactions    Sulfa (Sulfonamide Antibiotics) Rash    Other Medication Unknown (comments)     \"Dypentin\"    Tape [Adhesive] Other (comments)     Blisters and skin tears     Immunization History   Administered Date(s) Administered    Influenza Vaccine 10/01/2015, 10/17/2016    TB Skin Test (PPD) Intradermal 03/17/2013, 03/04/2021, 09/19/2021, 10/13/2021, 11/18/2021       Recent Vital Data:  Patient Vitals for the past 24 hrs:   Temp Pulse Resp BP SpO2   11/19/21 1049 98 °F (36.7 °C) 66 17 (!) 130/51 94 %   11/19/21 0753     94 %   11/19/21 0715 98.5 °F (36.9 °C) 70 17 131/62 94 %   11/19/21 0303 97.9 °F (36.6 °C) 63 18 (!) 141/69 93 %   11/19/21 0004 98.5 °F (36.9 °C) 63 18 132/71 99 %   11/18/21 2002 98.1 °F (36.7 °C) 76 19 (!) 141/71 95 %   11/18/21 1758  67      11/18/21 1603 97.3 °F (36.3 °C) 66 20 128/75 95 %     Oxygen Therapy  O2 Sat (%): 94 % (11/19/21 1049)  Pulse via Oximetry: 70 beats per minute (11/19/21 0753)  O2 Device: None (Room air) (11/19/21 0753)    Estimated body mass index is 25.76 kg/m² as calculated from the following:    Height as of this encounter: 4' 9\" (1.448 m). Weight as of this encounter: 54 kg (119 lb 0.8 oz). Intake/Output Summary (Last 24 hours) at 11/19/2021 1239  Last data filed at 11/19/2021 0842  Gross per 24 hour   Intake 410 ml   Output 1050 ml   Net -640 ml         Physical Exam:    General:    Well nourished. No overt distress  Head:  Normocephalic, atraumatic  Eyes:  Sclerae appear normal.  Pupils equally round. HENT:  Nares appear normal, no drainage. Moist mucous membranes  Neck:  No restricted ROM. Trachea midline  CV:   RRR. No m/r/g. No JVD  Lungs:   CTAB. No wheezing, rhonchi, or rales.   Even, unlabored  Abdomen: Soft, nontender, nondistended. Extremities: Warm and dry. No cyanosis or clubbing. No edema. Skin:     No rashes. Normal coloration  Neuro:  CN II-XII grossly intact. AAOx3  Psych:  Normal mood and affect. Signed:  Yi Saenz MD    Part of this note may have been written by using a voice dictation software. The note has been proof read but may still contain some grammatical/other typographical errors. Dory Rose

## 2021-11-30 VITALS
SYSTOLIC BLOOD PRESSURE: 130 MMHG | TEMPERATURE: 98 F | HEART RATE: 66 BPM | BODY MASS INDEX: 25.68 KG/M2 | RESPIRATION RATE: 17 BRPM | HEIGHT: 57 IN | OXYGEN SATURATION: 94 % | DIASTOLIC BLOOD PRESSURE: 51 MMHG | WEIGHT: 119.05 LBS

## 2022-02-06 NOTE — ED NOTES
Gave scheduled decadron and newly ordered mag as directed.   Pt resting in nad Previously Declined (within the last year)

## 2022-03-18 PROBLEM — R53.81 DEBILITY: Status: ACTIVE | Noted: 2021-10-12

## 2022-03-18 PROBLEM — I47.20 V-TACH (HCC): Status: ACTIVE | Noted: 2021-11-17

## 2022-03-18 PROBLEM — N30.00 ACUTE CYSTITIS WITHOUT HEMATURIA: Status: ACTIVE | Noted: 2021-03-04

## 2022-03-18 PROBLEM — S72.002A CLOSED FRACTURE OF LEFT HIP (HCC): Status: ACTIVE | Noted: 2021-03-03

## 2022-03-18 PROBLEM — S72.142A INTERTROCHANTERIC FRACTURE OF LEFT FEMUR, CLOSED, INITIAL ENCOUNTER (HCC): Status: ACTIVE | Noted: 2021-09-18

## 2022-03-18 PROBLEM — K80.10 CHOLECYSTITIS WITH CHOLELITHIASIS: Status: ACTIVE | Noted: 2017-10-06

## 2022-03-18 PROBLEM — I63.9 CVA (CEREBRAL VASCULAR ACCIDENT) (HCC): Status: ACTIVE | Noted: 2017-10-23

## 2022-03-18 PROBLEM — E11.21 TYPE 2 DIABETES WITH NEPHROPATHY (HCC): Status: ACTIVE | Noted: 2018-02-09
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
## 2022-03-19 PROBLEM — B37.31 VAGINAL CANDIDA: Status: ACTIVE | Noted: 2017-10-26

## 2022-03-19 PROBLEM — J81.1 PULMONARY EDEMA: Status: ACTIVE | Noted: 2021-11-16

## 2022-03-19 PROBLEM — I50.30 HEART FAILURE WITH PRESERVED LEFT VENTRICULAR FUNCTION (HFPEF) (HCC): Status: ACTIVE | Noted: 2021-11-19

## 2022-03-19 PROBLEM — E11.42 DIABETIC POLYNEUROPATHY ASSOCIATED WITH TYPE 2 DIABETES MELLITUS (HCC): Status: ACTIVE | Noted: 2018-06-11

## 2022-03-19 PROBLEM — Z79.899 ENCOUNTER FOR MEDICATION MANAGEMENT: Status: ACTIVE | Noted: 2018-06-11

## 2022-03-19 PROBLEM — R05.9 COUGH: Status: ACTIVE | Noted: 2021-11-16

## 2022-03-19 PROBLEM — Z87.81 HISTORY OF FRACTURE OF RIGHT HIP: Status: ACTIVE | Noted: 2021-10-13

## 2022-03-19 PROBLEM — I48.91 ATRIAL FIBRILLATION (HCC): Status: ACTIVE | Noted: 2021-11-16

## 2022-03-19 PROBLEM — G62.9 POLYNEUROPATHY: Status: ACTIVE | Noted: 2017-11-20

## 2022-03-19 PROBLEM — D62 ACUTE BLOOD LOSS ANEMIA: Status: ACTIVE | Noted: 2021-03-07

## 2022-03-20 PROBLEM — S72.009A HIP FRACTURE REQUIRING OPERATIVE REPAIR (HCC): Status: ACTIVE | Noted: 2021-03-03

## 2022-03-20 PROBLEM — B96.20 E COLI BACTEREMIA: Status: ACTIVE | Noted: 2021-11-17

## 2022-03-20 PROBLEM — R78.81 E COLI BACTEREMIA: Status: ACTIVE | Noted: 2021-11-17

## 2022-03-20 PROBLEM — A41.9 SEVERE SEPSIS (HCC): Status: ACTIVE | Noted: 2021-11-17

## 2022-03-20 PROBLEM — N39.0 UTI (URINARY TRACT INFECTION): Status: ACTIVE | Noted: 2021-03-07

## 2022-03-20 PROBLEM — R65.20 SEVERE SEPSIS (HCC): Status: ACTIVE | Noted: 2021-11-17

## 2022-03-20 PROBLEM — R30.0 DYSURIA: Status: ACTIVE | Noted: 2017-10-25

## 2022-03-20 PROBLEM — N12 PYELONEPHRITIS: Status: ACTIVE | Noted: 2021-11-14

## 2022-06-09 ENCOUNTER — TELEPHONE (OUTPATIENT)
Dept: ORTHOPEDIC SURGERY | Age: 87
End: 2022-06-09

## 2022-06-12 ENCOUNTER — HOSPITAL ENCOUNTER (EMERGENCY)
Age: 87
Discharge: HOME | End: 2022-06-13
Attending: STUDENT IN AN ORGANIZED HEALTH CARE EDUCATION/TRAINING PROGRAM
Payer: MEDICARE

## 2022-06-12 ENCOUNTER — HOSPITAL ENCOUNTER (EMERGENCY)
Dept: CT IMAGING | Age: 87
Discharge: HOME | End: 2022-06-15
Payer: MEDICARE

## 2022-06-12 DIAGNOSIS — N30.00 ACUTE CYSTITIS WITHOUT HEMATURIA: Primary | ICD-10-CM

## 2022-06-12 DIAGNOSIS — E86.0 DEHYDRATION: ICD-10-CM

## 2022-06-12 LAB
ALBUMIN SERPL-MCNC: 3.4 G/DL (ref 3.2–4.6)
ALBUMIN/GLOB SERPL: 0.9 {RATIO} (ref 1.2–3.5)
ALP SERPL-CCNC: 123 U/L (ref 50–136)
ALT SERPL-CCNC: 23 U/L (ref 12–65)
ANION GAP SERPL CALC-SCNC: 8 MMOL/L (ref 7–16)
AST SERPL-CCNC: 55 U/L (ref 15–37)
BASOPHILS # BLD: 0.2 K/UL (ref 0–0.2)
BASOPHILS NFR BLD: 1 % (ref 0–2)
BILIRUB SERPL-MCNC: 0.8 MG/DL (ref 0.2–1.1)
BUN SERPL-MCNC: 31 MG/DL (ref 8–23)
CALCIUM SERPL-MCNC: 9.9 MG/DL (ref 8.3–10.4)
CHLORIDE SERPL-SCNC: 100 MMOL/L (ref 98–107)
CO2 SERPL-SCNC: 24 MMOL/L (ref 21–32)
CREAT SERPL-MCNC: 1.21 MG/DL (ref 0.6–1)
DIFFERENTIAL METHOD BLD: ABNORMAL
EOSINOPHIL # BLD: 0.4 K/UL (ref 0–0.8)
EOSINOPHIL NFR BLD: 4 % (ref 0.5–7.8)
ERYTHROCYTE [DISTWIDTH] IN BLOOD BY AUTOMATED COUNT: 13.6 % (ref 11.9–14.6)
GLOBULIN SER CALC-MCNC: 4 G/DL (ref 2.3–3.5)
GLUCOSE BLD STRIP.AUTO-MCNC: 290 MG/DL (ref 65–100)
GLUCOSE BLD STRIP.AUTO-MCNC: 312 MG/DL (ref 65–100)
GLUCOSE SERPL-MCNC: 306 MG/DL (ref 65–100)
HCT VFR BLD AUTO: 36.5 % (ref 35.8–46.3)
HGB BLD-MCNC: 12.3 G/DL (ref 11.7–15.4)
IMM GRANULOCYTES # BLD AUTO: 0.5 K/UL (ref 0–0.5)
IMM GRANULOCYTES NFR BLD AUTO: 4 % (ref 0–5)
LYMPHOCYTES # BLD: 2.4 K/UL (ref 0.5–4.6)
LYMPHOCYTES NFR BLD: 21 % (ref 13–44)
MCH RBC QN AUTO: 29.2 PG (ref 26.1–32.9)
MCHC RBC AUTO-ENTMCNC: 33.7 G/DL (ref 31.4–35)
MCV RBC AUTO: 86.7 FL (ref 79.6–97.8)
MONOCYTES # BLD: 0.8 K/UL (ref 0.1–1.3)
MONOCYTES NFR BLD: 7 % (ref 4–12)
NEUTS SEG # BLD: 7.1 K/UL (ref 1.7–8.2)
NEUTS SEG NFR BLD: 63 % (ref 43–78)
NRBC # BLD: 0 K/UL (ref 0–0.2)
PLATELET # BLD AUTO: 263 K/UL (ref 150–450)
PMV BLD AUTO: 10.3 FL (ref 9.4–12.3)
POTASSIUM SERPL-SCNC: 4.8 MMOL/L (ref 3.5–5.1)
PROT SERPL-MCNC: 7.4 G/DL (ref 6.3–8.2)
RBC # BLD AUTO: 4.21 M/UL (ref 4.05–5.2)
SERVICE CMNT-IMP: ABNORMAL
SERVICE CMNT-IMP: ABNORMAL
SODIUM SERPL-SCNC: 132 MMOL/L (ref 136–145)
WBC # BLD AUTO: 11.3 K/UL (ref 4.3–11.1)

## 2022-06-12 PROCEDURE — 70450 CT HEAD/BRAIN W/O DYE: CPT

## 2022-06-12 PROCEDURE — 82962 GLUCOSE BLOOD TEST: CPT

## 2022-06-12 PROCEDURE — 2580000003 HC RX 258: Performed by: STUDENT IN AN ORGANIZED HEALTH CARE EDUCATION/TRAINING PROGRAM

## 2022-06-12 PROCEDURE — 80053 COMPREHEN METABOLIC PANEL: CPT

## 2022-06-12 PROCEDURE — 6370000000 HC RX 637 (ALT 250 FOR IP): Performed by: STUDENT IN AN ORGANIZED HEALTH CARE EDUCATION/TRAINING PROGRAM

## 2022-06-12 PROCEDURE — 99284 EMERGENCY DEPT VISIT MOD MDM: CPT

## 2022-06-12 PROCEDURE — 81015 MICROSCOPIC EXAM OF URINE: CPT

## 2022-06-12 PROCEDURE — 96360 HYDRATION IV INFUSION INIT: CPT

## 2022-06-12 PROCEDURE — 72125 CT NECK SPINE W/O DYE: CPT

## 2022-06-12 PROCEDURE — 85025 COMPLETE CBC W/AUTO DIFF WBC: CPT

## 2022-06-12 RX ORDER — CEPHALEXIN 500 MG/1
500 CAPSULE ORAL 3 TIMES DAILY
Qty: 15 CAPSULE | Refills: 0 | Status: SHIPPED | OUTPATIENT
Start: 2022-06-12 | End: 2022-06-17

## 2022-06-12 RX ORDER — 0.9 % SODIUM CHLORIDE 0.9 %
500 INTRAVENOUS SOLUTION INTRAVENOUS
Status: COMPLETED | OUTPATIENT
Start: 2022-06-12 | End: 2022-06-12

## 2022-06-12 RX ORDER — CEPHALEXIN 500 MG/1
500 CAPSULE ORAL
Status: COMPLETED | OUTPATIENT
Start: 2022-06-12 | End: 2022-06-12

## 2022-06-12 RX ADMIN — SODIUM CHLORIDE 500 ML: 900 INJECTION, SOLUTION INTRAVENOUS at 23:15

## 2022-06-12 RX ADMIN — CEPHALEXIN 500 MG: 500 CAPSULE ORAL at 23:16

## 2022-06-12 ASSESSMENT — ENCOUNTER SYMPTOMS
COUGH: 0
SHORTNESS OF BREATH: 0
SORE THROAT: 0
PHOTOPHOBIA: 0
VOMITING: 0
NAUSEA: 0
DIARRHEA: 0

## 2022-06-12 ASSESSMENT — PAIN - FUNCTIONAL ASSESSMENT: PAIN_FUNCTIONAL_ASSESSMENT: NONE - DENIES PAIN

## 2022-06-13 VITALS
SYSTOLIC BLOOD PRESSURE: 135 MMHG | HEART RATE: 64 BPM | OXYGEN SATURATION: 96 % | HEIGHT: 57 IN | RESPIRATION RATE: 18 BRPM | TEMPERATURE: 98.3 F | WEIGHT: 111 LBS | BODY MASS INDEX: 23.95 KG/M2 | DIASTOLIC BLOOD PRESSURE: 75 MMHG

## 2022-06-13 LAB
BACTERIA URNS QL MICRO: ABNORMAL /HPF
CASTS URNS QL MICRO: 0 /LPF
EPI CELLS #/AREA URNS HPF: 0 /HPF
RBC #/AREA URNS HPF: ABNORMAL /HPF
WBC URNS QL MICRO: ABNORMAL /HPF

## 2022-06-13 NOTE — ED PROVIDER NOTES
[employer] Emergency Department Provider Note                   PCP:                Robb Chen MD               Age: 80 y.o. Sex: female       ICD-10-CM    1. Acute cystitis without hematuria  N30.00    2. Dehydration  E86.0        DISPOSITION Decision To Discharge 06/12/2022 11:24:20 PM       New Prescriptions    CEPHALEXIN (KEFLEX) 500 MG CAPSULE    Take 1 capsule by mouth 3 times daily for 5 days       Orders Placed This Encounter   Procedures    CT HEAD WO CONTRAST    CT CERVICAL SPINE WO CONTRAST    CBC with Auto Differential    CMP    Urinalysis, Micro    POCT Urine Dipstick    POCT Glucose    POCT Glucose    POCT Glucose        Fleeta Mouse, DO 11:59 PM      MDM  Number of Diagnoses or Management Options  Acute cystitis without hematuria  Dehydration  Diagnosis management comments: 26-year-old female presents to the emergency department from assisted living. Patient had a fall 2 days ago. Was found to have elevated glucose today. Glucose in the 400s per EMS. Was given 500 cc of IV fluid. Will obtain a broad-based work-up. Labs show 11.3, stable h and h, normal electrolytes, bun 31, creatinine 1.21, patient's creatinine was 1 most recently. Patient given additional 500 cc IV fluid here in the emergency department, Normal LFTs, urinalysis shows evidence of urinary tract infection. Head CT as well as cervical spine CT did not reveal any emergent findings since patient is a fall 2 days ago. Patient given first dose of Keflex here in the ER. Given prescription for 5 days of Keflex 3 times daily. Advised to continue to orally hydrate with clear liquids. Glucose was improved to 90 with IV fluids only. Advised to follow-up with primary care physician within 1 week. Given strict return precautions. She voiced understanding and agreement with this plan.        Amount and/or Complexity of Data Reviewed  Clinical lab tests: ordered and reviewed  Tests in the radiology section of CPT®: ordered and reviewed  Independent visualization of images, tracings, or specimens: yes    Risk of Complications, Morbidity, and/or Mortality  Presenting problems: moderate  Diagnostic procedures: moderate  Management options: dee Shen is a 80 y.o. female who presents to the Emergency Department with chief complaint of    Chief Complaint   Patient presents with    Hyperglycemia      80-year-old female presents to the emergency department from assisted living with concern for elevated glucose. States that this is living, staff members administer her medications. Found her to have elevated glucose and sent her to the emergency department. Patient reports history of falls occur intermittently, states she fell 2 days ago, is on Eliquis. Denies loss of consciousness. States she tripped causing her to fall forward. Did not lose consciousness. Reports she has been moving all extremities normally. Denies any symptoms. Denies nausea, vomiting, diarrhea, abdominal pain, chest pain, shortness of breath, numbness or weakness in extremities. Denies headache. Review of Systems   Constitutional: Negative for chills and fever. HENT: Negative for sore throat. Eyes: Negative for photophobia. Respiratory: Negative for cough and shortness of breath. Cardiovascular: Negative for chest pain. Gastrointestinal: Negative for diarrhea, nausea and vomiting. Genitourinary: Negative for dysuria. Musculoskeletal: Negative for neck pain and neck stiffness. Skin: Positive for wound. Negative for rash. Neurological: Negative for syncope and headaches. Psychiatric/Behavioral: Negative for confusion. All other systems reviewed and are negative.       Past Medical History:   Diagnosis Date    Acute kidney failure ([facility] [address] 05/2016    Acute pericarditis 2015    Arthritis     CAP (community acquired pneumonia) 5/2016    Coronary artery disease     Crohn's disease ([facility] [address]     Hypercalcemia  Hypercholesterolemia     Hypertension     Lumbar compression fracture (HCC)     Osteoporosis     Type 2 diabetes mellitus ([facility] [address]         Past Surgical History:   Procedure Laterality Date    ARTHROPLASTY  1/5/2016    left elbow    CARDIAC CATHETERIZATION  5/12/2015    no intervention    FIXATION KYPHOPLASTY      2002    HYSTERECTOMY (CERVIX STATUS UNKNOWN)  1978        Family History   Problem Relation Age of Onset    Headache Neg Hx     Hypertension Neg Hx     Elevated Lipids Neg Hx     Bleeding Prob Neg Hx     Asthma Neg Hx     Rheum Arthritis Neg Hx     Alcohol Abuse Neg Hx     Cancer Father         throat    Diabetes Mother     Heart Failure Mother     Thyroid Disease Neg Hx     Mental Retardation Neg Hx     Stroke Neg Hx     Lung Disease Neg Hx     Migraines Neg Hx     Psychiatric Disorder Neg Hx            Social Connections:     Frequency of Communication with Friends and Family: Not on file    Frequency of Social Gatherings with Friends and Family: Not on file    Attends Gnosticism Services: Not on file    Active Member of Clubs or Organizations: Not on file    Attends Club or Organization Meetings: Not on file    Marital Status: Not on file        Allergies   Allergen Reactions    Sulfa Antibiotics Rash        Vitals signs and nursing note reviewed. Patient Vitals for the past 4 hrs:   Temp Pulse Resp BP SpO2   06/12/22 2215 -- 68 18 135/75 97 %   06/12/22 2101 98.4 °F (36.9 °C) 67 18 134/69 98 %          Physical Exam  Vitals and nursing note reviewed. Constitutional:       General: She is not in acute distress. Appearance: Normal appearance. HENT:      Head: Normocephalic. Comments: Ecchymosis to the right lower mandibular region, involving the right lower lip including the mucosal aspect of the right lower lip. Teeth align well. No evidence of but mandibular fracture.     Tooth #7, lateral aspect with small chip     Nose: Nose normal.      Mouth/Throat: components within normal limits   POCT GLUCOSE - Abnormal; Notable for the following components:    POC Glucose 290 (*)     All other components within normal limits   URINALYSIS, MICRO   POCT GLUCOSE        CT HEAD WO CONTRAST   Final Result      1. No CT evidence of acute intracranial abnormality. 2. Stable chronic findings. CT CERVICAL SPINE WO CONTRAST   Final Result      1. No acute fracture or dislocation in the cervical spine. 2. Moderate degenerative changes. 3. Osteopenia. Voice dictation software was used during the making of this note. This software is not perfect and grammatical and other typographical errors may be present. This note has not been completely proofread for errors.         Melina Garcia DO  06/13/22 0002

## 2022-06-13 NOTE — ED NOTES
I have reviewed discharge instructions with the patient. The patient verbalized understanding. Patient left ED via Discharge Method: stretcher to Home with thron. Opportunity for questions and clarification provided. Patient given 1 scripts. To continue your aftercare when you leave the hospital, you may receive an automated call from our care team to check in on how you are doing. This is a free service and part of our promise to provide the best care and service to meet your aftercare needs.  If you have questions, or wish to unsubscribe from this service please call [phone]. Thank you for Choosing our [facility] Emergency Department.           Tay Delatorre RN  06/13/22 5698 details…

## 2022-06-15 ENCOUNTER — TELEPHONE (OUTPATIENT)
Dept: ORTHOPEDIC SURGERY | Age: 87
End: 2022-06-15

## 2022-06-15 NOTE — TELEPHONE ENCOUNTER
Returned call. Ofe gone for the day and will not be back until 9 am Thursday. No voice mail. She can try us back tomorrow. We are sending the order for her quad cane elsewhere. We called to check on status and original provider was not in network with her  Insurance.

## 2022-06-21 ENCOUNTER — TELEPHONE (OUTPATIENT)
Dept: ORTHOPEDIC SURGERY | Age: 87
End: 2022-06-21

## 2022-06-21 NOTE — TELEPHONE ENCOUNTER
She is calling regarding an order for a quad cane. She is wondering when the patient will receive that item. Please call. If you can't get through you can call her cell at [phone].

## 2023-05-16 ENCOUNTER — APPOINTMENT (OUTPATIENT)
Dept: CT IMAGING | Age: 88
DRG: 683 | End: 2023-05-16
Payer: MEDICARE

## 2023-05-16 ENCOUNTER — HOSPITAL ENCOUNTER (INPATIENT)
Age: 88
LOS: 4 days | Discharge: NURSING, REHAB OR LONG-TERM CARE | DRG: 683 | End: 2023-05-20
Attending: STUDENT IN AN ORGANIZED HEALTH CARE EDUCATION/TRAINING PROGRAM | Admitting: FAMILY MEDICINE
Payer: MEDICARE

## 2023-05-16 DIAGNOSIS — E87.1 HYPONATREMIA: ICD-10-CM

## 2023-05-16 DIAGNOSIS — R73.9 HYPERGLYCEMIA: ICD-10-CM

## 2023-05-16 DIAGNOSIS — N20.0 NEPHROLITHIASIS: ICD-10-CM

## 2023-05-16 DIAGNOSIS — N17.9 ACUTE RENAL FAILURE, UNSPECIFIED ACUTE RENAL FAILURE TYPE (HCC): Primary | ICD-10-CM

## 2023-05-16 LAB
ALBUMIN SERPL-MCNC: 3.1 G/DL (ref 3.2–4.6)
ALBUMIN/GLOB SERPL: 0.9 (ref 0.4–1.6)
ALP SERPL-CCNC: 109 U/L (ref 50–136)
ALT SERPL-CCNC: 23 U/L (ref 12–65)
ANION GAP SERPL CALC-SCNC: 8 MMOL/L (ref 2–11)
APPEARANCE UR: CLEAR
ARTERIAL PATENCY WRIST A: ABNORMAL
AST SERPL-CCNC: 6 U/L (ref 15–37)
BACTERIA URNS QL MICRO: ABNORMAL /HPF
BASE DEFICIT BLDV-SCNC: 8 MMOL/L
BASOPHILS # BLD: 0.1 K/UL (ref 0–0.2)
BASOPHILS NFR BLD: 1 % (ref 0–2)
BILIRUB SERPL-MCNC: 0.4 MG/DL (ref 0.2–1.1)
BILIRUB UR QL: NEGATIVE
BUN SERPL-MCNC: 76 MG/DL (ref 8–23)
CALCIUM SERPL-MCNC: 8.7 MG/DL (ref 8.3–10.4)
CHLORIDE SERPL-SCNC: 100 MMOL/L (ref 101–110)
CO2 SERPL-SCNC: 19 MMOL/L (ref 21–32)
COLOR UR: ABNORMAL
CREAT SERPL-MCNC: 6.68 MG/DL (ref 0.6–1)
CREAT UR-MCNC: 43 MG/DL
DIFFERENTIAL METHOD BLD: ABNORMAL
EOSINOPHIL # BLD: 0.3 K/UL (ref 0–0.8)
EOSINOPHIL NFR BLD: 3 % (ref 0.5–7.8)
EPI CELLS #/AREA URNS HPF: ABNORMAL /HPF
ERYTHROCYTE [DISTWIDTH] IN BLOOD BY AUTOMATED COUNT: 14 % (ref 11.9–14.6)
EST. AVERAGE GLUCOSE BLD GHB EST-MCNC: 217 MG/DL
GAS FLOW.O2 O2 DELIVERY SYS: ABNORMAL
GLOBULIN SER CALC-MCNC: 3.3 G/DL (ref 2.8–4.5)
GLUCOSE BLD STRIP.AUTO-MCNC: 265 MG/DL (ref 65–100)
GLUCOSE BLD STRIP.AUTO-MCNC: 332 MG/DL (ref 65–100)
GLUCOSE SERPL-MCNC: 327 MG/DL (ref 65–100)
GLUCOSE UR STRIP.AUTO-MCNC: NEGATIVE MG/DL
HBA1C MFR BLD: 9.2 % (ref 4.8–5.6)
HCO3 BLDV-SCNC: 18.6 MMOL/L (ref 23–28)
HCT VFR BLD AUTO: 33.4 % (ref 35.8–46.3)
HGB BLD-MCNC: 10.8 G/DL (ref 11.7–15.4)
HGB UR QL STRIP: NEGATIVE
IMM GRANULOCYTES # BLD AUTO: 0.4 K/UL (ref 0–0.5)
IMM GRANULOCYTES NFR BLD AUTO: 4 % (ref 0–5)
KETONES UR QL STRIP.AUTO: NEGATIVE MG/DL
LEUKOCYTE ESTERASE UR QL STRIP.AUTO: ABNORMAL
LYMPHOCYTES # BLD: 1.7 K/UL (ref 0.5–4.6)
LYMPHOCYTES NFR BLD: 17 % (ref 13–44)
MCH RBC QN AUTO: 28.6 PG (ref 26.1–32.9)
MCHC RBC AUTO-ENTMCNC: 32.3 G/DL (ref 31.4–35)
MCV RBC AUTO: 88.6 FL (ref 82–102)
MONOCYTES # BLD: 0.6 K/UL (ref 0.1–1.3)
MONOCYTES NFR BLD: 6 % (ref 4–12)
NEUTS SEG # BLD: 6.8 K/UL (ref 1.7–8.2)
NEUTS SEG NFR BLD: 69 % (ref 43–78)
NITRITE UR QL STRIP.AUTO: NEGATIVE
NRBC # BLD: 0 K/UL (ref 0–0.2)
PCO2 BLDV: 41.4 MMHG (ref 41–51)
PH BLDV: 7.26 (ref 7.32–7.42)
PH UR STRIP: 5 (ref 5–9)
PLATELET # BLD AUTO: 274 K/UL (ref 150–450)
PMV BLD AUTO: 10.3 FL (ref 9.4–12.3)
PO2 BLDV: 26 MMHG
POTASSIUM SERPL-SCNC: 4.7 MMOL/L (ref 3.5–5.1)
PROT SERPL-MCNC: 6.4 G/DL (ref 6.3–8.2)
PROT UR STRIP-MCNC: NEGATIVE MG/DL
PROT UR-MCNC: 11 MG/DL
PROT/CREAT UR-RTO: 0.3
RBC # BLD AUTO: 3.77 M/UL (ref 4.05–5.2)
RBC #/AREA URNS HPF: ABNORMAL /HPF
SAO2 % BLDV: 39.6 % (ref 65–88)
SERVICE CMNT-IMP: ABNORMAL
SODIUM SERPL-SCNC: 127 MMOL/L (ref 133–143)
SODIUM UR-SCNC: 65 MMOL/L
SP GR UR REFRACTOMETRY: 1.01 (ref 1–1.02)
SPECIMEN TYPE: ABNORMAL
UROBILINOGEN UR QL STRIP.AUTO: 0.2 EU/DL (ref 0.2–1)
WBC # BLD AUTO: 9.9 K/UL (ref 4.3–11.1)
WBC URNS QL MICRO: ABNORMAL /HPF
YEAST URNS QL MICRO: ABNORMAL

## 2023-05-16 PROCEDURE — 6360000002 HC RX W HCPCS: Performed by: FAMILY MEDICINE

## 2023-05-16 PROCEDURE — 99285 EMERGENCY DEPT VISIT HI MDM: CPT

## 2023-05-16 PROCEDURE — 96360 HYDRATION IV INFUSION INIT: CPT

## 2023-05-16 PROCEDURE — 82570 ASSAY OF URINE CREATININE: CPT

## 2023-05-16 PROCEDURE — 84156 ASSAY OF PROTEIN URINE: CPT

## 2023-05-16 PROCEDURE — 2500000003 HC RX 250 WO HCPCS: Performed by: FAMILY MEDICINE

## 2023-05-16 PROCEDURE — 2580000003 HC RX 258: Performed by: STUDENT IN AN ORGANIZED HEALTH CARE EDUCATION/TRAINING PROGRAM

## 2023-05-16 PROCEDURE — 87186 SC STD MICRODIL/AGAR DIL: CPT

## 2023-05-16 PROCEDURE — 82962 GLUCOSE BLOOD TEST: CPT

## 2023-05-16 PROCEDURE — 82803 BLOOD GASES ANY COMBINATION: CPT

## 2023-05-16 PROCEDURE — 83036 HEMOGLOBIN GLYCOSYLATED A1C: CPT

## 2023-05-16 PROCEDURE — 2580000003 HC RX 258: Performed by: FAMILY MEDICINE

## 2023-05-16 PROCEDURE — 6370000000 HC RX 637 (ALT 250 FOR IP): Performed by: FAMILY MEDICINE

## 2023-05-16 PROCEDURE — 96361 HYDRATE IV INFUSION ADD-ON: CPT

## 2023-05-16 PROCEDURE — 87086 URINE CULTURE/COLONY COUNT: CPT

## 2023-05-16 PROCEDURE — 81001 URINALYSIS AUTO W/SCOPE: CPT

## 2023-05-16 PROCEDURE — 83930 ASSAY OF BLOOD OSMOLALITY: CPT

## 2023-05-16 PROCEDURE — 84300 ASSAY OF URINE SODIUM: CPT

## 2023-05-16 PROCEDURE — 2580000003 HC RX 258: Performed by: PHYSICIAN ASSISTANT

## 2023-05-16 PROCEDURE — 85025 COMPLETE CBC W/AUTO DIFF WBC: CPT

## 2023-05-16 PROCEDURE — 80053 COMPREHEN METABOLIC PANEL: CPT

## 2023-05-16 PROCEDURE — 87088 URINE BACTERIA CULTURE: CPT

## 2023-05-16 PROCEDURE — 74176 CT ABD & PELVIS W/O CONTRAST: CPT

## 2023-05-16 PROCEDURE — 36415 COLL VENOUS BLD VENIPUNCTURE: CPT

## 2023-05-16 PROCEDURE — 51702 INSERT TEMP BLADDER CATH: CPT

## 2023-05-16 PROCEDURE — 1100000000 HC RM PRIVATE

## 2023-05-16 RX ORDER — ACETAMINOPHEN 650 MG/1
650 SUPPOSITORY RECTAL EVERY 6 HOURS PRN
Status: DISCONTINUED | OUTPATIENT
Start: 2023-05-16 | End: 2023-05-20 | Stop reason: HOSPADM

## 2023-05-16 RX ORDER — HEPARIN SODIUM 5000 [USP'U]/ML
5000 INJECTION, SOLUTION INTRAVENOUS; SUBCUTANEOUS EVERY 8 HOURS SCHEDULED
Status: DISCONTINUED | OUTPATIENT
Start: 2023-05-16 | End: 2023-05-20

## 2023-05-16 RX ORDER — ONDANSETRON 2 MG/ML
4 INJECTION INTRAMUSCULAR; INTRAVENOUS EVERY 6 HOURS PRN
Status: DISCONTINUED | OUTPATIENT
Start: 2023-05-16 | End: 2023-05-20 | Stop reason: HOSPADM

## 2023-05-16 RX ORDER — INSULIN GLARGINE 100 [IU]/ML
0.15 INJECTION, SOLUTION SUBCUTANEOUS NIGHTLY
Status: DISCONTINUED | OUTPATIENT
Start: 2023-05-16 | End: 2023-05-17

## 2023-05-16 RX ORDER — INSULIN GLARGINE 100 [IU]/ML
25 INJECTION, SOLUTION SUBCUTANEOUS NIGHTLY
COMMUNITY
Start: 2020-09-16

## 2023-05-16 RX ORDER — 0.9 % SODIUM CHLORIDE 0.9 %
1000 INTRAVENOUS SOLUTION INTRAVENOUS
Status: COMPLETED | OUTPATIENT
Start: 2023-05-16 | End: 2023-05-16

## 2023-05-16 RX ORDER — INSULIN LISPRO 100 [IU]/ML
0-4 INJECTION, SOLUTION INTRAVENOUS; SUBCUTANEOUS NIGHTLY
Status: DISCONTINUED | OUTPATIENT
Start: 2023-05-16 | End: 2023-05-20 | Stop reason: HOSPADM

## 2023-05-16 RX ORDER — DEXTROSE MONOHYDRATE 100 MG/ML
INJECTION, SOLUTION INTRAVENOUS CONTINUOUS PRN
Status: DISCONTINUED | OUTPATIENT
Start: 2023-05-16 | End: 2023-05-20 | Stop reason: HOSPADM

## 2023-05-16 RX ORDER — SODIUM CHLORIDE 0.9 % (FLUSH) 0.9 %
5-40 SYRINGE (ML) INJECTION PRN
Status: DISCONTINUED | OUTPATIENT
Start: 2023-05-16 | End: 2023-05-20 | Stop reason: HOSPADM

## 2023-05-16 RX ORDER — SODIUM CHLORIDE 0.9 % (FLUSH) 0.9 %
5-40 SYRINGE (ML) INJECTION EVERY 12 HOURS SCHEDULED
Status: DISCONTINUED | OUTPATIENT
Start: 2023-05-16 | End: 2023-05-20 | Stop reason: HOSPADM

## 2023-05-16 RX ORDER — ONDANSETRON 4 MG/1
4 TABLET, ORALLY DISINTEGRATING ORAL EVERY 8 HOURS PRN
Status: DISCONTINUED | OUTPATIENT
Start: 2023-05-16 | End: 2023-05-20 | Stop reason: HOSPADM

## 2023-05-16 RX ORDER — INSULIN LISPRO 100 [IU]/ML
0-8 INJECTION, SOLUTION INTRAVENOUS; SUBCUTANEOUS
Status: DISCONTINUED | OUTPATIENT
Start: 2023-05-17 | End: 2023-05-20 | Stop reason: HOSPADM

## 2023-05-16 RX ORDER — ACETAMINOPHEN 325 MG/1
650 TABLET ORAL EVERY 6 HOURS PRN
Status: DISCONTINUED | OUTPATIENT
Start: 2023-05-16 | End: 2023-05-20 | Stop reason: HOSPADM

## 2023-05-16 RX ORDER — GLIPIZIDE 5 MG/1
TABLET ORAL
Status: ON HOLD | COMMUNITY
End: 2023-05-20

## 2023-05-16 RX ORDER — 0.9 % SODIUM CHLORIDE 0.9 %
1000 INTRAVENOUS SOLUTION INTRAVENOUS ONCE
Status: COMPLETED | OUTPATIENT
Start: 2023-05-16 | End: 2023-05-16

## 2023-05-16 RX ORDER — SODIUM CHLORIDE 9 MG/ML
INJECTION, SOLUTION INTRAVENOUS PRN
Status: DISCONTINUED | OUTPATIENT
Start: 2023-05-16 | End: 2023-05-20 | Stop reason: HOSPADM

## 2023-05-16 RX ADMIN — SODIUM CHLORIDE 1000 ML: 9 INJECTION, SOLUTION INTRAVENOUS at 15:01

## 2023-05-16 RX ADMIN — SODIUM CHLORIDE 1000 ML: 9 INJECTION, SOLUTION INTRAVENOUS at 15:02

## 2023-05-16 RX ADMIN — SODIUM BICARBONATE: 84 INJECTION, SOLUTION INTRAVENOUS at 20:54

## 2023-05-16 RX ADMIN — SODIUM CHLORIDE, PRESERVATIVE FREE 10 ML: 5 INJECTION INTRAVENOUS at 22:20

## 2023-05-16 RX ADMIN — HEPARIN SODIUM 5000 UNITS: 5000 INJECTION INTRAVENOUS; SUBCUTANEOUS at 21:03

## 2023-05-16 RX ADMIN — INSULIN GLARGINE 8 UNITS: 100 INJECTION, SOLUTION SUBCUTANEOUS at 22:19

## 2023-05-16 RX ADMIN — TUBERCULIN PURIFIED PROTEIN DERIVATIVE 5 UNITS: 5 INJECTION, SOLUTION INTRADERMAL at 21:04

## 2023-05-16 ASSESSMENT — PAIN - FUNCTIONAL ASSESSMENT: PAIN_FUNCTIONAL_ASSESSMENT: 0-10

## 2023-05-16 ASSESSMENT — ENCOUNTER SYMPTOMS
VOMITING: 0
SORE THROAT: 0
BACK PAIN: 0
RHINORRHEA: 0
SHORTNESS OF BREATH: 0
CHEST TIGHTNESS: 0
NAUSEA: 0
EYE REDNESS: 0
DIARRHEA: 0
ABDOMINAL DISTENTION: 0
COUGH: 0

## 2023-05-16 ASSESSMENT — PAIN SCALES - GENERAL
PAINLEVEL_OUTOF10: 0
PAINLEVEL_OUTOF10: 0

## 2023-05-16 ASSESSMENT — LIFESTYLE VARIABLES
HOW MANY STANDARD DRINKS CONTAINING ALCOHOL DO YOU HAVE ON A TYPICAL DAY: PATIENT DOES NOT DRINK
HOW OFTEN DO YOU HAVE A DRINK CONTAINING ALCOHOL: NEVER

## 2023-05-16 NOTE — CARE COORDINATION
SW met with patient who states that she's from [facility] where she's resided for the last year. Patient's daughter Stefany George is local and supportive. Patient is seen by the provider at her facility for primary care. Patient uses a cane or walker to ambulate, is independent in her ADLs, and denies any falls. SW will continue to follow ER workup for new or additional needs. Dispo pending.      Duyen Bain LMSW    [address] [facility]

## 2023-05-16 NOTE — H&P
Hospitalist History and Physical   Admit Date:  2023  2:31 PM   Name:  Bao Arevalo   Age:  80 y.o. Sex:  female  :  1934   MRN:  [id number]   Room:  HA/A    Presenting/Chief Complaint: Hyperglycemia     Reason(s) for Admission: No admission diagnoses are documented for this encounter. History of Present Illness:   Bao Arevalo is a 80 y.o. female with medical history of CAD, Crohn's, HTN who presented via EMS with hyperglycemia from assisted living facility associated with lightheadedness and loose stools of 1 day duration. Her BG was found to be in 400 range and her BP was running low therefore she was sent to the ED. Pt was alert and oriented x3 on admission, was able to tell me she came from [facility] in [facility]. She was not able to recall which medications she takes at [facility]. She denies fever, chills, SOB, chest pain, abdominal pain, N/V. In ED, Cr 6.68. Na 127, . She was given 2L NS boluses in ED. Assessment & Plan:     Acute Renal Failure  Cr 6.68 on admission (baseline <1 in  but was 1.21 in 2022)  Avoid nephrotoxic meds  Accurate I&O's--insert stallworth  Holding home diuretics/ACE-I/ARB  MIVF with bicarb gtt  Obtain UA, Urine studies and CT AP to r/o obstruction  Consult nephrology, appreciate recs    Metabolic acidosis  Most likely d/t acute renal failure  Initiate bicarb gtt  Check VBG    Hyponatremia, chronic  Na on admission 127, corrected with BG up to 131, which appears to be at her baseline  Hold home Aldactone  Cont miVF  Obtain urine sodium, urine osmol, serum osmol  F/u BMP    Hyperglycemia  DMII  Pt reported taking insulin at [facility] but unsure amount  Check hA1C  Start SSI  Diabetic diet  Consult diabetes management    Hx of HTN  Hold home Coreg, Norvasc, Aldactone    GERD  Cont home PPI    Attempted to call [facility] in [facility] first [phone] and then [phone] to verify home med list but unable to get in touch with staff.  Communication

## 2023-05-16 NOTE — ED TRIAGE NOTES
Pt is from [facility] in [facility]. Pt was complaining to staff this morning about being dizzy and lightheaded. Blood sugar was 481. When EMS arrived BGL was 385. IV started in [facility] and IVF started. Total of 800 ml given.  BP 98/58

## 2023-05-16 NOTE — ED PROVIDER NOTES
Glucose 327 (*)     BUN 76 (*)     Creatinine 6.68 (*)     Est, Glom Filt Rate 6 (*)     AST 6 (*)     Albumin 3.1 (*)     All other components within normal limits   POCT GLUCOSE - Abnormal; Notable for the following components:    POC Glucose 332 (*)     All other components within normal limits   URINALYSIS   SODIUM, URINE, RANDOM   PROTEIN / CREATININE RATIO, URINE   BLOOD GAS, VENOUS   HEMOGLOBIN A1C   POCT GLUCOSE   Labs show that she has a stable hemoglobin however glucose of 327, corrected sodium of 131, she does appear to be in acute renal failure with a BUN of 76 and creatinine of 6.68. Most recent previous labs on record are from last June and shows normal range of BUN and creatinine. Patient denies any history of kidney failure and reports that she still urinates daily. Denies any dysuria or increased urinary urgency or frequency. Attempt made to get urine sample however she had large bowel movement and RN concerned for contamination. Will plan for straight cath. Spoke with , hospitalist, who accepts patient for admission. The patient was admitted and I have discussed patient management with the admitting provider. Risk of Complications and/or Morbidity of Patient Management:  Prescription drug management performed. History      Tao Rosa is a 80 y.o. female who presents to the Emergency Department with chief complaint of    Chief Complaint   Patient presents with    Hyperglycemia      Patient is an 42-year-old female with history of diabetes who presents via EMS for the complaint of hyperglycemia. She lives in an assisted living facility and when she went down to get breakfast this morning she noticed that she felt slightly lightheaded. Thought that maybe her blood sugar could be low however as the day went on and she ate breakfast she was still feeling slightly lightheaded.   She told one of the workers and they checked her sugar and found it to be in the 400 range and

## 2023-05-16 NOTE — ACP (ADVANCE CARE PLANNING)
[facility] Hospitalist Service  At the heart of better care     Advance Care Planning   Admit Date:  2023  2:31 PM   Name:  Darrin Lee   Age:  80 y.o. Sex:  female  :  1934   MRN:  [id number]   Room:  [facility]/A    Darrin Lee is able to make her own decisions:   Yes    If pt unable to make decisions, POA/surrogate decision maker:  Sister in [facility]    Pt was alert and oriented x3. She was adamant about DNR/DNI. When discussing ACLS/BLS protocol, pt stated \"I don't want any of that, just let me go peacefully. \"    Patient or surrogate consented to discussion of the current conditions, workup, management plans, prognosis, and the risk for further deterioration. Time spent: 17 minutes in direct discussion. Signed:   Montana Yoo DO

## 2023-05-16 NOTE — ED NOTES
TRANSFER - OUT REPORT:    Verbal report given to Hector Hawthorne  being transferred to [facility] for routine progression of patient care       Report consisted of patient's Situation, Background, Assessment and   Recommendations(SBAR). Information from the following report(s) Nurse Handoff Report was reviewed with the receiving nurse. Equality Assessment: Presents to emergency department  because of falls (Syncope, seizure, or loss of consciousness): No, Age > 79: No, Altered Mental Status, Intoxication with alcohol or substance confusion (Disorientation, impaired judgment, poor safety awaremess, or inability to follow instructions): No, Impaired Mobility: Ambulates or transfers with assistive devices or assistance; Unable to ambulate or transer.: No, Nursing Judgement: No  Lines:   Peripheral IV 05/16/23 Left Antecubital (Active)        Opportunity for questions and clarification was provided.       Patient transported with:  Registered Nurse          Montrell Ly RN  05/16/23 3534

## 2023-05-17 PROBLEM — D62 ACUTE BLOOD LOSS ANEMIA: Status: RESOLVED | Noted: 2021-03-07 | Resolved: 2023-05-17

## 2023-05-17 PROBLEM — Z79.899 ENCOUNTER FOR MEDICATION MANAGEMENT: Status: RESOLVED | Noted: 2018-06-11 | Resolved: 2023-05-17

## 2023-05-17 PROBLEM — B96.20 E COLI BACTEREMIA: Status: RESOLVED | Noted: 2021-11-17 | Resolved: 2023-05-17

## 2023-05-17 PROBLEM — R78.81 E COLI BACTEREMIA: Status: RESOLVED | Noted: 2021-11-17 | Resolved: 2023-05-17

## 2023-05-17 PROBLEM — I50.30 HEART FAILURE WITH PRESERVED LEFT VENTRICULAR FUNCTION (HFPEF) (HCC): Status: ACTIVE | Noted: 2021-11-19

## 2023-05-17 PROBLEM — R53.81 DEBILITY: Status: ACTIVE | Noted: 2021-10-12

## 2023-05-17 PROBLEM — N30.00 ACUTE CYSTITIS WITHOUT HEMATURIA: Status: RESOLVED | Noted: 2021-03-04 | Resolved: 2023-05-17

## 2023-05-17 PROBLEM — A41.9 SEVERE SEPSIS (HCC): Status: RESOLVED | Noted: 2021-11-17 | Resolved: 2023-05-17

## 2023-05-17 PROBLEM — N12 PYELONEPHRITIS: Status: RESOLVED | Noted: 2021-11-14 | Resolved: 2023-05-17

## 2023-05-17 PROBLEM — J81.1 PULMONARY EDEMA: Status: RESOLVED | Noted: 2021-11-16 | Resolved: 2023-05-17

## 2023-05-17 PROBLEM — N39.0 UTI (URINARY TRACT INFECTION): Status: RESOLVED | Noted: 2021-03-07 | Resolved: 2023-05-17

## 2023-05-17 PROBLEM — R65.20 SEVERE SEPSIS (HCC): Status: RESOLVED | Noted: 2021-11-17 | Resolved: 2023-05-17

## 2023-05-17 LAB
ALBUMIN SERPL-MCNC: 2.9 G/DL (ref 3.2–4.6)
ALBUMIN/GLOB SERPL: 0.9 (ref 0.4–1.6)
ALP SERPL-CCNC: 112 U/L (ref 50–130)
ALT SERPL-CCNC: 25 U/L (ref 12–65)
ANION GAP SERPL CALC-SCNC: 6 MMOL/L (ref 2–11)
AST SERPL-CCNC: 15 U/L (ref 15–37)
BASOPHILS # BLD: 0.1 K/UL (ref 0–0.2)
BASOPHILS NFR BLD: 1 % (ref 0–2)
BILIRUB SERPL-MCNC: 0.3 MG/DL (ref 0.2–1.1)
BUN SERPL-MCNC: 56 MG/DL (ref 8–23)
CALCIUM SERPL-MCNC: 8.8 MG/DL (ref 8.3–10.4)
CHLORIDE SERPL-SCNC: 104 MMOL/L (ref 101–110)
CO2 SERPL-SCNC: 23 MMOL/L (ref 21–32)
CREAT SERPL-MCNC: 3.45 MG/DL (ref 0.6–1)
DIFFERENTIAL METHOD BLD: ABNORMAL
EOSINOPHIL # BLD: 0.2 K/UL (ref 0–0.8)
EOSINOPHIL NFR BLD: 3 % (ref 0.5–7.8)
ERYTHROCYTE [DISTWIDTH] IN BLOOD BY AUTOMATED COUNT: 13.9 % (ref 11.9–14.6)
GLOBULIN SER CALC-MCNC: 3.2 G/DL (ref 2.8–4.5)
GLUCOSE BLD STRIP.AUTO-MCNC: 271 MG/DL (ref 65–100)
GLUCOSE BLD STRIP.AUTO-MCNC: 293 MG/DL (ref 65–100)
GLUCOSE BLD STRIP.AUTO-MCNC: 341 MG/DL (ref 65–100)
GLUCOSE SERPL-MCNC: 383 MG/DL (ref 65–100)
HCT VFR BLD AUTO: 32.2 % (ref 35.8–46.3)
HGB BLD-MCNC: 10.5 G/DL (ref 11.7–15.4)
IMM GRANULOCYTES # BLD AUTO: 0.2 K/UL (ref 0–0.5)
IMM GRANULOCYTES NFR BLD AUTO: 2 % (ref 0–5)
LYMPHOCYTES # BLD: 1.1 K/UL (ref 0.5–4.6)
LYMPHOCYTES NFR BLD: 12 % (ref 13–44)
MCH RBC QN AUTO: 28.5 PG (ref 26.1–32.9)
MCHC RBC AUTO-ENTMCNC: 32.6 G/DL (ref 31.4–35)
MCV RBC AUTO: 87.5 FL (ref 82–102)
MM INDURATION, POC: 0 MM (ref 0–5)
MONOCYTES # BLD: 0.5 K/UL (ref 0.1–1.3)
MONOCYTES NFR BLD: 5 % (ref 4–12)
NEUTS SEG # BLD: 6.6 K/UL (ref 1.7–8.2)
NEUTS SEG NFR BLD: 77 % (ref 43–78)
NRBC # BLD: 0 K/UL (ref 0–0.2)
OSMOLALITY SERPL: 325 MOSM/KG H2O (ref 280–301)
PLATELET # BLD AUTO: 257 K/UL (ref 150–450)
PMV BLD AUTO: 10.1 FL (ref 9.4–12.3)
POTASSIUM SERPL-SCNC: 4.4 MMOL/L (ref 3.5–5.1)
PPD, POC: NEGATIVE
PROT SERPL-MCNC: 6.1 G/DL (ref 6.3–8.2)
RBC # BLD AUTO: 3.68 M/UL (ref 4.05–5.2)
SERVICE CMNT-IMP: ABNORMAL
SODIUM SERPL-SCNC: 133 MMOL/L (ref 133–143)
WBC # BLD AUTO: 8.6 K/UL (ref 4.3–11.1)

## 2023-05-17 PROCEDURE — 82962 GLUCOSE BLOOD TEST: CPT

## 2023-05-17 PROCEDURE — 97535 SELF CARE MNGMENT TRAINING: CPT

## 2023-05-17 PROCEDURE — 80053 COMPREHEN METABOLIC PANEL: CPT

## 2023-05-17 PROCEDURE — 1100000000 HC RM PRIVATE

## 2023-05-17 PROCEDURE — 6370000000 HC RX 637 (ALT 250 FOR IP): Performed by: FAMILY MEDICINE

## 2023-05-17 PROCEDURE — 97161 PT EVAL LOW COMPLEX 20 MIN: CPT

## 2023-05-17 PROCEDURE — 85025 COMPLETE CBC W/AUTO DIFF WBC: CPT

## 2023-05-17 PROCEDURE — 2580000003 HC RX 258: Performed by: FAMILY MEDICINE

## 2023-05-17 PROCEDURE — 97530 THERAPEUTIC ACTIVITIES: CPT

## 2023-05-17 PROCEDURE — 6360000002 HC RX W HCPCS: Performed by: FAMILY MEDICINE

## 2023-05-17 PROCEDURE — 97165 OT EVAL LOW COMPLEX 30 MIN: CPT

## 2023-05-17 PROCEDURE — 36415 COLL VENOUS BLD VENIPUNCTURE: CPT

## 2023-05-17 PROCEDURE — 2580000003 HC RX 258: Performed by: NURSE PRACTITIONER

## 2023-05-17 RX ORDER — SODIUM CHLORIDE, SODIUM LACTATE, POTASSIUM CHLORIDE, CALCIUM CHLORIDE 600; 310; 30; 20 MG/100ML; MG/100ML; MG/100ML; MG/100ML
INJECTION, SOLUTION INTRAVENOUS CONTINUOUS
Status: DISCONTINUED | OUTPATIENT
Start: 2023-05-17 | End: 2023-05-20 | Stop reason: HOSPADM

## 2023-05-17 RX ORDER — INSULIN GLARGINE 100 [IU]/ML
12 INJECTION, SOLUTION SUBCUTANEOUS ONCE
Status: COMPLETED | OUTPATIENT
Start: 2023-05-17 | End: 2023-05-17

## 2023-05-17 RX ORDER — INSULIN GLARGINE 100 [IU]/ML
20 INJECTION, SOLUTION SUBCUTANEOUS NIGHTLY
Status: DISCONTINUED | OUTPATIENT
Start: 2023-05-17 | End: 2023-05-19

## 2023-05-17 RX ADMIN — INSULIN LISPRO 4 UNITS: 100 INJECTION, SOLUTION INTRAVENOUS; SUBCUTANEOUS at 11:52

## 2023-05-17 RX ADMIN — HEPARIN SODIUM 5000 UNITS: 5000 INJECTION INTRAVENOUS; SUBCUTANEOUS at 14:27

## 2023-05-17 RX ADMIN — SODIUM CHLORIDE, POTASSIUM CHLORIDE, SODIUM LACTATE AND CALCIUM CHLORIDE: 600; 310; 30; 20 INJECTION, SOLUTION INTRAVENOUS at 11:19

## 2023-05-17 RX ADMIN — HEPARIN SODIUM 5000 UNITS: 5000 INJECTION INTRAVENOUS; SUBCUTANEOUS at 21:43

## 2023-05-17 RX ADMIN — INSULIN GLARGINE 20 UNITS: 100 INJECTION, SOLUTION SUBCUTANEOUS at 21:44

## 2023-05-17 RX ADMIN — SODIUM CHLORIDE, PRESERVATIVE FREE 10 ML: 5 INJECTION INTRAVENOUS at 08:20

## 2023-05-17 RX ADMIN — INSULIN LISPRO 6 UNITS: 100 INJECTION, SOLUTION INTRAVENOUS; SUBCUTANEOUS at 08:19

## 2023-05-17 RX ADMIN — INSULIN GLARGINE 12 UNITS: 100 INJECTION, SOLUTION SUBCUTANEOUS at 08:20

## 2023-05-17 RX ADMIN — HEPARIN SODIUM 5000 UNITS: 5000 INJECTION INTRAVENOUS; SUBCUTANEOUS at 05:38

## 2023-05-17 RX ADMIN — INSULIN LISPRO 4 UNITS: 100 INJECTION, SOLUTION INTRAVENOUS; SUBCUTANEOUS at 16:27

## 2023-05-17 RX ADMIN — SODIUM CHLORIDE, PRESERVATIVE FREE 10 ML: 5 INJECTION INTRAVENOUS at 21:49

## 2023-05-17 ASSESSMENT — PAIN SCALES - GENERAL: PAINLEVEL_OUTOF10: 0

## 2023-05-17 NOTE — DIABETES MGMT
Patient admitted with acute renal failure. Admitting bood glucose 327 on admission. Per chart review patient admitted from assisted living facility. A1c 9.2 (). Patient recived Lantus 8 units last nightly. Lab blood glucose this morning was 383. POC glucose 341. Creatinine 3.45. GFR 12. Reviewed patient current regimen: Lantus 8 units nightly and Humalog correctional insulin. Spoke with assisted living facility the staff their checks patient's glucose and administer Lantus 25 units nightly. Patient would likely benefit from an increase in basal insulin as fasting blood glucose is not at goal. Provider updated via [employer] regarding recommendations and patient glycemic control.

## 2023-05-17 NOTE — CARE COORDINATION
05/17/23 0915   Service Assessment   Patient Orientation Alert and Oriented   Cognition Alert   History Provided By Patient   Primary Caregiver Other (Comment)  ([facility])   Support Systems Other (Comment)  ([facility])   [facility] is: Legal Next of Kin   PCP Verified by CM Yes   Last Visit to PCP Within last 3 months   Prior Functional Level Assistance with the following:;Bathing; Toileting;Mobility   Current Functional Level Assistance with the following:;Bathing; Toileting;Mobility   Can patient return to prior living arrangement Yes   Ability to make needs known: Good   Family able to assist with home care needs: Yes   Financial Resources [facility]   Condition of Participation: Discharge Planning   The Plan for Transition of Care is related to the following treatment goals: return to [facility]   The Patient and/or Patient Representative was provided with a Choice of Provider? Patient   The Patient and/Or Patient Representative agree with the Discharge Plan? Yes   Freedom of Choice list was provided with basic dialogue that supports the patient's individualized plan of care/goals, treatment preferences, and shares the quality data associated with the providers? Yes     Assessment completed with patient at bedside. Patient alert and oriented. Patient lives at [facility] assisted living. Patient reports they assist her with all meals, medication management, bathing, dressing and toileting. Patient uses a rolling walker at baseline. Discharge plan is to return to [facility], pt/. Ot pepe at pending patient may need outpatient therapy/ home health at discharge.

## 2023-05-17 NOTE — CARE COORDINATION
CM spoke with [facility] and confirmed patient is a resident there. Facility uses interim home health. Referral sent.

## 2023-05-17 NOTE — CONSULTS
Nephrology consult    Admission Date:  5/16/2023    Admission Diagnosis  Hyponatremia [E87.1]  Hyperglycemia [R73.9]  Acute renal failure (ARF) (HCC) [N17.9]  Acute renal failure, unspecified acute renal failure type ([facility] [address] [N17.9]    We are asked by Alicia Valderrama DO    History of Present Illness: Ms. Bear Ny is a 80 y.o female with PMH significant for HLD, HTN, CAP, CAD, DM type II, Pulm HTN and Crohn's disease reportedly admitted from assisted living facility with hyperglycemia, complaints of lightheadedness and loose stools for 1 day. Was started on bicarb gtts. We are consulted for JAMEY  Significant ED labs , Sna 127, CO2 19, Cr/BUN 6.68/76  Abd CT revealed 4 mm calculus within the mid segment right kidney. Exophytic hyperdense focus and measures 7.4 mm within the right kidney. No calculi in the left kidney. Multiple bilateral renal cysts. No evidence of hydronephrosis, small hiatal hernia-noted. Pt seen and examined in room she is sitting up in chair states she wants to call her daughter so that she can take her home. Pt reports that she was having dysuria prior to admission, has chronic diarrhea and lives in assisted living, denies CP, SOB, N/V, fever/chills, dizziness or syncope.        Past Medical History:   Diagnosis Date    Acute kidney failure ([facility] [address] 05/2016    Acute pericarditis 2015    Arthritis     CAP (community acquired pneumonia) 5/2016    Coronary artery disease     Crohn's disease ([facility] [address]     Hypercalcemia     Hypercholesterolemia     Hypertension     Lumbar compression fracture (HCC)     Osteoporosis     Type 2 diabetes mellitus ([facility] [address]       Past Surgical History:   Procedure Laterality Date    ARTHROPLASTY  1/5/2016    left elbow    CARDIAC CATHETERIZATION  5/12/2015    no intervention    FIXATION KYPHOPLASTY      2002    HYSTERECTOMY (CERVIX STATUS UNKNOWN)  1978      Current Facility-Administered Medications   Medication Dose Route Frequency    insulin glargine (LANTUS) injection vial 20

## 2023-05-17 NOTE — DIABETES MGMT
Patient admitted 5/16/23 with acute renal failure. Blood glucose on admission 327. HbA1c 9.2% (217). Patient seen for assessment regarding diabetes management. Patient has a past medical history of CAD, type 2 DM, Crohn's, and HTN. Patient is hard of hearing and states that at one point she had hearing aids, but has lost them both. Patient states that she has had diabetes for \"many years\", but does not remember when she was diagnosed. Patient states that she is living in in assisted living at [facility] and they give her all of her medications and monitor her blood sugars for her. She does not know the names of her medications or what her blood glucose readings have been running. Per chart review prior to admission medications include Lantus 25 units hs. Educated patient regarding current basal/bolus regimen of Lantus 20 units hs and Humalog correctional scale coverage 4x/day ac and hs. Reviewed signs, symptoms and treatments for hypoglycemia. Pt verbalizes understanding. Plan is for patient to discharge back to assisted living facility, [facility]. Pt has no questions regarding diabetes management.

## 2023-05-18 PROBLEM — R29.6 FALLS: Status: ACTIVE | Noted: 2018-06-11

## 2023-05-18 PROBLEM — B37.31 VAGINAL CANDIDA: Status: RESOLVED | Noted: 2017-10-26 | Resolved: 2023-05-18

## 2023-05-18 PROBLEM — E11.40 NEUROPATHY DUE TO TYPE 2 DIABETES MELLITUS (HCC): Status: ACTIVE | Noted: 2018-02-09

## 2023-05-18 PROBLEM — R30.0 DYSURIA: Status: RESOLVED | Noted: 2017-10-25 | Resolved: 2023-05-18

## 2023-05-18 PROBLEM — E11.40 NEUROPATHY DUE TO TYPE 2 DIABETES MELLITUS (HCC): Chronic | Status: ACTIVE | Noted: 2018-02-09

## 2023-05-18 PROBLEM — S72.009A HIP FRACTURE REQUIRING OPERATIVE REPAIR (HCC): Status: RESOLVED | Noted: 2021-03-03 | Resolved: 2023-05-18

## 2023-05-18 PROBLEM — R05.9 COUGH: Status: RESOLVED | Noted: 2021-11-16 | Resolved: 2023-05-18

## 2023-05-18 PROBLEM — N20.0 NEPHROLITHIASIS: Status: ACTIVE | Noted: 2023-05-18

## 2023-05-18 PROBLEM — I48.0 PAROXYSMAL ATRIAL FIBRILLATION (HCC): Status: ACTIVE | Noted: 2021-11-16
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
## 2023-05-18 LAB
ALBUMIN SERPL-MCNC: 3 G/DL (ref 3.2–4.6)
ALBUMIN/GLOB SERPL: 0.8 (ref 0.4–1.6)
ALP SERPL-CCNC: 97 U/L (ref 50–136)
ALT SERPL-CCNC: 25 U/L (ref 12–65)
ANION GAP SERPL CALC-SCNC: 6 MMOL/L (ref 2–11)
AST SERPL-CCNC: 14 U/L (ref 15–37)
BASOPHILS # BLD: 0.1 K/UL (ref 0–0.2)
BASOPHILS NFR BLD: 1 % (ref 0–2)
BILIRUB SERPL-MCNC: 0.5 MG/DL (ref 0.2–1.1)
BUN SERPL-MCNC: 32 MG/DL (ref 8–23)
CALCIUM SERPL-MCNC: 10.2 MG/DL (ref 8.3–10.4)
CHLORIDE SERPL-SCNC: 108 MMOL/L (ref 101–110)
CO2 SERPL-SCNC: 27 MMOL/L (ref 21–32)
CREAT SERPL-MCNC: 1.37 MG/DL (ref 0.6–1)
DIFFERENTIAL METHOD BLD: ABNORMAL
EOSINOPHIL # BLD: 0.3 K/UL (ref 0–0.8)
EOSINOPHIL NFR BLD: 2 % (ref 0.5–7.8)
ERYTHROCYTE [DISTWIDTH] IN BLOOD BY AUTOMATED COUNT: 14.2 % (ref 11.9–14.6)
GLOBULIN SER CALC-MCNC: 3.6 G/DL (ref 2.8–4.5)
GLUCOSE BLD STRIP.AUTO-MCNC: 146 MG/DL (ref 65–100)
GLUCOSE BLD STRIP.AUTO-MCNC: 153 MG/DL (ref 65–100)
GLUCOSE BLD STRIP.AUTO-MCNC: 176 MG/DL (ref 65–100)
GLUCOSE BLD STRIP.AUTO-MCNC: 291 MG/DL (ref 65–100)
GLUCOSE BLD STRIP.AUTO-MCNC: 96 MG/DL (ref 65–100)
GLUCOSE SERPL-MCNC: 100 MG/DL (ref 65–100)
HCT VFR BLD AUTO: 34.8 % (ref 35.8–46.3)
HGB BLD-MCNC: 11.2 G/DL (ref 11.7–15.4)
IMM GRANULOCYTES # BLD AUTO: 0.2 K/UL (ref 0–0.5)
IMM GRANULOCYTES NFR BLD AUTO: 2 % (ref 0–5)
LYMPHOCYTES # BLD: 1.9 K/UL (ref 0.5–4.6)
LYMPHOCYTES NFR BLD: 16 % (ref 13–44)
MCH RBC QN AUTO: 28.7 PG (ref 26.1–32.9)
MCHC RBC AUTO-ENTMCNC: 32.2 G/DL (ref 31.4–35)
MCV RBC AUTO: 89.2 FL (ref 82–102)
MM INDURATION, POC: 0 MM (ref 0–5)
MONOCYTES # BLD: 0.8 K/UL (ref 0.1–1.3)
MONOCYTES NFR BLD: 7 % (ref 4–12)
NEUTS SEG # BLD: 8.7 K/UL (ref 1.7–8.2)
NEUTS SEG NFR BLD: 72 % (ref 43–78)
NRBC # BLD: 0 K/UL (ref 0–0.2)
PLATELET # BLD AUTO: 258 K/UL (ref 150–450)
PMV BLD AUTO: 10.1 FL (ref 9.4–12.3)
POTASSIUM SERPL-SCNC: 4.4 MMOL/L (ref 3.5–5.1)
PPD, POC: NEGATIVE
PROT SERPL-MCNC: 6.6 G/DL (ref 6.3–8.2)
RBC # BLD AUTO: 3.9 M/UL (ref 4.05–5.2)
SERVICE CMNT-IMP: ABNORMAL
SERVICE CMNT-IMP: NORMAL
SODIUM SERPL-SCNC: 141 MMOL/L (ref 133–143)
WBC # BLD AUTO: 12 K/UL (ref 4.3–11.1)

## 2023-05-18 PROCEDURE — 1100000000 HC RM PRIVATE

## 2023-05-18 PROCEDURE — 2580000003 HC RX 258: Performed by: FAMILY MEDICINE

## 2023-05-18 PROCEDURE — 36415 COLL VENOUS BLD VENIPUNCTURE: CPT

## 2023-05-18 PROCEDURE — 6360000002 HC RX W HCPCS: Performed by: FAMILY MEDICINE

## 2023-05-18 PROCEDURE — 80053 COMPREHEN METABOLIC PANEL: CPT

## 2023-05-18 PROCEDURE — 6370000000 HC RX 637 (ALT 250 FOR IP): Performed by: FAMILY MEDICINE

## 2023-05-18 PROCEDURE — 2580000003 HC RX 258: Performed by: NURSE PRACTITIONER

## 2023-05-18 PROCEDURE — 85025 COMPLETE CBC W/AUTO DIFF WBC: CPT

## 2023-05-18 PROCEDURE — 82962 GLUCOSE BLOOD TEST: CPT

## 2023-05-18 RX ORDER — L. ACIDOPHILUS/L.BULGARICUS 1MM CELL
4 TABLET ORAL 2 TIMES DAILY
Status: DISCONTINUED | OUTPATIENT
Start: 2023-05-18 | End: 2023-05-20 | Stop reason: HOSPADM

## 2023-05-18 RX ORDER — FUROSEMIDE 20 MG/1
20 TABLET ORAL DAILY
COMMUNITY
Start: 2023-04-16

## 2023-05-18 RX ORDER — ATORVASTATIN CALCIUM 80 MG/1
80 TABLET, FILM COATED ORAL NIGHTLY
COMMUNITY
Start: 2023-04-16

## 2023-05-18 RX ORDER — APIXABAN 2.5 MG/1
2.5 TABLET, FILM COATED ORAL 2 TIMES DAILY
COMMUNITY
Start: 2023-04-16

## 2023-05-18 RX ADMIN — LACTOBACILLUS TAB 4 TABLET: TAB at 20:39

## 2023-05-18 RX ADMIN — SODIUM CHLORIDE, POTASSIUM CHLORIDE, SODIUM LACTATE AND CALCIUM CHLORIDE: 600; 310; 30; 20 INJECTION, SOLUTION INTRAVENOUS at 05:55

## 2023-05-18 RX ADMIN — SODIUM CHLORIDE, PRESERVATIVE FREE 10 ML: 5 INJECTION INTRAVENOUS at 08:22

## 2023-05-18 RX ADMIN — PSYLLIUM HUSK 1 PACKET: 3.4 POWDER ORAL at 12:25

## 2023-05-18 RX ADMIN — LACTOBACILLUS TAB 4 TABLET: TAB at 12:25

## 2023-05-18 RX ADMIN — HEPARIN SODIUM 5000 UNITS: 5000 INJECTION INTRAVENOUS; SUBCUTANEOUS at 05:54

## 2023-05-18 RX ADMIN — HEPARIN SODIUM 5000 UNITS: 5000 INJECTION INTRAVENOUS; SUBCUTANEOUS at 20:38

## 2023-05-18 RX ADMIN — CEFTRIAXONE 1000 MG: 1 INJECTION, POWDER, FOR SOLUTION INTRAMUSCULAR; INTRAVENOUS at 12:25

## 2023-05-18 RX ADMIN — ONDANSETRON 4 MG: 4 TABLET, ORALLY DISINTEGRATING ORAL at 18:31

## 2023-05-18 RX ADMIN — ONDANSETRON 4 MG: 4 TABLET, ORALLY DISINTEGRATING ORAL at 11:04

## 2023-05-18 RX ADMIN — HEPARIN SODIUM 5000 UNITS: 5000 INJECTION INTRAVENOUS; SUBCUTANEOUS at 13:53

## 2023-05-18 NOTE — DIABETES MGMT
Patient admitted with acute renal failure. Blood glucose ranged 271-383 yesterday with patient receiving Lantus 32 units and Humalog 14 units. Blood glucose this morning was 96. Creatinine 1.37. GFR 37. Reviewed patient current regimen: Lantus 20 units nightly and Humalog correctional insulin. Patient due to receive less basal insulin today so risk of morning hypoglycemia should be lower tomorrow morning. Will follow along.

## 2023-05-19 ENCOUNTER — APPOINTMENT (OUTPATIENT)
Dept: CT IMAGING | Age: 88
DRG: 683 | End: 2023-05-19
Payer: MEDICARE

## 2023-05-19 LAB
ALBUMIN SERPL-MCNC: 2.8 G/DL (ref 3.2–4.6)
ALBUMIN/GLOB SERPL: 0.8 (ref 0.4–1.6)
ALP SERPL-CCNC: 86 U/L (ref 50–136)
ALT SERPL-CCNC: 22 U/L (ref 12–65)
ANION GAP SERPL CALC-SCNC: 7 MMOL/L (ref 2–11)
AST SERPL-CCNC: 21 U/L (ref 15–37)
BACTERIA SPEC CULT: ABNORMAL
BASOPHILS # BLD: 0.1 K/UL (ref 0–0.2)
BASOPHILS NFR BLD: 1 % (ref 0–2)
BILIRUB SERPL-MCNC: 1 MG/DL (ref 0.2–1.1)
BUN SERPL-MCNC: 17 MG/DL (ref 8–23)
CALCIUM SERPL-MCNC: 9.8 MG/DL (ref 8.3–10.4)
CHLORIDE SERPL-SCNC: 103 MMOL/L (ref 101–110)
CO2 SERPL-SCNC: 28 MMOL/L (ref 21–32)
CREAT SERPL-MCNC: 1.06 MG/DL (ref 0.6–1)
D DIMER PPP FEU-MCNC: 0.81 UG/ML(FEU)
DIFFERENTIAL METHOD BLD: ABNORMAL
EKG ATRIAL RATE: 86 BPM
EKG DIAGNOSIS: NORMAL
EKG P AXIS: 86 DEGREES
EKG P-R INTERVAL: 162 MS
EKG Q-T INTERVAL: 362 MS
EKG QRS DURATION: 92 MS
EKG QTC CALCULATION (BAZETT): 433 MS
EKG R AXIS: 2 DEGREES
EKG T AXIS: -2 DEGREES
EKG VENTRICULAR RATE: 86 BPM
EOSINOPHIL # BLD: 0.2 K/UL (ref 0–0.8)
EOSINOPHIL NFR BLD: 2 % (ref 0.5–7.8)
ERYTHROCYTE [DISTWIDTH] IN BLOOD BY AUTOMATED COUNT: 14.2 % (ref 11.9–14.6)
GLOBULIN SER CALC-MCNC: 3.7 G/DL (ref 2.8–4.5)
GLUCOSE BLD STRIP.AUTO-MCNC: 178 MG/DL (ref 65–100)
GLUCOSE BLD STRIP.AUTO-MCNC: 207 MG/DL (ref 65–100)
GLUCOSE BLD STRIP.AUTO-MCNC: 248 MG/DL (ref 65–100)
GLUCOSE BLD STRIP.AUTO-MCNC: 250 MG/DL (ref 65–100)
GLUCOSE SERPL-MCNC: 144 MG/DL (ref 65–100)
HCT VFR BLD AUTO: 33.8 % (ref 35.8–46.3)
HGB BLD-MCNC: 10.7 G/DL (ref 11.7–15.4)
IMM GRANULOCYTES # BLD AUTO: 0.3 K/UL (ref 0–0.5)
IMM GRANULOCYTES NFR BLD AUTO: 2 % (ref 0–5)
LYMPHOCYTES # BLD: 1.8 K/UL (ref 0.5–4.6)
LYMPHOCYTES NFR BLD: 12 % (ref 13–44)
MCH RBC QN AUTO: 28.5 PG (ref 26.1–32.9)
MCHC RBC AUTO-ENTMCNC: 31.7 G/DL (ref 31.4–35)
MCV RBC AUTO: 89.9 FL (ref 82–102)
MM INDURATION, POC: 0 MM (ref 0–5)
MONOCYTES # BLD: 1 K/UL (ref 0.1–1.3)
MONOCYTES NFR BLD: 7 % (ref 4–12)
NEUTS SEG # BLD: 11.2 K/UL (ref 1.7–8.2)
NEUTS SEG NFR BLD: 77 % (ref 43–78)
NRBC # BLD: 0 K/UL (ref 0–0.2)
PLATELET # BLD AUTO: 237 K/UL (ref 150–450)
PMV BLD AUTO: 9.8 FL (ref 9.4–12.3)
POTASSIUM SERPL-SCNC: 4.6 MMOL/L (ref 3.5–5.1)
PPD, POC: NEGATIVE
PROCALCITONIN SERPL-MCNC: <0.05 NG/ML (ref 0–0.49)
PROT SERPL-MCNC: 6.5 G/DL (ref 6.3–8.2)
RBC # BLD AUTO: 3.76 M/UL (ref 4.05–5.2)
SERVICE CMNT-IMP: ABNORMAL
SODIUM SERPL-SCNC: 138 MMOL/L (ref 133–143)
TROPONIN I SERPL HS-MCNC: 16.7 PG/ML (ref 0–14)
TROPONIN I SERPL HS-MCNC: 17.8 PG/ML (ref 0–14)
WBC # BLD AUTO: 14.6 K/UL (ref 4.3–11.1)

## 2023-05-19 PROCEDURE — 85025 COMPLETE CBC W/AUTO DIFF WBC: CPT

## 2023-05-19 PROCEDURE — 93010 ELECTROCARDIOGRAM REPORT: CPT | Performed by: INTERNAL MEDICINE

## 2023-05-19 PROCEDURE — 2700000000 HC OXYGEN THERAPY PER DAY

## 2023-05-19 PROCEDURE — 1100000000 HC RM PRIVATE

## 2023-05-19 PROCEDURE — 51798 US URINE CAPACITY MEASURE: CPT

## 2023-05-19 PROCEDURE — 85379 FIBRIN DEGRADATION QUANT: CPT

## 2023-05-19 PROCEDURE — 6360000002 HC RX W HCPCS: Performed by: FAMILY MEDICINE

## 2023-05-19 PROCEDURE — 2580000003 HC RX 258: Performed by: FAMILY MEDICINE

## 2023-05-19 PROCEDURE — 80053 COMPREHEN METABOLIC PANEL: CPT

## 2023-05-19 PROCEDURE — 6370000000 HC RX 637 (ALT 250 FOR IP): Performed by: FAMILY MEDICINE

## 2023-05-19 PROCEDURE — 36415 COLL VENOUS BLD VENIPUNCTURE: CPT

## 2023-05-19 PROCEDURE — 94761 N-INVAS EAR/PLS OXIMETRY MLT: CPT

## 2023-05-19 PROCEDURE — 6360000004 HC RX CONTRAST MEDICATION: Performed by: FAMILY MEDICINE

## 2023-05-19 PROCEDURE — 82962 GLUCOSE BLOOD TEST: CPT

## 2023-05-19 PROCEDURE — 84484 ASSAY OF TROPONIN QUANT: CPT

## 2023-05-19 PROCEDURE — 71260 CT THORAX DX C+: CPT

## 2023-05-19 PROCEDURE — 97530 THERAPEUTIC ACTIVITIES: CPT

## 2023-05-19 PROCEDURE — 84145 PROCALCITONIN (PCT): CPT

## 2023-05-19 PROCEDURE — 94760 N-INVAS EAR/PLS OXIMETRY 1: CPT

## 2023-05-19 PROCEDURE — 93005 ELECTROCARDIOGRAM TRACING: CPT | Performed by: FAMILY MEDICINE

## 2023-05-19 RX ORDER — INSULIN GLARGINE 100 [IU]/ML
10 INJECTION, SOLUTION SUBCUTANEOUS NIGHTLY
Status: DISCONTINUED | OUTPATIENT
Start: 2023-05-19 | End: 2023-05-20

## 2023-05-19 RX ORDER — MORPHINE SULFATE 2 MG/ML
1 INJECTION, SOLUTION INTRAMUSCULAR; INTRAVENOUS ONCE
Status: COMPLETED | OUTPATIENT
Start: 2023-05-19 | End: 2023-05-19

## 2023-05-19 RX ORDER — SODIUM CHLORIDE 0.9 % (FLUSH) 0.9 %
5-40 SYRINGE (ML) INJECTION 2 TIMES DAILY
Status: DISCONTINUED | OUTPATIENT
Start: 2023-05-19 | End: 2023-05-20 | Stop reason: HOSPADM

## 2023-05-19 RX ORDER — 0.9 % SODIUM CHLORIDE 0.9 %
100 INTRAVENOUS SOLUTION INTRAVENOUS ONCE
Status: DISCONTINUED | OUTPATIENT
Start: 2023-05-19 | End: 2023-05-20 | Stop reason: HOSPADM

## 2023-05-19 RX ADMIN — INSULIN LISPRO 2 UNITS: 100 INJECTION, SOLUTION INTRAVENOUS; SUBCUTANEOUS at 17:24

## 2023-05-19 RX ADMIN — LACTOBACILLUS TAB 4 TABLET: TAB at 21:34

## 2023-05-19 RX ADMIN — CEFTRIAXONE 1000 MG: 1 INJECTION, POWDER, FOR SOLUTION INTRAMUSCULAR; INTRAVENOUS at 12:16

## 2023-05-19 RX ADMIN — INSULIN GLARGINE 10 UNITS: 100 INJECTION, SOLUTION SUBCUTANEOUS at 21:31

## 2023-05-19 RX ADMIN — PSYLLIUM HUSK 1 PACKET: 3.4 POWDER ORAL at 08:59

## 2023-05-19 RX ADMIN — IOPAMIDOL 100 ML: 755 INJECTION, SOLUTION INTRAVENOUS at 10:37

## 2023-05-19 RX ADMIN — INSULIN LISPRO 2 UNITS: 100 INJECTION, SOLUTION INTRAVENOUS; SUBCUTANEOUS at 12:17

## 2023-05-19 RX ADMIN — HEPARIN SODIUM 5000 UNITS: 5000 INJECTION INTRAVENOUS; SUBCUTANEOUS at 06:10

## 2023-05-19 RX ADMIN — HEPARIN SODIUM 5000 UNITS: 5000 INJECTION INTRAVENOUS; SUBCUTANEOUS at 12:17

## 2023-05-19 RX ADMIN — LACTOBACILLUS TAB 4 TABLET: TAB at 08:59

## 2023-05-19 RX ADMIN — SODIUM CHLORIDE, PRESERVATIVE FREE 10 ML: 5 INJECTION INTRAVENOUS at 09:03

## 2023-05-19 RX ADMIN — ACETAMINOPHEN 650 MG: 325 TABLET, FILM COATED ORAL at 19:44

## 2023-05-19 RX ADMIN — HEPARIN SODIUM 5000 UNITS: 5000 INJECTION INTRAVENOUS; SUBCUTANEOUS at 21:31

## 2023-05-19 RX ADMIN — MORPHINE SULFATE 1 MG: 2 INJECTION, SOLUTION INTRAMUSCULAR; INTRAVENOUS at 20:30

## 2023-05-19 ASSESSMENT — PAIN DESCRIPTION - INTENSITY: RATING_2: 7

## 2023-05-19 ASSESSMENT — PAIN DESCRIPTION - DESCRIPTORS
DESCRIPTORS: ACHING
DESCRIPTORS_2: ACHING
DESCRIPTORS: ACHING

## 2023-05-19 ASSESSMENT — PAIN SCALES - GENERAL
PAINLEVEL_OUTOF10: 8
PAINLEVEL_OUTOF10: 3

## 2023-05-19 ASSESSMENT — PAIN DESCRIPTION - LOCATION
LOCATION: CHEST
LOCATION_2: CHEST
LOCATION: HEAD

## 2023-05-19 ASSESSMENT — PAIN - FUNCTIONAL ASSESSMENT: PAIN_FUNCTIONAL_ASSESSMENT: ACTIVITIES ARE NOT PREVENTED

## 2023-05-19 ASSESSMENT — PAIN DESCRIPTION - ORIENTATION: ORIENTATION_2: MID

## 2023-05-19 NOTE — DIABETES MGMT
Patient admitted with acute renal failure. Blood glucose ranged  yesterday with patient receiving no diabetes medications. Blood glucose this morning was 178. Creatinine 1.06. GFR 51. Reviewed patient current regimen: Humalog correctional insulin and Lantus 20 units nightly. Noted patient did not receive any Lantus last night. Patient Wt 61kg. Patient would likely benefit from a reduction in basal insulin to reduce risk of hypoglycemia. Provider updated via [employer] regarding recommendations and patient glycemic control.

## 2023-05-19 NOTE — ADT AUTH CERT
UPDATED PROGRESS NOTES        Isha Ann MD  Physician  Internal Medicine  Progress Notes      Addendum  Date of Service:  2023  7:53 AM     Expand All Collapse All                                                                                                                                                                                                                                                                                                                                                                                                                                               Hospitalist Progress Note   Admit Date:     2023  2:31 PM   Name:              Penny Area   Age:                 80 y.o. Sex:                 female  :               1934   MRN:               [id number]   Room:              [facility]     Presenting/Chief Complaint: Hyperglycemia     Reason(s) for Admission: Hyponatremia [E87.1]  Hyperglycemia [R73.9]  Acute renal failure (ARF) (HCC) [N17.9]  Acute renal failure, unspecified acute renal failure type [facility]) [N17.9]      Hospital Course:   80 y.o. female with medical history of CAD, Crohn's, HTN who presented via EMS with hyperglycemia from assisted living facility associated with lightheadedness and loose stools of 1 day duration. Her BG was found to be in 400 range and her BP was running low therefore she was sent to the ED. Pt was alert and oriented x3 on admission, was able to tell me she came from [facility] in [facility]. She was not able to recall which medications she takes at [facility]. She denies fever, chills, SOB, chest pain, abdominal pain, N/V. In ED, Cr 6.68. Na 127, . She was given 2L NS boluses in ED. Subjective & 24hr Events (23): Blood sugar running high, added basal and increased dose, improved in afternoon, though remains elevated. Cultures no growth to date. sCr much higher than baseline but declining with resuscitation.

## 2023-05-19 NOTE — CARE COORDINATION
Chart reviewed and patient discussed in IDT rounds. patent possibly discharging tomorrow. Patient lives at [facility]. CM informed yanez place of patient possibly returning tomorrow. Home health has been arranged through [facility]. No further CM needs at this time.

## 2023-05-20 VITALS
WEIGHT: 133.6 LBS | TEMPERATURE: 100.2 F | BODY MASS INDEX: 28.82 KG/M2 | DIASTOLIC BLOOD PRESSURE: 73 MMHG | HEART RATE: 82 BPM | OXYGEN SATURATION: 95 % | SYSTOLIC BLOOD PRESSURE: 149 MMHG | RESPIRATION RATE: 16 BRPM | HEIGHT: 57 IN

## 2023-05-20 PROBLEM — I48.91 HYPERCOAGULABILITY DUE TO ATRIAL FIBRILLATION (HCC): Chronic | Status: ACTIVE | Noted: 2023-05-20

## 2023-05-20 PROBLEM — N17.9 STAGE 3 ACUTE KIDNEY INJURY (HCC): Status: RESOLVED | Noted: 2023-05-16 | Resolved: 2023-05-20

## 2023-05-20 PROBLEM — D68.69 HYPERCOAGULABILITY DUE TO ATRIAL FIBRILLATION (HCC): Chronic | Status: ACTIVE | Noted: 2023-05-20

## 2023-05-20 PROBLEM — N20.0 NEPHROLITHIASIS: Chronic | Status: ACTIVE | Noted: 2023-05-18

## 2023-05-20 LAB
ALBUMIN SERPL-MCNC: 2.9 G/DL (ref 3.2–4.6)
ALBUMIN/GLOB SERPL: 0.7 (ref 0.4–1.6)
ALP SERPL-CCNC: 87 U/L (ref 50–130)
ALT SERPL-CCNC: 33 U/L (ref 12–65)
ANION GAP SERPL CALC-SCNC: 7 MMOL/L (ref 2–11)
AST SERPL-CCNC: 17 U/L (ref 15–37)
BASOPHILS # BLD: 0.1 K/UL (ref 0–0.2)
BASOPHILS NFR BLD: 1 % (ref 0–2)
BILIRUB SERPL-MCNC: 0.9 MG/DL (ref 0.2–1.1)
BUN SERPL-MCNC: 14 MG/DL (ref 8–23)
CALCIUM SERPL-MCNC: 9.8 MG/DL (ref 8.3–10.4)
CHLORIDE SERPL-SCNC: 101 MMOL/L (ref 101–110)
CO2 SERPL-SCNC: 28 MMOL/L (ref 21–32)
CREAT SERPL-MCNC: 0.99 MG/DL (ref 0.6–1)
DIFFERENTIAL METHOD BLD: ABNORMAL
EOSINOPHIL # BLD: 0.4 K/UL (ref 0–0.8)
EOSINOPHIL NFR BLD: 3 % (ref 0.5–7.8)
ERYTHROCYTE [DISTWIDTH] IN BLOOD BY AUTOMATED COUNT: 14.2 % (ref 11.9–14.6)
GLOBULIN SER CALC-MCNC: 4.1 G/DL (ref 2.8–4.5)
GLUCOSE BLD STRIP.AUTO-MCNC: 179 MG/DL (ref 65–100)
GLUCOSE SERPL-MCNC: 198 MG/DL (ref 65–100)
HCT VFR BLD AUTO: 33.5 % (ref 35.8–46.3)
HGB BLD-MCNC: 10.6 G/DL (ref 11.7–15.4)
IMM GRANULOCYTES # BLD AUTO: 0.4 K/UL (ref 0–0.5)
IMM GRANULOCYTES NFR BLD AUTO: 3 % (ref 0–5)
LYMPHOCYTES # BLD: 1.1 K/UL (ref 0.5–4.6)
LYMPHOCYTES NFR BLD: 7 % (ref 13–44)
MCH RBC QN AUTO: 28.9 PG (ref 26.1–32.9)
MCHC RBC AUTO-ENTMCNC: 31.6 G/DL (ref 31.4–35)
MCV RBC AUTO: 91.3 FL (ref 82–102)
MONOCYTES # BLD: 1 K/UL (ref 0.1–1.3)
MONOCYTES NFR BLD: 7 % (ref 4–12)
NEUTS SEG # BLD: 11.3 K/UL (ref 1.7–8.2)
NEUTS SEG NFR BLD: 80 % (ref 43–78)
NRBC # BLD: 0 K/UL (ref 0–0.2)
PHOSPHATE SERPL-MCNC: 1.9 MG/DL (ref 2.3–3.7)
PLATELET # BLD AUTO: 257 K/UL (ref 150–450)
PMV BLD AUTO: 10.2 FL (ref 9.4–12.3)
POTASSIUM SERPL-SCNC: 4.1 MMOL/L (ref 3.5–5.1)
PROCALCITONIN SERPL-MCNC: 0.08 NG/ML (ref 0–0.49)
PROT SERPL-MCNC: 7 G/DL (ref 6.3–8.2)
RBC # BLD AUTO: 3.67 M/UL (ref 4.05–5.2)
SERVICE CMNT-IMP: ABNORMAL
SODIUM SERPL-SCNC: 136 MMOL/L (ref 133–143)
TROPONIN I SERPL HS-MCNC: 16.4 PG/ML (ref 0–14)
WBC # BLD AUTO: 14.2 K/UL (ref 4.3–11.1)

## 2023-05-20 PROCEDURE — 2700000000 HC OXYGEN THERAPY PER DAY

## 2023-05-20 PROCEDURE — 94760 N-INVAS EAR/PLS OXIMETRY 1: CPT

## 2023-05-20 PROCEDURE — 2580000003 HC RX 258: Performed by: NURSE PRACTITIONER

## 2023-05-20 PROCEDURE — 87040 BLOOD CULTURE FOR BACTERIA: CPT

## 2023-05-20 PROCEDURE — 84145 PROCALCITONIN (PCT): CPT

## 2023-05-20 PROCEDURE — 82962 GLUCOSE BLOOD TEST: CPT

## 2023-05-20 PROCEDURE — 6370000000 HC RX 637 (ALT 250 FOR IP): Performed by: FAMILY MEDICINE

## 2023-05-20 PROCEDURE — 84100 ASSAY OF PHOSPHORUS: CPT

## 2023-05-20 PROCEDURE — 94761 N-INVAS EAR/PLS OXIMETRY MLT: CPT

## 2023-05-20 PROCEDURE — 84484 ASSAY OF TROPONIN QUANT: CPT

## 2023-05-20 PROCEDURE — 80053 COMPREHEN METABOLIC PANEL: CPT

## 2023-05-20 PROCEDURE — 85025 COMPLETE CBC W/AUTO DIFF WBC: CPT

## 2023-05-20 PROCEDURE — 36415 COLL VENOUS BLD VENIPUNCTURE: CPT

## 2023-05-20 PROCEDURE — 6360000002 HC RX W HCPCS: Performed by: FAMILY MEDICINE

## 2023-05-20 RX ORDER — LEVOFLOXACIN 750 MG/1
750 TABLET ORAL DAILY
Qty: 5 TABLET | Refills: 0 | Status: SHIPPED | OUTPATIENT
Start: 2023-05-20 | End: 2023-05-20 | Stop reason: SDUPTHER

## 2023-05-20 RX ORDER — SPIRONOLACTONE 25 MG/1
50 TABLET ORAL DAILY
Status: DISCONTINUED | OUTPATIENT
Start: 2023-05-20 | End: 2023-05-20 | Stop reason: HOSPADM

## 2023-05-20 RX ORDER — AMLODIPINE BESYLATE 5 MG/1
5 TABLET ORAL DAILY
Status: DISCONTINUED | OUTPATIENT
Start: 2023-05-20 | End: 2023-05-20 | Stop reason: HOSPADM

## 2023-05-20 RX ORDER — SACCHAROMYCES BOULARDII 250 MG
250 CAPSULE ORAL 2 TIMES DAILY
Qty: 14 CAPSULE | Refills: 0 | Status: SHIPPED | OUTPATIENT
Start: 2023-05-20 | End: 2023-05-27

## 2023-05-20 RX ORDER — CARVEDILOL 6.25 MG/1
12.5 TABLET ORAL 2 TIMES DAILY WITH MEALS
Status: DISCONTINUED | OUTPATIENT
Start: 2023-05-20 | End: 2023-05-20 | Stop reason: HOSPADM

## 2023-05-20 RX ORDER — ATORVASTATIN CALCIUM 40 MG/1
80 TABLET, FILM COATED ORAL NIGHTLY
Status: DISCONTINUED | OUTPATIENT
Start: 2023-05-20 | End: 2023-05-20 | Stop reason: HOSPADM

## 2023-05-20 RX ORDER — LEVOFLOXACIN 750 MG/1
750 TABLET ORAL DAILY
Qty: 5 TABLET | Refills: 0 | Status: SHIPPED | OUTPATIENT
Start: 2023-05-20 | End: 2023-05-25

## 2023-05-20 RX ORDER — INSULIN GLARGINE 100 [IU]/ML
20 INJECTION, SOLUTION SUBCUTANEOUS NIGHTLY
Status: DISCONTINUED | OUTPATIENT
Start: 2023-05-20 | End: 2023-05-20 | Stop reason: HOSPADM

## 2023-05-20 RX ADMIN — SODIUM CHLORIDE, POTASSIUM CHLORIDE, SODIUM LACTATE AND CALCIUM CHLORIDE: 600; 310; 30; 20 INJECTION, SOLUTION INTRAVENOUS at 01:11

## 2023-05-20 RX ADMIN — AMLODIPINE BESYLATE 5 MG: 5 TABLET ORAL at 09:17

## 2023-05-20 RX ADMIN — LACTOBACILLUS TAB 4 TABLET: TAB at 09:18

## 2023-05-20 RX ADMIN — SPIRONOLACTONE 50 MG: 25 TABLET ORAL at 09:18

## 2023-05-20 RX ADMIN — HEPARIN SODIUM 5000 UNITS: 5000 INJECTION INTRAVENOUS; SUBCUTANEOUS at 05:34

## 2023-05-20 RX ADMIN — CARVEDILOL 12.5 MG: 6.25 TABLET, FILM COATED ORAL at 09:17

## 2023-05-20 RX ADMIN — PSYLLIUM HUSK 1 PACKET: 3.4 POWDER ORAL at 09:22

## 2023-05-20 RX ADMIN — APIXABAN 2.5 MG: 2.5 TABLET, FILM COATED ORAL at 09:18

## 2023-05-20 NOTE — PROGRESS NOTES
05/20/23 1059   Resting (Room Air)   SpO2 95   HR 73   Resting (On O2)   SpO2 94   HR 87   O2 Device Nasal cannula   O2 Flow Rate (l/min) 2 l/min   During Walk (Room Air)   SpO2 93   HR 71   Walk/Assistance Device Walker   Rate of Dyspnea 0   After Walk   SpO2 95  (on RA)   HR 72   Does the Patient Qualify for Home O2 No   Does the Patient Need Portable Oxygen Tanks No
[address]   Admission Date: 5/16/2023         [address] Nephrology Progress Note: 5/18/2023    Pt chart and labs reviewed remotely, renal function improving, noted uop. Please call with additional Nephrology questions, thanks for this consultation.      Current Facility-Administered Medications   Medication Dose Route Frequency    psyllium (METAMUCIL) 58.12 % packet 1 packet  1 packet Oral Daily    lactobacillus acidophilus (FLORANEX) 4 tablet  4 tablet Oral BID    cefTRIAXone (ROCEPHIN) 1,000 mg in sodium chloride 0.9 % 50 mL IVPB (mini-bag)  1,000 mg IntraVENous Q24H    insulin glargine (LANTUS) injection vial 20 Units  20 Units SubCUTAneous Nightly    lactated ringers IV soln infusion   IntraVENous Continuous    sodium chloride flush 0.9 % injection 5-40 mL  5-40 mL IntraVENous 2 times per day    sodium chloride flush 0.9 % injection 5-40 mL  5-40 mL IntraVENous PRN    0.9 % sodium chloride infusion   IntraVENous PRN    heparin (porcine) injection 5,000 Units  5,000 Units SubCUTAneous 3 times per day    ondansetron (ZOFRAN-ODT) disintegrating tablet 4 mg  4 mg Oral Q8H PRN    Or    ondansetron (ZOFRAN) injection 4 mg  4 mg IntraVENous Q6H PRN    acetaminophen (TYLENOL) tablet 650 mg  650 mg Oral Q6H PRN    Or    acetaminophen (TYLENOL) suppository 650 mg  650 mg Rectal Q6H PRN    glucose chewable tablet 16 g  4 tablet Oral PRN    dextrose bolus 10% 125 mL  125 mL IntraVENous PRN    Or    dextrose bolus 10% 250 mL  250 mL IntraVENous PRN    glucagon (rDNA) injection 1 mg  1 mg SubCUTAneous PRN    dextrose 10 % infusion   IntraVENous Continuous PRN    insulin lispro (HUMALOG) injection vial 0-8 Units  0-8 Units SubCUTAneous TID WC    insulin lispro (HUMALOG) injection vial 0-4 Units  0-4 Units SubCUTAneous Nightly         Objective:     Vitals:    05/17/23 0745 05/17/23 1928 05/18/23 0610 05/18/23 0713   BP: (!) 125/49 124/61  (!) 143/65   Pulse: 71 77  85   Resp: 16 22     Temp: 98.4 °F
ACUTE OCCUPATIONAL THERAPY GOALS:   (Developed with and agreed upon by patient and/or caregiver.)  1. Patient will perform grooming with supervision. 2. Patient will perform upper body dressing with supervision. 3. Patient will perform lower body dressing with supervision. 4. Patient will perform bathing with supervision. 5. Patient will perform toileting and toilet transfer with supervision. 6. Patient will perform ADL functional mobility and tranfers in room with supervision. 7. Patient/family to demonstrate knowledge of home safety and DME recommendations. Goals to be achieved in 7 days. OCCUPATIONAL THERAPY Daily Note and PM       OT Visit Days: 2  Acknowledge Orders  Time  OT Charge Capture  Rehab Caseload Tracker      Nick Carrasco is a 80 y.o. female   PRIMARY DIAGNOSIS: Acute renal failure (ARF) ([facility] [address]  Hyponatremia [E87.1]  Hyperglycemia [R73.9]  Acute renal failure (ARF) (HCC) [N17.9]  Acute renal failure, unspecified acute renal failure type ([address] [N17.9]       Reason for Referral: Generalized Muscle Weakness (M62.81)  Other lack of cordination (R27.8)  Difficulty in walking, Not elsewhere classified (R26.2)  Other abnormalities of gait and mobility (R26.89)  Inpatient: Payor: Rasta Cordial / Plan: [employer] / Product Type: *No Product type* /     ASSESSMENT:     REHAB RECOMMENDATIONS:   Recommendation to date pending progress:  Setting:  Home Health Therapy    Equipment:    None     ASSESSMENT:  Ms. Nj Benjamin seen in room with daughter present. Pt willing to work with therapy, pt up in chair upon arrival. Pt up in room and castillo (short household distance) with RW and CGA. Pt moved very slow today and reports her knees feeling weak. Pt returned to room and back to recliner at her request. Pt seems not feel well today. OT will follow.       [employer] AM-PAC 6 Clicks Daily Activity Inpatient Short Form:    AM-PAC Daily Activity - Inpatient   How much help is needed for
ACUTE OCCUPATIONAL THERAPY GOALS:   (Developed with and agreed upon by patient and/or caregiver.)  1. Patient will perform grooming with supervision. 2. Patient will perform upper body dressing with supervision. 3. Patient will perform lower body dressing with supervision. 4. Patient will perform bathing with supervision. 5. Patient will perform toileting and toilet transfer with supervision. 6. Patient will perform ADL functional mobility and tranfers in room with supervision. 7. Patient/family to demonstrate knowledge of home safety and DME recommendations. Goals to be achieved in 7 days. OCCUPATIONAL THERAPY Initial Assessment and AM       OT Visit Days: 1  Acknowledge Orders  Time  OT Charge Capture  Rehab Caseload Tracker      Donovan Martinez is a 80 y.o. female   PRIMARY DIAGNOSIS: Acute renal failure (ARF) ([facility] [address]  Hyponatremia [E87.1]  Hyperglycemia [R73.9]  Acute renal failure (ARF) (HCC) [N17.9]  Acute renal failure, unspecified acute renal failure type ([address] [N17.9]       Reason for Referral: Generalized Muscle Weakness (M62.81)  Other lack of cordination (R27.8)  Difficulty in walking, Not elsewhere classified (R26.2)  Other abnormalities of gait and mobility (R26.89)  Inpatient: Payor: Janak Putnam / Plan: June Wheatley / Product Type: *No Product type* /     ASSESSMENT:     REHAB RECOMMENDATIONS:   Recommendation to date pending progress:  Setting:  Home Health Therapy    Equipment:    None     ASSESSMENT:  Ms. Emely Gonzalez supine in bed. SBA supine to sit. She washed her face with set up. She stood and transferred to [facility] with Cga. She is a little unsteady in standing. CGA for clothing management in standing. She was able to don her socks with additional time. She reported she loves detention. At baseline she is mod I in her apartment with her RW as needed. She uses the RW when she leaves the apartment. She gets 3 meals a day there. She does outings with the detention.  She is mod I
ACUTE PHYSICAL THERAPY GOALS:   (Developed with and agreed upon by patient and/or caregiver.)   Patient will ambulate 200' with supervision with a RW within 1 treatment day. -GOAL MET 5/17/23     PHYSICAL THERAPY Initial Assessment and Discharge  (Link to Caseload Tracking: PT Visit Days : 1  Acknowledge Orders  Time In/Out  PT Charge Capture  Rehab Caseload Tracker    Rach Arroyo is a 80 y.o. female   PRIMARY DIAGNOSIS: Acute renal failure (ARF) ([facility] [address]  Hyponatremia [E87.1]  Hyperglycemia [R73.9]  Acute renal failure (ARF) (HCC) [N17.9]  Acute renal failure, unspecified acute renal failure type ([address] [N17.9]       Reason for Referral: Generalized Muscle Weakness (M62.81)  Inpatient: Payor: Ingrid Soni / Plan: LEOA Jael Dye / Product Type: *No Product type* /     ASSESSMENT:     REHAB RECOMMENDATIONS:   Recommendation to date pending progress:  Setting:  No further skilled therapy after discharge from hospital  Can continue [facility] if already current at [facility]; otherwise no PT needs. Equipment:    None     ASSESSMENT:  Ms. Severo Manuel admitted with above diagnoses. Patient presents from her [facility] where she is ambulatory with her rollator. She is modified independent with her mobility and pretty active around the facility. Patient ambulates to meals and reports she shows people around the facility. During today's session, patient demonstrated continued mobility with a RW with supervision only. She even demonstrated some of the \"dance moves\" she has has done with her previous therapist.  Patient enjoys square dancing and shagging and is anxious for her daughter to take her back to square dancing. Patient did well with today's session and is not demonstrating any acute or d/c therapy needs. If patient is current with therapy at her facility, she can certainly continue. If not, however, she does not have any new needs.      [facility]
Care of patient assumed at this time.
During assessment patient stated \"my chest feels kind of tight. \" She states her pain as 7/10 and aching. She pointed between her breasts when demonstrating its location. VS WNL except diastolic BP is 58. Heart sounds regular. MD notified. New orders received at this time. This RN called lab and EKG tech to notify of new STAT orders. 2027  EKG completed. Paper states \" Normal sinus rhythm (rate 81 BPM). Septal infarct, age underetmined. Abnormal ECG. \" Provider notified via [employer]. 2036  CP is currently an 8 (up from a 7). SPO2 of 89% and respirations 32 per minute. 2 liters applied via nasal cannula. SPO2 increased to 92%. Giving OTD morphine now. MD notified of all above information. No new orders at this time. 2120  Patients troponins resulted at 17.8. Patient is currently asleep with no visible distress. Respirations 26 currently. [employer] sent to MD to notify of all above information. Per MD repeat troponin in 6 hours (0330). 0425  Patients 0330 troponin just resulted at 16.4. [employer] sent to MD to notify. No new orders at this time.
Hospitalist Progress Note   Admit Date:  2023  2:31 PM   Name:  Bin Mayo   Age:  80 y.o. Sex:  female  :  1934   MRN:  [id number]   Room:  [facility]/    Presenting/Chief Complaint: Hyperglycemia     Reason(s) for Admission: Hyponatremia [E87.1]  Hyperglycemia [R73.9]  Acute renal failure (ARF) ([facility]) [N17.9]  Acute renal failure, unspecified acute renal failure type [facility]) [N17.9]     Hospital Course:   80 y.o. female with medical history of CAD, Crohn's, HTN who presented via EMS with hyperglycemia from assisted living facility associated with lightheadedness and loose stools of 1 day duration. Her BG was found to be in 400 range and her BP was running low therefore she was sent to the ED. Pt was alert and oriented x3 on admission, was able to tell me she came from [facility] in [facility]. She was not able to recall which medications she takes at [facility]. She denies fever, chills, SOB, chest pain, abdominal pain, N/V. In ED, Cr 6.68. Na 127, . She was given 2L NS boluses in ED. Subjective & 24hr Events (23): Blood sugar r low overnight and basal insulin held. Lowering dose. . Cultures with E. coli susceptible to ceftriaxone. sCr getting closer to baseline but remains elevated. Patient reports feeling weak and tired. Plan discussed with nurse and . Likely home tomorrow. Assessment & Plan:   Acute renal failure  Admission sCr 6.9 from baseline ~ 1  -serial BMP  -fluid resuscitation  2023: sCr 6.7>3.5>1.4>1.1, continue LR @ 75    Acute cystitis  UCx with GNR  -ceftriaxone    Diabetes  -basal 20U  -SSI  2023: Glc 144-207 last 24h on basal 0U + 2 SSI, change to basal 10U + SSI    Loose stool  -lactobacillus, psyllium    Falls, Permanent AF  -hold apixaban  -OP FU with cardiology  -PT, OT, PPD    PT/OT evals and PPD needed/ordered? Yes  Diet:  ADULT DIET;  Regular; 3 carb choices (45 gm/meal)  VTE prophylaxis: Heparin  Code status: DNR    Hospital
Hospitalist Progress Note   Admit Date:  2023  2:31 PM   Name:  Ethan Alonzo   Age:  80 y.o. Sex:  female  :  1934   MRN:  [id number]   Room:  [facility]/    Presenting/Chief Complaint: Hyperglycemia     Reason(s) for Admission: Hyponatremia [E87.1]  Hyperglycemia [R73.9]  Acute renal failure (ARF) ([facility]) [N17.9]  Acute renal failure, unspecified acute renal failure type [facility]) [N17.9]     Hospital Course:   80 y.o. female with medical history of CAD, Crohn's, HTN who presented via EMS with hyperglycemia from assisted living facility associated with lightheadedness and loose stools of 1 day duration. Her BG was found to be in 400 range and her BP was running low therefore she was sent to the ED. Pt was alert and oriented x3 on admission, was able to tell me she came from [facility] in [facility]. She was not able to recall which medications she takes at [facility]. She denies fever, chills, SOB, chest pain, abdominal pain, N/V. In ED, Cr 6.68. Na 127, . She was given 2L NS boluses in ED. Subjective & 24hr Events (23): Blood sugar running high, added basal and increased dose, improved in afternoon, though remains elevated. Cultures no growth to date. sCr much higher than baseline but declining with resuscitation. Plan discussed with nephrology, nurse and . Assessment & Plan:   Acute renal failure  Admission sCr 6.9 from baseline ~ 1  -serial BMP  -fluid resusciation    Diabetes  -basal 20U  -SSI  2023: basal 20U + 14 SSI, continue basal 20U + SSI    PT/OT evals and PPD needed/ordered? Yes  Diet:  ADULT DIET; Regular; 3 carb choices (45 gm/meal)  VTE prophylaxis: Heparin  Code status: DNR    Hospital Problems:  Principal Problem:    Acute renal failure (ARF) ([facility])  Active Problems:    Debility    Heart failure with preserved left ventricular function (HFpEF) ([facility] [address]    DM type 2 (diabetes mellitus, type 2) ([facility] [address]  Resolved Problems:    * No resolved hospital problems.
Patient has only voided 50 mL this shift. Bladder scan showed 130 mL. Perfectserve sent to MD to notify. No new orders at this time.
Patient just ambulated to bathroom and returned to bed. She became dyspneic with respirations of 32 per minute. Patients SPO2 was still 93% on 2 liters of oxygen. Patient stated she feels \"stopped up\" and gestured to her lungs. Perfectserve sent to MD to notify of all above information. No new orders at this time. 0393   Patient is resting in bed. RR is currently 30 breaths per minute. MD notified per patients orders. No new orders.
[facility] nurse direct number: [phone]. Gave facility update on patient. They request to be called upon patient discharge.
Visit attempted but RN in room. Sandie Willams M.Div.   
Yanet, POC 41.4 41 - 51 MMHG    PO2, VENOUS (POC) 26 (LL) mmHg    HCO3, Venous 18.6 (L) 23 - 28 MMOL/L    SO2, VENOUS (POC) 39.6 (L) 65 - 88 %    Base Deficit, Venous 8.0 mmol/L    POC Jorge's Test NOT APPLICABLE      Specimen type: VENOUS BLOOD      Performed by: Kayleigh     Critical Value Read Back ARIAS    Urinalysis w rflx microscopic    Collection Time: 05/16/23  6:08 PM   Result Value Ref Range    Color, UA YELLOW/STRAW      Appearance CLEAR      Specific Gravity, UA 1.007 1.001 - 1.023      pH, Urine 5.0 5.0 - 9.0      Protein, UA Negative NEG mg/dL    Glucose, UA Negative mg/dL    Ketones, Urine Negative NEG mg/dL    Bilirubin Urine Negative NEG      Blood, Urine Negative NEG      Urobilinogen, Urine 0.2 0.2 - 1.0 EU/dL    Nitrite, Urine Negative NEG      Leukocyte Esterase, Urine MODERATE (A) NEG      WBC, UA 5-10 0 /hpf    RBC, UA 0-3 0 /hpf    Epithelial Cells UA 10-20 0 /hpf    BACTERIA, URINE 2+ (H) 0 /hpf    Yeast, UA OCCASIONAL     Sodium, urine, random    Collection Time: 05/16/23  6:08 PM   Result Value Ref Range    SODIUM, RANDOM URINE 65 MMOL/L   Protein / creatinine ratio, urine    Collection Time: 05/16/23  6:08 PM   Result Value Ref Range    Protein, Urine, Random 11 mg/dL    Creatinine, Ur 43.00 mg/dL    PROTEIN/CREAT RATIO URINE RAN 0.3     POCT Glucose    Collection Time: 05/16/23 10:04 PM   Result Value Ref Range    POC Glucose 265 (H) 65 - 100 mg/dL    Performed by: Vandana(BS)    Osmolality    Collection Time: 05/16/23 10:53 PM   Result Value Ref Range    Serum Osmolality 325 (H) 280 - 301 MOSM/kg H2O   Comprehensive Metabolic Panel w/ Reflex to MG    Collection Time: 05/17/23  6:35 AM   Result Value Ref Range    Sodium 133 133 - 143 mmol/L    Potassium 4.4 3.5 - 5.1 mmol/L    Chloride 104 101 - 110 mmol/L    CO2 23 21 - 32 mmol/L    Anion Gap 6 2 - 11 mmol/L    Glucose 383 (H) 65 - 100 mg/dL    BUN 56 (H) 8 - 23 MG/DL    Creatinine 3.45 (H) 0.6 - 1.0 MG/DL    Est, Glom

## 2023-05-20 NOTE — DISCHARGE SUMMARY
Hospitalist Discharge Summary   Admit Date:  2023  2:31 PM   DC Note date: 2023  Name:  Becka Jarquin   Age:  80 y.o. Sex:  female  :  1934   MRN:  [id number]   Room:  [facility]  PCP:  Annmarie Almendarez MD    Presenting Complaint: Hyperglycemia     Initial Admission Diagnosis: Hyponatremia [E87.1]  Hyperglycemia [R73.9]  Acute renal failure (ARF) (HCC) [N17.9]  Acute renal failure, unspecified acute renal failure type ([address] [N17.9]     Problem List for this Hospitalization (present on admission):    Principal Problem (Resolved):    Stage 3 acute kidney injury ([address]  Active Problems:    Debility    Paroxysmal atrial fibrillation ([address]    Heart failure with preserved left ventricular function (HFpEF) ([facility] [address]    Falls    Type 2 diabetes mellitus ([facility] [address]    Nephrolithiasis    Hypercoagulability due to atrial fibrillation [facility])      Hospital Course:  88F PMHx CAD, Crohn's, HTN who presented via EMS with hyperglycemia from assisted living facility associated with lightheadedness and loose stools of 1 day duration. Her BG was found to be in 400 range and her BP was running low therefore she was sent to the ED. Pt was alert and oriented x3 on admission, was able to confirm she came from [facility] in [facility]. She was not able to recall which medications she was taking at [facility]. She denied fever, chills, SOB, chest pain, abdominal pain, N/V. In ED, Cr 6.68. Na 127, . She was given 2L NS boluses in ED. she was admitted for further evaluation management. She was aggressively resuscitated with slow improvement in renal function consistent with ATN. Her apixaban was held. She had an elevated D-dimer. CT pulmonary embolism negative. She was evaluated by PT, OT. He was determined that she was appropriate for short-term rehab. She was noted to have a UTI which was susceptible to empiric therapy of ceftriaxone. She was noted to have nephrolithiasis which could be seeding urinary tract symptoms.   She

## 2023-05-20 NOTE — CARE COORDINATION
1732: The patient has discharge orders. RN CM called [address] [place] [address],  [address] at [phone] and was asked to contact the nurse Rosey at [phone]. She confirmed the patient can return today and will need prescriptions for any new medications and the facility needs an order for anything that has been discontinued. The patient is currently on oxygen. She stated the patient can return with home oxygen as long as it is continuous and the patient isn't requiring greater than 3L O2. She stated she sent the previous  their \"Physician Plan of Care\" forms that are required in order for the patient to return to the facility. Discharge planning was discussed with the attending MD. The patient will need an walking oxygen saturation test. If home oxygen is needed, the attending MD is in agreement for a home health RN to be added to the home health order. 1000: RN CM met with the patient and her daughter at the bedside and discussed discharge planning. The patient confirmed she is in agreement to return to [facility] today with [facility]. The patient was provided with a list of home health agencies and their quality metrics. Her daughter wants to transport her home. If home oxygen is needed, she is in agreement to use [facility]. RN CM encouraged her to ask questions. She verbalized understanding and agreement with the discharge plan. The Important Message from Medicare letter was delivered and explained to the patient. She verbalized understanding of the information and signed the letter. The patient was given a copy of the letter and the original was placed in the chart.

## 2023-05-20 NOTE — CARE COORDINATION
05/20/23 1103   Service Assessment   Patient Orientation Alert and Oriented   Cognition Alert   History Provided By Patient; Child/Family   Support Systems Family Members; Other (Comment)  (assisted living facility staff)   [facility] is: Legal Next of Kin   Prior Functional Level Assistance with the following:   Current Functional Level Assistance with the following:   Can patient return to prior living arrangement Yes   Ability to make needs known: Good   Financial Resources Medicare   Community Resources Assisted Living   Discharge Planning   Type of Residence Assisted living   [address]   DME Ordered? No   Potential Assistance Purchasing Medications No   Patient expects to be discharged to: Assisted living   [facility] [address] Discharge   Transition of Care Consult (CM Consult) Discharge Planning   Services [facility] Discharge Home Health   Condition of Participation: Discharge Planning   The Plan for Transition of Care is related to the following treatment goals: return to [facility] longterm   The Patient and/or Patient Representative was provided with a Choice of Provider? Patient   The Patient and/Or Patient Representative agree with the Discharge Plan? Yes   Freedom of Choice list was provided with basic dialogue that supports the patient's individualized plan of care/goals, treatment preferences, and shares the quality data associated with the providers? Yes     The patient is returning to her assisted living facility with [facility]. Orders and clinicals were sent to the home health agency and were also placed in the discharge packet. Her family will transport her home. The patient and family verbalized understanding and agreement with the discharge plan. The patient was evaluated by respiratory therapy and did not qualify for home oxygen.

## 2023-05-20 NOTE — PLAN OF CARE
Problem: Discharge Planning  Goal: Discharge to home or other facility with appropriate resources  Outcome: Progressing  Flowsheets (Taken 5/19/2023 1944)  Discharge to home or other facility with appropriate resources: Identify discharge learning needs (meds, wound care, etc)     Problem: Pain  Goal: Verbalizes/displays adequate comfort level or baseline comfort level  Outcome: Progressing     Problem: Safety - Adult  Goal: Free from fall injury  Outcome: Progressing     Problem: ABCDS Injury Assessment  Goal: Absence of physical injury  Outcome: Progressing

## 2023-05-20 NOTE — PLAN OF CARE
Problem: Discharge Planning  Goal: Discharge to home or other facility with appropriate resources  5/20/2023 1018 by Traci Thapa RN  Outcome: Progressing  5/19/2023 2349 by Eduardo Henderson RN  Outcome: Progressing  Flowsheets (Taken 5/19/2023 1944)  Discharge to home or other facility with appropriate resources: Identify discharge learning needs (meds, wound care, etc)     Problem: Pain  Goal: Verbalizes/displays adequate comfort level or baseline comfort level  5/20/2023 1018 by Traci Thapa RN  Outcome: Progressing  5/19/2023 2349 by Eduardo Henderson RN  Outcome: Progressing     Problem: Safety - Adult  Goal: Free from fall injury  5/20/2023 1018 by Traci Thapa RN  Outcome: Progressing  5/19/2023 2349 by Eduardo Henderson RN  Outcome: Progressing     Problem: ABCDS Injury Assessment  Goal: Absence of physical injury  5/20/2023 1018 by Traci Thapa RN  Outcome: Progressing  5/19/2023 2349 by Eduardo Henderson RN  Outcome: Progressing     Problem: Chronic Conditions and Co-morbidities  Goal: Patient's chronic conditions and co-morbidity symptoms are monitored and maintained or improved  Outcome: Progressing     Problem: Skin/Tissue Integrity  Goal: Absence of new skin breakdown  Description: 1. Monitor for areas of redness and/or skin breakdown  2. Assess vascular access sites hourly  3. Every 4-6 hours minimum:  Change oxygen saturation probe site  4. Every 4-6 hours:  If on nasal continuous positive airway pressure, respiratory therapy assess nares and determine need for appliance change or resting period.   Outcome: Progressing

## 2023-05-21 LAB
BACTERIA SPEC CULT: NORMAL
BACTERIA SPEC CULT: NORMAL
SERVICE CMNT-IMP: NORMAL
SERVICE CMNT-IMP: NORMAL

## 2023-06-29 ENCOUNTER — HOSPITAL ENCOUNTER (INPATIENT)
Age: 88
LOS: 4 days | Discharge: HOME | DRG: 872 | End: 2023-07-03
Attending: EMERGENCY MEDICINE | Admitting: FAMILY MEDICINE
Payer: MEDICARE

## 2023-06-29 ENCOUNTER — APPOINTMENT (OUTPATIENT)
Dept: GENERAL RADIOLOGY | Age: 88
DRG: 872 | End: 2023-06-29
Payer: MEDICARE

## 2023-06-29 ENCOUNTER — APPOINTMENT (OUTPATIENT)
Dept: ULTRASOUND IMAGING | Age: 88
DRG: 872 | End: 2023-06-29
Payer: MEDICARE

## 2023-06-29 DIAGNOSIS — A41.9 SEPTICEMIA (HCC): ICD-10-CM

## 2023-06-29 DIAGNOSIS — N17.9 AKI (ACUTE KIDNEY INJURY) (HCC): ICD-10-CM

## 2023-06-29 DIAGNOSIS — R73.9 HYPERGLYCEMIA: ICD-10-CM

## 2023-06-29 DIAGNOSIS — N30.00 ACUTE CYSTITIS WITHOUT HEMATURIA: Primary | ICD-10-CM

## 2023-06-29 PROBLEM — S32.000A COMPRESSION FRACTURE OF LUMBAR VERTEBRA (HCC): Status: ACTIVE | Noted: 2021-10-29

## 2023-06-29 PROBLEM — M54.12 CERVICAL RADICULOPATHY: Status: ACTIVE | Noted: 2018-09-17

## 2023-06-29 PROBLEM — M79.605 PAIN OF LEFT LOWER EXTREMITY: Status: ACTIVE | Noted: 2022-08-22

## 2023-06-29 PROBLEM — S41.112S: Status: ACTIVE | Noted: 2019-07-09

## 2023-06-29 PROBLEM — F32.A DEPRESSIVE DISORDER: Status: ACTIVE | Noted: 2022-03-11

## 2023-06-29 PROBLEM — Z86.16 PERSONAL HISTORY OF COVID-19: Status: ACTIVE | Noted: 2021-03-09

## 2023-06-29 PROBLEM — E78.00 HYPERCHOLESTEROLEMIA: Status: ACTIVE | Noted: 2021-10-29

## 2023-06-29 PROBLEM — M19.011 PRIMARY OSTEOARTHRITIS OF RIGHT SHOULDER: Status: ACTIVE | Noted: 2018-09-17

## 2023-06-29 PROBLEM — I73.9 PERIPHERAL VASCULAR DISEASE (HCC): Status: ACTIVE | Noted: 2022-04-06

## 2023-06-29 PROBLEM — R91.1 LUNG NODULE: Status: ACTIVE | Noted: 2022-03-10

## 2023-06-29 PROBLEM — R65.10 SIRS (SYSTEMIC INFLAMMATORY RESPONSE SYNDROME) (HCC): Status: RESOLVED | Noted: 2023-06-29 | Resolved: 2023-06-29

## 2023-06-29 PROBLEM — M47.816 SPONDYLOSIS WITHOUT MYELOPATHY OR RADICULOPATHY, LUMBAR REGION: Status: ACTIVE | Noted: 2021-03-09

## 2023-06-29 PROBLEM — N18.32 STAGE 3B CHRONIC KIDNEY DISEASE (HCC): Status: ACTIVE | Noted: 2022-07-28

## 2023-06-29 PROBLEM — E11.40 NEUROPATHY DUE TO TYPE 2 DIABETES MELLITUS (HCC): Chronic | Status: RESOLVED | Noted: 2018-02-09 | Resolved: 2023-06-29

## 2023-06-29 PROBLEM — R41.841 COGNITIVE COMMUNICATION DEFICIT: Status: ACTIVE | Noted: 2021-03-09

## 2023-06-29 PROBLEM — H04.123 DRY EYES: Status: ACTIVE | Noted: 2020-01-20

## 2023-06-29 PROBLEM — H43.819 POSTERIOR VITREOUS DETACHMENT: Status: ACTIVE | Noted: 2020-02-24

## 2023-06-29 PROBLEM — M62.81 MUSCLE WEAKNESS (GENERALIZED): Status: ACTIVE | Noted: 2021-03-09

## 2023-06-29 PROBLEM — R26.89 OTHER ABNORMALITIES OF GAIT AND MOBILITY: Status: ACTIVE | Noted: 2021-03-09

## 2023-06-29 PROBLEM — M25.522 PAIN IN LEFT ELBOW: Status: ACTIVE | Noted: 2021-03-09

## 2023-06-29 PROBLEM — H02.539 LID RETRACTION: Status: ACTIVE | Noted: 2020-02-24

## 2023-06-29 PROBLEM — E53.8 DEFICIENCY OF OTHER SPECIFIED B GROUP VITAMINS: Status: ACTIVE | Noted: 2021-03-09

## 2023-06-29 PROBLEM — R65.20 SEVERE SEPSIS (HCC): Status: ACTIVE | Noted: 2021-11-17

## 2023-06-29 PROBLEM — H25.819 COMBINED FORM OF SENILE CATARACT: Status: ACTIVE | Noted: 2020-02-24

## 2023-06-29 PROBLEM — Z79.4 TYPE 2 DIABETES MELLITUS WITH HYPERGLYCEMIA, WITH LONG-TERM CURRENT USE OF INSULIN (HCC): Chronic | Status: ACTIVE | Noted: 2018-06-11

## 2023-06-29 PROBLEM — R41.3 MEMORY CHANGE: Status: ACTIVE | Noted: 2021-08-26

## 2023-06-29 PROBLEM — K21.9 GERD (GASTROESOPHAGEAL REFLUX DISEASE): Status: ACTIVE | Noted: 2018-10-03

## 2023-06-29 PROBLEM — H25.9 AGE-RELATED CATARACT OF RIGHT EYE: Status: ACTIVE | Noted: 2020-01-20

## 2023-06-29 PROBLEM — K59.00 CONSTIPATION: Status: ACTIVE | Noted: 2022-07-08

## 2023-06-29 PROBLEM — S72.142A INTERTROCHANTERIC FRACTURE OF LEFT FEMUR, CLOSED, INITIAL ENCOUNTER (HCC): Status: RESOLVED | Noted: 2021-09-18 | Resolved: 2023-06-29

## 2023-06-29 PROBLEM — L03.114 CELLULITIS OF LEFT FOREARM: Status: ACTIVE | Noted: 2019-09-07

## 2023-06-29 PROBLEM — E83.42 HYPOMAGNESEMIA: Status: ACTIVE | Noted: 2023-06-29

## 2023-06-29 PROBLEM — I83.90 VARICOSE VEINS OF LOWER EXTREMITY: Status: ACTIVE | Noted: 2022-04-29

## 2023-06-29 PROBLEM — H40.1130 PRIMARY OPEN ANGLE GLAUCOMA (POAG) OF BOTH EYES: Status: ACTIVE | Noted: 2020-02-24

## 2023-06-29 PROBLEM — R21 RASH AND OTHER NONSPECIFIC SKIN ERUPTION: Status: ACTIVE | Noted: 2019-05-08

## 2023-06-29 PROBLEM — L02.419 ABSCESS OF LEG: Status: ACTIVE | Noted: 2019-07-09

## 2023-06-29 PROBLEM — R27.8 OTHER LACK OF COORDINATION: Status: ACTIVE | Noted: 2021-03-09

## 2023-06-29 PROBLEM — Z96.1 BILATERAL PSEUDOPHAKIA: Status: ACTIVE | Noted: 2020-06-08

## 2023-06-29 PROBLEM — D62 ACUTE POSTHEMORRHAGIC ANEMIA: Status: ACTIVE | Noted: 2021-03-09

## 2023-06-29 PROBLEM — R26.2 DIFFICULTY IN WALKING, NOT ELSEWHERE CLASSIFIED: Status: ACTIVE | Noted: 2021-03-09

## 2023-06-29 PROBLEM — S41.112A SKIN TEAR OF LEFT UPPER EXTREMITY: Status: ACTIVE | Noted: 2019-09-07

## 2023-06-29 PROBLEM — R65.10 SIRS (SYSTEMIC INFLAMMATORY RESPONSE SYNDROME) (HCC): Status: ACTIVE | Noted: 2023-06-29

## 2023-06-29 PROBLEM — E11.65 TYPE 2 DIABETES MELLITUS WITH HYPERGLYCEMIA, WITH LONG-TERM CURRENT USE OF INSULIN (HCC): Chronic | Status: ACTIVE | Noted: 2018-06-11

## 2023-06-29 PROBLEM — M50.30 DDD (DEGENERATIVE DISC DISEASE), CERVICAL: Status: ACTIVE | Noted: 2018-09-17

## 2023-06-29 PROBLEM — T14.8XXA BLOOD BLISTER: Status: ACTIVE | Noted: 2022-04-06

## 2023-06-29 LAB
ALBUMIN SERPL-MCNC: 3.9 G/DL (ref 3.2–4.6)
ALBUMIN/GLOB SERPL: 1 (ref 0.4–1.6)
ALP SERPL-CCNC: 122 U/L (ref 50–136)
ALT SERPL-CCNC: 29 U/L (ref 12–65)
ANION GAP SERPL CALC-SCNC: 7 MMOL/L (ref 2–11)
APPEARANCE UR: ABNORMAL
AST SERPL-CCNC: 16 U/L (ref 15–37)
BACTERIA URNS QL MICRO: ABNORMAL /HPF
BASOPHILS # BLD: 0.1 K/UL (ref 0–0.2)
BASOPHILS NFR BLD: 1 % (ref 0–2)
BILIRUB SERPL-MCNC: 0.9 MG/DL (ref 0.2–1.1)
BILIRUB UR QL: NEGATIVE
BUN SERPL-MCNC: 27 MG/DL (ref 8–23)
CALCIUM SERPL-MCNC: 10.3 MG/DL (ref 8.3–10.4)
CASTS URNS QL MICRO: ABNORMAL /LPF
CHLORIDE SERPL-SCNC: 99 MMOL/L (ref 101–110)
CO2 SERPL-SCNC: 26 MMOL/L (ref 21–32)
COLOR UR: ABNORMAL
CREAT SERPL-MCNC: 1.34 MG/DL (ref 0.6–1)
DIFFERENTIAL METHOD BLD: ABNORMAL
EKG ATRIAL RATE: 0 BPM
EKG DIAGNOSIS: NORMAL
EKG Q-T INTERVAL: 335 MS
EKG QRS DURATION: 89 MS
EKG QTC CALCULATION (BAZETT): 422 MS
EKG R AXIS: -43 DEGREES
EKG T AXIS: -47 DEGREES
EKG VENTRICULAR RATE: 95 BPM
EOSINOPHIL # BLD: 0.3 K/UL (ref 0–0.8)
EOSINOPHIL NFR BLD: 2 % (ref 0.5–7.8)
EPI CELLS #/AREA URNS HPF: ABNORMAL /HPF
ERYTHROCYTE [DISTWIDTH] IN BLOOD BY AUTOMATED COUNT: 13.6 % (ref 11.9–14.6)
EST. AVERAGE GLUCOSE BLD GHB EST-MCNC: 229 MG/DL
GLOBULIN SER CALC-MCNC: 3.8 G/DL (ref 2.8–4.5)
GLUCOSE BLD STRIP.AUTO-MCNC: 148 MG/DL (ref 65–100)
GLUCOSE BLD STRIP.AUTO-MCNC: 177 MG/DL (ref 65–100)
GLUCOSE BLD STRIP.AUTO-MCNC: 255 MG/DL (ref 65–100)
GLUCOSE SERPL-MCNC: 321 MG/DL (ref 65–100)
GLUCOSE UR STRIP.AUTO-MCNC: 500 MG/DL
HBA1C MFR BLD: 9.6 % (ref 4.8–5.6)
HCT VFR BLD AUTO: 42.7 % (ref 35.8–46.3)
HGB BLD-MCNC: 14.2 G/DL (ref 11.7–15.4)
HGB UR QL STRIP: NEGATIVE
IMM GRANULOCYTES # BLD AUTO: 0.6 K/UL (ref 0–0.5)
IMM GRANULOCYTES NFR BLD AUTO: 4 % (ref 0–5)
KETONES UR QL STRIP.AUTO: NEGATIVE MG/DL
LACTATE SERPL-SCNC: 1.7 MMOL/L (ref 0.4–2)
LACTATE SERPL-SCNC: 1.8 MMOL/L (ref 0.4–2)
LACTATE SERPL-SCNC: 2.3 MMOL/L (ref 0.4–2)
LACTATE SERPL-SCNC: 2.6 MMOL/L (ref 0.4–2)
LEUKOCYTE ESTERASE UR QL STRIP.AUTO: ABNORMAL
LYMPHOCYTES # BLD: 1.9 K/UL (ref 0.5–4.6)
LYMPHOCYTES NFR BLD: 13 % (ref 13–44)
MAGNESIUM SERPL-MCNC: 1.6 MG/DL (ref 1.8–2.4)
MCH RBC QN AUTO: 28.6 PG (ref 26.1–32.9)
MCHC RBC AUTO-ENTMCNC: 33.3 G/DL (ref 31.4–35)
MCV RBC AUTO: 85.9 FL (ref 82–102)
MONOCYTES # BLD: 0.6 K/UL (ref 0.1–1.3)
MONOCYTES NFR BLD: 4 % (ref 4–12)
NEUTS SEG # BLD: 10.9 K/UL (ref 1.7–8.2)
NEUTS SEG NFR BLD: 76 % (ref 43–78)
NITRITE UR QL STRIP.AUTO: POSITIVE
NRBC # BLD: 0 K/UL (ref 0–0.2)
NT PRO BNP: 1195 PG/ML
OTHER OBSERVATIONS: ABNORMAL
PH UR STRIP: 5 (ref 5–9)
PLATELET # BLD AUTO: 341 K/UL (ref 150–450)
PMV BLD AUTO: 9.8 FL (ref 9.4–12.3)
POTASSIUM SERPL-SCNC: 4.4 MMOL/L (ref 3.5–5.1)
PROCALCITONIN SERPL-MCNC: <0.05 NG/ML (ref 0–0.49)
PROT SERPL-MCNC: 7.7 G/DL (ref 6.3–8.2)
PROT UR STRIP-MCNC: NEGATIVE MG/DL
RBC # BLD AUTO: 4.97 M/UL (ref 4.05–5.2)
SERVICE CMNT-IMP: ABNORMAL
SODIUM SERPL-SCNC: 132 MMOL/L (ref 133–143)
SP GR UR REFRACTOMETRY: 1.01 (ref 1–1.02)
UROBILINOGEN UR QL STRIP.AUTO: 0.2 EU/DL (ref 0.2–1)
WBC # BLD AUTO: 14.4 K/UL (ref 4.3–11.1)
WBC URNS QL MICRO: ABNORMAL /HPF

## 2023-06-29 PROCEDURE — 71045 X-RAY EXAM CHEST 1 VIEW: CPT

## 2023-06-29 PROCEDURE — 6360000002 HC RX W HCPCS

## 2023-06-29 PROCEDURE — 83735 ASSAY OF MAGNESIUM: CPT

## 2023-06-29 PROCEDURE — 76770 US EXAM ABDO BACK WALL COMP: CPT

## 2023-06-29 PROCEDURE — 1100000003 HC PRIVATE W/ TELEMETRY

## 2023-06-29 PROCEDURE — 83036 HEMOGLOBIN GLYCOSYLATED A1C: CPT

## 2023-06-29 PROCEDURE — 82962 GLUCOSE BLOOD TEST: CPT

## 2023-06-29 PROCEDURE — 84145 PROCALCITONIN (PCT): CPT

## 2023-06-29 PROCEDURE — 87186 SC STD MICRODIL/AGAR DIL: CPT

## 2023-06-29 PROCEDURE — 6370000000 HC RX 637 (ALT 250 FOR IP)

## 2023-06-29 PROCEDURE — 2580000003 HC RX 258: Performed by: NURSE PRACTITIONER

## 2023-06-29 PROCEDURE — 83605 ASSAY OF LACTIC ACID: CPT

## 2023-06-29 PROCEDURE — 6370000000 HC RX 637 (ALT 250 FOR IP): Performed by: NURSE PRACTITIONER

## 2023-06-29 PROCEDURE — 87040 BLOOD CULTURE FOR BACTERIA: CPT

## 2023-06-29 PROCEDURE — 83880 ASSAY OF NATRIURETIC PEPTIDE: CPT

## 2023-06-29 PROCEDURE — 80053 COMPREHEN METABOLIC PANEL: CPT

## 2023-06-29 PROCEDURE — 93005 ELECTROCARDIOGRAM TRACING: CPT

## 2023-06-29 PROCEDURE — 93010 ELECTROCARDIOGRAM REPORT: CPT | Performed by: INTERNAL MEDICINE

## 2023-06-29 PROCEDURE — 87088 URINE BACTERIA CULTURE: CPT

## 2023-06-29 PROCEDURE — 81001 URINALYSIS AUTO W/SCOPE: CPT

## 2023-06-29 PROCEDURE — 1100000000 HC RM PRIVATE

## 2023-06-29 PROCEDURE — 87086 URINE CULTURE/COLONY COUNT: CPT

## 2023-06-29 PROCEDURE — 6360000002 HC RX W HCPCS: Performed by: NURSE PRACTITIONER

## 2023-06-29 PROCEDURE — 99285 EMERGENCY DEPT VISIT HI MDM: CPT

## 2023-06-29 PROCEDURE — 2580000003 HC RX 258

## 2023-06-29 PROCEDURE — 2580000003 HC RX 258: Performed by: FAMILY MEDICINE

## 2023-06-29 PROCEDURE — 85025 COMPLETE CBC W/AUTO DIFF WBC: CPT

## 2023-06-29 PROCEDURE — 96365 THER/PROPH/DIAG IV INF INIT: CPT

## 2023-06-29 PROCEDURE — 2500000003 HC RX 250 WO HCPCS: Performed by: NURSE PRACTITIONER

## 2023-06-29 RX ORDER — CARVEDILOL 6.25 MG/1
12.5 TABLET ORAL 2 TIMES DAILY WITH MEALS
Status: DISCONTINUED | OUTPATIENT
Start: 2023-06-29 | End: 2023-06-30

## 2023-06-29 RX ORDER — ROPINIROLE 1 MG/1
1 TABLET, FILM COATED ORAL 2 TIMES DAILY
Status: DISCONTINUED | OUTPATIENT
Start: 2023-06-29 | End: 2023-07-03 | Stop reason: HOSPADM

## 2023-06-29 RX ORDER — SODIUM CHLORIDE 0.9 % (FLUSH) 0.9 %
5-40 SYRINGE (ML) INJECTION PRN
Status: DISCONTINUED | OUTPATIENT
Start: 2023-06-29 | End: 2023-07-03 | Stop reason: HOSPADM

## 2023-06-29 RX ORDER — DEXTROSE MONOHYDRATE 100 MG/ML
INJECTION, SOLUTION INTRAVENOUS CONTINUOUS PRN
Status: DISCONTINUED | OUTPATIENT
Start: 2023-06-29 | End: 2023-07-03 | Stop reason: HOSPADM

## 2023-06-29 RX ORDER — ESCITALOPRAM OXALATE 10 MG/1
10 TABLET ORAL DAILY
Status: DISCONTINUED | OUTPATIENT
Start: 2023-06-30 | End: 2023-07-03 | Stop reason: HOSPADM

## 2023-06-29 RX ORDER — MAGNESIUM SULFATE IN WATER 40 MG/ML
2000 INJECTION, SOLUTION INTRAVENOUS ONCE
Status: COMPLETED | OUTPATIENT
Start: 2023-06-29 | End: 2023-06-29

## 2023-06-29 RX ORDER — INSULIN LISPRO 100 [IU]/ML
0-4 INJECTION, SOLUTION INTRAVENOUS; SUBCUTANEOUS
Status: DISCONTINUED | OUTPATIENT
Start: 2023-06-29 | End: 2023-07-03 | Stop reason: HOSPADM

## 2023-06-29 RX ORDER — 0.9 % SODIUM CHLORIDE 0.9 %
1000 INTRAVENOUS SOLUTION INTRAVENOUS ONCE
Status: COMPLETED | OUTPATIENT
Start: 2023-06-29 | End: 2023-06-29

## 2023-06-29 RX ORDER — SODIUM CHLORIDE 0.9 % (FLUSH) 0.9 %
5-40 SYRINGE (ML) INJECTION EVERY 12 HOURS SCHEDULED
Status: DISCONTINUED | OUTPATIENT
Start: 2023-06-29 | End: 2023-07-03 | Stop reason: HOSPADM

## 2023-06-29 RX ORDER — FUROSEMIDE 20 MG/1
20 TABLET ORAL DAILY
Status: DISCONTINUED | OUTPATIENT
Start: 2023-06-30 | End: 2023-07-03 | Stop reason: HOSPADM

## 2023-06-29 RX ORDER — ACETAMINOPHEN 325 MG/1
650 TABLET ORAL EVERY 6 HOURS PRN
Status: DISCONTINUED | OUTPATIENT
Start: 2023-06-29 | End: 2023-07-03 | Stop reason: HOSPADM

## 2023-06-29 RX ORDER — AMLODIPINE BESYLATE 5 MG/1
5 TABLET ORAL DAILY
Status: DISCONTINUED | OUTPATIENT
Start: 2023-06-30 | End: 2023-07-03 | Stop reason: HOSPADM

## 2023-06-29 RX ORDER — 0.9 % SODIUM CHLORIDE 0.9 %
1000 INTRAVENOUS SOLUTION INTRAVENOUS ONCE
Status: DISCONTINUED | OUTPATIENT
Start: 2023-06-29 | End: 2023-06-29

## 2023-06-29 RX ORDER — INSULIN GLARGINE 100 [IU]/ML
25 INJECTION, SOLUTION SUBCUTANEOUS NIGHTLY
Status: DISCONTINUED | OUTPATIENT
Start: 2023-06-29 | End: 2023-06-30

## 2023-06-29 RX ORDER — ATORVASTATIN CALCIUM 40 MG/1
80 TABLET, FILM COATED ORAL NIGHTLY
Status: DISCONTINUED | OUTPATIENT
Start: 2023-06-29 | End: 2023-07-03 | Stop reason: HOSPADM

## 2023-06-29 RX ORDER — PANTOPRAZOLE SODIUM 40 MG/1
40 TABLET, DELAYED RELEASE ORAL EVERY MORNING
Status: DISCONTINUED | OUTPATIENT
Start: 2023-06-30 | End: 2023-07-03 | Stop reason: HOSPADM

## 2023-06-29 RX ORDER — INSULIN LISPRO 100 [IU]/ML
0-4 INJECTION, SOLUTION INTRAVENOUS; SUBCUTANEOUS NIGHTLY
Status: DISCONTINUED | OUTPATIENT
Start: 2023-06-29 | End: 2023-07-03 | Stop reason: HOSPADM

## 2023-06-29 RX ORDER — SODIUM CHLORIDE 9 MG/ML
INJECTION, SOLUTION INTRAVENOUS CONTINUOUS
Status: DISCONTINUED | OUTPATIENT
Start: 2023-06-29 | End: 2023-07-02

## 2023-06-29 RX ORDER — ACETAMINOPHEN 650 MG/1
650 SUPPOSITORY RECTAL EVERY 6 HOURS PRN
Status: DISCONTINUED | OUTPATIENT
Start: 2023-06-29 | End: 2023-07-03 | Stop reason: HOSPADM

## 2023-06-29 RX ORDER — SPIRONOLACTONE 25 MG/1
25 TABLET ORAL DAILY
Status: CANCELLED | OUTPATIENT
Start: 2023-06-29

## 2023-06-29 RX ORDER — FAMOTIDINE 20 MG/1
20 TABLET, FILM COATED ORAL DAILY
Status: DISCONTINUED | OUTPATIENT
Start: 2023-06-30 | End: 2023-07-03 | Stop reason: HOSPADM

## 2023-06-29 RX ORDER — MAGNESIUM HYDROXIDE/ALUMINUM HYDROXICE/SIMETHICONE 120; 1200; 1200 MG/30ML; MG/30ML; MG/30ML
30 SUSPENSION ORAL EVERY 6 HOURS PRN
Status: DISCONTINUED | OUTPATIENT
Start: 2023-06-29 | End: 2023-07-03 | Stop reason: HOSPADM

## 2023-06-29 RX ORDER — SODIUM CHLORIDE 9 MG/ML
INJECTION, SOLUTION INTRAVENOUS PRN
Status: DISCONTINUED | OUTPATIENT
Start: 2023-06-29 | End: 2023-07-03 | Stop reason: HOSPADM

## 2023-06-29 RX ORDER — TROSPIUM CHLORIDE 20 MG/1
20 TABLET, FILM COATED ORAL DAILY
Status: DISCONTINUED | OUTPATIENT
Start: 2023-06-30 | End: 2023-07-03 | Stop reason: HOSPADM

## 2023-06-29 RX ORDER — LANOLIN ALCOHOL/MO/W.PET/CERES
2500 CREAM (GRAM) TOPICAL DAILY
Status: DISCONTINUED | OUTPATIENT
Start: 2023-06-30 | End: 2023-07-03 | Stop reason: HOSPADM

## 2023-06-29 RX ADMIN — SODIUM CHLORIDE 1000 ML: 9 INJECTION, SOLUTION INTRAVENOUS at 12:52

## 2023-06-29 RX ADMIN — SODIUM CHLORIDE, PRESERVATIVE FREE 10 ML: 5 INJECTION INTRAVENOUS at 21:44

## 2023-06-29 RX ADMIN — APIXABAN 2.5 MG: 2.5 TABLET, FILM COATED ORAL at 21:44

## 2023-06-29 RX ADMIN — ATORVASTATIN CALCIUM 80 MG: 40 TABLET, FILM COATED ORAL at 21:44

## 2023-06-29 RX ADMIN — INSULIN HUMAN 5 UNITS: 100 INJECTION, SOLUTION PARENTERAL at 15:07

## 2023-06-29 RX ADMIN — MAGNESIUM SULFATE HEPTAHYDRATE 2000 MG: 40 INJECTION, SOLUTION INTRAVENOUS at 15:22

## 2023-06-29 RX ADMIN — PIPERACILLIN AND TAZOBACTAM 4500 MG: 4; .5 INJECTION, POWDER, LYOPHILIZED, FOR SOLUTION INTRAVENOUS at 12:52

## 2023-06-29 RX ADMIN — MEROPENEM 1000 MG: 1 INJECTION, POWDER, FOR SOLUTION INTRAVENOUS at 16:26

## 2023-06-29 RX ADMIN — SODIUM CHLORIDE: 9 INJECTION, SOLUTION INTRAVENOUS at 15:22

## 2023-06-29 RX ADMIN — TUBERCULIN PURIFIED PROTEIN DERIVATIVE 5 UNITS: 5 INJECTION, SOLUTION INTRADERMAL at 18:36

## 2023-06-29 RX ADMIN — ROPINIROLE HYDROCHLORIDE 1 MG: 1 TABLET, FILM COATED ORAL at 21:44

## 2023-06-29 RX ADMIN — CARVEDILOL 12.5 MG: 6.25 TABLET, FILM COATED ORAL at 18:37

## 2023-06-29 RX ADMIN — INSULIN GLARGINE 25 UNITS: 100 INJECTION, SOLUTION SUBCUTANEOUS at 21:44

## 2023-06-29 ASSESSMENT — ENCOUNTER SYMPTOMS
VOMITING: 0
SHORTNESS OF BREATH: 0
COLOR CHANGE: 0
DIARRHEA: 0
ABDOMINAL PAIN: 0
NAUSEA: 0

## 2023-06-29 ASSESSMENT — PAIN DESCRIPTION - LOCATION: LOCATION: ABDOMEN

## 2023-06-29 ASSESSMENT — PAIN SCALES - GENERAL: PAINLEVEL_OUTOF10: 6

## 2023-06-30 LAB
ALBUMIN SERPL-MCNC: 3.1 G/DL (ref 3.2–4.6)
ALBUMIN/GLOB SERPL: 0.9 (ref 0.4–1.6)
ALP SERPL-CCNC: 98 U/L (ref 50–130)
ALT SERPL-CCNC: 24 U/L (ref 12–65)
ANION GAP SERPL CALC-SCNC: 6 MMOL/L (ref 2–11)
AST SERPL-CCNC: 13 U/L (ref 15–37)
BASOPHILS # BLD: 0.2 K/UL (ref 0–0.2)
BASOPHILS NFR BLD: 2 % (ref 0–2)
BILIRUB SERPL-MCNC: 0.5 MG/DL (ref 0.2–1.1)
BUN SERPL-MCNC: 17 MG/DL (ref 8–23)
CALCIUM SERPL-MCNC: 9.5 MG/DL (ref 8.3–10.4)
CHLORIDE SERPL-SCNC: 108 MMOL/L (ref 101–110)
CO2 SERPL-SCNC: 26 MMOL/L (ref 21–32)
CREAT SERPL-MCNC: 0.91 MG/DL (ref 0.6–1)
DIFFERENTIAL METHOD BLD: ABNORMAL
EOSINOPHIL # BLD: 0.4 K/UL (ref 0–0.8)
EOSINOPHIL NFR BLD: 3 % (ref 0.5–7.8)
ERYTHROCYTE [DISTWIDTH] IN BLOOD BY AUTOMATED COUNT: 13.8 % (ref 11.9–14.6)
GLOBULIN SER CALC-MCNC: 3.4 G/DL (ref 2.8–4.5)
GLUCOSE BLD STRIP.AUTO-MCNC: 144 MG/DL (ref 65–100)
GLUCOSE BLD STRIP.AUTO-MCNC: 190 MG/DL (ref 65–100)
GLUCOSE BLD STRIP.AUTO-MCNC: 200 MG/DL (ref 65–100)
GLUCOSE BLD STRIP.AUTO-MCNC: 235 MG/DL (ref 65–100)
GLUCOSE SERPL-MCNC: 161 MG/DL (ref 65–100)
HCT VFR BLD AUTO: 38.7 % (ref 35.8–46.3)
HGB BLD-MCNC: 12.7 G/DL (ref 11.7–15.4)
IMM GRANULOCYTES # BLD AUTO: 0.5 K/UL (ref 0–0.5)
IMM GRANULOCYTES NFR BLD AUTO: 4 % (ref 0–5)
LYMPHOCYTES # BLD: 1.7 K/UL (ref 0.5–4.6)
LYMPHOCYTES NFR BLD: 15 % (ref 13–44)
MCH RBC QN AUTO: 28.7 PG (ref 26.1–32.9)
MCHC RBC AUTO-ENTMCNC: 32.8 G/DL (ref 31.4–35)
MCV RBC AUTO: 87.6 FL (ref 82–102)
MM INDURATION, POC: 0 MM (ref 0–5)
MONOCYTES # BLD: 0.7 K/UL (ref 0.1–1.3)
MONOCYTES NFR BLD: 6 % (ref 4–12)
NEUTS SEG # BLD: 7.9 K/UL (ref 1.7–8.2)
NEUTS SEG NFR BLD: 70 % (ref 43–78)
NRBC # BLD: 0 K/UL (ref 0–0.2)
PLATELET # BLD AUTO: 280 K/UL (ref 150–450)
PMV BLD AUTO: 9.6 FL (ref 9.4–12.3)
POTASSIUM SERPL-SCNC: 3.8 MMOL/L (ref 3.5–5.1)
PPD, POC: NEGATIVE
PROCALCITONIN SERPL-MCNC: <0.05 NG/ML (ref 0–0.49)
PROT SERPL-MCNC: 6.5 G/DL (ref 6.3–8.2)
RBC # BLD AUTO: 4.42 M/UL (ref 4.05–5.2)
SERVICE CMNT-IMP: ABNORMAL
SODIUM SERPL-SCNC: 140 MMOL/L (ref 133–143)
WBC # BLD AUTO: 11.3 K/UL (ref 4.3–11.1)

## 2023-06-30 PROCEDURE — 97165 OT EVAL LOW COMPLEX 30 MIN: CPT

## 2023-06-30 PROCEDURE — 97161 PT EVAL LOW COMPLEX 20 MIN: CPT

## 2023-06-30 PROCEDURE — 6360000002 HC RX W HCPCS: Performed by: NURSE PRACTITIONER

## 2023-06-30 PROCEDURE — 97535 SELF CARE MNGMENT TRAINING: CPT

## 2023-06-30 PROCEDURE — 97530 THERAPEUTIC ACTIVITIES: CPT

## 2023-06-30 PROCEDURE — 82962 GLUCOSE BLOOD TEST: CPT

## 2023-06-30 PROCEDURE — 85025 COMPLETE CBC W/AUTO DIFF WBC: CPT

## 2023-06-30 PROCEDURE — 2580000003 HC RX 258: Performed by: FAMILY MEDICINE

## 2023-06-30 PROCEDURE — 1100000003 HC PRIVATE W/ TELEMETRY

## 2023-06-30 PROCEDURE — 36415 COLL VENOUS BLD VENIPUNCTURE: CPT

## 2023-06-30 PROCEDURE — 6370000000 HC RX 637 (ALT 250 FOR IP): Performed by: NURSE PRACTITIONER

## 2023-06-30 PROCEDURE — 84145 PROCALCITONIN (PCT): CPT

## 2023-06-30 PROCEDURE — 80053 COMPREHEN METABOLIC PANEL: CPT

## 2023-06-30 PROCEDURE — 2580000003 HC RX 258: Performed by: NURSE PRACTITIONER

## 2023-06-30 RX ORDER — INSULIN GLARGINE 100 [IU]/ML
20 INJECTION, SOLUTION SUBCUTANEOUS NIGHTLY
Status: DISCONTINUED | OUTPATIENT
Start: 2023-06-30 | End: 2023-07-03 | Stop reason: HOSPADM

## 2023-06-30 RX ORDER — CARVEDILOL 25 MG/1
25 TABLET ORAL 2 TIMES DAILY WITH MEALS
Status: DISCONTINUED | OUTPATIENT
Start: 2023-06-30 | End: 2023-07-03 | Stop reason: HOSPADM

## 2023-06-30 RX ADMIN — AMLODIPINE BESYLATE 5 MG: 5 TABLET ORAL at 08:56

## 2023-06-30 RX ADMIN — INSULIN GLARGINE 20 UNITS: 100 INJECTION, SOLUTION SUBCUTANEOUS at 21:48

## 2023-06-30 RX ADMIN — CYANOCOBALAMIN TAB 1000 MCG 2500 MCG: 1000 TAB at 08:55

## 2023-06-30 RX ADMIN — ESCITALOPRAM OXALATE 10 MG: 10 TABLET ORAL at 08:56

## 2023-06-30 RX ADMIN — ROPINIROLE HYDROCHLORIDE 1 MG: 1 TABLET, FILM COATED ORAL at 08:56

## 2023-06-30 RX ADMIN — FAMOTIDINE 20 MG: 20 TABLET ORAL at 08:55

## 2023-06-30 RX ADMIN — INSULIN LISPRO 1 UNITS: 100 INJECTION, SOLUTION INTRAVENOUS; SUBCUTANEOUS at 14:00

## 2023-06-30 RX ADMIN — TROSPIUM CHLORIDE 20 MG: 20 TABLET, FILM COATED ORAL at 08:55

## 2023-06-30 RX ADMIN — INSULIN LISPRO 1 UNITS: 100 INJECTION, SOLUTION INTRAVENOUS; SUBCUTANEOUS at 17:29

## 2023-06-30 RX ADMIN — APIXABAN 2.5 MG: 2.5 TABLET, FILM COATED ORAL at 21:47

## 2023-06-30 RX ADMIN — ATORVASTATIN CALCIUM 80 MG: 40 TABLET, FILM COATED ORAL at 21:47

## 2023-06-30 RX ADMIN — CARVEDILOL 25 MG: 25 TABLET, FILM COATED ORAL at 17:42

## 2023-06-30 RX ADMIN — CARVEDILOL 25 MG: 25 TABLET, FILM COATED ORAL at 08:56

## 2023-06-30 RX ADMIN — ROPINIROLE HYDROCHLORIDE 1 MG: 1 TABLET, FILM COATED ORAL at 21:47

## 2023-06-30 RX ADMIN — ACETAMINOPHEN 650 MG: 325 TABLET, FILM COATED ORAL at 05:42

## 2023-06-30 RX ADMIN — PANTOPRAZOLE SODIUM 40 MG: 40 TABLET, DELAYED RELEASE ORAL at 08:56

## 2023-06-30 RX ADMIN — MEROPENEM 1000 MG: 1 INJECTION, POWDER, FOR SOLUTION INTRAVENOUS at 03:18

## 2023-06-30 RX ADMIN — SODIUM CHLORIDE: 9 INJECTION, SOLUTION INTRAVENOUS at 20:18

## 2023-06-30 RX ADMIN — SODIUM CHLORIDE: 9 INJECTION, SOLUTION INTRAVENOUS at 03:31

## 2023-06-30 RX ADMIN — APIXABAN 2.5 MG: 2.5 TABLET, FILM COATED ORAL at 08:55

## 2023-06-30 RX ADMIN — MEROPENEM 1000 MG: 1 INJECTION, POWDER, FOR SOLUTION INTRAVENOUS at 15:49

## 2023-06-30 ASSESSMENT — PAIN SCALES - GENERAL: PAINLEVEL_OUTOF10: 5

## 2023-06-30 ASSESSMENT — PAIN DESCRIPTION - ORIENTATION: ORIENTATION: LEFT

## 2023-06-30 ASSESSMENT — PAIN DESCRIPTION - LOCATION: LOCATION: ARM

## 2023-07-01 LAB
ALBUMIN SERPL-MCNC: 3 G/DL (ref 3.2–4.6)
ALBUMIN/GLOB SERPL: 1 (ref 0.4–1.6)
ALP SERPL-CCNC: 89 U/L (ref 50–130)
ALT SERPL-CCNC: 21 U/L (ref 12–65)
ANION GAP SERPL CALC-SCNC: 5 MMOL/L (ref 2–11)
AST SERPL-CCNC: 14 U/L (ref 15–37)
BASOPHILS # BLD: 0.1 K/UL (ref 0–0.2)
BASOPHILS NFR BLD: 2 % (ref 0–2)
BILIRUB SERPL-MCNC: 0.6 MG/DL (ref 0.2–1.1)
BUN SERPL-MCNC: 14 MG/DL (ref 8–23)
CALCIUM SERPL-MCNC: 9.4 MG/DL (ref 8.3–10.4)
CHLORIDE SERPL-SCNC: 111 MMOL/L (ref 101–110)
CO2 SERPL-SCNC: 26 MMOL/L (ref 21–32)
CREAT SERPL-MCNC: 0.79 MG/DL (ref 0.6–1)
DIFFERENTIAL METHOD BLD: NORMAL
EOSINOPHIL # BLD: 0.4 K/UL (ref 0–0.8)
EOSINOPHIL NFR BLD: 5 % (ref 0.5–7.8)
ERYTHROCYTE [DISTWIDTH] IN BLOOD BY AUTOMATED COUNT: 13.9 % (ref 11.9–14.6)
GLOBULIN SER CALC-MCNC: 3.1 G/DL (ref 2.8–4.5)
GLUCOSE BLD STRIP.AUTO-MCNC: 124 MG/DL (ref 65–100)
GLUCOSE BLD STRIP.AUTO-MCNC: 200 MG/DL (ref 65–100)
GLUCOSE BLD STRIP.AUTO-MCNC: 210 MG/DL (ref 65–100)
GLUCOSE BLD STRIP.AUTO-MCNC: 222 MG/DL (ref 65–100)
GLUCOSE SERPL-MCNC: 115 MG/DL (ref 65–100)
HCT VFR BLD AUTO: 37.2 % (ref 35.8–46.3)
HGB BLD-MCNC: 12.1 G/DL (ref 11.7–15.4)
IMM GRANULOCYTES # BLD AUTO: 0.3 K/UL (ref 0–0.5)
IMM GRANULOCYTES NFR BLD AUTO: 4 % (ref 0–5)
LYMPHOCYTES # BLD: 2 K/UL (ref 0.5–4.6)
LYMPHOCYTES NFR BLD: 23 % (ref 13–44)
MCH RBC QN AUTO: 28.7 PG (ref 26.1–32.9)
MCHC RBC AUTO-ENTMCNC: 32.5 G/DL (ref 31.4–35)
MCV RBC AUTO: 88.2 FL (ref 82–102)
MONOCYTES # BLD: 0.6 K/UL (ref 0.1–1.3)
MONOCYTES NFR BLD: 6 % (ref 4–12)
NEUTS SEG # BLD: 5.3 K/UL (ref 1.7–8.2)
NEUTS SEG NFR BLD: 61 % (ref 43–78)
NRBC # BLD: 0 K/UL (ref 0–0.2)
PLATELET # BLD AUTO: 265 K/UL (ref 150–450)
PMV BLD AUTO: 9.7 FL (ref 9.4–12.3)
POTASSIUM SERPL-SCNC: 4 MMOL/L (ref 3.5–5.1)
PROT SERPL-MCNC: 6.1 G/DL (ref 6.3–8.2)
RBC # BLD AUTO: 4.22 M/UL (ref 4.05–5.2)
SERVICE CMNT-IMP: ABNORMAL
SODIUM SERPL-SCNC: 142 MMOL/L (ref 133–143)
WBC # BLD AUTO: 8.6 K/UL (ref 4.3–11.1)

## 2023-07-01 PROCEDURE — 6370000000 HC RX 637 (ALT 250 FOR IP): Performed by: NURSE PRACTITIONER

## 2023-07-01 PROCEDURE — 36415 COLL VENOUS BLD VENIPUNCTURE: CPT

## 2023-07-01 PROCEDURE — 2580000003 HC RX 258: Performed by: FAMILY MEDICINE

## 2023-07-01 PROCEDURE — 2580000003 HC RX 258: Performed by: NURSE PRACTITIONER

## 2023-07-01 PROCEDURE — 80053 COMPREHEN METABOLIC PANEL: CPT

## 2023-07-01 PROCEDURE — 82962 GLUCOSE BLOOD TEST: CPT

## 2023-07-01 PROCEDURE — 1100000003 HC PRIVATE W/ TELEMETRY

## 2023-07-01 PROCEDURE — 85025 COMPLETE CBC W/AUTO DIFF WBC: CPT

## 2023-07-01 PROCEDURE — 6360000002 HC RX W HCPCS: Performed by: NURSE PRACTITIONER

## 2023-07-01 RX ORDER — 0.9 % SODIUM CHLORIDE 0.9 %
500 INTRAVENOUS SOLUTION INTRAVENOUS ONCE
Status: COMPLETED | OUTPATIENT
Start: 2023-07-01 | End: 2023-07-01

## 2023-07-01 RX ADMIN — INSULIN LISPRO 1 UNITS: 100 INJECTION, SOLUTION INTRAVENOUS; SUBCUTANEOUS at 17:42

## 2023-07-01 RX ADMIN — ESCITALOPRAM OXALATE 10 MG: 10 TABLET ORAL at 08:24

## 2023-07-01 RX ADMIN — AMLODIPINE BESYLATE 5 MG: 5 TABLET ORAL at 08:25

## 2023-07-01 RX ADMIN — CARVEDILOL 25 MG: 25 TABLET, FILM COATED ORAL at 08:25

## 2023-07-01 RX ADMIN — CYANOCOBALAMIN TAB 1000 MCG 2500 MCG: 1000 TAB at 08:25

## 2023-07-01 RX ADMIN — SODIUM CHLORIDE: 9 INJECTION, SOLUTION INTRAVENOUS at 15:55

## 2023-07-01 RX ADMIN — ATORVASTATIN CALCIUM 80 MG: 40 TABLET, FILM COATED ORAL at 21:11

## 2023-07-01 RX ADMIN — INSULIN GLARGINE 20 UNITS: 100 INJECTION, SOLUTION SUBCUTANEOUS at 21:10

## 2023-07-01 RX ADMIN — APIXABAN 2.5 MG: 2.5 TABLET, FILM COATED ORAL at 21:12

## 2023-07-01 RX ADMIN — PANTOPRAZOLE SODIUM 40 MG: 40 TABLET, DELAYED RELEASE ORAL at 08:24

## 2023-07-01 RX ADMIN — ROPINIROLE HYDROCHLORIDE 1 MG: 1 TABLET, FILM COATED ORAL at 08:24

## 2023-07-01 RX ADMIN — INSULIN LISPRO 1 UNITS: 100 INJECTION, SOLUTION INTRAVENOUS; SUBCUTANEOUS at 12:32

## 2023-07-01 RX ADMIN — APIXABAN 2.5 MG: 2.5 TABLET, FILM COATED ORAL at 08:36

## 2023-07-01 RX ADMIN — ROPINIROLE HYDROCHLORIDE 1 MG: 1 TABLET, FILM COATED ORAL at 21:11

## 2023-07-01 RX ADMIN — MEROPENEM 1000 MG: 1 INJECTION, POWDER, FOR SOLUTION INTRAVENOUS at 03:22

## 2023-07-01 RX ADMIN — MEROPENEM 1000 MG: 1 INJECTION, POWDER, FOR SOLUTION INTRAVENOUS at 15:48

## 2023-07-01 RX ADMIN — SODIUM CHLORIDE 500 ML: 9 INJECTION, SOLUTION INTRAVENOUS at 08:24

## 2023-07-01 RX ADMIN — TROSPIUM CHLORIDE 20 MG: 20 TABLET, FILM COATED ORAL at 08:24

## 2023-07-01 RX ADMIN — CARVEDILOL 25 MG: 25 TABLET, FILM COATED ORAL at 15:49

## 2023-07-01 RX ADMIN — FAMOTIDINE 20 MG: 20 TABLET ORAL at 08:24

## 2023-07-02 LAB
ALBUMIN SERPL-MCNC: 2.9 G/DL (ref 3.2–4.6)
ALBUMIN/GLOB SERPL: 0.9 (ref 0.4–1.6)
ALP SERPL-CCNC: 86 U/L (ref 50–130)
ALT SERPL-CCNC: 19 U/L (ref 12–65)
ANION GAP SERPL CALC-SCNC: 6 MMOL/L (ref 2–11)
AST SERPL-CCNC: 14 U/L (ref 15–37)
BACTERIA SPEC CULT: ABNORMAL
BACTERIA SPEC CULT: ABNORMAL
BASOPHILS # BLD: 0.1 K/UL (ref 0–0.2)
BASOPHILS NFR BLD: 1 % (ref 0–2)
BILIRUB SERPL-MCNC: 0.5 MG/DL (ref 0.2–1.1)
BUN SERPL-MCNC: 13 MG/DL (ref 8–23)
CALCIUM SERPL-MCNC: 9.4 MG/DL (ref 8.3–10.4)
CHLORIDE SERPL-SCNC: 110 MMOL/L (ref 101–110)
CO2 SERPL-SCNC: 25 MMOL/L (ref 21–32)
CREAT SERPL-MCNC: 0.9 MG/DL (ref 0.6–1)
DIFFERENTIAL METHOD BLD: NORMAL
EOSINOPHIL # BLD: 0.4 K/UL (ref 0–0.8)
EOSINOPHIL NFR BLD: 5 % (ref 0.5–7.8)
ERYTHROCYTE [DISTWIDTH] IN BLOOD BY AUTOMATED COUNT: 13.9 % (ref 11.9–14.6)
GLOBULIN SER CALC-MCNC: 3.2 G/DL (ref 2.8–4.5)
GLUCOSE BLD STRIP.AUTO-MCNC: 135 MG/DL (ref 65–100)
GLUCOSE BLD STRIP.AUTO-MCNC: 177 MG/DL (ref 65–100)
GLUCOSE BLD STRIP.AUTO-MCNC: 192 MG/DL (ref 65–100)
GLUCOSE BLD STRIP.AUTO-MCNC: 209 MG/DL (ref 65–100)
GLUCOSE SERPL-MCNC: 191 MG/DL (ref 65–100)
HCT VFR BLD AUTO: 36.6 % (ref 35.8–46.3)
HGB BLD-MCNC: 11.7 G/DL (ref 11.7–15.4)
IMM GRANULOCYTES # BLD AUTO: 0.2 K/UL (ref 0–0.5)
IMM GRANULOCYTES NFR BLD AUTO: 2 % (ref 0–5)
LYMPHOCYTES # BLD: 2.1 K/UL (ref 0.5–4.6)
LYMPHOCYTES NFR BLD: 26 % (ref 13–44)
MAGNESIUM SERPL-MCNC: 1.4 MG/DL (ref 1.8–2.4)
MCH RBC QN AUTO: 28.5 PG (ref 26.1–32.9)
MCHC RBC AUTO-ENTMCNC: 32 G/DL (ref 31.4–35)
MCV RBC AUTO: 89.1 FL (ref 82–102)
MM INDURATION, POC: 0 MM (ref 0–5)
MM INDURATION, POC: 0 MM (ref 0–5)
MONOCYTES # BLD: 0.6 K/UL (ref 0.1–1.3)
MONOCYTES NFR BLD: 7 % (ref 4–12)
NEUTS SEG # BLD: 4.8 K/UL (ref 1.7–8.2)
NEUTS SEG NFR BLD: 59 % (ref 43–78)
NRBC # BLD: 0 K/UL (ref 0–0.2)
PLATELET # BLD AUTO: 282 K/UL (ref 150–450)
PMV BLD AUTO: 9.5 FL (ref 9.4–12.3)
POTASSIUM SERPL-SCNC: 4.1 MMOL/L (ref 3.5–5.1)
PPD, POC: NEGATIVE
PPD, POC: NEGATIVE
PROT SERPL-MCNC: 6.1 G/DL (ref 6.3–8.2)
RBC # BLD AUTO: 4.11 M/UL (ref 4.05–5.2)
SERVICE CMNT-IMP: ABNORMAL
SODIUM SERPL-SCNC: 141 MMOL/L (ref 133–143)
WBC # BLD AUTO: 8.1 K/UL (ref 4.3–11.1)

## 2023-07-02 PROCEDURE — 6360000002 HC RX W HCPCS: Performed by: NURSE PRACTITIONER

## 2023-07-02 PROCEDURE — 80053 COMPREHEN METABOLIC PANEL: CPT

## 2023-07-02 PROCEDURE — 2580000003 HC RX 258: Performed by: NURSE PRACTITIONER

## 2023-07-02 PROCEDURE — 85025 COMPLETE CBC W/AUTO DIFF WBC: CPT

## 2023-07-02 PROCEDURE — 6370000000 HC RX 637 (ALT 250 FOR IP): Performed by: NURSE PRACTITIONER

## 2023-07-02 PROCEDURE — 83735 ASSAY OF MAGNESIUM: CPT

## 2023-07-02 PROCEDURE — 36415 COLL VENOUS BLD VENIPUNCTURE: CPT

## 2023-07-02 PROCEDURE — 1100000003 HC PRIVATE W/ TELEMETRY

## 2023-07-02 PROCEDURE — 82962 GLUCOSE BLOOD TEST: CPT

## 2023-07-02 RX ORDER — MAGNESIUM SULFATE IN WATER 40 MG/ML
4000 INJECTION, SOLUTION INTRAVENOUS ONCE
Status: COMPLETED | OUTPATIENT
Start: 2023-07-02 | End: 2023-07-02

## 2023-07-02 RX ADMIN — MEROPENEM 1000 MG: 1 INJECTION, POWDER, FOR SOLUTION INTRAVENOUS at 16:19

## 2023-07-02 RX ADMIN — FAMOTIDINE 20 MG: 20 TABLET ORAL at 08:20

## 2023-07-02 RX ADMIN — MAGNESIUM SULFATE HEPTAHYDRATE 4000 MG: 40 INJECTION, SOLUTION INTRAVENOUS at 12:31

## 2023-07-02 RX ADMIN — INSULIN LISPRO 1 UNITS: 100 INJECTION, SOLUTION INTRAVENOUS; SUBCUTANEOUS at 16:33

## 2023-07-02 RX ADMIN — CYANOCOBALAMIN TAB 1000 MCG 2500 MCG: 1000 TAB at 08:18

## 2023-07-02 RX ADMIN — PANTOPRAZOLE SODIUM 40 MG: 40 TABLET, DELAYED RELEASE ORAL at 08:18

## 2023-07-02 RX ADMIN — CARVEDILOL 25 MG: 25 TABLET, FILM COATED ORAL at 08:18

## 2023-07-02 RX ADMIN — ESCITALOPRAM OXALATE 10 MG: 10 TABLET ORAL at 08:19

## 2023-07-02 RX ADMIN — APIXABAN 2.5 MG: 2.5 TABLET, FILM COATED ORAL at 22:02

## 2023-07-02 RX ADMIN — ROPINIROLE HYDROCHLORIDE 1 MG: 1 TABLET, FILM COATED ORAL at 22:02

## 2023-07-02 RX ADMIN — MEROPENEM 1000 MG: 1 INJECTION, POWDER, FOR SOLUTION INTRAVENOUS at 03:43

## 2023-07-02 RX ADMIN — ROPINIROLE HYDROCHLORIDE 1 MG: 1 TABLET, FILM COATED ORAL at 08:18

## 2023-07-02 RX ADMIN — CARVEDILOL 25 MG: 25 TABLET, FILM COATED ORAL at 16:19

## 2023-07-02 RX ADMIN — ATORVASTATIN CALCIUM 80 MG: 40 TABLET, FILM COATED ORAL at 22:02

## 2023-07-02 RX ADMIN — TROSPIUM CHLORIDE 20 MG: 20 TABLET, FILM COATED ORAL at 08:18

## 2023-07-02 RX ADMIN — APIXABAN 2.5 MG: 2.5 TABLET, FILM COATED ORAL at 08:19

## 2023-07-02 RX ADMIN — ACETAMINOPHEN 650 MG: 325 TABLET, FILM COATED ORAL at 06:26

## 2023-07-02 RX ADMIN — AMLODIPINE BESYLATE 5 MG: 5 TABLET ORAL at 08:19

## 2023-07-02 RX ADMIN — INSULIN GLARGINE 20 UNITS: 100 INJECTION, SOLUTION SUBCUTANEOUS at 22:03

## 2023-07-02 ASSESSMENT — PAIN DESCRIPTION - DESCRIPTORS: DESCRIPTORS: ACHING

## 2023-07-02 ASSESSMENT — PAIN - FUNCTIONAL ASSESSMENT: PAIN_FUNCTIONAL_ASSESSMENT: PREVENTS OR INTERFERES SOME ACTIVE ACTIVITIES AND ADLS

## 2023-07-02 ASSESSMENT — PAIN DESCRIPTION - LOCATION: LOCATION: BACK

## 2023-07-02 ASSESSMENT — PAIN SCALES - GENERAL: PAINLEVEL_OUTOF10: 3

## 2023-07-03 VITALS
RESPIRATION RATE: 16 BRPM | SYSTOLIC BLOOD PRESSURE: 120 MMHG | DIASTOLIC BLOOD PRESSURE: 76 MMHG | OXYGEN SATURATION: 95 % | HEART RATE: 89 BPM | TEMPERATURE: 98.4 F | HEIGHT: 57 IN | WEIGHT: 123.5 LBS | BODY MASS INDEX: 26.64 KG/M2

## 2023-07-03 PROBLEM — N39.0 URINARY TRACT INFECTION DUE TO ESBL KLEBSIELLA: Status: ACTIVE | Noted: 2023-07-03

## 2023-07-03 PROBLEM — E83.42 HYPOMAGNESEMIA: Status: RESOLVED | Noted: 2023-06-29 | Resolved: 2023-07-03

## 2023-07-03 PROBLEM — N39.0 URINARY TRACT INFECTION DUE TO ESBL KLEBSIELLA: Status: RESOLVED | Noted: 2023-07-03 | Resolved: 2023-07-03

## 2023-07-03 PROBLEM — N30.00 ACUTE CYSTITIS WITHOUT HEMATURIA: Status: RESOLVED | Noted: 2021-03-04 | Resolved: 2023-07-03

## 2023-07-03 PROBLEM — B96.89 URINARY TRACT INFECTION DUE TO ESBL KLEBSIELLA: Status: RESOLVED | Noted: 2023-07-03 | Resolved: 2023-07-03

## 2023-07-03 PROBLEM — B96.89 URINARY TRACT INFECTION DUE TO ESBL KLEBSIELLA: Status: ACTIVE | Noted: 2023-07-03

## 2023-07-03 PROBLEM — A41.9 SEVERE SEPSIS (HCC): Status: RESOLVED | Noted: 2021-11-17 | Resolved: 2023-07-03

## 2023-07-03 PROBLEM — R65.20 SEVERE SEPSIS (HCC): Status: RESOLVED | Noted: 2021-11-17 | Resolved: 2023-07-03

## 2023-07-03 LAB
ANION GAP SERPL CALC-SCNC: 6 MMOL/L (ref 2–11)
BASOPHILS # BLD: 0.1 K/UL (ref 0–0.2)
BASOPHILS NFR BLD: 1 % (ref 0–2)
BUN SERPL-MCNC: 14 MG/DL (ref 8–23)
CALCIUM SERPL-MCNC: 9.6 MG/DL (ref 8.3–10.4)
CHLORIDE SERPL-SCNC: 110 MMOL/L (ref 101–110)
CO2 SERPL-SCNC: 26 MMOL/L (ref 21–32)
CREAT SERPL-MCNC: 0.89 MG/DL (ref 0.6–1)
DIFFERENTIAL METHOD BLD: NORMAL
EOSINOPHIL # BLD: 0.4 K/UL (ref 0–0.8)
EOSINOPHIL NFR BLD: 5 % (ref 0.5–7.8)
ERYTHROCYTE [DISTWIDTH] IN BLOOD BY AUTOMATED COUNT: 13.9 % (ref 11.9–14.6)
GLUCOSE BLD STRIP.AUTO-MCNC: 210 MG/DL (ref 65–100)
GLUCOSE BLD STRIP.AUTO-MCNC: 232 MG/DL (ref 65–100)
GLUCOSE SERPL-MCNC: 243 MG/DL (ref 65–100)
HCT VFR BLD AUTO: 37.9 % (ref 35.8–46.3)
HGB BLD-MCNC: 12 G/DL (ref 11.7–15.4)
IMM GRANULOCYTES # BLD AUTO: 0.1 K/UL (ref 0–0.5)
IMM GRANULOCYTES NFR BLD AUTO: 2 % (ref 0–5)
LACTATE SERPL-SCNC: 1.8 MMOL/L (ref 0.4–2)
LYMPHOCYTES # BLD: 1.6 K/UL (ref 0.5–4.6)
LYMPHOCYTES NFR BLD: 22 % (ref 13–44)
MAGNESIUM SERPL-MCNC: 2.3 MG/DL (ref 1.8–2.4)
MCH RBC QN AUTO: 28.2 PG (ref 26.1–32.9)
MCHC RBC AUTO-ENTMCNC: 31.7 G/DL (ref 31.4–35)
MCV RBC AUTO: 89.2 FL (ref 82–102)
MONOCYTES # BLD: 0.4 K/UL (ref 0.1–1.3)
MONOCYTES NFR BLD: 6 % (ref 4–12)
NEUTS SEG # BLD: 4.6 K/UL (ref 1.7–8.2)
NEUTS SEG NFR BLD: 64 % (ref 43–78)
NRBC # BLD: 0 K/UL (ref 0–0.2)
PLATELET # BLD AUTO: 260 K/UL (ref 150–450)
PMV BLD AUTO: 9.7 FL (ref 9.4–12.3)
POTASSIUM SERPL-SCNC: 3.9 MMOL/L (ref 3.5–5.1)
PROCALCITONIN SERPL-MCNC: <0.05 NG/ML (ref 0–0.49)
RBC # BLD AUTO: 4.25 M/UL (ref 4.05–5.2)
SERVICE CMNT-IMP: ABNORMAL
SERVICE CMNT-IMP: ABNORMAL
SODIUM SERPL-SCNC: 142 MMOL/L (ref 133–143)
WBC # BLD AUTO: 7.2 K/UL (ref 4.3–11.1)

## 2023-07-03 PROCEDURE — 6360000002 HC RX W HCPCS: Performed by: NURSE PRACTITIONER

## 2023-07-03 PROCEDURE — 85025 COMPLETE CBC W/AUTO DIFF WBC: CPT

## 2023-07-03 PROCEDURE — 2580000003 HC RX 258: Performed by: NURSE PRACTITIONER

## 2023-07-03 PROCEDURE — 84145 PROCALCITONIN (PCT): CPT

## 2023-07-03 PROCEDURE — 82962 GLUCOSE BLOOD TEST: CPT

## 2023-07-03 PROCEDURE — 6370000000 HC RX 637 (ALT 250 FOR IP): Performed by: NURSE PRACTITIONER

## 2023-07-03 PROCEDURE — 83735 ASSAY OF MAGNESIUM: CPT

## 2023-07-03 PROCEDURE — 83605 ASSAY OF LACTIC ACID: CPT

## 2023-07-03 PROCEDURE — 80048 BASIC METABOLIC PNL TOTAL CA: CPT

## 2023-07-03 PROCEDURE — 36415 COLL VENOUS BLD VENIPUNCTURE: CPT

## 2023-07-03 RX ORDER — APIXABAN 2.5 MG/1
2.5 TABLET, FILM COATED ORAL 2 TIMES DAILY
Qty: 60 TABLET | Refills: 0 | Status: SHIPPED | OUTPATIENT
Start: 2023-07-03 | End: 2023-08-02

## 2023-07-03 RX ORDER — ESCITALOPRAM OXALATE 10 MG/1
10 TABLET ORAL DAILY
Qty: 30 TABLET | Refills: 0 | Status: SHIPPED | OUTPATIENT
Start: 2023-07-03 | End: 2023-08-02

## 2023-07-03 RX ORDER — SPIRONOLACTONE 25 MG/1
25 TABLET ORAL DAILY
Qty: 30 TABLET | Refills: 0 | Status: SHIPPED | OUTPATIENT
Start: 2023-07-03 | End: 2023-08-02

## 2023-07-03 RX ORDER — FAMOTIDINE 20 MG/1
20 TABLET, FILM COATED ORAL DAILY
Qty: 30 TABLET | Refills: 0 | Status: SHIPPED | OUTPATIENT
Start: 2023-07-03 | End: 2023-08-02

## 2023-07-03 RX ORDER — AMLODIPINE BESYLATE 5 MG/1
5 TABLET ORAL DAILY
Qty: 30 TABLET | Refills: 0 | Status: SHIPPED | OUTPATIENT
Start: 2023-07-03 | End: 2023-08-02

## 2023-07-03 RX ORDER — CARVEDILOL 25 MG/1
25 TABLET ORAL 2 TIMES DAILY WITH MEALS
Qty: 60 TABLET | Refills: 0 | Status: SHIPPED | OUTPATIENT
Start: 2023-07-03 | End: 2023-08-02

## 2023-07-03 RX ORDER — FUROSEMIDE 20 MG/1
10 TABLET ORAL DAILY
Qty: 15 TABLET | Refills: 0 | Status: SHIPPED | OUTPATIENT
Start: 2023-07-03

## 2023-07-03 RX ORDER — ATORVASTATIN CALCIUM 80 MG/1
80 TABLET, FILM COATED ORAL NIGHTLY
Qty: 30 TABLET | Refills: 0 | Status: SHIPPED | OUTPATIENT
Start: 2023-07-03

## 2023-07-03 RX ORDER — UREA 10 %
800 LOTION (ML) TOPICAL DAILY
Qty: 30 TABLET | Refills: 0 | Status: SHIPPED | OUTPATIENT
Start: 2023-07-03 | End: 2023-08-02

## 2023-07-03 RX ORDER — ACETAMINOPHEN 500 MG
500 TABLET ORAL EVERY 6 HOURS PRN
Qty: 40 TABLET | Refills: 0 | Status: SHIPPED | OUTPATIENT
Start: 2023-07-03 | End: 2023-07-13

## 2023-07-03 RX ORDER — OMEPRAZOLE 40 MG/1
40 CAPSULE, DELAYED RELEASE ORAL PRN
Qty: 30 CAPSULE | Refills: 0 | Status: SHIPPED | OUTPATIENT
Start: 2023-07-03 | End: 2023-08-02

## 2023-07-03 RX ORDER — INSULIN GLARGINE 100 [IU]/ML
25 INJECTION, SOLUTION SUBCUTANEOUS NIGHTLY
Qty: 10 ML | Refills: 0 | Status: SHIPPED | OUTPATIENT
Start: 2023-07-03

## 2023-07-03 RX ORDER — ROPINIROLE 1 MG/1
1 TABLET, FILM COATED ORAL 2 TIMES DAILY
Qty: 60 TABLET | Refills: 0 | Status: SHIPPED | OUTPATIENT
Start: 2023-07-03 | End: 2023-08-02

## 2023-07-03 RX ADMIN — INSULIN LISPRO 1 UNITS: 100 INJECTION, SOLUTION INTRAVENOUS; SUBCUTANEOUS at 10:31

## 2023-07-03 RX ADMIN — PANTOPRAZOLE SODIUM 40 MG: 40 TABLET, DELAYED RELEASE ORAL at 10:07

## 2023-07-03 RX ADMIN — CARVEDILOL 25 MG: 25 TABLET, FILM COATED ORAL at 10:23

## 2023-07-03 RX ADMIN — ESCITALOPRAM OXALATE 10 MG: 10 TABLET ORAL at 10:07

## 2023-07-03 RX ADMIN — MEROPENEM 1000 MG: 1 INJECTION, POWDER, FOR SOLUTION INTRAVENOUS at 03:53

## 2023-07-03 RX ADMIN — CYANOCOBALAMIN TAB 1000 MCG 2500 MCG: 1000 TAB at 10:11

## 2023-07-03 RX ADMIN — INSULIN LISPRO 1 UNITS: 100 INJECTION, SOLUTION INTRAVENOUS; SUBCUTANEOUS at 12:24

## 2023-07-03 RX ADMIN — TROSPIUM CHLORIDE 20 MG: 20 TABLET, FILM COATED ORAL at 10:06

## 2023-07-03 RX ADMIN — APIXABAN 2.5 MG: 2.5 TABLET, FILM COATED ORAL at 10:06

## 2023-07-03 RX ADMIN — AMLODIPINE BESYLATE 5 MG: 5 TABLET ORAL at 10:06

## 2023-07-03 RX ADMIN — FAMOTIDINE 20 MG: 20 TABLET ORAL at 10:07

## 2023-07-03 RX ADMIN — ROPINIROLE HYDROCHLORIDE 1 MG: 1 TABLET, FILM COATED ORAL at 10:06

## 2023-07-03 NOTE — CARE COORDINATION
3777: Discharge planning was discussed with the attending NP. The home health order was confirmed with the NP and placed in the patient's discharge packet. The prescription for her medication was also packed in the discharge packet. Her family will transport her back to her facility. She is returning to her assisted living facility today. RN CM called [address] [place] [address] at [phone] to notify them of the patient's discharge orders but was unable to reach them by telephone. After multiple rings, the call was disconnected. RN CM made two attempts to reach the facility. 0900: RN CM met with the patient at the bedside and discussed discharge planning. She confirmed she is in agreement to return to her assisted living facility today and resume services with [facility]. The patient was provided with a list of home health agencies and their quality metrics. A referral and home health orders were sent to [facility]. She confirmed her daughter will transport her home. The Important Message from Medicare letter was delivered and explained to the patient. She verbalized understanding of the information and signed the letter. The patient was given a copy of the letter and the original was placed in the chart. 5078: Phone call received from [address] [facility] at [facility] stating they cannot accept the patient without their Plan of Care papers. She was provided with the fax number for the nursing station.

## 2023-07-03 NOTE — CARE COORDINATION
07/03/23 0905   Service Assessment   Patient Orientation Alert and Oriented   Cognition Alert   History Provided By Patient   Primary [facility]; Other (Comment)  (assisted living facility staff)   Prior Functional Level Independent in ADLs/IADLs   Current Functional Level Independent in ADLs/IADLs   Can patient return to prior living arrangement Yes   Ability to make needs known: Good   Community Resources Assisted Living   Social/Functional History   Type of Home Assisted living   [address]   Discharge Planning   Type of Residence Assisted living   Current Services Prior To Admission [address]   DME Ordered? No   Potential Assistance Purchasing Medications No   Patient expects to be discharged to: Assisted living   [address] Discharge   Transition of Care Consult (CM Consult) Discharge Planning   Services At/After Discharge Home Health   Confirm Follow Up Transport Family   Condition of Participation: Discharge Planning   The Plan for Transition of Care is related to the following treatment goals: ANGELINA   The Patient and/or Patient Representative was provided with a Choice of Provider? Patient   The Patient and/Or Patient Representative agree with the Discharge Plan? Yes   Freedom of Choice list was provided with basic dialogue that supports the patient's individualized plan of care/goals, treatment preferences, and shares the quality data associated with the providers? Yes     RN CM met with the patient at the bedside and reviewed her discharge plan. She confirmed she is in agreement to return to her assisted living facility today and resume her home health services. Orders were sent to [facility]. The patient's prescription will be transported with her back to the assisted living facility. She verbalized understanding and agreement with the discharge plan.  RN CM

## 2023-07-03 NOTE — DISCHARGE SUMMARY
Hospitalist Discharge Summary   Admit Date:  2023 11:48 AM   DC Note date: 7/3/2023  Name:  Rachel Mcdonald   Age:  80 y.o. Sex:  female  :  1934   MRN:  [id number]   Room:  [facility]/  PCP:  Andrea Olson MD    Presenting Complaint: Dysuria and Abdominal Pain     Initial Admission Diagnosis: Septicemia ([address]) [A41.9]  Hyperglycemia [R73.9]  SIRS (systemic inflammatory response syndrome) ([address]) [R65.10]  JAMEY (acute kidney injury) ([address]) [N17.9]  Acute cystitis without hematuria [N30.00]     Problem List for this Hospitalization (present on admission):    Principal Problem (Resolved):    Urinary tract infection due to ESBL Klebsiella  Active Problems:    Paroxysmal atrial fibrillation (HCC)    Heart failure with preserved left ventricular function (HFpEF) ([address])    Type 2 diabetes mellitus with hyperglycemia, with long-term current use of insulin ([address])    Hypercoagulability due to atrial fibrillation ([address])  Resolved Problems:    Acute cystitis without hematuria    JAMEY (acute kidney injury) ([address])    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 female with medical history of UTIs (both ecoli and ESBL), JAMEY, CAP, HTN, HLD, sepsis, arthritis, and hip fracture. Pt presents to ED from home w c/o abdominal pain, urine retention / fullness at times, urgency at others, urine darker and cloudier than usual. Saw PCP, UA cx came back for ESBL on 6/10/23. Has been taking bactrim out pt but still having sx. IN ED her HR was 103, LA 2.6, WBC ct 14.1 and UA was positive for bacteria, nitrates, and leukocytes. Sent off sample for new cx here. Hospitalist asked to admit and continue work up / tx for UTI failure out tx and JAMEY. We are glad to participate in her care.       ED states the lab form pt brought w her shows last cx sensitive to IV zosyn, therefore

## 2023-07-03 NOTE — DIABETES MGMT
Patient admitted with septicemia. Blood glucose ranged 135-209 yesterday with patient receiving Lantus 20 units and Humalog 1 unit. Blood glucose this morning was 232. Creatinine 0.89. GFR >60. Reviewed patient current regimen: Lantus 20 units nightly and Humalog correctional insulin. Patient would likely benefit from an increase in basal insulin as fasting blood glucose is not at goal. Provider updated via [employer] regarding recommendations and patient glycemic control. Per provider patient to discharge back to facility. Patient on Lantus 25 units at facility. Will sign off.

## 2023-09-04 NOTE — PROGRESS NOTES
ACUTE PHYSICAL THERAPY GOALS:  (Developed with and agreed upon by patient and/or caregiver.)    All goals ongoing 3/7/2021     LTG:  (1.)Ms. Curtis will move from supine to sit and sit to supine , scoot up and down and roll side to side in bed with stand by ASSIST within 7 treatment day(s). (2.)Ms. Curtis will transfer from bed to chair and chair to bed with stand by ASSIST using the least restrictive device within 7 treatment day(s). (3.)Ms. Curtis will ambulate with stand by ASSIST for  feet with the least restrictive device within 7 treatment day(s). PHYSICAL THERAPY: Daily Note and AM Treatment Day # 3    Rickey Mcelroy is a 80 y.o. female   PRIMARY DIAGNOSIS: Closed fracture of left hip (HCC)  Hip fracture ([facility] [address] [S72.009A]  Hip fracture requiring operative repair ([facility] [address] [S72.009A]  Procedure(s) (LRB):  LEFT HIP GAMMA NAIL INSERTION (Left)  3 Days Post-Op    ASSESSMENT:     REHAB RECOMMENDATIONS: CURRENT LEVEL OF FUNCTION:  (Most Recently Demonstrated)   Recommendation to date pending progress:  Setting:   Short-term Rehab  Equipment:    Rolling Walker   To Be Determined Bed Mobility:   Minimal Assistance  Sit to Stand:   Minimal Assistance  Transfers:   Minimal Assistance  Gait/Mobility:   Minimal Assistance     ASSESSMENT:  Ms. Carla Miramontes is supine on arrival, 2 L O2 via n.c, stats 97%. Pt states pain at rest, 4/10, declined need to notify RN. Pt continues to require MIN A for bed mob, transfers. Safety concerns noted with pt attempting to sit early to [facility] and recliner chair with transfers. Pt with fair to good static and dynamic standing balance with RW for chel care after toileting and donning brief. Pt making slow progress toward goals, little ambulation this date due to increased fatigue after toileting/brief/gown change. PT to cont to follow for acute care needs.       SUBJECTIVE:   Ms. Carla Miramontes states, \"I need to use the bathroom\"    SOCIAL HISTORY/ LIVING ENVIRONMENT: Ms. Carla Miramontes lives with her spouse and ambulates with a rollator independently in home and community. She drives and attends [employer]. Was a square dancer prior to covid.   Home Environment: Private residence  One/Two Story Residence: One story  Living Alone: No  Support Systems: Family member(s), Spouse/Significant Other/Partner  OBJECTIVE:     PAIN: VITAL SIGNS: LINES/DRAINS:   Pre Treatment: Pain Screen  Pain Scale 1: Numeric (0 - 10)  Pain Intensity 1: 4(declined need for medication)  Pain Location 1: Leg  Pain Orientation 1: Left  Pain Description 1: Aching  Pain Intervention(s) 1: Repositioned  Post Treatment: 4 Vital Signs  O2 Sat (%): 97 %  O2 Device: Nasal cannula  IV  O2 Device: Nasal cannula     MOBILITY: I Mod I S SBA CGA Min Mod Max Total  NT x2 Comments:   Bed Mobility    Rolling [] [] [] [] [] [] [] [] [] [x] []    Supine to Sit [] [] [] [] [] [x] [] [] [] [] []    Scooting [] [] [] [] [] [x] [] [] [] [] []    Sit to Supine [] [] [] [] [] [] [] [] [] [x] [] chair   Transfers    Sit to Stand [] [] [] [] [] [x] [] [] [] [] []    Bed to Chair [] [] [] [] [] [x] [] [] [] [] []    Stand to Sit [] [] [] [] [] [x] [] [] [] [] [] Safety concerns   I=Independent, Mod I=Modified Independent, S=Supervision, SBA=Standby Assistance, CGA=Contact Guard Assistance,   Min=Minimal Assistance, Mod=Moderate Assistance, Max=Maximal Assistance, Total=Total Assistance, NT=Not Tested    BALANCE: Good Fair+ Fair Fair- Poor NT Comments   Sitting Static [x] [] [] [] [] []    Sitting Dynamic [x] [] [] [] [] []              Standing Static [] [] [x] [] [] [] With RW   Standing Dynamic [] [] [x] [] [] []      GAIT: I Mod I S SBA CGA Min Mod Max Total  NT x2 Comments:   Level of Assistance [] [] [] [] [] [x] [] [] [] [] [] Bed to [facility], BSC to chair   Distance 5'    DME Rolling Walker and Gait Belt    Gait Quality Step to gait pattern, short, slow steps    Weightbearing  Status WBAT     I=Independent, Mod I=Modified Independent, S=Supervision, SBA=Standby Assistance, CGA=Contact Guard Assistance,   Min=Minimal Assistance, Mod=Moderate Assistance, Max=Maximal Assistance, Total=Total Assistance, NT=Not Tested    PLAN:   FREQUENCY/DURATION: PT Plan of Care: BID for duration of hospital stay or until stated goals are met, whichever comes first.  TREATMENT:     TREATMENT:   ($$ Therapeutic Activity: 23-37 mins    )  Therapeutic Activity (23 Minutes): Therapeutic activity included Supine to Sit, Scooting, Transfer Training, Ambulation on level ground, Sitting balance  and Standing balance to improve functional Mobility, Strength and Activity tolerance.     TREATMENT GRID:      DATE: 3/6/21 early pm       Ambulation        Hip Flexion x10 AB       Long Arc Quads x10 AB       Hip ab/ad        Heel Raises x20 AB       Toe Raises x20 AB                                Key:  A=active, AA=active assisted, P=passive, B=bilaterally, R=right, L=left   DF=dorsiflexion, PF=plantarflexion    AFTER TREATMENT POSITION/PRECAUTIONS:  Chair, Needs within reach and RN notified    INTERDISCIPLINARY COLLABORATION:  RN/PCT and PT/PTA    TOTAL TREATMENT DURATION:  PT Patient Time In/Time Out  Time In: 1055  Time Out: [address] PT FAMILY HISTORY:  No pertinent family history in first degree relatives

## 2024-07-31 ENCOUNTER — APPOINTMENT (OUTPATIENT)
Dept: CT IMAGING | Age: 89
End: 2024-07-31
Payer: MEDICARE

## 2024-07-31 ENCOUNTER — HOSPITAL ENCOUNTER (EMERGENCY)
Age: 89
Discharge: HOME | End: 2024-07-31
Attending: EMERGENCY MEDICINE
Payer: MEDICARE

## 2024-07-31 ENCOUNTER — APPOINTMENT (OUTPATIENT)
Dept: GENERAL RADIOLOGY | Age: 89
End: 2024-07-31
Payer: MEDICARE

## 2024-07-31 VITALS
BODY MASS INDEX: 26.75 KG/M2 | TEMPERATURE: 98 F | RESPIRATION RATE: 16 BRPM | OXYGEN SATURATION: 94 % | HEART RATE: 80 BPM | WEIGHT: 124 LBS | DIASTOLIC BLOOD PRESSURE: 50 MMHG | HEIGHT: 57 IN | SYSTOLIC BLOOD PRESSURE: 112 MMHG

## 2024-07-31 DIAGNOSIS — S51.012A SKIN TEAR OF LEFT ELBOW WITHOUT COMPLICATION, INITIAL ENCOUNTER: ICD-10-CM
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
## 2024-07-31 DIAGNOSIS — S20.212A CONTUSION OF LEFT CHEST WALL, INITIAL ENCOUNTER: ICD-10-CM

## 2024-07-31 DIAGNOSIS — S09.90XA INJURY OF HEAD, INITIAL ENCOUNTER: ICD-10-CM

## 2024-07-31 PROCEDURE — 99284 EMERGENCY DEPT VISIT MOD MDM: CPT

## 2024-07-31 PROCEDURE — 71101 X-RAY EXAM UNILAT RIBS/CHEST: CPT

## 2024-07-31 PROCEDURE — 70450 CT HEAD/BRAIN W/O DYE: CPT

## 2024-07-31 ASSESSMENT — PAIN DESCRIPTION - LOCATION: LOCATION: RIB CAGE

## 2024-07-31 ASSESSMENT — PAIN DESCRIPTION - ORIENTATION: ORIENTATION: RIGHT

## 2024-07-31 ASSESSMENT — PAIN SCALES - GENERAL: PAINLEVEL_OUTOF10: 8

## 2024-07-31 NOTE — FLOWSHEET NOTE
Patient mobility status  with no difficulty. Provider aware     I have reviewed discharge instructions with the patient.  The patient verbalized understanding.    Patient left ED via Discharge Method: stretcher to Home with  EMS .    Opportunity for questions and clarification provided.     Patient given 0 scripts.

## 2024-07-31 NOTE — ED TRIAGE NOTES
Patient comes via ems from [facility].  Pt reports lost balance and fell hitting her head and left sided rib pain

## 2024-07-31 NOTE — DISCHARGE INSTRUCTIONS
CT shows no evidence of injury to the head, x-rays show no evidence of fracture.    Follow-up with your primary care doctor in 3 to 5 days for reevaluation to ensure you are improving return to the ER for worsening of symptoms.

## 2024-07-31 NOTE — ED PROVIDER NOTES
Emergency Department Provider Note       PCP: Alpa Wolf MD   Age: 89 y.o.   Sex: female     DISPOSITION Decision To Discharge 07/31/2024 02:58:21 PM       ICD-10-CM    1. Fall, initial encounter  W19.XXXA       2. Injury of head, initial encounter  S09.90XA       3. Contusion of left chest wall, initial encounter  S20.212A       4. Skin tear of left elbow without complication, initial encounter  S51.012A           Medical Decision Making     CT shows no evidence of acute injury, x-rays are neck Given.  Patient is well-appearing, skin tears been repaired.  Will discharge patient home at this time.     1 acute, uncomplicated illness or injury.    I independently ordered and reviewed each unique test.  I reviewed external records: provider visit note from PCP.  I reviewed external records: provider visit note from outside specialist.     I independently interpreted the cardiac monitor rhythm strip nsr.  I interpreted the X-rays no fx.  I interpreted the CT Scan no ich.              History     89-year-old female coming from a nursing facility with a history of dementia presenting after a fall.  Staff state that she lost her balance and fell and may have hit her head.  Patient is complaining of some left anterior chest wall pain which she states she hit on the fall.  She has no other injuries or complaints except for a small skin tear on the right elbow.  Per facility policy the patient has to come for evaluation        Physical Exam     Vitals signs and nursing note reviewed:  Vitals:    07/31/24 1330   BP: 93/80   Pulse: 80   Resp: 16   Temp: 98 °F (36.7 °C)   TempSrc: Oral   SpO2: 95%   Weight: 56.2 kg (124 lb)   Height: 1.448 m (4' 9\")      Physical Exam  Vitals and nursing note reviewed.   Constitutional:       General: She is not in acute distress.     Appearance: Normal appearance. She is normal weight. She is not ill-appearing or toxic-appearing.   HENT:      Head: Normocephalic and atraumatic.

## (undated) DEVICE — UNIVERSAL FIXATION CANNULA: Brand: VERSAONE

## (undated) DEVICE — K-WIRE

## (undated) DEVICE — PAD,ABDOMINAL,5"X9",ST,LF,25/BX: Brand: MEDLINE INDUSTRIES, INC.

## (undated) DEVICE — CONTAINER SPEC FRMLN 120ML --

## (undated) DEVICE — (D)PREP SKN CHLRAPRP APPL 26ML -- CONVERT TO ITEM 371833

## (undated) DEVICE — KENDALL SCD EXPRESS SLEEVES, KNEE LENGTH, MEDIUM: Brand: KENDALL SCD

## (undated) DEVICE — DRAPE SHT 3 QTR PROXIMA 53X77 --

## (undated) DEVICE — DRILL, AO, STERILE

## (undated) DEVICE — BANDAGE COBAN 4 IN COMPR W4INXL5YD FOAM COHESIVE QUIK STK SELF ADH SFT

## (undated) DEVICE — 2, DISPOSABLE SUCTION/IRRIGATOR WITHOUT DISPOSABLE TIP: Brand: STRYKEFLOW

## (undated) DEVICE — DRAPE,U/SHT,SPLIT,FILM,60X84,STERILE: Brand: MEDLINE

## (undated) DEVICE — SPONGE GZ W4XL4IN COT 12 PLY TYP VII WVN C FLD DSGN

## (undated) DEVICE — GUIDE WIRE, BALL-TIPPED, STERILE

## (undated) DEVICE — STANDARD HYPODERMIC NEEDLE,POLYPROPYLENE HUB: Brand: MONOJECT

## (undated) DEVICE — SHEET, DRAPE, SPLIT, STERILE: Brand: MEDLINE

## (undated) DEVICE — TFN: Brand: MEDLINE INDUSTRIES, INC.

## (undated) DEVICE — SUT VCRL + 0 36IN UR6 VIO --

## (undated) DEVICE — SUTURE MCRYL SZ 2-0 L27IN ABSRB UD SH L26MM TAPERPOINT NDL Y417H

## (undated) DEVICE — LAP CHOLE: Brand: MEDLINE INDUSTRIES, INC.

## (undated) DEVICE — SUTURE ABSORBABLE BRAIDED 0 CT-1 8X27 IN UD VICRYL JJ41G

## (undated) DEVICE — CLIP APPLIER WITH CLIP LOGIC TECHNOLOGY: Brand: ENDO CLIP III

## (undated) DEVICE — DRAPE C-ARMOUR C-ARM KIT --

## (undated) DEVICE — SOLUTION IV 1000ML 0.9% SOD CHL

## (undated) DEVICE — INTENDED FOR TISSUE SEPARATION, AND OTHER PROCEDURES THAT REQUIRE A SHARP SURGICAL BLADE TO PUNCTURE OR CUT.: Brand: BARD-PARKER SAFETY BLADES SIZE 10, STERILE

## (undated) DEVICE — 7 DAY SILVER-COATED ANTIMICROBIAL BARRIER DRESSING: Brand: ACTICOAT 7  4" X 5"

## (undated) DEVICE — REAMER SHAFT, MOD.TRINKLE: Brand: BIXCUT

## (undated) DEVICE — BLADELESS OPTICAL TROCAR WITH FIXATION CANNULA: Brand: VERSAPORT

## (undated) DEVICE — BAG SPEC RETRV 275ML 10ML DISPOSABLE RELIACATCH

## (undated) DEVICE — SYR 50ML LR LCK 1ML GRAD NSAF --

## (undated) DEVICE — 3M™ IOBAN™ 2 ANTIMICROBIAL INCISE DRAPE 6650EZ: Brand: IOBAN™ 2

## (undated) DEVICE — PREP SKN CHLRAPRP APL 26ML STR --

## (undated) DEVICE — [HIGH FLOW INSUFFLATOR,  DO NOT USE IF PACKAGE IS DAMAGED,  KEEP DRY,  KEEP AWAY FROM SUNLIGHT,  PROTECT FROM HEAT AND RADIOACTIVE SOURCES.]: Brand: PNEUMOSURE

## (undated) DEVICE — SUTURE VCRL SZ 2-0 L18IN ABSRB VLT L26MM SH 1/2 CIR J775D

## (undated) DEVICE — SUTURE ENDOLOOP SZ 0 L18IN ABSRB VLT LIG SLDE KNOT VCRL

## (undated) DEVICE — REM POLYHESIVE ADULT PATIENT RETURN ELECTRODE: Brand: VALLEYLAB

## (undated) DEVICE — SOL ANTI-FOG 6ML MEDC -- MEDICHOICE - CONVERT TO 358427

## (undated) DEVICE — 2000CC GUARDIAN II: Brand: GUARDIAN

## (undated) DEVICE — LOGICUT SCISSOR LENGTH 320MM: Brand: LOGI - LAPAROSCOPIC INSTRUMENT SYSTEM

## (undated) DEVICE — BLUNT TROCAR WITH THREADED ANCHOR: Brand: VERSAONE

## (undated) DEVICE — SLIM BODY SKIN STAPLER: Brand: APPOSE ULC